# Patient Record
Sex: MALE | Race: WHITE | Employment: OTHER | URBAN - METROPOLITAN AREA
[De-identification: names, ages, dates, MRNs, and addresses within clinical notes are randomized per-mention and may not be internally consistent; named-entity substitution may affect disease eponyms.]

---

## 2017-01-01 ENCOUNTER — HOSPITAL ENCOUNTER (EMERGENCY)
Facility: CLINIC | Age: 57
Discharge: HOME | End: 2017-11-03
Attending: FAMILY MEDICINE | Admitting: FAMILY MEDICINE
Payer: COMMERCIAL

## 2017-01-01 ENCOUNTER — ANESTHESIA EVENT (OUTPATIENT)
Dept: SURGERY | Facility: CLINIC | Age: 57
End: 2017-01-01
Payer: COMMERCIAL

## 2017-01-01 ENCOUNTER — HOSPITAL ENCOUNTER (OUTPATIENT)
Dept: CARDIOLOGY | Facility: CLINIC | Age: 57
Discharge: HOME | End: 2017-12-07
Attending: INTERNAL MEDICINE | Admitting: INTERNAL MEDICINE
Payer: COMMERCIAL

## 2017-01-01 ENCOUNTER — HOSPITAL ENCOUNTER (OUTPATIENT)
Dept: NUCLEAR MEDICINE | Facility: CLINIC | Age: 57
Setting detail: NUCLEAR MEDICINE
Discharge: HOME | End: 2017-11-29
Attending: FAMILY MEDICINE | Admitting: FAMILY MEDICINE
Payer: COMMERCIAL

## 2017-01-01 ENCOUNTER — OFFICE VISIT (OUTPATIENT)
Dept: FAMILY MEDICINE | Facility: CLINIC | Age: 57
End: 2017-01-01
Payer: COMMERCIAL

## 2017-01-01 ENCOUNTER — APPOINTMENT (OUTPATIENT)
Dept: GENERAL RADIOLOGY | Facility: CLINIC | Age: 57
End: 2017-01-01
Attending: EMERGENCY MEDICINE
Payer: COMMERCIAL

## 2017-01-01 ENCOUNTER — HOSPITAL ENCOUNTER (OUTPATIENT)
Dept: CT IMAGING | Facility: CLINIC | Age: 57
Discharge: HOME | End: 2017-12-20
Attending: INTERNAL MEDICINE | Admitting: INTERNAL MEDICINE
Payer: COMMERCIAL

## 2017-01-01 ENCOUNTER — HOSPITAL ENCOUNTER (OUTPATIENT)
Dept: PET IMAGING | Facility: CLINIC | Age: 57
Discharge: HOME | End: 2017-12-15
Attending: INTERNAL MEDICINE | Admitting: INTERNAL MEDICINE
Payer: COMMERCIAL

## 2017-01-01 ENCOUNTER — OFFICE VISIT (OUTPATIENT)
Dept: SURGERY | Facility: CLINIC | Age: 57
End: 2017-01-01
Payer: COMMERCIAL

## 2017-01-01 ENCOUNTER — APPOINTMENT (OUTPATIENT)
Dept: ULTRASOUND IMAGING | Facility: CLINIC | Age: 57
DRG: 274 | End: 2017-01-01
Attending: HOSPITALIST
Payer: COMMERCIAL

## 2017-01-01 ENCOUNTER — TELEPHONE (OUTPATIENT)
Dept: NUTRITION | Facility: CLINIC | Age: 57
End: 2017-01-01

## 2017-01-01 ENCOUNTER — APPOINTMENT (OUTPATIENT)
Dept: CT IMAGING | Facility: CLINIC | Age: 57
DRG: 274 | End: 2017-01-01
Attending: INTERNAL MEDICINE
Payer: COMMERCIAL

## 2017-01-01 ENCOUNTER — HOSPITAL ENCOUNTER (OUTPATIENT)
Dept: CARDIOLOGY | Facility: CLINIC | Age: 57
Discharge: HOME | End: 2017-11-29
Attending: FAMILY MEDICINE | Admitting: FAMILY MEDICINE
Payer: COMMERCIAL

## 2017-01-01 ENCOUNTER — APPOINTMENT (OUTPATIENT)
Dept: GENERAL RADIOLOGY | Facility: CLINIC | Age: 57
DRG: 274 | End: 2017-01-01
Attending: RADIOLOGY
Payer: COMMERCIAL

## 2017-01-01 ENCOUNTER — APPOINTMENT (OUTPATIENT)
Dept: CT IMAGING | Facility: CLINIC | Age: 57
DRG: 274 | End: 2017-01-01
Attending: HOSPITALIST
Payer: COMMERCIAL

## 2017-01-01 ENCOUNTER — HOSPITAL ENCOUNTER (OUTPATIENT)
Dept: MRI IMAGING | Facility: CLINIC | Age: 57
Discharge: HOME | End: 2017-12-29
Attending: INTERNAL MEDICINE | Admitting: INTERNAL MEDICINE
Payer: COMMERCIAL

## 2017-01-01 ENCOUNTER — TELEPHONE (OUTPATIENT)
Dept: FAMILY MEDICINE | Facility: CLINIC | Age: 57
End: 2017-01-01

## 2017-01-01 ENCOUNTER — HOSPITAL ENCOUNTER (EMERGENCY)
Facility: CLINIC | Age: 57
Discharge: OTHER INSTITUTION | End: 2017-12-07
Attending: EMERGENCY MEDICINE | Admitting: EMERGENCY MEDICINE
Payer: COMMERCIAL

## 2017-01-01 ENCOUNTER — CARE COORDINATION (OUTPATIENT)
Dept: ONCOLOGY | Facility: CLINIC | Age: 57
End: 2017-01-01

## 2017-01-01 ENCOUNTER — HOSPITAL ENCOUNTER (OUTPATIENT)
Dept: GENERAL RADIOLOGY | Facility: CLINIC | Age: 57
End: 2017-12-20
Attending: RADIOLOGY
Payer: COMMERCIAL

## 2017-01-01 ENCOUNTER — ONCOLOGY VISIT (OUTPATIENT)
Dept: ONCOLOGY | Facility: CLINIC | Age: 57
End: 2017-01-01
Attending: INTERNAL MEDICINE
Payer: COMMERCIAL

## 2017-01-01 ENCOUNTER — APPOINTMENT (OUTPATIENT)
Dept: CARDIOLOGY | Facility: CLINIC | Age: 57
DRG: 274 | End: 2017-01-01
Attending: PHYSICIAN ASSISTANT
Payer: COMMERCIAL

## 2017-01-01 ENCOUNTER — OFFICE VISIT (OUTPATIENT)
Dept: CARDIOLOGY | Facility: CLINIC | Age: 57
End: 2017-01-01

## 2017-01-01 ENCOUNTER — HOSPITAL ENCOUNTER (INPATIENT)
Facility: CLINIC | Age: 57
LOS: 2 days | Discharge: HOME | DRG: 274 | End: 2017-12-09
Attending: INTERNAL MEDICINE | Admitting: INTERNAL MEDICINE
Payer: COMMERCIAL

## 2017-01-01 VITALS
RESPIRATION RATE: 16 BRPM | TEMPERATURE: 98.6 F | HEART RATE: 69 BPM | WEIGHT: 162.48 LBS | OXYGEN SATURATION: 95 % | BODY MASS INDEX: 23.66 KG/M2 | DIASTOLIC BLOOD PRESSURE: 78 MMHG | SYSTOLIC BLOOD PRESSURE: 117 MMHG

## 2017-01-01 VITALS
SYSTOLIC BLOOD PRESSURE: 139 MMHG | HEART RATE: 56 BPM | BODY MASS INDEX: 21.42 KG/M2 | DIASTOLIC BLOOD PRESSURE: 87 MMHG | OXYGEN SATURATION: 99 % | WEIGHT: 149.6 LBS | RESPIRATION RATE: 18 BRPM | HEIGHT: 70 IN | TEMPERATURE: 96.3 F

## 2017-01-01 VITALS
DIASTOLIC BLOOD PRESSURE: 60 MMHG | SYSTOLIC BLOOD PRESSURE: 122 MMHG | HEART RATE: 88 BPM | TEMPERATURE: 97.7 F | WEIGHT: 156 LBS | HEIGHT: 70 IN | BODY MASS INDEX: 22.33 KG/M2

## 2017-01-01 VITALS
SYSTOLIC BLOOD PRESSURE: 138 MMHG | DIASTOLIC BLOOD PRESSURE: 90 MMHG | RESPIRATION RATE: 16 BRPM | OXYGEN SATURATION: 95 % | HEART RATE: 91 BPM | TEMPERATURE: 97.5 F

## 2017-01-01 VITALS
OXYGEN SATURATION: 97 % | DIASTOLIC BLOOD PRESSURE: 85 MMHG | TEMPERATURE: 97.5 F | BODY MASS INDEX: 23.3 KG/M2 | HEIGHT: 69 IN | HEART RATE: 67 BPM | RESPIRATION RATE: 16 BRPM | SYSTOLIC BLOOD PRESSURE: 127 MMHG | WEIGHT: 157.3 LBS

## 2017-01-01 VITALS
BODY MASS INDEX: 21.33 KG/M2 | WEIGHT: 149 LBS | TEMPERATURE: 97.8 F | RESPIRATION RATE: 14 BRPM | HEART RATE: 60 BPM | SYSTOLIC BLOOD PRESSURE: 111 MMHG | HEIGHT: 70 IN | DIASTOLIC BLOOD PRESSURE: 78 MMHG

## 2017-01-01 VITALS
SYSTOLIC BLOOD PRESSURE: 100 MMHG | DIASTOLIC BLOOD PRESSURE: 70 MMHG | WEIGHT: 158.6 LBS | OXYGEN SATURATION: 98 % | TEMPERATURE: 98.4 F | HEIGHT: 70 IN | BODY MASS INDEX: 22.71 KG/M2 | HEART RATE: 154 BPM

## 2017-01-01 VITALS
WEIGHT: 166.2 LBS | BODY MASS INDEX: 24.62 KG/M2 | DIASTOLIC BLOOD PRESSURE: 86 MMHG | OXYGEN SATURATION: 100 % | HEIGHT: 69 IN | HEART RATE: 140 BPM | SYSTOLIC BLOOD PRESSURE: 122 MMHG

## 2017-01-01 VITALS
WEIGHT: 166 LBS | TEMPERATURE: 97.4 F | RESPIRATION RATE: 12 BRPM | BODY MASS INDEX: 24.16 KG/M2 | HEART RATE: 141 BPM | DIASTOLIC BLOOD PRESSURE: 85 MMHG | OXYGEN SATURATION: 96 % | SYSTOLIC BLOOD PRESSURE: 127 MMHG

## 2017-01-01 VITALS
DIASTOLIC BLOOD PRESSURE: 78 MMHG | OXYGEN SATURATION: 99 % | HEART RATE: 68 BPM | TEMPERATURE: 98 F | RESPIRATION RATE: 16 BRPM | SYSTOLIC BLOOD PRESSURE: 118 MMHG

## 2017-01-01 VITALS
HEART RATE: 148 BPM | TEMPERATURE: 97.8 F | RESPIRATION RATE: 18 BRPM | HEIGHT: 70 IN | SYSTOLIC BLOOD PRESSURE: 117 MMHG | DIASTOLIC BLOOD PRESSURE: 77 MMHG | WEIGHT: 166 LBS | BODY MASS INDEX: 23.77 KG/M2 | OXYGEN SATURATION: 97 %

## 2017-01-01 DIAGNOSIS — I48.92 ATRIAL FLUTTER, UNSPECIFIED TYPE (H): ICD-10-CM

## 2017-01-01 DIAGNOSIS — R59.0 MEDIASTINAL LYMPHADENOPATHY: ICD-10-CM

## 2017-01-01 DIAGNOSIS — K21.9 GASTROESOPHAGEAL REFLUX DISEASE WITHOUT ESOPHAGITIS: ICD-10-CM

## 2017-01-01 DIAGNOSIS — I48.3 TYPICAL ATRIAL FLUTTER (H): Primary | ICD-10-CM

## 2017-01-01 DIAGNOSIS — J90 PLEURAL EFFUSION: ICD-10-CM

## 2017-01-01 DIAGNOSIS — C34.92 NON-SMALL CELL CANCER OF LEFT LUNG (H): ICD-10-CM

## 2017-01-01 DIAGNOSIS — I48.92 ATRIAL FLUTTER WITH RAPID VENTRICULAR RESPONSE (H): ICD-10-CM

## 2017-01-01 DIAGNOSIS — C34.90 NON-SMALL CELL CARCINOMA OF LUNG (H): Primary | ICD-10-CM

## 2017-01-01 DIAGNOSIS — R91.8 LUNG MASS: ICD-10-CM

## 2017-01-01 DIAGNOSIS — F17.210 CIGARETTE NICOTINE DEPENDENCE WITHOUT COMPLICATION: ICD-10-CM

## 2017-01-01 DIAGNOSIS — I42.9 CARDIOMYOPATHY, UNSPECIFIED TYPE (H): Primary | ICD-10-CM

## 2017-01-01 DIAGNOSIS — R10.84 ABDOMINAL PAIN, GENERALIZED: ICD-10-CM

## 2017-01-01 DIAGNOSIS — I50.21 ACUTE SYSTOLIC HEART FAILURE (H): ICD-10-CM

## 2017-01-01 DIAGNOSIS — C34.02 MALIGNANT NEOPLASM OF HILUS OF LEFT LUNG (H): Primary | ICD-10-CM

## 2017-01-01 DIAGNOSIS — R91.8 LUNG MASS: Primary | ICD-10-CM

## 2017-01-01 DIAGNOSIS — R00.0 TACHYCARDIA: Primary | ICD-10-CM

## 2017-01-01 DIAGNOSIS — R06.09 DOE (DYSPNEA ON EXERTION): ICD-10-CM

## 2017-01-01 DIAGNOSIS — I49.9 IRREGULAR HEART BEAT: ICD-10-CM

## 2017-01-01 DIAGNOSIS — R06.09 DYSPNEA ON EXERTION: ICD-10-CM

## 2017-01-01 DIAGNOSIS — I49.9 IRREGULAR HEART BEAT: Primary | ICD-10-CM

## 2017-01-01 DIAGNOSIS — I48.92 ATRIAL FLUTTER (H): ICD-10-CM

## 2017-01-01 DIAGNOSIS — R07.9 ACUTE CHEST PAIN: ICD-10-CM

## 2017-01-01 DIAGNOSIS — Z71.6 ENCOUNTER FOR TOBACCO USE CESSATION COUNSELING: ICD-10-CM

## 2017-01-01 DIAGNOSIS — C34.90 NON-SMALL CELL CARCINOMA OF LUNG (H): ICD-10-CM

## 2017-01-01 DIAGNOSIS — R07.9 CHEST PAIN, UNSPECIFIED TYPE: ICD-10-CM

## 2017-01-01 DIAGNOSIS — I42.9 SECONDARY CARDIOMYOPATHY (H): ICD-10-CM

## 2017-01-01 LAB
ALBUMIN SERPL-MCNC: 3.4 G/DL (ref 3.4–5)
ALP SERPL-CCNC: 122 U/L (ref 40–150)
ALT SERPL W P-5'-P-CCNC: 30 U/L (ref 0–70)
ANION GAP SERPL CALCULATED.3IONS-SCNC: 5 MMOL/L (ref 3–14)
ANION GAP SERPL CALCULATED.3IONS-SCNC: 6 MMOL/L (ref 3–14)
ANION GAP SERPL CALCULATED.3IONS-SCNC: 7 MMOL/L (ref 3–14)
ANION GAP SERPL CALCULATED.3IONS-SCNC: 8 MMOL/L (ref 3–14)
ANION GAP SERPL CALCULATED.3IONS-SCNC: NORMAL MMOL/L (ref 6–17)
APPEARANCE FLD: NORMAL
APTT PPP: 38 SEC (ref 22–37)
AST SERPL W P-5'-P-CCNC: 22 U/L (ref 0–45)
BACTERIA SPEC CULT: NO GROWTH
BASOPHILS # BLD AUTO: 0 10E9/L (ref 0–0.2)
BASOPHILS # BLD AUTO: 0 10E9/L (ref 0–0.2)
BASOPHILS NFR BLD AUTO: 0.3 %
BASOPHILS NFR BLD AUTO: 0.3 %
BILIRUB SERPL-MCNC: 0.6 MG/DL (ref 0.2–1.3)
BUN SERPL-MCNC: 15 MG/DL (ref 7–30)
BUN SERPL-MCNC: 20 MG/DL (ref 7–30)
BUN SERPL-MCNC: 23 MG/DL (ref 7–30)
BUN SERPL-MCNC: 24 MG/DL (ref 7–30)
BUN SERPL-MCNC: NORMAL MG/DL (ref 7–30)
CALCIUM SERPL-MCNC: 8.5 MG/DL (ref 8.5–10.1)
CALCIUM SERPL-MCNC: 8.6 MG/DL (ref 8.5–10.1)
CALCIUM SERPL-MCNC: 8.7 MG/DL (ref 8.5–10.1)
CALCIUM SERPL-MCNC: 8.7 MG/DL (ref 8.5–10.1)
CALCIUM SERPL-MCNC: NORMAL MG/DL (ref 8.5–10.1)
CHLORIDE SERPL-SCNC: 105 MMOL/L (ref 94–109)
CHLORIDE SERPL-SCNC: 106 MMOL/L (ref 94–109)
CHLORIDE SERPL-SCNC: 107 MMOL/L (ref 94–109)
CHLORIDE SERPL-SCNC: 107 MMOL/L (ref 94–109)
CHLORIDE SERPL-SCNC: NORMAL MMOL/L (ref 94–109)
CO2 SERPL-SCNC: 25 MMOL/L (ref 20–32)
CO2 SERPL-SCNC: 26 MMOL/L (ref 20–32)
CO2 SERPL-SCNC: 29 MMOL/L (ref 20–32)
CO2 SERPL-SCNC: 29 MMOL/L (ref 20–32)
CO2 SERPL-SCNC: NORMAL MMOL/L (ref 20–32)
COLOR FLD: YELLOW
COPATH REPORT: NORMAL
CREAT SERPL-MCNC: 0.95 MG/DL (ref 0.66–1.25)
CREAT SERPL-MCNC: 0.95 MG/DL (ref 0.66–1.25)
CREAT SERPL-MCNC: 1 MG/DL (ref 0.66–1.25)
CREAT SERPL-MCNC: 1.13 MG/DL (ref 0.66–1.25)
CREAT SERPL-MCNC: NORMAL MG/DL (ref 0.66–1.25)
DIFFERENTIAL METHOD BLD: NORMAL
EOSINOPHIL # BLD AUTO: 0 10E9/L (ref 0–0.7)
EOSINOPHIL # BLD AUTO: 0.1 10E9/L (ref 0–0.7)
EOSINOPHIL NFR BLD AUTO: 0.5 %
EOSINOPHIL NFR BLD AUTO: 0.6 %
ERYTHROCYTE [DISTWIDTH] IN BLOOD BY AUTOMATED COUNT: 12.9 % (ref 10–15)
ERYTHROCYTE [DISTWIDTH] IN BLOOD BY AUTOMATED COUNT: 13 % (ref 10–15)
ERYTHROCYTE [DISTWIDTH] IN BLOOD BY AUTOMATED COUNT: 13.2 % (ref 10–15)
ERYTHROCYTE [DISTWIDTH] IN BLOOD BY AUTOMATED COUNT: 13.5 % (ref 10–15)
ERYTHROCYTE [DISTWIDTH] IN BLOOD BY AUTOMATED COUNT: NORMAL % (ref 10–15)
GFR SERPL CREATININE-BSD FRML MDRD: 66 ML/MIN/1.7M2
GFR SERPL CREATININE-BSD FRML MDRD: 77 ML/MIN/1.7M2
GFR SERPL CREATININE-BSD FRML MDRD: 81 ML/MIN/1.7M2
GFR SERPL CREATININE-BSD FRML MDRD: 81 ML/MIN/1.7M2
GFR SERPL CREATININE-BSD FRML MDRD: NORMAL ML/MIN/1.7M2
GLUCOSE BLDC GLUCOMTR-MCNC: 94 MG/DL (ref 70–99)
GLUCOSE FLD-MCNC: 88 MG/DL
GLUCOSE SERPL-MCNC: 77 MG/DL (ref 70–99)
GLUCOSE SERPL-MCNC: 88 MG/DL (ref 70–99)
GLUCOSE SERPL-MCNC: 90 MG/DL (ref 70–99)
GLUCOSE SERPL-MCNC: 96 MG/DL (ref 70–99)
GLUCOSE SERPL-MCNC: NORMAL MG/DL (ref 70–99)
GRAM STN SPEC: NORMAL
GRAM STN SPEC: NORMAL
HCT VFR BLD AUTO: 41.2 % (ref 40–53)
HCT VFR BLD AUTO: 42.3 % (ref 40–53)
HCT VFR BLD AUTO: 42.7 % (ref 40–53)
HCT VFR BLD AUTO: 44 % (ref 40–53)
HCT VFR BLD AUTO: 44.8 % (ref 40–53)
HCT VFR BLD AUTO: NORMAL % (ref 40–53)
HGB BLD-MCNC: 13.7 G/DL (ref 13.3–17.7)
HGB BLD-MCNC: 14 G/DL (ref 13.3–17.7)
HGB BLD-MCNC: 14.5 G/DL (ref 13.3–17.7)
HGB BLD-MCNC: 14.8 G/DL (ref 13.3–17.7)
HGB BLD-MCNC: 15 G/DL (ref 13.3–17.7)
HGB BLD-MCNC: 15.2 G/DL (ref 13.3–17.7)
HGB BLD-MCNC: NORMAL G/DL (ref 13.3–17.7)
IMM GRANULOCYTES # BLD: 0 10E9/L (ref 0–0.4)
IMM GRANULOCYTES # BLD: 0 10E9/L (ref 0–0.4)
IMM GRANULOCYTES NFR BLD: 0.2 %
IMM GRANULOCYTES NFR BLD: 0.3 %
INR PPP: 1.05 (ref 0.86–1.14)
INR PPP: 1.09 (ref 0.86–1.14)
INTERPRETATION ECG - MUSE: NORMAL
INTERPRETATION ECG - MUSE: NORMAL
LAB SCANNED RESULT: NORMAL
LDH FLD L TO P-CCNC: 219 U/L
LDH SERPL L TO P-CCNC: 357 U/L (ref 85–227)
LMWH PPP CHRO-ACNC: 0.16 IU/ML
LMWH PPP CHRO-ACNC: 0.22 IU/ML
LMWH PPP CHRO-ACNC: 0.26 IU/ML
LMWH PPP CHRO-ACNC: 1.62 IU/ML
LYMPHOCYTES # BLD AUTO: 1.4 10E9/L (ref 0.8–5.3)
LYMPHOCYTES # BLD AUTO: 1.8 10E9/L (ref 0.8–5.3)
LYMPHOCYTES NFR BLD AUTO: 17.4 %
LYMPHOCYTES NFR BLD AUTO: 28.7 %
LYMPHOCYTES NFR FLD MANUAL: 80 %
MCH RBC QN AUTO: 29.5 PG (ref 26.5–33)
MCH RBC QN AUTO: 29.9 PG (ref 26.5–33)
MCH RBC QN AUTO: 30.1 PG (ref 26.5–33)
MCH RBC QN AUTO: 30.8 PG (ref 26.5–33)
MCH RBC QN AUTO: NORMAL PG (ref 26.5–33)
MCHC RBC AUTO-ENTMCNC: 33.1 G/DL (ref 31.5–36.5)
MCHC RBC AUTO-ENTMCNC: 33.9 G/DL (ref 31.5–36.5)
MCHC RBC AUTO-ENTMCNC: 34.1 G/DL (ref 31.5–36.5)
MCHC RBC AUTO-ENTMCNC: 34.7 G/DL (ref 31.5–36.5)
MCHC RBC AUTO-ENTMCNC: NORMAL G/DL (ref 31.5–36.5)
MCV RBC AUTO: 88 FL (ref 78–100)
MCV RBC AUTO: 88 FL (ref 78–100)
MCV RBC AUTO: 89 FL (ref 78–100)
MCV RBC AUTO: 89 FL (ref 78–100)
MCV RBC AUTO: NORMAL FL (ref 78–100)
MONOCYTES # BLD AUTO: 0.8 10E9/L (ref 0–1.3)
MONOCYTES # BLD AUTO: 1.1 10E9/L (ref 0–1.3)
MONOCYTES NFR BLD AUTO: 12.1 %
MONOCYTES NFR BLD AUTO: 13.6 %
MONOS+MACROS NFR FLD MANUAL: 3 %
NEUTROPHILS # BLD AUTO: 3.7 10E9/L (ref 1.6–8.3)
NEUTROPHILS # BLD AUTO: 5.4 10E9/L (ref 1.6–8.3)
NEUTROPHILS NFR BLD AUTO: 58.2 %
NEUTROPHILS NFR BLD AUTO: 67.8 %
NEUTS BAND NFR FLD MANUAL: 17 %
NT-PROBNP SERPL-MCNC: 1314 PG/ML (ref 0–900)
PLATELET # BLD AUTO: 190 10E9/L (ref 150–450)
PLATELET # BLD AUTO: 215 10E9/L (ref 150–450)
PLATELET # BLD AUTO: 233 10E9/L (ref 150–450)
PLATELET # BLD AUTO: 246 10E9/L (ref 150–450)
PLATELET # BLD AUTO: NORMAL 10E9/L (ref 150–450)
POTASSIUM SERPL-SCNC: 4 MMOL/L (ref 3.4–5.3)
POTASSIUM SERPL-SCNC: 4.1 MMOL/L (ref 3.4–5.3)
POTASSIUM SERPL-SCNC: 4.2 MMOL/L (ref 3.4–5.3)
POTASSIUM SERPL-SCNC: 4.2 MMOL/L (ref 3.4–5.3)
POTASSIUM SERPL-SCNC: NORMAL MMOL/L (ref 3.4–5.3)
PROT FLD-MCNC: 2.2 G/DL
PROT SERPL-MCNC: 5.9 G/DL (ref 6.8–8.8)
PROT SERPL-MCNC: 6.6 G/DL (ref 6.8–8.8)
RADIOLOGIST FLAGS: ABNORMAL
RBC # BLD AUTO: 4.75 10E12/L (ref 4.4–5.9)
RBC # BLD AUTO: 4.8 10E12/L (ref 4.4–5.9)
RBC # BLD AUTO: 4.98 10E12/L (ref 4.4–5.9)
RBC # BLD AUTO: 5.08 10E12/L (ref 4.4–5.9)
RBC # BLD AUTO: NORMAL 10E12/L (ref 4.4–5.9)
SODIUM SERPL-SCNC: 139 MMOL/L (ref 133–144)
SODIUM SERPL-SCNC: 140 MMOL/L (ref 133–144)
SODIUM SERPL-SCNC: 140 MMOL/L (ref 133–144)
SODIUM SERPL-SCNC: 141 MMOL/L (ref 133–144)
SODIUM SERPL-SCNC: NORMAL MMOL/L (ref 133–144)
SPECIMEN SOURCE FLD: NORMAL
SPECIMEN SOURCE: NORMAL
SPECIMEN SOURCE: NORMAL
TROPONIN I SERPL-MCNC: <0.015 UG/L (ref 0–0.04)
TSH SERPL DL<=0.005 MIU/L-ACNC: 2.35 MU/L (ref 0.4–4)
TSH SERPL DL<=0.005 MIU/L-ACNC: 2.84 MU/L (ref 0.4–4)
TSH SERPL DL<=0.005 MIU/L-ACNC: NORMAL MU/L (ref 0.4–4)
WBC # BLD AUTO: 6.4 10E9/L (ref 4–11)
WBC # BLD AUTO: 7.4 10E9/L (ref 4–11)
WBC # BLD AUTO: 7.9 10E9/L (ref 4–11)
WBC # BLD AUTO: 8 10E9/L (ref 4–11)
WBC # BLD AUTO: NORMAL 10E9/L (ref 4–11)
WBC # FLD AUTO: 800 /UL

## 2017-01-01 PROCEDURE — 93312 ECHO TRANSESOPHAGEAL: CPT | Mod: 26 | Performed by: INTERNAL MEDICINE

## 2017-01-01 PROCEDURE — 93613 INTRACARDIAC EPHYS 3D MAPG: CPT | Performed by: INTERNAL MEDICINE

## 2017-01-01 PROCEDURE — 99285 EMERGENCY DEPT VISIT HI MDM: CPT | Mod: 25 | Performed by: EMERGENCY MEDICINE

## 2017-01-01 PROCEDURE — C1732 CATH, EP, DIAG/ABL, 3D/VECT: HCPCS

## 2017-01-01 PROCEDURE — 25000128 H RX IP 250 OP 636: Performed by: INTERNAL MEDICINE

## 2017-01-01 PROCEDURE — 99232 SBSQ HOSP IP/OBS MODERATE 35: CPT | Performed by: INTERNAL MEDICINE

## 2017-01-01 PROCEDURE — 82962 GLUCOSE BLOOD TEST: CPT

## 2017-01-01 PROCEDURE — 96368 THER/DIAG CONCURRENT INF: CPT | Performed by: EMERGENCY MEDICINE

## 2017-01-01 PROCEDURE — 85018 HEMOGLOBIN: CPT | Performed by: INTERNAL MEDICINE

## 2017-01-01 PROCEDURE — 85027 COMPLETE CBC AUTOMATED: CPT | Performed by: PHYSICIAN ASSISTANT

## 2017-01-01 PROCEDURE — 93010 ELECTROCARDIOGRAM REPORT: CPT | Performed by: INTERNAL MEDICINE

## 2017-01-01 PROCEDURE — 99211 OFF/OP EST MAY X REQ PHY/QHP: CPT

## 2017-01-01 PROCEDURE — 81445 SO NEO GSAP 5-50DNA/DNA&RNA: CPT | Performed by: RADIOLOGY

## 2017-01-01 PROCEDURE — 88112 CYTOPATH CELL ENHANCE TECH: CPT | Mod: 26 | Performed by: HOSPITALIST

## 2017-01-01 PROCEDURE — 71250 CT THORAX DX C-: CPT

## 2017-01-01 PROCEDURE — 00000155 ZZHCL STATISTIC H-CELL BLOCK W/STAIN: Performed by: HOSPITALIST

## 2017-01-01 PROCEDURE — 93018 CV STRESS TEST I&R ONLY: CPT | Performed by: INTERNAL MEDICINE

## 2017-01-01 PROCEDURE — 25000132 ZZH RX MED GY IP 250 OP 250 PS 637: Performed by: FAMILY MEDICINE

## 2017-01-01 PROCEDURE — 21000001 ZZH R&B HEART CARE

## 2017-01-01 PROCEDURE — 25000132 ZZH RX MED GY IP 250 OP 250 PS 637: Performed by: INTERNAL MEDICINE

## 2017-01-01 PROCEDURE — 34300033 ZZH RX 343: Performed by: INTERNAL MEDICINE

## 2017-01-01 PROCEDURE — 85025 COMPLETE CBC W/AUTO DIFF WBC: CPT | Performed by: EMERGENCY MEDICINE

## 2017-01-01 PROCEDURE — 93306 TTE W/DOPPLER COMPLETE: CPT | Mod: 26 | Performed by: INTERNAL MEDICINE

## 2017-01-01 PROCEDURE — 99152 MOD SED SAME PHYS/QHP 5/>YRS: CPT

## 2017-01-01 PROCEDURE — 83615 LACTATE (LD) (LDH) ENZYME: CPT | Performed by: PHYSICIAN ASSISTANT

## 2017-01-01 PROCEDURE — 99221 1ST HOSP IP/OBS SF/LOW 40: CPT | Mod: 25 | Performed by: INTERNAL MEDICINE

## 2017-01-01 PROCEDURE — 99223 1ST HOSP IP/OBS HIGH 75: CPT | Mod: AI | Performed by: INTERNAL MEDICINE

## 2017-01-01 PROCEDURE — 25000128 H RX IP 250 OP 636: Performed by: FAMILY MEDICINE

## 2017-01-01 PROCEDURE — 93325 DOPPLER ECHO COLOR FLOW MAPG: CPT

## 2017-01-01 PROCEDURE — 99233 SBSQ HOSP IP/OBS HIGH 50: CPT | Performed by: INTERNAL MEDICINE

## 2017-01-01 PROCEDURE — 25000128 H RX IP 250 OP 636: Performed by: EMERGENCY MEDICINE

## 2017-01-01 PROCEDURE — 93621 COMP EP EVL L PAC&REC C SINS: CPT

## 2017-01-01 PROCEDURE — 74177 CT ABD & PELVIS W/CONTRAST: CPT

## 2017-01-01 PROCEDURE — 93016 CV STRESS TEST SUPVJ ONLY: CPT | Performed by: INTERNAL MEDICINE

## 2017-01-01 PROCEDURE — A9585 GADOBUTROL INJECTION: HCPCS | Performed by: INTERNAL MEDICINE

## 2017-01-01 PROCEDURE — 99215 OFFICE O/P EST HI 40 MIN: CPT | Performed by: INTERNAL MEDICINE

## 2017-01-01 PROCEDURE — 4A0234Z MEASUREMENT OF CARDIAC ELECTRICAL ACTIVITY, PERCUTANEOUS APPROACH: ICD-10-PCS | Performed by: INTERNAL MEDICINE

## 2017-01-01 PROCEDURE — 80048 BASIC METABOLIC PNL TOTAL CA: CPT | Performed by: PHYSICIAN ASSISTANT

## 2017-01-01 PROCEDURE — 88305 TISSUE EXAM BY PATHOLOGIST: CPT | Performed by: HOSPITALIST

## 2017-01-01 PROCEDURE — 27210782 ZZH KIT EP TOTES DISP CR7

## 2017-01-01 PROCEDURE — 25000125 ZZHC RX 250: Performed by: FAMILY MEDICINE

## 2017-01-01 PROCEDURE — 88305 TISSUE EXAM BY PATHOLOGIST: CPT | Performed by: RADIOLOGY

## 2017-01-01 PROCEDURE — 99231 SBSQ HOSP IP/OBS SF/LOW 25: CPT | Performed by: INTERNAL MEDICINE

## 2017-01-01 PROCEDURE — 78452 HT MUSCLE IMAGE SPECT MULT: CPT

## 2017-01-01 PROCEDURE — 36415 COLL VENOUS BLD VENIPUNCTURE: CPT | Performed by: INTERNAL MEDICINE

## 2017-01-01 PROCEDURE — 84157 ASSAY OF PROTEIN OTHER: CPT | Performed by: HOSPITALIST

## 2017-01-01 PROCEDURE — 4A023FZ MEASUREMENT OF CARDIAC RHYTHM, PERCUTANEOUS APPROACH: ICD-10-PCS | Performed by: INTERNAL MEDICINE

## 2017-01-01 PROCEDURE — 99205 OFFICE O/P NEW HI 60 MIN: CPT | Performed by: INTERNAL MEDICINE

## 2017-01-01 PROCEDURE — 99203 OFFICE O/P NEW LOW 30 MIN: CPT | Performed by: SURGERY

## 2017-01-01 PROCEDURE — 85520 HEPARIN ASSAY: CPT | Performed by: INTERNAL MEDICINE

## 2017-01-01 PROCEDURE — 78816 PET IMAGE W/CT FULL BODY: CPT | Mod: PI

## 2017-01-01 PROCEDURE — 25000132 ZZH RX MED GY IP 250 OP 250 PS 637: Performed by: EMERGENCY MEDICINE

## 2017-01-01 PROCEDURE — 99284 EMERGENCY DEPT VISIT MOD MDM: CPT | Mod: Z6 | Performed by: FAMILY MEDICINE

## 2017-01-01 PROCEDURE — 25000125 ZZHC RX 250: Performed by: INTERNAL MEDICINE

## 2017-01-01 PROCEDURE — 93613 INTRACARDIAC EPHYS 3D MAPG: CPT

## 2017-01-01 PROCEDURE — 32555 ASPIRATE PLEURA W/ IMAGING: CPT

## 2017-01-01 PROCEDURE — 93000 ELECTROCARDIOGRAM COMPLETE: CPT | Performed by: FAMILY MEDICINE

## 2017-01-01 PROCEDURE — 84484 ASSAY OF TROPONIN QUANT: CPT | Performed by: EMERGENCY MEDICINE

## 2017-01-01 PROCEDURE — 80048 BASIC METABOLIC PNL TOTAL CA: CPT | Performed by: FAMILY MEDICINE

## 2017-01-01 PROCEDURE — 99284 EMERGENCY DEPT VISIT MOD MDM: CPT | Mod: 25 | Performed by: FAMILY MEDICINE

## 2017-01-01 PROCEDURE — 85025 COMPLETE CBC W/AUTO DIFF WBC: CPT | Performed by: FAMILY MEDICINE

## 2017-01-01 PROCEDURE — 70553 MRI BRAIN STEM W/O & W/DYE: CPT

## 2017-01-01 PROCEDURE — 02583ZZ DESTRUCTION OF CONDUCTION MECHANISM, PERCUTANEOUS APPROACH: ICD-10-PCS | Performed by: INTERNAL MEDICINE

## 2017-01-01 PROCEDURE — 96366 THER/PROPH/DIAG IV INF ADDON: CPT | Performed by: EMERGENCY MEDICINE

## 2017-01-01 PROCEDURE — 96375 TX/PRO/DX INJ NEW DRUG ADDON: CPT | Performed by: EMERGENCY MEDICINE

## 2017-01-01 PROCEDURE — 87070 CULTURE OTHR SPECIMN AEROBIC: CPT | Performed by: HOSPITALIST

## 2017-01-01 PROCEDURE — 93005 ELECTROCARDIOGRAM TRACING: CPT

## 2017-01-01 PROCEDURE — A9552 F18 FDG: HCPCS | Performed by: INTERNAL MEDICINE

## 2017-01-01 PROCEDURE — 93621 COMP EP EVL L PAC&REC C SINS: CPT | Mod: 26 | Performed by: INTERNAL MEDICINE

## 2017-01-01 PROCEDURE — 99495 TRANSJ CARE MGMT MOD F2F 14D: CPT | Performed by: NURSE PRACTITIONER

## 2017-01-01 PROCEDURE — 99205 OFFICE O/P NEW HI 60 MIN: CPT | Mod: 25 | Performed by: INTERNAL MEDICINE

## 2017-01-01 PROCEDURE — 36415 COLL VENOUS BLD VENIPUNCTURE: CPT | Performed by: FAMILY MEDICINE

## 2017-01-01 PROCEDURE — 38505 NEEDLE BIOPSY LYMPH NODES: CPT

## 2017-01-01 PROCEDURE — 84443 ASSAY THYROID STIM HORMONE: CPT | Performed by: EMERGENCY MEDICINE

## 2017-01-01 PROCEDURE — 96365 THER/PROPH/DIAG IV INF INIT: CPT | Performed by: EMERGENCY MEDICINE

## 2017-01-01 PROCEDURE — 27210795 ZZH PAD DEFIB QUICK CR4

## 2017-01-01 PROCEDURE — 99239 HOSP IP/OBS DSCHRG MGMT >30: CPT | Performed by: HOSPITALIST

## 2017-01-01 PROCEDURE — 96375 TX/PRO/DX INJ NEW DRUG ADDON: CPT | Performed by: FAMILY MEDICINE

## 2017-01-01 PROCEDURE — 88342 IMHCHEM/IMCYTCHM 1ST ANTB: CPT | Mod: 26 | Performed by: RADIOLOGY

## 2017-01-01 PROCEDURE — 99214 OFFICE O/P EST MOD 30 MIN: CPT | Performed by: FAMILY MEDICINE

## 2017-01-01 PROCEDURE — 93320 DOPPLER ECHO COMPLETE: CPT | Mod: 26 | Performed by: INTERNAL MEDICINE

## 2017-01-01 PROCEDURE — 80048 BASIC METABOLIC PNL TOTAL CA: CPT | Performed by: INTERNAL MEDICINE

## 2017-01-01 PROCEDURE — 88341 IMHCHEM/IMCYTCHM EA ADD ANTB: CPT | Mod: 26 | Performed by: RADIOLOGY

## 2017-01-01 PROCEDURE — 25000128 H RX IP 250 OP 636: Performed by: RADIOLOGY

## 2017-01-01 PROCEDURE — 27210796 ZZH PAD EXTRNAL REFRENCE CARDIAC MAPPING CR14

## 2017-01-01 PROCEDURE — 99233 SBSQ HOSP IP/OBS HIGH 50: CPT | Performed by: HOSPITALIST

## 2017-01-01 PROCEDURE — 87205 SMEAR GRAM STAIN: CPT | Performed by: HOSPITALIST

## 2017-01-01 PROCEDURE — 96374 THER/PROPH/DIAG INJ IV PUSH: CPT | Performed by: FAMILY MEDICINE

## 2017-01-01 PROCEDURE — 71020 XR CHEST 2 VW: CPT

## 2017-01-01 PROCEDURE — 85027 COMPLETE CBC AUTOMATED: CPT | Performed by: INTERNAL MEDICINE

## 2017-01-01 PROCEDURE — 40000986 XR CHEST 1 VW

## 2017-01-01 PROCEDURE — 93653 COMPRE EP EVAL TX SVT: CPT | Performed by: INTERNAL MEDICINE

## 2017-01-01 PROCEDURE — 80053 COMPREHEN METABOLIC PANEL: CPT | Performed by: EMERGENCY MEDICINE

## 2017-01-01 PROCEDURE — 40000857 ZZH STATISTIC TEE INCLUDES SEDATION

## 2017-01-01 PROCEDURE — 84155 ASSAY OF PROTEIN SERUM: CPT | Performed by: PHYSICIAN ASSISTANT

## 2017-01-01 PROCEDURE — 93653 COMPRE EP EVAL TX SVT: CPT

## 2017-01-01 PROCEDURE — 88342 IMHCHEM/IMCYTCHM 1ST ANTB: CPT | Performed by: RADIOLOGY

## 2017-01-01 PROCEDURE — 93005 ELECTROCARDIOGRAM TRACING: CPT | Performed by: FAMILY MEDICINE

## 2017-01-01 PROCEDURE — 25000132 ZZH RX MED GY IP 250 OP 250 PS 637: Performed by: HOSPITALIST

## 2017-01-01 PROCEDURE — 25000128 H RX IP 250 OP 636: Performed by: HOSPITALIST

## 2017-01-01 PROCEDURE — 84443 ASSAY THYROID STIM HORMONE: CPT | Performed by: FAMILY MEDICINE

## 2017-01-01 PROCEDURE — 85520 HEPARIN ASSAY: CPT | Performed by: PHYSICIAN ASSISTANT

## 2017-01-01 PROCEDURE — 02K83ZZ MAP CONDUCTION MECHANISM, PERCUTANEOUS APPROACH: ICD-10-PCS | Performed by: INTERNAL MEDICINE

## 2017-01-01 PROCEDURE — A9502 TC99M TETROFOSMIN: HCPCS | Performed by: FAMILY MEDICINE

## 2017-01-01 PROCEDURE — 85610 PROTHROMBIN TIME: CPT | Performed by: PHYSICIAN ASSISTANT

## 2017-01-01 PROCEDURE — 89051 BODY FLUID CELL COUNT: CPT | Performed by: HOSPITALIST

## 2017-01-01 PROCEDURE — 99152 MOD SED SAME PHYS/QHP 5/>YRS: CPT | Performed by: INTERNAL MEDICINE

## 2017-01-01 PROCEDURE — 0W9B3ZX DRAINAGE OF LEFT PLEURAL CAVITY, PERCUTANEOUS APPROACH, DIAGNOSTIC: ICD-10-PCS | Performed by: RADIOLOGY

## 2017-01-01 PROCEDURE — 78452 HT MUSCLE IMAGE SPECT MULT: CPT | Mod: 26 | Performed by: INTERNAL MEDICINE

## 2017-01-01 PROCEDURE — 93306 TTE W/DOPPLER COMPLETE: CPT

## 2017-01-01 PROCEDURE — 25000125 ZZHC RX 250: Performed by: RADIOLOGY

## 2017-01-01 PROCEDURE — 83615 LACTATE (LD) (LDH) ENZYME: CPT | Performed by: HOSPITALIST

## 2017-01-01 PROCEDURE — 88112 CYTOPATH CELL ENHANCE TECH: CPT | Performed by: HOSPITALIST

## 2017-01-01 PROCEDURE — 36415 COLL VENOUS BLD VENIPUNCTURE: CPT | Performed by: PHYSICIAN ASSISTANT

## 2017-01-01 PROCEDURE — 88305 TISSUE EXAM BY PATHOLOGIST: CPT | Mod: 26 | Performed by: RADIOLOGY

## 2017-01-01 PROCEDURE — 93005 ELECTROCARDIOGRAM TRACING: CPT | Performed by: EMERGENCY MEDICINE

## 2017-01-01 PROCEDURE — 00000159 ZZHCL STATISTIC H-SEND OUTS PREP: Performed by: RADIOLOGY

## 2017-01-01 PROCEDURE — 93325 DOPPLER ECHO COLOR FLOW MAPG: CPT | Mod: 26 | Performed by: INTERNAL MEDICINE

## 2017-01-01 PROCEDURE — 99153 MOD SED SAME PHYS/QHP EA: CPT

## 2017-01-01 PROCEDURE — 88305 TISSUE EXAM BY PATHOLOGIST: CPT | Mod: 26 | Performed by: HOSPITALIST

## 2017-01-01 PROCEDURE — 25000125 ZZHC RX 250: Performed by: EMERGENCY MEDICINE

## 2017-01-01 PROCEDURE — 85018 HEMOGLOBIN: CPT | Performed by: FAMILY MEDICINE

## 2017-01-01 PROCEDURE — 85014 HEMATOCRIT: CPT | Performed by: INTERNAL MEDICINE

## 2017-01-01 PROCEDURE — 83880 ASSAY OF NATRIURETIC PEPTIDE: CPT | Performed by: EMERGENCY MEDICINE

## 2017-01-01 PROCEDURE — 93017 CV STRESS TEST TRACING ONLY: CPT

## 2017-01-01 PROCEDURE — 25000132 ZZH RX MED GY IP 250 OP 250 PS 637: Performed by: PHYSICIAN ASSISTANT

## 2017-01-01 PROCEDURE — 93010 ELECTROCARDIOGRAM REPORT: CPT | Mod: Z6 | Performed by: EMERGENCY MEDICINE

## 2017-01-01 PROCEDURE — 34300033 ZZH RX 343: Performed by: FAMILY MEDICINE

## 2017-01-01 PROCEDURE — 27210995 ZZH RX 272: Performed by: PHYSICIAN ASSISTANT

## 2017-01-01 PROCEDURE — 82945 GLUCOSE OTHER FLUID: CPT | Performed by: HOSPITALIST

## 2017-01-01 PROCEDURE — 88341 IMHCHEM/IMCYTCHM EA ADD ANTB: CPT | Performed by: RADIOLOGY

## 2017-01-01 PROCEDURE — 71260 CT THORAX DX C+: CPT

## 2017-01-01 RX ORDER — ADENOSINE 3 MG/ML
6-12 INJECTION, SOLUTION INTRAVENOUS EVERY 5 MIN PRN
Status: DISCONTINUED | OUTPATIENT
Start: 2017-01-01 | End: 2017-01-01 | Stop reason: HOSPADM

## 2017-01-01 RX ORDER — LIDOCAINE/PRILOCAINE 2.5 %-2.5%
CREAM (GRAM) TOPICAL
Qty: 30 G | Refills: 1 | Status: SHIPPED | OUTPATIENT
Start: 2017-01-01 | End: 2018-01-01

## 2017-01-01 RX ORDER — IBUTILIDE FUMARATE 1 MG/10ML
1 INJECTION, SOLUTION INTRAVENOUS
Status: DISCONTINUED | OUTPATIENT
Start: 2017-01-01 | End: 2017-01-01 | Stop reason: HOSPADM

## 2017-01-01 RX ORDER — METOPROLOL TARTRATE 50 MG
50 TABLET ORAL 2 TIMES DAILY
Qty: 60 TABLET | Refills: 1 | Status: SHIPPED | OUTPATIENT
Start: 2017-01-01 | End: 2017-01-01

## 2017-01-01 RX ORDER — SODIUM CHLORIDE 450 MG/100ML
INJECTION, SOLUTION INTRAVENOUS CONTINUOUS
Status: DISCONTINUED | OUTPATIENT
Start: 2017-01-01 | End: 2017-01-01 | Stop reason: HOSPADM

## 2017-01-01 RX ORDER — ADENOSINE 3 MG/ML
6 INJECTION, SOLUTION INTRAVENOUS EVERY 5 MIN PRN
Status: DISCONTINUED | OUTPATIENT
Start: 2017-01-01 | End: 2017-01-01 | Stop reason: HOSPADM

## 2017-01-01 RX ORDER — NALOXONE HYDROCHLORIDE 0.4 MG/ML
0.4 INJECTION, SOLUTION INTRAMUSCULAR; INTRAVENOUS; SUBCUTANEOUS EVERY 5 MIN PRN
Status: DISCONTINUED | OUTPATIENT
Start: 2017-01-01 | End: 2017-01-01 | Stop reason: HOSPADM

## 2017-01-01 RX ORDER — NALOXONE HYDROCHLORIDE 0.4 MG/ML
.1-.4 INJECTION, SOLUTION INTRAMUSCULAR; INTRAVENOUS; SUBCUTANEOUS
Status: DISCONTINUED | OUTPATIENT
Start: 2017-01-01 | End: 2017-01-01 | Stop reason: HOSPADM

## 2017-01-01 RX ORDER — METOPROLOL TARTRATE 25 MG/1
25 TABLET, FILM COATED ORAL ONCE
Status: COMPLETED | OUTPATIENT
Start: 2017-01-01 | End: 2017-01-01

## 2017-01-01 RX ORDER — ONDANSETRON 2 MG/ML
4 INJECTION INTRAMUSCULAR; INTRAVENOUS EVERY 4 HOURS PRN
Status: DISCONTINUED | OUTPATIENT
Start: 2017-01-01 | End: 2017-01-01 | Stop reason: HOSPADM

## 2017-01-01 RX ORDER — SODIUM FLUORIDE 0.1 MG/ML
1 RINSE ORAL 3 TIMES DAILY PRN
COMMUNITY
Start: 2017-01-01 | End: 2018-01-01

## 2017-01-01 RX ORDER — FENTANYL CITRATE 50 UG/ML
25-50 INJECTION, SOLUTION INTRAMUSCULAR; INTRAVENOUS
Status: DISCONTINUED | OUTPATIENT
Start: 2017-01-01 | End: 2017-01-01 | Stop reason: HOSPADM

## 2017-01-01 RX ORDER — METOPROLOL TARTRATE 25 MG/1
25 TABLET, FILM COATED ORAL 2 TIMES DAILY
Qty: 60 TABLET | Refills: 0 | Status: SHIPPED | OUTPATIENT
Start: 2017-01-01 | End: 2017-01-01

## 2017-01-01 RX ORDER — IOPAMIDOL 755 MG/ML
81 INJECTION, SOLUTION INTRAVASCULAR ONCE
Status: COMPLETED | OUTPATIENT
Start: 2017-01-01 | End: 2017-01-01

## 2017-01-01 RX ORDER — CALCIUM CARBONATE 500 MG/1
500-1000 TABLET, CHEWABLE ORAL
Status: DISCONTINUED | OUTPATIENT
Start: 2017-01-01 | End: 2017-01-01 | Stop reason: HOSPADM

## 2017-01-01 RX ORDER — IBUTILIDE FUMARATE 1 MG/10ML
0.01 INJECTION, SOLUTION INTRAVENOUS
Status: DISCONTINUED | OUTPATIENT
Start: 2017-01-01 | End: 2017-01-01 | Stop reason: HOSPADM

## 2017-01-01 RX ORDER — ACETAMINOPHEN 325 MG/1
650 TABLET ORAL EVERY 4 HOURS PRN
Status: DISCONTINUED | OUTPATIENT
Start: 2017-01-01 | End: 2017-01-01

## 2017-01-01 RX ORDER — FENTANYL CITRATE 50 UG/ML
50-100 INJECTION, SOLUTION INTRAMUSCULAR; INTRAVENOUS
Status: COMPLETED | OUTPATIENT
Start: 2017-01-01 | End: 2017-01-01

## 2017-01-01 RX ORDER — SODIUM CHLORIDE 9 MG/ML
INJECTION, SOLUTION INTRAVENOUS CONTINUOUS
Status: DISCONTINUED | OUTPATIENT
Start: 2017-01-01 | End: 2017-01-01

## 2017-01-01 RX ORDER — ASPIRIN 325 MG
325 TABLET ORAL ONCE
Status: COMPLETED | OUTPATIENT
Start: 2017-01-01 | End: 2017-01-01

## 2017-01-01 RX ORDER — LIDOCAINE HYDROCHLORIDE 10 MG/ML
5 INJECTION, SOLUTION EPIDURAL; INFILTRATION; INTRACAUDAL; PERINEURAL ONCE
Status: COMPLETED | OUTPATIENT
Start: 2017-01-01 | End: 2017-01-01

## 2017-01-01 RX ORDER — NICOTINE 21 MG/24HR
1 PATCH, TRANSDERMAL 24 HOURS TRANSDERMAL DAILY
Status: DISCONTINUED | OUTPATIENT
Start: 2017-01-01 | End: 2017-01-01 | Stop reason: HOSPADM

## 2017-01-01 RX ORDER — DOBUTAMINE HYDROCHLORIDE 200 MG/100ML
5-40 INJECTION INTRAVENOUS CONTINUOUS PRN
Status: DISCONTINUED | OUTPATIENT
Start: 2017-01-01 | End: 2017-01-01 | Stop reason: HOSPADM

## 2017-01-01 RX ORDER — LIDOCAINE 40 MG/G
CREAM TOPICAL
Status: DISCONTINUED | OUTPATIENT
Start: 2017-01-01 | End: 2017-01-01 | Stop reason: HOSPADM

## 2017-01-01 RX ORDER — DIPHENHYDRAMINE HYDROCHLORIDE 50 MG/ML
25-50 INJECTION INTRAMUSCULAR; INTRAVENOUS
Status: DISCONTINUED | OUTPATIENT
Start: 2017-01-01 | End: 2017-01-01 | Stop reason: HOSPADM

## 2017-01-01 RX ORDER — FUROSEMIDE 10 MG/ML
20-100 INJECTION INTRAMUSCULAR; INTRAVENOUS
Status: DISCONTINUED | OUTPATIENT
Start: 2017-01-01 | End: 2017-01-01 | Stop reason: HOSPADM

## 2017-01-01 RX ORDER — AMOXICILLIN 250 MG
1 CAPSULE ORAL 2 TIMES DAILY PRN
Status: DISCONTINUED | OUTPATIENT
Start: 2017-01-01 | End: 2017-01-01 | Stop reason: HOSPADM

## 2017-01-01 RX ORDER — LIDOCAINE HYDROCHLORIDE AND EPINEPHRINE 10; 10 MG/ML; UG/ML
10-30 INJECTION, SOLUTION INFILTRATION; PERINEURAL
Status: DISCONTINUED | OUTPATIENT
Start: 2017-01-01 | End: 2017-01-01 | Stop reason: HOSPADM

## 2017-01-01 RX ORDER — ONDANSETRON 4 MG/1
4 TABLET, ORALLY DISINTEGRATING ORAL EVERY 6 HOURS PRN
Status: DISCONTINUED | OUTPATIENT
Start: 2017-01-01 | End: 2017-01-01 | Stop reason: HOSPADM

## 2017-01-01 RX ORDER — PROTAMINE SULFATE 10 MG/ML
5-40 INJECTION, SOLUTION INTRAVENOUS EVERY 10 MIN PRN
Status: DISCONTINUED | OUTPATIENT
Start: 2017-01-01 | End: 2017-01-01 | Stop reason: HOSPADM

## 2017-01-01 RX ORDER — FLUMAZENIL 0.1 MG/ML
0.2 INJECTION, SOLUTION INTRAVENOUS
Status: DISCONTINUED | OUTPATIENT
Start: 2017-01-01 | End: 2017-01-01 | Stop reason: HOSPADM

## 2017-01-01 RX ORDER — BUPIVACAINE HYDROCHLORIDE 2.5 MG/ML
10-30 INJECTION, SOLUTION EPIDURAL; INFILTRATION; INTRACAUDAL
Status: DISCONTINUED | OUTPATIENT
Start: 2017-01-01 | End: 2017-01-01 | Stop reason: HOSPADM

## 2017-01-01 RX ORDER — PANTOPRAZOLE SODIUM 40 MG/1
40 TABLET, DELAYED RELEASE ORAL EVERY MORNING
Qty: 30 TABLET | Refills: 0 | Status: CANCELLED | OUTPATIENT
Start: 2017-01-01

## 2017-01-01 RX ORDER — DILTIAZEM HYDROCHLORIDE 5 MG/ML
20 INJECTION INTRAVENOUS ONCE
Status: COMPLETED | OUTPATIENT
Start: 2017-01-01 | End: 2017-01-01

## 2017-01-01 RX ORDER — KETOROLAC TROMETHAMINE 15 MG/ML
15-30 INJECTION, SOLUTION INTRAMUSCULAR; INTRAVENOUS
Status: DISCONTINUED | OUTPATIENT
Start: 2017-01-01 | End: 2017-01-01 | Stop reason: HOSPADM

## 2017-01-01 RX ORDER — SODIUM CHLORIDE 9 MG/ML
INJECTION, SOLUTION INTRAVENOUS CONTINUOUS PRN
Status: DISCONTINUED | OUTPATIENT
Start: 2017-01-01 | End: 2017-01-01 | Stop reason: HOSPADM

## 2017-01-01 RX ORDER — MEGESTROL ACETATE 40 MG/ML
200 SUSPENSION ORAL DAILY
Qty: 600 ML | Refills: 2 | Status: SHIPPED | OUTPATIENT
Start: 2017-01-01 | End: 2018-01-01

## 2017-01-01 RX ORDER — NALOXONE HYDROCHLORIDE 0.4 MG/ML
.1-.4 INJECTION, SOLUTION INTRAMUSCULAR; INTRAVENOUS; SUBCUTANEOUS
Status: DISCONTINUED | OUTPATIENT
Start: 2017-01-01 | End: 2017-01-01

## 2017-01-01 RX ORDER — ONDANSETRON 2 MG/ML
4 INJECTION INTRAMUSCULAR; INTRAVENOUS EVERY 6 HOURS PRN
Status: DISCONTINUED | OUTPATIENT
Start: 2017-01-01 | End: 2017-01-01 | Stop reason: HOSPADM

## 2017-01-01 RX ORDER — PROCHLORPERAZINE MALEATE 10 MG
10 TABLET ORAL EVERY 6 HOURS PRN
Qty: 30 TABLET | Refills: 5 | Status: SHIPPED | OUTPATIENT
Start: 2017-01-01 | End: 2018-01-01

## 2017-01-01 RX ORDER — GADOBUTROL 604.72 MG/ML
7 INJECTION INTRAVENOUS ONCE
Status: COMPLETED | OUTPATIENT
Start: 2017-01-01 | End: 2017-01-01

## 2017-01-01 RX ORDER — ASPIRIN 81 MG/1
81 TABLET ORAL DAILY
Status: DISCONTINUED | OUTPATIENT
Start: 2017-01-01 | End: 2017-01-01

## 2017-01-01 RX ORDER — PROMETHAZINE HYDROCHLORIDE 25 MG/ML
6.25-25 INJECTION, SOLUTION INTRAMUSCULAR; INTRAVENOUS EVERY 4 HOURS PRN
Status: DISCONTINUED | OUTPATIENT
Start: 2017-01-01 | End: 2017-01-01 | Stop reason: HOSPADM

## 2017-01-01 RX ORDER — AMOXICILLIN 250 MG
2 CAPSULE ORAL 2 TIMES DAILY PRN
Status: DISCONTINUED | OUTPATIENT
Start: 2017-01-01 | End: 2017-01-01 | Stop reason: HOSPADM

## 2017-01-01 RX ORDER — OXYCODONE HYDROCHLORIDE 5 MG/1
5-10 TABLET ORAL
Status: DISCONTINUED | OUTPATIENT
Start: 2017-01-01 | End: 2017-01-01 | Stop reason: HOSPADM

## 2017-01-01 RX ORDER — LIDOCAINE HYDROCHLORIDE 10 MG/ML
10-30 INJECTION, SOLUTION INFILTRATION; PERINEURAL
Status: DISCONTINUED | OUTPATIENT
Start: 2017-01-01 | End: 2017-01-01 | Stop reason: HOSPADM

## 2017-01-01 RX ORDER — LIDOCAINE HYDROCHLORIDE 10 MG/ML
10-30 INJECTION, SOLUTION INFILTRATION; PERINEURAL
Status: COMPLETED | OUTPATIENT
Start: 2017-01-01 | End: 2017-01-01

## 2017-01-01 RX ORDER — LIDOCAINE HYDROCHLORIDE 40 MG/ML
1.5 SOLUTION TOPICAL ONCE
Status: COMPLETED | OUTPATIENT
Start: 2017-01-01 | End: 2017-01-01

## 2017-01-01 RX ORDER — ATROPINE SULFATE 0.1 MG/ML
.5-1 INJECTION INTRAVENOUS
Status: DISCONTINUED | OUTPATIENT
Start: 2017-01-01 | End: 2017-01-01 | Stop reason: HOSPADM

## 2017-01-01 RX ORDER — LORAZEPAM 0.5 MG/1
0.5 TABLET ORAL EVERY 4 HOURS PRN
Qty: 30 TABLET | Refills: 5 | Status: SHIPPED | OUTPATIENT
Start: 2017-01-01 | End: 2018-01-01

## 2017-01-01 RX ORDER — FENTANYL CITRATE 50 UG/ML
25-50 INJECTION, SOLUTION INTRAMUSCULAR; INTRAVENOUS EVERY 5 MIN PRN
Status: DISCONTINUED | OUTPATIENT
Start: 2017-01-01 | End: 2017-01-01 | Stop reason: HOSPADM

## 2017-01-01 RX ORDER — OMEPRAZOLE 40 MG/1
40 CAPSULE, DELAYED RELEASE ORAL DAILY
Qty: 30 CAPSULE | Refills: 1 | Status: SHIPPED | OUTPATIENT
Start: 2017-01-01 | End: 2018-01-01

## 2017-01-01 RX ORDER — IOPAMIDOL 755 MG/ML
80 INJECTION, SOLUTION INTRAVASCULAR ONCE
Status: COMPLETED | OUTPATIENT
Start: 2017-01-01 | End: 2017-01-01

## 2017-01-01 RX ORDER — PROTAMINE SULFATE 10 MG/ML
1-5 INJECTION, SOLUTION INTRAVENOUS
Status: DISCONTINUED | OUTPATIENT
Start: 2017-01-01 | End: 2017-01-01 | Stop reason: HOSPADM

## 2017-01-01 RX ORDER — ACETAMINOPHEN 325 MG/1
650 TABLET ORAL EVERY 4 HOURS PRN
Status: DISCONTINUED | OUTPATIENT
Start: 2017-01-01 | End: 2017-01-01 | Stop reason: HOSPADM

## 2017-01-01 RX ORDER — METOPROLOL TARTRATE 1 MG/ML
5 INJECTION, SOLUTION INTRAVENOUS EVERY 5 MIN PRN
Status: DISCONTINUED | OUTPATIENT
Start: 2017-01-01 | End: 2017-01-01 | Stop reason: HOSPADM

## 2017-01-01 RX ORDER — MORPHINE SULFATE 2 MG/ML
1-2 INJECTION, SOLUTION INTRAMUSCULAR; INTRAVENOUS EVERY 5 MIN PRN
Status: DISCONTINUED | OUTPATIENT
Start: 2017-01-01 | End: 2017-01-01 | Stop reason: HOSPADM

## 2017-01-01 RX ORDER — NICOTINE 21 MG/24HR
1 PATCH, TRANSDERMAL 24 HOURS TRANSDERMAL DAILY
Qty: 30 PATCH | Refills: 0 | Status: SHIPPED | OUTPATIENT
Start: 2017-01-01 | End: 2018-01-01

## 2017-01-01 RX ORDER — NICOTINE 21 MG/24HR
1 PATCH, TRANSDERMAL 24 HOURS TRANSDERMAL DAILY
Status: DISCONTINUED | OUTPATIENT
Start: 2017-01-01 | End: 2017-01-01

## 2017-01-01 RX ORDER — GLYCOPYRROLATE 0.2 MG/ML
0.1 INJECTION, SOLUTION INTRAMUSCULAR; INTRAVENOUS ONCE
Status: COMPLETED | OUTPATIENT
Start: 2017-01-01 | End: 2017-01-01

## 2017-01-01 RX ORDER — HEPARIN SODIUM 1000 [USP'U]/ML
1000-10000 INJECTION, SOLUTION INTRAVENOUS; SUBCUTANEOUS EVERY 5 MIN PRN
Status: DISCONTINUED | OUTPATIENT
Start: 2017-01-01 | End: 2017-01-01 | Stop reason: HOSPADM

## 2017-01-01 RX ORDER — REGADENOSON 0.08 MG/ML
0.4 INJECTION, SOLUTION INTRAVENOUS ONCE
Status: COMPLETED | OUTPATIENT
Start: 2017-01-01 | End: 2017-01-01

## 2017-01-01 RX ORDER — METOPROLOL TARTRATE 50 MG
75 TABLET ORAL 2 TIMES DAILY
Qty: 60 TABLET | Refills: 1
Start: 2017-01-01 | End: 2018-01-01

## 2017-01-01 RX ORDER — ONDANSETRON 8 MG/1
8 TABLET, FILM COATED ORAL EVERY 8 HOURS PRN
Qty: 10 TABLET | Refills: 5 | Status: SHIPPED | OUTPATIENT
Start: 2017-01-01 | End: 2018-01-01

## 2017-01-01 RX ORDER — LORAZEPAM 2 MG/ML
.5-2 INJECTION INTRAMUSCULAR EVERY 10 MIN PRN
Status: DISCONTINUED | OUTPATIENT
Start: 2017-01-01 | End: 2017-01-01 | Stop reason: HOSPADM

## 2017-01-01 RX ORDER — SODIUM CHLORIDE 9 MG/ML
INJECTION, SOLUTION INTRAVENOUS CONTINUOUS
Status: DISCONTINUED | OUTPATIENT
Start: 2017-01-01 | End: 2017-01-01 | Stop reason: HOSPADM

## 2017-01-01 RX ADMIN — ASPIRIN 81 MG: 81 TABLET, COATED ORAL at 10:00

## 2017-01-01 RX ADMIN — OMEPRAZOLE 40 MG: 20 CAPSULE, DELAYED RELEASE ORAL at 10:00

## 2017-01-01 RX ADMIN — BENZOCAINE 2 ML: 220 SPRAY, METERED PERIODONTAL at 12:19

## 2017-01-01 RX ADMIN — SODIUM CHLORIDE 80 ML: 9 INJECTION, SOLUTION INTRAVENOUS at 08:35

## 2017-01-01 RX ADMIN — FENTANYL CITRATE 50 MCG: 50 INJECTION, SOLUTION INTRAMUSCULAR; INTRAVENOUS at 14:08

## 2017-01-01 RX ADMIN — NICOTINE 1 PATCH: 21 PATCH, EXTENDED RELEASE TRANSDERMAL at 21:44

## 2017-01-01 RX ADMIN — SODIUM CHLORIDE: 4.5 INJECTION, SOLUTION INTRAVENOUS at 12:02

## 2017-01-01 RX ADMIN — FENTANYL CITRATE 25 MCG: 50 INJECTION, SOLUTION INTRAMUSCULAR; INTRAVENOUS at 12:44

## 2017-01-01 RX ADMIN — SODIUM CHLORIDE: 9 INJECTION, SOLUTION INTRAVENOUS at 05:49

## 2017-01-01 RX ADMIN — SODIUM CHLORIDE 65 ML: 9 INJECTION, SOLUTION INTRAVENOUS at 10:31

## 2017-01-01 RX ADMIN — METOPROLOL TARTRATE 5 MG: 5 INJECTION INTRAVENOUS at 19:39

## 2017-01-01 RX ADMIN — FLUDEOXYGLUCOSE F-18 10.12 MCI.: 500 INJECTION, SOLUTION INTRAVENOUS at 06:56

## 2017-01-01 RX ADMIN — METOPROLOL TARTRATE 75 MG: 50 TABLET ORAL at 01:42

## 2017-01-01 RX ADMIN — MIDAZOLAM 1 MG: 1 INJECTION INTRAMUSCULAR; INTRAVENOUS at 13:36

## 2017-01-01 RX ADMIN — METOPROLOL TARTRATE 25 MG: 25 TABLET ORAL at 20:04

## 2017-01-01 RX ADMIN — MIDAZOLAM HYDROCHLORIDE 1 MG: 1 INJECTION, SOLUTION INTRAMUSCULAR; INTRAVENOUS at 12:46

## 2017-01-01 RX ADMIN — Medication 4500 UNITS: at 17:42

## 2017-01-01 RX ADMIN — APIXABAN 5 MG: 5 TABLET, FILM COATED ORAL at 17:50

## 2017-01-01 RX ADMIN — DILTIAZEM HYDROCHLORIDE 20 MG: 5 INJECTION INTRAVENOUS at 15:30

## 2017-01-01 RX ADMIN — FENTANYL CITRATE 25 MCG: 50 INJECTION, SOLUTION INTRAMUSCULAR; INTRAVENOUS at 12:46

## 2017-01-01 RX ADMIN — FENTANYL CITRATE 50 MCG: 50 INJECTION, SOLUTION INTRAMUSCULAR; INTRAVENOUS at 13:36

## 2017-01-01 RX ADMIN — DILTIAZEM HYDROCHLORIDE 5 MG/HR: 5 INJECTION INTRAVENOUS at 15:35

## 2017-01-01 RX ADMIN — OMEPRAZOLE 40 MG: 20 CAPSULE, DELAYED RELEASE ORAL at 06:57

## 2017-01-01 RX ADMIN — SODIUM CHLORIDE: 9 INJECTION, SOLUTION INTRAVENOUS at 08:20

## 2017-01-01 RX ADMIN — MIDAZOLAM HYDROCHLORIDE 1 MG: 1 INJECTION, SOLUTION INTRAMUSCULAR; INTRAVENOUS at 12:44

## 2017-01-01 RX ADMIN — GADOBUTROL 7 ML: 604.72 INJECTION INTRAVENOUS at 12:41

## 2017-01-01 RX ADMIN — MIDAZOLAM HYDROCHLORIDE 2 MG: 1 INJECTION, SOLUTION INTRAMUSCULAR; INTRAVENOUS at 12:37

## 2017-01-01 RX ADMIN — IOPAMIDOL 80 ML: 755 INJECTION, SOLUTION INTRAVENOUS at 08:35

## 2017-01-01 RX ADMIN — CALCIUM CARBONATE (ANTACID) CHEW TAB 500 MG 500 MG: 500 CHEW TAB at 06:07

## 2017-01-01 RX ADMIN — MIDAZOLAM 1 MG: 1 INJECTION INTRAMUSCULAR; INTRAVENOUS at 08:21

## 2017-01-01 RX ADMIN — IOPAMIDOL 81 ML: 755 INJECTION, SOLUTION INTRAVENOUS at 10:31

## 2017-01-01 RX ADMIN — LIDOCAINE HYDROCHLORIDE 1.5 ML: 40 SOLUTION TOPICAL at 12:01

## 2017-01-01 RX ADMIN — ASPIRIN 325 MG ORAL TABLET 325 MG: 325 PILL ORAL at 20:08

## 2017-01-01 RX ADMIN — HEPARIN SODIUM 900 UNITS/HR: 10000 INJECTION, SOLUTION INTRAVENOUS at 17:42

## 2017-01-01 RX ADMIN — APIXABAN 5 MG: 5 TABLET, FILM COATED ORAL at 08:11

## 2017-01-01 RX ADMIN — METOPROLOL TARTRATE 75 MG: 50 TABLET ORAL at 08:11

## 2017-01-01 RX ADMIN — MIDAZOLAM 1 MG: 1 INJECTION INTRAMUSCULAR; INTRAVENOUS at 14:09

## 2017-01-01 RX ADMIN — METOPROLOL TARTRATE 75 MG: 50 TABLET ORAL at 10:00

## 2017-01-01 RX ADMIN — ADENOSINE 6 MG: 3 INJECTION, SOLUTION INTRAVENOUS at 19:08

## 2017-01-01 RX ADMIN — TETROFOSMIN 33 MCI.: 1.38 INJECTION, POWDER, LYOPHILIZED, FOR SOLUTION INTRAVENOUS at 12:50

## 2017-01-01 RX ADMIN — METOPROLOL TARTRATE 75 MG: 50 TABLET ORAL at 00:15

## 2017-01-01 RX ADMIN — GLYCOPYRROLATE 0.5 MG: 0.2 INJECTION, SOLUTION INTRAMUSCULAR; INTRAVENOUS at 11:59

## 2017-01-01 RX ADMIN — FENTANYL CITRATE 50 MCG: 50 INJECTION, SOLUTION INTRAMUSCULAR; INTRAVENOUS at 13:37

## 2017-01-01 RX ADMIN — NICOTINE 1 PATCH: 21 PATCH, EXTENDED RELEASE TRANSDERMAL at 08:11

## 2017-01-01 RX ADMIN — SODIUM CHLORIDE: 9 INJECTION, SOLUTION INTRAVENOUS at 15:30

## 2017-01-01 RX ADMIN — LIDOCAINE HYDROCHLORIDE 50 MG: 10 INJECTION, SOLUTION EPIDURAL; INFILTRATION; INTRACAUDAL; PERINEURAL at 16:05

## 2017-01-01 RX ADMIN — FENTANYL CITRATE 50 MCG: 50 INJECTION INTRAMUSCULAR; INTRAVENOUS at 08:20

## 2017-01-01 RX ADMIN — TETROFOSMIN 10.9 MCI.: 1.38 INJECTION, POWDER, LYOPHILIZED, FOR SOLUTION INTRAVENOUS at 10:45

## 2017-01-01 RX ADMIN — REGADENOSON 0.4 MG: 0.08 INJECTION, SOLUTION INTRAVENOUS at 12:16

## 2017-01-01 RX ADMIN — LIDOCAINE HYDROCHLORIDE 10 ML: 10 INJECTION, SOLUTION INFILTRATION; PERINEURAL at 13:41

## 2017-01-01 ASSESSMENT — PAIN SCALES - GENERAL
PAINLEVEL: NO PAIN (0)
PAINLEVEL: SEVERE PAIN (6)
PAINLEVEL: MILD PAIN (3)

## 2017-01-01 ASSESSMENT — LIFESTYLE VARIABLES: TOBACCO_USE: 1

## 2017-11-03 NOTE — ED NOTES
Went to [facility] today to get something for reflux, found to have tachycardia and was sent here for further eval, states his doctor spoke to one of the doctors here. Has had SOA recently, felt it was due to his smokers cough, continues to smoke, has had epigastric chest pain

## 2017-11-03 NOTE — MR AVS SNAPSHOT
"              After Visit Summary   11/3/2017    Zechariah Ashby    MRN: [id number]           Patient Information     Date Of Birth          1960        Visit Information        Provider Department      11/3/2017 4:00 PM Kailash Mason MD [facility]        Today's Diagnoses     Tachycardia    -  1    Abdominal pain, generalized        MONTANA (dyspnea on exertion)           Follow-ups after your visit        Who to contact     If you have questions or need follow up information about today's clinic visit or your schedule please contact [facility] directly at [phone].  Normal or non-critical lab and imaging results will be communicated to you by Musicraiserhart, letter or phone within 4 business days after the clinic has received the results. If you do not hear from us within 7 days, please contact the clinic through Musicraiserhart or phone. If you have a critical or abnormal lab result, we will notify you by phone as soon as possible.  Submit refill requests through [email or web address] or call your pharmacy and they will forward the refill request to us. Please allow 3 business days for your refill to be completed.          Additional Information About Your Visit        MyChart Information     [email or web address] lets you send messages to your doctor, view your test results, renew your prescriptions, schedule appointments and more. To sign up, go to [email or web address] . Click on \"Log in\" on the left side of the screen, which will take you to the Welcome page. Then click on \"Sign up Now\" on the right side of the page.     You will be asked to enter the access code listed below, as well as some personal information. Please follow the directions to create your username and password.     Your access code is: [email or web address]  Expires: 2018  5:44 PM     Your access code will  in 90 days. If you need help or a new code, please call your [facility] or [phone].        Care EveryWhere ID     This " "is your Care EveryWhere ID. This could be used by other organizations to access your [facility] medical records  [id number]        Your Vitals Were     Pulse Temperature Height Pulse Oximetry BMI (Body Mass Index)       154 98.4  F (36.9  C) (Tympanic) 5' 9.5\" (1.765 m) 98% 23.09 kg/m2        Blood Pressure from Last 3 Encounters:   11/03/17 100/70   10/16/15 144/76   04/28/15 143/84    Weight from Last 3 Encounters:   11/03/17 158 lb 9.6 oz (71.9 kg)   10/16/15 183 lb 6.4 oz (83.2 kg)   10/30/13 170 lb (77.1 kg)              We Performed the Following     EKG 12-lead complete w/read - Clinics     Hemoglobin        Primary Care Provider Office Phone # Fax #    Kailash Mason [phone] [address] [place] [address]        Equal Access to Services     [facility] : Hadii aad ku hadasho Soomaali, waaxda luqadaha, qaybta kaalmada adeegyada, waxay idiin haypeggy carrasquillo . So [facility] [phone].    ATENCIÓN: Si habla español, tiene a morocho disposición servicios gratuitos de asistencia lingüística. Llame al [phone].    We comply with applicable federal civil rights laws and [place] laws. We do not discriminate on the basis of race, color, national origin, age, disability, sex, sexual orientation, or gender identity.            Thank you!     Thank you for choosing [facility]  for your care. Our goal is always to provide you with excellent care. Hearing back from our patients is one way we can continue to improve our services. Please take a few minutes to complete the written survey that you may receive in the mail after your visit with us. Thank you!             Your Updated Medication List - Protect others around you: Learn how to safely use, store and throw away your medicines at www.disposemymeds.org.      Notice  As of 11/3/2017  5:44 PM    You have not been prescribed any medications.      "

## 2017-11-03 NOTE — PROGRESS NOTES
"  SUBJECTIVE:   Zechariah Ashby is a 57 year old male who presents to clinic today for the following health issues:  Chief Complaint   Patient presents with     Abdominal Pain     Hx of ulcer.        Abdominal Pain      Duration:hx of ulcer in 2015- abdominal pain since but worse x 6 months.    Description (location/character/radiation): upper mid abdomen       Associated flank pain: None    Intensity:  moderate, severe, 4-7/10    Accompanying signs and symptoms:        Fever/Chills: YES- states gets cold very easy. Weight loss of 30 lbs over the last year without trying. Rapid pulse of 155 in clinic today       Gas/Bloating: YES       Nausea/vomitting: no        Diarrhea: no        Dysuria or Hematuria: no     History (previous similar pain/trauma/previous testing): yes hx of perforated  ulcer 2015    Precipitating or alleviating factors:       Pain worse with eating/BM/urination: worse if constipated and eating        Pain relieved by BM: YES    Therapies tried and outcome: Protonix helped in 2015    LMP:  not applicable      Abdominal pain daily around umbilicus and epigastric.    Aggravating factors: eating and spicy food.   Course of symptoms over time: gradually worse over 6 months.  Seems fairly good today.  It has been daily.   It has been constant.   Alleviating factors: Tums.  Align helps-Probiotic?  No nausea or vomiting.    Constipation the past couple years.  No melena or blood.     He has shortness of breath.  He has dyspnea on exertion.  Walking fast or up stairs or manual labor.  This has been present the past couple months.   Chest pain off and on pressure associated with breathing problems.  Frequency: 1-2 times a week.  Triggering factors: exertion.    No chest pain today.   No history of heart disease.     Some \"smokers cough\".  More in the AM.     History   Smoking Status     Current Every Day Smoker     Packs/day: 1.50   Smokeless Tobacco     Never Used      Patient Active Problem List " "  Diagnosis     CARDIOVASCULAR SCREENING; LDL GOAL LESS THAN 130     Perforated ulcer (HCC)     Free intraperitoneal air      ROS:General: POSITIVE for:, weight loss, 30#.  Low energy.   : No urinary frequency or dysuria, bladder or kidney problems  Musculoskeletal: POSITIVE  for:, pain shoulders.   Psychiatric:Some depression.   He had lightheadedness once a week or so ago.  No orthostatic symptoms.   Drinking less fluids lately    OBJECTIVE:Blood pressure 100/70, pulse 154, temperature 98.4  F (36.9  C), temperature source Tympanic, height 5' 9.5\" (1.765 m), weight 158 lb 9.6 oz (71.9 kg), SpO2 98 %. BMI=Body mass index is 23.09 kg/(m^2).  GENERAL APPEARANCE ADULT: Alert, no acute distress  EYES: PERRL, EOM normal, conjunctiva and lids normal  ENT:oral mucous membranes moist, oropharynx clear   NECK: No adenopathy,masses or thyromegaly  RESP: lungs clear to auscultation   CV: regular rhythm, tachycardia rate about 150, no murmur  ABDOMEN: soft, no organomegaly or masses , bowel sounds normal, mildly tender epigastrium  MS: extremities normal, no peripheral edema   HGB=14.5  EKG:Sinus tachycardia.  Ovmw=761.  No acute ST-T wave changes    ASSESSMENT:   (R00.0) Tachycardia  (primary encounter diagnosis)  Comment: uncertain cause.  No anemia.  Possible hyperthyroid.  Does not look dehydrated.  This may have been going on for a while and causing shortness of breath with exertion.  R/O pulmonary or cardiac cause.   Plan: EKG 12-lead complete w/read - Clinics  [facility]    I have sent patient to the ED for further evaluation for timely evaluation which we cannot do here.           (R10.84) Abdominal pain, generalized  Comment: GERD, ulcer likely stomach origin.  Doubt any perforation like 2 years ago with benign stomach exam and no acute symptoms.   Plan: Hemoglobin        Likely will need PPI and possibly gastroscopy with his weight loss to R/O cancer.     (R06.09) MONTANA " (dyspnea on exertion)  Comment: see above.  R/O underlying respiratory or cardiac cause.

## 2017-11-03 NOTE — ED AVS SNAPSHOT
[facility] Emergency Department    [address] [address]    Phone:  [phone]    Fax:  [phone]                                       Zechariah Ashby   MRN: [id number]    Department:  [facility] Emergency Department   Date of Visit:  11/3/2017           After Visit Summary Signature Page     I have received my discharge instructions, and my questions have been answered. I have discussed any challenges I see with this plan with the nurse or doctor.    ..........................................................................................................................................  Patient/Patient Representative Signature      ..........................................................................................................................................  Patient Representative Print Name and Relationship to Patient    ..................................................               ................................................  Date                                            Time    ..........................................................................................................................................  Reviewed by Signature/Title    ...................................................              ..............................................  Date                                                            Time

## 2017-11-03 NOTE — ED AVS SNAPSHOT
[facility] Emergency Department    [address] [address]    Phone:  [phone]    Fax:  [phone]                                       Zechariah Ashby   MRN: [id number]    Department:  [facility] Emergency Department   Date of Visit:  11/3/2017           Patient Information     Date Of Birth          1960        Your diagnoses for this visit were:     Atrial flutter, unspecified type (H)        You were seen by Shankar Ornelas MD.        Discharge Instructions       Return to the Emergency Room if the following occurs:     Fainting, worsened breathing, chest pain, or for any concern at anytime.    Or, follow-up with the following provider as we discussed:     Return to your primary doctor early this next week for reevaluation.    Medications discussed:    Metoprolol 25 mg twice a day.  Prilosec 20 mg once or twice daily.  Aspirin 325 mg daily.    If you received pain-relieving or sedating medication during your time in the ER, avoid alcohol, driving automobiles, or working with machinery.  Also, a responsible adult must stay with you.        Call the Nurse Advice Line at [phone] or [phone] for any concern at anytime.      24 Hour Appointment Hotline       To make an appointment at any [facility] clinic, call [phone] ([phone]). If you don't have a family doctor or clinic, we will help you find one. [facility] clinics are conveniently located to serve the needs of you and your family.             Review of your medicines      START taking        Dose / Directions Last dose taken    metoprolol 25 MG tablet   Commonly known as:  LOPRESSOR   Dose:  25 mg   Quantity:  60 tablet        Take 1 tablet (25 mg) by mouth 2 times daily   Refills:  0        omeprazole 20 MG CR capsule   Commonly known as:  priLOSEC   Dose:  20 mg   Quantity:  30 capsule        Take 1 capsule (20 mg) by mouth daily   Refills:  0                Prescriptions were sent or printed at  these locations (2 Prescriptions)                   [id number] - [place], [place] - [address]   [address], [place] [address]    Telephone:  [phone]   Fax:  [phone]   Hours:  Closed 10-16-08 business to [facility]                E-Prescribed (1 of 2)         metoprolol (LOPRESSOR) 25 MG tablet                 Printed at Department/Unit printer (1 of 2)         omeprazole (PRILOSEC) 20 MG CR capsule                Procedures and tests performed during your visit     Procedure/Test Number of Times Performed    Basic metabolic panel 2    CBC with platelets differential 1    CBC with platelets, differential 1    EKG 12-lead, tracing only 1    TSH with free T4 reflex 2      Orders Needing Specimen Collection     None      Pending Results     No orders found from 11/1/2017 to 11/4/2017.            Pending Culture Results     No orders found from 11/1/2017 to 11/4/2017.            Pending Results Instructions     If you had any lab results that were not finalized at the time of your Discharge, you can call the ED Lab Result RN at [phone]. You will be contacted by this team for any positive Lab results or changes in treatment. The nurses are available 7 days a week from 10A to 6:30P.  You can leave a message 24 hours per day and they will return your call.        Test Results From Your Hospital Stay        11/3/2017  7:24 PM      Component Results     Component Value Ref Range & Units Status    TSH Canceled, Test credited 0.40 - 4.00 mU/L Final    Quantity not sufficient  ED TECH ELVIN TO REORDER RECOLLECT 1915 11/03/2017 FACUNDO           11/3/2017  7:22 PM      Component Results     Component Value Ref Range & Units Status    WBC Canceled, Test credited 4.0 - 11.0 10e9/L Final    Unsatisfactory specimen - clotted  ED TECH ELVIN TO REORDER RECOLLECT 1915 11/03/2017 FACUNDO      RBC Count Canceled, Test credited 4.4 - 5.9 10e12/L Final    Unsatisfactory specimen - clotted  ED TECH ELVIN  TO REORDER RECOLLECT 1915 11/03/2017 FACUNDO      Hemoglobin Canceled, Test credited 13.3 - 17.7 g/dL Final    Unsatisfactory specimen - clotted  ED TECH ELVIN TO REORDER RECOLLECT 1915 11/03/2017 FACUNDO      Hematocrit Canceled, Test credited 40.0 - 53.0 % Final    Unsatisfactory specimen - clotted  ED TECH ELVIN TO REORDER RECOLLECT 1915 11/03/2017 FACUNDO      MCV Canceled, Test credited 78 - 100 fl Final    Unsatisfactory specimen - clotted  ED TECH ELVIN TO REORDER RECOLLECT 1915 11/03/2017 FACUNDO      MCH Canceled, Test credited 26.5 - 33.0 pg Final    Unsatisfactory specimen - clotted  ED TECH ELVIN TO REORDER RECOLLECT 1915 11/03/2017 FACUNDO      MCHC Canceled, Test credited 31.5 - 36.5 g/dL Final    Unsatisfactory specimen - clotted  ED TECH ELVIN TO REORDER RECOLLECT 1915 11/03/2017 FACUNDO      RDW Canceled, Test credited 10.0 - 15.0 % Final    Unsatisfactory specimen - clotted  ED TECH ELVIN TO REORDER RECOLLECT 1915 11/03/2017 FACUNDO      Platelet Count Canceled, Test credited 150 - 450 10e9/L Final    Unsatisfactory specimen - clotted  ED TECH ELVIN TO REORDER RECOLLECT 1915 11/03/2017 FACUNDO      Diff Method Canceled, Test credited  Final    Unsatisfactory specimen - clotted  ED TECH ELVIN TO REORDER RECOLLECT 1915 11/03/2017 FACUNDO           11/3/2017  7:24 PM      Component Results     Component Value Ref Range & Units Status    Sodium Canceled, Test credited 133 - 144 mmol/L Final    Quantity not sufficient  ED TECH ELVIN TO REORDER RECOLLECT 1915 11/03/2017 FACUNDO      Potassium Canceled, Test credited 3.4 - 5.3 mmol/L Final    Quantity not sufficient  ED TECH ELVIN TO REORDER RECOLLECT 1915 11/03/2017 FACUNDO      Chloride Canceled, Test credited 94 - 109 mmol/L Final    Quantity not sufficient  ED TECH ELVIN TO REORDER RECOLLECT 1915 11/03/2017 FACUNDO      Carbon Dioxide Canceled, Test credited 20 - 32 mmol/L Final    Quantity not sufficient  ED TECH ELVIN TO REORDER RECOLLECT 1915 11/03/2017 FACUNDO      Anion Gap Canceled, Test credited 6 - 17  mmol/L Final    Quantity not sufficient  ED TECH ELVIN TO REORDER RECOLLECT 1915 11/03/2017 FACUNDO      Glucose Canceled, Test credited 70 - 99 mg/dL Final    Quantity not sufficient  ED TECH ELVIN TO REORDER RECOLLECT 1915 11/03/2017 FACUNDO      Urea Nitrogen Canceled, Test credited 7 - 30 mg/dL Final    Quantity not sufficient  ED TECH ELVIN TO REORDER RECOLLECT 1915 11/03/2017 FACUNDO      Creatinine Canceled, Test credited 0.66 - 1.25 mg/dL Final    Quantity not sufficient  ED TECH ELVIN TO REORDER RECOLLECT 1915 11/03/2017 FACUNDO      GFR Estimate Canceled, Test credited >60 mL/min/1.7m2 Final    Quantity not sufficient  ED TECH ELVIN TO REORDER RECOLLECT 1915 11/03/2017 FACUNDO      GFR Estimate If Black Canceled, Test credited >60 mL/min/1.7m2 Final    Quantity not sufficient  ED TECH ELVIN TO REORDER RECOLLECT 1915 11/03/2017 FACUNDO      Calcium Canceled, Test credited 8.5 - 10.1 mg/dL Final    Quantity not sufficient  ED TECH ELVIN TO REORDER RECOLLECT 1915 11/03/2017 FACUNDO           11/3/2017  8:03 PM      Component Results     Component Value Ref Range & Units Status    WBC 6.4 4.0 - 11.0 10e9/L Final    RBC Count 4.98 4.4 - 5.9 10e12/L Final    Hemoglobin 15.0 13.3 - 17.7 g/dL Final    Hematocrit 44.0 40.0 - 53.0 % Final    MCV 88 78 - 100 fl Final    MCH 30.1 26.5 - 33.0 pg Final    MCHC 34.1 31.5 - 36.5 g/dL Final    RDW 13.0 10.0 - 15.0 % Final    Platelet Count 190 150 - 450 10e9/L Final    Diff Method Automated Method  Final    % Neutrophils 58.2 % Final    % Lymphocytes 28.7 % Final    % Monocytes 12.1 % Final    % Eosinophils 0.5 % Final    % Basophils 0.3 % Final    % Immature Granulocytes 0.2 % Final    Absolute Neutrophil 3.7 1.6 - 8.3 10e9/L Final    Absolute Lymphocytes 1.8 0.8 - 5.3 10e9/L Final    Absolute Monocytes 0.8 0.0 - 1.3 10e9/L Final    Absolute Eosinophils 0.0 0.0 - 0.7 10e9/L Final    Absolute Basophils 0.0 0.0 - 0.2 10e9/L Final    Abs Immature Granulocytes 0.0 0 - 0.4 10e9/L Final         11/3/2017  8:12  "PM      Component Results     Component Value Ref Range & Units Status    Sodium 139 133 - 144 mmol/L Final    Potassium 4.0 3.4 - 5.3 mmol/L Final    Chloride 107 94 - 109 mmol/L Final    Carbon Dioxide 25 20 - 32 mmol/L Final    Anion Gap 7 3 - 14 mmol/L Final    Glucose 77 70 - 99 mg/dL Final    Urea Nitrogen 15 7 - 30 mg/dL Final    Creatinine 1.00 0.66 - 1.25 mg/dL Final    GFR Estimate 77 >60 mL/min/1.7m2 Final    Non  GFR Calc    GFR Estimate If Black >90 >60 mL/min/1.7m2 Final    African American GFR Calc    Calcium 8.7 8.5 - 10.1 mg/dL Final         11/3/2017  8:23 PM      Component Results     Component Value Ref Range & Units Status    TSH 2.84 0.40 - 4.00 mU/L Final                Thank you for choosing [facility]       Thank you for choosing [facility] for your care. Our goal is always to provide you with excellent care. Hearing back from our patients is one way we can continue to improve our services. Please take a few minutes to complete the written survey that you may receive in the mail after you visit with us. Thank you!        Viralize Information     Viralize lets you send messages to your doctor, view your test results, renew your prescriptions, schedule appointments and more. To sign up, go to [email or web address] . Click on \"Log in\" on the left side of the screen, which will take you to the Welcome page. Then click on \"Sign up Now\" on the right side of the page.     You will be asked to enter the access code listed below, as well as some personal information. Please follow the directions to create your username and password.     Your access code is: [email or web address]  Expires: 2018  5:44 PM     Your access code will  in 90 days. If you need help or a new code, please call your [facility] clinic or [phone].        Care EveryWhere ID     This is your Care EveryWhere ID. This could be used by other organizations to access your [facility] medical records  [id number]      "   Equal Access to Services     ROD PERRY : Diego Zhang, ronan pendleton, harley campos. So [facility] [phone].    ATENCIÓN: Si habla español, tiene a morocho disposición servicios gratuitos de asistencia lingüística. Llame al [phone].    We comply with applicable federal civil rights laws and [place] laws. We do not discriminate on the basis of race, color, national origin, age, disability, sex, sexual orientation, or gender identity.            After Visit Summary       This is your record. Keep this with you and show to your community pharmacist(s) and doctor(s) at your next visit.

## 2017-11-03 NOTE — NURSING NOTE
"Chief Complaint   Patient presents with     Abdominal Pain     Hx of ulcer.       Initial /70 (BP Location: Right arm, Patient Position: Chair, Cuff Size: Adult Large)  Pulse 154  Temp 98.4  F (36.9  C) (Tympanic)  Ht 5' 9.5\" (1.765 m)  Wt 158 lb 9.6 oz (71.9 kg)  SpO2 98%  BMI 23.09 kg/m2 Estimated body mass index is 23.09 kg/(m^2) as calculated from the following:    Height as of this encounter: 5' 9.5\" (1.765 m).    Weight as of this encounter: 158 lb 9.6 oz (71.9 kg).  Medication Reconciliation: complete    Health Maintenance that is potentially due pending provider review:  Colonoscopy/FIT    Gave pt phone number/pended order to schedule mammo and/or colonoscopy(or FIT)    Is there anyone who you would like to be able to receive your results? Yes  If yes have patient fill out ARIK  Emani Blanco CMA    "

## 2017-11-03 NOTE — ED PROVIDER NOTES
HPI  Patient is a 57-year-old male presenting with a rapid heart rate and shortness of breath.  He denies significant past medical history involving his heart.  He takes Sudafed on a regular basis for sinus symptoms.  This is not changed over the past week.  He does smoke.  No drugs.  No alcohol.  He drinks a pot of coffee daily.  Distant history of perforated ulcer in his stomach and appendicitis with appendectomy.    The patient has recognized a fast heart rate and some occasional shortness of breath over the past week.  No chest pain.  His ability to walk and exert himself has been limited.  Occasional lightheadedness but no fainting.  No leg pain.  No leg swelling.  No back pain or abdominal pain.  No nausea or vomiting.  No hematochezia or melena.  No diarrhea.  No fever.  No coughing.  He has not had similar symptoms previously.  No personal or family history of blood dyscrasias.  No family history of sudden death.    ROS: All other review of systems are negative other than that noted above.   PMH: Reviewed.  SH: Reviewed.  FH: Reviewed.      PHYSICAL  /90  Pulse 91  Temp 97.5  F (36.4  C) (Oral)  Resp 16  SpO2 95%  General: Patient is alert and in moderate distress.  Concerned.  Neurological: Alert.  Moving upper and lower extremities equally, bilaterally.  Head / Neck: Atraumatic.  Ears: Not done.  Eyes: Pupils are equal, round, and reactive.  Normal conjunctiva.  Nose: Midline.  No epistaxis.  Mouth / Throat: No ulcerations or lesions.  Upper pharynx is not erythematous.  Moist.  Respiratory: No respiratory distress. CTA B.  Cardiovascular: Regular rhythm.  Tachycardia.  Peripheral extremities are warm.  No edema.  No calf tenderness.  Abdomen / Pelvis: Not tender.  No distention.  Soft throughout.  Genitalia: Not done.  Musculoskeletal: No tenderness over major muscles and joints.  Skin: No evidence of rash or trauma.        PHYSICIAN  1851.  Patient presents from the clinic with tachycardia and  shortness of breath.  It is a narrow complex tachycardia that is regular.  It's rate is consistent at about 152.  Repeat EKG ordered.  This will be documented below.  When looking at the EKG brought from the clinic and the monitor I suspect this is SVT.  Lab values pending.    Labs Ordered and Resulted from Time of ED Arrival Up to the Time of Departure from the ED   TSH WITH FREE T4 REFLEX   CBC WITH PLATELETS DIFFERENTIAL   BASIC METABOLIC PANEL   CBC WITH PLATELETS DIFFERENTIAL   BASIC METABOLIC PANEL   TSH WITH FREE T4 REFLEX     EKG  (1852)   Interpretation performed by me.  Rate: 153     Rhythm: svt     Axis: nl  Intervals: CT (12-2) -, QRS (<12) 74, QTc (>5) 446  P wave: -     QRS complex: nl  ST segment / T-wave: T-wave inversion in the inferior leads.  Conclusion: SVT with T-wave inversion in the inferior leads likely related to rate    1858.  Patient has SVT.  Adenosine ordered.  Labs pending.    1917.  The patient was given adenosine for presumed SVT.  I reviewed the risks and benefits of getting the medication prior to proceeding.  The patient and his wife were in agreement with the plan to use adenosine for SVT.  Adenosine 6 mg IV was given.  The underlying rhythm of atrial flutter was easily seen.  Therefore, I placed an order for metoprolol 5 mg IV bumps based on the rate.  I will determine need for oral medication depending on the results of his IV doses.    1956.  Pt has a variable heart rate between .  Metoprolol 25 mg oral dose given.  He has a [id number] score of 0.  Aspirin 325 mg will be given and recommended for anticoagulation until further guidance as provided by his primary doctor or cardiologist.    2100.  The patient continues to have a heart rate that is variable between 95 and 115.  He tells me his breathing is much improved.  Continue with aspirin 325 mg daily.  Continue with metoprolol 25 mg twice a day.      IMPRESSION    ICD-10-CM    1. Atrial flutter, unspecified type (H)  I48.92            Critical Care time:  none     \                 Shankar Ornelas MD  11/03/17 2104

## 2017-11-04 NOTE — DISCHARGE INSTRUCTIONS
Return to the Emergency Room if the following occurs:     Fainting, worsened breathing, chest pain, or for any concern at anytime.    Or, follow-up with the following provider as we discussed:     Return to your primary doctor early this next week for reevaluation.    Medications discussed:    Metoprolol 25 mg twice a day.  Prilosec 20 mg once or twice daily.  Aspirin 325 mg daily.    If you received pain-relieving or sedating medication during your time in the ER, avoid alcohol, driving automobiles, or working with machinery.  Also, a responsible adult must stay with you.        Call the Nurse Advice Line at [phone] or [phone] for any concern at anytime.

## 2017-11-04 NOTE — ED NOTES
Discharge instructions reviewed in detail.  Pt verbalized understanding.  No further questions or concerns.

## 2017-11-15 NOTE — NURSING NOTE
"Chief Complaint   Patient presents with     Heart Problem       Initial /77  Pulse 148  Temp 97.8  F (36.6  C) (Tympanic)  Resp 18  Ht 5' 9.5\" (1.765 m)  Wt 166 lb (75.3 kg)  SpO2 97%  BMI 24.16 kg/m2 Estimated body mass index is 24.16 kg/(m^2) as calculated from the following:    Height as of this encounter: 5' 9.5\" (1.765 m).    Weight as of this encounter: 166 lb (75.3 kg).  Medication Reconciliation: complete    Health Maintenance that is potentially due pending provider review:          Is there anyone who you would like to be able to receive your results?   If yes have patient fill out ARIK    "

## 2017-11-15 NOTE — MR AVS SNAPSHOT
After Visit Summary   11/15/2017    Zechariah Ashby    MRN: [id number]           Patient Information     Date Of Birth          1960        Visit Information        Provider Department      11/15/2017 4:00 PM Kailash Mason MD [facility]        Today's Diagnoses     Irregular heart beat    -  1    Atrial flutter, unspecified type (H)        Gastroesophageal reflux disease without esophagitis          Care Instructions    ASSESSMENT:     ICD-10-CM    1. Irregular heart beat I49.9 EKG 12-lead complete w/read - Clinics     metoprolol (LOPRESSOR) 50 MG tablet   2. Atrial flutter, unspecified type (H) I48.92 metoprolol (LOPRESSOR) 50 MG tablet   3. Gastroesophageal reflux disease without esophagitis K21.9 omeprazole (PRILOSEC) 40 MG capsule      Heart rate is slower with metoprolol but still a little fast and causing some symptoms.   PLAN: Increase the metoprolol to 2 pills (50mg) twice daily.  I will send new prescription for 50mg pills so you only have to take 1 pill.   I will discuss with cardiology.    We may need to put you on blood thinner as well.     Check your pulse for rhythm and rate at the wrist or neck.  You want to check for two things, the heart rate and if it is a regular rhythm or irregular rhythm.  Normal heart rate is  beats a minute.  You can count for 10 seconds and multiply by 6 or count for 15 seconds and multiply by 4 to get the number of beats in a minute.     If you feel worse, more shortness of breath,  Worse or persistent chest pain, go to the emergency room right away or call 911.     Gastroesophageal reflux (GERD) lifestyle changes that can help improve symptoms include:  Eating smaller meals and not lying down after meals  Elevate head of bed with 6-8 inch blocks or a wedge pillow.   Avoid lying flat on back after eating and at bedtime  Avoid tight fitting clothing  Avoid reflux inducing foods like fat, caffeine, chocolate, carbonated  "beverages  Maintain an ideal body weight  Avoid alcohol and tobacco  Avoid medications like ibuprofen, naproxen and aspirin          Follow-ups after your visit        Who to contact     If you have questions or need follow up information about today's clinic visit or your schedule please contact [facility] directly at [phone].  Normal or non-critical lab and imaging results will be communicated to you by MyChart, letter or phone within 4 business days after the clinic has received the results. If you do not hear from us within 7 days, please contact the clinic through MyChart or phone. If you have a critical or abnormal lab result, we will notify you by phone as soon as possible.  Submit refill requests through [employer] or call your pharmacy and they will forward the refill request to us. Please allow 3 business days for your refill to be completed.          Additional Information About Your Visit        MyChart Information     [employer] lets you send messages to your doctor, view your test results, renew your prescriptions, schedule appointments and more. To sign up, go to [email or web address] . Click on \"Log in\" on the left side of the screen, which will take you to the Welcome page. Then click on \"Sign up Now\" on the right side of the page.     You will be asked to enter the access code listed below, as well as some personal information. Please follow the directions to create your username and password.     Your access code is: [email or web address]  Expires: 2018  4:44 PM     Your access code will  in 90 days. If you need help or a new code, please call your [facility] or [phone].        Care EveryWhere ID     This is your Care EveryWhere ID. This could be used by other organizations to access your [facility] medical records  [id number]        Your Vitals Were     Pulse Temperature Respirations Height Pulse Oximetry BMI (Body Mass Index)    148 97.8  F (36.6  C) (Tympanic) 18 " "5' 9.5\" (1.765 m) 97% 24.16 kg/m2       Blood Pressure from Last 3 Encounters:   11/15/17 117/77   11/03/17 138/90   11/03/17 100/70    Weight from Last 3 Encounters:   11/15/17 166 lb (75.3 kg)   11/03/17 158 lb 9.6 oz (71.9 kg)   10/16/15 183 lb 6.4 oz (83.2 kg)              We Performed the Following     EKG 12-lead complete w/read - Clinics          Today's Medication Changes          These changes are accurate as of: 11/15/17  5:54 PM.  If you have any questions, ask your nurse or doctor.               These medicines have changed or have updated prescriptions.        Dose/Directions    metoprolol 50 MG tablet   Commonly known as:  LOPRESSOR   This may have changed:    - medication strength  - how much to take   Used for:  Irregular heart beat, Atrial flutter, unspecified type (H)   Changed by:  Kailash Mason MD        Dose:  50 mg   Take 1 tablet (50 mg) by mouth 2 times daily   Quantity:  60 tablet   Refills:  1       omeprazole 40 MG capsule   Commonly known as:  priLOSEC   This may have changed:    - medication strength  - how much to take  - additional instructions   Used for:  Gastroesophageal reflux disease without esophagitis   Changed by:  Kailash Mason MD        Dose:  40 mg   Take 1 capsule (40 mg) by mouth daily Take 30-60 minutes before a meal.   Quantity:  30 capsule   Refills:  1            Where to get your medicines      These medications were sent to [id number] [address] [address]    Hours:  Closed 10-16-08 business to [facility] Phone:  [phone]     metoprolol 50 MG tablet    omeprazole 40 MG capsule                Primary Care Provider Office Phone # Fax #    Kailash Mason [phone] [address] [facility] [address]        Equal Access to Services     ROD PERRY AH: Diego Zhang, waaxda luqadaha, qaybchiara so, harley frost " lalincoln boykin. So [facility] [phone].    ATENCIÓN: Si habla jarred, tiene a morocho disposición servicios gratuitos de asistencia lingüística. Roz al [phone].    We comply with applicable federal civil rights laws and [place] laws. We do not discriminate on the basis of race, color, national origin, age, disability, sex, sexual orientation, or gender identity.            Thank you!     Thank you for choosing [facility]  for your care. Our goal is always to provide you with excellent care. Hearing back from our patients is one way we can continue to improve our services. Please take a few minutes to complete the written survey that you may receive in the mail after your visit with us. Thank you!             Your Updated Medication List - Protect others around you: Learn how to safely use, store and throw away your medicines at www.disposemymeds.org.          This list is accurate as of: 11/15/17  5:54 PM.  Always use your most recent med list.                   Brand Name Dispense Instructions for use Diagnosis    ALIGN Chew      Take 1 chew tab by mouth 3 times daily as needed        metoprolol 50 MG tablet    LOPRESSOR    60 tablet    Take 1 tablet (50 mg) by mouth 2 times daily    Irregular heart beat, Atrial flutter, unspecified type (H)       omeprazole 40 MG capsule    priLOSEC    30 capsule    Take 1 capsule (40 mg) by mouth daily Take 30-60 minutes before a meal.    Gastroesophageal reflux disease without esophagitis

## 2017-11-15 NOTE — PROGRESS NOTES
"  SUBJECTIVE:   Zechariah Ashby is a 57 year old male who presents to clinic today for the following health issues:  Chief Complaint   Patient presents with     Heart Problem      Follow up on Rapid Heart Rate  Seen in clinic on 11/03/17 and went to ED.  Still has symptoms daily. Chest pain and SOB.  Today in clinic BP on rt arm 121/91  P 147     Lt arm 117/77 P 64   /84 P 147     Seen in clinic and sent to ED on 11/3.  Was given adenosine, found to be in atrial flutter.  His rate decreased to  in ED.   Started on metoprolol at 25mg twice daily.   Also started on aspirin.     He has been taking the metoprolol.  Seems like he feels better within a half hour.  Lasts 8-10 hours.  Then he feels chest pain and shortness of breath.   5/10 chest discomfort-seems like it has not changed.  Feels like someone sitting on chest and difficult to breathe.  He has had this for 2 and 1/2 weeks now.   No current symptoms.   He does have dyspnea on exertion.   Can walk  About 100' before feeling shortness of breath.  He feels he could walk for miles but would be short of breath.   The guys at work as him why he is puffing.     Caffeine:he has cut back to 2 cups per day.  Was drinking a pot per day.     Occupation: .     Patient Active Problem List   Diagnosis     CARDIOVASCULAR SCREENING; LDL GOAL LESS THAN 130     Perforated ulcer (HCC)     Free intraperitoneal air      ROS:  No edema.   Persistent heartburn symptoms.  Epigastric pain.  Worse after eating.  He has stopped pop.   Taking omeprazole 20mg daily for the past week which helps some.  Asking if he can take something else.   No alcohol.   History   Smoking Status     Current Every Day Smoker     Packs/day: 0.75   Smokeless Tobacco     Never Used        OBJECTIVE:Blood pressure 117/77, pulse 148, temperature 97.8  F (36.6  C), temperature source Tympanic, resp. rate 18, height 5' 9.5\" (1.765 m), weight 166 lb (75.3 kg), SpO2 97 %. BMI=Body " mass index is 24.16 kg/(m^2).  GENERAL APPEARANCE ADULT: Alert, no acute distress  NECK: No adenopathy,masses or thyromegaly  RESP: lungs clear to auscultation   CV: irregular rhythm-irregularly irregular, tachycardia [id number], no murmur  ABDOMEN: soft, no organomegaly, masses or tenderness  MS: extremities normal, no peripheral edema  EKG:Atrial flutter with variable rate.  Unchanged since recent EKG-no acute or different ST-T changes.      ASSESSMENT:     ICD-10-CM    1. Irregular heart beat I49.9 EKG 12-lead complete w/read - Clinics     metoprolol (LOPRESSOR) 50 MG tablet   2. Atrial flutter, unspecified type (H) I48.92 metoprolol (LOPRESSOR) 50 MG tablet   3. Gastroesophageal reflux disease without esophagitis K21.9 omeprazole (PRILOSEC) 40 MG capsule      Heart rate is slower with metoprolol but still a little fast and causing some symptoms.   PLAN: Increase the metoprolol to 2 pills (50mg) twice daily.  I will send new prescription for 50mg pills so you only have to take 1 pill.   I will discuss with cardiology.    We may need to put you on blood thinner as well.     Check your pulse for rhythm and rate at the wrist or neck.  You want to check for two things, the heart rate and if it is a regular rhythm or irregular rhythm.  Normal heart rate is  beats a minute.  You can count for 10 seconds and multiply by 6 or count for 15 seconds and multiply by 4 to get the number of beats in a minute.     If you feel worse, more shortness of breath,  Worse or persistent chest pain, go to the emergency room right away or call 911.     Gastroesophageal reflux (GERD) lifestyle changes that can help improve symptoms include:  Eating smaller meals and not lying down after meals  Elevate head of bed with 6-8 inch blocks or a wedge pillow.   Avoid lying flat on back after eating and at bedtime  Avoid tight fitting clothing  Avoid reflux inducing foods like fat, caffeine, chocolate, carbonated beverages  Maintain an ideal  body weight  Avoid alcohol and tobacco  Avoid medications like ibuprofen, naproxen and aspirin               11/16/2017:I did review with cardiology.   PLAN: Lexiscan nuclear medicine stress test  Continue the beta-blocker.    EP cardiology consult.   He reports feeling better today with increased beta-blocker dose.   I will hold off on anticoagulation for now as CHA2 DS2 VASc score=0.  Might need if some cardioversion procedure.

## 2017-11-15 NOTE — PATIENT INSTRUCTIONS
ASSESSMENT:     ICD-10-CM    1. Irregular heart beat I49.9 EKG 12-lead complete w/read - Clinics     metoprolol (LOPRESSOR) 50 MG tablet   2. Atrial flutter, unspecified type (H) I48.92 metoprolol (LOPRESSOR) 50 MG tablet   3. Gastroesophageal reflux disease without esophagitis K21.9 omeprazole (PRILOSEC) 40 MG capsule      Heart rate is slower with metoprolol but still a little fast and causing some symptoms.   PLAN: Increase the metoprolol to 2 pills (50mg) twice daily.  I will send new prescription for 50mg pills so you only have to take 1 pill.   I will discuss with cardiology.    We may need to put you on blood thinner as well.     Check your pulse for rhythm and rate at the wrist or neck.  You want to check for two things, the heart rate and if it is a regular rhythm or irregular rhythm.  Normal heart rate is  beats a minute.  You can count for 10 seconds and multiply by 6 or count for 15 seconds and multiply by 4 to get the number of beats in a minute.     If you feel worse, more shortness of breath,  Worse or persistent chest pain, go to the emergency room right away or call 911.     Gastroesophageal reflux (GERD) lifestyle changes that can help improve symptoms include:  Eating smaller meals and not lying down after meals  Elevate head of bed with 6-8 inch blocks or a wedge pillow.   Avoid lying flat on back after eating and at bedtime  Avoid tight fitting clothing  Avoid reflux inducing foods like fat, caffeine, chocolate, carbonated beverages  Maintain an ideal body weight  Avoid alcohol and tobacco  Avoid medications like ibuprofen, naproxen and aspirin

## 2017-12-01 NOTE — PROGRESS NOTES
I spoke with him today.  He is continuing to have shortness of breath with activity.  Pulse has been 100-110 on metoprolol 50mg twice daily.   Notified of decreased EF and possible ischemia and possible previous area of heart muscle damage.    I did review tests with cardiology-Dr. Pierre today.  He has appointment on 12/14.   PLAN: Take aspirin daily-can do 81mg.   Increase metoprolol to 75mg (1 and 1/2 pills) twice daily.   Cardiology will contact him to see about getting him in earlier.   ROSA JASSO MD

## 2017-12-07 NOTE — ED PROVIDER NOTES
Chief complaint short of air chest pain    57-year-old male presents from cardiology clinic here.  He is found to have atrial flutter with rapid ventricular response, he has associated shortness of air chest tightness.  He could not walk stairs going up to the cardiology clinic today which is unusual for him, had to stop secondary to shortness of air.  He describes precordial chest pressure that occurs randomly, not necessarily exertional, reports more frequent episodes in the evening, he relates this to his morning dose of metoprolol wearing off.  He denies feeling of palpitations, he believes has been in and out of atrial flutter for the past 5 weeks or so.  Said no nausea vomiting or diarrhea.  Has a nonproductive cough.  He's been unable to work at his job as a  secondary to fatigue and shortness of air.  He is not anticoagulated, takes aspirin 325 mg twice a day.  He denies leg pain or swelling, no history of DVT/PE.  Denies fever.  Continues to smoke.  He is transferred down to the emergency department for further evaluation, stabilization with respect to heart rate, and then consider transfer to [facility], at patient request, for KIMBERLY guided cardioversion per her recommendation  from cardiology clinic here.    Past medical history, medications, allergies, social history, family history all reviewed.  Patient Active Problem List   Diagnosis     CARDIOVASCULAR SCREENING; LDL GOAL LESS THAN 130     Perforated ulcer (HCC)     Free intraperitoneal air     Atrial flutter (H)     Review Of Systems  Skin: negative  Eyes: negative  Ears/Nose/Throat: negative  Respiratory: Shortness of breath,Dyspnea on exertion,Cough  Cardiovascular: palpitations, tachycardia and chest pain  Gastrointestinal: nausea  Genitourinary: negative  Musculoskeletal: negative  Neurologic: negative  Psychiatric: negative  Hematologic/Lymphatic/Immunologic: negative  Endocrine: negative    BP (!) 133/107  Pulse 141  Temp 97.4  F (36.3   C) (Oral)  Resp 17  Wt 75.3 kg (166 lb)  SpO2 97%  BMI 24.17 kg/m2  Nontoxic appearing no respiratory distress alert and oriented ×3  Head atraumatic normocephalic  Conjunctiva noninjected, oropharynx moist without lesions or erythema  No cervical adenopathy neck supple full active painless range of motion, small nodular minimally tender subcutaneous mass left inferior lateral neck without associated induration and warmth or redness.  Lungs clear to auscultation  Heart regular tachycardic no murmur  Abdomen soft nontender bowel sounds positive no masses or HSM  Strength and sensation grossly intact throughout the extremities  Speech is fluent, good eye contact, thought processes are rational  Legs nontender without redness, trace bilateral pretibial edema    EKG time 1445, symptoms short of air tachycardia, atrial flutter, rate 142, diffuse ST depression, low voltage, unchanged from previous, read by Dr. Norman Lara    Results for orders placed or performed during the hospital encounter of 12/07/17   XR Chest 2 Views    Narrative    XR CHEST 2 VW 12/7/2017 4:11 PM    HISTORY: Chest pain. Short of breath. Atrial flutter.    COMPARISON: 4/26/2015    FINDINGS: In comparison with previous chest radiographs there is  elevation of the left hemidiaphragm with focal tenting. There is also  hazy opacity in the upper lung which could represents partial upper  lobe atelectasis. The right lung is clear. No pneumothorax. No pleural  effusion. Heart size appears stable.      Impression    IMPRESSION: Abnormal appearance of the left lung, as above, new in  comparison with previous exams. Although this is unlikely to be  related to the patient's current cardiac symptoms, further evaluation  with chest CT is recommended.    HOLLIE MAE MD   CBC with platelets differential   Result Value Ref Range    WBC 7.9 4.0 - 11.0 10e9/L    RBC Count 5.08 4.4 - 5.9 10e12/L    Hemoglobin 15.2 13.3 - 17.7 g/dL    Hematocrit 44.8 40.0 -  53.0 %    MCV 88 78 - 100 fl    MCH 29.9 26.5 - 33.0 pg    MCHC 33.9 31.5 - 36.5 g/dL    RDW 12.9 10.0 - 15.0 %    Platelet Count 246 150 - 450 10e9/L    Diff Method Automated Method     % Neutrophils 67.8 %    % Lymphocytes 17.4 %    % Monocytes 13.6 %    % Eosinophils 0.6 %    % Basophils 0.3 %    % Immature Granulocytes 0.3 %    Absolute Neutrophil 5.4 1.6 - 8.3 10e9/L    Absolute Lymphocytes 1.4 0.8 - 5.3 10e9/L    Absolute Monocytes 1.1 0.0 - 1.3 10e9/L    Absolute Eosinophils 0.1 0.0 - 0.7 10e9/L    Absolute Basophils 0.0 0.0 - 0.2 10e9/L    Abs Immature Granulocytes 0.0 0 - 0.4 10e9/L   Comprehensive metabolic panel   Result Value Ref Range    Sodium 140 133 - 144 mmol/L    Potassium 4.2 3.4 - 5.3 mmol/L    Chloride 105 94 - 109 mmol/L    Carbon Dioxide 29 20 - 32 mmol/L    Anion Gap 6 3 - 14 mmol/L    Glucose 90 70 - 99 mg/dL    Urea Nitrogen 23 7 - 30 mg/dL    Creatinine 1.13 0.66 - 1.25 mg/dL    GFR Estimate 66 >60 mL/min/1.7m2    GFR Estimate If Black 80 >60 mL/min/1.7m2    Calcium 8.5 8.5 - 10.1 mg/dL    Bilirubin Total 0.6 0.2 - 1.3 mg/dL    Albumin 3.4 3.4 - 5.0 g/dL    Protein Total 6.6 (L) 6.8 - 8.8 g/dL    Alkaline Phosphatase 122 40 - 150 U/L    ALT 30 0 - 70 U/L    AST 22 0 - 45 U/L   Troponin I   Result Value Ref Range    Troponin I ES <0.015 0.000 - 0.045 ug/L   Nt probnp inpatient (BNP)   Result Value Ref Range    N-Terminal Pro BNP Inpatient 1314 (H) 0 - 900 pg/mL   INR   Result Value Ref Range    INR 1.09 0.86 - 1.14   Partial thromboplastin time   Result Value Ref Range    PTT 38 (H) 22 - 37 sec   TSH with free T4 reflex   Result Value Ref Range    TSH 2.35 0.40 - 4.00 mU/L     Medications   diltiazem (CARDIZEM) 125 mg in NaCl 0.9 % 125 mL infusion (5 mg/hr Intravenous New Bag 12/7/17 4105)   heparin infusion 25,000 units in 0.45% NaCl 250 mL (900 Units/hr Intravenous New Bag 12/7/17 4911)   diltiazem (CARDIZEM) injection 20 mg (20 mg Intravenous Given 12/7/17 1530)   heparin Loading Dose  bolus dose from infusion pump 4,500 Units (4,500 Units Intravenous Given 12/7/17 4496)     Course: 15 mg Cardizem IV bolus, 5 mg per hour drip, heart rate from 140s down to 70s, subjectively feeling better on reevaluation.  No evidence for fluid overload or cardiac ischemia.  Remainder of labs within normal limits.  Started on low-dose heparin protocol given atrial flutter.  Chest x-ray abnormal opacification left upper lobe requires further investigation with CT scan.  Patient is reevaluated for times during his prolonged stay in the emergency department.  He remains on a 5 mg/h Cardizem drip heart rate in the 70s.    MDM: 57-year-old male presented cardiology clinic today for evaluation of chest pain shortness of air, known history of atrial flutter, presented with rapid ventricular response, responded well to diltiazem, remained stable throughout stay in the emergency Department vitals within normal limits and subjectively feeling better with rate control.  Patient requires further evaluation by cardiology given recent echo and nuclear stress results, reviewed in Epic.  I spoke with  cardiologist on-call [facility] who accepts patient in transfer for further evaluation and treatment.  He was originally seen by cardiology here today, and she discussed transferring him to [facility], at patient request he prefers to be transferred to [facility].  Ultimately a bed was not available at [facility], I spoke with the patient, he consented to transfer to [facility], patient is reviewed with  hospitalist on-call who accepts patient in transfer.  Working diagnosis and plan and results of studies reviewed with patient who expressed understanding and agreement.    Impression: Atrial flutter with rapid ventricular response, chest pain, dyspnea on exertion     Norman Lara MD  12/07/17 1756       Norman Lara MD  12/07/17 2022

## 2017-12-07 NOTE — PROGRESS NOTES
Dictation reference number -     REFERRING PROVIDER:  Kailash Mason MD  [address] [place] [id number]    PRIMARY CARE PROVIDER:  Kailash Mason  [address] [place] [address]    REASON FOR VISIT/CHIEF COMPLAINT:  1.   2.   3.     HISTORY OF PRESENT ILLNESS:      REVIEW OF SYSTEMS:  A comprehensive 10-point review of systems was completed and the pertinent positives are documented in the history of present illness.    Skin:  Negative     Eyes:  Positive for glasses  ENT:  Positive for nasal congestion  Respiratory:  Positive for shortness of breath;dyspnea on exertion;dyspnea at rest  Cardiovascular:    Positive for;palpitations;chest pain;fatigue;lightheadedness;dizziness  Gastroenterology: Negative    Genitourinary:  Negative    Musculoskeletal:  Negative    Neurologic:  Negative    Psychiatric:  Negative    Heme/Lymph/Imm:  Negative    Endocrine:  Negative      CURRENT MEDICATIONS:  Current Outpatient Prescriptions   Medication Sig Dispense Refill     aspirin 81 MG tablet Take 1 tablet (81 mg) by mouth daily 30 tablet      metoprolol (LOPRESSOR) 50 MG tablet Take 1.5 tablets (75 mg) by mouth 2 times daily 60 tablet 1     Probiotic Product (ALIGN) CHEW Take 1 chew tab by mouth 3 times daily as needed       omeprazole (PRILOSEC) 40 MG capsule Take 1 capsule (40 mg) by mouth daily Take 30-60 minutes before a meal. 30 capsule 1         ALLERGIES:  No Known Allergies    PAST MEDICAL HISTORY:    No past medical history on file.    PAST SURGICAL HISTORY:    Past Surgical History:   Procedure Laterality Date     APPENDECTOMY      age 28s     SURGICAL HISTORY OF -   2004    Gastroscopy with biopsy for gastric ulcer       FAMILY HISTORY:    Family History   Problem Relation Age of Onset     CANCER Mother      pancreatic cancer,  at 38 years     Alzheimer Disease Father       at 84 years.       SOCIAL HISTORY:    Social History     Social History     Marital status:      Spouse  "name: Leonor Shultz     Number of children: 2     Years of education: N/A     Occupational History           Social History Main Topics     Smoking status: Current Every Day Smoker     Packs/day: 2.00     Years: 50.00     Smokeless tobacco: Never Used     Alcohol use No     Drug use: No     Sexual activity: Yes     Partners: Female     Other Topics Concern     Parent/Sibling W/ Cabg, Mi Or Angioplasty Before 65f 55m? No     Social History Narrative       PHYSICAL EXAM:    Vitals: /86 (BP Location: Right arm, Patient Position: Sitting, Cuff Size: Adult Regular)  Pulse 140  Ht 1.765 m (5' 9.49\")  Wt 75.4 kg (166 lb 3.2 oz)  SpO2 100%  BMI 24.2 kg/m2  Wt Readings from Last 5 Encounters:   12/07/17 75.3 kg (166 lb)   12/07/17 75.4 kg (166 lb 3.2 oz)   11/15/17 75.3 kg (166 lb)   11/03/17 71.9 kg (158 lb 9.6 oz)   10/16/15 83.2 kg (183 lb 6.4 oz)       Constitutional: Comfortable at rest. Cooperative, alert and oriented, well developed, well nourished.  Eyes: Pupils equal and round, conjunctivae and lids unremarkable, sclera white, no xanthalasma,   ENT: Satisfactory dentition.  No cyanosis or pallor.  Neck: Carotid pulses are full and equal bilaterally, no carotid bruit, no thyromegaly     Respiratory: Normal respiratory effort with symmetrical chest wall movements and no use of accessory muscles. Good air entry with normal breath sounds and no rales or wheeze.  Cardiovascular: Normal jugular venous pulse and pressure.  Normal carotid pulse character and volume.  No carotid bruit.  Apical impulse is undisplaced and normal to palpation without parasternal heave or thrill.  Heart sounds are normal and regular. No audible murmur. No S3, S4 or friction rub.    GI: Soft, nontender, no hepatosplenomegaly, no masses, active bowel sounds.    Skin: No rash, erythema, ecchymosis.  Musculoskeletal: Normal muscle tone and strength. Normal gait. No spinal deformities.  Neuropsychiatric: Oriented to " time place and person.  Affect normal.  No gross motor deficits.  Extremities: No edema. No clubbing or deformities.    DIAGNOSTIC DATA:  I reviewed pertinent laboratory data and imaging studies. Results were discussed with the patient.    LABORATORY DATA       ECG:       CHEST X-RAY:       TRANSTHORACIC ECHOCARDIOGRAM:      CORONARY ANGIOGRAM:       RIGHT HEART CATHETERIZATION:  RA -   RV -  PA -  WEDGE PRESSURE -  CARDIAC INDEX -  PVR -    DIAGNOSES:  1.   2.   3.   4.   5.   6.   7.     ASSESSMENT:         PLAN:  1.   2.   3.   4.   5.   6.   7.       Encounter Diagnoses   Name Primary?     Typical atrial flutter (H) Yes     Acute chest pain      Acute systolic heart failure (H)      Cigarette nicotine dependence without complication        Orders Placed This Encounter   Procedures     EKG 12-LEAD CLINIC READ ([facility])- to be scheduled       CC  REFERRING PROVIDER:  Kailash Mason MD  [address], [place] [address]

## 2017-12-07 NOTE — MR AVS SNAPSHOT
After Visit Summary   12/7/2017    Zechariah Ashby    MRN: [id number]           Patient Information     Date Of Birth          1960        Visit Information        Provider Department      12/7/2017 1:45 PM Princess Damon MD [facility]        Today's Diagnoses     Typical atrial flutter (H)    -  1    Acute chest pain        Acute systolic heart failure (H)        Cigarette nicotine dependence without complication          Care Instructions    Transfer to ER for KIMBERLY guided cardioversion and heart failure management at the [facility], per patient preference.          Follow-ups after your visit        Your next 10 appointments already scheduled     Dec 15, 2017  7:30 AM CST   (Arrive by 7:15 AM)   PE NPET ONCOLOGY (EYES TO THIGHS) with UUPET1   [facility] [place] PET [facility] ([facility], [facility])    [address] [facility] [address] [phone]           Tell your doctor:   If there is any chance you may be pregnant or if you are breastfeeding.   If you have problems lying in small spaces (claustrophobia). If you do, your doctor may give you medicine to help you relax. If you have diabetes:   Have your exam early in the morning. Your blood glucose will go up as the day goes by.   Your glucose level must be 180 or less at the start of the exam. Please take any medicines you need to ensure this blood glucose level. 24 hours before your scan: Don t do any heavy exercise. (No jogging, aerobics or other workouts.) Exercise will make your pictures less accurate. 6 hours before your scan:   Stop all food and liquids (except water).   Do not chew gum or suck on mints.   If you need to take medicine with food, you may take it with a few crackers.  Please call your Imaging Department at your exam site with any questions.            Jan 09, 2018  8:00 AM CST   (Arrive by 7:45 AM)   NEW LUNG NODULE with  Osvaldo Ortiz MD   [facility] ([facility])    [address]  2[address]  [facility] [id number] [phone]            Jan 12, 2018 10:45 AM CST   Ech Complete with MAVIS   [facility] ([facility])    [address] [facility] [id number] [phone]           1. Please bring or wear a comfortable two-piece outfit. 2. You may eat, drink and take your normal medicines. 3. For any questions that cannot be answered, please contact the ordering physician            Jan 16, 2018  9:45 AM CST   ecg with [place] [facility]   [facility] ([facility])    [address] [facility] [id number] [phone]            Jan 16, 2018 10:00 AM CST   Return Visit with Daksha Montemayor PA-C   [facility] ([facility])    [address] [facility] [id number] [phone]              Future tests that were ordered for you today     Open Future Orders        Priority Expected Expires Ordered    CT Lymph Node Biopsy STAT 12/13/2017 12/13/2018 12/13/2017    PET Oncology (Eyes to Thighs) Routine 12/13/2017 12/14/2018 12/13/2017            Who to contact     If you have questions or need follow up information about today's clinic visit or your schedule please contact [facility] directly at [phone].  Normal or non-critical lab and imaging results will be communicated to you by MyChart, letter or phone within 4 business days after the clinic has received the results. If you do not hear from us within 7 days, please contact the clinic through MyChart or phone. If you have a critical or abnormal lab result, we will notify you by phone as soon as possible.  Submit refill requests through [employer] or call your pharmacy and they will forward the refill request to us. Please allow 3  "business days for your refill to be completed.          Additional Information About Your Visit        MyChart Information     [employer] lets you send messages to your doctor, view your test results, renew your prescriptions, schedule appointments and more. To sign up, go to [email or web address] . Click on \"Log in\" on the left side of the screen, which will take you to the Welcome page. Then click on \"Sign up Now\" on the right side of the page.     You will be asked to enter the access code listed below, as well as some personal information. Please follow the directions to create your username and password.     Your access code is: [email or web address]  Expires: 2018  4:44 PM     Your access code will  in 90 days. If you need help or a new code, please call your [place] clinic or [phone].        Care EveryWhere ID     This is your Care EveryWhere ID. This could be used by other organizations to access your [place] medical records  [id number]        Your Vitals Were     Pulse Height Pulse Oximetry BMI (Body Mass Index)          140 1.765 m (5' 9.49\") 100% 24.2 kg/m2         Blood Pressure from Last 3 Encounters:   No data found for BP    Weight from Last 3 Encounters:   No data found for Wt              Today, you had the following     No orders found for display       Primary Care Provider Office Phone # Fax #    Kailash Mason [phone]       [address] [id number]        Equal Access to Services     MIRIAM PERRY : Hadii arnol lai hadasho Soaustinali, waaxda luqadaha, qaybta kaalmada judah, harley carrasquillo . So [facility] [phone].    ATENCIÓN: Si habla español, tiene a morocho disposición servicios gratuitos de asistencia lingüística. Llame al [phone].    We comply with applicable federal civil rights laws and [place] laws. We do not discriminate on the basis of race, color, national origin, age, disability, sex, sexual orientation, or gender " identity.            Thank you!     Thank you for choosing [facility] HEART [facility]  for your care. Our goal is always to provide you with excellent care. Hearing back from our patients is one way we can continue to improve our services. Please take a few minutes to complete the written survey that you may receive in the mail after your visit with us. Thank you!             Your Updated Medication List - Protect others around you: Learn how to safely use, store and throw away your medicines at www.disposemymeds.org.          This list is accurate as of: 12/7/17 10:57 PM.  Always use your most recent med list.                   Brand Name Dispense Instructions for use Diagnosis    ALIGN Chew      Take 1 chew tab by mouth 3 times daily as needed        apixaban ANTICOAGULANT 5 MG tablet    ELIQUIS    60 tablet    Take 1 tablet (5 mg) by mouth 2 times daily    Typical atrial flutter (H)       metoprolol 50 MG tablet    LOPRESSOR    60 tablet    Take 1.5 tablets (75 mg) by mouth 2 times daily    Irregular heart beat, Atrial flutter, unspecified type (H), Cardiomyopathy, unspecified type (H)       nicotine 21 MG/24HR 24 hr patch    NICODERM CQ    30 patch    Place 1 patch onto the skin daily    Encounter for tobacco use cessation counseling       omeprazole 40 MG capsule    priLOSEC    30 capsule    Take 1 capsule (40 mg) by mouth daily Take 30-60 minutes before a meal.    Gastroesophageal reflux disease without esophagitis

## 2017-12-07 NOTE — IP AVS SNAPSHOT
[facility]    [address] [place] [address], [address]    [place] [address]    Phone:  [phone]                                       After Visit Summary   12/7/2017    Zechariah Ashby    MRN: [id number]           After Visit Summary Signature Page     I have received my discharge instructions, and my questions have been answered. I have discussed any challenges I see with this plan with the nurse or doctor.    ..........................................................................................................................................  Patient/Patient Representative Signature      ..........................................................................................................................................  Patient Representative Print Name and Relationship to Patient    ..................................................               ................................................  Date                                            Time    ..........................................................................................................................................  Reviewed by Signature/Title    ...................................................              ..............................................  Date                                                            Time

## 2017-12-07 NOTE — PATIENT INSTRUCTIONS
Transfer to ER for KIMBERLY guided cardioversion and heart failure management at the [facility], per patient preference.

## 2017-12-07 NOTE — ED NOTES
Bed: ED03  Expected date:   Expected time:   Means of arrival:   Comments:  [facility] - cardiology clinic

## 2017-12-07 NOTE — ED NOTES
Pt here from clinic with a-fib with high rate of 140's. The patient is currently having slight shortness of breath, and feels palpitations. The patient has had palpitations for a month.

## 2017-12-07 NOTE — NURSING NOTE
"Pt roomed, EKG done showing A Flutter, rate ~140. Pt c/o SOB/Chest pain \"heaviness\" when my medication wears off. Is on Lopressor 75 mg BID. Also takes  mg BID \"I don't want to have a stroke\" (ASA 81 mg on med list) Has not felt well for the past 5-6 weeks. This pt is a new consult for cardiology-Dr. Damon. Dr. Damon notified and assessed the patient. Plan is for patient to go to the ER, transfer for KIMBERLY and DCCV. MD to MD (Rich JOSHI and MD Nelson at 1413) Pt brought to ER via w/c with all belongings. Verbal report given to GURMEET Hernández at 1430. Mary Ocasio RN Cardiology at [facility] December 7, 2017, 2:36 PM  "

## 2017-12-07 NOTE — PROGRESS NOTES
LOCATION:  [facility]      REFERRING AND PRIMARY CARE PROVIDER:  Kailash Mason MD      REASON FOR VISIT:   1.  Recent diagnosis of symptomatic atrial flutter with rapid ventricular rate.      HISTORY OF PRESENT ILLNESS:  The patient is new to Cardiology.  He is a 57-year-old  gentleman who is employed as a , who was unaccompanied today.  He is a heavy cigarette smoker (2 packs a day since the age of 9 years).  He is quite slim and his BMI is 24.2 kg/m2.      1.  New diagnosis of atrial flutter.   2.  Tachycardia.  New diagnosis of left ventricular systolic cardiomyopathy.      For the last 5 weeks, the patient has been having tach palpitations associated with dyspnea and chest tightness.  He presented to the Emergency Room at [facility] 11/03/2017 and his ECG demonstrated typical atrial flutter, 2:1 conduction, ventricular rate 150 BPM, troponin negative.  With 6 mg of IV adenosine, flutter waves were clearly visible.  At that time, his CHADS-VASc score was 0.  He was started on aspirin 325 mg and metoprolol tartrate 37.5 mg daily and discharged.      He subsequently had a transthoracic echocardiogram which showed left ventricular systolic cardiomyopathy with an LVEF of 30%-35%, diffuse hypokinesis, normal right ventricular size and function, mild mitral and tricuspid regurgitation, moderate biatrial enlargement, and enlarged and noncollapsing IVC.      He also had a nuclear stress test which was done when he was still in rapid atrial flutter - it showed a small area of possible nontransmural infarction in the anterior wall and anterior apex, with ischemia in the anterior wall, with the appearance of LV dilatation on stress images.  However, the patient was in atrial flutter with the variable block at a rate of 100 bpm and there was trouble with gating.      His TSH is normal at 2.84, normal renal function, normal electrolytes, hemoglobin 15.0.     ECG  "repeated today shows typical atrial flutter with 2:1 block, 150 BPM.  There are some nonspecific lateral ST-T changes.      He has not had a recent chest x-ray.     He is not known to have any previous cardiac diagnoses, does not drink alcohol, is a heavy smoker, clinical probability of sleep apnea is low, no thyroid disease, not hypertensive or diabetic.  Over the last 2 weeks, he has had progressive exertional shortness of breath that is limiting him from doing his work, 2 pillow orthopnea without PND, no presyncope or syncope, and chest pain consistent with angina with a tightness radiating down the arm that occurs when \"the metoprolol wears off.\"  A few years ago, he had a perforated gastric ulcer but has not had any major bleeding.      CURRENT MEDICATIONS:   1.  Aspirin 325 mg daily.   2.  Metoprolol tartrate 75 mg twice daily.   3.  Omeprazole 40 mg daily.      ALLERGIES:  No known allergies.      FAMILY HISTORY:   Reviewed and noncontributory.      SOCIAL HISTORY:  Works as a .  Heavy smoker of 2 packs a day since the age of 9 years.  Rare alcohol.  No recreational drugs.   and lives with his wife, Anita.  Two children.      PHYSICAL EXAMINATION:     VITAL SIGNS:  /86, pulse 140 BPM, weight 75.4 kg, respiratory rate 16 per minute, sats 100% on room air.  BMI 24.19 kg/m2.   CONSTITUTIONAL:  Very slim.  Has resting conversational dyspnea but no chest pain.  Cooperative, alert, oriented.   EYES:  No pallor, icterus or xanthelasma.   ENT:  Poor dental hygiene with nicotine staining.   NECK:  No thyromegaly.  Normal carotid pulse.   RESPIRATORY:  Normal respiratory effort bilaterally.   CHEST:  Normal breath sounds with a few rales at the bases.  No wheeze.   CARDIOVASCULAR:  Apical impulse is not displaced.  Jugular venous pressure is elevated 5 cm in the neck.  Heart sounds are regular but tachycardic, no audible murmur or pericardial friction rub.   ABDOMEN:  Soft and " nontender.  No hepatosplenomegaly or masses.   SKIN:  No rash, erythema or ecchymosis.   NEUROPSYCHIATRIC:  Oriented x3.   EXTREMITIES:  No edema, clubbing or deformities.      DIAGNOSES:   1.  Recent diagnosis of typical atrial flutter with rapid ventricular response.   2.  Chest pain consistent with angina.   3.  Acute left ventricular systolic heart failure secondary to #1 and tachycardia-mediated cardiomyopathy.  LVEF 30%-35%.   4.  Chronic nicotine dependency, 2 packs per day.      ASSESSMENT:    This gentleman gives a clear history of tachy palpitations, angina-like chest pain and dyspnea that has been progressing over the last 5 weeks.  Echo shows an LVEF of 30%-35% with diffuse hypokinesis consistent with a tachycardia-mediated cardiomyopathy. Although his baseline stress test showed abnormalities suggestive of ischemia, it was done when he was still in rapid atrial flutter and this can make the test unreliable.  However, due to his heavy smoking history he is at very high risk of coronary artery disease. once he is in sinus rhythm and his ejection fraction has improved, we should consider either a coronary angiogram or a repeat stress test.  Given his probability of vascular disease and low EF, his CHADS-VASc score is elevated.      RECOMMENDATIONS:   1.   I would recommend hospitalization, KIMBERLY-guided cardioversion, diagnostic coronary angiogram and downstream catheter ablation.   Counseled the patient about the above.  He is agreeable to be transferred to the Emergency Room and from there prefers admission to the Cardiology unit at the [facility].  We will facilitate this.   2.  With respect to long-term anticoagulation, he does have a history of perforated gastric ulcer in the past.  However, given that catheter ablation has a very high success rate, hopefully we will not commit him to long-term anticoagulation.        It was my pleasure to visit with this patient.  I spent 60 minutes with  the patient, about 30 minutes of this was critical care time to arrange urgent ER transfer and hospitalization.      cc:   Kailash Mason MD   [address] JACQUI MENDEZ MD             D: 2017 14:33   T: 2017 15:47   MT: al      Name:     ROQUE SAXENA   MRN:      [id number]        Account:      [id number]   :      1960           Service Date: 2017      Document: [id number]

## 2017-12-07 NOTE — ED NOTES
Bed: IN01  Expected date:   Expected time:   Means of arrival:   Comments:  Zechariah Ashby  [id number]    57-year-old who was visiting with Dr. Tellez in cardiology clinic.  Patient has a recent visit to the emergency department with a flutter, converted with adenosine.  He is in cardiology clinic currently with symptomatic atrial flutter, complaining of chest tightness, tachycardic, and shortness of breath.  Ejection fraction is 30-35%.  He has history of tobacco abuse, and   vascular disease which is likely.  Also does have history of perforated gastric ulcer previously.  Blood pressures are stable at 122/86.  Patient will be sent to the emergency department for further evaluation and treatment.  Consider KIMBERLY guided cardioversion.  According to cardiologist, patient requesting [facility] rather than [facility].

## 2017-12-07 NOTE — IP AVS SNAPSHOT
MRN:[id number]                      After Visit Summary   12/7/2017    Zechariah Ashby    MRN: [id number]           Thank you!     Thank you for choosing [facility] for your care. Our goal is always to provide you with excellent care. Hearing back from our patients is one way we can continue to improve our services. Please take a few minutes to complete the written survey that you may receive in the mail after you visit with us. Thank you!        Patient Information     Date Of Birth          1960        Designated Caregiver       Most Recent Value    Caregiver    Will someone help with your care after discharge? no      About your hospital stay     You were admitted on:  December 7, 2017 You last received care in the:  [facility]    You were discharged on:  December 9, 2017        Reason for your hospital stay       Atrial flutter                  Who to Call     For medical emergencies, please call 911.  For non-urgent questions about your medical care, please call your primary care provider or clinic, [phone]          Attending Provider     Provider Specialty    Xochitl Escobedo MD Internal Medicine    [place], Naeem Valero DO Internal Medicine       Primary Care Provider Office Phone # Fax #    Kailash Masno [phone]       When to contact your care team       Call your primary doctor if you have any of the following:  increased shortness of breath or cough or fever or chest pain.                  After Care Instructions     Diet       Follow this diet upon discharge: Orders Placed This Encounter      Regular Diet Adult                  Follow-up Appointments     Follow-up and recommended labs and tests        Follow up with primary care provider, Kailash Mason, within 7-10, to evaluate medication change and for hospital follow- up. No follow up labs or test are needed.    Follow up with oncology as recommended.                   Your next 10 appointments already scheduled     Jan 12, 2018 10:45 AM CST   Ech Complete with [facility]   [facility] ([facility])    [address] [address]   [phone]           1. Please bring or wear a comfortable two-piece outfit. 2. You may eat, drink and take your normal medicines. 3. For any questions that cannot be answered, please contact the ordering physician            Jan 16, 2018  9:45 AM CST   ecg with [facility]   [facility] ([facility])    [address] [address]   [phone]            Jan 16, 2018 10:00 AM CST   Return Visit with Daksha Montemayor PA-C   [facility] ([facility])    [address] [address]   [phone]              Additional Services     Follow-Up with Cardiac Advanced Practice Provider                 Future tests that were ordered for you     EKG 12-lead complete with read (future)           Echocardiogram       Administration of IV contrast will be tailored to this examination per the appropriate written protocol listed in the Echocardiography department Protocol Book, or by the supervising Cardiologist. This may result in an order change.    Use of contrast is at the discretion of the supervising Cardiologist.                  Pending Results     Date and Time Order Name Status Description    12/8/2017 2300 EKG 12-lead, tracing only Preliminary     12/8/2017 1459 Cytology non gyn In process     12/8/2017 1459 Fluid Culture Aerobic Bacterial Preliminary     12/8/2017 1101 EKG 12-lead, tracing only Preliminary             Statement of Approval     Ordered          12/09/17 1644  I have reviewed and agree with all the recommendations and orders detailed in this document.  EFFECTIVE NOW     Approved and electronically signed by:  Kenneth Quintana MD             Admission  "Information     Date & Time Provider Department Dept. Phone    2017 Naeem Leone,  [facility] [phone]      Your Vitals Were     Blood Pressure Pulse Temperature Respirations Weight Pulse Oximetry    117/78 (BP Location: Right arm) 69 98.6  F (37  C) (Oral) 16 73.7 kg (162 lb 7.7 oz) 95%    BMI (Body Mass Index)                   23.66 kg/m2           [facility] Education Information     [facility] lets you send messages to your doctor, view your test results, renew your prescriptions, schedule appointments and more. To sign up, go to [email or web address] . Click on \"Log in\" on the left side of the screen, which will take you to the Welcome page. Then click on \"Sign up Now\" on the right side of the page.     You will be asked to enter the access code listed below, as well as some personal information. Please follow the directions to create your username and password.     Your access code is: [email or web address]  Expires: 2018  4:44 PM     Your access code will  in 90 days. If you need help or a new code, please call your [place] clinic or [phone].        Care EveryWhere ID     This is your Care EveryWhere ID. This could be used by other organizations to access your [place] medical records  [id number]        Equal Access to Services     ROD PERRY AH: Hadnehal Zhang, waaxturner pendleton, qaybta harley martinez. So [facility] [phone].    ATENCIÓN: Si habla español, tiene a morocho disposición servicios gratuitos de asistencia lingüística. Roz al [phone].    We comply with applicable federal civil rights laws and [place] laws. We do not discriminate on the basis of race, color, national origin, age, disability, sex, sexual orientation, or gender identity.               Review of your medicines      START taking        Dose / Directions    apixaban ANTICOAGULANT 5 MG tablet   Commonly known as:  ELIQUIS        " Dose:  5 mg   Take 1 tablet (5 mg) by mouth 2 times daily   Quantity:  60 tablet   Refills:  0       nicotine 21 MG/24HR 24 hr patch   Commonly known as:  NICODERM CQ   Used for:  Encounter for tobacco use cessation counseling        Dose:  1 patch   Start taking on:  12/10/2017   Place 1 patch onto the skin daily   Quantity:  30 patch   Refills:  0         CONTINUE these medicines which have NOT CHANGED        Dose / Directions    ALIGN Chew        Dose:  1 chew tab   Take 1 chew tab by mouth 3 times daily as needed   Refills:  0       metoprolol 50 MG tablet   Commonly known as:  LOPRESSOR   Used for:  Irregular heart beat, Cardiomyopathy, unspecified type (H)        Dose:  75 mg   Take 1.5 tablets (75 mg) by mouth 2 times daily   Quantity:  60 tablet   Refills:  1       omeprazole 40 MG capsule   Commonly known as:  priLOSEC   Used for:  Gastroesophageal reflux disease without esophagitis        Dose:  40 mg   Take 1 capsule (40 mg) by mouth daily Take 30-60 minutes before a meal.   Quantity:  30 capsule   Refills:  1         STOP taking     ASPIRIN EC PO                Where to get your medicines      These medications were sent to [id number] [address]  [address] [address]    Hours:  Closed 10-16-08 business to [facility] Phone:  [phone]     apixaban ANTICOAGULANT 5 MG tablet    nicotine 21 MG/24HR 24 hr patch                Protect others around you: Learn how to safely use, store and throw away your medicines at www.disposemymeds.org.             Medication List: This is a list of all your medications and when to take them. Check marks below indicate your daily home schedule. Keep this list as a reference.      Medications           Morning Afternoon Evening Bedtime As Needed    ALIGN Chew   Take 1 chew tab by mouth 3 times daily as needed                                   apixaban ANTICOAGULANT 5 MG tablet   Commonly known as:  ELIQUIS    Take 1 tablet (5 mg) by mouth 2 times daily   Last time this was given:  5 mg on 12/9/2017  8:11 AM   Next Dose Due:  12/9 9:00 p.m.                                      metoprolol 50 MG tablet   Commonly known as:  LOPRESSOR   Take 1.5 tablets (75 mg) by mouth 2 times daily   Last time this was given:  75 mg on 12/9/2017  8:11 AM   Next Dose Due:  12/9  9:00 p.m.                                      nicotine 21 MG/24HR 24 hr patch   Commonly known as:  NICODERM CQ   Place 1 patch onto the skin daily   Start taking on:  12/10/2017   Last time this was given:  1 patch on 12/9/2017  8:11 AM                                   omeprazole 40 MG capsule   Commonly known as:  priLOSEC   Take 1 capsule (40 mg) by mouth daily Take 30-60 minutes before a meal.   Last time this was given:  40 mg on 12/9/2017  6:57 AM   Next Dose Due:  12/10                                             More Information        Apixaban Oral tablet  What is this medicine?  APIXABAN (a PIX a ban) is an anticoagulant (blood thinner). It is used to lower the chance of stroke in people with a medical condition called atrial fibrillation. It is also used to treat or prevent blood clots in the lungs or in the veins.  This medicine may be used for other purposes; ask your health care provider or pharmacist if you have questions.  What should I tell my health care provider before I take this medicine?  They need to know if you have any of these conditions:    bleeding disorders    bleeding in the brain    blood in your stools (black or tarry stools) or if you have blood in your vomit    history of stomach bleeding    kidney disease    liver disease    mechanical heart valve    an unusual or allergic reaction to apixaban, other medicines, foods, dyes, or preservatives    pregnant or trying to get pregnant    breast-feeding  How should I use this medicine?  Take this medicine by mouth with a glass of water. Follow the directions on the prescription label.  You can take it with or without food. If it upsets your stomach, take it with food. Take your medicine at regular intervals. Do not take it more often than directed. Do not stop taking except on your doctor's advice. Stopping this medicine may increase your risk of a blot clot. Be sure to refill your prescription before you run out of medicine.  Talk to your pediatrician regarding the use of this medicine in children. Special care may be needed.  Overdosage: If you think you have taken too much of this medicine contact a poison control center or emergency room at once.  NOTE: This medicine is only for you. Do not share this medicine with others.  What if I miss a dose?  If you miss a dose, take it as soon as you can. If it is almost time for your next dose, take only that dose. Do not take double or extra doses.  What may interact with this medicine?  This medicine may interact with the following:    aspirin and aspirin-like medicines    certain medicines for fungal infections like ketoconazole and itraconazole    certain medicines for seizures like carbamazepine and phenytoin    certain medicines that treat or prevent blood clots like warfarin, enoxaparin, and dalteparin    clarithromycin    NSAIDs, medicines for pain and inflammation, like ibuprofen or naproxen    rifampin    ritonavir    Presho's wort  This list may not describe all possible interactions. Give your health care provider a list of all the medicines, herbs, non-prescription drugs, or dietary supplements you use. Also tell them if you smoke, drink alcohol, or use illegal drugs. Some items may interact with your medicine.  What should I watch for while using this medicine?  Notify your doctor or health care professional and seek emergency treatment if you develop breathing problems; changes in vision; chest pain; severe, sudden headache; pain, swelling, warmth in the leg; trouble speaking; sudden numbness or weakness of the face, arm, or leg. These  can be signs that your condition has gotten worse.  If you are going to have surgery, tell your doctor or health care professional that you are taking this medicine.  Tell your health care professional that you use this medicine before you have a spinal or epidural procedure. Sometimes people who take this medicine have bleeding problems around the spine when they have a spinal or epidural procedure. This bleeding is very rare. If you have a spinal or epidural procedure while on this medicine, call your health care professional immediately if you have back pain, numbness or tingling (especially in your legs and feet), muscle weakness, paralysis, or loss of bladder or bowel control.  Avoid sports and activities that might cause injury while you are using this medicine. Severe falls or injuries can cause unseen bleeding. Be careful when using sharp tools or knives. Consider using an electric razor. Take special care brushing or flossing your teeth. Report any injuries, bruising, or red spots on the skin to your doctor or health care professional.  What side effects may I notice from receiving this medicine?  Side effects that you should report to your doctor or health care professional as soon as possible:    allergic reactions like skin rash, itching or hives, swelling of the face, lips, or tongue    signs and symptoms of bleeding such as bloody or black, tarry stools; red or dark-brown urine; spitting up blood or brown material that looks like coffee grounds; red spots on the skin; unusual bruising or bleeding from the eye, gums, or nose  This list may not describe all possible side effects. Call your doctor for medical advice about side effects. You may report side effects to FDA at 6-454-RSJ-2350.  Where should I keep my medicine?  Keep out of the reach of children.  Store at room temperature between 20 and 25 degrees C (68 and 77 degrees F). Throw away any unused medicine after the expiration date.  NOTE: This  sheet is a summary. It may not cover all possible information. If you have questions about this medicine, talk to your doctor, pharmacist, or health care provider.  NOTE:This sheet is a summary. It may not cover all possible information. If you have questions about this medicine, talk to your doctor, pharmacist, or health care provider. Copyright  2016 Gold Standard

## 2017-12-07 NOTE — LETTER
12/7/2017      Kailash Mason MD  [address] [place] [id number]      RE: Zechariah Ashby       Dear Colleague,    I had the pleasure of seeing Zechariah Ashby in the [facility].    LOCATION:  [facility], [place], [facility]      REFERRING AND PRIMARY CARE PROVIDER:  Kailash Mason MD      REASON FOR VISIT:   1.  Recent diagnosis of symptomatic atrial flutter with rapid ventricular rate.      HISTORY OF PRESENT ILLNESS:  The patient is new to Cardiology.  He is a 57-year-old  gentleman who is employed as a , who was unaccompanied today.  He is a heavy cigarette smoker (2 packs a day since the age of 9 years).  He is quite slim and his BMI is 24.2 kg/m2.      1.  New diagnosis of atrial flutter.   2.  Tachycardia.  New diagnosis of left ventricular systolic cardiomyopathy.      For the last 5 weeks, the patient has been having tach palpitations associated with dyspnea and chest tightness.  He presented to the Emergency Room at [facility] 11/03/2017 and his ECG demonstrated typical atrial flutter, 2:1 conduction, ventricular rate 150 BPM, troponin negative.  With 6 mg of IV adenosine, flutter waves were clearly visible.  At that time, his CHADS-VASc score was 0.  He was started on aspirin 325 mg and metoprolol tartrate 37.5 mg daily and discharged.      He subsequently had a transthoracic echocardiogram which showed left ventricular systolic cardiomyopathy with an LVEF of 30%-35%, diffuse hypokinesis, normal right ventricular size and function, mild mitral and tricuspid regurgitation, moderate biatrial enlargement, and enlarged and noncollapsing IVC.      He also had a nuclear stress test which was done when he was still in atrial flutter and showed a small area of possible nontransmural infarction in the anterior wall and anterior apex.  There was no definite reversible ischemia.      His TSH is normal at 2.84, normal renal function, normal  "electrolytes, hemoglobin 15.0.     ECG repeated today shows typical atrial flutter with 2:1 block, 150 BPM.  There are some nonspecific lateral ST-T changes.      He has not had a recent chest x-ray.     He is not known to have any previous cardiac diagnoses, does not drink alcohol, is a heavy smoker, clinical probability of sleep apnea is low, no thyroid disease, not hypertensive or diabetic.  Over the last 2 weeks, he has had progressive exertional shortness of breath that is limiting him from doing his work, 2 pillow orthopnea without PND, no presyncope or syncope, and chest pain consistent with angina with a tightness radiating down the arm that occurs when \"the metoprolol wears off.\"  A few years ago, he had a perforated gastric ulcer but has not had any major bleeding.      CURRENT MEDICATIONS:   1.  Aspirin 325 mg daily.   2.  Metoprolol tartrate 75 mg twice daily.   3.  Omeprazole 40 mg daily.      ALLERGIES:  No known allergies.      FAMILY HISTORY:   Reviewed and noncontributory.      SOCIAL HISTORY:  Works as a .  Heavy smoker of 2 packs a day since the age of 9 years.  Rare alcohol.  No recreational drugs.   and lives with his wife, Anita.  Two children.      PHYSICAL EXAMINATION:     VITAL SIGNS:  /86, pulse 140 BPM, weight 75.4 kg, respiratory rate 16 per minute, sats 100% on room air.  BMI 24.19 kg/m2.   CONSTITUTIONAL:  Very slim.  Has resting conversational dyspnea but no chest pain.  Cooperative, alert, oriented.   EYES:  No pallor, icterus or xanthelasma.   ENT:  Poor dental hygiene with nicotine staining.   NECK:  No thyromegaly.  Normal carotid pulse.   RESPIRATORY:  Normal respiratory effort bilaterally.   CHEST:  Normal breath sounds with a few rales at the bases.  No wheeze.   CARDIOVASCULAR:  Apical impulse is not displaced.  Jugular venous pressure is elevated 5 cm in the neck.  Heart sounds are regular but tachycardic, no audible murmur or pericardial " friction rub.   ABDOMEN:  Soft and nontender.  No hepatosplenomegaly or masses.   SKIN:  No rash, erythema or ecchymosis.   NEUROPSYCHIATRIC:  Oriented x3.   EXTREMITIES:  No edema, clubbing or deformities.      DIAGNOSES:   1.  Recent diagnosis of typical atrial flutter with rapid ventricular response.   2.  Chest pain consistent with angina.   3.  Acute left ventricular systolic heart failure secondary to #1 and tachycardia-mediated cardiomyopathy.  LVEF 30%-35%.   4.  Chronic nicotine dependency, 2 packs per day.      ASSESSMENT:    This gentleman gives a clear history of tachy palpitations, angina-like chest pain and dyspnea that has been progressing over the last 5 weeks.  Echo shows an LVEF of 30%-35% with diffuse hypokinesis consistent with a tachycardia-mediated cardiomyopathy.  Although his stress test had minor abnormalities, it was done when he was in atrial flutter, so the results are not entirely reliable.  Certainly with his heavy smoking history, he is at significant risk for obstructive coronary artery disease and probably has an element of a rate-related ischemia on top of that.  Given his probability of a vascular disease and low EF, his CHADS-VASc score is elevated.  I would recommend hospitalization, KIMBERLY-guided cardioversion and downstream catheter ablation.      RECOMMENDATIONS:   1.  Counseled the patient about the above.  He is agreeable to be transferred to the Emergency Room and from there prefers admission to the Cardiology unit at the [facility].  We will facilitate this.   2.  With respect to long-term anticoagulation, he does have a history of perforated gastric ulcer in the past.  However, given that catheter ablation has a very high success rate, hopefully we will not commit him to long-term anticoagulation.   3.  Due to heavy smoking history, once he is in sinus rhythm and his ejection fraction has improved, we should consider either a coronary angiogram or a repeat  stress test.      It was my pleasure to visit with this patient.  I spent 60 minutes with the patient, about 30 minutes of this was critical care time to arrange urgent ER transfer and hospitalization.      Outpatient Encounter Prescriptions as of 12/7/2017   Medication Sig Dispense Refill     metoprolol (LOPRESSOR) 50 MG tablet Take 1.5 tablets (75 mg) by mouth 2 times daily 60 tablet 1     [DISCONTINUED] aspirin 81 MG tablet Take 1 tablet (81 mg) by mouth daily (Patient taking differently: Take 325 mg by mouth 2 times daily ) 30 tablet      Probiotic Product (ALIGN) CHEW Take 1 chew tab by mouth 3 times daily as needed       omeprazole (PRILOSEC) 40 MG capsule Take 1 capsule (40 mg) by mouth daily Take 30-60 minutes before a meal. 30 capsule 1     No facility-administered encounter medications on file as of 12/7/2017.      Again, thank you for allowing me to participate in the care of your patient.      Sincerely,    Princess Damon MD     [facility]

## 2017-12-08 NOTE — ED NOTES
[facility] still getting bed assignment for patient.  Will call us when they have a bed.

## 2017-12-08 NOTE — PLAN OF CARE
Problem: Patient Care Overview  Goal: Plan of Care/Patient Progress Review  Outcome: No Change  Pt arrived from [facility] last evening. VSS since arrival on unit. Tele shows a-flutter with rates 70-90. Pt c/o SOB, improved with 2L nasal cannula though O2 sats never dropped below 90%. Continues on heparin and diltiazem gtts. A&O x 4. Up with SBA. Plan for chest CT and cardiology consult today.

## 2017-12-08 NOTE — PROGRESS NOTES
KIMBERLY, arrived in special procedures. Family updated. Heparin gtt dc'd per order. .45 normal saline started. Dentures removed. Lungs clear.  1300 KIMBERLY done, tolerated well. 4mg versed, 100mcg fentanyl used. Dr. Damico reported no clot and small pericardial effusion.  1320 Patient transported to cath lab on monitor for ablation. Bedside nurse, Marcelo updated and report given to Karli from the cath lab. Drowsy but arousable. Pants, socks, long underware removed and sent to room.  Dentures given to significant other.

## 2017-12-08 NOTE — PROCEDURES
Dictated.  Typical CCW aflutter.  Successful RA isthmus ablation.  No apparent complication.  Moderate sinus lizzy- (38-42 bpm) post ablation.     EBL = 20 cc.    Plan:  - Eliquis to be started at 5 pm today.

## 2017-12-08 NOTE — CONSULTS
"CLINICAL NUTRITION SERVICES  -  ASSESSMENT NOTE      Future/Additional Recommendations: Once diet advances, Medical Food Supplement: Plus2 shake BID between meals   Malnutrition: Severe malnutrition  In Context of:  Environmental or social circumstances          REASON FOR ASSESSMENT  Dolores Ashby is a 57 year old male seen by Registered Dietitian for Provider Order - Unintentional weight loss of 10# or more in past 2 months.    NUTRITION HISTORY  - Information obtained from pt and ex-wife who was at bedside with him.  When asked about his diet dolores say's he doesn't eat very much and has had a decrease in appetite for last 6-12 months. Ex-wife says its been longer. Dolores says he is very tired most of the time. Dolores works as a  and is on his feet for greater then 12 hours a day. Ex-wife says he is \"work alcoholic and never eats.\" smokes about 1/2 pack/day  Typical day diet:  B- 1 cup of coffee with teaspoon of sugar  L- Ritz crackers ( about 12-15) , Gatorade to drink.  D- very sporadic, sometimes two burgers a night and others just a bowl of cereal with 2% milk and english muffin with butter on it.       CURRENT NUTRITION ORDERS  Diet Order:     NPO for medical test as of 12/7    Current Intake/Tolerance:  NPO-   At home Pt is eating less then 2 very small meals a day.   Pt says has no appetite for long time now. Ex-wife says she is concerned about his weight.     PHYSICAL FINDINGS  Observed  Muscle Wasting clavicle   Obtained from Chart/Interdisciplinary Team  None noted    ANTHROPOMETRICS  Height: 5'11\" -per pt  Weight: 73.6 kg (162 lbs 3.2 oz)  Body mass index is 22.5 kg/(m^2).  Weight Status:  Normal BMI  IBW: 78.1  % IBW: 94%  Weight History: pt says he usually weighs around 180-185 and know he has lost weight. Overall pt has gained weight in last month. However pt has lost 1.8kg (2%) in last 24 hrs.     Wt Readings from Last 10 Encounters:   12/08/17 73.6 kg (162 lb 3.2 oz)   12/07/17 " 75.3 kg (166 lb)   12/07/17 75.4 kg (166 lb 3.2 oz)   11/15/17 75.3 kg (166 lb)   11/03/17 71.9 kg (158 lb 9.6 oz)   10/16/15 83.2 kg (183 lb 6.4 oz)   10/30/13 77.1 kg (170 lb)   02/21/11 81.6 kg (179 lb 12.8 oz)   11/13/09 71.9 kg (158 lb 9.6 oz)       LABS  Labs reviewed    MEDICATIONS  Medications reviewed    Dosing Weight 73.6 actual weight 12/8    ASSESSED NUTRITION NEEDS PER APPROVED PRACTICE GUIDELINES:  Estimated Energy Needs: 1061-0869 kcals (30-35 Kcal/Kg)  Justification: underweight  Estimated Protein Needs:  grams protein (1.2-1.5 g pro/Kg)  Justification: preservation of lean body mass  Estimated Fluid Needs: 9475-0665  mL (1 mL/Kcal)  Justification: maintenance    MALNUTRITION:  % Weight Loss:  Weight loss does not meet criteria for malnutrition   % Intake:  </= 50% for >/= 1 month (severe malnutrition)  Subcutaneous Fat Loss:  None observed  Muscle Loss:  Clavicle bone region Severe depletion  Fluid Retention:  None noted    Malnutrition Diagnosis: Severe malnutrition  In Context of:  Environmental or social circumstances    NUTRITION DIAGNOSIS:  Inadequate protein-energy intake related to reduced appetite as evidenced by  eating less than 2 small meals a day for greater then 12 month.       NUTRITION INTERVENTIONS  Recommendations / Nutrition Prescription  AD per MD  Supplements BID between meals  .  Implementation  Nutrition education: Provided education on importance of adequate caloric/protein intake for health and muscle retention.   Medical Food Supplement: Plus2 shake BID between meals  .  Nutrition Goals  1.Pt will eat at least 50% of his meals TID  2.Drink at least 50% of supplements.  .  MONITORING AND EVALUATION:  Progress towards goals will be monitored and evaluated per protocol and Practice Guidelines      Surendra Galvez (Dietitic Intern)

## 2017-12-08 NOTE — H&P
[facility]    History and Physical  Hospitalist       Date of Admission:  12/7/2017    Assessment & Plan   Zechariah Ashby is a 57 year old male who was recently diagnosed with atrial fib/flutter in early November was transferred to [facility] from [facility] for KIMBERLY and possible cardioversion for symptomatic a fib/flutter     Atrial Fibrillation/Flutter  Reduced EF 30-35% but no history of heart failure  Positive stress test   Was being managed with Lopressor for rate control and ASA.  He was symptomatic at the cardiologist office today and was sent in to the ED for admission for KIMBERLY and possible cardioversion.  Patient was then sent to [facility] from [facility] to have this performed.  While at [facility] he was started on a Heparin drip in anticipation of his cardioversion   Of note, patient did have an echocardiogram 1 week ago which shoed a normal LV size, mild concentric LVH, EF 30-35%.  He also had a lexiscan at that time that showed small area of possible nontransmural infarction involving the distal anterior wall and anteroapex.  - [place] [facility] [place]  - NPO at midnight   - Continue Lopressor for rate control   - Continue Heparin drip   - Cardiology consult placed and appreciate their recommendations    Abnormal CXR  Of note, per the CXR report the patient has new findings on CXR with elevation of the left hemidiaphragm with focal tenting and radiology recommended CT imaging for further evaluation   - CT Chest ordered     Chronic Medical Issues  GERD & H/o perforated gastric ulcer  Continue PTA Prilosec     DVT Prophylaxis: Heparin drip  Code Status: Full Code    Disposition: Expected discharge in 2-3 days after KIMBERLY with cardioversion.    Naeem Leone DO    Primary Care Physician   Kailash Mason    Chief Complaint    Symptomatic atrial fibrillation     History is obtained from the patient    History of Present Illness   Zechariah Ashby is a 57 year old male who was recently  diagnosed with A fib/flutter at the beginning of November.  He was actually at his cardiologist's clinic today when he was found to have a rapid ventricular rate and was symptomatic with chest tightness and difficulty breathing.  His cardiologist then sent him to the ED at [facility] for admission for KIMBERLY and possible cardioversion.  Patient was then sent to [facility] to have these performed.  By the time I evaluated him his symptoms had resolved.  He denies any fevers, chills, cough, abdominal pain, N/V/D or problems with urination    Past Medical History    I have reviewed this patient's medical history and updated it with pertinent information if needed.   Atrial fibrillation   Perforated gastric ulcer    Past Surgical History   I have reviewed this patient's surgical history and updated it with pertinent information if needed.  Past Surgical History:   Procedure Laterality Date     APPENDECTOMY      age 28s     SURGICAL HISTORY OF -   08/24/2004    Gastroscopy with biopsy for gastric ulcer       Prior to Admission Medications   Prior to Admission Medications   Prescriptions Last Dose Informant Patient Reported? Taking?   Probiotic Product (ALIGN) CHEW  Self Yes No   Sig: Take 1 chew tab by mouth 3 times daily as needed   aspirin 81 MG tablet  Self No No   Sig: Take 1 tablet (81 mg) by mouth daily   Patient taking differently: Take 325 mg by mouth 2 times daily    metoprolol (LOPRESSOR) 50 MG tablet  Self No No   Sig: Take 1.5 tablets (75 mg) by mouth 2 times daily   omeprazole (PRILOSEC) 40 MG capsule  Self No No   Sig: Take 1 capsule (40 mg) by mouth daily Take 30-60 minutes before a meal.      Facility-Administered Medications: None     Allergies   No Known Allergies    Social History   I have reviewed this patient's social history and updated it with pertinent information if needed. Zechariah Ashby  reports that he has been smoking.  He has a 100.00 pack-year smoking history. He has never used smokeless  tobacco. He reports that he does not drink alcohol or use illicit drugs.    Family History   I have reviewed this patient's family history and updated it with pertinent information if needed.   Family History   Problem Relation Age of Onset     CANCER Mother      pancreatic cancer,  at 38 years     Alzheimer Disease Father       at 84 years.       Review of Systems   The 10 point Review of Systems is negative other than noted in the HPI    Physical Exam   Temp: 99.2  F (37.3  C) Temp src: Oral BP: (!) 136/93   Heart Rate: 76 Resp: 18 SpO2: 96 % O2 Device: None (Room air)    Vital Signs with Ranges  Temp:  [97.4  F (36.3  C)-99.2  F (37.3  C)] 99.2  F (37.3  C)  Pulse:  [140-141] 141  Heart Rate:  [] 76  Resp:  [7-33] 18  BP: (101-157)/() 136/93  SpO2:  [95 %-100 %] 96 %  160 lbs 14.97 oz    Constitutional: Awake, alert, cooperative, no apparent distress.  Eyes: Conjunctiva and pupils examined and normal.  HEENT: Moist mucous membranes, normal dentition.  Respiratory: Clear to auscultation bilaterally, no crackles or wheezing.  Cardiovascular: Irregularly irregular, normal S1 and S2, and no murmur noted.  GI: Soft, non-distended, non-tender, normal bowel sounds.  Lymph/Hematologic: No anterior cervical or supraclavicular adenopathy.  Skin: No rashes, no cyanosis, no edema.  Musculoskeletal: No joint swelling, erythema or tenderness.  Neurologic: Cranial nerves 2-12 intact, normal strength and sensation.  Psychiatric: Alert, oriented to person, place and time, no obvious anxiety or depression.    Data   Data reviewed today:  I personally reviewed   CXR:  No cardiomegaly.  No obvious infiltrates    Recent Labs  Lab 17  1445   WBC 7.9   HGB 15.2   MCV 88      INR 1.09      POTASSIUM 4.2   CHLORIDE 105   CO2 29   BUN 23   CR 1.13   ANIONGAP 6   VIJAY 8.5   GLC 90   ALBUMIN 3.4   PROTTOTAL 6.6*   BILITOTAL 0.6   ALKPHOS 122   ALT 30   AST 22   TROPI <0.015       Imaging:  Recent  Results (from the past 24 hour(s))   XR Chest 2 Views    Narrative    XR CHEST 2 VW 12/7/2017 4:11 PM    HISTORY: Chest pain. Short of breath. Atrial flutter.    COMPARISON: 4/26/2015    FINDINGS: In comparison with previous chest radiographs there is  elevation of the left hemidiaphragm with focal tenting. There is also  hazy opacity in the upper lung which could represents partial upper  lobe atelectasis. The right lung is clear. No pneumothorax. No pleural  effusion. Heart size appears stable.      Impression    IMPRESSION: Abnormal appearance of the left lung, as above, new in  comparison with previous exams. Although this is unlikely to be  related to the patient's current cardiac symptoms, further evaluation  with chest CT is recommended.    HOLLIE MAE MD

## 2017-12-08 NOTE — PROGRESS NOTES
In anticipatation of patient DCing tomorrow post ablation, [facility]    [facility]:   Will have echocardiogram 1/12 (Friday) @ 10:45 AM  Will get EKG on 1/16/2018 @ 9:45 AM  Will see me on 1/16 @ 10:00 AM

## 2017-12-08 NOTE — CONSULTS
[facility]    EP Consultation     Date of Admission:  12/7/2017  Date of Consult (When I saw the patient): 12/08/17    Assessment & Plan   Zechariah Ashby is a 57 year old male with h/o long term tobacco abuse who has had palpitations x 5 weeks and went to ER at [facility] and dx'd with AFlutter.  Sent home on metoprolol and ASA. Saw Dr. Damon yesterday who arranged admission and transfer for KIMBERLY/DCCV.  Sent from [facility] yesterday evening. Has been NPO.    1. Atrial Flutter with presumed tachycardia induced CM -   * First dx'd 11/3/2017.   * Has now likely been in AFlutter since  * Has been on heparin gtt since admit  * Has Diltiazem gtt started and HR is controlled  * Discussed KIMBERLY and plan for DCCV vs AFlutter ablation (if indeed typical). He would like to proceed with AFlutter ablation if possible.     PLAN:    * Discussed RBI of KIMBERLY including sore throat, esophageal injury and use of sedation. Agrees and willing to proceed   * Discussed RBI of Atrial Flutter ablation, including peripheral vessel injury, use of sedation, damage to existing structures, CVA/TIA, need for anticoagulants post procedure and lifting restrictions x 48 hours following procedure. Understands that he will likely be cardioverted IF not found to have typical atrial flutter. Discussed RBI of DCCV including CVA/TIA, need for anticoagulation following, surface burns and other arrhythmias requiring treatment.    2. CM - EF on echo 11/29 30-35%.   * LA was moderately dilated @ 3.5 cm with a volume index of 31.6 ml/m2  * PTA on lopressor 75 mg BID.   * Euvolemic on exam      PLAN:   * Repeat echo ~1 month as CM likely arrhythmia-induced   * If continues, will need ischemic w/u given tobacco use    3. Tobacco use - 90 pyh (2 ppd since 10 y/o)  * Interested in quitting   PLAN:   * Nicotine patch   *Smoking cessation    4. Reported elevated hemidiaphragm   PLAN:   * CT chest today        Daksha Montemayor PA-C    Code Status    Full  "Code    Reason for Consult   Reason for consult: We were asked by Internal Medicine to evaluate for atrial flutter    Primary Care Physician   Kailash Mason    Chief Complaint   Atrial flutter    History is obtained from the patient and EPIC chart.    History of Present Illness   Zechariah Ashby is a 57 year old male with h/o long term tobacco abuse who has had palpitations x 5 weeks and went to ER at [facility] and dx'd with AFlutter on 11/3/17.  Sent home on metoprolol and ASA. Saw Dr. Damon yesterday 12/7 who arranged admission and transfer for KIMBERLY/DCCV.  Sent from [facility] yesterday evening. Has been NPO in preparation for procedures.    He denies exertional CP, but does note a \"heaviness\" that the Diltiazem gtt has helped. For the last few weeks he has noted increasing [place] and 2 pillow orthopnea.     He denies lightheadedness, dizziness, edema or PND.     Past Medical History   I have reviewed this patient's medical history and updated it with pertinent information if needed.   No past medical history on file.    Past Surgical History   I have reviewed this patient's surgical history and updated it with pertinent information if needed.  Past Surgical History:   Procedure Laterality Date     APPENDECTOMY      age 28s     SURGICAL HISTORY OF -   08/24/2004    Gastroscopy with biopsy for gastric ulcer       Prior to Admission Medications   Prior to Admission Medications   Prescriptions Last Dose Informant Patient Reported? Taking?   Probiotic Product (ALIGN) CHEW  Self Yes No   Sig: Take 1 chew tab by mouth 3 times daily as needed   aspirin 81 MG tablet  Self No No   Sig: Take 1 tablet (81 mg) by mouth daily   Patient taking differently: Take 325 mg by mouth 2 times daily    metoprolol (LOPRESSOR) 50 MG tablet  Self No No   Sig: Take 1.5 tablets (75 mg) by mouth 2 times daily   omeprazole (PRILOSEC) 40 MG capsule  Self No No   Sig: Take 1 capsule (40 mg) by mouth daily Take 30-60 minutes before a meal.    "   Facility-Administered Medications: None         Medications     diltiazem (CARDIZEM) infusion ADULT 5 mg/hr (17 0134)     Reason anticoagulant not prescribed for atrial fibrillation       - MEDICATION INSTRUCTIONS -       HEParin 900 Units/hr (17 0123)       aspirin EC  81 mg Oral Daily     metoprolol  75 mg Oral BID     omeprazole  40 mg Oral Daily       Allergies   No Known Allergies    Social History   I have updated and reviewed the following Social History Narrative:   Social History     Social History Narrative   ; 2 kids. Minimal EtOH. Heavy smoking    Family History   I have reviewed this patient's family history and updated it with pertinent information if needed.   Family History   Problem Relation Age of Onset     CANCER Mother      pancreatic cancer,  at 38 years     Alzheimer Disease Father       at 84 years.       Review of Systems   The 5 point Review of Systems is negative other than noted in the HPI or here.     Physical Exam   Temp: 97.7  F (36.5  C) Temp src: Oral BP: 132/83   Heart Rate: 74 Resp: 16 SpO2: 96 % O2 Device: Nasal cannula Oxygen Delivery: 2 LPM  Vital Signs with Ranges  Temp:  [97.4  F (36.3  C)-99.2  F (37.3  C)] 97.7  F (36.5  C)  Pulse:  [140-141] 141  Heart Rate:  [] 74  Resp:  [7-33] 16  BP: (101-157)/() 132/83  SpO2:  [95 %-100 %] 96 %  162 lbs 3.2 oz    Telemetry:  AFlutter 70-90s    Constitutional: awake, alert, cooperative, no apparent distress, and appears stated age  Respiratory: Decreased BS throughout. No wheeze/crackles  Cardiovascular: Slight irregularity. No MRG  Musculoskeletal: No edema. No bruits noted    Data   I personally reviewed the EKG tracing showing typical atrial flutter @ 142 bpm ( @ 1445).  Results for orders placed or performed during the hospital encounter of 17 (from the past 24 hour(s))   CBC with platelets   Result Value Ref Range    WBC 8.0 4.0 - 11.0 10e9/L    RBC Count 4.80 4.4 - 5.9 10e12/L     Hemoglobin 14.8 13.3 - 17.7 g/dL    Hematocrit 42.7 40.0 - 53.0 %    MCV 89 78 - 100 fl    MCH 30.8 26.5 - 33.0 pg    MCHC 34.7 31.5 - 36.5 g/dL    RDW 13.2 10.0 - 15.0 %    Platelet Count 233 150 - 450 10e9/L   Heparin 10a Level   Result Value Ref Range    Heparin 10A Level 0.26 IU/mL   Heparin Xa (10a) Level   Result Value Ref Range    Heparin 10A Level 0.16 IU/mL   CT Chest w/o Contrast   Result Value Ref Range    Radiologist flags Probable chest malignancy. (Urgent)     Narrative    CT CHEST WITHOUT CONTRAST   12/8/2017 8:53 AM     HISTORY: Abnormal chest x-ray.     TECHNIQUE:  No IV contrast material.  Radiation dose for this scan was  reduced using automated exposure control, adjustment of the mA and/or  kV according to patient size, or iterative reconstruction technique.    COMPARISON: Chest x-ray 12/7/2017.    FINDINGS:  There is complete left upper lobe atelectasis. The left  upper lobe bronchus is abnormally truncated (series 3, image 27).  There is a moderate-sized left pleural effusion. Calcified granuloma  is noted in the left lower lobe. There are mild emphysematous changes  of both lungs. No right pulmonary nodule or mass. There is an enlarged  subcarinal lymph node that measures 3.1 x 2.2 cm (series 2, image 30).  There are also enlarged right paratracheal lymph nodes. Evaluation of  hilar lymph nodes limited without IV contrast. No axillary adenopathy.      Impression    IMPRESSION:  1. Left upper lobe atelectasis appears to be related to obstruction of  the left upper lobe bronchus, likely malignant. Consider bronchoscopy.  2. Moderate-sized left pleural effusion.  3. Mediastinal lymphadenopathy. Evaluation for hilar lymphadenopathy  limited without intravenous contrast.  4. Mild emphysematous changes.    [Access Center: Probable chest malignancy.]    This report will be copied to the [facility] to ensure a  provider is contacted. Access Center is available Monday through  Friday  8am-3:30 pm.

## 2017-12-08 NOTE — CONSULTS
[facility]    Hematology/Oncology Consult Note      Date of Admission:  12/7/2017  Date of Consult:  12/08/17  Reason for Consult: concern for lung malignancy      ASSESSMENT/PLAN:  Zechariah Ashby is a 57 year old male, with heavy smoking history, weight loss, found to have complete left upper lobe atelectasis, left pleural effusion, and mediastinal lymphadenopathy on CT chest, concerning for metastatic lung cancer.  He underwent a thoracentesis earlier today, with cytology sent and pending.  He will be getting a CT c/a/p to evaluate extent of disease as well.  He will likely need a pulmonary consult and bronchoscopy for biopsy when his cardiac status is stable, but will see if the cytology from the pleural effusion can provide enough information.    I discussed this with the patient this evening, but he had just returned from procedure and was very sleepy, and kept falling asleep during our conversation.   I am unsure of how much information he was able to retain from our conversation this evening, and he had no family present.  Dr. Sauceda is rounding this weekend, and I have updated him.  He will visit patient tomorrow as well to further discuss and see if he has any questions.  After discharge, he will need oncology follow-up, likely at [facility] in [place], with Dr. Rand or Dr. May.    Atrial flutter: s/p KIMBERLY with ablation, has been on heparin drip, planning to start apixaban this evening.  -as per cardiology      HISTORY OF PRESENT ILLNESS: Zechariah Ashby is a 57 year old male was recently diagnosed with atrial fibrillation/atrial flutter.  He was found to have rapid ventricular rate and symptomatic, so he was sent to the hospital for KIMBERLY and cardioversion.  He was transferred from [facility] to [facility].  He was found to have abnormal chest x-ray, so this was followed up by a CT chest without contrast.  This found left upper lobe atelectasis, appears to be related  to obstruction of the left upper lobe bronchus, likely malignancy.  There is also a moderate-sized left pleural effusion, as well as mediastinal lymphadenopathy, including an enlarged subcarinal lymph node that measures 3.1 x 2.2 cm.      Patient was seen in his room.  He was sleeping.  He had just returned from a procedure.  He was able to awaken, but fell asleep again quickly.  Per chart review, he does have a heavy smoking history and is a current smoker.  He says that his breathing currently feels comfortable.  He has an ex-wife who was here, but was not in the room when I visited.      REVIEW OF SYSTEMS:   14 point ROS was reviewed and is negative other than as noted above in HPI.       MEDICATIONS:  Current Facility-Administered Medications   Medication     metoprolol (LOPRESSOR) tablet 75 mg     omeprazole (priLOSEC) CR capsule 40 mg     diltiazem (CARDIZEM) 125 mg in NaCl 0.9 % 125 mL infusion     [START ON 12/9/2017] apixaban ANTICOAGULANT (ELIQUIS) tablet 5 mg     nicotine Patch in Place     [START ON 12/9/2017] nicotine patch REMOVAL     nicotine (NICODERM CQ) 21 MG/24HR 24 hr patch 1 patch     lidocaine 1 % 1 mL     lidocaine (LMX4) cream     sodium chloride (PF) 0.9% PF flush 3 mL     sodium chloride (PF) 0.9% PF flush 3 mL     HOLD: metformin and metformin containing medications on day of procedure and 48 hours after IV contrast given     HOLD: enoxaparin (LOVENOX) Post Procedure     HOLD: heparin (IV or Subcutaneous) Post Procedure     acetaminophen (TYLENOL) tablet 650 mg     apixaban ANTICOAGULANT (ELIQUIS) tablet 5 mg     0.9% sodium chloride infusion     naloxone (NARCAN) injection 0.1-0.4 mg     No anticoagulants IF patient has had acute trauma/surgery or recent intracranial, GI or urinary tract bleeding.      Patient is already receiving anticoagulation with heparin, enoxaparin (LOVENOX), warfarin (COUMADIN)  or other anticoagulant medication     oxyCODONE IR (ROXICODONE) tablet 5-10 mg      "senna-docusate (SENOKOT-S;PERICOLACE) 8.6-50 MG per tablet 1 tablet    Or     senna-docusate (SENOKOT-S;PERICOLACE) 8.6-50 MG per tablet 2 tablet     ondansetron (ZOFRAN-ODT) ODT tab 4 mg    Or     ondansetron (ZOFRAN) injection 4 mg         ALLERGIES:  No Known Allergies      PAST MEDICAL HISTORY:  No past medical history on file.      PAST SURGICAL HISTORY:  Past Surgical History:   Procedure Laterality Date     APPENDECTOMY      age 28s     SURGICAL HISTORY OF -   2004    Gastroscopy with biopsy for gastric ulcer         SOCIAL HISTORY:  Social History     Social History     Marital status:      Spouse name: Leonor Shultz     Number of children: 2     Years of education: N/A     Occupational History           Social History Main Topics     Smoking status: Current Every Day Smoker     Packs/day: 2.00     Years: 50.00     Smokeless tobacco: Never Used     Alcohol use No     Drug use: No     Sexual activity: Yes     Partners: Female     Other Topics Concern     Parent/Sibling W/ Cabg, Mi Or Angioplasty Before 65f 55m? No     Social History Narrative         FAMILY HISTORY:  Family History   Problem Relation Age of Onset     CANCER Mother      pancreatic cancer,  at 38 years     Alzheimer Disease Father       at 84 years.         PHYSICAL EXAM:  Vital signs:  Temp: 96.1  F (35.6  C) Temp src: Oral BP: 115/75 Pulse: (!) 40 (needle out, thora done) Heart Rate: 70 Resp: 29 SpO2: 98 % O2 Device: None (Room air) Oxygen Delivery: 2 LPM   Weight: 73.6 kg (162 lb 3.2 oz)  Estimated body mass index is 23.62 kg/(m^2) as calculated from the following:    Height as of an earlier encounter on 17: 1.765 m (5' 9.49\").    Weight as of this encounter: 73.6 kg (162 lb 3.2 oz).    GENERAL/CONSTITUTIONAL: No acute distress.  Sleeping, but able to awaken.  RESPIRATORY: Clear to auscultation bilaterally on anterior auscultation.  CARDIOVASCULAR: Regular rate and rhythm without murmurs, " gallops, or rubs.  GASTROINTESTINAL: No tenderness. The patient has normal bowel sounds. No guarding.  MUSCULOSKELETAL: Warm and well-perfused.  NEUROLOGIC: Sleepy.  INTEGUMENTARY: No jaundice.      LABS:  CBC RESULTS:   Recent Labs   Lab Test  12/08/17   1125   WBC  7.4   RBC  4.75   HGB  14.0   HCT  42.3   MCV  89   MCH  29.5   MCHC  33.1   RDW  13.5   PLT  215       Recent Labs   Lab Test  12/08/17   1125  12/07/17   1445   NA  141  140   POTASSIUM  4.1  4.2   CHLORIDE  107  105   CO2  29  29   ANIONGAP  5  6   GLC  88  90   BUN  20  23   CR  0.95  1.13   VIJAY  8.6  8.5         IMAGING:  CT chest without contrast 12/8/17:  1. Left upper lobe atelectasis appears to be related to obstruction of  the left upper lobe bronchus, likely malignant. Consider bronchoscopy.  2. Moderate-sized left pleural effusion.  3. Mediastinal lymphadenopathy. Evaluation for hilar lymphadenopathy  limited without intravenous contrast.  4. Mild emphysematous changes.        Thank you for the opportunity to participate in this patient's care.  Please call with any questions.    Daksha Marr MD  Hematology/Oncology  [facility]

## 2017-12-08 NOTE — PHARMACY-ADMISSION MEDICATION HISTORY
Admission medication history interview status for the 12/7/2017  admission is complete. See EPIC admission navigator for prior to admission medications     Medication history source reliability:Good    Actions taken by pharmacist (provider contacted, etc): Interviewed pt     Additional medication history information not noted on PTA med list :    ---  Pt was prescribed aspirin 325 mg po bid but he takes 650 mg po bid b/c he doesn't want to have a stroke.    Medication reconciliation/reorder completed by provider prior to medication history? No    Time spent in this activity: 10 minutes    Prior to Admission medications    Medication Sig Last Dose Taking? Auth Provider   ASPIRIN EC PO Take 650 mg by mouth 2 times daily 12/7/2017 Yes Unknown, Entered By History   metoprolol (LOPRESSOR) 50 MG tablet Take 1.5 tablets (75 mg) by mouth 2 times daily 12/7/2017 at pm Yes Kailash Mason MD   Probiotic Product (ALIGN) CHEW Take 1 chew tab by mouth 3 times daily as needed prn Yes Kailash Mason MD   omeprazole (PRILOSEC) 40 MG capsule Take 1 capsule (40 mg) by mouth daily Take 30-60 minutes before a meal. 12/7/2017 Yes Kailash Mason MD

## 2017-12-08 NOTE — PROGRESS NOTES
Flying squad note    Monitored pt while in US for L chest thora.  When pt sat at EOB for procedure he became nauseated and SOB.  SpO2 on RA 96-98%, did place on 2L NC anyway. Denied chest pain and light-headedness throughout.    Heart rate ranged 38-45 bpm during procedure and did increase to 45-55 bpm on transport back to room.  650ml straw-colored fluid from L chest.    Report given to primary care RN upon return to room.

## 2017-12-08 NOTE — PROGRESS NOTES
1200- Dr Cardenas contacted by Ania from Cath lab. RE Heparin gtt. Dr Cardenas states that it is acceptable to turn off the heparin gtt at NOON and still proceed with the 1pm ablation. Ex wife is aware.

## 2017-12-08 NOTE — PLAN OF CARE
Problem: Patient Care Overview  Goal: Plan of Care/Patient Progress Review  Outcome: Improving  A&OX4. Denies pain, no SOB reported. Up with SBA. Heparin gtt running at 900 units/hr. Diltiazem gtt at 5ml/hr running prior to ablation. Cardiology consulted. KIMBERLY with ablation done. Pt came from cath lab at 3pm. Vitals stable. Rt groin site CDI. Bed rest until 7pm.Dressing intact, CMS intact. Plan for thoracocenthesis prior to starting eliquis.  Will continue to monitor.

## 2017-12-08 NOTE — PROGRESS NOTES
[facility]    Hospitalist Progress Note    Date of Service (when I saw the patient): 12/08/2017    Assessment & Plan   Zechariah Ashby is a 57 year old male who was recently diagnosed with atrial fib/flutter in early November was transferred to [facility] from [facility] for KIMBERLY and possible cardioversion for symptomatic a fib/flutter      Atrial Fibrillation/Flutter  Reduced EF 30-35% but no history of heart failure  Positive stress test   Was being managed with Lopressor for rate control and ASA.  He was symptomatic at the cardiologist's office and was sent in to the ED for admission for KIMBERLY and possible cardioversion. Patient was then sent to [facility] from [facility] to have this performed. While at [facility] he was started on a Heparin drip in anticipation of his cardioversion   Of note, patient did have an echocardiogram 1 week ago which shoed a normal LV size, mild concentric LVH, EF 30-35%.  He also had a lexiscan at that time that showed small area of possible nontransmural infarction involving the distal anterior wall and anteroapex.  - was on heparin prior, underwent KIMBERLY then successful RA isthmus ablation, now in sinus  - plan is to start apixaban this evening   - Metoprolol 75 mg BID  - tele monitor  - appreciate cardiology/EP consult.     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 CXR with elevation of the left hemidiaphragm with focal tenting, CT chest was done for further evaluation and it showed Left upper lobe atelectasis appears to be related to obstruction of the left upper lobe bronchus, likely malignant. Also has moderate-sized left pleural effusion and Mediastinal lymphadenopathy. Evaluation for hilar lymphadenopathy was limited without intravenous contrast.  - CT chest abd pelvis ordered with contrast for further evaluation  - would like to tap Lt effusion -diagnostic  - he will  eventually need bronchoscopy, will ask onc for eval.   - he needs to be in anticoagulation starting tonight, and with that will not be able to send for bronch/biopsy.      Tobacco use  Nicotine patch  Counseling when alert    GERD & H/o perforated gastric ulcer  Continue PTA Prilosec     DVT Prophylaxis: apixaban    Code Status: Full Code    Disposition: Expected discharge: after Onc eval. Discussed with patient and his ex wife.     Kenneth Quintana MD  Hospitalist    Interval History   Patient had KIMBERLY and A flutter ablation, he is sleepy. Denies chest pain, sore throat, dyspnea  No headache or diplopia.    -Data reviewed today: I reviewed all new labs and imaging results over the last 24 hours. I personally reviewed the EKG tracing showing sinus currently on tele. CT chest report reviewed, attached.    Physical Exam   Temp: 96.1  F (35.6  C) Temp src: Oral BP: 115/75 Pulse: 52 Heart Rate: 70 Resp: 16 SpO2: 96 % O2 Device: None (Room air) Oxygen Delivery: 2 LPM  Vitals:    12/07/17 2300 12/08/17 0708   Weight: 73 kg (160 lb 15 oz) 73.6 kg (162 lb 3.2 oz)     Vital Signs with Ranges  Temp:  [96.1  F (35.6  C)-99.2  F (37.3  C)] 96.1  F (35.6  C)  Pulse:  [52-77] 52  Heart Rate:  [] 70  Resp:  [0-33] 16  BP: (101-157)/() 115/75  SpO2:  [89 %-100 %] 96 %  I/O last 3 completed shifts:  In: 120 [P.O.:120]  Out: 600 [Urine:600]    Constitutional: Somnolent, not in distress, thin appearing male.  HRRNT: PERRLA EOMI  Respiratory: Bilateral equal air entry, clear to auscultation. No respiratory distress.  Cardiovascular: Regular s1s2, no murmur, rub or gallop. No tachycardia.  GI: Soft, non distended, non tender, bowel tones active.  Skin/Integumen: No rash, no blister.  Lymphatic: no cervical lymphadenopathy  Neuro: Somnolent but wakes up and responds appropriately.     Medications     diltiazem (CARDIZEM) infusion ADULT Stopped (12/08/17 2430)     NaCl       Reason anticoagulant not prescribed for atrial  fibrillation       - MEDICATION INSTRUCTIONS -         metoprolol  75 mg Oral BID     omeprazole  40 mg Oral Daily     [START ON 12/9/2017] apixaban ANTICOAGULANT  5 mg Oral BID     nicotine   Transdermal Q8H     [START ON 12/9/2017] nicotine   Transdermal Daily     nicotine  1 patch Transdermal Daily     sodium chloride (PF)  3 mL Intracatheter Q8H     apixaban ANTICOAGULANT  5 mg Oral Once       Data     Recent Labs  Lab 12/08/17  1125 12/08/17  0010 12/07/17  1445   WBC 7.4 8.0 7.9   HGB 14.0 14.8 15.2   MCV 89 89 88    233 246   INR 1.05  --  1.09     --  140   POTASSIUM 4.1  --  4.2   CHLORIDE 107  --  105   CO2 29  --  29   BUN 20  --  23   CR 0.95  --  1.13   ANIONGAP 5  --  6   VIJAY 8.6  --  8.5   GLC 88  --  90   ALBUMIN  --   --  3.4   PROTTOTAL  --   --  6.6*   BILITOTAL  --   --  0.6   ALKPHOS  --   --  122   ALT  --   --  30   AST  --   --  22   TROPI  --   --  <0.015       Recent Results (from the past 24 hour(s))   XR Chest 2 Views    Narrative    XR CHEST 2 VW 12/7/2017 4:11 PM    HISTORY: Chest pain. Short of breath. Atrial flutter.    COMPARISON: 4/26/2015    FINDINGS: In comparison with previous chest radiographs there is  elevation of the left hemidiaphragm with focal tenting. There is also  hazy opacity in the upper lung which could represents partial upper  lobe atelectasis. The right lung is clear. No pneumothorax. No pleural  effusion. Heart size appears stable.      Impression    IMPRESSION: Abnormal appearance of the left lung, as above, new in  comparison with previous exams. Although this is unlikely to be  related to the patient's current cardiac symptoms, further evaluation  with chest CT is recommended.    HOLLIE MAE MD   CT Chest w/o Contrast   Result Value    Radiologist flags Probable chest malignancy. (Urgent)    Narrative    CT CHEST WITHOUT CONTRAST   12/8/2017 8:53 AM     HISTORY: Abnormal chest x-ray.     TECHNIQUE:  No IV contrast material.  Radiation dose for  this scan was  reduced using automated exposure control, adjustment of the mA and/or  kV according to patient size, or iterative reconstruction technique.    COMPARISON: Chest x-ray 12/7/2017.    FINDINGS:  There is complete left upper lobe atelectasis. The left  upper lobe bronchus is abnormally truncated (series 3, image 27).  There is a moderate-sized left pleural effusion. Calcified granuloma  is noted in the left lower lobe. There are mild emphysematous changes  of both lungs. No right pulmonary nodule or mass. There is an enlarged  subcarinal lymph node that measures 3.1 x 2.2 cm (series 2, image 30).  There are also enlarged right paratracheal lymph nodes. Evaluation of  hilar lymph nodes limited without IV contrast. No axillary adenopathy.      Impression    IMPRESSION:  1. Left upper lobe atelectasis appears to be related to obstruction of  the left upper lobe bronchus, likely malignant. Consider bronchoscopy.  2. Moderate-sized left pleural effusion.  3. Mediastinal lymphadenopathy. Evaluation for hilar lymphadenopathy  limited without intravenous contrast.  4. Mild emphysematous changes.    [Access Center: Probable chest malignancy.]    This report will be copied to the [facility] to ensure a  provider is contacted. [facility] Center is available Monday through  Friday 8am-3:30 pm.     BROCK MCCORMACK MD

## 2017-12-08 NOTE — ED NOTES
[facility] called asking if the patient would be ok going there instead of [facility]. Dr. Lara talked to the patient and is now talking to Dr. Escobedo @ [facility] for transfer.

## 2017-12-09 NOTE — PLAN OF CARE
Problem: Patient Care Overview  Goal: Plan of Care/Patient Progress Review  Outcome: No Change  Pt A&O. Pt has been resting w/out any complaints. Son at bedside. IVF infusing. S/p DCCV, R groin site dsg c/d/i.  NSR on tele. VSS. Pt NPO after 2am, plan for CT Abd today. Will continue to monitor pt.

## 2017-12-09 NOTE — PROGRESS NOTES
[facility]    Cardiology Progress Note    Date of Service (when I saw the patient): 12/09/2017     Assessment & Plan   Zechariah Ashby is a 57 year old male who was admitted on 12/7/2017.     Active Problems:    Atrial flutter (H)    S/p successful ablation 12/8    Assessment: Patient stable without new symptoms or issues. CXR and device check off.    Plan: Continue current medical Rx, and f/u with EP in 2-3 months. CV will sign off for now.    Tyree Dasilva MD    Interval History       Physical Exam   Temp: 97.9  F (36.6  C) Temp src: Oral BP: 131/84 Pulse: 69 Heart Rate: 74 Resp: 18 SpO2: 96 % O2 Device: None (Room air)    Vitals:    12/07/17 2300 12/08/17 0708 12/09/17 0640   Weight: 73 kg (160 lb 15 oz) 73.6 kg (162 lb 3.2 oz) 73.7 kg (162 lb 7.7 oz)     Vital Signs with Ranges  Temp:  [96.1  F (35.6  C)-98.4  F (36.9  C)] 97.9  F (36.6  C)  Pulse:  [40-69] 69  Heart Rate:  [70-74] 74  Resp:  [16-29] 18  BP: (108-141)/(72-86) 131/84  SpO2:  [93 %-98 %] 96 %  I/O last 3 completed shifts:  In: 50 [P.O.:50]  Out: 600 [Urine:600]    Constitutional: awake, alert, cooperative, no apparent distress, and appears stated age  Respiratory: No increased work of breathing, good air exchange, clear to auscultation bilaterally, no crackles or wheezing  Cardiovascular: Normal apical impulse, regular rate and rhythm, normal S1 and S2, no S3 or S4, and no murmur noted  GI: No scars, normal bowel sounds, soft, non-distended, non-tender, no masses palpated, no hepatosplenomegally  Musculoskeletal: There is no redness, warmth, or swelling of the joints.  Full range of motion noted.  Motor strength is 5 out of 5 all extremities bilaterally.  Tone is normal.    Medications     diltiazem (CARDIZEM) infusion ADULT Stopped (12/08/17 1339)     NaCl 75 mL/hr at 12/09/17 0549     Reason anticoagulant not prescribed for atrial fibrillation       - MEDICATION INSTRUCTIONS -         metoprolol  75 mg Oral BID      omeprazole  40 mg Oral Daily     apixaban ANTICOAGULANT  5 mg Oral BID     nicotine   Transdermal Q8H     nicotine   Transdermal Daily     nicotine  1 patch Transdermal Daily     sodium chloride (PF)  3 mL Intracatheter Q8H       Data   Results for orders placed or performed during the hospital encounter of 12/07/17 (from the past 24 hour(s))   Hematology & Oncology IP Consult: Lt lung mass/hilar lymphadenopathy and Lt effusion, concern for lung malignancy; Consultant may enter orders: Yes; Patient to be seen: Routine - within 24 hours; Requested Clinic/Group: [facility]    Daksha Black MD     12/8/2017  5:28 PM  [facility]    Hematology/Oncology Consult Note      Date of Admission:  12/7/2017  Date of Consult:  12/08/17  Reason for Consult: concern for lung malignancy      ASSESSMENT/PLAN:  Zechariah Ashby is a 57 year old male, with   heavy smoking history, weight loss, found to have complete left   upper lobe atelectasis, left pleural effusion, and mediastinal   lymphadenopathy on CT chest, concerning for metastatic lung   cancer.  He underwent a thoracentesis earlier today, with   cytology sent and pending.  He will be getting a CT c/a/p to   evaluate extent of disease as well.  He will likely need a   pulmonary consult and bronchoscopy for biopsy when his cardiac   status is stable, but will see if the cytology from the pleural   effusion can provide enough information.    I discussed this with the patient this evening, but he had just   returned from procedure and was very sleepy, and kept falling   asleep during our conversation.   I am unsure of how much   information he was able to retain from our conversation this   evening, and he had no family present.  Dr. Sauceda is rounding   this weekend, and I have updated him.  He will visit patient   tomorrow as well to further discuss and see if he has any   questions.  After discharge, he will need oncology  follow-up,   likely at [facility] in [place], with Dr. Rand or Dr. May.    Atrial flutter: s/p KIMBERLY with ablation, has been on heparin drip,   planning to start apixaban this evening.  -as per cardiology      HISTORY OF PRESENT ILLNESS: Zechariah Ashby is a 57 year old male   was recently diagnosed with atrial fibrillation/atrial flutter.    He was found to have rapid ventricular rate and symptomatic, so   he was sent to the hospital for KIMBERLY and cardioversion.  He was   transferred from [facility] to [facility].  He was   found to have abnormal chest x-ray, so this was followed up by a   CT chest without contrast.  This found left upper lobe   atelectasis, appears to be related to obstruction of the left   upper lobe bronchus, likely malignancy.  There is also a   moderate-sized left pleural effusion, as well as mediastinal   lymphadenopathy, including an enlarged subcarinal lymph node that   measures 3.1 x 2.2 cm.      Patient was seen in his room.  He was sleeping.  He had just   returned from a procedure.  He was able to awaken, but fell   asleep again quickly.  Per chart review, he does have a heavy   smoking history and is a current smoker.  He says that his   breathing currently feels comfortable.  He has an ex-wife who was   here, but was not in the room when I visited.      REVIEW OF SYSTEMS:   14 point ROS was reviewed and is negative other than as noted   above in HPI.       MEDICATIONS:  Current Facility-Administered Medications   Medication     metoprolol (LOPRESSOR) tablet 75 mg     omeprazole (priLOSEC) CR capsule 40 mg     diltiazem (CARDIZEM) 125 mg in NaCl 0.9 % 125 mL infusion     [START ON 12/9/2017] apixaban ANTICOAGULANT (ELIQUIS) tablet 5   mg     nicotine Patch in Place     [START ON 12/9/2017] nicotine patch REMOVAL     nicotine (NICODERM CQ) 21 MG/24HR 24 hr patch 1 patch     lidocaine 1 % 1 mL     lidocaine (LMX4) cream     sodium chloride (PF) 0.9% PF flush 3 mL     sodium  chloride (PF) 0.9% PF flush 3 mL     HOLD: metformin and metformin containing medications on day of   procedure and 48 hours after IV contrast given     HOLD: enoxaparin (LOVENOX) Post Procedure     HOLD: heparin (IV or Subcutaneous) Post Procedure     acetaminophen (TYLENOL) tablet 650 mg     apixaban ANTICOAGULANT (ELIQUIS) tablet 5 mg     0.9% sodium chloride infusion     naloxone (NARCAN) injection 0.1-0.4 mg     No anticoagulants IF patient has had acute trauma/surgery or   recent intracranial, GI or urinary tract bleeding.      Patient is already receiving anticoagulation with heparin,   enoxaparin (LOVENOX), warfarin (COUMADIN)  or other anticoagulant   medication     oxyCODONE IR (ROXICODONE) tablet 5-10 mg     senna-docusate (SENOKOT-S;PERICOLACE) 8.6-50 MG per tablet 1   tablet    Or     senna-docusate (SENOKOT-S;PERICOLACE) 8.6-50 MG per tablet 2   tablet     ondansetron (ZOFRAN-ODT) ODT tab 4 mg    Or     ondansetron (ZOFRAN) injection 4 mg         ALLERGIES:  No Known Allergies      PAST MEDICAL HISTORY:  No past medical history on file.      PAST SURGICAL HISTORY:  Past Surgical History:   Procedure Laterality Date     APPENDECTOMY      age 28s     SURGICAL HISTORY OF -   08/24/2004    Gastroscopy with biopsy for gastric ulcer         SOCIAL HISTORY:  Social History     Social History     Marital status:      Spouse name: Leonor Shultz     Number of children: 2     Years of education: N/A     Occupational History           Social History Main Topics     Smoking status: Current Every Day Smoker     Packs/day: 2.00     Years: 50.00     Smokeless tobacco: Never Used     Alcohol use No     Drug use: No     Sexual activity: Yes     Partners: Female     Other Topics Concern     Parent/Sibling W/ Cabg, Mi Or Angioplasty Before 65f 55m? No     Social History Narrative         FAMILY HISTORY:  Family History   Problem Relation Age of Onset     CANCER Mother      pancreatic cancer,  " at 38 years     Alzheimer Disease Father       at 84 years.         PHYSICAL EXAM:  Vital signs:  Temp: 96.1  F (35.6  C) Temp src: Oral BP: 115/75 Pulse: (!) 40   (needle out, thora done) Heart Rate: 70 Resp: 29 SpO2: 98 % O2   Device: None (Room air) Oxygen Delivery: 2 LPM   Weight: 73.6 kg   (162 lb 3.2 oz)  Estimated body mass index is 23.62 kg/(m^2) as calculated from   the following:    Height as of an earlier encounter on 17: 1.765 m (5'   9.49\").    Weight as of this encounter: 73.6 kg (162 lb 3.2 oz).    GENERAL/CONSTITUTIONAL: No acute distress.  Sleeping, but able to   awaken.  RESPIRATORY: Clear to auscultation bilaterally on anterior   auscultation.  CARDIOVASCULAR: Regular rate and rhythm without murmurs, gallops,   or rubs.  GASTROINTESTINAL: No tenderness. The patient has normal bowel   sounds. No guarding.  MUSCULOSKELETAL: Warm and well-perfused.  NEUROLOGIC: Sleepy.  INTEGUMENTARY: No jaundice.      LABS:  CBC RESULTS:   Recent Labs   Lab Test  17   1125   WBC  7.4   RBC  4.75   HGB  14.0   HCT  42.3   MCV  89   MCH  29.5   MCHC  33.1   RDW  13.5   PLT  215       Recent Labs   Lab Test  17   1125  17   1445   NA  141  140   POTASSIUM  4.1  4.2   CHLORIDE  107  105   CO2  29  29   ANIONGAP  5  6   GLC  88  90   BUN  20  23   CR  0.95  1.13   VIJAY  8.6  8.5         IMAGING:  CT chest without contrast 17:  1. Left upper lobe atelectasis appears to be related to   obstruction of  the left upper lobe bronchus, likely malignant. Consider   bronchoscopy.  2. Moderate-sized left pleural effusion.  3. Mediastinal lymphadenopathy. Evaluation for hilar   lymphadenopathy  limited without intravenous contrast.  4. Mild emphysematous changes.        Thank you for the opportunity to participate in this patient's   care.  Please call with any questions.    Daksha Marr MD  Hematology/Oncology  [facility]   Cell count with differential fluid   Result " Value Ref Range    Body Fluid Analysis Source Left     Color Fluid Yellow     Appearance Fluid Hazy     WBC Fluid 800 /uL    % Neutrophils Fluid 17 %    % Lymphocytes Fluid 80 %    % Mono/Macro Fluid 3 %   Fluid Culture Aerobic Bacterial   Result Value Ref Range    Specimen Description Pleural fluid Left     Culture Micro Culture negative monitoring continues    Glucose fluid   Result Value Ref Range    Glucose Fluid Source Left     Glucose Fluid 88 mg/dL   Gram stain   Result Value Ref Range    Specimen Description Pleural fluid Left     Gram Stain No organisms seen     Gram Stain No WBC's seen    Lactate dehydrogenase fluid   Result Value Ref Range    LD Fluid Source Left     Lactate Dehydrogenase Fluid 219 U/L   Protein fluid   Result Value Ref Range    Protein Total Fluid Source Left     Protein Total Fluid 2.2 g/dL   XR Chest 1 View    Narrative    CHEST ONE VIEW  12/8/2017 4:20 PM     HISTORY:  Left thoracentesis.     COMPARISON: 12/7/2017.      Impression    IMPRESSION: Persistent hazy opacity projected over the left upper lobe  may represent atelectasis. No pneumothorax on either side. Left  pleural fluid has been evacuated. Heart size normal.    BROCK MCCORMACK MD   US Thoracentesis    Narrative     US THORACENTESIS   12/8/2017 4:27 PM       HISTORY: Left pleural effusion      TECHNIQUE:  Informed consent was obtained from the patient. A timeout  was performed. Ultrasound was used to confirm left pleural effusion.  The overlying skin was marked and then prepped and draped in a sterile  manner.  5 mL of 1% lidocaine was used for local anesthesia. The  thoracentesis needle was then advanced into the chest. 625 mL of  straw-colored pleural fluid was removed. Patient tolerated the  procedure well. Followup chest x-ray was ordered.      FINDINGS: Ultrasound demonstrates a moderate  sized left pleural  effusion.      Impression    IMPRESSION:  Ultrasound-guided left thoracentesis.     BROCK MCCORMACK MD    Heparin Xa (10a) Level   Result Value Ref Range    Heparin 10A Level 1.62 (HH) IU/mL   Basic metabolic panel   Result Value Ref Range    Sodium 140 133 - 144 mmol/L    Potassium 4.2 3.4 - 5.3 mmol/L    Chloride 106 94 - 109 mmol/L    Carbon Dioxide 26 20 - 32 mmol/L    Anion Gap 8 3 - 14 mmol/L    Glucose 96 70 - 99 mg/dL    Urea Nitrogen 24 7 - 30 mg/dL    Creatinine 0.95 0.66 - 1.25 mg/dL    GFR Estimate 81 >60 mL/min/1.7m2    GFR Estimate If Black >90 >60 mL/min/1.7m2    Calcium 8.7 8.5 - 10.1 mg/dL   Hemoglobin and hematocrit   Result Value Ref Range    Hemoglobin 13.7 13.3 - 17.7 g/dL    Hematocrit 41.2 40.0 - 53.0 %   EKG 12-lead, tracing only   Result Value Ref Range    Interpretation ECG Click View Image link to view waveform and result    CT Chest/Abdomen/Pelvis w Contrast    Narrative    CT CHEST, ABDOMEN AND PELVIS WITH CONTRAST  12/9/2017 10:41 AM    HISTORY: Lung mass. To evaluate for other lesions as well.     TECHNIQUE: CT scan obtained of the chest, abdomen, and pelvis with  oral and IV contrast. 81 mL Isovue-370 injected. Radiation dose for  this scan was reduced using automated exposure control, adjustment of  the mA and/or kV according to patient size, or iterative  reconstruction technique.    COMPARISON: CT chest 12/8/2017, CT abdomen and pelvis 4/24/2015.    FINDINGS:  Chest: Collapse of the left upper lobe with left upper lobe bronchus  cutoff again identified. This is worrisome for underlying left upper  lobe mass that blends in with necrotic appearing anterior mediastinal  adenopathy. The precise size is difficult to measure, but a potential  mass with surrounding left upper lobe atelectatic lung is  approximately 7.9 x 5.8 cm series 2 image 23. This measurement  includes the lobulated necrotic mediastinal adenopathy and hilar  adenopathy on the left. Stable left lower lobe granuloma that is  calcified. Mild right base atelectasis. Emphysematous changes. A few  stable pulmonary nodules  noted bilaterally. A new ground glass nodule  posterior left lower lobe is 0.6 cm series 3 image 36. Clusters of  nodularity appear stable at the periphery of the right middle lobe,  and right lower lobe. Emphysematous changes. Small left pleural fluid.  Trace right pleural fluid. No acute thoracic aortic abnormality. No  detected pulmonary embolism.    Adenopathy in the mediastinum also extends to the subcarinal location  that appears heterogeneous measuring 2.9 x 2 cm, not significantly  changed image 26. Right hilar enlarged lymph node is also stable  measuring 2.1 x 2.1 cm image 26. There are other right paratracheal  stable enlarged lymph nodes noted. No aggressive thoracic bony lesions  detected.    Abdomen/pelvis: No aggressive appearing bony lesions identified at the  levels of the abdomen and pelvis. There is diffuse edema of the  gallbladder. Limited opacification of the liver. There is a nodular  hypodense focal lesion at the posterior right liver that is 1.1 cm  series 2 image 57 that appears new. It is uncertain whether this  represents a true nodule versus an unenhanced vessel given the limited  opacification of the liver. The adrenals, spleen, pancreas, and  kidneys do not demonstrate any significant abnormalities. Tiny left  renal cysts suggested on series 2 image 75 that are limited in  assessment given the small sizes. No acute bowel abnormality. No bowel  obstruction. Stool distends the rectum. Small pelvic fluid. Edema in  the fat planes of the abdomen and pelvis noted. There are small upper  abdominal and tiny retroperitoneal lymph nodes without convincing  enlargement.      Impression    IMPRESSION:  1. Complete left upper lobe collapse appears stable likely  representing underlying malignancy such as bronchogenic carcinoma.  This blends in with necrotic large adenopathy at the anterior  mediastinum. There is also potential malignant adenopathy at the  bilateral hilar, right mediastinum, and  subcarinal locations recommend  continued oncologic workup.  2. Several small stable indeterminate pulmonary nodules on the right.  Recommend surveillance imaging to establish long-term stability. There  is a new pulmonary nodule at the left lower lobe that is likely  infectious or inflammatory as it is new since yesterday.  3. A small hypodense nodule at the posterior right liver appears to be  new since the prior CT from 2015. As such, it is indeterminate with a  neoplastic etiology such as metastasis remaining in the differential.  It could also represent poorly enhanced vessel on this exam showing  heterogeneous enhancement. Liver MRI should be considered for further  workup.  4. Anasarca and small fluid in the abdomen and pelvis.  5. Prominent edema of the gallbladder wall. Correlate clinically for  cholecystitis.    PEDRITO MAHONEY MD

## 2017-12-09 NOTE — PLAN OF CARE
Problem: Patient Care Overview  Goal: Plan of Care/Patient Progress Review  Outcome: Improving  A&OX4. Denies pain, no SOB reported. O2 sat stable on RA, 94-96% RA. Vitals stable. Rt groin site looks CDI, lt posterior chest s/p thoracocenthesis site CDI. Up with SBA. CT abdomen/ pelvis done. Awaiting result. TELE SR. Possible d/c in the afternoon. Will continue to monitor.

## 2017-12-09 NOTE — PLAN OF CARE
Problem: Patient Care Overview  Goal: Plan of Care/Patient Progress Review  VSS. Tele SR/SB. Denies CP, SOB. R groin site CDI, no bruit and CMS intact. Up with SBA. Thoracentesis today, site CDI. Plan for CT chest tomorrow. Continue to monitor.

## 2017-12-10 NOTE — PLAN OF CARE
Problem: Patient Care Overview  Goal: Plan of Care/Patient Progress Review  Outcome: Adequate for Discharge Date Met: 12/09/17  Patient Ax4.  Up with SBA in room.  Patient denies pain or increased SOB.  VSS on room air.  Lungs diminished.  Thoracentesis site C/D/I, right groin site C/D/I.  Discharge instructions reviewed with patient and son, no further questions.  Discharged with all personal belongings via wheelchair transport to private transportation home provided by son.

## 2017-12-10 NOTE — DISCHARGE SUMMARY
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  The following problems were addressed during his hospitalization:      Atrial Fibrillation/Flutter  Reduced EF 30-35% but no history of heart failure  Positive stress test   Was being managed with Lopressor for rate control and ASA.  He was symptomatic at the cardiologist's office and was sent in to the ED for admission for KIMBERLY and possible cardioversion. Patient was then sent to [facility] from [facility] to have this performed. While at [facility] he was started on a Heparin drip in anticipation of his cardioversion   Of note, patient did have an echocardiogram 1 week ago which showed a normal LV size, mild concentric LVH, EF 30-35%.  He also had a lexiscan at that time that showed small area of possible nontransmural infarction involving the distal anterior wall and anteroapex.  - was on heparin prior, underwent KIMBERLY then successful RA isthmus ablation, remained on sinus  - Immediately after ablation, he underwent Lt thoracentesis with fluid analysis, and then started on apixaban, cytology is pending, effusion is transudative by light's criteria.    - Metoprolol 75 mg BID  - Appreciate cardiology/EP consult.      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 CXR with elevation of the left hemidiaphragm with focal tenting, CT chest was done for further evaluation and it showed Left upper lobe atelectasis appears to be related to obstruction of the left upper lobe bronchus, likely malignant. Also has moderate-sized left pleural effusion and Mediastinal lymphadenopathy.    - CT chest abd pelvis obtained, report attached below, basically showed finding concerning for Lt bronchogenic carcinoma, Lt effusion tapped, cytopogy pending but is transudative in nature.  - Evaluated by Oncology, eventually patient will need bronchoscopy and biopsy (there is Lt cervical LN which could be biopsied instead if cytology is negative), Onc will follow.      Tobacco use  Nicotine patch  Counseling done    Kenneth Quintana MD  Hospialist    Significant Results and Procedures   KIMBERLY  Atrial flutter ablation  CT chest and chest abd pelvis reports attached.    Pending Results   These results will be followed up by Oncologist  Unresulted Labs Ordered in the Past 30 Days of this Admission     Date and Time Order Name Status Description    12/8/2017 1459 Cytology non gyn In process     12/8/2017 1459 Fluid Culture Aerobic Bacterial Preliminary           Code Status   Full Code       Primary Care Physician   Kailash Mason    Constitutional: Alert, awake. Not in distress.   HEENT: PERRLA EOMI  Lymphatic system: Isolated enlarged firm Lt cervical LN about 1.5-2 cm, firm. Also Lt axillary LN about 1.5 cm in size  Respiratory: Bilateral equal air entry, clear to auscultation. No respiratory distress.  Cardiovascular: Regular s1s2, no murmur, rub or gallop. No tachycardia.  GI: Soft, non distended, non tender, bowel tones active.  Skin/Integumen: No rash, no blister.    Discharge Disposition   Discharged to home  Condition at discharge: Stable    Consultations This Hospital Stay   CARDIOLOGY IP CONSULT  PHARMACY TO DOSE  HEPARIN  SMOKING CESSATION PROGRAM IP CONSULT  HEMATOLOGY & ONCOLOGY IP CONSULT    Time Spent on this Encounter   I, Kenneth Quintana, personally saw the patient today and spent greater than 30 minutes discharging this patient.    Discharge Orders     Follow-Up with Cardiac Advanced Practice Provider     EKG 12-lead complete with read (future)     Reason for your hospital stay   Atrial flutter     Follow-up and recommended labs and tests    Follow up with primary care provider, Kailash Mason, within 7-10, to evaluate medication change and for hospital follow- up. No follow up labs or test are needed.    Follow up with oncology as recommended.     When to contact your care team   Call your primary doctor if you have any of the following:  increased shortness of breath or cough or fever or chest pain.     Full Code     Echocardiogram   Administration of IV contrast will be tailored to this examination per the appropriate written protocol listed in the Echocardiography department Protocol Book, or by the supervising Cardiologist. This may result in an order change.    Use of contrast is at the discretion of the supervising Cardiologist.     Diet   Follow this diet upon discharge: Orders Placed This Encounter     Regular Diet Adult       Discharge Medications   Discharge Medication List as of 12/9/2017  4:54 PM      START taking these medications    Details   apixaban ANTICOAGULANT (ELIQUIS) 5 MG tablet Take 1 tablet (5 mg) by mouth 2 times daily, Disp-60 tablet, R-0, E-Prescribe      nicotine (NICODERM CQ) 21 MG/24HR 24 hr patch Place 1 patch onto the skin daily, Disp-30 patch, R-0, E-Prescribe         CONTINUE these medications which have NOT CHANGED    Details   metoprolol (LOPRESSOR) 50 MG tablet Take 1.5 tablets (75 mg) by mouth 2 times daily, Disp-60 tablet, R-1, No Print Out      Probiotic Product (ALIGN) CHEW Take 1 chew tab by mouth 3 times daily as needed, Historical      omeprazole (PRILOSEC) 40 MG capsule  Take 1 capsule (40 mg) by mouth daily Take 30-60 minutes before a meal., Disp-30 capsule, R-1, E-Prescribe         STOP taking these medications       ASPIRIN EC PO Comments:   Reason for Stopping:             Allergies   No Known Allergies  Data   Most Recent 3 CBC's:  Recent Labs   Lab Test  12/09/17   0635  12/08/17   1125  12/08/17   0010  12/07/17   1445   WBC   --   7.4  8.0  7.9   HGB  13.7  14.0  14.8  15.2   MCV   --   89  89  88   PLT   --   215  233  246      Most Recent 3 BMP's:  Recent Labs   Lab Test  12/09/17   0635  12/08/17   1125  12/07/17   1445   NA  140  141  140   POTASSIUM  4.2  4.1  4.2   CHLORIDE  106  107  105   CO2  26  29  29   BUN  24  20  23   CR  0.95  0.95  1.13   ANIONGAP  8  5  6   VIJAY  8.7  8.6  8.5   GLC  96  88  90     Most Recent 2 LFT's:  Recent Labs   Lab Test  12/07/17   1445  04/25/15   0650   AST  22  10   ALT  30  17   ALKPHOS  122  54   BILITOTAL  0.6  0.7     Most Recent INR's and Anticoagulation Dosing History:  Anticoagulation Dose History     Recent Dosing and Labs Latest Ref Rng & Units 12/7/2017 12/8/2017    INR 0.86 - 1.14 1.09 1.05        Most Recent 3 Troponin's:  Recent Labs   Lab Test  12/07/17   1445   TROPI  <0.015     Most Recent Cholesterol Panel:  Recent Labs   Lab Test  02/21/11   0909   CHOL  179   LDL  86   HDL  53   TRIG  202*     Most Recent 6 Bacteria Isolates From Any Culture (See EPIC Reports for Culture Details):  Recent Labs   Lab Test  12/08/17   1605   CULT  Culture negative monitoring continues     Most Recent TSH, T4 and A1c Labs:  Recent Labs   Lab Test  12/07/17   1445   TSH  2.35     Results for orders placed or performed during the hospital encounter of 12/07/17   CT Chest w/o Contrast     Value    Radiologist flags Probable chest malignancy. (Urgent)    Narrative    CT CHEST WITHOUT CONTRAST   12/8/2017 8:53 AM     HISTORY: Abnormal chest x-ray.     TECHNIQUE:  No IV contrast material.  Radiation dose for this scan was  reduced using  automated exposure control, adjustment of the mA and/or  kV according to patient size, or iterative reconstruction technique.    COMPARISON: Chest x-ray 12/7/2017.    FINDINGS:  There is complete left upper lobe atelectasis. The left  upper lobe bronchus is abnormally truncated (series 3, image 27).  There is a moderate-sized left pleural effusion. Calcified granuloma  is noted in the left lower lobe. There are mild emphysematous changes  of both lungs. No right pulmonary nodule or mass. There is an enlarged  subcarinal lymph node that measures 3.1 x 2.2 cm (series 2, image 30).  There are also enlarged right paratracheal lymph nodes. Evaluation of  hilar lymph nodes limited without IV contrast. No axillary adenopathy.      Impression    IMPRESSION:  1. Left upper lobe atelectasis appears to be related to obstruction of  the left upper lobe bronchus, likely malignant. Consider bronchoscopy.  2. Moderate-sized left pleural effusion.  3. Mediastinal lymphadenopathy. Evaluation for hilar lymphadenopathy  limited without intravenous contrast.  4. Mild emphysematous changes.    [Access Center: Probable chest malignancy.]    This report will be copied to the [facility] to ensure a  provider is contacted. [facility] Center is available Monday through  Friday 8am-3:30 pm.     BROCK MCCORMACK MD   US Thoracentesis    Narrative     US THORACENTESIS   12/8/2017 4:27 PM       HISTORY: Left pleural effusion      TECHNIQUE:  Informed consent was obtained from the patient. A timeout  was performed. Ultrasound was used to confirm left pleural effusion.  The overlying skin was marked and then prepped and draped in a sterile  manner.  5 mL of 1% lidocaine was used for local anesthesia. The  thoracentesis needle was then advanced into the chest. 625 mL of  straw-colored pleural fluid was removed. Patient tolerated the  procedure well. Followup chest x-ray was ordered.      FINDINGS: Ultrasound demonstrates a moderate  sized  left pleural  effusion.      Impression    IMPRESSION:  Ultrasound-guided left thoracentesis.     BROCK MCCORMACK MD   CT Chest/Abdomen/Pelvis w Contrast    Narrative    CT CHEST, ABDOMEN AND PELVIS WITH CONTRAST  12/9/2017 10:41 AM    HISTORY: Lung mass. To evaluate for other lesions as well.     TECHNIQUE: CT scan obtained of the chest, abdomen, and pelvis with  oral and IV contrast. 81 mL Isovue-370 injected. Radiation dose for  this scan was reduced using automated exposure control, adjustment of  the mA and/or kV according to patient size, or iterative  reconstruction technique.    COMPARISON: CT chest 12/8/2017, CT abdomen and pelvis 4/24/2015.    FINDINGS:  Chest: Collapse of the left upper lobe with left upper lobe bronchus  cutoff again identified. This is worrisome for underlying left upper  lobe mass that blends in with necrotic appearing anterior mediastinal  adenopathy. The precise size is difficult to measure, but a potential  mass with surrounding left upper lobe atelectatic lung is  approximately 7.9 x 5.8 cm series 2 image 23. This measurement  includes the lobulated necrotic mediastinal adenopathy and hilar  adenopathy on the left. Stable left lower lobe granuloma that is  calcified. Mild right base atelectasis. Emphysematous changes. A few  stable pulmonary nodules noted bilaterally. A new ground glass nodule  posterior left lower lobe is 0.6 cm series 3 image 36. Clusters of  nodularity appear stable at the periphery of the right middle lobe,  and right lower lobe. Emphysematous changes. Small left pleural fluid.  Trace right pleural fluid. No acute thoracic aortic abnormality. No  detected pulmonary embolism.    Adenopathy in the mediastinum also extends to the subcarinal location  that appears heterogeneous measuring 2.9 x 2 cm, not significantly  changed image 26. Right hilar enlarged lymph node is also stable  measuring 2.1 x 2.1 cm image 26. There are other right paratracheal  stable  enlarged lymph nodes noted. No aggressive thoracic bony lesions  detected.    Abdomen/pelvis: No aggressive appearing bony lesions identified at the  levels of the abdomen and pelvis. There is diffuse edema of the  gallbladder. Limited opacification of the liver. There is a nodular  hypodense focal lesion at the posterior right liver that is 1.1 cm  series 2 image 57 that appears new. It is uncertain whether this  represents a true nodule versus an unenhanced vessel given the limited  opacification of the liver. The adrenals, spleen, pancreas, and  kidneys do not demonstrate any significant abnormalities. Tiny left  renal cysts suggested on series 2 image 75 that are limited in  assessment given the small sizes. No acute bowel abnormality. No bowel  obstruction. Stool distends the rectum. Small pelvic fluid. Edema in  the fat planes of the abdomen and pelvis noted. There are small upper  abdominal and tiny retroperitoneal lymph nodes without convincing  enlargement.      Impression    IMPRESSION:  1. Complete left upper lobe collapse appears stable likely  representing underlying malignancy such as bronchogenic carcinoma.  This blends in with necrotic large adenopathy at the anterior  mediastinum. There is also potential malignant adenopathy at the  bilateral hilar, right mediastinum, and subcarinal locations recommend  continued oncologic workup.  2. Several small stable indeterminate pulmonary nodules on the right.  Recommend surveillance imaging to establish long-term stability. There  is a new pulmonary nodule at the left lower lobe that is likely  infectious or inflammatory as it is new since yesterday.  3. A small hypodense nodule at the posterior right liver appears to be  new since the prior CT from 2015. As such, it is indeterminate with a  neoplastic etiology such as metastasis remaining in the differential.  It could also represent poorly enhanced vessel on this exam showing  heterogeneous enhancement.  Liver MRI should be considered for further  workup.  4. Anasarca and small fluid in the abdomen and pelvis.  5. Prominent edema of the gallbladder wall. Correlate clinically for  cholecystitis.    PEDRITO MAHONEY MD   XR Chest 1 View    Narrative    CHEST ONE VIEW  12/8/2017 4:20 PM     HISTORY:  Left thoracentesis.     COMPARISON: 12/7/2017.      Impression    IMPRESSION: Persistent hazy opacity projected over the left upper lobe  may represent atelectasis. No pneumothorax on either side. Left  pleural fluid has been evacuated. Heart size normal.    BROCK MCCORMACK MD

## 2017-12-10 NOTE — PROGRESS NOTES
Mr. Zechariah Ashby is a 57-year-old gentleman with atrial fibrillation/flutter. He was admitted for KIMBERLY and ablation.      Chest x-ray on 12/07/2017 revealed abnormal appearance of the left lung.  For further evaluation, CT chest was done on 12/08/2017.  It reveals complete left upper lobe atelectasis.  Left upper lobe bronchus is abnormally truncated.  It could be due to endobronchial lesion.  There is moderate left-sided pleural effusion.  There is enlarged chest lymphnode.  Left thoracocentesis was done on 12/08/2017.  Straw-colored fluid was removed. Cytology is pending.   For further evaluation, CT chest, abdomen and pelvis was done on 12/09/2017.  It again reveals complete left upper lobe collapse.  There is a large necrotic lymph node at anterior mediastinum.  There is lymphadenopathy in bilateral hilar and subcarinal location.  There are several small indeterminate pulmonary nodules on the right.  There is a small hypodense nodule in the right liver.        For atrial flutter, patient had ablation done on 12/08/2017.  He is currently on anticoagulation with Eliquis.      Overall, the patient is feeling better.  He denies any chest pain.  No shortness of breath.  Occasional cough.  No coughing up blood.      PHYSICAL EXAMINATION:   GENERAL:  He is alert, oriented x3.   VITAL SIGNS:  Reviewed.   NECK:  There is a hard lymph node in the left upper neck.  No lymph node in the right side  AXILLAE:  No lymphadenopathy.      LABORATORY DATA:  Reviewed.      ASSESSMENT:   1.  A 57-year-old gentleman with left pleural effusion, left neck lymphadenopathy, mediastinal lymphadenopathy and left upper lobe collapse.  This is highly suspicious for primary lung cancer.   2.  Hypodense nodule in posterior right liver.  Could be metastatic disease.   3.  Atrial flutter, status post ablation.      PLAN:   1.  I had a long discussion with the patient and his son.  Reviewed the result of CT scan.  Picture of CT scan shown.  I  explained to them that the CT scan reveals multiple abnormalities including left upper lobe collapse, effusion and mediastinal lymphadenopathy.  Also, there are nodules in the lung.  This could be metastatic disease.  There is a hypodensity in the right side of the liver.  This could be metastatic disease.      Patient had thoracocentesis done.  We will wait for cytology.  It might give us the diagnosis.      I explained to the patient that, if cytology is negative or if sample is not enough for molecular testing, he will need biopsy. Easiest would be to do  biopsy of the left neck lymph node.  This can be done as an outpatient. Patient is on Eliquis.  As per the cardiologist, Eliquis can be held for biopsy.      2. Patient lives in [place].  We will arrange for him to follow up with Oncology in [facility].  Patient and son are agreeable for this plan.      5.  Case discussed with Dr. Quintana. Patient is going to be discharged today.  He will follow up in Oncology Clinic week.      TOTAL TIME SPENT:  Thirty minutes, more than 50% of the time spent in counseling and coordination of care.         TAMIE MACHADO MD             D: 2017 16:40   T: 2017 18:13   MT: MARCO      Name:     ROQUE SAXENA   MRN:      [id number]        Account:      [id number]   :      1960           Service Date: 2017      Document: [id number]

## 2017-12-11 NOTE — TELEPHONE ENCOUNTER
ED/UC/IP follow up phone call: typical atrial flutter - 12/09/17    RN please call to follow up.    Number of ED visits in past 12 months = 2

## 2017-12-11 NOTE — TELEPHONE ENCOUNTER
ED / Discharge Outreach Protocol    Patient Contact    Attempt # 1    Was call answered?  No.  Left message on voicemail with information to call me back.  Emani Lemus RN

## 2017-12-12 NOTE — MR AVS SNAPSHOT
After Visit Summary   12/12/2017    Zechariah Ashby    MRN: [id number]           Patient Information     Date Of Birth          1960        Visit Information        Provider Department      12/12/2017 1:40 PM Halima Hood APRN CNP [facility]        Care Instructions    Continue all medication without any change     See Oncology tomorrow for follow up and Cardiology as scheduled     See Dr. Mason in 2 months for check in or sooner if needed              Follow-ups after your visit        Your next 10 appointments already scheduled     Dec 13, 2017  3:00 PM CST   New Visit with Joycelyn May MD   [facility] ([facility])    [facility]  [address]  [facility] [address]   [phone]            Jan 12, 2018 10:45 AM CST   Ech Complete with [facility] [facility] ([facility])    [address] [facility] [address] [phone]           1. Please bring or wear a comfortable two-piece outfit. 2. You may eat, drink and take your normal medicines. 3. For any questions that cannot be answered, please contact the ordering physician            Jan 16, 2018  9:45 AM CST   ecg with [facility] [facility]   [facility] ([facility])    [address] [facility] [id number] [phone]            Jan 16, 2018 10:00 AM CST   Return Visit with Daksha Montemayor PA-C   [facility] ([facility])    [address] [facility] [address] [phone]              Who to contact     If you have questions or need follow up information about today's clinic visit or your schedule please contact [facility] directly at [phone].  Normal or non-critical lab and imaging results will be communicated to you by MyChart, letter or phone within 4 business days after the  "clinic has received the results. If you do not hear from us within 7 days, please contact the clinic through [employer] or phone. If you have a critical or abnormal lab result, we will notify you by phone as soon as possible.  Submit refill requests through [employer] or call your pharmacy and they will forward the refill request to us. Please allow 3 business days for your refill to be completed.          Additional Information About Your Visit        [employer] Information     [employer] lets you send messages to your doctor, view your test results, renew your prescriptions, schedule appointments and more. To sign up, go to [email or web address] . Click on \"Log in\" on the left side of the screen, which will take you to the Welcome page. Then click on \"Sign up Now\" on the right side of the page.     You will be asked to enter the access code listed below, as well as some personal information. Please follow the directions to create your username and password.     Your access code is: [email or web address]  Expires: 2018  4:44 PM     Your access code will  in 90 days. If you need help or a new code, please call your [facility] clinic or [phone].        Care EveryWhere ID     This is your Care EveryWhere ID. This could be used by other organizations to access your [facility] medical records  [id number]        Your Vitals Were     Pulse Temperature Height BMI (Body Mass Index)          88 97.7  F (36.5  C) (Tympanic) 5' 9.5\" (1.765 m) 22.71 kg/m2         Blood Pressure from Last 3 Encounters:   17 122/60   17 117/78   17 127/85    Weight from Last 3 Encounters:   17 156 lb (70.8 kg)   17 162 lb 7.7 oz (73.7 kg)   17 166 lb (75.3 kg)              Today, you had the following     No orders found for display       Primary Care Provider Office Phone # Fax #    Kailash Mason [phone] [address] [place] [address]        Equal Access to Services     [facility] " GAAR : Hadii aad ku hadotis Coxali, waaxda luqadaha, qaybta kaalmada adepriscila, harley hirain hayaacristiano villanuevadick frost neida . So [facility] [phone].    ATENCIÓN: Si habla español, tiene a morocho disposición servicios gratuitos de asistencia lingüística. Llame al [phone].    We comply with applicable federal civil rights laws and [place] laws. We do not discriminate on the basis of race, color, national origin, age, disability, sex, sexual orientation, or gender identity.            Thank you!     Thank you for choosing [facility]  for your care. Our goal is always to provide you with excellent care. Hearing back from our patients is one way we can continue to improve our services. Please take a few minutes to complete the written survey that you may receive in the mail after your visit with us. Thank you!             Your Updated Medication List - Protect others around you: Learn how to safely use, store and throw away your medicines at www.disposemymeds.org.          This list is accurate as of: 12/12/17  2:15 PM.  Always use your most recent med list.                   Brand Name Dispense Instructions for use Diagnosis    ALIGN Chew      Take 1 chew tab by mouth 3 times daily as needed        apixaban ANTICOAGULANT 5 MG tablet    ELIQUIS    60 tablet    Take 1 tablet (5 mg) by mouth 2 times daily    Typical atrial flutter (H)       metoprolol 50 MG tablet    LOPRESSOR    60 tablet    Take 1.5 tablets (75 mg) by mouth 2 times daily    Irregular heart beat, Atrial flutter, unspecified type (H), Cardiomyopathy, unspecified type (H)       nicotine 21 MG/24HR 24 hr patch    NICODERM CQ    30 patch    Place 1 patch onto the skin daily    Encounter for tobacco use cessation counseling       omeprazole 40 MG capsule    priLOSEC    30 capsule    Take 1 capsule (40 mg) by mouth daily Take 30-60 minutes before a meal.    Gastroesophageal reflux disease without esophagitis

## 2017-12-12 NOTE — PROGRESS NOTES
SUBJECTIVE:   Zechariah Ashby is a 57 year old male who presents to clinic today for the following health issues:        Hospital Follow-up Visit:    Hospital/Nursing Home/IP Rehab Facility: [facility]  Date of Admission: 12/07/17  Date of Discharge: 12/09/17  Reason(s) for Admission: Typical atrial flutter            Problems taking medications regularly:  None       Medication changes since discharge: None       Problems adhering to non-medication therapy:  None    Summary of hospitalization:  [facility] discharge summary reviewed  Diagnostic Tests/Treatments reviewed.  Follow up needed: Oncology on 12/13/2017 and Cardiology on 1/12/2017  Other Healthcare Providers Involved in Patient s Care:         Specialist appointment - Oncology and Cardiology   Update since discharge: stable.     Post Discharge Medication Reconciliation: discharge medications reconciled, continue medications without change.  Plan of care communicated with patient and family     Coding guidelines for this visit:  Type of Medical   Decision Making Face-to-Face Visit       within 7 Days of discharge Face-to-Face Visit        within 14 days of discharge   Moderate Complexity 60506 [id number]   High Complexity 51774 [id number]          Patient diagnosed with A-flutter early November managed on Metoprolol and ASA. Was seeing Cardiology on 12/7/2017 and subsequently sent to the ER to be transferred to the [facility] for KIMBERLY- guided cardioversion and ablation. Patient on eliquis post-discharge, tolerating well without any significant bleeding. No complaints of heart flutter since discharge. Still wea and shortness of breath is better than prior to admission  Lung mass found on CT during hospitalization 2/2 20# weight loss over the year and chest x-ray findings of elevation of hemidiaphragm with focal tenting.         Problem list and histories reviewed & adjusted, as indicated.  Additional history: as documented    Patient Active  "Problem List   Diagnosis     CARDIOVASCULAR SCREENING; LDL GOAL LESS THAN 130     Perforated ulcer (HCC)     Free intraperitoneal air     Atrial flutter (H)     Lung mass     Pleural effusion     Mediastinal lymphadenopathy     Past Surgical History:   Procedure Laterality Date     APPENDECTOMY      age 28s     SURGICAL HISTORY OF -   2004    Gastroscopy with biopsy for gastric ulcer       Social History   Substance Use Topics     Smoking status: Former Smoker     Packs/day: 2.00     Years: 50.00     Quit date: 2017     Smokeless tobacco: Never Used     Alcohol use No     Family History   Problem Relation Age of Onset     CANCER Mother      pancreatic cancer,  at 38 years     Alzheimer Disease Father       at 84 years.         Current Outpatient Prescriptions   Medication Sig Dispense Refill     apixaban ANTICOAGULANT (ELIQUIS) 5 MG tablet Take 1 tablet (5 mg) by mouth 2 times daily 60 tablet 0     nicotine (NICODERM CQ) 21 MG/24HR 24 hr patch Place 1 patch onto the skin daily 30 patch 0     metoprolol (LOPRESSOR) 50 MG tablet Take 1.5 tablets (75 mg) by mouth 2 times daily 60 tablet 1     Probiotic Product (ALIGN) CHEW Take 1 chew tab by mouth 3 times daily as needed       omeprazole (PRILOSEC) 40 MG capsule Take 1 capsule (40 mg) by mouth daily Take 30-60 minutes before a meal. 30 capsule 1     No Known Allergies      Reviewed and updated as needed this visit by clinical staff  Tobacco  Allergies  Meds  Problems  Med Hx  Surg Hx  Fam Hx  Soc Hx        Reviewed and updated as needed this visit by Provider  Allergies  Meds  Problems         ROS:  Constitutional, HEENT, cardiovascular, pulmonary, gi and gu systems are negative, except as otherwise noted.      OBJECTIVE:   /60 (BP Location: Right arm, Cuff Size: Adult Regular)  Pulse 88  Temp 97.7  F (36.5  C) (Tympanic)  Ht 5' 9.5\" (1.765 m)  Wt 156 lb (70.8 kg)  BMI 22.71 kg/m2  Body mass index is 22.71 kg/(m^2).  GENERAL " APPEARANCE: healthy, alert, no distress, Nourishment slightly cachectic and fatigued  EYES: Eyes grossly normal to inspection, PERRL and conjunctivae and sclerae normal  RESP: lungs clear to auscultation - no rales, rhonchi or wheezes   CV: regular rates and rhythm, normal S1 S2, no S3 or S4 and no murmur, click or rub  ABDOMEN: soft, nontender, without hepatosplenomegaly or masses and bowel sounds normal  MS: extremities normal- no gross deformities noted, peripheral pulses normal and no edema  SKIN: no suspicious lesions or rashes and ashen appearing  NEURO: mentation intact, speech normal and decreased strength and tone    Diagnostic Test Results:  none     ASSESSMENT/PLAN:     1. Typical atrial flutter (H)  Patient diagnosed with A-flutter early November managed on Metoprolol and ASA. Was seeing Cardiology on 12/7/2017 and subsequently sent to the ER to be transferred to the [facility] for KIMBERLY- guided cardioversion and ablation. Patient on eliquis post-discharge, tolerating well without any significant bleeding. No complaints of heart flutter since discharge. Still weak. Has appointment with Cardiology 1/16/2018. Follow up as scheduled no change in Eliquis.     2. Lung mass  During recent hospitalization CT scan was done to evaluate significant weight loss and abnormal chest x-ray findings and found to have obstruction of the left upper lobe bronchus with lymphadenopathy. Patient has appointment with Oncology tomorrow to set up bronchoscopy and further management.       Patient Instructions   Continue all medication without any change     See Oncology tomorrow for follow up and Cardiology as scheduled     See Dr. Mason in 2 months for check in or sooner if needed          NOEMY Gregory [facility]

## 2017-12-12 NOTE — PATIENT INSTRUCTIONS
Continue all medication without any change     See Oncology tomorrow for follow up and Cardiology as scheduled     See Dr. Mason in 2 months for check in or sooner if needed

## 2017-12-13 PROBLEM — J90 PLEURAL EFFUSION: Status: ACTIVE | Noted: 2017-01-01

## 2017-12-13 PROBLEM — R91.8 LUNG MASS: Status: ACTIVE | Noted: 2017-01-01

## 2017-12-13 PROBLEM — R59.0 MEDIASTINAL LYMPHADENOPATHY: Status: ACTIVE | Noted: 2017-01-01

## 2017-12-13 NOTE — PATIENT INSTRUCTIONS
You will need to have a biopsy of the cervical lymph node that is abnormal as well as a bronchoscopy/EUS.  You will need to have a PET scan. We would like to see you back in clinic with Dr. May with the results of testing. Copy of appointments, and after visit summary (AVS) given to patient.  If you have any questions during business hours (M-F 8 AM- 4PM), please call Camille Lara RN, BSN, OCN Oncology Hematology /Breast Cancer Navigator at [facility] [phone].   For questions after business hours, or on holidays/weekends, please call our after hours Nurse Triage line [phone]. Thank you.

## 2017-12-13 NOTE — LETTER
12/13/2017         RE: Zechariah Ashby  [id number] [facility] [address]        Dear Colleague,    Thank you for referring your patient, Zechariah Ashby, to the [facility]. Please see a copy of my visit note below.    DATE OF VISIT: Dec 13, 2017    REASON FOR REFERRAL:  Lung mass.    CHIEF COMPLAINT:   Chief Complaint   Patient presents with     Oncology Clinic Visit     New Patient - Lung Mass       HISTORY OF PRESENT ILLNESS:   Zechariah Ashby is a 57 year old male who was recently diagnosed with atrial fib/flutter in early November . During workup just x-ray showed elevation of the left hemidiaphragm with a focal tenting. The CT scan was done for further evaluation showing left upper lobe atelectasis and a moderate-sized left pleural effusion. Patient also had mediastinal adenopathy. Patient had left thoracocentesis. Cytology came back as transudate and cytology came back negative. Patient has a long history of smoking. He recently had 20 pounds weight loss. He denies any dysphagia. He denies any chest pain.    REVIEW OF SYSTEMS:   Constitutional: Negative for fever, chills, and night sweats. Weight loss as mentioned above. Poor appetite.  Skin: negative.  Eyes: negative.  Ears/Nose/Throat: negative.  Respiratory: Increasing shortness of breath on exertion.   Cardiovascular: negative.  Gastrointestinal: negative.  Genitourinary: negative.  Musculoskeletal: negative.  Neurologic: negative.  Psychiatric: negative.  Hematologic/Lymphatic/Immunologic: negative.  Endocrine: negative.    PAST MEDICAL HISTORY:   No past medical history on file.    PAST SURGICAL HISTORY:   Past Surgical History:   Procedure Laterality Date     APPENDECTOMY      age 28s     SURGICAL HISTORY OF -   08/24/2004    Gastroscopy with biopsy for gastric ulcer       ALLERGIES:   Allergies as of 12/13/2017     (No Known Allergies)       MEDICATIONS:   Current Outpatient Prescriptions   Medication Sig Dispense Refill  "    apixaban ANTICOAGULANT (ELIQUIS) 5 MG tablet Take 1 tablet (5 mg) by mouth 2 times daily 60 tablet 0     nicotine (NICODERM CQ) 21 MG/24HR 24 hr patch Place 1 patch onto the skin daily 30 patch 0     metoprolol (LOPRESSOR) 50 MG tablet Take 1.5 tablets (75 mg) by mouth 2 times daily 60 tablet 1     Probiotic Product (ALIGN) CHEW Take 1 chew tab by mouth 3 times daily as needed       omeprazole (PRILOSEC) 40 MG capsule Take 1 capsule (40 mg) by mouth daily Take 30-60 minutes before a meal. 30 capsule 1        FAMILY HISTORY:   Family History   Problem Relation Age of Onset     CANCER Mother      pancreatic cancer,  at 38 years     Alzheimer Disease Father       at 84 years.      Family history is reviewed today.    SOCIAL HISTORY:   Social History     Social History     Marital status:      Spouse name: Leonor Shultz     Number of children: 2     Years of education: N/A     Occupational History           Social History Main Topics     Smoking status: Former Smoker     Packs/day: 2.00     Years: 50.00     Quit date: 2017     Smokeless tobacco: Never Used     Alcohol use No     Drug use: No     Sexual activity: Yes     Partners: Female     Other Topics Concern     Parent/Sibling W/ Cabg, Mi Or Angioplasty Before 65f 55m? No     Social History Narrative       PHYSICAL EXAMINATION:   /85 (BP Location: Right arm, Patient Position: Sitting, Cuff Size: Adult Regular)  Pulse 67  Temp 97.5  F (36.4  C) (Tympanic)  Resp 16  Ht 1.765 m (5' 9.49\")  Wt 71.4 kg (157 lb 4.8 oz)  SpO2 97%  BMI 22.9 kg/m2  Wt Readings from Last 10 Encounters:   17 71.4 kg (157 lb 4.8 oz)   17 70.8 kg (156 lb)   17 73.7 kg (162 lb 7.7 oz)   17 75.3 kg (166 lb)   17 75.4 kg (166 lb 3.2 oz)   11/15/17 75.3 kg (166 lb)   17 71.9 kg (158 lb 9.6 oz)   10/16/15 83.2 kg (183 lb 6.4 oz)   10/30/13 77.1 kg (170 lb)   11 81.6 kg (179 lb 12.8 oz)      ECOG " performance status: 1  GENERAL APPEARANCE: Healthy, alert and in no acute distress.  HEENT: Sclerae anicteric. PERRLA. Oropharynx without ulcers, lesions, or thrush.  NECK: Supple. No asymmetry or masses.  LYMPHATICS: No palpable cervical, supraclavicular, axillary, or inguinal lymphadenopathy.  RESP: Lungs clear to auscultation bilaterally without rales, rhonchi or wheezes.  CARDIOVASCULAR: Regular rate and rhythm. Normal S1, S2; no S3 or S4. No murmur, gallop, or rub.  ABDOMEN: Soft, nontender. Bowel sounds normal. No palpable organomegaly or masses.  MUSCULOSKELETAL: Extremities without gross deformities noted. No edema of bilateral lower extremities.  SKIN: No suspicious lesions or rashes.  NEURO: Alert and oriented x 3. Cranial nerves II-XII grossly intact.  PSYCHIATRIC: Mentation and affect appear normal.    LABORATORY RESULTS:  Admission on 2017, Discharged on 2017   Component Date Value Ref Range Status     WBC 2017 8.0  4.0 - 11.0 10e9/L Final     RBC Count 2017 4.80  4.4 - 5.9 10e12/L Final     Hemoglobin 2017 14.8  13.3 - 17.7 g/dL Final     Hematocrit 2017 42.7  40.0 - 53.0 % Final     MCV 2017 89  78 - 100 fl Final     MCH 2017 30.8  26.5 - 33.0 pg Final     MCHC 2017 34.7  31.5 - 36.5 g/dL Final     RDW 2017 13.2  10.0 - 15.0 % Final     Platelet Count 2017 233  150 - 450 10e9/L Final     Heparin 10A Level 2017 0.26  IU/mL Final    Comment: Therapeutic Range:    UFH:   0.15-0.35 IU/mL for low             intensity dosing           0.30-0.70 IU/mL for high             intensity dosing for             DVT and PE    Enoxaparin: If administered             once daily with dose             1.5mg/k.0-2.0 IU/mL                If administered             twice daily with dose             1mg/k.60-1.0 IU/mL       Heparin 10A Level 2017 0.16  IU/mL Final    Comment: Therapeutic Range:    UFH:   0.15-0.35 IU/mL for low              intensity dosing           0.30-0.70 IU/mL for high             intensity dosing for             DVT and PE    Enoxaparin: If administered             once daily with dose             1.5mg/k.0-2.0 IU/mL                If administered             twice daily with dose             1mg/k.60-1.0 IU/mL       Radiologist flags 2017 Probable chest malignancy.*  Final     WBC 2017 7.4  4.0 - 11.0 10e9/L Final     RBC Count 2017 4.75  4.4 - 5.9 10e12/L Final     Hemoglobin 2017 14.0  13.3 - 17.7 g/dL Final     Hematocrit 2017 42.3  40.0 - 53.0 % Final     MCV 2017 89  78 - 100 fl Final     MCH 2017 29.5  26.5 - 33.0 pg Final     MCHC 2017 33.1  31.5 - 36.5 g/dL Final     RDW 2017 13.5  10.0 - 15.0 % Final     Platelet Count 2017 215  150 - 450 10e9/L Final     Sodium 2017 141  133 - 144 mmol/L Final     Potassium 2017 4.1  3.4 - 5.3 mmol/L Final     Chloride 2017 107  94 - 109 mmol/L Final     Carbon Dioxide 2017 29  20 - 32 mmol/L Final     Anion Gap 2017 5  3 - 14 mmol/L Final     Glucose 2017 88  70 - 99 mg/dL Final     Urea Nitrogen 2017 20  7 - 30 mg/dL Final     Creatinine 2017 0.95  0.66 - 1.25 mg/dL Final     GFR Estimate 2017 81  >60 mL/min/1.7m2 Final    Non  GFR Calc     GFR Estimate If Black 2017 >90  >60 mL/min/1.7m2 Final    African American GFR Calc     Calcium 2017 8.6  8.5 - 10.1 mg/dL Final     INR 2017 1.05  0.86 - 1.14 Final     Interpretation ECG 2017 Click View Image link to view waveform and result   Final     Heparin 10A Level 2017 0.22  IU/mL Final    Comment: Therapeutic Range:    UFH:   0.15-0.35 IU/mL for low             intensity dosing           0.30-0.70 IU/mL for high             intensity dosing for             DVT and PE    Enoxaparin: If administered             once daily with dose             1.5mg/k.0-2.0 IU/mL                 If administered             twice daily with dose             1mg/k.60-1.0 IU/mL       Body Fluid Analysis Source 2017 Left   Final    Pleural fluid     Color Fluid 2017 Yellow   Final     Appearance Fluid 2017 Hazy   Final     WBC Fluid 2017 800  /uL Final    Comment: Clot(s) present, counts may be inaccurate  No reference ranges have been established.  This result should be interpreted   in the context of the patient's clinical condition and compared to   simultaneous measurement in the patient's blood.  Refer to Lab Guide for   specific interpretive guidelines.       % Neutrophils Fluid 2017 17  % Final     % Lymphocytes Fluid 2017 80  % Final     % Mono/Macro Fluid 2017 3  % Final     Specimen Description 2017 Pleural fluid Left   Final     Culture Micro 2017 No growth   Final     Glucose Fluid Source 2017 Left   Final    Pleural fluid     Glucose Fluid 2017 88  mg/dL Final    Comment: No reference ranges have been established.  This result should be interpreted   in the context of the patient's clinical condition and compared to   simultaneous measurement in the patient's blood.  Refer to Lab Guide for   specific interpretive guidelines.       Specimen Description 2017 Pleural fluid Left   Final     Gram Stain 2017 No organisms seen   Final     Gram Stain 2017 No WBC's seen   Final     LD Fluid Source 2017 Left   Final    Pleural fluid     Lactate Dehydrogenase Fluid 2017 219  U/L Final    Comment: No reference ranges have been established.  This result should be interpreted   in the context of the patient's clinical condition and compared to   simultaneous measurement in the patient's blood.  Refer to Lab Guide for   specific interpretive guidelines.       Protein Total Fluid Source 2017 Left   Final    Pleural fluid     Protein Total Fluid 2017 2.2  g/dL Final    Comment: No reference  ranges have been established.  This result should be interpreted   in the context of the patient's clinical condition and compared to   simultaneous measurement in the patient's blood.  Refer to Lab Guide for   specific interpretive guidelines.       Copath Report 12/08/2017    Final                    Value:Patient Name: ROQUE SAXENA  MR#: [id number]  Specimen #: [id number]  Collected: 12/8/2017  Received: 12/11/2017  Reported: 12/12/2017 15:03  Ordering Phy(s): MAUREEN DAY  Additional Phy(s): NIKKI MCCALLUM [place]    For improved result formatting, select 'View Enhanced Report Format'  under Linked Documents section.    SPECIMEN/STAIN PROCESS:  Pleural Fluid, left       Pap-Cyto x 1, Cell Block w/ H&E-Cyto x 1, Cell Block, Level 2 x  1, Cell Block, Level 3 x 1    ----------------------------------------------------------------  CYTOLOGIC INTERPRETATION:     Pleural Fluid, left:   Negative for malignancy  Specimen Adequacy: Satisfactory for evaluation.    I have personally reviewed all specimens and/or slides, including the  listed special stains, and used them with my medical judgement to  determine or confirm the final diagnosis.    Electronically signed out by:    Sergey Thacker M.D.    Processed and screened at [facility],  [facility]    CLINICAL HISTORY:  Pleural effusion    ,    GROSS:  Pleural Fluid, left:  Received 650 ml of yellow, cloudy fluid, processed  as 1 Pap stained Autocyte and one hematoxylin and eosin stained cell  block.    MICROSCOPIC:  Examination of pleural fluid preparation is remarkable for clusters of  reactive mesothelial cells associated with chronic inflammatory change.  No malignant features are apparent.    CPT Codes:  A: 74946-OKGKPEW, 34061-GLO, HCB    TESTING LAB LOCATION:  [facility]    [phone]    COLLECTION SITE:  Client:  [facility]  Location:  [facility] (S)       Lactate Dehydrogenase 2017 357* 85 - 227 U/L Final     Protein Total 2017 5.9* 6.8 - 8.8 g/dL Final     Interpretation ECG 2017 Click View Image link to view waveform and result   Final     Heparin 10A Level 2017 1.62* IU/mL Final    Comment: Therapeutic Range:    UFH:   0.15-0.35 IU/mL for low             intensity dosing           0.30-0.70 IU/mL for high             intensity dosing for             DVT and PE    Enoxaparin: If administered             once daily with dose             1.5mg/k.0-2.0 IU/mL                If administered             twice daily with dose             1mg/k.60-1.0 IU/mL  Critical Value:  Critical value if patient is receiving  unfractionated heparin: >1 IU/mL, critical  range does not apply if patient is receiving  low molecular weight heparin.  Critical Value called to and read back by  RYAN ZHENG IN [facility] AT 0746 BY DD       Sodium 2017 140  133 - 144 mmol/L Final     Potassium 2017 4.2  3.4 - 5.3 mmol/L Final     Chloride 2017 106  94 - 109 mmol/L Final     Carbon Dioxide 2017 26  20 - 32 mmol/L Final     Anion Gap 2017 8  3 - 14 mmol/L Final     Glucose 2017 96  70 - 99 mg/dL Final     Urea Nitrogen 2017 24  7 - 30 mg/dL Final     Creatinine 2017 0.95  0.66 - 1.25 mg/dL Final     GFR Estimate 2017 81  >60 mL/min/1.7m2 Final    Non  GFR Calc     GFR Estimate If Black 2017 >90  >60 mL/min/1.7m2 Final    African American GFR Calc     Calcium 2017 8.7  8.5 - 10.1 mg/dL Final     Hemoglobin 2017 13.7  13.3 - 17.7 g/dL Final     Hematocrit 2017 41.2  40.0 - 53.0 % Final       IMAGING RESULTS:  Recent Results (from the past 744 hour(s))   NM Lexiscan stress test    Narrative    GATED MYOCARDIAL PERFUSION SCINTIGRAPHY WITH INTRAVENOUS PHARMACOLOGIC  VASODILATATION LEXISCAN -ONE DAY STUDY     2017 1:21 PM  ROQUE SAXENA  57  years  Male  1960.    Indication/Clinical History: Shortness of breath and chest pain with  new atrial flutter    Impression  1.  Myocardial perfusion imaging using single isotope technique  demonstrated small area of possible nontransmural infarction involving  the distal anterior wall and anteroapex with significant residual  ischemia within the remainder of the anterior wall.   2. Gated images demonstrated anteroseptal and apical hypokinesis with  normal left ventricular chamber size.  The left ventricular systolic  function is mild to moderately reduced, with an ejection fraction  measured at 43%.  3. No prior studies available for comparison. The reduced ejection  fraction could be due to the presence of atrial flutter with somewhat  rapid heart rate at rest of 100 bpm and difficulty with gating.  However, the ventricle appears to dilate significantly on stress  images raising concern that there could be significant myocardial  ischemia present as suggested by the perfusion defects. Additional  evaluation for significant coronary artery disease should be  considered before referral for ablation of atrial flutter.    Procedure  Pharmacologic stress testing was performed with Lexiscan at a rate of  0.08 mg/ml rapid bolus injection, for 15 seconds, 0.4 mg/5ml  intravenously. Low-level exercise was performed along with the  vasodilator infusion.  The heart rate was 100 at baseline and martha to  112 beats per minute during the Lexiscan infusion. The rest blood  pressure was 128/94 mmHg and was 80/60 mm Hg during Lexiscan infusion.  The patient experienced dizziness and shortness of breath  during the  test.    Myocardial perfusion imaging was performed at rest, approximately 45  minutes after the injection intravenously of 10.9 mCi of Tc-99m  Myoview. At peak pharmacologic effect, 10-20 seconds after Lexiscan,   the patient was injected intravenously with 33.0 mCi of  Tc-99m  Myoview. The post-stress tomographic  imaging was performed  approximately 60 minutes after stress.    EKG Findings  The resting EKG demonstrated atrial flutter with variable conduction.  ST and T-wave abnormalities are present due to atrial flutter. The  stress EKG demonstrated no obvious ischemic changes but the ECG is  essentially nondiagnostic due to the presence of flutter waves making  ST and T-wave portions unreadable.    Tomographic Findings  Overall, the study quality is good . On the stress images, there is  moderate count depression involving the mid and distal anteroseptal  wall and apex and mild count depression within the remainder of the  anterior wall. On the rest images, there is mild count depression  involving the distal anteroseptal wall . Gated images demonstrated mid  and distal anteroseptal hypokinesis. The left ventricular ejection  fraction was calculated to be 43%. TID was present.    PRISCILLA VILLAFUERTE MD   XR Chest 2 Views    Narrative    XR CHEST 2 VW 12/7/2017 4:11 PM    HISTORY: Chest pain. Short of breath. Atrial flutter.    COMPARISON: 4/26/2015    FINDINGS: In comparison with previous chest radiographs there is  elevation of the left hemidiaphragm with focal tenting. There is also  hazy opacity in the upper lung which could represents partial upper  lobe atelectasis. The right lung is clear. No pneumothorax. No pleural  effusion. Heart size appears stable.      Impression    IMPRESSION: Abnormal appearance of the left lung, as above, new in  comparison with previous exams. Although this is unlikely to be  related to the patient's current cardiac symptoms, further evaluation  with chest CT is recommended.    HOLLIE MAE MD   CT Chest w/o Contrast   Result Value    Radiologist flags Probable chest malignancy. (Urgent)    Narrative    CT CHEST WITHOUT CONTRAST   12/8/2017 8:53 AM     HISTORY: Abnormal chest x-ray.     TECHNIQUE:  No IV contrast material.  Radiation dose for this scan was  reduced using automated exposure  control, adjustment of the mA and/or  kV according to patient size, or iterative reconstruction technique.    COMPARISON: Chest x-ray 12/7/2017.    FINDINGS:  There is complete left upper lobe atelectasis. The left  upper lobe bronchus is abnormally truncated (series 3, image 27).  There is a moderate-sized left pleural effusion. Calcified granuloma  is noted in the left lower lobe. There are mild emphysematous changes  of both lungs. No right pulmonary nodule or mass. There is an enlarged  subcarinal lymph node that measures 3.1 x 2.2 cm (series 2, image 30).  There are also enlarged right paratracheal lymph nodes. Evaluation of  hilar lymph nodes limited without IV contrast. No axillary adenopathy.      Impression    IMPRESSION:  1. Left upper lobe atelectasis appears to be related to obstruction of  the left upper lobe bronchus, likely malignant. Consider bronchoscopy.  2. Moderate-sized left pleural effusion.  3. Mediastinal lymphadenopathy. Evaluation for hilar lymphadenopathy  limited without intravenous contrast.  4. Mild emphysematous changes.    [Access Center: Probable chest malignancy.]    This report will be copied to the [facility] to ensure a  provider is contacted. Access Center is available Monday through  Friday 8am-3:30 pm.     BROCK MCCORMACK MD   XR Chest 1 View    Narrative    CHEST ONE VIEW  12/8/2017 4:20 PM     HISTORY:  Left thoracentesis.     COMPARISON: 12/7/2017.      Impression    IMPRESSION: Persistent hazy opacity projected over the left upper lobe  may represent atelectasis. No pneumothorax on either side. Left  pleural fluid has been evacuated. Heart size normal.    BROCK MCCORMACK MD   US Thoracentesis    Narrative     US THORACENTESIS   12/8/2017 4:27 PM       HISTORY: Left pleural effusion      TECHNIQUE:  Informed consent was obtained from the patient. A timeout  was performed. Ultrasound was used to confirm left pleural effusion.  The overlying skin was marked and  then prepped and draped in a sterile  manner.  5 mL of 1% lidocaine was used for local anesthesia. The  thoracentesis needle was then advanced into the chest. 625 mL of  straw-colored pleural fluid was removed. Patient tolerated the  procedure well. Followup chest x-ray was ordered.      FINDINGS: Ultrasound demonstrates a moderate  sized left pleural  effusion.      Impression    IMPRESSION:  Ultrasound-guided left thoracentesis.     BROCK MCCORMACK MD   CT Chest/Abdomen/Pelvis w Contrast    Narrative    CT CHEST, ABDOMEN AND PELVIS WITH CONTRAST  12/9/2017 10:41 AM    HISTORY: Lung mass. To evaluate for other lesions as well.     TECHNIQUE: CT scan obtained of the chest, abdomen, and pelvis with  oral and IV contrast. 81 mL Isovue-370 injected. Radiation dose for  this scan was reduced using automated exposure control, adjustment of  the mA and/or kV according to patient size, or iterative  reconstruction technique.    COMPARISON: CT chest 12/8/2017, CT abdomen and pelvis 4/24/2015.    FINDINGS:  Chest: Collapse of the left upper lobe with left upper lobe bronchus  cutoff again identified. This is worrisome for underlying left upper  lobe mass that blends in with necrotic appearing anterior mediastinal  adenopathy. The precise size is difficult to measure, but a potential  mass with surrounding left upper lobe atelectatic lung is  approximately 7.9 x 5.8 cm series 2 image 23. This measurement  includes the lobulated necrotic mediastinal adenopathy and hilar  adenopathy on the left. Stable left lower lobe granuloma that is  calcified. Mild right base atelectasis. Emphysematous changes. A few  stable pulmonary nodules noted bilaterally. A new ground glass nodule  posterior left lower lobe is 0.6 cm series 3 image 36. Clusters of  nodularity appear stable at the periphery of the right middle lobe,  and right lower lobe. Emphysematous changes. Small left pleural fluid.  Trace right pleural fluid. No acute thoracic  aortic abnormality. No  detected pulmonary embolism.    Adenopathy in the mediastinum also extends to the subcarinal location  that appears heterogeneous measuring 2.9 x 2 cm, not significantly  changed image 26. Right hilar enlarged lymph node is also stable  measuring 2.1 x 2.1 cm image 26. There are other right paratracheal  stable enlarged lymph nodes noted. No aggressive thoracic bony lesions  detected.    Abdomen/pelvis: No aggressive appearing bony lesions identified at the  levels of the abdomen and pelvis. There is diffuse edema of the  gallbladder. Limited opacification of the liver. There is a nodular  hypodense focal lesion at the posterior right liver that is 1.1 cm  series 2 image 57 that appears new. It is uncertain whether this  represents a true nodule versus an unenhanced vessel given the limited  opacification of the liver. The adrenals, spleen, pancreas, and  kidneys do not demonstrate any significant abnormalities. Tiny left  renal cysts suggested on series 2 image 75 that are limited in  assessment given the small sizes. No acute bowel abnormality. No bowel  obstruction. Stool distends the rectum. Small pelvic fluid. Edema in  the fat planes of the abdomen and pelvis noted. There are small upper  abdominal and tiny retroperitoneal lymph nodes without convincing  enlargement.      Impression    IMPRESSION:  1. Complete left upper lobe collapse appears stable likely  representing underlying malignancy such as bronchogenic carcinoma.  This blends in with necrotic large adenopathy at the anterior  mediastinum. There is also potential malignant adenopathy at the  bilateral hilar, right mediastinum, and subcarinal locations recommend  continued oncologic workup.  2. Several small stable indeterminate pulmonary nodules on the right.  Recommend surveillance imaging to establish long-term stability. There  is a new pulmonary nodule at the left lower lobe that is likely  infectious or inflammatory as it  is new since yesterday.  3. A small hypodense nodule at the posterior right liver appears to be  new since the prior CT from 2015. As such, it is indeterminate with a  neoplastic etiology such as metastasis remaining in the differential.  It could also represent poorly enhanced vessel on this exam showing  heterogeneous enhancement. Liver MRI should be considered for further  workup.  4. Anasarca and small fluid in the abdomen and pelvis.  5. Prominent edema of the gallbladder wall. Correlate clinically for  cholecystitis.    PEDRITO MAHONEY MD   PET Oncology Whole Body    Narrative    Combined Report of:    PET and CT on  12/15/2017 8:39 AM :    1. PET of the neck, chest, abdomen, and pelvis.  2. PET CT Fusion for Attenuation Correction and Anatomical  Localization:    3. 3D MIP and PET-CT fused images were processed on an independent  workstation and archived to PACS and reviewed by a radiologist.    Technique:    1. PET: The patient received 10.12 mCi of F-18-FDG; the serum glucose  was 94 prior to administration, body weight was 71.4 kg. Images were  evaluated in the axial, sagittal, and coronal planes as well as the  rotational whole body MIP. Images were acquired from the Vertex to the  Feet.    UPTAKE WAS MEASURED AT 67 MINUTES.     2. CT: CT only obtained for attenuation correction and not diagnostic  purposes.    INDICATION: obstruction noted on previous imaging  , bronchial mass;  Lung mass; Pleural effusion; Mediastinal lymphadenopathy    ADDITIONAL INFORMATION OBTAINED FROM EMR: none    COMPARISON: CT CAP 12/9/2017     FINDINGS:     HEAD/NECK:  Several FDG avid left neck lymphadenopathy. Examples include 1.8 cm  supraclavicular node with max SUV of 13 and 1.1 cm level 5 lymph node  with max SUV of 10.    CHEST:  There is a large FDG-avid mass centered within the anterior  mediastinum approximately measuring 8 x 6 cm with max SUV of 13. Also  there are multiple FDG avid mediastinal lymphadenopathy.  Examples  include 2.1 x 2.8 cm subcarinal node with max SUV of 19, 2 x 1.3 cm  subcarinal node with max SUV of 18 and 1.4 cm aorticopulmonary lymph  node with max SUV of 12.    Several FDG avid left axillary lymph nodes. For example, 1.4 cm node  with max SUV of 5.3 and 1.0 cm node with max SUV of 5.6    Increased 4.3 cm thick left pleural effusion.    There is obliteration of the left upper lobe bronchus. Complete  atelectasis of the left upper lobe with associated FDG uptake (max SUV  3.2).     Emphysematous changes in the lungs.    ABDOMEN AND PELVIS:  There is slight thickening and an FDG uptake in the left adrenal gland  with max SUV of 4.6.    There is focal FDG uptake in the right inguinal region (max SUV 5.1)  without associated CT of normal.    LOWER EXTREMITIES:   No abnormal masses or hypermetabolic lesions.    BONES:   Bilateral spondylolysis and grade 1 anterolisthesis at L5-S1.     There are no suspicious lytic or blastic osseous lesions.  There is no  abnormal FDG uptake in the skeleton.      Impression    IMPRESSION:   1. Large FDG avid mediastinal mass, also extending to the left hilum  with obliteration of the left upper lobe bronchus and atelectasis of  the upper lobe.   2. Multiple left cervical, left axillary and mediastinal  lymphadenopathy.  3. Increased left pleural effusion since 12/9/2017.  4. Hypermetabolic left adrenal lesion, suspicious for metastasis.  5. Bilateral spondylolysis and grade 1 anterolisthesis at L5-S1.    I have personally reviewed the examination and initial interpretation  and I agree with the findings.    ALICIA BEVERLY MD       ASSESSMENT AND PLAN:  (R91.8) Lung mass  (primary encounter diagnosis)  (J90) Pleural effusion  (R59.0) Mediastinal lymphadenopathy  This is a 57-year-old male patient with probably lung cancer with multiple metastases including mediastinal cervical lymph nodes. I reviewed with the patient today didn't imaging studies in details. I reviewed  with the patient the natural history of lung cancer. We talked about histology. We also talked about staging, management and prognosis. I we refilled the patient for biopsy from the cervical lymph node. Bronchoscopy probably helpful in diagnosis as well. Once tissue diagnosis is identified we will talk to the patient about different treatment modalities. We talked about symptom management including shortness of breath, pain, poor appetite and weight loss. I will meet with the patient again next week to discuss management plan and details based on tissue diagnosis and the finding from imaging studies.    The patient is ready to learn, no apparent learning barriers were identified, Diagnosis and treatment plans were explained to the patient. The patient expressed understanding of the content. The patient questions were answered to his satisfaction.    Joycelyn May MD    Chart documentation with Dragon Voice recognition Software. Although reviewed after completion, some words and grammatical errors may remain.    Again, thank you for allowing me to participate in the care of your patient.        Sincerely,        Joycelyn May MD

## 2017-12-13 NOTE — NURSING NOTE
"Oncology Rooming Note    December 13, 2017 2:53 PM   Zechariah Ashby is a 57 year old male who presents for:    Chief Complaint   Patient presents with     Oncology Clinic Visit     New Patient - Lung Mass     Initial Vitals: /85 (BP Location: Right arm, Patient Position: Sitting, Cuff Size: Adult Regular)  Pulse 67  Temp 97.5  F (36.4  C) (Tympanic)  Resp 16  Ht 1.765 m (5' 9.49\")  Wt 71.4 kg (157 lb 4.8 oz)  SpO2 97%  BMI 22.9 kg/m2 Estimated body mass index is 22.9 kg/(m^2) as calculated from the following:    Height as of this encounter: 1.765 m (5' 9.49\").    Weight as of this encounter: 71.4 kg (157 lb 4.8 oz). Body surface area is 1.87 meters squared.  No Pain (0) Comment: Data Unavailable   No LMP for male patient.  Allergies reviewed: Yes  Medications reviewed: Yes    Medications: Medication refills not needed today.  Pharmacy name entered into Ginger [employer]:        [id number] [address] [facility]    Clinical concerns:New Patient - Lung Mass.     7  minutes for nursing intake (face to face time)     Carmen Barreto CMA              "

## 2017-12-13 NOTE — MR AVS SNAPSHOT
After Visit Summary   12/13/2017    Zechariah Ashby    MRN: [id number]           Patient Information     Date Of Birth          1960        Visit Information        Provider Department      12/13/2017 3:00 PM Joycelyn May MD [facility] ONCOLOGY      Today's Diagnoses     Lung mass    -  1    Pleural effusion        Mediastinal lymphadenopathy          Care Instructions    You will need to have a biopsy of the cervical lymph node that is abnormal as well as a bronchoscopy/EUS.  You will need to have a PET scan. We would like to see you back in clinic with Dr. May with the results of testing. Copy of appointments, and after visit summary (AVS) given to patient.  If you have any questions during business hours (M-F 8 AM- 4PM), please call Camille Lara RN, BSN, OCN Oncology Hematology /Breast Cancer Navigator at [facility] [phone].   For questions after business hours, or on holidays/weekends, please call our after hours Nurse Triage line [phone]. Thank you.            Follow-ups after your visit        Your next 10 appointments already scheduled     Jan 12, 2018 10:45 AM CST   [place]ch Complete with [facility] [facility] ([facility])    [address] [id number] [phone]           1. Please bring or wear a comfortable two-piece outfit. 2. You may eat, drink and take your normal medicines. 3. For any questions that cannot be answered, please contact the ordering physician            Jan 16, 2018  9:45 AM CST   ecg with [facility] [facility]   [facility] ([facility])    [address] [facility] [address] [phone]            Jan 16, 2018 10:00 AM CST   Return Visit with Daksha Montemayor PA-C   [facility] ([facility])    [address] [facility] [address]  "  [phone]              Future tests that were ordered for you today     Open Future Orders        Priority Expected Expires Ordered    CT Lymph Node Biopsy STAT 2017    PET Oncology (Eyes to Thighs) Routine 2017            Who to contact     If you have questions or need follow up information about today's clinic visit or your schedule please contact [facility] directly at [phone].  Normal or non-critical lab and imaging results will be communicated to you by MyChart, letter or phone within 4 business days after the clinic has received the results. If you do not hear from us within 7 days, please contact the clinic through Ocean[employer]art or phone. If you have a critical or abnormal lab result, we will notify you by phone as soon as possible.  Submit refill requests through Trot or call your pharmacy and they will forward the refill request to us. Please allow 3 business days for your refill to be completed.          Additional Information About Your Visit        OceanaharMobissimo Information     Trot lets you send messages to your doctor, view your test results, renew your prescriptions, schedule appointments and more. To sign up, go to [email or web address] . Click on \"Log in\" on the left side of the screen, which will take you to the Welcome page. Then click on \"Sign up Now\" on the right side of the page.     You will be asked to enter the access code listed below, as well as some personal information. Please follow the directions to create your username and password.     Your access code is: [email or web address]  Expires: 2018  4:44 PM     Your access code will  in 90 days. If you need help or a new code, please call your [place] clinic or [phone].        Care EveryWhere ID     This is your Care EveryWhere ID. This could be used by other organizations to access your [place] medical records  [id number]        Your Vitals Were     Pulse " "Temperature Respirations Height Pulse Oximetry BMI (Body Mass Index)    67 97.5  F (36.4  C) (Tympanic) 16 1.765 m (5' 9.49\") 97% 22.9 kg/m2       Blood Pressure from Last 3 Encounters:   12/13/17 127/85   12/12/17 122/60   12/09/17 117/78    Weight from Last 3 Encounters:   12/13/17 71.4 kg (157 lb 4.8 oz)   12/12/17 70.8 kg (156 lb)   12/09/17 73.7 kg (162 lb 7.7 oz)              We Performed the Following     BRONCHOSCOPY W BIOPSY, SINGLE/MULT SITES        Primary Care Provider Office Phone # Fax #    Kailash Mason [phone] [address] [place] [address]        Equal Access to Services     [facility] Hospital[facility] : Diego Zhang, waaxda luqadaha, qaybta kaalmada judah, harley carrasquillo . So [facility] [phone].    ATENCIÓN: Si habla español, tiene a morocho disposición servicios gratuitos de asistencia lingüística. [facility] [phone].    We comply with applicable federal civil rights laws and [place] laws. We do not discriminate on the basis of race, color, national origin, age, disability, sex, sexual orientation, or gender identity.            Thank you!     Thank you for choosing [facility]  for your care. Our goal is always to provide you with excellent care. Hearing back from our patients is one way we can continue to improve our services. Please take a few minutes to complete the written survey that you may receive in the mail after your visit with us. Thank you!             Your Updated Medication List - Protect others around you: Learn how to safely use, store and throw away your medicines at www.disposemymeds.org.          This list is accurate as of: 12/13/17  3:37 PM.  Always use your most recent med list.                   Brand Name Dispense Instructions for use Diagnosis    ALIGN Chew      Take 1 chew tab by mouth 3 times daily as needed        apixaban ANTICOAGULANT 5 MG tablet    ELIQUIS    60 tablet    Take 1 tablet (5 " mg) by mouth 2 times daily    Typical atrial flutter (H)       metoprolol 50 MG tablet    LOPRESSOR    60 tablet    Take 1.5 tablets (75 mg) by mouth 2 times daily    Irregular heart beat, Atrial flutter, unspecified type (H), Cardiomyopathy, unspecified type (H)       nicotine 21 MG/24HR 24 hr patch    NICODERM CQ    30 patch    Place 1 patch onto the skin daily    Encounter for tobacco use cessation counseling       omeprazole 40 MG capsule    priLOSEC    30 capsule    Take 1 capsule (40 mg) by mouth daily Take 30-60 minutes before a meal.    Gastroesophageal reflux disease without esophagitis

## 2017-12-15 NOTE — PROGRESS NOTES
DATE OF VISIT: Dec 13, 2017    REASON FOR REFERRAL:  Lung mass.    CHIEF COMPLAINT:   Chief Complaint   Patient presents with     Oncology Clinic Visit     New Patient - Lung Mass       HISTORY OF PRESENT ILLNESS:   Zechariah Ashby is a 57 year old male who was recently diagnosed with atrial fib/flutter in early November . During workup just x-ray showed elevation of the left hemidiaphragm with a focal tenting. The CT scan was done for further evaluation showing left upper lobe atelectasis and a moderate-sized left pleural effusion. Patient also had mediastinal adenopathy. Patient had left thoracocentesis. Cytology came back as transudate and cytology came back negative. Patient has a long history of smoking. He recently had 20 pounds weight loss. He denies any dysphagia. He denies any chest pain.    REVIEW OF SYSTEMS:   Constitutional: Negative for fever, chills, and night sweats. Weight loss as mentioned above. Poor appetite.  Skin: negative.  Eyes: negative.  Ears/Nose/Throat: negative.  Respiratory: Increasing shortness of breath on exertion.   Cardiovascular: negative.  Gastrointestinal: negative.  Genitourinary: negative.  Musculoskeletal: negative.  Neurologic: negative.  Psychiatric: negative.  Hematologic/Lymphatic/Immunologic: negative.  Endocrine: negative.    PAST MEDICAL HISTORY:   No past medical history on file.    PAST SURGICAL HISTORY:   Past Surgical History:   Procedure Laterality Date     APPENDECTOMY      age 28s     SURGICAL HISTORY OF -   08/24/2004    Gastroscopy with biopsy for gastric ulcer       ALLERGIES:   Allergies as of 12/13/2017     (No Known Allergies)       MEDICATIONS:   Current Outpatient Prescriptions   Medication Sig Dispense Refill     apixaban ANTICOAGULANT (ELIQUIS) 5 MG tablet Take 1 tablet (5 mg) by mouth 2 times daily 60 tablet 0     nicotine (NICODERM CQ) 21 MG/24HR 24 hr patch Place 1 patch onto the skin daily 30 patch 0     metoprolol (LOPRESSOR) 50 MG tablet Take 1.5  "tablets (75 mg) by mouth 2 times daily 60 tablet 1     Probiotic Product (ALIGN) CHEW Take 1 chew tab by mouth 3 times daily as needed       omeprazole (PRILOSEC) 40 MG capsule Take 1 capsule (40 mg) by mouth daily Take 30-60 minutes before a meal. 30 capsule 1        FAMILY HISTORY:   Family History   Problem Relation Age of Onset     CANCER Mother      pancreatic cancer,  at 38 years     Alzheimer Disease Father       at 84 years.      Family history is reviewed today.    SOCIAL HISTORY:   Social History     Social History     Marital status:      Spouse name: Leonor Shultz     Number of children: 2     Years of education: N/A     Occupational History           Social History Main Topics     Smoking status: Former Smoker     Packs/day: 2.00     Years: 50.00     Quit date: 2017     Smokeless tobacco: Never Used     Alcohol use No     Drug use: No     Sexual activity: Yes     Partners: Female     Other Topics Concern     Parent/Sibling W/ Cabg, Mi Or Angioplasty Before 65f 55m? No     Social History Narrative       PHYSICAL EXAMINATION:   /85 (BP Location: Right arm, Patient Position: Sitting, Cuff Size: Adult Regular)  Pulse 67  Temp 97.5  F (36.4  C) (Tympanic)  Resp 16  Ht 1.765 m (5' 9.49\")  Wt 71.4 kg (157 lb 4.8 oz)  SpO2 97%  BMI 22.9 kg/m2  Wt Readings from Last 10 Encounters:   17 71.4 kg (157 lb 4.8 oz)   17 70.8 kg (156 lb)   17 73.7 kg (162 lb 7.7 oz)   17 75.3 kg (166 lb)   17 75.4 kg (166 lb 3.2 oz)   11/15/17 75.3 kg (166 lb)   17 71.9 kg (158 lb 9.6 oz)   10/16/15 83.2 kg (183 lb 6.4 oz)   10/30/13 77.1 kg (170 lb)   11 81.6 kg (179 lb 12.8 oz)      ECOG performance status: 1  GENERAL APPEARANCE: Healthy, alert and in no acute distress.  HEENT: Sclerae anicteric. PERRLA. Oropharynx without ulcers, lesions, or thrush.  NECK: Supple. No asymmetry or masses.  LYMPHATICS: No palpable cervical, " supraclavicular, axillary, or inguinal lymphadenopathy.  RESP: Lungs clear to auscultation bilaterally without rales, rhonchi or wheezes.  CARDIOVASCULAR: Regular rate and rhythm. Normal S1, S2; no S3 or S4. No murmur, gallop, or rub.  ABDOMEN: Soft, nontender. Bowel sounds normal. No palpable organomegaly or masses.  MUSCULOSKELETAL: Extremities without gross deformities noted. No edema of bilateral lower extremities.  SKIN: No suspicious lesions or rashes.  NEURO: Alert and oriented x 3. Cranial nerves II-XII grossly intact.  PSYCHIATRIC: Mentation and affect appear normal.    LABORATORY RESULTS:  Admission on 2017, Discharged on 2017   Component Date Value Ref Range Status     WBC 2017 8.0  4.0 - 11.0 10e9/L Final     RBC Count 2017 4.80  4.4 - 5.9 10e12/L Final     Hemoglobin 2017 14.8  13.3 - 17.7 g/dL Final     Hematocrit 2017 42.7  40.0 - 53.0 % Final     MCV 2017 89  78 - 100 fl Final     MCH 2017 30.8  26.5 - 33.0 pg Final     MCHC 2017 34.7  31.5 - 36.5 g/dL Final     RDW 2017 13.2  10.0 - 15.0 % Final     Platelet Count 2017 233  150 - 450 10e9/L Final     Heparin 10A Level 2017 0.26  IU/mL Final    Comment: Therapeutic Range:    UFH:   0.15-0.35 IU/mL for low             intensity dosing           0.30-0.70 IU/mL for high             intensity dosing for             DVT and PE    Enoxaparin: If administered             once daily with dose             1.5mg/k.0-2.0 IU/mL                If administered             twice daily with dose             1mg/k.60-1.0 IU/mL       Heparin 10A Level 2017 0.16  IU/mL Final    Comment: Therapeutic Range:    UFH:   0.15-0.35 IU/mL for low             intensity dosing           0.30-0.70 IU/mL for high             intensity dosing for             DVT and PE    Enoxaparin: If administered             once daily with dose             1.5mg/k.0-2.0 IU/mL                If  administered             twice daily with dose             1mg/k.60-1.0 IU/mL       Radiologist flags 2017 Probable chest malignancy.*  Final     WBC 2017 7.4  4.0 - 11.0 10e9/L Final     RBC Count 2017 4.75  4.4 - 5.9 10e12/L Final     Hemoglobin 2017 14.0  13.3 - 17.7 g/dL Final     Hematocrit 2017 42.3  40.0 - 53.0 % Final     MCV 2017 89  78 - 100 fl Final     MCH 2017 29.5  26.5 - 33.0 pg Final     MCHC 2017 33.1  31.5 - 36.5 g/dL Final     RDW 2017 13.5  10.0 - 15.0 % Final     Platelet Count 2017 215  150 - 450 10e9/L Final     Sodium 2017 141  133 - 144 mmol/L Final     Potassium 2017 4.1  3.4 - 5.3 mmol/L Final     Chloride 2017 107  94 - 109 mmol/L Final     Carbon Dioxide 2017 29  20 - 32 mmol/L Final     Anion Gap 2017 5  3 - 14 mmol/L Final     Glucose 2017 88  70 - 99 mg/dL Final     Urea Nitrogen 2017 20  7 - 30 mg/dL Final     Creatinine 2017 0.95  0.66 - 1.25 mg/dL Final     GFR Estimate 2017 81  >60 mL/min/1.7m2 Final    Non  GFR Calc     GFR Estimate If Black 2017 >90  >60 mL/min/1.7m2 Final    African American GFR Calc     Calcium 2017 8.6  8.5 - 10.1 mg/dL Final     INR 2017 1.05  0.86 - 1.14 Final     Interpretation ECG 2017 Click View Image link to view waveform and result   Final     Heparin 10A Level 2017 0.22  IU/mL Final    Comment: Therapeutic Range:    UFH:   0.15-0.35 IU/mL for low             intensity dosing           0.30-0.70 IU/mL for high             intensity dosing for             DVT and PE    Enoxaparin: If administered             once daily with dose             1.5mg/k.0-2.0 IU/mL                If administered             twice daily with dose             1mg/k.60-1.0 IU/mL       Body Fluid Analysis Source 2017 Left   Final    Pleural fluid     Color Fluid 2017 Yellow   Final     Appearance  Fluid 12/08/2017 Hazy   Final     WBC Fluid 12/08/2017 800  /uL Final    Comment: Clot(s) present, counts may be inaccurate  No reference ranges have been established.  This result should be interpreted   in the context of the patient's clinical condition and compared to   simultaneous measurement in the patient's blood.  Refer to Lab Guide for   specific interpretive guidelines.       % Neutrophils Fluid 12/08/2017 17  % Final     % Lymphocytes Fluid 12/08/2017 80  % Final     % Mono/Macro Fluid 12/08/2017 3  % Final     Specimen Description 12/08/2017 Pleural fluid Left   Final     Culture Micro 12/08/2017 No growth   Final     Glucose Fluid Source 12/08/2017 Left   Final    Pleural fluid     Glucose Fluid 12/08/2017 88  mg/dL Final    Comment: No reference ranges have been established.  This result should be interpreted   in the context of the patient's clinical condition and compared to   simultaneous measurement in the patient's blood.  Refer to Lab Guide for   specific interpretive guidelines.       Specimen Description 12/08/2017 Pleural fluid Left   Final     Gram Stain 12/08/2017 No organisms seen   Final     Gram Stain 12/08/2017 No WBC's seen   Final     LD Fluid Source 12/08/2017 Left   Final    Pleural fluid     Lactate Dehydrogenase Fluid 12/08/2017 219  U/L Final    Comment: No reference ranges have been established.  This result should be interpreted   in the context of the patient's clinical condition and compared to   simultaneous measurement in the patient's blood.  Refer to Lab Guide for   specific interpretive guidelines.       Protein Total Fluid Source 12/08/2017 Left   Final    Pleural fluid     Protein Total Fluid 12/08/2017 2.2  g/dL Final    Comment: No reference ranges have been established.  This result should be interpreted   in the context of the patient's clinical condition and compared to   simultaneous measurement in the patient's blood.  Refer to Lab Guide for   specific  interpretive guidelines.       Copath Report 12/08/2017    Final                    Value:Patient Name: ROQUE SAXENA  MR#: [id number]  Specimen #: [id number]  Collected: 12/8/2017  Received: 12/11/2017  Reported: 12/12/2017 15:03  Ordering Phy(s): MAUREEN DAY  Additional Phy(s): NIKKI MCCALLUM [place]    For improved result formatting, select 'View Enhanced Report Format'  under Linked Documents section.    SPECIMEN/STAIN PROCESS:  Pleural Fluid, left       Pap-Cyto x 1, Cell Block w/ H&E-Cyto x 1, Cell Block, Level 2 x  1, Cell Block, Level 3 x 1    ----------------------------------------------------------------  CYTOLOGIC INTERPRETATION:     Pleural Fluid, left:   Negative for malignancy  Specimen Adequacy: Satisfactory for evaluation.    I have personally reviewed all specimens and/or slides, including the  listed special stains, and used them with my medical judgement to  determine or confirm the final diagnosis.    Electronically signed out by:    Sergey Thacker M.D.    Processed and screened at [facility]    CLINICAL HISTORY:  Pleural effusion    ,    GROSS:  Pleural Fluid, left:  Received 650 ml of yellow, cloudy fluid, processed  as 1 Pap stained Autocyte and one hematoxylin and eosin stained cell  block.    MICROSCOPIC:  Examination of pleural fluid preparation is remarkable for clusters of  reactive mesothelial cells associated with chronic inflammatory change.  No malignant features are apparent.    CPT Codes:  A: 51996-WDQBHZI, 58149-DTJ, HCB    TESTING LAB LOCATION:  [facility]    [phone]    COLLECTION SITE:  Client[facility]  [facility]  [facility]       Lactate Dehydrogenase 12/08/2017 357* 85 - 227 U/L Final     Protein Total 12/08/2017 5.9* 6.8 - 8.8 g/dL Final     Interpretation ECG 12/09/2017 Click View Image link to view waveform and result    Final     Heparin 10A Level 2017 1.62* IU/mL Final    Comment: Therapeutic Range:    UFH:   0.15-0.35 IU/mL for low             intensity dosing           0.30-0.70 IU/mL for high             intensity dosing for             DVT and PE    Enoxaparin: If administered             once daily with dose             1.5mg/k.0-2.0 IU/mL                If administered             twice daily with dose             1mg/k.60-1.0 IU/mL  Critical Value:  Critical value if patient is receiving  unfractionated heparin: >1 IU/mL, critical  range does not apply if patient is receiving  low molecular weight heparin.  Critical Value called to and read back by  RYAN ZHENG IN [place] AT 0746 BY DD       Sodium 2017 140  133 - 144 mmol/L Final     Potassium 2017 4.2  3.4 - 5.3 mmol/L Final     Chloride 2017 106  94 - 109 mmol/L Final     Carbon Dioxide 2017 26  20 - 32 mmol/L Final     Anion Gap 2017 8  3 - 14 mmol/L Final     Glucose 2017 96  70 - 99 mg/dL Final     Urea Nitrogen 2017 24  7 - 30 mg/dL Final     Creatinine 2017 0.95  0.66 - 1.25 mg/dL Final     GFR Estimate 2017 81  >60 mL/min/1.7m2 Final    Non  GFR Calc     GFR Estimate If Black 2017 >90  >60 mL/min/1.7m2 Final    African American GFR Calc     Calcium 2017 8.7  8.5 - 10.1 mg/dL Final     Hemoglobin 2017 13.7  13.3 - 17.7 g/dL Final     Hematocrit 2017 41.2  40.0 - 53.0 % Final       IMAGING RESULTS:  Recent Results (from the past 744 hour(s))   NM Lexiscan stress test    Narrative    GATED MYOCARDIAL PERFUSION SCINTIGRAPHY WITH INTRAVENOUS PHARMACOLOGIC  VASODILATATION LEXISCAN -ONE DAY STUDY     2017 1:21 PM  ROQUE SAXENA  57 years  Male  1960.    Indication/Clinical History: Shortness of breath and chest pain with  new atrial flutter    Impression  1.  Myocardial perfusion imaging using single isotope technique  demonstrated small area of possible  nontransmural infarction involving  the distal anterior wall and anteroapex with significant residual  ischemia within the remainder of the anterior wall.   2. Gated images demonstrated anteroseptal and apical hypokinesis with  normal left ventricular chamber size.  The left ventricular systolic  function is mild to moderately reduced, with an ejection fraction  measured at 43%.  3. No prior studies available for comparison. The reduced ejection  fraction could be due to the presence of atrial flutter with somewhat  rapid heart rate at rest of 100 bpm and difficulty with gating.  However, the ventricle appears to dilate significantly on stress  images raising concern that there could be significant myocardial  ischemia present as suggested by the perfusion defects. Additional  evaluation for significant coronary artery disease should be  considered before referral for ablation of atrial flutter.    Procedure  Pharmacologic stress testing was performed with Lexiscan at a rate of  0.08 mg/ml rapid bolus injection, for 15 seconds, 0.4 mg/5ml  intravenously. Low-level exercise was performed along with the  vasodilator infusion.  The heart rate was 100 at baseline and martha to  112 beats per minute during the Lexiscan infusion. The rest blood  pressure was 128/94 mmHg and was 80/60 mm Hg during Lexiscan infusion.  The patient experienced dizziness and shortness of breath  during the  test.    Myocardial perfusion imaging was performed at rest, approximately 45  minutes after the injection intravenously of 10.9 mCi of Tc-99m  Myoview. At peak pharmacologic effect, 10-20 seconds after Lexiscan,   the patient was injected intravenously with 33.0 mCi of  Tc-99m  Myoview. The post-stress tomographic imaging was performed  approximately 60 minutes after stress.    EKG Findings  The resting EKG demonstrated atrial flutter with variable conduction.  ST and T-wave abnormalities are present due to atrial flutter. The  stress EKG  demonstrated no obvious ischemic changes but the ECG is  essentially nondiagnostic due to the presence of flutter waves making  ST and T-wave portions unreadable.    Tomographic Findings  Overall, the study quality is good . On the stress images, there is  moderate count depression involving the mid and distal anteroseptal  wall and apex and mild count depression within the remainder of the  anterior wall. On the rest images, there is mild count depression  involving the distal anteroseptal wall . Gated images demonstrated mid  and distal anteroseptal hypokinesis. The left ventricular ejection  fraction was calculated to be 43%. TID was present.    PRISCILLA VILLAFUERTE MD   XR Chest 2 Views    Narrative    XR CHEST 2 VW 12/7/2017 4:11 PM    HISTORY: Chest pain. Short of breath. Atrial flutter.    COMPARISON: 4/26/2015    FINDINGS: In comparison with previous chest radiographs there is  elevation of the left hemidiaphragm with focal tenting. There is also  hazy opacity in the upper lung which could represents partial upper  lobe atelectasis. The right lung is clear. No pneumothorax. No pleural  effusion. Heart size appears stable.      Impression    IMPRESSION: Abnormal appearance of the left lung, as above, new in  comparison with previous exams. Although this is unlikely to be  related to the patient's current cardiac symptoms, further evaluation  with chest CT is recommended.    HOLLIE MAE MD   CT Chest w/o Contrast   Result Value    Radiologist flags Probable chest malignancy. (Urgent)    Narrative    CT CHEST WITHOUT CONTRAST   12/8/2017 8:53 AM     HISTORY: Abnormal chest x-ray.     TECHNIQUE:  No IV contrast material.  Radiation dose for this scan was  reduced using automated exposure control, adjustment of the mA and/or  kV according to patient size, or iterative reconstruction technique.    COMPARISON: Chest x-ray 12/7/2017.    FINDINGS:  There is complete left upper lobe atelectasis. The left  upper lobe bronchus  is abnormally truncated (series 3, image 27).  There is a moderate-sized left pleural effusion. Calcified granuloma  is noted in the left lower lobe. There are mild emphysematous changes  of both lungs. No right pulmonary nodule or mass. There is an enlarged  subcarinal lymph node that measures 3.1 x 2.2 cm (series 2, image 30).  There are also enlarged right paratracheal lymph nodes. Evaluation of  hilar lymph nodes limited without IV contrast. No axillary adenopathy.      Impression    IMPRESSION:  1. Left upper lobe atelectasis appears to be related to obstruction of  the left upper lobe bronchus, likely malignant. Consider bronchoscopy.  2. Moderate-sized left pleural effusion.  3. Mediastinal lymphadenopathy. Evaluation for hilar lymphadenopathy  limited without intravenous contrast.  4. Mild emphysematous changes.    [Access Center: Probable chest malignancy.]    This report will be copied to the [facility] to ensure a  provider is contacted. Access Center is available Monday through  Friday 8am-3:30 pm.     BROCK MCCORMACK MD   XR Chest 1 View    Narrative    CHEST ONE VIEW  12/8/2017 4:20 PM     HISTORY:  Left thoracentesis.     COMPARISON: 12/7/2017.      Impression    IMPRESSION: Persistent hazy opacity projected over the left upper lobe  may represent atelectasis. No pneumothorax on either side. Left  pleural fluid has been evacuated. Heart size normal.    BROCK MCCORMACK MD   US Thoracentesis    Narrative     US THORACENTESIS   12/8/2017 4:27 PM       HISTORY: Left pleural effusion      TECHNIQUE:  Informed consent was obtained from the patient. A timeout  was performed. Ultrasound was used to confirm left pleural effusion.  The overlying skin was marked and then prepped and draped in a sterile  manner.  5 mL of 1% lidocaine was used for local anesthesia. The  thoracentesis needle was then advanced into the chest. 625 mL of  straw-colored pleural fluid was removed. Patient tolerated  the  procedure well. Followup chest x-ray was ordered.      FINDINGS: Ultrasound demonstrates a moderate  sized left pleural  effusion.      Impression    IMPRESSION:  Ultrasound-guided left thoracentesis.     BROCK MCCORMACK MD   CT Chest/Abdomen/Pelvis w Contrast    Narrative    CT CHEST, ABDOMEN AND PELVIS WITH CONTRAST  12/9/2017 10:41 AM    HISTORY: Lung mass. To evaluate for other lesions as well.     TECHNIQUE: CT scan obtained of the chest, abdomen, and pelvis with  oral and IV contrast. 81 mL Isovue-370 injected. Radiation dose for  this scan was reduced using automated exposure control, adjustment of  the mA and/or kV according to patient size, or iterative  reconstruction technique.    COMPARISON: CT chest 12/8/2017, CT abdomen and pelvis 4/24/2015.    FINDINGS:  Chest: Collapse of the left upper lobe with left upper lobe bronchus  cutoff again identified. This is worrisome for underlying left upper  lobe mass that blends in with necrotic appearing anterior mediastinal  adenopathy. The precise size is difficult to measure, but a potential  mass with surrounding left upper lobe atelectatic lung is  approximately 7.9 x 5.8 cm series 2 image 23. This measurement  includes the lobulated necrotic mediastinal adenopathy and hilar  adenopathy on the left. Stable left lower lobe granuloma that is  calcified. Mild right base atelectasis. Emphysematous changes. A few  stable pulmonary nodules noted bilaterally. A new ground glass nodule  posterior left lower lobe is 0.6 cm series 3 image 36. Clusters of  nodularity appear stable at the periphery of the right middle lobe,  and right lower lobe. Emphysematous changes. Small left pleural fluid.  Trace right pleural fluid. No acute thoracic aortic abnormality. No  detected pulmonary embolism.    Adenopathy in the mediastinum also extends to the subcarinal location  that appears heterogeneous measuring 2.9 x 2 cm, not significantly  changed image 26. Right hilar  enlarged lymph node is also stable  measuring 2.1 x 2.1 cm image 26. There are other right paratracheal  stable enlarged lymph nodes noted. No aggressive thoracic bony lesions  detected.    Abdomen/pelvis: No aggressive appearing bony lesions identified at the  levels of the abdomen and pelvis. There is diffuse edema of the  gallbladder. Limited opacification of the liver. There is a nodular  hypodense focal lesion at the posterior right liver that is 1.1 cm  series 2 image 57 that appears new. It is uncertain whether this  represents a true nodule versus an unenhanced vessel given the limited  opacification of the liver. The adrenals, spleen, pancreas, and  kidneys do not demonstrate any significant abnormalities. Tiny left  renal cysts suggested on series 2 image 75 that are limited in  assessment given the small sizes. No acute bowel abnormality. No bowel  obstruction. Stool distends the rectum. Small pelvic fluid. Edema in  the fat planes of the abdomen and pelvis noted. There are small upper  abdominal and tiny retroperitoneal lymph nodes without convincing  enlargement.      Impression    IMPRESSION:  1. Complete left upper lobe collapse appears stable likely  representing underlying malignancy such as bronchogenic carcinoma.  This blends in with necrotic large adenopathy at the anterior  mediastinum. There is also potential malignant adenopathy at the  bilateral hilar, right mediastinum, and subcarinal locations recommend  continued oncologic workup.  2. Several small stable indeterminate pulmonary nodules on the right.  Recommend surveillance imaging to establish long-term stability. There  is a new pulmonary nodule at the left lower lobe that is likely  infectious or inflammatory as it is new since yesterday.  3. A small hypodense nodule at the posterior right liver appears to be  new since the prior CT from 2015. As such, it is indeterminate with a  neoplastic etiology such as metastasis remaining in the  differential.  It could also represent poorly enhanced vessel on this exam showing  heterogeneous enhancement. Liver MRI should be considered for further  workup.  4. Anasarca and small fluid in the abdomen and pelvis.  5. Prominent edema of the gallbladder wall. Correlate clinically for  cholecystitis.    PEDRITO MAHONEY MD   PET Oncology Whole Body    Narrative    Combined Report of:    PET and CT on  12/15/2017 8:39 AM :    1. PET of the neck, chest, abdomen, and pelvis.  2. PET CT Fusion for Attenuation Correction and Anatomical  Localization:    3. 3D MIP and PET-CT fused images were processed on an independent  workstation and archived to PACS and reviewed by a radiologist.    Technique:    1. PET: The patient received 10.12 mCi of F-18-FDG; the serum glucose  was 94 prior to administration, body weight was 71.4 kg. Images were  evaluated in the axial, sagittal, and coronal planes as well as the  rotational whole body MIP. Images were acquired from the Vertex to the  Feet.    UPTAKE WAS MEASURED AT 67 MINUTES.     2. CT: CT only obtained for attenuation correction and not diagnostic  purposes.    INDICATION: obstruction noted on previous imaging  , bronchial mass;  Lung mass; Pleural effusion; Mediastinal lymphadenopathy    ADDITIONAL INFORMATION OBTAINED FROM EMR: none    COMPARISON: CT CAP 12/9/2017     FINDINGS:     HEAD/NECK:  Several FDG avid left neck lymphadenopathy. Examples include 1.8 cm  supraclavicular node with max SUV of 13 and 1.1 cm level 5 lymph node  with max SUV of 10.    CHEST:  There is a large FDG-avid mass centered within the anterior  mediastinum approximately measuring 8 x 6 cm with max SUV of 13. Also  there are multiple FDG avid mediastinal lymphadenopathy. Examples  include 2.1 x 2.8 cm subcarinal node with max SUV of 19, 2 x 1.3 cm  subcarinal node with max SUV of 18 and 1.4 cm aorticopulmonary lymph  node with max SUV of 12.    Several FDG avid left axillary lymph nodes. For example,  1.4 cm node  with max SUV of 5.3 and 1.0 cm node with max SUV of 5.6    Increased 4.3 cm thick left pleural effusion.    There is obliteration of the left upper lobe bronchus. Complete  atelectasis of the left upper lobe with associated FDG uptake (max SUV  3.2).     Emphysematous changes in the lungs.    ABDOMEN AND PELVIS:  There is slight thickening and an FDG uptake in the left adrenal gland  with max SUV of 4.6.    There is focal FDG uptake in the right inguinal region (max SUV 5.1)  without associated CT of normal.    LOWER EXTREMITIES:   No abnormal masses or hypermetabolic lesions.    BONES:   Bilateral spondylolysis and grade 1 anterolisthesis at L5-S1.     There are no suspicious lytic or blastic osseous lesions.  There is no  abnormal FDG uptake in the skeleton.      Impression    IMPRESSION:   1. Large FDG avid mediastinal mass, also extending to the left hilum  with obliteration of the left upper lobe bronchus and atelectasis of  the upper lobe.   2. Multiple left cervical, left axillary and mediastinal  lymphadenopathy.  3. Increased left pleural effusion since 12/9/2017.  4. Hypermetabolic left adrenal lesion, suspicious for metastasis.  5. Bilateral spondylolysis and grade 1 anterolisthesis at L5-S1.    I have personally reviewed the examination and initial interpretation  and I agree with the findings.    ALICIA BEVERLY MD       ASSESSMENT AND PLAN:  (R91.8) Lung mass  (primary encounter diagnosis)  (J90) Pleural effusion  (R59.0) Mediastinal lymphadenopathy  This is a 57-year-old male patient with probably lung cancer with multiple metastases including mediastinal cervical lymph nodes. I reviewed with the patient today didn't imaging studies in details. I reviewed with the patient the natural history of lung cancer. We talked about histology. We also talked about staging, management and prognosis. I we refilled the patient for biopsy from the cervical lymph node. Bronchoscopy probably helpful  in diagnosis as well. Once tissue diagnosis is identified we will talk to the patient about different treatment modalities. We talked about symptom management including shortness of breath, pain, poor appetite and weight loss. I will meet with the patient again next week to discuss management plan and details based on tissue diagnosis and the finding from imaging studies.    The patient is ready to learn, no apparent learning barriers were identified, Diagnosis and treatment plans were explained to the patient. The patient expressed understanding of the content. The patient questions were answered to his satisfaction.    Joycelyn May MD    Chart documentation with Dragon Voice recognition Software. Although reviewed after completion, some words and grammatical errors may remain.  Patient is clear for procedure in  day surgery.

## 2017-12-19 NOTE — TELEPHONE ENCOUNTER
Patient was contacted by phone due to reporting concerns about ability to eat on the Oncology Distress Screening tool. A voicemail message was left indicating the reason for the call and the number to use to contact dietitian.     Catalina Garg RD,LD  Clinical Dietitian

## 2017-12-20 NOTE — PROGRESS NOTES
Review of Systems, Mallimpadi and ASA has been performed. The patient is approved for the imaging procedure using sedation.

## 2017-12-20 NOTE — DISCHARGE INSTRUCTIONS
Radiology  Discharge Instructions for Lung/Chest Biopsy    A lung/chest biopsy is a procedure to obtain a small tissue sample from your lung/chest.  This tissue is obtained using a biopsy needle.  This sample will be examined in a laboratory for any abnormalities.    AFTER YOU ARE HOME:    You may return to your normal diet    Relax and take it easy for 24 hours    Do have someone stay with you for the next 24 hours to help you if you have any difficulties.  You may develop a complete or partial collapse of your lung (pneumothorax), from the needle entering your lung.    Remove band-aid from biopsy site in 24 hours.    You may use Tylenol for discomfort at biopsy site.    You may cough up small amounts of blood.    CALL YOUR PRIMARY PROVIDER IF:    You develop temperature over 101o F or redness at biopsy site.        COME TO EMERGENCY ROOM IF:    You are having severe difficulty breathing, severe chest pain, coughing up large amounts of blood, or have heavy bleeding from biopsy site.  DO NOT DRIVE YOURSELF.    Your ordering physician will contact you with the laboratory results.                     Radiology  Discharge Instructions Following Sedation for an Adult    Procedure performed: CT Needle Biopsy    Sedative medication given: Versed, Fentanyl    AFTER YOU ARE HOME:    Relax and take it easy for 24 hours.    Drink plenty of fluids.    Eat your normal diet, unless otherwise instructed by your primary provider.    DO NOT smoke for at least 24 hours (if you have been given any medications that were to help you relax or sedate you during your procedure).    DO NOT drink alcoholic beverages for the remainder of the day.    DO NOT drive or operate machinery at home or work for 24 hours.    DO NOT make any important or legal decisions for 24 hours.    CALL YOUR PRIMARY PROVIDER IF:    You develop excessive nausea, vomiting or dizziness.     The nausea, vomiting or dizziness continues longer than 24  hours.    AFTER HOURS CALL [place] [facility] AT [phone].              I have reviewed and understand these discharge instructions.        __________________________________________________  Patient Signature        __________________________________________________  Nurse/Radiologist Signature  12/20/2017

## 2017-12-20 NOTE — IP AVS SNAPSHOT
MRN:[id number]                      After Visit Summary   12/20/2017    Zechariah Ashby    MRN: [id number]           Visit Information        Provider Department      12/20/2017  8:00 AM [facility]; [facility]; [facility]           Review of your medicines      UNREVIEWED medicines. Ask your doctor about these medicines        Dose / Directions    ALIGN Chew        Dose:  1 chew tab   Take 1 chew tab by mouth 3 times daily as needed   Refills:  0       apixaban ANTICOAGULANT 5 MG tablet   Commonly known as:  ELIQUIS   Used for:  Typical atrial flutter (H)        Dose:  5 mg   Take 1 tablet (5 mg) by mouth 2 times daily   Quantity:  60 tablet   Refills:  0       metoprolol 50 MG tablet   Commonly known as:  LOPRESSOR   Used for:  Irregular heart beat, Atrial flutter, unspecified type (H), Cardiomyopathy, unspecified type (H)        Dose:  75 mg   Take 1.5 tablets (75 mg) by mouth 2 times daily   Quantity:  60 tablet   Refills:  1       nicotine 21 MG/24HR 24 hr patch   Commonly known as:  NICODERM CQ   Used for:  Encounter for tobacco use cessation counseling        Dose:  1 patch   Place 1 patch onto the skin daily   Quantity:  30 patch   Refills:  0       omeprazole 40 MG capsule   Commonly known as:  priLOSEC   Used for:  Gastroesophageal reflux disease without esophagitis        Dose:  40 mg   Take 1 capsule (40 mg) by mouth daily Take 30-60 minutes before a meal.   Quantity:  30 capsule   Refills:  1                Protect others around you: Learn how to safely use, store and throw away your medicines at www.disposemymeds.org.         Follow-ups after your visit        Your next 10 appointments already scheduled     Jan 09, 2018  8:00 AM CST   (Arrive by 7:45 AM)   NEW LUNG NODULE with Osvaldo Ortiz MD   [facility] ([facility])    [address]  [address]  [place] [address] [phone]            Jan 12, 2018  10:45 AM CST   Ech Complete with [id number]   [facility] ([facility])    [address]  [facility] [place] [address] [phone]           1. Please bring or wear a comfortable two-piece outfit. 2. You may eat, drink and take your normal medicines. 3. For any questions that cannot be answered, please contact the ordering physician            Jan 16, 2018  9:45 AM CST   ecg with [facility]   [facility] ([facility])    [address]  [facility] [place] [place] [address]   [phone]            Jan 16, 2018 10:00 AM CST   Return Visit with Daksah Montemayor PA-C   [facility] ([facility])    [address] [address] [phone]               Care Instructions        Further instructions from your care team                       Radiology  Discharge Instructions for Lung/Chest Biopsy    A lung/chest biopsy is a procedure to obtain a small tissue sample from your lung/chest.  This tissue is obtained using a biopsy needle.  This sample will be examined in a laboratory for any abnormalities.    AFTER YOU ARE HOME:    You may return to your normal diet    Relax and take it easy for 24 hours    Do have someone stay with you for the next 24 hours to help you if you have any difficulties.  You may develop a complete or partial collapse of your lung (pneumothorax), from the needle entering your lung.    Remove band-aid from biopsy site in 24 hours.    You may use Tylenol for discomfort at biopsy site.    You may cough up small amounts of blood.    CALL YOUR PRIMARY PROVIDER IF:    You develop temperature over 101o F or redness at biopsy site.        COME TO EMERGENCY ROOM IF:    You are having severe difficulty breathing, severe chest pain, coughing up large amounts of blood, or have heavy bleeding from biopsy site.  DO NOT DRIVE YOURSELF.    Your ordering physician will contact  "you with the laboratory results.                     Radiology  Discharge Instructions Following Sedation for an Adult    Procedure performed: CT Needle Biopsy    Sedative medication given: Versed, Fentanyl    AFTER YOU ARE HOME:    Relax and take it easy for 24 hours.    Drink plenty of fluids.    Eat your normal diet, unless otherwise instructed by your primary provider.    DO NOT smoke for at least 24 hours (if you have been given any medications that were to help you relax or sedate you during your procedure).    DO NOT drink alcoholic beverages for the remainder of the day.    DO NOT drive or operate machinery at home or work for 24 hours.    DO NOT make any important or legal decisions for 24 hours.    CALL YOUR PRIMARY PROVIDER IF:    You develop excessive nausea, vomiting or dizziness.     The nausea, vomiting or dizziness continues longer than 24 hours.    AFTER HOURS CALL [place] [facility] AT [phone].              I have reviewed and understand these discharge instructions.        __________________________________________________  Patient Signature        __________________________________________________  Nurse/Radiologist Signature  2017         Additional Information About Your Visit        MyChart Information     [facility] lets you send messages to your doctor, view your test results, renew your prescriptions, schedule appointments and more. To sign up, go to [email or web address] . Click on \"Log in\" on the left side of the screen, which will take you to the Welcome page. Then click on \"Sign up Now\" on the right side of the page.     You will be asked to enter the access code listed below, as well as some personal information. Please follow the directions to create your username and password.     Your access code is: [email or web address]  Expires: 2018  4:44 PM     Your access code will  in 90 days. If you need help or a new code, please call your [place] clinic or " [phone].        Care EveryWhere ID     This is your Care EveryWhere ID. This could be used by other organizations to access your [facility] medical records  [id number]        Your Vitals Were     Blood Pressure Pulse Temperature Respirations Pulse Oximetry       120/87 68 98  F (36.7  C) (Tympanic) 16 98%        Primary Care Provider Office Phone # Fax #    Kailash Mason [phone]      Equal Access to Services     ROD PERRY : Hadii aad ku hadasho Soomaali, waaxda luqadaha, qaybta kaalmada adeegyada, waxay idiin hayaan adeeg kharash la'peggy . So [facility] [phone].    ATENCIÓN: Si habla español, tiene a morocho disposición servicios gratuitos de asistencia lingüística. [facility] [phone].    We comply with applicable federal civil rights laws and [place] laws. We do not discriminate on the basis of race, color, national origin, age, disability, sex, sexual orientation, or gender identity.            Thank you!     Thank you for choosing [facility] for your care. Our goal is always to provide you with excellent care. Hearing back from our patients is one way we can continue to improve our services. Please take a few minutes to complete the written survey that you may receive in the mail after you visit with us. Thank you!             Medication List: This is a list of all your medications and when to take them. Check marks below indicate your daily home schedule. Keep this list as a reference.      Medications           Morning Afternoon Evening Bedtime As Needed    ALIGN Chew   Take 1 chew tab by mouth 3 times daily as needed                                apixaban ANTICOAGULANT 5 MG tablet   Commonly known as:  ELIQUIS   Take 1 tablet (5 mg) by mouth 2 times daily                                metoprolol 50 MG tablet   Commonly known as:  LOPRESSOR   Take 1.5 tablets (75 mg) by mouth 2 times daily                                nicotine 21 MG/24HR 24 hr patch   Commonly known as:   NICODERM CQ   Place 1 patch onto the skin daily                                omeprazole 40 MG capsule   Commonly known as:  priLOSEC   Take 1 capsule (40 mg) by mouth daily Take 30-60 minutes before a meal.

## 2017-12-20 NOTE — IP AVS SNAPSHOT
[facility]    [address]    [facility] [place] [address]    Phone:  [phone]    Fax:  [phone]                                       After Visit Summary   12/20/2017    Zechariah Ashby    MRN: [id number]           After Visit Summary Signature Page     I have received my discharge instructions, and my questions have been answered. I have discussed any challenges I see with this plan with the nurse or doctor.    ..........................................................................................................................................  Patient/Patient Representative Signature      ..........................................................................................................................................  Patient Representative Print Name and Relationship to Patient    ..................................................               ................................................  Date                                            Time    ..........................................................................................................................................  Reviewed by Signature/Title    ...................................................              ..............................................  Date                                                            Time

## 2017-12-20 NOTE — PROGRESS NOTES
RADIOLOGY PROCEDURE NOTE  Patient name: Zechariah Ashby  MRN: [id number]  : 1960    Pre-procedure diagnosis: Anterior mediastinal adenopathy.  Post-procedure diagnosis: Same    Procedure Date/Time: 2017  8:42 AM  Procedure: CT guided needle core biopsy.  Estimated blood loss: None  Specimen(s) collected:  Three needle cores.  The patient tolerated the procedure well with no immediate complications.    See imaging dictation for procedural details.    Provider name: Peter Crooks  Assistant(s):None

## 2017-12-27 NOTE — LETTER
SURGERYPLANNING/SCHEDULING WORKSHEET                              [facility]  [address] [address] [phone]                          Zechariah Ashby                :  1960  MRN:  [id number]  Phone: [phone] (home)     Same Day Surgery   Surgeon: Tomas Crews MD  Diagnosis:   Left lung cancer  Allergies:  Review of patient's allergies indicates no known allergies.   A preoperative evaluation and physical will be done by this office.   ====================================================  Surgical Procedure:  General Surgery:portacath placement  Length of Procedure:  1 hour  Type of anesthesia:  Local with MAC  The proposed surgical procedure is considered LOW risk.  Date of Procedure:_January 3, 2018___    Time: __12:00 pm_____       Special Equipment: None  Informed Consent Obtained and Signed:  NO  ====================================================  Instructions to Same Day Surgery Staff  Pneumoboots  Preop Antibiotic:  Ancef 2 gm IV  pre-op within one hour prior to incision For > 80 kg  Preop Pain Meds:  None  Preop Orders:  Routine Standing Orders.  ====================================================  Instructions to the patient:  Preop physical exam scheduled (within 30 days or 7 days prior) with:  Dr. Pradhan_______  Clinic:  ____________________                                         Date______________Time_________________________  Come to the hospital at: __Same Day Surgery will call to confirm__________  HOME PREPARATION:   Shower with Hibiclens the night before or the morning of surgery, gently cleaning skin from neck to feet  Bathe and brush teeth the morning of surgery.  Take medications with a sip of water the morning of surgery:   Check with  if taking insulin.  May have  a light meal, toast and clear liquids, up to 8 hrs before surgery  May have clear liquids (liquids one can read  through) up to 4 hrs before surgery  NOTHING after 4 hrs before surgery  Stop aspirin 7-10 days before surgery  Stop NSAIDS (Ibuproven, Naproxen, etc) 5 days before surgery  Stop Plavix 7-10 days before surgery  Arrange for transportation.    Tomas Crews MD    12/27/2017  This form was electronically signed at chart closure                                                                        Chart Copy

## 2017-12-27 NOTE — MR AVS SNAPSHOT
After Visit Summary   12/27/2017    Zechariah Ashby    MRN: [id number]           Patient Information     Date Of Birth          1960        Visit Information        Provider Department      12/27/2017 8:00 AM Joycelyn May MD [facility] ONCOLOGY      Today's Diagnoses     Non-small cell carcinoma of lung (H)    -  1    Mediastinal lymphadenopathy          Care Instructions    We would like you to have a port placed, Brain MRI, Biopsy of neck and to start Carboplatin/Gemzar after port placement. We would like to see you back in with Cycle 2 for a follow up appt. Copy of appointments, and after visit summary (AVS) given to patient.  If you have any questions during business hours (M-F 8 AM- 4PM), please call Camille Lara RN, BSN, OCN Oncology Hematology /Breast Cancer Navigator at [facility] [phone].   For questions after business hours, or on holidays/weekends, please call our after hours Nurse Triage line [phone]. Thank you.            Follow-ups after your visit        Additional Services     GENERAL SURG ADULT REFERRAL       Your provider has referred you to: [facility]: [facility] [phone]   [email or web address]    Please be aware that coverage of these services is subject to the terms and limitations of your health insurance plan.  Call member services at your health plan with any benefit or coverage questions.      Please bring the following with you to your appointment:    (1) Any X-Rays, CTs or MRIs which have been performed.  Contact the facility where they were done to arrange for  prior to your scheduled appointment.   (2) List of current medications   (3) This referral request   (4) Any documents/labs given to you for this referral    Port placement                  Your next 10 appointments already scheduled     Dec 29, 2017 12:30 PM CST   (Arrive by 12:15 PM)    MR BRAIN W/O & W CONTRAST with [place]MR1   [facility] MRI ([facility])    [address]  [facility] [address] [phone]           Take your medicines as usual, unless your doctor tells you not to. Bring a list of your current medicines to your exam (including vitamins, minerals and over-the-counter drugs).  You will be given intravenous contrast for this exam. To prepare:   The day before your exam, drink extra fluids at least six 8-ounce glasses (unless your doctor tells you to restrict your fluids).   Have a blood test (creatinine test) within 30 days of your exam. Go to your clinic or Diagnostic Imaging Department for this test.  The MRI machine uses a strong magnet. Please wear clothes without metal (snaps, zippers). A sweatsuit works well, or we may give you a hospital gown.  Please remove any body piercings and hair extensions before you arrive. You will also remove watches, jewelry, hairpins, wallets, dentures, partial dental plates and hearing aids. You may wear contact lenses, and you may be able to wear your rings. We have a safe place to keep your personal items, but it is safer to leave them at home.   **IMPORTANT** THE INSTRUCTIONS BELOW ARE ONLY FOR THOSE PATIENTS WHO HAVE BEEN TOLD THEY WILL RECEIVE SEDATION OR GENERAL ANESTHESIA DURING THEIR MRI PROCEDURE:  IF YOU WILL RECEIVE SEDATION (take medicine to help you relax during your exam):   You must get the medicine from your doctor before you arrive. Bring the medicine to the exam. Do not take it at home.   Arrive one hour early. Bring someone who can take you home after the test. Your medicine will make you sleepy. After the exam, you may not drive, take a bus or take a taxi by yourself.   No eating 8 hours before your exam. You may have clear liquids up until 4 hours before your exam. (Clear liquids include water, clear tea, black coffee and fruit juice without pulp.)  IF YOU WILL RECEIVE ANESTHESIA (be asleep for  your exam):   Arrive 1 1/2 hours early. Bring someone who can take you home after the test. You may not drive, take a bus or take a taxi by yourself.   No eating 8 hours before your exam. You may have clear liquids up until 4 hours before your exam. (Clear liquids include water, clear tea, black coffee and fruit juice without pulp.)  Please call the Imaging Department at your exam site with any questions.            Jan 03, 2018   Procedure with Tomas Crews MD   [facility] PeriOP Services (--)    [address] [address]   [phone]           The medical center is located at [address] (between [address] in [place], [place] north of [place]).            Jan 03, 2018  2:00 PM CST   Level O with ROOM 6 [facility] Infusion ([facility])    [facility]  [address]  [facility] [address]   [phone]            Jan 04, 2018  8:45 AM CST   (Arrive by 8:30 AM)   US BIOPSY HEAD NECK THORAX SOFT TISSUE with [place] RAD   [facility] Ultrasound ([facility])    [address]  [facility] [address]   [phone]           Tell us in advance if there s any chance you may be pregnant.  Bring a list of your medicines to the exam. Include vitamins, minerals and over-the-counter drugs.  If you take blood thinners, you may need to stop taking them a few days before treatment. Talk to your doctor before stopping these medicines. You will need a blood test the morning of your exam.   Stop taking Coumadin (warfarin) 3 days before your exam. Restart the day after your exam.   If you take aspirin, you may need to stop taking it 3 days before your scan.   If you take Plavix, Ticlid, Pletal or Persantine, you may need to stop taking them 5 days before your scan. Please talk to your doctor before stopping these medicines.  If you will receive sedation for this test (medicine to help you relax):   See  your family doctor for an exam within 30 days of treatment.   Plan for an adult to drive you home and stay with you for at least 6 hours.   Follow the eating and drinking guidelines checked below:   No eating or drinking for 4 hours before your test. You may take medicine with small sips of water.   If you have diabetes:If you take insulin, call your diabetes care team. Do not take diabetes pills on the morning of your test. If you take metformin (Avandamet, Glucophage, Glucovance, Metaglip) and received contrast, wait 48 hours before re-starting this medicine.  Please call the Imaging Department at your exam site with any questions.            Jan 05, 2018  8:00 AM CST   Level 3 with ROOM 8 [facility] Infusion ([facility])    [facility]  [address]  [facility] [address]   [phone]            Jan 09, 2018  8:00 AM CST   (Arrive by 7:45 AM)   NEW LUNG NODULE with Osvaldo Ortiz MD   [facility] ([facility])    [address]  2nd [address]  [facility] [address] [phone]            Jan 12, 2018 10:45 AM CST   [place]ch Complete with [address] Echocardiography ([facility])    [address] [address] [phone]           1. Please bring or wear a comfortable two-piece outfit. 2. You may eat, drink and take your normal medicines. 3. For any questions that cannot be answered, please contact the ordering physician            Jan 16, 2018  9:45 AM CST   ecg with [place] CARDIAC SERVICES   [facility] Cardiac Services ([facility])    [address] [facility] [address] [phone]            Jan 16, 2018 10:00 AM CST   Return Visit with Daksha Montemayor PA-C   [facility] ([facility])    [address] [facility] [address] [phone]              Future tests  "that were ordered for you today     Open Future Orders        Priority Expected Expires Ordered    US Biopsy Head Neck Thorax Soft Tissue Routine 2017    MR Brain w/o & w Contrast Routine  2018            Who to contact     If you have questions or need follow up information about today's clinic visit or your schedule please contact [facility] directly at [phone].  Normal or non-critical lab and imaging results will be communicated to you by [employer], letter or phone within 4 business days after the clinic has received the results. If you do not hear from us within 7 days, please contact the clinic through [employer] or phone. If you have a critical or abnormal lab result, we will notify you by phone as soon as possible.  Submit refill requests through [employer] or call your pharmacy and they will forward the refill request to us. Please allow 3 business days for your refill to be completed.          Additional Information About Your Visit        [employer] Information     [employer] lets you send messages to your doctor, view your test results, renew your prescriptions, schedule appointments and more. To sign up, go to [email or web address] . Click on \"Log in\" on the left side of the screen, which will take you to the Welcome page. Then click on \"Sign up Now\" on the right side of the page.     You will be asked to enter the access code listed below, as well as some personal information. Please follow the directions to create your username and password.     Your access code is: [email or web address]  Expires: 2018  4:44 PM     Your access code will  in 90 days. If you need help or a new code, please call your [place] clinic or [phone].        Care EveryWhere ID     This is your Care EveryWhere ID. This could be used by other organizations to access your [place] medical records  [id number]        Your Vitals Were     Pulse Temperature Respirations Height " "Pulse Oximetry BMI (Body Mass Index)    56 96.3  F (35.7  C) (Tympanic) 18 1.778 m (5' 10\") 99% 21.47 kg/m2       Blood Pressure from Last 3 Encounters:   12/27/17 111/78   12/27/17 139/87   12/20/17 118/78    Weight from Last 3 Encounters:   12/27/17 67.6 kg (149 lb)   12/27/17 67.9 kg (149 lb 9.6 oz)   12/13/17 71.4 kg (157 lb 4.8 oz)              We Performed the Following     GENERAL SURG ADULT REFERRAL        Primary Care Provider Office Phone # Fax #    Kailash Mason [phone] [address] [place] [address]        Equal Access to Services     ROD PERRY : Diego garibay Soasher, waaxda luqadaha, qaybta kaalmada adedickyaturner, hraley carrasquillo . So [facility] [phone].    ATENCIÓN: Si habla español, tiene a morocho disposición servicios gratuitos de asistencia lingüística. Llame al [phone].    We comply with applicable federal civil rights laws and [place] laws. We do not discriminate on the basis of race, color, national origin, age, disability, sex, sexual orientation, or gender identity.            Thank you!     Thank you for choosing [facility]CER [facility]  for your care. Our goal is always to provide you with excellent care. Hearing back from our patients is one way we can continue to improve our services. Please take a few minutes to complete the written survey that you may receive in the mail after your visit with us. Thank you!             Your Updated Medication List - Protect others around you: Learn how to safely use, store and throw away your medicines at www.disposemymeds.org.          This list is accurate as of: 12/27/17 10:50 AM.  Always use your most recent med list.                   Brand Name Dispense Instructions for use Diagnosis    ALIGN Chew      Take 1 chew tab by mouth 3 times daily as needed        apixaban ANTICOAGULANT 5 MG tablet    ELIQUIS    60 tablet    Take 1 tablet (5 mg) by mouth 2 times daily    Typical atrial " flutter (H)       ASPIRIN PO      Take 325 mg by mouth daily    Mediastinal lymphadenopathy, Non-small cell carcinoma of lung (H)       metoprolol 50 MG tablet    LOPRESSOR    60 tablet    Take 1.5 tablets (75 mg) by mouth 2 times daily    Irregular heart beat, Atrial flutter, unspecified type (H), Cardiomyopathy, unspecified type (H)       nicotine 21 MG/24HR 24 hr patch    NICODERM CQ    30 patch    Place 1 patch onto the skin daily    Encounter for tobacco use cessation counseling       omeprazole 40 MG capsule    priLOSEC    30 capsule    Take 1 capsule (40 mg) by mouth daily Take 30-60 minutes before a meal.    Gastroesophageal reflux disease without esophagitis

## 2017-12-27 NOTE — PROGRESS NOTES
57-year-old male with diagnosis of left lung cancer here for Port-A-Cath placement. Patient has a large left mediastinal mass as well as a left pleural effusion causing partial collapse of his left lung. Right lung is intact. Patient has never had a central line.    Patient Active Problem List   Diagnosis     CARDIOVASCULAR SCREENING; LDL GOAL LESS THAN 130     Perforated ulcer (HCC)     Free intraperitoneal air     Atrial flutter (H)     Lung mass     Pleural effusion     Mediastinal lymphadenopathy       Past Medical History:   Diagnosis Date     Atrial flutter (H) 2017       Past Surgical History:   Procedure Laterality Date     APPENDECTOMY      age 30s     SURGICAL HISTORY OF -   2004    Gastroscopy with biopsy for gastric ulcer       Family History   Problem Relation Age of Onset     CANCER Mother      pancreatic cancer,  at 38 years     Alzheimer Disease Father       at 84 years.       Social History   Substance Use Topics     Smoking status: Current Some Day Smoker     Packs/day: 0.25     Years: 50.00     Smokeless tobacco: Never Used      Comment: down to 1-2 cigerattes a day.      Alcohol use No        History   Drug Use No       Current Outpatient Prescriptions   Medication Sig Dispense Refill     ASPIRIN PO Take 325 mg by mouth daily       apixaban ANTICOAGULANT (ELIQUIS) 5 MG tablet Take 1 tablet (5 mg) by mouth 2 times daily 60 tablet 0     nicotine (NICODERM CQ) 21 MG/24HR 24 hr patch Place 1 patch onto the skin daily 30 patch 0     metoprolol (LOPRESSOR) 50 MG tablet Take 1.5 tablets (75 mg) by mouth 2 times daily 60 tablet 1     Probiotic Product (ALIGN) CHEW Take 1 chew tab by mouth 3 times daily as needed       omeprazole (PRILOSEC) 40 MG capsule Take 1 capsule (40 mg) by mouth daily Take 30-60 minutes before a meal. 30 capsule 1       No Known Allergies    CBC  Recent Labs   Lab Test  17   0635  17   1125   WBC   --   7.4   RBC   --   4.75   HGB  13.7  14.0   HCT   "41.2  42.3   MCV   --   89   MCH   --   29.5   MCHC   --   33.1   RDW   --   13.5   PLT   --   215       BMP  Recent Labs   Lab Test  12/09/17   0635   NA  140   POTASSIUM  4.2   VIJAY  8.7   CHLORIDE  106   CO2  26   BUN  24   CR  0.95   GLC  96       LFTs  Recent Labs   Lab Test  12/08/17   1125  12/07/17   1445   PROTTOTAL  5.9*  6.6*   ALBUMIN   --   3.4   BILITOTAL   --   0.6   ALKPHOS   --   122   AST   --   22   ALT   --   30     ROS  Constitutional - Denies fevers, weight loss, malaise, lethargy  Neuro - Denies tremors or seizures  Pulmon - + SOB  CV - Denies CP, SOB, lower extremity edema, difficulty w/ stairs, has never used NTG  GI - Denies hematemesis, BRBPR, melena, chronic diarrhea or epigastric pain   - Denies hematuria, difficulty voiding, h/o STDs  Hematology - Denies blood clotting disorders, chronic anemias  Dermatology - No melanomas or skin cancers  Rheumatology - No h/o RA  Pysch - Denies depression, bipolar d/o or schizophrenia    Exam:  Patient Vitals for the past 24 hrs:   BP Temp Temp src Pulse Resp Height Weight   12/27/17 1009 111/78 97.8  F (36.6  C) Oral 60 14 1.778 m (5' 10\") 67.6 kg (149 lb)       General - Alert and Oriented X4, NAD, cachectic  HEENT - Normocephalic, atraumatic, PERRLA, Nose midline, Throat without lesions  Neck - supple, no LAD, Thyroid normal, Carotids without bruits  Lungs - Decreased BS on left  CV - Heart RRR, no lift's, thrills, murmurs, rubs, or gallops. Carotid, radial, and femoral pulses 2+ bilaterally  Abdomen - Soft, non-tender, +BS, no hepatosplenomegaly, no palpable masses  Neuro - Full ROM, Strength 5/5 and major muscle groups, sensation intact  Extremities - No cyanosis, clubbing or edema    Assessment and plan: A 57-year-old male with lung cancer. Patient is a good candidate for Port-A-Cath placement. Risks benefits alternatives and complications were discussed with the patient including the possibility of infection and bleeding or pneumothorax. " Patient understood and wished to proceed. PATIENT IS CLEARED FOR SURGERY.    Tomas Crews MD

## 2017-12-27 NOTE — PATIENT INSTRUCTIONS
We would like you to have a port placed, Brain MRI, Biopsy of neck and to start Carboplatin/Gemzar after port placement. We would like to see you back in with Cycle 2 for a follow up appt. Copy of appointments, and after visit summary (AVS) given to patient.  If you have any questions during business hours (M-F 8 AM- 4PM), please call Camille Lara RN, BSN, OCN Oncology Hematology /Breast Cancer Navigator at [facility] [phone].   For questions after business hours, or on holidays/weekends, please call our after hours Nurse Triage line [phone]. Thank you.

## 2017-12-27 NOTE — NURSING NOTE
"Oncology Rooming Note    December 27, 2017 8:09 AM   Zechariah Ashby is a 57 year old male who presents for:    Chief Complaint   Patient presents with     Oncology Clinic Visit     2 week follow up bronchial mass. Review biopsy results.      Initial Vitals: /87 (BP Location: Right arm, Patient Position: Sitting, Cuff Size: Adult Large)  Pulse 56  Temp 96.3  F (35.7  C) (Tympanic)  Resp 18  Ht 1.778 m (5' 10\")  Wt 67.9 kg (149 lb 9.6 oz)  SpO2 99%  BMI 21.47 kg/m2 Estimated body mass index is 21.47 kg/(m^2) as calculated from the following:    Height as of this encounter: 1.778 m (5' 10\").    Weight as of this encounter: 67.9 kg (149 lb 9.6 oz). Body surface area is 1.83 meters squared.  Severe Pain (6) Comment: intermittent after medications.    No LMP for male patient.  Allergies reviewed: Yes  Medications reviewed: Yes    Medications: Medication refills not needed today.  Pharmacy name entered into [employer]:    [id number] [address]    Clinical concerns: 2 week follow up bronchial mass. Review biopsy results. Bilateral pain in the lower back which started about 1-2 weeks ago. Also with intermittent chest pains     7 minutes for nursing intake (face to face time)     Courtney Lopez, [facility]            "

## 2017-12-27 NOTE — LETTER
12/27/2017         RE: Zechariah Ashby  [address] [facility] [address]        Dear Colleague,    Thank you for referring your patient, Zechariah Ashby, to the [facility]. Please see a copy of my visit note below.    DATE OF VISIT: Dec 27, 2017    Zechariah Ashby is a 57 year old male is seen today for   Chief Complaint   Patient presents with     Oncology Clinic Visit     2 week follow up bronchial mass. Review biopsy results.    .       (C34.90) Non-small cell carcinoma of lung (H)  (primary encounter diagnosis)  I reviewed with the patient the results of the PET scan showing a large FDG avid mediastinal mass extending to the left hilum with obliteration of the left upper lobe bronchus and atelectasis of the upper lobe.  There are also multiple left cervical left cervical and mediastinal adenopathy.  There is increased left pleural effusion.  There is hypermetabolic left adrenal lesion.  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  I reviewed with the patient today management of stage IV squamous cell lung cancer.  We talked about staging, biology, management and prognosis.  We talked about palliative chemotherapy.  We talked about different options including immunotherapy.  I would recommend carboplatin with an AUC of 5 on day 1 with gemcitabine 1000 mg/m  to be given on day 1 and 8 every 21 day cycle.  The plan is to proceed with 6 cycles of treatment. We discussed the rationale behind using combination Carboplatin and Gemcitabine in detail as well as major side effects including, but not limited to immediate/short term side effects of acute infusion reaction/anaphylaxis, life-threatening infection, flu-like syndrome (headache, weakness, fever, shakes, aches, pains, and sweating), nausea/vomiting, diarrhea/constipation, mucositis, edema, rare alopecia, rash, anemia, neutropenia, thrombocytopenia and bleeding,  and weakness and fatigue as well as risk of long-term side effect of rare duration and dose dependent peripheral neuropathy and pulmonary toxicity. Patient instructed to call with signs or symptoms of infection including, but not limited to temperature greater than or equal to 100.5 degrees Fahrenheit, chills, severe sore throat, ear or sinus pain, mouth sores, cough, painful urination, and/or non-healing wound. The patient was given written information about Carboplatin and Gemcitabine, agrees to proceed with treatment, and denies any further questions at this time.    The patient is ready to learn, no apparent learning barriers were identified.  Diagnosis and treatment plans were explained to the patient. The patient expressed understanding of the content. The patient asked appropriate questions. The patient questions were answered to his satisfaction.  Time spent 45 minutes more than 50% of the time in counseling coordination of care including discussion of chemotherapeutic drugs potential benefits and side effects.  Management of squamous cell lung cancer and prognosis.  Chart documentation with Dragon Voice recognition Software. Although reviewed after completion, some words and grammatical errors may remain.    Again, thank you for allowing me to participate in the care of your patient.        Sincerely,        Joycelyn May MD

## 2017-12-27 NOTE — MR AVS SNAPSHOT
After Visit Summary   12/27/2017    Zechariah Ashby    MRN: [id number]           Patient Information     Date Of Birth          1960        Visit Information        Provider Department      12/27/2017 10:00 AM Tomas Crews MD [facility]        Today's Diagnoses     Malignant neoplasm of hilus of left lung (H)    -  1      Care Instructions    Per Physician's instructions            Follow-ups after your visit        Your next 10 appointments already scheduled     Dec 29, 2017 12:30 PM CST   (Arrive by 12:15 PM)   MR BRAIN W/O & W CONTRAST with [address] MRI ([facility])    [address] [facility] [address]   [phone]           Take your medicines as usual, unless your doctor tells you not to. Bring a list of your current medicines to your exam (including vitamins, minerals and over-the-counter drugs).  You will be given intravenous contrast for this exam. To prepare:   The day before your exam, drink extra fluids at least six 8-ounce glasses (unless your doctor tells you to restrict your fluids).   Have a blood test (creatinine test) within 30 days of your exam. Go to your clinic or Diagnostic Imaging Department for this test.  The MRI machine uses a strong magnet. Please wear clothes without metal (snaps, zippers). A sweatsuit works well, or we may give you a hospital gown.  Please remove any body piercings and hair extensions before you arrive. You will also remove watches, jewelry, hairpins, wallets, dentures, partial dental plates and hearing aids. You may wear contact lenses, and you may be able to wear your rings. We have a safe place to keep your personal items, but it is safer to leave them at home.   **IMPORTANT** THE INSTRUCTIONS BELOW ARE ONLY FOR THOSE PATIENTS WHO HAVE BEEN TOLD THEY WILL RECEIVE SEDATION OR GENERAL ANESTHESIA DURING THEIR MRI PROCEDURE:  IF YOU WILL RECEIVE SEDATION (take medicine to help you relax during  your exam):   You must get the medicine from your doctor before you arrive. Bring the medicine to the exam. Do not take it at home.   Arrive one hour early. Bring someone who can take you home after the test. Your medicine will make you sleepy. After the exam, you may not drive, take a bus or take a taxi by yourself.   No eating 8 hours before your exam. You may have clear liquids up until 4 hours before your exam. (Clear liquids include water, clear tea, black coffee and fruit juice without pulp.)  IF YOU WILL RECEIVE ANESTHESIA (be asleep for your exam):   Arrive 1 1/2 hours early. Bring someone who can take you home after the test. You may not drive, take a bus or take a taxi by yourself.   No eating 8 hours before your exam. You may have clear liquids up until 4 hours before your exam. (Clear liquids include water, clear tea, black coffee and fruit juice without pulp.)  Please call the Imaging Department at your exam site with any questions.            Jan 09, 2018  8:00 AM CST   (Arrive by 7:45 AM)   NEW LUNG NODULE with Osvaldo Ortiz MD   [facility] ([facility])    [address] [address] [phone]            Jan 12, 2018 10:45 AM CST   Ech Complete with [address] Echocardiography ([facility])    [address] [facility] [address] [phone]           1. Please bring or wear a comfortable two-piece outfit. 2. You may eat, drink and take your normal medicines. 3. For any questions that cannot be answered, please contact the ordering physician            Jan 16, 2018  9:45 AM CST   ecg with [facility] [facility]   [facility] ([facility])    [address] [facility] [address] [phone]            Jan 16, 2018 10:00 AM CST   Return Visit with Daksha Montemayor PA-C   [facility] ([facility]  "[facility])    [address]  [facility] [address]   [phone]              Future tests that were ordered for you today     Open Future Orders        Priority Expected Expires Ordered    US Biopsy Head Neck Thorax Soft Tissue Routine 2017    MR Brain w/o & w Contrast Routine  2018            Who to contact     If you have questions or need follow up information about today's clinic visit or your schedule please contact [facility] directly at [phone].  Normal or non-critical lab and imaging results will be communicated to you by [email or web address], letter or phone within 4 business days after the clinic has received the results. If you do not hear from us within 7 days, please contact the clinic through [email or web address] or phone. If you have a critical or abnormal lab result, we will notify you by phone as soon as possible.  Submit refill requests through [employer] or call your pharmacy and they will forward the refill request to us. Please allow 3 business days for your refill to be completed.          Additional Information About Your Visit        MyChart Information     [employer] lets you send messages to your doctor, view your test results, renew your prescriptions, schedule appointments and more. To sign up, go to [email or web address] . Click on \"Log in\" on the left side of the screen, which will take you to the Welcome page. Then click on \"Sign up Now\" on the right side of the page.     You will be asked to enter the access code listed below, as well as some personal information. Please follow the directions to create your username and password.     Your access code is: [email or web address]  Expires: 2018  4:44 PM     Your access code will  in 90 days. If you need help or a new code, please call your [facility] or [phone].        Care EveryWhere ID     This is your Care EveryWhere ID. This could be used by other organizations to access your " "[facility] medical records  [id number]        Your Vitals Were     Pulse Temperature Respirations Height BMI (Body Mass Index)       60 97.8  F (36.6  C) (Oral) 14 1.778 m (5' 10\") 21.38 kg/m2        Blood Pressure from Last 3 Encounters:   12/27/17 111/78   12/27/17 139/87   12/20/17 118/78    Weight from Last 3 Encounters:   12/27/17 67.6 kg (149 lb)   12/27/17 67.9 kg (149 lb 9.6 oz)   12/13/17 71.4 kg (157 lb 4.8 oz)              Today, you had the following     No orders found for display       Primary Care Provider Office Phone # Fax #    Kailash Mason [phone] [address] [place] [address]        Equal Access to Services     [facility]: Hadii arnol garibay Soasher, waaxda luqadaha, qaybta kaalmada judah, harley carrasquillo . So [facility] [phone].    ATENCIÓN: Si habla español, tiene a morocho disposición servicios gratuitos de asistencia lingüística. aLuriame al [phone].    We comply with applicable federal civil rights laws and [place] laws. We do not discriminate on the basis of race, color, national origin, age, disability, sex, sexual orientation, or gender identity.            Thank you!     Thank you for choosing [facility]  for your care. Our goal is always to provide you with excellent care. Hearing back from our patients is one way we can continue to improve our services. Please take a few minutes to complete the written survey that you may receive in the mail after your visit with us. Thank you!             Your Updated Medication List - Protect others around you: Learn how to safely use, store and throw away your medicines at www.disposemymeds.org.          This list is accurate as of: 12/27/17 10:33 AM.  Always use your most recent med list.                   Brand Name Dispense Instructions for use Diagnosis    ALIGN Chew      Take 1 chew tab by mouth 3 times daily as needed        apixaban ANTICOAGULANT 5 MG tablet "    ELIQUIS    60 tablet    Take 1 tablet (5 mg) by mouth 2 times daily    Typical atrial flutter (H)       ASPIRIN PO      Take 325 mg by mouth daily    Mediastinal lymphadenopathy, Non-small cell carcinoma of lung (H)       metoprolol 50 MG tablet    LOPRESSOR    60 tablet    Take 1.5 tablets (75 mg) by mouth 2 times daily    Irregular heart beat, Atrial flutter, unspecified type (H), Cardiomyopathy, unspecified type (H)       nicotine 21 MG/24HR 24 hr patch    NICODERM CQ    30 patch    Place 1 patch onto the skin daily    Encounter for tobacco use cessation counseling       omeprazole 40 MG capsule    priLOSEC    30 capsule    Take 1 capsule (40 mg) by mouth daily Take 30-60 minutes before a meal.    Gastroesophageal reflux disease without esophagitis

## 2017-12-27 NOTE — NURSING NOTE
"Chief Complaint   Patient presents with     Consult     port placement       Initial /78 (BP Location: Right arm, Patient Position: Chair, Cuff Size: Adult Regular)  Pulse 60  Temp 97.8  F (36.6  C) (Oral)  Resp 14  Ht 1.778 m (5' 10\")  Wt 67.6 kg (149 lb)  BMI 21.38 kg/m2 Estimated body mass index is 21.38 kg/(m^2) as calculated from the following:    Height as of this encounter: 1.778 m (5' 10\").    Weight as of this encounter: 67.6 kg (149 lb).  Medication Reconciliation: complete     Maddy Panchal CMA      "

## 2017-12-27 NOTE — LETTER
2017         RE: Zechariah Ashby  [address] [facility] [address]        Dear Colleague,    Thank you for referring your patient, Zechariah Ashby, to the [facility]. Please see a copy of my visit note below.    57-year-old male with diagnosis of left lung cancer here for Port-A-Cath placement. Patient has a large left mediastinal mass as well as a left pleural effusion causing partial collapse of his left lung. Right lung is intact. Patient has never had a central line.    Patient Active Problem List   Diagnosis     CARDIOVASCULAR SCREENING; LDL GOAL LESS THAN 130     Perforated ulcer (HCC)     Free intraperitoneal air     Atrial flutter (H)     Lung mass     Pleural effusion     Mediastinal lymphadenopathy       Past Medical History:   Diagnosis Date     Atrial flutter (H) 2017       Past Surgical History:   Procedure Laterality Date     APPENDECTOMY      age 30s     SURGICAL HISTORY OF -   2004    Gastroscopy with biopsy for gastric ulcer       Family History   Problem Relation Age of Onset     CANCER Mother      pancreatic cancer,  at 38 years     Alzheimer Disease Father       at 84 years.       Social History   Substance Use Topics     Smoking status: Current Some Day Smoker     Packs/day: 0.25     Years: 50.00     Smokeless tobacco: Never Used      Comment: down to 1-2 cigerattes a day.      Alcohol use No        History   Drug Use No       Current Outpatient Prescriptions   Medication Sig Dispense Refill     ASPIRIN PO Take 325 mg by mouth daily       apixaban ANTICOAGULANT (ELIQUIS) 5 MG tablet Take 1 tablet (5 mg) by mouth 2 times daily 60 tablet 0     nicotine (NICODERM CQ) 21 MG/24HR 24 hr patch Place 1 patch onto the skin daily 30 patch 0     metoprolol (LOPRESSOR) 50 MG tablet Take 1.5 tablets (75 mg) by mouth 2 times daily 60 tablet 1     Probiotic Product (ALIGN) CHEW Take 1 chew tab by mouth 3 times daily as needed       omeprazole (PRILOSEC)  "40 MG capsule Take 1 capsule (40 mg) by mouth daily Take 30-60 minutes before a meal. 30 capsule 1       No Known Allergies    CBC  Recent Labs   Lab Test  12/09/17   0635  12/08/17   1125   WBC   --   7.4   RBC   --   4.75   HGB  13.7  14.0   HCT  41.2  42.3   MCV   --   89   MCH   --   29.5   MCHC   --   33.1   RDW   --   13.5   PLT   --   215       BMP  Recent Labs   Lab Test  12/09/17   0635   NA  140   POTASSIUM  4.2   VIJAY  8.7   CHLORIDE  106   CO2  26   BUN  24   CR  0.95   GLC  96       LFTs  Recent Labs   Lab Test  12/08/17   1125  12/07/17   1445   PROTTOTAL  5.9*  6.6*   ALBUMIN   --   3.4   BILITOTAL   --   0.6   ALKPHOS   --   122   AST   --   22   ALT   --   30     ROS  Constitutional - Denies fevers, weight loss, malaise, lethargy  Neuro - Denies tremors or seizures  Pulmon - + SOB  CV - Denies CP, SOB, lower extremity edema, difficulty w/ stairs, has never used NTG  GI - Denies hematemesis, BRBPR, melena, chronic diarrhea or epigastric pain   - Denies hematuria, difficulty voiding, h/o STDs  Hematology - Denies blood clotting disorders, chronic anemias  Dermatology - No melanomas or skin cancers  Rheumatology - No h/o RA  Pysch - Denies depression, bipolar d/o or schizophrenia    Exam:  Patient Vitals for the past 24 hrs:   BP Temp Temp src Pulse Resp Height Weight   12/27/17 1009 111/78 97.8  F (36.6  C) Oral 60 14 1.778 m (5' 10\") 67.6 kg (149 lb)       General - Alert and Oriented X4, NAD, cachectic  HEENT - Normocephalic, atraumatic, PERRLA, Nose midline, Throat without lesions  Neck - supple, no LAD, Thyroid normal, Carotids without bruits  Lungs - Decreased BS on left  CV - Heart RRR, no lift's, thrills, murmurs, rubs, or gallops. Carotid, radial, and femoral pulses 2+ bilaterally  Abdomen - Soft, non-tender, +BS, no hepatosplenomegaly, no palpable masses  Neuro - Full ROM, Strength 5/5 and major muscle groups, sensation intact  Extremities - No cyanosis, clubbing or edema    Assessment and " plan: A 57-year-old male with lung cancer. Patient is a good candidate for Port-A-Cath placement. Risks benefits alternatives and complications were discussed with the patient including the possibility of infection and bleeding or pneumothorax. Patient understood and wished to proceed. PATIENT IS CLEARED FOR SURGERY.    Tomas Crews MD     Again, thank you for allowing me to participate in the care of your patient.        Sincerely,        Tomas Crews MD

## 2017-12-29 NOTE — PROGRESS NOTES
DATE OF VISIT: Dec 27, 2017    Zechariah Ashby is a 57 year old male is seen today for   Chief Complaint   Patient presents with     Oncology Clinic Visit     2 week follow up bronchial mass. Review biopsy results.    .       (C34.90) Non-small cell carcinoma of lung (H)  (primary encounter diagnosis)  I reviewed with the patient the results of the PET scan showing a large FDG avid mediastinal mass extending to the left hilum with obliteration of the left upper lobe bronchus and atelectasis of the upper lobe.  There are also multiple left cervical left cervical and mediastinal adenopathy.  There is increased left pleural effusion.  There is hypermetabolic left adrenal lesion.  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  I reviewed with the patient today management of stage IV squamous cell lung cancer.  We talked about staging, biology, management and prognosis.  We talked about palliative chemotherapy.  We talked about different options including immunotherapy.  I would recommend carboplatin with an AUC of 5 on day 1 with gemcitabine 1000 mg/m  to be given on day 1 and 8 every 21 day cycle.  The plan is to proceed with 6 cycles of treatment. We discussed the rationale behind using combination Carboplatin and Gemcitabine in detail as well as major side effects including, but not limited to immediate/short term side effects of acute infusion reaction/anaphylaxis, life-threatening infection, flu-like syndrome (headache, weakness, fever, shakes, aches, pains, and sweating), nausea/vomiting, diarrhea/constipation, mucositis, edema, rare alopecia, rash, anemia, neutropenia, thrombocytopenia and bleeding, and weakness and fatigue as well as risk of long-term side effect of rare duration and dose dependent peripheral neuropathy and pulmonary toxicity. Patient instructed to call with signs or symptoms of infection including, but not limited to  temperature greater than or equal to 100.5 degrees Fahrenheit, chills, severe sore throat, ear or sinus pain, mouth sores, cough, painful urination, and/or non-healing wound. The patient was given written information about Carboplatin and Gemcitabine, agrees to proceed with treatment, and denies any further questions at this time.    The patient is ready to learn, no apparent learning barriers were identified.  Diagnosis and treatment plans were explained to the patient. The patient expressed understanding of the content. The patient asked appropriate questions. The patient questions were answered to his satisfaction.  Time spent 45 minutes more than 50% of the time in counseling coordination of care including discussion of chemotherapeutic drugs potential benefits and side effects.  Management of squamous cell lung cancer and prognosis.  Chart documentation with Dragon Voice recognition Software. Although reviewed after completion, some words and grammatical errors may remain.

## 2017-12-29 NOTE — PROGRESS NOTES
Message left for patient with normal results.  Advised to call back with questions or concerns.  Direct line provided.

## 2017-12-29 NOTE — PROGRESS NOTES
"Chemotherapy Education    Patient is a 57 year old male here today for chemotherapy education, accompanied by ex-wife Estefani.  Pt has a cancer diagnosis of   Encounter Diagnosis   Name Primary?     Non-small cell carcinoma of lung (H) Yes    and their main concern is \"weight gain, tolerance\".  Their Oncologist is Dr. LASHON May, and PCP is Kailash Mason.  Reviewed the following with the patient and their support person:  Treatment goal: palliative  Treatment regimen & duration: Carboplatin day 1, Gemzar day 1 and 8 every 21 days; and rationale for strict adherence to this schedule, including specific medication names including pre-treatment medications and at home scheduled or as needed medications, delivery methods,.  Potential side effects: Side effects and management; including skin changes/hand-foot syndrome, anemia, neutropenia, thrombocytopenia, diarrhea/constipation, hair loss syndrome, memory changes/ \"chemobrain\", mouth sores, taste changes, neuropathy, fatigue, myelosuppression, sexuality/infertility, and risk of extravasation or infiltration.  Infection prevention, and monitoring of lab values, what lab tests and what changes of these values meant, along with the possibility of hydration or blood product transfusion, or the need to defer or hold treatment.    Chemotherapy information, including ways it is excreted from the body and cleaning and containment of vomitus or other bodily fluid, use of the bathroom, sexual health and intimacy, what to do if needing to miss a treatment, when to call a provider and the need for staff to wear protective equipment.  Importance of Central line care (port) or IV site care.  Proper use of the take home infusion pump, troubleshooting, and who to call if there is a malfunction.  Written information: Written information including the \"Getting Ready for Chemotherapy: What to Expect, Before, During, and After your Treatment\" booklet, specific drug information guides " "printed from [employer], [employer] booklets \"Eating Hints: Before, During, and After Cancer Treatment\" and \"Chemotherapy and You\".  Also, a folder with information on when to contact the provider, various programs offered at [facility], and our business card with contact information given; Oncology Clinic, RN Case Manager, and the after hours Nurse Advise Line.  General orientation to the Medical Oncology department, Infusion Services department, Huc/scheduling, bathrooms and usual flow of the treatment day provided as well as introduction to the Infusion nurses.  No barriers to learning identified. Patient and family verbalized understanding of all written and verbal information. All questions answered to patients satisfaction.   Other concerns: appetite is decreased.  \"Don't feel hungry\".  Would like to try an appetite stimulant.  Pt instructed to call with further questions or concerns.  Patient states understanding and is in agreement with this plan.  Copy of appointments, and after visit summary (AVS) given to patient. Patient discharged via wheelchair.    Face to Face time with patient: 50 min    Camille Lara RN, BSN, OCN  Oncology Hematology   Breast Cancer Navigator  [facility]  [email or web address]  Phone [phone]    "

## 2017-12-30 NOTE — ANESTHESIA PREPROCEDURE EVALUATION
Anesthesia Evaluation     .             ROS/MED HX    ENT/Pulmonary: Comment: Large left mediastinal mass and pleural effusion causing partial collapse of left lung  Left lung cancer  SOB    (+)tobacco use, Current use , . .    Neurologic:       Cardiovascular: Comment: Recent ablation for atrial flutter    (+) ----. : . . . :. dysrhythmias a-fib, . Previous cardiac testing Echodate:[id number]:Complete KIMBERLY Adult. KIMBERLY Probe #3 was used during the procedure.  _____________________________________________________________________________  __        Interpretation Summary     The left ventricle is normal in size.  The visual ejection fraction is estimated at 30-35%.  No regional wall motion abnormalities noted.  The right ventricle is normal size.  Mildly decreased right ventricular systolic function  No thrombus is detected in the left atrial appendage.  A contrast injection (Bubble Study) was performed that was essentially  negative for flow across the interatrial septum.  _____________________________________________________________________________  __        KIMBERLY  Versed (4mg) was given intravenously. Fentanyl (100mcg) was given  intravenously. Total sedation time: 18 minutes of continuous bedside 1:1  monitoring. Prior to the exam, the oral cavity was checked and no overcrowding  was noted. Consent to the procedure was obtained prior to sedation. The  transesophageal probe was passed without difficulty. There were no  complications associated with this procedure.     Left Ventricle  The left ventricle is normal in size. There is normal left ventricular wall  thickness. The visual ejection fraction is estimated at 30-35%. No regional  wall motion abnormalities noted.     Right Ventricle  The right ventricle is normal size. Mildly decreased right ventricular  systolic function.     Atria  No thrombus is detected in the left atrial appendage. The left atrium is  moderately dilated. The right atrium is moderately  dilated. A contrast  injection (Bubble Study) was performed that was negative for flow across the  interatrial septum. There is no color Doppler evidence of an atrial shunt.        Mitral Valve  The mitral valve is normal in structure and function. There is trace mitral  regurgitation.     Tricuspid Valve  The tricuspid valve is normal in structure and function. There is trace  tricuspid regurgitation.     Aortic Valve  Normal tricuspid aortic valve. No aortic regurgitation is present.     Pulmonic Valve  The pulmonic valve is normal in structure and function. There is trace  pulmonic valvular regurgitation.     Vessels  The aortic root is normal size. Normal size ascending aorta. Mild  atherosclerotic plaque(s) in the descending aorta.        Pericardial/Pleural  Small pericardial effusion. There are no echocardiographic indications of  cardiac tamponade.     Rhythm  The rhythm was atrial flutter.Stress Testdate:11-29-17 results:Indication/Clinical History: Shortness of breath and chest pain with  new atrial flutter     Impression  1. Myocardial perfusion imaging using single isotope technique  demonstrated small area of possible nontransmural infarction involving  the distal anterior wall and anteroapex with significant residual  ischemia within the remainder of the anterior wall.   2. Gated images demonstrated anteroseptal and apical hypokinesis with  normal left ventricular chamber size. The left ventricular systolic  function is mild to moderately reduced, with an ejection fraction  measured at 43%.  3. No prior studies available for comparison. The reduced ejection  fraction could be due to the presence of atrial flutter with somewhat  rapid heart rate at rest of 100 bpm and difficulty with gating.  However, the ventricle appears to dilate significantly on stress  images raising concern that there could be significant myocardial  ischemia present as suggested by the perfusion defects. Additional  evaluation  for significant coronary artery disease should be  considered before referral for ablation of atrial flutter.     Procedure  Pharmacologic stress testing was performed with Lexiscan at a rate of  0.08 mg/ml rapid bolus injection, for 15 seconds, 0.4 mg/5ml  intravenously. Low-level exercise was performed along with the  vasodilator infusion. The heart rate was 100 at baseline and martha to  112 beats per minute during the Lexiscan infusion. The rest blood  pressure was 128/94 mmHg and was 80/60 mm Hg during Lexiscan infusion.  The patient experienced dizziness and shortness of breath  during the  test.     Myocardial perfusion imaging was performed at rest, approximately 45  minutes after the injection intravenously of 10.9 mCi of Tc-99m  Myoview. At peak pharmacologic effect, 10-20 seconds after Lexiscan,   the patient was injected intravenously with 33.0 mCi of  Tc-99m  Myoview. The post-stress tomographic imaging was performed  approximately 60 minutes after stress.     EKG Findings  The resting EKG demonstrated atrial flutter with variable conduction.  ST and T-wave abnormalities are present due to atrial flutter. The  stress EKG demonstrated no obvious ischemic changes but the ECG is  essentially nondiagnostic due to the presence of flutter waves making  ST and T-wave portions unreadable.     Tomographic Findings  Overall, the study quality is good . On the stress images, there is  moderate count depression involving the mid and distal anteroseptal  wall and apex and mild count depression within the remainder of the  anterior wall. On the rest images, there is mild count depression  involving the distal anteroseptal wall . Gated images demonstrated mid  and distal anteroseptal hypokinesis. The left ventricular ejection  fraction was calculated to be 43%. TID was present.ECG reviewed date:12-9-17 results:SR with PACs; nonspecific T wave abnormality date: results:          METS/Exercise Tolerance:     Hematologic:          Musculoskeletal:         GI/Hepatic: Comment: ulcer        Renal/Genitourinary:         Endo:         Psychiatric:         Infectious Disease:         Malignancy:   (+) Malignancy History of Lung          Other:                     Physical Exam  Normal systems: cardiovascular, pulmonary and dental    Airway   Mallampati: II  TM distance: >3 FB  Neck ROM: full    Dental     Cardiovascular       Pulmonary                     Anesthesia Plan      History & Physical Review  History and physical reviewed and following examination; no interval change.    ASA Status:  3 .    NPO Status:  > 6 hours    Plan for MAC Maintenance will be Balanced.  Reason for MAC:  Deep or markedly invasive procedure (G8)  PONV prophylaxis:  Ondansetron (or other 5HT-3) and Dexamethasone or Solumedrol       Postoperative Care  Postoperative pain management:  IV analgesics and Oral pain medications.      Consents  Anesthetic plan, risks, benefits and alternatives discussed with:  Patient..                          .

## 2018-01-01 ENCOUNTER — ANTICOAGULATION THERAPY VISIT (OUTPATIENT)
Dept: ANTICOAGULATION | Facility: CLINIC | Age: 58
End: 2018-01-01
Payer: COMMERCIAL

## 2018-01-01 ENCOUNTER — TELEPHONE (OUTPATIENT)
Dept: ANTICOAGULATION | Facility: CLINIC | Age: 58
End: 2018-01-01

## 2018-01-01 ENCOUNTER — INFUSION THERAPY VISIT (OUTPATIENT)
Dept: INFUSION THERAPY | Facility: CLINIC | Age: 58
End: 2018-01-01
Attending: INTERNAL MEDICINE
Payer: COMMERCIAL

## 2018-01-01 ENCOUNTER — APPOINTMENT (OUTPATIENT)
Dept: CT IMAGING | Facility: CLINIC | Age: 58
End: 2018-01-01
Attending: EMERGENCY MEDICINE
Payer: COMMERCIAL

## 2018-01-01 ENCOUNTER — HOSPITAL ENCOUNTER (EMERGENCY)
Facility: CLINIC | Age: 58
Discharge: HOME | End: 2018-07-17
Attending: EMERGENCY MEDICINE | Admitting: EMERGENCY MEDICINE
Payer: COMMERCIAL

## 2018-01-01 ENCOUNTER — TELEPHONE (OUTPATIENT)
Dept: FAMILY MEDICINE | Facility: CLINIC | Age: 58
End: 2018-01-01

## 2018-01-01 ENCOUNTER — OFFICE VISIT (OUTPATIENT)
Dept: NEUROSURGERY | Facility: CLINIC | Age: 58
End: 2018-01-01
Attending: NEUROLOGICAL SURGERY
Payer: COMMERCIAL

## 2018-01-01 ENCOUNTER — TELEPHONE (OUTPATIENT)
Dept: ONCOLOGY | Facility: CLINIC | Age: 58
End: 2018-01-01

## 2018-01-01 ENCOUNTER — TELEPHONE (OUTPATIENT)
Dept: NUTRITION | Facility: CLINIC | Age: 58
End: 2018-01-01

## 2018-01-01 ENCOUNTER — OFFICE VISIT (OUTPATIENT)
Dept: CARDIOLOGY | Facility: CLINIC | Age: 58
End: 2018-01-01
Payer: COMMERCIAL

## 2018-01-01 ENCOUNTER — OFFICE VISIT (OUTPATIENT)
Dept: FAMILY MEDICINE | Facility: CLINIC | Age: 58
End: 2018-01-01
Payer: MEDICAID

## 2018-01-01 ENCOUNTER — OFFICE VISIT (OUTPATIENT)
Dept: CARDIOLOGY | Facility: CLINIC | Age: 58
End: 2018-01-01
Attending: PHYSICIAN ASSISTANT
Payer: COMMERCIAL

## 2018-01-01 ENCOUNTER — HOSPITAL ENCOUNTER (OUTPATIENT)
Facility: CLINIC | Age: 58
Discharge: HOME | End: 2018-02-15
Attending: INTERNAL MEDICINE | Admitting: INTERNAL MEDICINE
Payer: MEDICAID

## 2018-01-01 ENCOUNTER — HOSPITAL ENCOUNTER (OUTPATIENT)
Facility: CLINIC | Age: 58
Discharge: HOME | End: 2018-07-27
Attending: INTERNAL MEDICINE | Admitting: INTERNAL MEDICINE
Payer: COMMERCIAL

## 2018-01-01 ENCOUNTER — HOSPITAL ENCOUNTER (OUTPATIENT)
Dept: CARDIOLOGY | Facility: CLINIC | Age: 58
Discharge: HOME | End: 2018-06-27
Attending: PHYSICIAN ASSISTANT | Admitting: PHYSICIAN ASSISTANT
Payer: COMMERCIAL

## 2018-01-01 ENCOUNTER — HOSPITAL ENCOUNTER (OUTPATIENT)
Facility: CLINIC | Age: 58
Discharge: HOME | End: 2018-03-15
Attending: INTERNAL MEDICINE | Admitting: INTERNAL MEDICINE
Payer: MEDICAID

## 2018-01-01 ENCOUNTER — HOSPITAL ENCOUNTER (OUTPATIENT)
Facility: CLINIC | Age: 58
Discharge: HOME | End: 2018-02-22
Attending: FAMILY MEDICINE | Admitting: INTERNAL MEDICINE
Payer: MEDICAID

## 2018-01-01 ENCOUNTER — ANTICOAGULATION THERAPY VISIT (OUTPATIENT)
Dept: ANTICOAGULATION | Facility: CLINIC | Age: 58
End: 2018-01-01
Payer: MEDICAID

## 2018-01-01 ENCOUNTER — HOSPITAL ENCOUNTER (EMERGENCY)
Facility: CLINIC | Age: 58
Discharge: HOME | End: 2018-07-22
Attending: EMERGENCY MEDICINE | Admitting: EMERGENCY MEDICINE
Payer: COMMERCIAL

## 2018-01-01 ENCOUNTER — INFUSION THERAPY VISIT (OUTPATIENT)
Dept: SPIRITUAL SERVICES | Facility: CLINIC | Age: 58
End: 2018-01-01

## 2018-01-01 ENCOUNTER — DOCUMENTATION ONLY (OUTPATIENT)
Dept: ONCOLOGY | Facility: CLINIC | Age: 58
End: 2018-01-01

## 2018-01-01 ENCOUNTER — HOSPITAL ENCOUNTER (OUTPATIENT)
Dept: MRI IMAGING | Facility: CLINIC | Age: 58
Discharge: HOME | End: 2018-05-17
Attending: RADIOLOGY | Admitting: RADIOLOGY
Payer: COMMERCIAL

## 2018-01-01 ENCOUNTER — INFUSION THERAPY VISIT (OUTPATIENT)
Dept: INFUSION THERAPY | Facility: CLINIC | Age: 58
End: 2018-01-01
Attending: INTERNAL MEDICINE
Payer: MEDICAID

## 2018-01-01 ENCOUNTER — ONCOLOGY VISIT (OUTPATIENT)
Dept: ONCOLOGY | Facility: CLINIC | Age: 58
End: 2018-01-01
Attending: INTERNAL MEDICINE
Payer: COMMERCIAL

## 2018-01-01 ENCOUNTER — MEDICAL CORRESPONDENCE (OUTPATIENT)
Dept: HEALTH INFORMATION MANAGEMENT | Facility: CLINIC | Age: 58
End: 2018-01-01

## 2018-01-01 ENCOUNTER — APPOINTMENT (OUTPATIENT)
Dept: CT IMAGING | Facility: CLINIC | Age: 58
End: 2018-01-01
Attending: FAMILY MEDICINE
Payer: COMMERCIAL

## 2018-01-01 ENCOUNTER — HOSPITAL ENCOUNTER (OUTPATIENT)
Dept: CARDIOLOGY | Facility: CLINIC | Age: 58
Discharge: HOME | End: 2018-05-11
Attending: INTERNAL MEDICINE | Admitting: INTERNAL MEDICINE
Payer: COMMERCIAL

## 2018-01-01 ENCOUNTER — TELEPHONE (OUTPATIENT)
Dept: INTERVENTIONAL RADIOLOGY/VASCULAR | Facility: CLINIC | Age: 58
End: 2018-01-01

## 2018-01-01 ENCOUNTER — ANTICOAGULATION THERAPY VISIT (OUTPATIENT)
Dept: ANTICOAGULATION | Facility: CLINIC | Age: 58
End: 2018-01-01

## 2018-01-01 ENCOUNTER — HOSPITAL ENCOUNTER (OUTPATIENT)
Dept: CARDIOLOGY | Facility: CLINIC | Age: 58
End: 2018-05-11
Attending: INTERNAL MEDICINE
Payer: COMMERCIAL

## 2018-01-01 ENCOUNTER — HOSPITAL ENCOUNTER (OUTPATIENT)
Dept: CARDIOLOGY | Facility: CLINIC | Age: 58
Discharge: HOME | End: 2018-04-10
Attending: PHYSICIAN ASSISTANT | Admitting: PHYSICIAN ASSISTANT
Payer: COMMERCIAL

## 2018-01-01 ENCOUNTER — HOSPITAL ENCOUNTER (OUTPATIENT)
Dept: CARDIOLOGY | Facility: CLINIC | Age: 58
End: 2018-02-13
Attending: PHYSICIAN ASSISTANT
Payer: MEDICAID

## 2018-01-01 ENCOUNTER — OFFICE VISIT (OUTPATIENT)
Dept: RADIATION THERAPY | Facility: OUTPATIENT CENTER | Age: 58
End: 2018-01-01
Payer: COMMERCIAL

## 2018-01-01 ENCOUNTER — HOSPITAL ENCOUNTER (OUTPATIENT)
Facility: CLINIC | Age: 58
Discharge: HOME | End: 2018-04-26
Attending: FAMILY MEDICINE | Admitting: INTERNAL MEDICINE
Payer: COMMERCIAL

## 2018-01-01 ENCOUNTER — DOCUMENTATION ONLY (OUTPATIENT)
Dept: CARDIOLOGY | Facility: CLINIC | Age: 58
End: 2018-01-01

## 2018-01-01 ENCOUNTER — APPOINTMENT (OUTPATIENT)
Dept: CARDIOLOGY | Facility: CLINIC | Age: 58
End: 2018-01-01
Attending: FAMILY MEDICINE
Payer: COMMERCIAL

## 2018-01-01 ENCOUNTER — HOSPITAL ENCOUNTER (OUTPATIENT)
Dept: SURGERY | Facility: CLINIC | Age: 58
End: 2018-01-12
Attending: PHYSICIAN ASSISTANT | Admitting: COLON & RECTAL SURGERY
Payer: COMMERCIAL

## 2018-01-01 ENCOUNTER — HOSPITAL ENCOUNTER (OUTPATIENT)
Facility: CLINIC | Age: 58
Setting detail: OBSERVATION
Discharge: HOME HEALTH | End: 2018-01-09
Attending: FAMILY MEDICINE | Admitting: INTERNAL MEDICINE
Payer: COMMERCIAL

## 2018-01-01 ENCOUNTER — HOSPITAL ENCOUNTER (OUTPATIENT)
Facility: CLINIC | Age: 58
Discharge: HOME | End: 2018-02-01
Attending: INTERNAL MEDICINE | Admitting: INTERNAL MEDICINE
Payer: MEDICAID

## 2018-01-01 ENCOUNTER — DOCUMENTATION ONLY (OUTPATIENT)
Dept: CARE COORDINATION | Facility: CLINIC | Age: 58
End: 2018-01-01

## 2018-01-01 ENCOUNTER — APPOINTMENT (OUTPATIENT)
Dept: MEDSURG UNIT | Facility: CLINIC | Age: 58
End: 2018-01-01
Attending: INTERNAL MEDICINE
Payer: COMMERCIAL

## 2018-01-01 ENCOUNTER — NURSE TRIAGE (OUTPATIENT)
Dept: NURSING | Facility: CLINIC | Age: 58
End: 2018-01-01

## 2018-01-01 ENCOUNTER — OFFICE VISIT (OUTPATIENT)
Dept: FAMILY MEDICINE | Facility: CLINIC | Age: 58
End: 2018-01-01
Payer: COMMERCIAL

## 2018-01-01 ENCOUNTER — OFFICE VISIT (OUTPATIENT)
Dept: RADIATION ONCOLOGY | Facility: CLINIC | Age: 58
End: 2018-01-01
Attending: RADIOLOGY
Payer: COMMERCIAL

## 2018-01-01 ENCOUNTER — HOSPITAL ENCOUNTER (OUTPATIENT)
Facility: CLINIC | Age: 58
Discharge: HOME | End: 2018-03-29
Attending: FAMILY MEDICINE | Admitting: INTERNAL MEDICINE
Payer: MEDICAID

## 2018-01-01 ENCOUNTER — HOSPITAL ENCOUNTER (OUTPATIENT)
Facility: CLINIC | Age: 58
Setting detail: OBSERVATION
Discharge: HOME | End: 2018-01-20
Attending: FAMILY MEDICINE | Admitting: FAMILY MEDICINE
Payer: COMMERCIAL

## 2018-01-01 ENCOUNTER — TELEPHONE (OUTPATIENT)
Dept: NURSING | Facility: CLINIC | Age: 58
End: 2018-01-01

## 2018-01-01 ENCOUNTER — HOSPITAL ENCOUNTER (OUTPATIENT)
Facility: CLINIC | Age: 58
Discharge: HOME | End: 2018-01-03
Attending: SURGERY | Admitting: SURGERY
Payer: COMMERCIAL

## 2018-01-01 ENCOUNTER — HOSPITAL ENCOUNTER (OUTPATIENT)
Dept: CARDIOLOGY | Facility: CLINIC | Age: 58
End: 2018-01-12
Attending: PHYSICIAN ASSISTANT | Admitting: COLON & RECTAL SURGERY
Payer: COMMERCIAL

## 2018-01-01 ENCOUNTER — HOSPITAL ENCOUNTER (OUTPATIENT)
Facility: CLINIC | Age: 58
Discharge: HOME | End: 2018-05-10
Attending: RADIOLOGY | Admitting: RADIOLOGY
Payer: COMMERCIAL

## 2018-01-01 ENCOUNTER — HOSPITAL ENCOUNTER (OUTPATIENT)
Facility: CLINIC | Age: 58
Discharge: HOME | End: 2018-04-19
Attending: FAMILY MEDICINE | Admitting: INTERNAL MEDICINE
Payer: COMMERCIAL

## 2018-01-01 ENCOUNTER — HOSPITAL ENCOUNTER (OUTPATIENT)
Dept: PET IMAGING | Facility: CLINIC | Age: 58
Discharge: HOME | End: 2018-05-16
Attending: INTERNAL MEDICINE | Admitting: INTERNAL MEDICINE
Payer: COMMERCIAL

## 2018-01-01 ENCOUNTER — HOSPITAL ENCOUNTER (OUTPATIENT)
Dept: ULTRASOUND IMAGING | Facility: CLINIC | Age: 58
Discharge: HOME | End: 2018-01-04
Attending: INTERNAL MEDICINE | Admitting: INTERNAL MEDICINE
Payer: COMMERCIAL

## 2018-01-01 ENCOUNTER — INFUSION THERAPY VISIT (OUTPATIENT)
Dept: INFUSION THERAPY | Facility: CLINIC | Age: 58
End: 2018-01-01
Attending: PHYSICIAN ASSISTANT
Payer: COMMERCIAL

## 2018-01-01 ENCOUNTER — APPOINTMENT (OUTPATIENT)
Dept: GENERAL RADIOLOGY | Facility: CLINIC | Age: 58
End: 2018-01-01
Attending: SURGERY
Payer: COMMERCIAL

## 2018-01-01 ENCOUNTER — HOSPITAL ENCOUNTER (OUTPATIENT)
Facility: CLINIC | Age: 58
Discharge: HOME | End: 2018-01-25
Attending: INTERNAL MEDICINE | Admitting: INTERNAL MEDICINE
Payer: COMMERCIAL

## 2018-01-01 ENCOUNTER — APPOINTMENT (OUTPATIENT)
Dept: PHYSICAL THERAPY | Facility: CLINIC | Age: 58
End: 2018-01-01
Payer: COMMERCIAL

## 2018-01-01 ENCOUNTER — APPOINTMENT (OUTPATIENT)
Dept: INTERVENTIONAL RADIOLOGY/VASCULAR | Facility: CLINIC | Age: 58
End: 2018-01-01
Attending: INTERNAL MEDICINE
Payer: COMMERCIAL

## 2018-01-01 ENCOUNTER — APPOINTMENT (OUTPATIENT)
Dept: GENERAL RADIOLOGY | Facility: CLINIC | Age: 58
End: 2018-01-01
Attending: EMERGENCY MEDICINE
Payer: COMMERCIAL

## 2018-01-01 ENCOUNTER — OFFICE VISIT (OUTPATIENT)
Dept: CARDIOLOGY | Facility: CLINIC | Age: 58
End: 2018-01-01
Payer: MEDICAID

## 2018-01-01 ENCOUNTER — APPOINTMENT (OUTPATIENT)
Dept: ULTRASOUND IMAGING | Facility: CLINIC | Age: 58
End: 2018-01-01
Attending: EMERGENCY MEDICINE
Payer: COMMERCIAL

## 2018-01-01 ENCOUNTER — HOSPITAL ENCOUNTER (OUTPATIENT)
Facility: CLINIC | Age: 58
Discharge: HOME | End: 2018-01-12
Admitting: PHYSICIAN ASSISTANT
Payer: COMMERCIAL

## 2018-01-01 ENCOUNTER — ONCOLOGY VISIT (OUTPATIENT)
Dept: ONCOLOGY | Facility: CLINIC | Age: 58
End: 2018-01-01
Attending: INTERNAL MEDICINE
Payer: MEDICAID

## 2018-01-01 ENCOUNTER — CARE COORDINATION (OUTPATIENT)
Dept: CARE COORDINATION | Facility: CLINIC | Age: 58
End: 2018-01-01

## 2018-01-01 ENCOUNTER — MYC MEDICAL ADVICE (OUTPATIENT)
Dept: FAMILY MEDICINE | Facility: CLINIC | Age: 58
End: 2018-01-01

## 2018-01-01 ENCOUNTER — HOSPITAL ENCOUNTER (OUTPATIENT)
Dept: MEDSURG UNIT | Facility: CLINIC | Age: 58
End: 2018-05-17
Attending: RADIOLOGY
Payer: COMMERCIAL

## 2018-01-01 ENCOUNTER — HOSPITAL ENCOUNTER (OUTPATIENT)
Facility: CLINIC | Age: 58
Discharge: HOME | End: 2018-04-05
Attending: FAMILY MEDICINE | Admitting: INTERNAL MEDICINE
Payer: COMMERCIAL

## 2018-01-01 ENCOUNTER — TELEPHONE (OUTPATIENT)
Dept: RADIATION ONCOLOGY | Facility: CLINIC | Age: 58
End: 2018-01-01

## 2018-01-01 ENCOUNTER — HOSPITAL ENCOUNTER (OUTPATIENT)
Dept: MRI IMAGING | Facility: CLINIC | Age: 58
Discharge: HOME | End: 2018-04-27
Attending: INTERNAL MEDICINE | Admitting: INTERNAL MEDICINE
Payer: COMMERCIAL

## 2018-01-01 ENCOUNTER — ONCOLOGY VISIT (OUTPATIENT)
Dept: ONCOLOGY | Facility: CLINIC | Age: 58
End: 2018-01-01
Attending: FAMILY MEDICINE
Payer: COMMERCIAL

## 2018-01-01 ENCOUNTER — MYC MEDICAL ADVICE (OUTPATIENT)
Dept: ONCOLOGY | Facility: CLINIC | Age: 58
End: 2018-01-01

## 2018-01-01 ENCOUNTER — APPOINTMENT (OUTPATIENT)
Dept: GENERAL RADIOLOGY | Facility: CLINIC | Age: 58
End: 2018-01-01
Attending: RADIOLOGY
Payer: COMMERCIAL

## 2018-01-01 ENCOUNTER — HOSPITAL ENCOUNTER (OUTPATIENT)
Facility: CLINIC | Age: 58
Discharge: HOME | End: 2018-05-23
Attending: INTERNAL MEDICINE | Admitting: INTERNAL MEDICINE
Payer: COMMERCIAL

## 2018-01-01 ENCOUNTER — HOSPITAL ENCOUNTER (OUTPATIENT)
Dept: GENERAL RADIOLOGY | Facility: CLINIC | Age: 58
End: 2018-06-27
Attending: PHYSICIAN ASSISTANT
Payer: COMMERCIAL

## 2018-01-01 ENCOUNTER — HOSPITAL ENCOUNTER (OUTPATIENT)
Facility: CLINIC | Age: 58
Discharge: HOME | End: 2018-01-12
Attending: INTERNAL MEDICINE | Admitting: INTERNAL MEDICINE
Payer: COMMERCIAL

## 2018-01-01 ENCOUNTER — ANESTHESIA (OUTPATIENT)
Dept: SURGERY | Facility: CLINIC | Age: 58
End: 2018-01-01
Payer: COMMERCIAL

## 2018-01-01 ENCOUNTER — HOSPITAL ENCOUNTER (EMERGENCY)
Facility: CLINIC | Age: 58
Discharge: HOME | End: 2018-05-07
Attending: EMERGENCY MEDICINE | Admitting: EMERGENCY MEDICINE
Payer: COMMERCIAL

## 2018-01-01 ENCOUNTER — APPOINTMENT (OUTPATIENT)
Dept: GENERAL RADIOLOGY | Facility: CLINIC | Age: 58
End: 2018-01-01
Attending: FAMILY MEDICINE
Payer: COMMERCIAL

## 2018-01-01 ENCOUNTER — HOSPITAL ENCOUNTER (OUTPATIENT)
Dept: CT IMAGING | Facility: CLINIC | Age: 58
Discharge: HOME | End: 2018-02-13
Attending: INTERNAL MEDICINE | Admitting: INTERNAL MEDICINE
Payer: MEDICAID

## 2018-01-01 VITALS
HEART RATE: 83 BPM | OXYGEN SATURATION: 100 % | SYSTOLIC BLOOD PRESSURE: 110 MMHG | BODY MASS INDEX: 20.79 KG/M2 | DIASTOLIC BLOOD PRESSURE: 74 MMHG | WEIGHT: 149 LBS

## 2018-01-01 VITALS
TEMPERATURE: 97.5 F | HEART RATE: 87 BPM | HEIGHT: 71 IN | BODY MASS INDEX: 20.06 KG/M2 | DIASTOLIC BLOOD PRESSURE: 96 MMHG | WEIGHT: 143.3 LBS | RESPIRATION RATE: 20 BRPM | SYSTOLIC BLOOD PRESSURE: 130 MMHG | OXYGEN SATURATION: 99 %

## 2018-01-01 VITALS
BODY MASS INDEX: 21.33 KG/M2 | TEMPERATURE: 97.8 F | OXYGEN SATURATION: 98 % | SYSTOLIC BLOOD PRESSURE: 110 MMHG | DIASTOLIC BLOOD PRESSURE: 66 MMHG | RESPIRATION RATE: 16 BRPM | WEIGHT: 149 LBS | HEART RATE: 68 BPM | HEIGHT: 70 IN

## 2018-01-01 VITALS
RESPIRATION RATE: 16 BRPM | OXYGEN SATURATION: 98 % | HEART RATE: 80 BPM | DIASTOLIC BLOOD PRESSURE: 78 MMHG | TEMPERATURE: 98.7 F | SYSTOLIC BLOOD PRESSURE: 128 MMHG

## 2018-01-01 VITALS
HEART RATE: 69 BPM | OXYGEN SATURATION: 100 % | BODY MASS INDEX: 20.98 KG/M2 | SYSTOLIC BLOOD PRESSURE: 117 MMHG | TEMPERATURE: 97.8 F | WEIGHT: 149.9 LBS | DIASTOLIC BLOOD PRESSURE: 75 MMHG | HEIGHT: 71 IN | RESPIRATION RATE: 16 BRPM

## 2018-01-01 VITALS
HEIGHT: 71 IN | HEART RATE: 94 BPM | RESPIRATION RATE: 20 BRPM | DIASTOLIC BLOOD PRESSURE: 88 MMHG | TEMPERATURE: 97.5 F | BODY MASS INDEX: 20.94 KG/M2 | WEIGHT: 149.6 LBS | SYSTOLIC BLOOD PRESSURE: 113 MMHG | OXYGEN SATURATION: 98 %

## 2018-01-01 VITALS
RESPIRATION RATE: 18 BRPM | DIASTOLIC BLOOD PRESSURE: 69 MMHG | HEART RATE: 75 BPM | OXYGEN SATURATION: 99 % | WEIGHT: 151.6 LBS | SYSTOLIC BLOOD PRESSURE: 112 MMHG | BODY MASS INDEX: 21.15 KG/M2

## 2018-01-01 VITALS — HEART RATE: 66 BPM | DIASTOLIC BLOOD PRESSURE: 78 MMHG | SYSTOLIC BLOOD PRESSURE: 111 MMHG

## 2018-01-01 VITALS
BODY MASS INDEX: 21.01 KG/M2 | OXYGEN SATURATION: 100 % | HEART RATE: 60 BPM | HEIGHT: 71 IN | RESPIRATION RATE: 18 BRPM | DIASTOLIC BLOOD PRESSURE: 94 MMHG | WEIGHT: 150.1 LBS | SYSTOLIC BLOOD PRESSURE: 153 MMHG | TEMPERATURE: 97 F

## 2018-01-01 VITALS
HEART RATE: 67 BPM | TEMPERATURE: 98.6 F | DIASTOLIC BLOOD PRESSURE: 56 MMHG | SYSTOLIC BLOOD PRESSURE: 92 MMHG | OXYGEN SATURATION: 99 % | RESPIRATION RATE: 16 BRPM

## 2018-01-01 VITALS
SYSTOLIC BLOOD PRESSURE: 100 MMHG | HEART RATE: 78 BPM | RESPIRATION RATE: 18 BRPM | BODY MASS INDEX: 20.3 KG/M2 | WEIGHT: 145 LBS | OXYGEN SATURATION: 99 % | TEMPERATURE: 98.7 F | HEIGHT: 71 IN | DIASTOLIC BLOOD PRESSURE: 60 MMHG

## 2018-01-01 VITALS
HEIGHT: 71 IN | TEMPERATURE: 96.4 F | DIASTOLIC BLOOD PRESSURE: 82 MMHG | BODY MASS INDEX: 20.94 KG/M2 | SYSTOLIC BLOOD PRESSURE: 134 MMHG | WEIGHT: 149.6 LBS | RESPIRATION RATE: 16 BRPM | OXYGEN SATURATION: 100 % | HEART RATE: 61 BPM

## 2018-01-01 VITALS
HEART RATE: 78 BPM | RESPIRATION RATE: 21 BRPM | TEMPERATURE: 97.9 F | OXYGEN SATURATION: 97 % | DIASTOLIC BLOOD PRESSURE: 60 MMHG | WEIGHT: 152 LBS | BODY MASS INDEX: 21.21 KG/M2 | SYSTOLIC BLOOD PRESSURE: 110 MMHG

## 2018-01-01 VITALS
WEIGHT: 146.4 LBS | BODY MASS INDEX: 20.43 KG/M2 | TEMPERATURE: 99.1 F | OXYGEN SATURATION: 97 % | DIASTOLIC BLOOD PRESSURE: 54 MMHG | SYSTOLIC BLOOD PRESSURE: 90 MMHG | RESPIRATION RATE: 24 BRPM | HEART RATE: 83 BPM

## 2018-01-01 VITALS
RESPIRATION RATE: 20 BRPM | WEIGHT: 148.8 LBS | OXYGEN SATURATION: 98 % | BODY MASS INDEX: 20.83 KG/M2 | TEMPERATURE: 98.4 F | DIASTOLIC BLOOD PRESSURE: 87 MMHG | HEART RATE: 74 BPM | HEIGHT: 71 IN | SYSTOLIC BLOOD PRESSURE: 125 MMHG

## 2018-01-01 VITALS
TEMPERATURE: 98.1 F | BODY MASS INDEX: 20.89 KG/M2 | HEIGHT: 71 IN | DIASTOLIC BLOOD PRESSURE: 71 MMHG | RESPIRATION RATE: 24 BRPM | HEART RATE: 79 BPM | OXYGEN SATURATION: 98 % | SYSTOLIC BLOOD PRESSURE: 105 MMHG | WEIGHT: 149.2 LBS

## 2018-01-01 VITALS
HEIGHT: 71 IN | DIASTOLIC BLOOD PRESSURE: 73 MMHG | OXYGEN SATURATION: 94 % | RESPIRATION RATE: 20 BRPM | SYSTOLIC BLOOD PRESSURE: 115 MMHG | HEART RATE: 83 BPM | WEIGHT: 147 LBS | BODY MASS INDEX: 20.58 KG/M2 | TEMPERATURE: 98.6 F

## 2018-01-01 VITALS
HEART RATE: 69 BPM | BODY MASS INDEX: 22.11 KG/M2 | HEIGHT: 69 IN | DIASTOLIC BLOOD PRESSURE: 62 MMHG | RESPIRATION RATE: 24 BRPM | WEIGHT: 149.3 LBS | SYSTOLIC BLOOD PRESSURE: 99 MMHG | OXYGEN SATURATION: 98 % | TEMPERATURE: 97.4 F

## 2018-01-01 VITALS
DIASTOLIC BLOOD PRESSURE: 96 MMHG | OXYGEN SATURATION: 96 % | HEIGHT: 71 IN | WEIGHT: 150 LBS | TEMPERATURE: 97.8 F | BODY MASS INDEX: 21 KG/M2 | SYSTOLIC BLOOD PRESSURE: 134 MMHG | RESPIRATION RATE: 16 BRPM

## 2018-01-01 VITALS — DIASTOLIC BLOOD PRESSURE: 65 MMHG | SYSTOLIC BLOOD PRESSURE: 97 MMHG | OXYGEN SATURATION: 97 % | HEART RATE: 83 BPM

## 2018-01-01 VITALS
DIASTOLIC BLOOD PRESSURE: 72 MMHG | OXYGEN SATURATION: 99 % | HEART RATE: 86 BPM | TEMPERATURE: 96.7 F | RESPIRATION RATE: 20 BRPM | SYSTOLIC BLOOD PRESSURE: 116 MMHG

## 2018-01-01 VITALS
SYSTOLIC BLOOD PRESSURE: 79 MMHG | DIASTOLIC BLOOD PRESSURE: 60 MMHG | BODY MASS INDEX: 21.2 KG/M2 | HEART RATE: 47 BPM | WEIGHT: 148.1 LBS | HEIGHT: 70 IN

## 2018-01-01 VITALS
TEMPERATURE: 98.1 F | HEIGHT: 71 IN | OXYGEN SATURATION: 96 % | BODY MASS INDEX: 21.98 KG/M2 | HEART RATE: 80 BPM | RESPIRATION RATE: 20 BRPM | DIASTOLIC BLOOD PRESSURE: 74 MMHG | WEIGHT: 157 LBS | SYSTOLIC BLOOD PRESSURE: 105 MMHG

## 2018-01-01 VITALS
RESPIRATION RATE: 20 BRPM | WEIGHT: 148 LBS | DIASTOLIC BLOOD PRESSURE: 89 MMHG | OXYGEN SATURATION: 100 % | TEMPERATURE: 98.4 F | SYSTOLIC BLOOD PRESSURE: 145 MMHG | BODY MASS INDEX: 20.65 KG/M2 | HEART RATE: 73 BPM

## 2018-01-01 VITALS
WEIGHT: 148.1 LBS | HEIGHT: 69 IN | OXYGEN SATURATION: 97 % | SYSTOLIC BLOOD PRESSURE: 100 MMHG | HEART RATE: 70 BPM | DIASTOLIC BLOOD PRESSURE: 71 MMHG | BODY MASS INDEX: 21.94 KG/M2 | TEMPERATURE: 97 F | RESPIRATION RATE: 18 BRPM

## 2018-01-01 VITALS
OXYGEN SATURATION: 99 % | WEIGHT: 148 LBS | BODY MASS INDEX: 20.65 KG/M2 | SYSTOLIC BLOOD PRESSURE: 136 MMHG | DIASTOLIC BLOOD PRESSURE: 94 MMHG | HEART RATE: 70 BPM

## 2018-01-01 VITALS
BODY MASS INDEX: 20.02 KG/M2 | DIASTOLIC BLOOD PRESSURE: 90 MMHG | TEMPERATURE: 96.4 F | WEIGHT: 143 LBS | SYSTOLIC BLOOD PRESSURE: 130 MMHG | HEIGHT: 71 IN | OXYGEN SATURATION: 100 % | RESPIRATION RATE: 18 BRPM | HEART RATE: 73 BPM

## 2018-01-01 VITALS
DIASTOLIC BLOOD PRESSURE: 85 MMHG | TEMPERATURE: 97 F | HEART RATE: 77 BPM | SYSTOLIC BLOOD PRESSURE: 146 MMHG | WEIGHT: 147 LBS | BODY MASS INDEX: 20.51 KG/M2

## 2018-01-01 VITALS — DIASTOLIC BLOOD PRESSURE: 60 MMHG | SYSTOLIC BLOOD PRESSURE: 106 MMHG | HEART RATE: 71 BPM

## 2018-01-01 VITALS
DIASTOLIC BLOOD PRESSURE: 109 MMHG | OXYGEN SATURATION: 95 % | RESPIRATION RATE: 21 BRPM | SYSTOLIC BLOOD PRESSURE: 147 MMHG | TEMPERATURE: 97.6 F

## 2018-01-01 VITALS
DIASTOLIC BLOOD PRESSURE: 55 MMHG | RESPIRATION RATE: 32 BRPM | HEIGHT: 69 IN | HEART RATE: 62 BPM | TEMPERATURE: 98.5 F | SYSTOLIC BLOOD PRESSURE: 80 MMHG | WEIGHT: 153.4 LBS | BODY MASS INDEX: 22.72 KG/M2 | OXYGEN SATURATION: 99 %

## 2018-01-01 VITALS
HEIGHT: 71 IN | DIASTOLIC BLOOD PRESSURE: 64 MMHG | BODY MASS INDEX: 21.17 KG/M2 | RESPIRATION RATE: 18 BRPM | OXYGEN SATURATION: 97 % | WEIGHT: 151.24 LBS | HEART RATE: 70 BPM | SYSTOLIC BLOOD PRESSURE: 91 MMHG | TEMPERATURE: 97.4 F

## 2018-01-01 VITALS
HEART RATE: 65 BPM | SYSTOLIC BLOOD PRESSURE: 113 MMHG | WEIGHT: 142 LBS | BODY MASS INDEX: 19.81 KG/M2 | TEMPERATURE: 98.8 F | DIASTOLIC BLOOD PRESSURE: 73 MMHG

## 2018-01-01 VITALS
SYSTOLIC BLOOD PRESSURE: 134 MMHG | DIASTOLIC BLOOD PRESSURE: 89 MMHG | OXYGEN SATURATION: 96 % | TEMPERATURE: 97.6 F | HEART RATE: 96 BPM

## 2018-01-01 VITALS
RESPIRATION RATE: 30 BRPM | SYSTOLIC BLOOD PRESSURE: 82 MMHG | HEIGHT: 70 IN | BODY MASS INDEX: 21.85 KG/M2 | DIASTOLIC BLOOD PRESSURE: 56 MMHG | OXYGEN SATURATION: 98 % | TEMPERATURE: 99.4 F | WEIGHT: 152.6 LBS | HEART RATE: 56 BPM

## 2018-01-01 VITALS — OXYGEN SATURATION: 99 % | SYSTOLIC BLOOD PRESSURE: 121 MMHG | HEART RATE: 74 BPM | DIASTOLIC BLOOD PRESSURE: 86 MMHG

## 2018-01-01 VITALS
WEIGHT: 144 LBS | BODY MASS INDEX: 20.09 KG/M2 | DIASTOLIC BLOOD PRESSURE: 72 MMHG | HEART RATE: 68 BPM | SYSTOLIC BLOOD PRESSURE: 119 MMHG

## 2018-01-01 VITALS
SYSTOLIC BLOOD PRESSURE: 107 MMHG | TEMPERATURE: 97.7 F | WEIGHT: 151.9 LBS | HEIGHT: 71 IN | OXYGEN SATURATION: 98 % | RESPIRATION RATE: 20 BRPM | BODY MASS INDEX: 21.27 KG/M2 | HEART RATE: 56 BPM | DIASTOLIC BLOOD PRESSURE: 73 MMHG

## 2018-01-01 VITALS
OXYGEN SATURATION: 98 % | RESPIRATION RATE: 22 BRPM | TEMPERATURE: 97.8 F | SYSTOLIC BLOOD PRESSURE: 99 MMHG | DIASTOLIC BLOOD PRESSURE: 76 MMHG

## 2018-01-01 VITALS
HEART RATE: 77 BPM | SYSTOLIC BLOOD PRESSURE: 114 MMHG | DIASTOLIC BLOOD PRESSURE: 77 MMHG | RESPIRATION RATE: 18 BRPM | OXYGEN SATURATION: 99 %

## 2018-01-01 VITALS
WEIGHT: 150.7 LBS | BODY MASS INDEX: 21.1 KG/M2 | HEIGHT: 71 IN | TEMPERATURE: 97.4 F | OXYGEN SATURATION: 100 % | DIASTOLIC BLOOD PRESSURE: 77 MMHG | HEART RATE: 75 BPM | RESPIRATION RATE: 16 BRPM | SYSTOLIC BLOOD PRESSURE: 110 MMHG

## 2018-01-01 VITALS
HEART RATE: 89 BPM | BODY MASS INDEX: 19.88 KG/M2 | TEMPERATURE: 98.5 F | HEIGHT: 71 IN | DIASTOLIC BLOOD PRESSURE: 98 MMHG | SYSTOLIC BLOOD PRESSURE: 128 MMHG | WEIGHT: 142 LBS | OXYGEN SATURATION: 99 % | RESPIRATION RATE: 40 BRPM

## 2018-01-01 VITALS
SYSTOLIC BLOOD PRESSURE: 139 MMHG | BODY MASS INDEX: 21.07 KG/M2 | HEART RATE: 67 BPM | WEIGHT: 151 LBS | OXYGEN SATURATION: 100 % | DIASTOLIC BLOOD PRESSURE: 83 MMHG

## 2018-01-01 VITALS
HEART RATE: 70 BPM | BODY MASS INDEX: 20.04 KG/M2 | RESPIRATION RATE: 16 BRPM | TEMPERATURE: 96.1 F | SYSTOLIC BLOOD PRESSURE: 123 MMHG | WEIGHT: 143.6 LBS | DIASTOLIC BLOOD PRESSURE: 74 MMHG

## 2018-01-01 VITALS — SYSTOLIC BLOOD PRESSURE: 108 MMHG | DIASTOLIC BLOOD PRESSURE: 78 MMHG

## 2018-01-01 VITALS — SYSTOLIC BLOOD PRESSURE: 131 MMHG | HEART RATE: 59 BPM | DIASTOLIC BLOOD PRESSURE: 79 MMHG

## 2018-01-01 VITALS — HEART RATE: 79 BPM | SYSTOLIC BLOOD PRESSURE: 113 MMHG | DIASTOLIC BLOOD PRESSURE: 73 MMHG

## 2018-01-01 VITALS — SYSTOLIC BLOOD PRESSURE: 121 MMHG | DIASTOLIC BLOOD PRESSURE: 75 MMHG | HEART RATE: 75 BPM

## 2018-01-01 DIAGNOSIS — I48.92 ATRIAL FLUTTER, UNSPECIFIED TYPE (H): ICD-10-CM

## 2018-01-01 DIAGNOSIS — C79.31 BRAIN METASTASIS: ICD-10-CM

## 2018-01-01 DIAGNOSIS — Z79.01 LONG-TERM (CURRENT) USE OF ANTICOAGULANTS: ICD-10-CM

## 2018-01-01 DIAGNOSIS — T45.1X5A CHEMOTHERAPY INDUCED NAUSEA AND VOMITING: Primary | ICD-10-CM

## 2018-01-01 DIAGNOSIS — C34.90 NON-SMALL CELL CARCINOMA OF LUNG (H): ICD-10-CM

## 2018-01-01 DIAGNOSIS — C34.90 NON-SMALL CELL CARCINOMA OF LUNG (H): Primary | ICD-10-CM

## 2018-01-01 DIAGNOSIS — K21.9 GASTROESOPHAGEAL REFLUX DISEASE WITHOUT ESOPHAGITIS: ICD-10-CM

## 2018-01-01 DIAGNOSIS — I82.409 DVT (DEEP VENOUS THROMBOSIS) (H): Primary | ICD-10-CM

## 2018-01-01 DIAGNOSIS — K59.03 DRUG-INDUCED CONSTIPATION: ICD-10-CM

## 2018-01-01 DIAGNOSIS — Z71.6 ENCOUNTER FOR TOBACCO USE CESSATION COUNSELING: ICD-10-CM

## 2018-01-01 DIAGNOSIS — I50.21 ACUTE SYSTOLIC CONGESTIVE HEART FAILURE (H): ICD-10-CM

## 2018-01-01 DIAGNOSIS — I42.9 CARDIOMYOPATHY, UNSPECIFIED TYPE (H): ICD-10-CM

## 2018-01-01 DIAGNOSIS — I48.92 ATRIAL FLUTTER (H): Primary | ICD-10-CM

## 2018-01-01 DIAGNOSIS — Z79.01 WARFARIN ANTICOAGULATION: ICD-10-CM

## 2018-01-01 DIAGNOSIS — R53.1 WEAKNESS: ICD-10-CM

## 2018-01-01 DIAGNOSIS — K59.01 SLOW TRANSIT CONSTIPATION: ICD-10-CM

## 2018-01-01 DIAGNOSIS — Z85.89 HISTORY OF MALIGNANT NEOPLASM METASTATIC TO BRAIN: Primary | ICD-10-CM

## 2018-01-01 DIAGNOSIS — I48.92 ATRIAL FLUTTER, UNSPECIFIED TYPE (H): Primary | ICD-10-CM

## 2018-01-01 DIAGNOSIS — C34.90 SQUAMOUS CELL CARCINOMA OF LUNG, UNSPECIFIED LATERALITY (H): ICD-10-CM

## 2018-01-01 DIAGNOSIS — E86.0 DEHYDRATION: Primary | ICD-10-CM

## 2018-01-01 DIAGNOSIS — F41.9 ANXIETY: ICD-10-CM

## 2018-01-01 DIAGNOSIS — R11.2 CHEMOTHERAPY INDUCED NAUSEA AND VOMITING: Primary | ICD-10-CM

## 2018-01-01 DIAGNOSIS — I95.9 HYPOTENSION, UNSPECIFIED HYPOTENSION TYPE: ICD-10-CM

## 2018-01-01 DIAGNOSIS — R06.02 SHORTNESS OF BREATH: ICD-10-CM

## 2018-01-01 DIAGNOSIS — D61.810 ANTINEOPLASTIC CHEMOTHERAPY INDUCED PANCYTOPENIA (H): ICD-10-CM

## 2018-01-01 DIAGNOSIS — I50.23 ACUTE ON CHRONIC SYSTOLIC CONGESTIVE HEART FAILURE (H): ICD-10-CM

## 2018-01-01 DIAGNOSIS — L89.151 DECUBITUS ULCER OF SACRAL REGION, STAGE 1: ICD-10-CM

## 2018-01-01 DIAGNOSIS — T45.1X5A CHEMOTHERAPY INDUCED NAUSEA AND VOMITING: ICD-10-CM

## 2018-01-01 DIAGNOSIS — F32.1 MODERATE SINGLE CURRENT EPISODE OF MAJOR DEPRESSIVE DISORDER (H): ICD-10-CM

## 2018-01-01 DIAGNOSIS — I48.0 PAROXYSMAL ATRIAL FIBRILLATION (H): ICD-10-CM

## 2018-01-01 DIAGNOSIS — J44.1 COPD EXACERBATION (H): ICD-10-CM

## 2018-01-01 DIAGNOSIS — R11.2 CHEMOTHERAPY INDUCED NAUSEA AND VOMITING: ICD-10-CM

## 2018-01-01 DIAGNOSIS — C34.12 MALIGNANT NEOPLASM OF UPPER LOBE OF LEFT LUNG (H): ICD-10-CM

## 2018-01-01 DIAGNOSIS — J90 PLEURAL EFFUSION: ICD-10-CM

## 2018-01-01 DIAGNOSIS — I48.3 TYPICAL ATRIAL FLUTTER (H): ICD-10-CM

## 2018-01-01 DIAGNOSIS — R06.02 SHORTNESS OF BREATH: Primary | ICD-10-CM

## 2018-01-01 DIAGNOSIS — R79.1 ELEVATED INR: ICD-10-CM

## 2018-01-01 DIAGNOSIS — R11.0 NAUSEA: ICD-10-CM

## 2018-01-01 DIAGNOSIS — Z79.899 ON AMIODARONE THERAPY: Primary | ICD-10-CM

## 2018-01-01 DIAGNOSIS — F17.210 CIGARETTE NICOTINE DEPENDENCE, UNCOMPLICATED: ICD-10-CM

## 2018-01-01 DIAGNOSIS — I48.0 PAROXYSMAL ATRIAL FIBRILLATION (H): Primary | ICD-10-CM

## 2018-01-01 DIAGNOSIS — E86.0 DEHYDRATION: ICD-10-CM

## 2018-01-01 DIAGNOSIS — R07.9 ACUTE CHEST PAIN: ICD-10-CM

## 2018-01-01 DIAGNOSIS — C79.31 BRAIN METASTASIS: Primary | ICD-10-CM

## 2018-01-01 DIAGNOSIS — I48.19 PERSISTENT ATRIAL FIBRILLATION (H): Primary | ICD-10-CM

## 2018-01-01 DIAGNOSIS — C71.2 MALIGNANT NEOPLASM OF TEMPORAL LOBE (H): Primary | ICD-10-CM

## 2018-01-01 DIAGNOSIS — C79.31 METASTASIS TO BRAIN (H): Primary | ICD-10-CM

## 2018-01-01 DIAGNOSIS — R13.10 DYSPHAGIA, UNSPECIFIED TYPE: ICD-10-CM

## 2018-01-01 DIAGNOSIS — E63.9 POOR NUTRITION: ICD-10-CM

## 2018-01-01 DIAGNOSIS — Z87.891 PERSONAL HISTORY OF TOBACCO USE, PRESENTING HAZARDS TO HEALTH: ICD-10-CM

## 2018-01-01 DIAGNOSIS — Z72.0 TOBACCO ABUSE: ICD-10-CM

## 2018-01-01 DIAGNOSIS — R00.2 PALPITATIONS: ICD-10-CM

## 2018-01-01 DIAGNOSIS — J90 BILATERAL PLEURAL EFFUSION: ICD-10-CM

## 2018-01-01 DIAGNOSIS — R09.02 HYPOXEMIA: ICD-10-CM

## 2018-01-01 DIAGNOSIS — J44.1 COPD EXACERBATION (H): Primary | ICD-10-CM

## 2018-01-01 DIAGNOSIS — F32.1 MODERATE SINGLE CURRENT EPISODE OF MAJOR DEPRESSIVE DISORDER (H): Primary | ICD-10-CM

## 2018-01-01 DIAGNOSIS — K59.00 CONSTIPATION, UNSPECIFIED CONSTIPATION TYPE: ICD-10-CM

## 2018-01-01 DIAGNOSIS — N39.0 URINARY TRACT INFECTION WITHOUT HEMATURIA, SITE UNSPECIFIED: ICD-10-CM

## 2018-01-01 DIAGNOSIS — I48.19 PERSISTENT ATRIAL FIBRILLATION (H): ICD-10-CM

## 2018-01-01 DIAGNOSIS — R94.39 ABNORMAL CARDIOVASCULAR STRESS TEST: ICD-10-CM

## 2018-01-01 DIAGNOSIS — I49.9 IRREGULAR HEART BEAT: ICD-10-CM

## 2018-01-01 DIAGNOSIS — C61 PROSTATE CANCER (H): Primary | ICD-10-CM

## 2018-01-01 DIAGNOSIS — J44.9 CHRONIC OBSTRUCTIVE PULMONARY DISEASE, UNSPECIFIED COPD TYPE (H): ICD-10-CM

## 2018-01-01 DIAGNOSIS — R59.0 MEDIASTINAL LYMPHADENOPATHY: Primary | ICD-10-CM

## 2018-01-01 DIAGNOSIS — T45.1X5A ANTINEOPLASTIC CHEMOTHERAPY INDUCED PANCYTOPENIA (H): ICD-10-CM

## 2018-01-01 DIAGNOSIS — I10 HTN (HYPERTENSION): ICD-10-CM

## 2018-01-01 DIAGNOSIS — R59.0 MEDIASTINAL LYMPHADENOPATHY: ICD-10-CM

## 2018-01-01 DIAGNOSIS — R49.0 HOARSENESS: ICD-10-CM

## 2018-01-01 DIAGNOSIS — R63.4 LOSS OF WEIGHT: ICD-10-CM

## 2018-01-01 DIAGNOSIS — C79.31 METASTASIS TO BRAIN (H): ICD-10-CM

## 2018-01-01 DIAGNOSIS — R09.89 AIR HUNGER: Primary | ICD-10-CM

## 2018-01-01 DIAGNOSIS — G93.9 BRAIN LESION: ICD-10-CM

## 2018-01-01 DIAGNOSIS — R00.2 PALPITATIONS: Primary | ICD-10-CM

## 2018-01-01 DIAGNOSIS — Z79.899 ON AMIODARONE THERAPY: ICD-10-CM

## 2018-01-01 LAB
ALBUMIN SERPL-MCNC: 2.9 G/DL (ref 3.4–5)
ALBUMIN SERPL-MCNC: 3.1 G/DL (ref 3.4–5)
ALBUMIN SERPL-MCNC: 3.1 G/DL (ref 3.4–5)
ALBUMIN SERPL-MCNC: 3.3 G/DL (ref 3.4–5)
ALBUMIN SERPL-MCNC: 3.5 G/DL (ref 3.4–5)
ALBUMIN SERPL-MCNC: 3.5 G/DL (ref 3.4–5)
ALBUMIN SERPL-MCNC: 3.9 G/DL (ref 3.4–5)
ALBUMIN UR-MCNC: 10 MG/DL
ALP SERPL-CCNC: 130 U/L (ref 40–150)
ALP SERPL-CCNC: 78 U/L (ref 40–150)
ALP SERPL-CCNC: 89 U/L (ref 40–150)
ALP SERPL-CCNC: 91 U/L (ref 40–150)
ALP SERPL-CCNC: 92 U/L (ref 40–150)
ALP SERPL-CCNC: 97 U/L (ref 40–150)
ALP SERPL-CCNC: 99 U/L (ref 40–150)
ALT SERPL W P-5'-P-CCNC: 14 U/L (ref 0–70)
ALT SERPL W P-5'-P-CCNC: 16 U/L (ref 0–70)
ALT SERPL W P-5'-P-CCNC: 17 U/L (ref 0–70)
ALT SERPL W P-5'-P-CCNC: 18 U/L (ref 0–70)
ALT SERPL W P-5'-P-CCNC: 23 U/L (ref 0–70)
ALT SERPL W P-5'-P-CCNC: 24 U/L (ref 0–70)
ALT SERPL W P-5'-P-CCNC: 70 U/L (ref 0–70)
ANION GAP SERPL CALCULATED.3IONS-SCNC: 11 MMOL/L (ref 3–14)
ANION GAP SERPL CALCULATED.3IONS-SCNC: 4 MMOL/L (ref 3–14)
ANION GAP SERPL CALCULATED.3IONS-SCNC: 4 MMOL/L (ref 3–14)
ANION GAP SERPL CALCULATED.3IONS-SCNC: 5 MMOL/L (ref 3–14)
ANION GAP SERPL CALCULATED.3IONS-SCNC: 5 MMOL/L (ref 3–14)
ANION GAP SERPL CALCULATED.3IONS-SCNC: 6 MMOL/L (ref 3–14)
ANION GAP SERPL CALCULATED.3IONS-SCNC: 7 MMOL/L (ref 3–14)
ANION GAP SERPL CALCULATED.3IONS-SCNC: 7 MMOL/L (ref 3–14)
ANISOCYTOSIS BLD QL SMEAR: ABNORMAL
APPEARANCE UR: ABNORMAL
AST SERPL W P-5'-P-CCNC: 14 U/L (ref 0–45)
AST SERPL W P-5'-P-CCNC: 16 U/L (ref 0–45)
AST SERPL W P-5'-P-CCNC: 16 U/L (ref 0–45)
AST SERPL W P-5'-P-CCNC: 18 U/L (ref 0–45)
AST SERPL W P-5'-P-CCNC: 19 U/L (ref 0–45)
AST SERPL W P-5'-P-CCNC: 19 U/L (ref 0–45)
AST SERPL W P-5'-P-CCNC: 76 U/L (ref 0–45)
BACTERIA #/AREA URNS HPF: ABNORMAL /HPF
BACTERIA SPEC CULT: NO GROWTH
BASE EXCESS BLDV CALC-SCNC: 0.2 MMOL/L
BASOPHILS # BLD AUTO: 0 10E9/L (ref 0–0.2)
BASOPHILS NFR BLD AUTO: 0 %
BASOPHILS NFR BLD AUTO: 0.1 %
BASOPHILS NFR BLD AUTO: 0.2 %
BASOPHILS NFR BLD AUTO: 0.3 %
BASOPHILS NFR BLD AUTO: 0.5 %
BASOPHILS NFR BLD AUTO: 0.6 %
BASOPHILS NFR BLD AUTO: 0.8 %
BASOPHILS NFR BLD AUTO: 0.9 %
BASOPHILS NFR BLD AUTO: 1.2 %
BASOPHILS NFR BLD AUTO: 2 %
BILIRUB SERPL-MCNC: 0.2 MG/DL (ref 0.2–1.3)
BILIRUB SERPL-MCNC: 0.2 MG/DL (ref 0.2–1.3)
BILIRUB SERPL-MCNC: 0.3 MG/DL (ref 0.2–1.3)
BILIRUB SERPL-MCNC: 0.4 MG/DL (ref 0.2–1.3)
BILIRUB SERPL-MCNC: 0.5 MG/DL (ref 0.2–1.3)
BILIRUB SERPL-MCNC: 0.5 MG/DL (ref 0.2–1.3)
BILIRUB SERPL-MCNC: 1.5 MG/DL (ref 0.2–1.3)
BILIRUB UR QL STRIP: NEGATIVE
BUN SERPL-MCNC: 15 MG/DL (ref 7–30)
BUN SERPL-MCNC: 16 MG/DL (ref 7–30)
BUN SERPL-MCNC: 17 MG/DL (ref 7–30)
BUN SERPL-MCNC: 19 MG/DL (ref 7–30)
BUN SERPL-MCNC: 20 MG/DL (ref 7–30)
BUN SERPL-MCNC: 21 MG/DL (ref 7–30)
BUN SERPL-MCNC: 23 MG/DL (ref 7–30)
BUN SERPL-MCNC: 23 MG/DL (ref 7–30)
BUN SERPL-MCNC: 25 MG/DL (ref 7–30)
BUN SERPL-MCNC: 25 MG/DL (ref 7–30)
BUN SERPL-MCNC: 27 MG/DL (ref 7–30)
CALCIUM SERPL-MCNC: 7.2 MG/DL (ref 8.5–10.1)
CALCIUM SERPL-MCNC: 7.9 MG/DL (ref 8.5–10.1)
CALCIUM SERPL-MCNC: 8 MG/DL (ref 8.5–10.1)
CALCIUM SERPL-MCNC: 8.1 MG/DL (ref 8.5–10.1)
CALCIUM SERPL-MCNC: 8.3 MG/DL (ref 8.5–10.1)
CALCIUM SERPL-MCNC: 8.4 MG/DL (ref 8.5–10.1)
CALCIUM SERPL-MCNC: 8.4 MG/DL (ref 8.5–10.1)
CALCIUM SERPL-MCNC: 8.5 MG/DL (ref 8.5–10.1)
CALCIUM SERPL-MCNC: 8.5 MG/DL (ref 8.5–10.1)
CALCIUM SERPL-MCNC: 8.6 MG/DL (ref 8.5–10.1)
CALCIUM SERPL-MCNC: 8.6 MG/DL (ref 8.5–10.1)
CHLORIDE SERPL-SCNC: 101 MMOL/L (ref 94–109)
CHLORIDE SERPL-SCNC: 102 MMOL/L (ref 94–109)
CHLORIDE SERPL-SCNC: 104 MMOL/L (ref 94–109)
CHLORIDE SERPL-SCNC: 105 MMOL/L (ref 94–109)
CHLORIDE SERPL-SCNC: 106 MMOL/L (ref 94–109)
CHLORIDE SERPL-SCNC: 107 MMOL/L (ref 94–109)
CHLORIDE SERPL-SCNC: 107 MMOL/L (ref 94–109)
CHLORIDE SERPL-SCNC: 109 MMOL/L (ref 94–109)
CHLORIDE SERPL-SCNC: 109 MMOL/L (ref 94–109)
CO2 SERPL-SCNC: 22 MMOL/L (ref 20–32)
CO2 SERPL-SCNC: 24 MMOL/L (ref 20–32)
CO2 SERPL-SCNC: 25 MMOL/L (ref 20–32)
CO2 SERPL-SCNC: 26 MMOL/L (ref 20–32)
CO2 SERPL-SCNC: 27 MMOL/L (ref 20–32)
CO2 SERPL-SCNC: 27 MMOL/L (ref 20–32)
CO2 SERPL-SCNC: 28 MMOL/L (ref 20–32)
CO2 SERPL-SCNC: 29 MMOL/L (ref 20–32)
COLOR UR AUTO: YELLOW
COPATH REPORT: NORMAL
CREAT SERPL-MCNC: 0.67 MG/DL (ref 0.66–1.25)
CREAT SERPL-MCNC: 0.69 MG/DL (ref 0.66–1.25)
CREAT SERPL-MCNC: 0.83 MG/DL (ref 0.66–1.25)
CREAT SERPL-MCNC: 0.86 MG/DL (ref 0.66–1.25)
CREAT SERPL-MCNC: 0.86 MG/DL (ref 0.66–1.25)
CREAT SERPL-MCNC: 0.87 MG/DL (ref 0.66–1.25)
CREAT SERPL-MCNC: 0.89 MG/DL (ref 0.66–1.25)
CREAT SERPL-MCNC: 0.9 MG/DL (ref 0.66–1.25)
CREAT SERPL-MCNC: 0.93 MG/DL (ref 0.66–1.25)
CREAT SERPL-MCNC: 0.94 MG/DL (ref 0.66–1.25)
CREAT SERPL-MCNC: 0.98 MG/DL (ref 0.66–1.25)
CREAT SERPL-MCNC: 0.99 MG/DL (ref 0.66–1.25)
CREAT SERPL-MCNC: 1 MG/DL (ref 0.66–1.25)
CREAT SERPL-MCNC: 1.03 MG/DL (ref 0.66–1.25)
CREAT SERPL-MCNC: 1.05 MG/DL (ref 0.66–1.25)
CREAT SERPL-MCNC: 1.07 MG/DL (ref 0.66–1.25)
CREAT SERPL-MCNC: 1.1 MG/DL (ref 0.66–1.25)
CREAT SERPL-MCNC: 1.13 MG/DL (ref 0.66–1.25)
D DIMER PPP FEU-MCNC: 2.8 UG/ML FEU (ref 0–0.5)
DIFFERENTIAL METHOD BLD: ABNORMAL
DIFFERENTIAL METHOD BLD: NORMAL
DIFFERENTIAL METHOD BLD: NORMAL
EOSINOPHIL # BLD AUTO: 0 10E9/L (ref 0–0.7)
EOSINOPHIL # BLD AUTO: 0.1 10E9/L (ref 0–0.7)
EOSINOPHIL NFR BLD AUTO: 0 %
EOSINOPHIL NFR BLD AUTO: 0.1 %
EOSINOPHIL NFR BLD AUTO: 0.1 %
EOSINOPHIL NFR BLD AUTO: 0.2 %
EOSINOPHIL NFR BLD AUTO: 0.2 %
EOSINOPHIL NFR BLD AUTO: 0.4 %
EOSINOPHIL NFR BLD AUTO: 0.5 %
EOSINOPHIL NFR BLD AUTO: 0.5 %
EOSINOPHIL NFR BLD AUTO: 0.6 %
EOSINOPHIL NFR BLD AUTO: 0.8 %
EOSINOPHIL NFR BLD AUTO: 0.8 %
EOSINOPHIL NFR BLD AUTO: 1 %
EOSINOPHIL NFR BLD AUTO: 1.4 %
EOSINOPHIL NFR BLD AUTO: 2.1 %
EOSINOPHIL NFR BLD AUTO: 2.3 %
ERYTHROCYTE [DISTWIDTH] IN BLOOD BY AUTOMATED COUNT: 12 % (ref 10–15)
ERYTHROCYTE [DISTWIDTH] IN BLOOD BY AUTOMATED COUNT: 12.1 % (ref 10–15)
ERYTHROCYTE [DISTWIDTH] IN BLOOD BY AUTOMATED COUNT: 12.2 % (ref 10–15)
ERYTHROCYTE [DISTWIDTH] IN BLOOD BY AUTOMATED COUNT: 12.3 % (ref 10–15)
ERYTHROCYTE [DISTWIDTH] IN BLOOD BY AUTOMATED COUNT: 12.3 % (ref 10–15)
ERYTHROCYTE [DISTWIDTH] IN BLOOD BY AUTOMATED COUNT: 12.5 % (ref 10–15)
ERYTHROCYTE [DISTWIDTH] IN BLOOD BY AUTOMATED COUNT: 12.6 % (ref 10–15)
ERYTHROCYTE [DISTWIDTH] IN BLOOD BY AUTOMATED COUNT: 12.7 % (ref 10–15)
ERYTHROCYTE [DISTWIDTH] IN BLOOD BY AUTOMATED COUNT: 12.9 % (ref 10–15)
ERYTHROCYTE [DISTWIDTH] IN BLOOD BY AUTOMATED COUNT: 13.6 % (ref 10–15)
ERYTHROCYTE [DISTWIDTH] IN BLOOD BY AUTOMATED COUNT: 13.7 % (ref 10–15)
ERYTHROCYTE [DISTWIDTH] IN BLOOD BY AUTOMATED COUNT: 13.8 % (ref 10–15)
ERYTHROCYTE [DISTWIDTH] IN BLOOD BY AUTOMATED COUNT: 15.2 % (ref 10–15)
ERYTHROCYTE [DISTWIDTH] IN BLOOD BY AUTOMATED COUNT: 15.5 % (ref 10–15)
ERYTHROCYTE [DISTWIDTH] IN BLOOD BY AUTOMATED COUNT: 15.6 % (ref 10–15)
ERYTHROCYTE [DISTWIDTH] IN BLOOD BY AUTOMATED COUNT: 16.3 % (ref 10–15)
ERYTHROCYTE [DISTWIDTH] IN BLOOD BY AUTOMATED COUNT: 17.1 % (ref 10–15)
ERYTHROCYTE [DISTWIDTH] IN BLOOD BY AUTOMATED COUNT: 17.5 % (ref 10–15)
ERYTHROCYTE [DISTWIDTH] IN BLOOD BY AUTOMATED COUNT: 18.1 % (ref 10–15)
ERYTHROCYTE [DISTWIDTH] IN BLOOD BY AUTOMATED COUNT: 18.7 % (ref 10–15)
ERYTHROCYTE [DISTWIDTH] IN BLOOD BY AUTOMATED COUNT: 19.2 % (ref 10–15)
GFR SERPL CREATININE-BSD FRML MDRD: 66 ML/MIN/1.7M2
GFR SERPL CREATININE-BSD FRML MDRD: 68 ML/MIN/1.7M2
GFR SERPL CREATININE-BSD FRML MDRD: 71 ML/MIN/1.7M2
GFR SERPL CREATININE-BSD FRML MDRD: 72 ML/MIN/1.7M2
GFR SERPL CREATININE-BSD FRML MDRD: 74 ML/MIN/1.7M2
GFR SERPL CREATININE-BSD FRML MDRD: 77 ML/MIN/1.7M2
GFR SERPL CREATININE-BSD FRML MDRD: 77 ML/MIN/1.7M2
GFR SERPL CREATININE-BSD FRML MDRD: 78 ML/MIN/1.7M2
GFR SERPL CREATININE-BSD FRML MDRD: 82 ML/MIN/1.7M2
GFR SERPL CREATININE-BSD FRML MDRD: 83 ML/MIN/1.7M2
GFR SERPL CREATININE-BSD FRML MDRD: 86 ML/MIN/1.7M2
GFR SERPL CREATININE-BSD FRML MDRD: 87 ML/MIN/1.7M2
GFR SERPL CREATININE-BSD FRML MDRD: 90 ML/MIN/1.7M2
GFR SERPL CREATININE-BSD FRML MDRD: >90 ML/MIN/1.7M2
GLUCOSE SERPL-MCNC: 104 MG/DL (ref 70–99)
GLUCOSE SERPL-MCNC: 104 MG/DL (ref 70–99)
GLUCOSE SERPL-MCNC: 107 MG/DL (ref 70–99)
GLUCOSE SERPL-MCNC: 121 MG/DL (ref 70–99)
GLUCOSE SERPL-MCNC: 125 MG/DL (ref 70–99)
GLUCOSE SERPL-MCNC: 150 MG/DL (ref 70–99)
GLUCOSE SERPL-MCNC: 162 MG/DL (ref 70–99)
GLUCOSE SERPL-MCNC: 79 MG/DL (ref 70–99)
GLUCOSE SERPL-MCNC: 86 MG/DL (ref 70–99)
GLUCOSE SERPL-MCNC: 88 MG/DL (ref 70–99)
GLUCOSE SERPL-MCNC: 92 MG/DL (ref 70–99)
GLUCOSE SERPL-MCNC: 93 MG/DL (ref 70–99)
GLUCOSE SERPL-MCNC: 95 MG/DL (ref 70–99)
GLUCOSE UR STRIP-MCNC: NEGATIVE MG/DL
HCO3 BLDV-SCNC: 26 MMOL/L (ref 21–28)
HCT VFR BLD AUTO: 25.1 % (ref 40–53)
HCT VFR BLD AUTO: 26.2 % (ref 40–53)
HCT VFR BLD AUTO: 27.4 % (ref 40–53)
HCT VFR BLD AUTO: 27.9 % (ref 40–53)
HCT VFR BLD AUTO: 28.1 % (ref 40–53)
HCT VFR BLD AUTO: 29.1 % (ref 40–53)
HCT VFR BLD AUTO: 29.3 % (ref 40–53)
HCT VFR BLD AUTO: 30.1 % (ref 40–53)
HCT VFR BLD AUTO: 30.7 % (ref 40–53)
HCT VFR BLD AUTO: 31.4 % (ref 40–53)
HCT VFR BLD AUTO: 31.7 % (ref 40–53)
HCT VFR BLD AUTO: 31.8 % (ref 40–53)
HCT VFR BLD AUTO: 34.3 % (ref 40–53)
HCT VFR BLD AUTO: 34.7 % (ref 40–53)
HCT VFR BLD AUTO: 35.9 % (ref 40–53)
HCT VFR BLD AUTO: 37.1 % (ref 40–53)
HCT VFR BLD AUTO: 37.8 % (ref 40–53)
HCT VFR BLD AUTO: 38.7 % (ref 40–53)
HCT VFR BLD AUTO: 40.1 % (ref 40–53)
HCT VFR BLD AUTO: 40.5 % (ref 40–53)
HCT VFR BLD AUTO: 46.3 % (ref 40–53)
HCT VFR BLD AUTO: 48.8 % (ref 40–53)
HCT VFR BLD AUTO: 49.9 % (ref 40–53)
HGB BLD-MCNC: 10.1 G/DL (ref 13.3–17.7)
HGB BLD-MCNC: 10.6 G/DL (ref 13.3–17.7)
HGB BLD-MCNC: 11.3 G/DL (ref 13.3–17.7)
HGB BLD-MCNC: 11.4 G/DL (ref 13.3–17.7)
HGB BLD-MCNC: 12.1 G/DL (ref 13.3–17.7)
HGB BLD-MCNC: 12.3 G/DL (ref 13.3–17.7)
HGB BLD-MCNC: 12.7 G/DL (ref 13.3–17.7)
HGB BLD-MCNC: 13.1 G/DL (ref 13.3–17.7)
HGB BLD-MCNC: 13.4 G/DL (ref 13.3–17.7)
HGB BLD-MCNC: 13.5 G/DL (ref 13.3–17.7)
HGB BLD-MCNC: 15.5 G/DL (ref 13.3–17.7)
HGB BLD-MCNC: 15.8 G/DL (ref 13.3–17.7)
HGB BLD-MCNC: 16.3 G/DL (ref 13.3–17.7)
HGB BLD-MCNC: 8.1 G/DL (ref 13.3–17.7)
HGB BLD-MCNC: 8.4 G/DL (ref 13.3–17.7)
HGB BLD-MCNC: 8.9 G/DL (ref 13.3–17.7)
HGB BLD-MCNC: 8.9 G/DL (ref 13.3–17.7)
HGB BLD-MCNC: 9.1 G/DL (ref 13.3–17.7)
HGB BLD-MCNC: 9.3 G/DL (ref 13.3–17.7)
HGB BLD-MCNC: 9.4 G/DL (ref 13.3–17.7)
HGB BLD-MCNC: 9.7 G/DL (ref 13.3–17.7)
HGB BLD-MCNC: 9.9 G/DL (ref 13.3–17.7)
HGB BLD-MCNC: 9.9 G/DL (ref 13.3–17.7)
HGB UR QL STRIP: NEGATIVE
HYALINE CASTS #/AREA URNS LPF: 9 /LPF (ref 0–2)
IMM GRANULOCYTES # BLD: 0 10E9/L (ref 0–0.4)
IMM GRANULOCYTES NFR BLD: 0 %
IMM GRANULOCYTES NFR BLD: 0.1 %
IMM GRANULOCYTES NFR BLD: 0.1 %
IMM GRANULOCYTES NFR BLD: 0.2 %
IMM GRANULOCYTES NFR BLD: 0.3 %
IMM GRANULOCYTES NFR BLD: 0.5 %
IMM GRANULOCYTES NFR BLD: 0.6 %
INR POINT OF CARE: 1.2 (ref 0.86–1.14)
INR POINT OF CARE: 2.3 (ref 0.86–1.14)
INR POINT OF CARE: 3.6 (ref 0.86–1.14)
INR PPP: 1.08 (ref 0.86–1.14)
INR PPP: 1.17 (ref 0.86–1.14)
INR PPP: 1.19 (ref 0.86–1.14)
INR PPP: 1.4
INR PPP: 1.4
INR PPP: 1.5
INR PPP: 1.6
INR PPP: 1.6 (ref 0.86–1.14)
INR PPP: 2.1
INR PPP: 2.1
INR PPP: 2.19 (ref 0.86–1.14)
INR PPP: 2.2
INR PPP: 2.3
INR PPP: 2.3
INR PPP: 2.35 (ref 0.86–1.14)
INR PPP: 2.51 (ref 0.86–1.14)
INR PPP: 2.6 (ref 0.86–1.14)
INR PPP: 2.77 (ref 0.86–1.14)
INR PPP: 2.92 (ref 0.86–1.14)
INR PPP: 2.98 (ref 0.86–1.14)
INR PPP: 3
INR PPP: 3.1
INR PPP: 3.57 (ref 0.86–1.14)
INR PPP: 4.37 (ref 0.86–1.14)
INR PPP: 4.8
INR PPP: 4.9
INR PPP: 5
INR PPP: 5.7
INR PPP: 6.5
INTERPRETATION ECG - MUSE: NORMAL
INTERPRETATION ECG - MUSE: NORMAL
KETONES UR STRIP-MCNC: NEGATIVE MG/DL
LACTATE BLD-SCNC: 1.6 MMOL/L (ref 0.7–2)
LEUKOCYTE ESTERASE UR QL STRIP: NEGATIVE
LYMPHOCYTES # BLD AUTO: 0.6 10E9/L (ref 0.8–5.3)
LYMPHOCYTES # BLD AUTO: 0.7 10E9/L (ref 0.8–5.3)
LYMPHOCYTES # BLD AUTO: 0.8 10E9/L (ref 0.8–5.3)
LYMPHOCYTES # BLD AUTO: 0.8 10E9/L (ref 0.8–5.3)
LYMPHOCYTES # BLD AUTO: 0.9 10E9/L (ref 0.8–5.3)
LYMPHOCYTES # BLD AUTO: 1 10E9/L (ref 0.8–5.3)
LYMPHOCYTES # BLD AUTO: 1 10E9/L (ref 0.8–5.3)
LYMPHOCYTES # BLD AUTO: 1.3 10E9/L (ref 0.8–5.3)
LYMPHOCYTES # BLD AUTO: 1.4 10E9/L (ref 0.8–5.3)
LYMPHOCYTES NFR BLD AUTO: 16.9 %
LYMPHOCYTES NFR BLD AUTO: 17 %
LYMPHOCYTES NFR BLD AUTO: 19.5 %
LYMPHOCYTES NFR BLD AUTO: 22.4 %
LYMPHOCYTES NFR BLD AUTO: 22.5 %
LYMPHOCYTES NFR BLD AUTO: 24.6 %
LYMPHOCYTES NFR BLD AUTO: 24.9 %
LYMPHOCYTES NFR BLD AUTO: 26 %
LYMPHOCYTES NFR BLD AUTO: 27 %
LYMPHOCYTES NFR BLD AUTO: 27.1 %
LYMPHOCYTES NFR BLD AUTO: 27.1 %
LYMPHOCYTES NFR BLD AUTO: 32.4 %
LYMPHOCYTES NFR BLD AUTO: 35 %
LYMPHOCYTES NFR BLD AUTO: 36.5 %
LYMPHOCYTES NFR BLD AUTO: 4.7 %
LYMPHOCYTES NFR BLD AUTO: 44.6 %
LYMPHOCYTES NFR BLD AUTO: 45.1 %
LYMPHOCYTES NFR BLD AUTO: 6.2 %
LYMPHOCYTES NFR BLD AUTO: 6.4 %
LYMPHOCYTES NFR BLD AUTO: 9.3 %
Lab: NORMAL
MAGNESIUM SERPL-MCNC: 1.5 MG/DL (ref 1.6–2.3)
MAGNESIUM SERPL-MCNC: 2 MG/DL (ref 1.6–2.3)
MCH RBC QN AUTO: 28.7 PG (ref 26.5–33)
MCH RBC QN AUTO: 28.7 PG (ref 26.5–33)
MCH RBC QN AUTO: 29 PG (ref 26.5–33)
MCH RBC QN AUTO: 29 PG (ref 26.5–33)
MCH RBC QN AUTO: 29.1 PG (ref 26.5–33)
MCH RBC QN AUTO: 29.3 PG (ref 26.5–33)
MCH RBC QN AUTO: 29.4 PG (ref 26.5–33)
MCH RBC QN AUTO: 29.5 PG (ref 26.5–33)
MCH RBC QN AUTO: 29.9 PG (ref 26.5–33)
MCH RBC QN AUTO: 30 PG (ref 26.5–33)
MCH RBC QN AUTO: 30.8 PG (ref 26.5–33)
MCH RBC QN AUTO: 30.8 PG (ref 26.5–33)
MCH RBC QN AUTO: 31.5 PG (ref 26.5–33)
MCH RBC QN AUTO: 31.7 PG (ref 26.5–33)
MCH RBC QN AUTO: 32 PG (ref 26.5–33)
MCH RBC QN AUTO: 32.4 PG (ref 26.5–33)
MCH RBC QN AUTO: 32.9 PG (ref 26.5–33)
MCH RBC QN AUTO: 33 PG (ref 26.5–33)
MCH RBC QN AUTO: 33.3 PG (ref 26.5–33)
MCHC RBC AUTO-ENTMCNC: 31.1 G/DL (ref 31.5–36.5)
MCHC RBC AUTO-ENTMCNC: 31.7 G/DL (ref 31.5–36.5)
MCHC RBC AUTO-ENTMCNC: 31.9 G/DL (ref 31.5–36.5)
MCHC RBC AUTO-ENTMCNC: 32.1 G/DL (ref 31.5–36.5)
MCHC RBC AUTO-ENTMCNC: 32.2 G/DL (ref 31.5–36.5)
MCHC RBC AUTO-ENTMCNC: 32.3 G/DL (ref 31.5–36.5)
MCHC RBC AUTO-ENTMCNC: 32.3 G/DL (ref 31.5–36.5)
MCHC RBC AUTO-ENTMCNC: 32.4 G/DL (ref 31.5–36.5)
MCHC RBC AUTO-ENTMCNC: 32.4 G/DL (ref 31.5–36.5)
MCHC RBC AUTO-ENTMCNC: 32.5 G/DL (ref 31.5–36.5)
MCHC RBC AUTO-ENTMCNC: 32.7 G/DL (ref 31.5–36.5)
MCHC RBC AUTO-ENTMCNC: 32.9 G/DL (ref 31.5–36.5)
MCHC RBC AUTO-ENTMCNC: 32.9 G/DL (ref 31.5–36.5)
MCHC RBC AUTO-ENTMCNC: 33.1 G/DL (ref 31.5–36.5)
MCHC RBC AUTO-ENTMCNC: 33.2 G/DL (ref 31.5–36.5)
MCHC RBC AUTO-ENTMCNC: 33.4 G/DL (ref 31.5–36.5)
MCHC RBC AUTO-ENTMCNC: 33.5 G/DL (ref 31.5–36.5)
MCHC RBC AUTO-ENTMCNC: 33.6 G/DL (ref 31.5–36.5)
MCHC RBC AUTO-ENTMCNC: 33.7 G/DL (ref 31.5–36.5)
MCHC RBC AUTO-ENTMCNC: 33.7 G/DL (ref 31.5–36.5)
MCHC RBC AUTO-ENTMCNC: 33.9 G/DL (ref 31.5–36.5)
MCV RBC AUTO: 102 FL (ref 78–100)
MCV RBC AUTO: 103 FL (ref 78–100)
MCV RBC AUTO: 87 FL (ref 78–100)
MCV RBC AUTO: 87 FL (ref 78–100)
MCV RBC AUTO: 88 FL (ref 78–100)
MCV RBC AUTO: 89 FL (ref 78–100)
MCV RBC AUTO: 89 FL (ref 78–100)
MCV RBC AUTO: 91 FL (ref 78–100)
MCV RBC AUTO: 92 FL (ref 78–100)
MCV RBC AUTO: 92 FL (ref 78–100)
MCV RBC AUTO: 93 FL (ref 78–100)
MCV RBC AUTO: 95 FL (ref 78–100)
MCV RBC AUTO: 95 FL (ref 78–100)
MCV RBC AUTO: 96 FL (ref 78–100)
MCV RBC AUTO: 96 FL (ref 78–100)
MCV RBC AUTO: 99 FL (ref 78–100)
MONOCYTES # BLD AUTO: 0 10E9/L (ref 0–1.3)
MONOCYTES # BLD AUTO: 0.1 10E9/L (ref 0–1.3)
MONOCYTES # BLD AUTO: 0.2 10E9/L (ref 0–1.3)
MONOCYTES # BLD AUTO: 0.3 10E9/L (ref 0–1.3)
MONOCYTES # BLD AUTO: 0.4 10E9/L (ref 0–1.3)
MONOCYTES # BLD AUTO: 0.4 10E9/L (ref 0–1.3)
MONOCYTES # BLD AUTO: 0.5 10E9/L (ref 0–1.3)
MONOCYTES # BLD AUTO: 0.5 10E9/L (ref 0–1.3)
MONOCYTES # BLD AUTO: 0.6 10E9/L (ref 0–1.3)
MONOCYTES # BLD AUTO: 0.7 10E9/L (ref 0–1.3)
MONOCYTES # BLD AUTO: 0.8 10E9/L (ref 0–1.3)
MONOCYTES NFR BLD AUTO: 0.9 %
MONOCYTES NFR BLD AUTO: 1.1 %
MONOCYTES NFR BLD AUTO: 13.3 %
MONOCYTES NFR BLD AUTO: 14.6 %
MONOCYTES NFR BLD AUTO: 14.6 %
MONOCYTES NFR BLD AUTO: 17.7 %
MONOCYTES NFR BLD AUTO: 18.6 %
MONOCYTES NFR BLD AUTO: 18.9 %
MONOCYTES NFR BLD AUTO: 20.6 %
MONOCYTES NFR BLD AUTO: 23.1 %
MONOCYTES NFR BLD AUTO: 23.6 %
MONOCYTES NFR BLD AUTO: 3.8 %
MONOCYTES NFR BLD AUTO: 4.1 %
MONOCYTES NFR BLD AUTO: 4.4 %
MONOCYTES NFR BLD AUTO: 5.4 %
MONOCYTES NFR BLD AUTO: 6.4 %
MONOCYTES NFR BLD AUTO: 6.9 %
MONOCYTES NFR BLD AUTO: 7.1 %
MONOCYTES NFR BLD AUTO: 8.6 %
MONOCYTES NFR BLD AUTO: 9.5 %
MUCOUS THREADS #/AREA URNS LPF: PRESENT /LPF
NEUTROPHILS # BLD AUTO: 0.6 10E9/L (ref 1.6–8.3)
NEUTROPHILS # BLD AUTO: 0.8 10E9/L (ref 1.6–8.3)
NEUTROPHILS # BLD AUTO: 0.9 10E9/L (ref 1.6–8.3)
NEUTROPHILS # BLD AUTO: 1.2 10E9/L (ref 1.6–8.3)
NEUTROPHILS # BLD AUTO: 1.5 10E9/L (ref 1.6–8.3)
NEUTROPHILS # BLD AUTO: 1.6 10E9/L (ref 1.6–8.3)
NEUTROPHILS # BLD AUTO: 1.7 10E9/L (ref 1.6–8.3)
NEUTROPHILS # BLD AUTO: 11.4 10E9/L (ref 1.6–8.3)
NEUTROPHILS # BLD AUTO: 2.1 10E9/L (ref 1.6–8.3)
NEUTROPHILS # BLD AUTO: 2.2 10E9/L (ref 1.6–8.3)
NEUTROPHILS # BLD AUTO: 2.3 10E9/L (ref 1.6–8.3)
NEUTROPHILS # BLD AUTO: 2.7 10E9/L (ref 1.6–8.3)
NEUTROPHILS # BLD AUTO: 2.8 10E9/L (ref 1.6–8.3)
NEUTROPHILS # BLD AUTO: 2.9 10E9/L (ref 1.6–8.3)
NEUTROPHILS # BLD AUTO: 6.2 10E9/L (ref 1.6–8.3)
NEUTROPHILS # BLD AUTO: 8.1 10E9/L (ref 1.6–8.3)
NEUTROPHILS # BLD AUTO: 8.7 10E9/L (ref 1.6–8.3)
NEUTROPHILS # BLD AUTO: 9.2 10E9/L (ref 1.6–8.3)
NEUTROPHILS NFR BLD AUTO: 31.8 %
NEUTROPHILS NFR BLD AUTO: 42.9 %
NEUTROPHILS NFR BLD AUTO: 46 %
NEUTROPHILS NFR BLD AUTO: 47.2 %
NEUTROPHILS NFR BLD AUTO: 53.6 %
NEUTROPHILS NFR BLD AUTO: 55.5 %
NEUTROPHILS NFR BLD AUTO: 58.4 %
NEUTROPHILS NFR BLD AUTO: 58.8 %
NEUTROPHILS NFR BLD AUTO: 59.6 %
NEUTROPHILS NFR BLD AUTO: 61.3 %
NEUTROPHILS NFR BLD AUTO: 61.9 %
NEUTROPHILS NFR BLD AUTO: 63.4 %
NEUTROPHILS NFR BLD AUTO: 64.4 %
NEUTROPHILS NFR BLD AUTO: 67.5 %
NEUTROPHILS NFR BLD AUTO: 73.9 %
NEUTROPHILS NFR BLD AUTO: 77.9 %
NEUTROPHILS NFR BLD AUTO: 82.7 %
NEUTROPHILS NFR BLD AUTO: 86.9 %
NEUTROPHILS NFR BLD AUTO: 90.7 %
NEUTROPHILS NFR BLD AUTO: 92.2 %
NITRATE UR QL: NEGATIVE
NRBC # BLD AUTO: 0 10*3/UL
NRBC # BLD AUTO: 0 10*3/UL
NRBC BLD AUTO-RTO: 0 /100
NRBC BLD AUTO-RTO: 0 /100
NT-PROBNP SERPL-MCNC: 1783 PG/ML (ref 0–900)
NT-PROBNP SERPL-MCNC: 2681 PG/ML (ref 0–900)
PCO2 BLDV: 44 MM HG (ref 40–50)
PH BLDV: 7.38 PH (ref 7.32–7.43)
PH UR STRIP: 6 PH (ref 5–7)
PLATELET # BLD AUTO: 106 10E9/L (ref 150–450)
PLATELET # BLD AUTO: 115 10E9/L (ref 150–450)
PLATELET # BLD AUTO: 115 10E9/L (ref 150–450)
PLATELET # BLD AUTO: 141 10E9/L (ref 150–450)
PLATELET # BLD AUTO: 145 10E9/L (ref 150–450)
PLATELET # BLD AUTO: 163 10E9/L (ref 150–450)
PLATELET # BLD AUTO: 170 10E9/L (ref 150–450)
PLATELET # BLD AUTO: 180 10E9/L (ref 150–450)
PLATELET # BLD AUTO: 198 10E9/L (ref 150–450)
PLATELET # BLD AUTO: 235 10E9/L (ref 150–450)
PLATELET # BLD AUTO: 249 10E9/L (ref 150–450)
PLATELET # BLD AUTO: 250 10E9/L (ref 150–450)
PLATELET # BLD AUTO: 250 10E9/L (ref 150–450)
PLATELET # BLD AUTO: 270 10E9/L (ref 150–450)
PLATELET # BLD AUTO: 275 10E9/L (ref 150–450)
PLATELET # BLD AUTO: 276 10E9/L (ref 150–450)
PLATELET # BLD AUTO: 294 10E9/L (ref 150–450)
PLATELET # BLD AUTO: 300 10E9/L (ref 150–450)
PLATELET # BLD AUTO: 333 10E9/L (ref 150–450)
PLATELET # BLD AUTO: 350 10E9/L (ref 150–450)
PLATELET # BLD AUTO: 365 10E9/L (ref 150–450)
PLATELET # BLD AUTO: 379 10E9/L (ref 150–450)
PLATELET # BLD AUTO: 402 10E9/L (ref 150–450)
PLATELET # BLD EST: ABNORMAL 10*3/UL
PO2 BLDV: 23 MM HG (ref 25–47)
POTASSIUM SERPL-SCNC: 3.6 MMOL/L (ref 3.4–5.3)
POTASSIUM SERPL-SCNC: 3.8 MMOL/L (ref 3.4–5.3)
POTASSIUM SERPL-SCNC: 3.8 MMOL/L (ref 3.4–5.3)
POTASSIUM SERPL-SCNC: 3.9 MMOL/L (ref 3.4–5.3)
POTASSIUM SERPL-SCNC: 4 MMOL/L (ref 3.4–5.3)
POTASSIUM SERPL-SCNC: 4 MMOL/L (ref 3.4–5.3)
POTASSIUM SERPL-SCNC: 4.1 MMOL/L (ref 3.4–5.3)
POTASSIUM SERPL-SCNC: 4.1 MMOL/L (ref 3.4–5.3)
POTASSIUM SERPL-SCNC: 4.2 MMOL/L (ref 3.4–5.3)
POTASSIUM SERPL-SCNC: 4.4 MMOL/L (ref 3.4–5.3)
POTASSIUM SERPL-SCNC: 4.4 MMOL/L (ref 3.4–5.3)
POTASSIUM SERPL-SCNC: 4.5 MMOL/L (ref 3.4–5.3)
PROT SERPL-MCNC: 6.1 G/DL (ref 6.8–8.8)
PROT SERPL-MCNC: 6.2 G/DL (ref 6.8–8.8)
PROT SERPL-MCNC: 6.2 G/DL (ref 6.8–8.8)
PROT SERPL-MCNC: 6.4 G/DL (ref 6.8–8.8)
PROT SERPL-MCNC: 6.4 G/DL (ref 6.8–8.8)
PROT SERPL-MCNC: 6.8 G/DL (ref 6.8–8.8)
PROT SERPL-MCNC: 7 G/DL (ref 6.8–8.8)
RBC # BLD AUTO: 2.63 10E12/L (ref 4.4–5.9)
RBC # BLD AUTO: 2.73 10E12/L (ref 4.4–5.9)
RBC # BLD AUTO: 2.76 10E12/L (ref 4.4–5.9)
RBC # BLD AUTO: 2.87 10E12/L (ref 4.4–5.9)
RBC # BLD AUTO: 2.95 10E12/L (ref 4.4–5.9)
RBC # BLD AUTO: 2.97 10E12/L (ref 4.4–5.9)
RBC # BLD AUTO: 2.97 10E12/L (ref 4.4–5.9)
RBC # BLD AUTO: 3.06 10E12/L (ref 4.4–5.9)
RBC # BLD AUTO: 3.14 10E12/L (ref 4.4–5.9)
RBC # BLD AUTO: 3.45 10E12/L (ref 4.4–5.9)
RBC # BLD AUTO: 3.52 10E12/L (ref 4.4–5.9)
RBC # BLD AUTO: 3.62 10E12/L (ref 4.4–5.9)
RBC # BLD AUTO: 3.9 10E12/L (ref 4.4–5.9)
RBC # BLD AUTO: 3.93 10E12/L (ref 4.4–5.9)
RBC # BLD AUTO: 4.12 10E12/L (ref 4.4–5.9)
RBC # BLD AUTO: 4.2 10E12/L (ref 4.4–5.9)
RBC # BLD AUTO: 4.32 10E12/L (ref 4.4–5.9)
RBC # BLD AUTO: 4.47 10E12/L (ref 4.4–5.9)
RBC # BLD AUTO: 4.56 10E12/L (ref 4.4–5.9)
RBC # BLD AUTO: 4.57 10E12/L (ref 4.4–5.9)
RBC # BLD AUTO: 4.89 10E12/L (ref 4.4–5.9)
RBC # BLD AUTO: 4.94 10E12/L (ref 4.4–5.9)
RBC # BLD AUTO: 5.18 10E12/L (ref 4.4–5.9)
RBC #/AREA URNS AUTO: 7 /HPF (ref 0–2)
RBC MORPH BLD: NORMAL
RBC MORPH BLD: NORMAL
SODIUM SERPL-SCNC: 132 MMOL/L (ref 133–144)
SODIUM SERPL-SCNC: 136 MMOL/L (ref 133–144)
SODIUM SERPL-SCNC: 137 MMOL/L (ref 133–144)
SODIUM SERPL-SCNC: 137 MMOL/L (ref 133–144)
SODIUM SERPL-SCNC: 138 MMOL/L (ref 133–144)
SODIUM SERPL-SCNC: 138 MMOL/L (ref 133–144)
SODIUM SERPL-SCNC: 139 MMOL/L (ref 133–144)
SODIUM SERPL-SCNC: 140 MMOL/L (ref 133–144)
SODIUM SERPL-SCNC: 140 MMOL/L (ref 133–144)
SOURCE: ABNORMAL
SP GR UR STRIP: 1.02 (ref 1–1.03)
SPECIMEN SOURCE: NORMAL
SQUAMOUS #/AREA URNS AUTO: 1 /HPF (ref 0–1)
T4 FREE SERPL-MCNC: 1.27 NG/DL (ref 0.76–1.46)
T4 FREE SERPL-MCNC: 1.37 NG/DL (ref 0.76–1.46)
TROPONIN I SERPL-MCNC: <0.015 UG/L (ref 0–0.04)
TSH SERPL DL<=0.005 MIU/L-ACNC: 3.89 MU/L (ref 0.4–4)
TSH SERPL DL<=0.005 MIU/L-ACNC: 5.63 MU/L (ref 0.4–4)
TSH SERPL DL<=0.005 MIU/L-ACNC: 9.46 MU/L (ref 0.4–4)
UROBILINOGEN UR STRIP-MCNC: 4 MG/DL (ref 0–2)
VARIANT LYMPHS BLD QL SMEAR: PRESENT
WBC # BLD AUTO: 1.9 10E9/L (ref 4–11)
WBC # BLD AUTO: 10 10E9/L (ref 4–11)
WBC # BLD AUTO: 10 10E9/L (ref 4–11)
WBC # BLD AUTO: 12.6 10E9/L (ref 4–11)
WBC # BLD AUTO: 2 10E9/L (ref 4–11)
WBC # BLD AUTO: 2 10E9/L (ref 4–11)
WBC # BLD AUTO: 2.2 10E9/L (ref 4–11)
WBC # BLD AUTO: 2.4 10E9/L (ref 4–11)
WBC # BLD AUTO: 2.7 10E9/L (ref 4–11)
WBC # BLD AUTO: 3.3 10E9/L (ref 4–11)
WBC # BLD AUTO: 3.4 10E9/L (ref 4–11)
WBC # BLD AUTO: 3.4 10E9/L (ref 4–11)
WBC # BLD AUTO: 3.5 10E9/L (ref 4–11)
WBC # BLD AUTO: 3.5 10E9/L (ref 4–11)
WBC # BLD AUTO: 3.9 10E9/L (ref 4–11)
WBC # BLD AUTO: 4 10E9/L (ref 4–11)
WBC # BLD AUTO: 4.2 10E9/L (ref 4–11)
WBC # BLD AUTO: 5 10E9/L (ref 4–11)
WBC # BLD AUTO: 8.4 10E9/L (ref 4–11)
WBC # BLD AUTO: 9.2 10E9/L (ref 4–11)
WBC # BLD AUTO: 9.2 10E9/L (ref 4–11)
WBC # BLD AUTO: 9.4 10E9/L (ref 4–11)
WBC # BLD AUTO: 9.8 10E9/L (ref 4–11)
WBC #/AREA URNS AUTO: 7 /HPF (ref 0–2)

## 2018-01-01 PROCEDURE — 99207 ZZC NO CHARGE NURSE ONLY: CPT

## 2018-01-01 PROCEDURE — 25000128 H RX IP 250 OP 636: Performed by: INTERNAL MEDICINE

## 2018-01-01 PROCEDURE — 36416 COLLJ CAPILLARY BLOOD SPEC: CPT

## 2018-01-01 PROCEDURE — G0463 HOSPITAL OUTPT CLINIC VISIT: HCPCS | Mod: 25

## 2018-01-01 PROCEDURE — 40000306 ZZH STATISTIC PRE PROC ASSESS II: Performed by: SURGERY

## 2018-01-01 PROCEDURE — 83735 ASSAY OF MAGNESIUM: CPT | Performed by: INTERNAL MEDICINE

## 2018-01-01 PROCEDURE — 25000125 ZZHC RX 250: Performed by: RADIOLOGY

## 2018-01-01 PROCEDURE — 71260 CT THORAX DX C+: CPT

## 2018-01-01 PROCEDURE — 25000125 ZZHC RX 250: Performed by: INTERNAL MEDICINE

## 2018-01-01 PROCEDURE — 85027 COMPLETE CBC AUTOMATED: CPT | Performed by: EMERGENCY MEDICINE

## 2018-01-01 PROCEDURE — 96375 TX/PRO/DX INJ NEW DRUG ADDON: CPT

## 2018-01-01 PROCEDURE — 96413 CHEMO IV INFUSION 1 HR: CPT

## 2018-01-01 PROCEDURE — 85025 COMPLETE CBC W/AUTO DIFF WBC: CPT | Performed by: FAMILY MEDICINE

## 2018-01-01 PROCEDURE — 70553 MRI BRAIN STEM W/O & W/DYE: CPT

## 2018-01-01 PROCEDURE — 25000128 H RX IP 250 OP 636: Performed by: FAMILY MEDICINE

## 2018-01-01 PROCEDURE — 96367 TX/PROPH/DG ADDL SEQ IV INF: CPT

## 2018-01-01 PROCEDURE — 85025 COMPLETE CBC W/AUTO DIFF WBC: CPT | Performed by: INTERNAL MEDICINE

## 2018-01-01 PROCEDURE — 70552 MRI BRAIN STEM W/DYE: CPT

## 2018-01-01 PROCEDURE — 25000132 ZZH RX MED GY IP 250 OP 250 PS 637: Performed by: FAMILY MEDICINE

## 2018-01-01 PROCEDURE — 96360 HYDRATION IV INFUSION INIT: CPT | Performed by: FAMILY MEDICINE

## 2018-01-01 PROCEDURE — 99214 OFFICE O/P EST MOD 30 MIN: CPT | Performed by: INTERNAL MEDICINE

## 2018-01-01 PROCEDURE — 84484 ASSAY OF TROPONIN QUANT: CPT | Performed by: FAMILY MEDICINE

## 2018-01-01 PROCEDURE — 25000128 H RX IP 250 OP 636: Performed by: EMERGENCY MEDICINE

## 2018-01-01 PROCEDURE — 97162 PT EVAL MOD COMPLEX 30 MIN: CPT | Mod: GP | Performed by: PHYSICAL THERAPIST

## 2018-01-01 PROCEDURE — 27210794 ZZH OR GENERAL SUPPLY STERILE: Performed by: SURGERY

## 2018-01-01 PROCEDURE — 99215 OFFICE O/P EST HI 40 MIN: CPT | Performed by: INTERNAL MEDICINE

## 2018-01-01 PROCEDURE — 36415 COLL VENOUS BLD VENIPUNCTURE: CPT | Performed by: FAMILY MEDICINE

## 2018-01-01 PROCEDURE — 96417 CHEMO IV INFUS EACH ADDL SEQ: CPT

## 2018-01-01 PROCEDURE — 93005 ELECTROCARDIOGRAM TRACING: CPT

## 2018-01-01 PROCEDURE — 84484 ASSAY OF TROPONIN QUANT: CPT | Performed by: EMERGENCY MEDICINE

## 2018-01-01 PROCEDURE — 76942 ECHO GUIDE FOR BIOPSY: CPT

## 2018-01-01 PROCEDURE — 25000128 H RX IP 250 OP 636: Performed by: RADIOLOGY

## 2018-01-01 PROCEDURE — 93010 ELECTROCARDIOGRAM REPORT: CPT | Performed by: PHYSICIAN ASSISTANT

## 2018-01-01 PROCEDURE — 40000193 ZZH STATISTIC PT WARD VISIT: Performed by: PHYSICAL THERAPIST

## 2018-01-01 PROCEDURE — 71046 X-RAY EXAM CHEST 2 VIEWS: CPT

## 2018-01-01 PROCEDURE — 40000858 ZZH STATISTIC CARDIOVERSION

## 2018-01-01 PROCEDURE — 85610 PROTHROMBIN TIME: CPT | Performed by: FAMILY MEDICINE

## 2018-01-01 PROCEDURE — 12000007 ZZH R&B INTERMEDIATE

## 2018-01-01 PROCEDURE — 93325 DOPPLER ECHO COLOR FLOW MAPG: CPT | Mod: 26 | Performed by: INTERNAL MEDICINE

## 2018-01-01 PROCEDURE — 37000009 ZZH ANESTHESIA TECHNICAL FEE, EACH ADDTL 15 MIN: Performed by: SURGERY

## 2018-01-01 PROCEDURE — 81001 URINALYSIS AUTO W/SCOPE: CPT | Performed by: FAMILY MEDICINE

## 2018-01-01 PROCEDURE — 99153 MOD SED SAME PHYS/QHP EA: CPT

## 2018-01-01 PROCEDURE — 34300033 ZZH RX 343: Performed by: INTERNAL MEDICINE

## 2018-01-01 PROCEDURE — 85610 PROTHROMBIN TIME: CPT | Mod: QW

## 2018-01-01 PROCEDURE — 99214 OFFICE O/P EST MOD 30 MIN: CPT | Mod: 25 | Performed by: INTERNAL MEDICINE

## 2018-01-01 PROCEDURE — 93321 DOPPLER ECHO F-UP/LMTD STD: CPT | Mod: 26 | Performed by: INTERNAL MEDICINE

## 2018-01-01 PROCEDURE — G0378 HOSPITAL OBSERVATION PER HR: HCPCS

## 2018-01-01 PROCEDURE — 85610 PROTHROMBIN TIME: CPT | Performed by: EMERGENCY MEDICINE

## 2018-01-01 PROCEDURE — 99214 OFFICE O/P EST MOD 30 MIN: CPT | Performed by: FAMILY MEDICINE

## 2018-01-01 PROCEDURE — 96374 THER/PROPH/DIAG INJ IV PUSH: CPT

## 2018-01-01 PROCEDURE — G0463 HOSPITAL OUTPT CLINIC VISIT: HCPCS

## 2018-01-01 PROCEDURE — 99207 ZZC NO CHARGE NURSE ONLY: CPT | Performed by: REGISTERED NURSE

## 2018-01-01 PROCEDURE — 99223 1ST HOSP IP/OBS HIGH 75: CPT | Mod: AI | Performed by: FAMILY MEDICINE

## 2018-01-01 PROCEDURE — 99219 ZZC INITIAL OBSERVATION CARE,LEVL II: CPT | Performed by: FAMILY MEDICINE

## 2018-01-01 PROCEDURE — 25000125 ZZHC RX 250: Mod: ZF | Performed by: RADIOLOGY

## 2018-01-01 PROCEDURE — 99285 EMERGENCY DEPT VISIT HI MDM: CPT | Mod: Z6 | Performed by: EMERGENCY MEDICINE

## 2018-01-01 PROCEDURE — 99207 ZZC CDG-CHARGE REQUIRED MANUAL ENTRY: CPT | Performed by: FAMILY MEDICINE

## 2018-01-01 PROCEDURE — 93308 TTE F-UP OR LMTD: CPT | Mod: 26 | Performed by: INTERNAL MEDICINE

## 2018-01-01 PROCEDURE — 80053 COMPREHEN METABOLIC PANEL: CPT | Performed by: EMERGENCY MEDICINE

## 2018-01-01 PROCEDURE — 93010 ELECTROCARDIOGRAM REPORT: CPT | Performed by: INTERNAL MEDICINE

## 2018-01-01 PROCEDURE — 78815 PET IMAGE W/CT SKULL-THIGH: CPT | Mod: PS

## 2018-01-01 PROCEDURE — 36561 INSERT TUNNELED CV CATH: CPT | Performed by: SURGERY

## 2018-01-01 PROCEDURE — 93010 ELECTROCARDIOGRAM REPORT: CPT | Mod: Z6 | Performed by: FAMILY MEDICINE

## 2018-01-01 PROCEDURE — 96361 HYDRATE IV INFUSION ADD-ON: CPT | Performed by: EMERGENCY MEDICINE

## 2018-01-01 PROCEDURE — 84439 ASSAY OF FREE THYROXINE: CPT | Performed by: PHYSICIAN ASSISTANT

## 2018-01-01 PROCEDURE — 84132 ASSAY OF SERUM POTASSIUM: CPT | Performed by: INTERNAL MEDICINE

## 2018-01-01 PROCEDURE — 25000125 ZZHC RX 250: Performed by: NURSE ANESTHETIST, CERTIFIED REGISTERED

## 2018-01-01 PROCEDURE — 80048 BASIC METABOLIC PNL TOTAL CA: CPT | Performed by: INTERNAL MEDICINE

## 2018-01-01 PROCEDURE — 25000125 ZZHC RX 250: Performed by: EMERGENCY MEDICINE

## 2018-01-01 PROCEDURE — 82565 ASSAY OF CREATININE: CPT | Performed by: INTERNAL MEDICINE

## 2018-01-01 PROCEDURE — 25000132 ZZH RX MED GY IP 250 OP 250 PS 637: Performed by: INTERNAL MEDICINE

## 2018-01-01 PROCEDURE — C1788 PORT, INDWELLING, IMP: HCPCS | Performed by: SURGERY

## 2018-01-01 PROCEDURE — 77001 FLUOROGUIDE FOR VEIN DEVICE: CPT | Mod: 26 | Performed by: SURGERY

## 2018-01-01 PROCEDURE — 99285 EMERGENCY DEPT VISIT HI MDM: CPT | Mod: 25 | Performed by: EMERGENCY MEDICINE

## 2018-01-01 PROCEDURE — 25000132 ZZH RX MED GY IP 250 OP 250 PS 637: Mod: ZF | Performed by: RADIOLOGY

## 2018-01-01 PROCEDURE — 40000277 XR SURGERY CARM FLUORO LESS THAN 5 MIN W STILLS

## 2018-01-01 PROCEDURE — C1729 CATH, DRAINAGE: HCPCS

## 2018-01-01 PROCEDURE — 85025 COMPLETE CBC W/AUTO DIFF WBC: CPT | Performed by: EMERGENCY MEDICINE

## 2018-01-01 PROCEDURE — 25000128 H RX IP 250 OP 636: Performed by: SURGERY

## 2018-01-01 PROCEDURE — 37000008 ZZH ANESTHESIA TECHNICAL FEE, 1ST 30 MIN: Performed by: SURGERY

## 2018-01-01 PROCEDURE — 27210732 ZZH ACCESSORY CR1

## 2018-01-01 PROCEDURE — 99284 EMERGENCY DEPT VISIT MOD MDM: CPT | Mod: 25 | Performed by: EMERGENCY MEDICINE

## 2018-01-01 PROCEDURE — 40000857 ZZH STATISTIC TEE INCLUDES SEDATION

## 2018-01-01 PROCEDURE — 80048 BASIC METABOLIC PNL TOTAL CA: CPT | Performed by: FAMILY MEDICINE

## 2018-01-01 PROCEDURE — 93000 ELECTROCARDIOGRAM COMPLETE: CPT | Performed by: PHYSICIAN ASSISTANT

## 2018-01-01 PROCEDURE — 85027 COMPLETE CBC AUTOMATED: CPT | Performed by: FAMILY MEDICINE

## 2018-01-01 PROCEDURE — 84443 ASSAY THYROID STIM HORMONE: CPT | Performed by: INTERNAL MEDICINE

## 2018-01-01 PROCEDURE — 36415 COLL VENOUS BLD VENIPUNCTURE: CPT | Performed by: PHYSICIAN ASSISTANT

## 2018-01-01 PROCEDURE — 85025 COMPLETE CBC W/AUTO DIFF WBC: CPT | Performed by: PHYSICIAN ASSISTANT

## 2018-01-01 PROCEDURE — 84484 ASSAY OF TROPONIN QUANT: CPT | Performed by: PHYSICIAN ASSISTANT

## 2018-01-01 PROCEDURE — 93005 ELECTROCARDIOGRAM TRACING: CPT | Mod: XU

## 2018-01-01 PROCEDURE — 71000027 ZZH RECOVERY PHASE 2 EACH 15 MINS: Performed by: SURGERY

## 2018-01-01 PROCEDURE — 99214 OFFICE O/P EST MOD 30 MIN: CPT | Performed by: PHYSICIAN ASSISTANT

## 2018-01-01 PROCEDURE — 40000235 ZZH STATISTIC TELEMETRY

## 2018-01-01 PROCEDURE — G0180 MD CERTIFICATION HHA PATIENT: HCPCS | Performed by: FAMILY MEDICINE

## 2018-01-01 PROCEDURE — 99215 OFFICE O/P EST HI 40 MIN: CPT | Performed by: PHYSICIAN ASSISTANT

## 2018-01-01 PROCEDURE — 99152 MOD SED SAME PHYS/QHP 5/>YRS: CPT

## 2018-01-01 PROCEDURE — 77370 RADIATION PHYSICS CONSULT: CPT | Performed by: RADIOLOGY

## 2018-01-01 PROCEDURE — 93000 ELECTROCARDIOGRAM COMPLETE: CPT | Performed by: FAMILY MEDICINE

## 2018-01-01 PROCEDURE — 99211 OFF/OP EST MAY X REQ PHY/QHP: CPT

## 2018-01-01 PROCEDURE — 96365 THER/PROPH/DIAG IV INF INIT: CPT

## 2018-01-01 PROCEDURE — 93005 ELECTROCARDIOGRAM TRACING: CPT | Performed by: EMERGENCY MEDICINE

## 2018-01-01 PROCEDURE — 84439 ASSAY OF FREE THYROXINE: CPT | Performed by: INTERNAL MEDICINE

## 2018-01-01 PROCEDURE — 99215 OFFICE O/P EST HI 40 MIN: CPT | Performed by: FAMILY MEDICINE

## 2018-01-01 PROCEDURE — 94640 AIRWAY INHALATION TREATMENT: CPT

## 2018-01-01 PROCEDURE — 25000128 H RX IP 250 OP 636

## 2018-01-01 PROCEDURE — 93325 DOPPLER ECHO COLOR FLOW MAPG: CPT

## 2018-01-01 PROCEDURE — 71046 X-RAY EXAM CHEST 2 VIEWS: CPT | Mod: XU

## 2018-01-01 PROCEDURE — 80053 COMPREHEN METABOLIC PANEL: CPT | Performed by: INTERNAL MEDICINE

## 2018-01-01 PROCEDURE — 99285 EMERGENCY DEPT VISIT HI MDM: CPT | Mod: 25 | Performed by: FAMILY MEDICINE

## 2018-01-01 PROCEDURE — 75989 ABSCESS DRAINAGE UNDER X-RAY: CPT

## 2018-01-01 PROCEDURE — 77371 SRS MULTISOURCE: CPT | Performed by: RADIOLOGY

## 2018-01-01 PROCEDURE — 25000125 ZZHC RX 250: Performed by: FAMILY MEDICINE

## 2018-01-01 PROCEDURE — 80053 COMPREHEN METABOLIC PANEL: CPT | Performed by: PHYSICIAN ASSISTANT

## 2018-01-01 PROCEDURE — 88305 TISSUE EXAM BY PATHOLOGIST: CPT | Mod: 26 | Performed by: INTERNAL MEDICINE

## 2018-01-01 PROCEDURE — C1769 GUIDE WIRE: HCPCS

## 2018-01-01 PROCEDURE — 82803 BLOOD GASES ANY COMBINATION: CPT | Performed by: FAMILY MEDICINE

## 2018-01-01 PROCEDURE — 0298T ZZC EXT ECG > 48HR TO 21 DAY REVIEW AND INTERPRETATN: CPT | Performed by: INTERNAL MEDICINE

## 2018-01-01 PROCEDURE — 70491 CT SOFT TISSUE NECK W/DYE: CPT

## 2018-01-01 PROCEDURE — 25000128 H RX IP 250 OP 636: Performed by: NURSE ANESTHETIST, CERTIFIED REGISTERED

## 2018-01-01 PROCEDURE — 85025 COMPLETE CBC W/AUTO DIFF WBC: CPT | Performed by: RADIOLOGY

## 2018-01-01 PROCEDURE — 99217 ZZC OBSERVATION CARE DISCHARGE: CPT | Performed by: FAMILY MEDICINE

## 2018-01-01 PROCEDURE — 40000986 XR CHEST 1 VW

## 2018-01-01 PROCEDURE — 99285 EMERGENCY DEPT VISIT HI MDM: CPT | Mod: Z6 | Performed by: FAMILY MEDICINE

## 2018-01-01 PROCEDURE — 80053 COMPREHEN METABOLIC PANEL: CPT | Performed by: FAMILY MEDICINE

## 2018-01-01 PROCEDURE — 25000128 H RX IP 250 OP 636: Mod: ZF | Performed by: RADIOLOGY

## 2018-01-01 PROCEDURE — 36000054 ZZH SURGERY LEVEL 2 W FLUORO 1ST 30 MIN: Performed by: SURGERY

## 2018-01-01 PROCEDURE — 93005 ELECTROCARDIOGRAM TRACING: CPT | Performed by: FAMILY MEDICINE

## 2018-01-01 PROCEDURE — 96374 THER/PROPH/DIAG INJ IV PUSH: CPT | Mod: 59 | Performed by: EMERGENCY MEDICINE

## 2018-01-01 PROCEDURE — 27210190 US THORACENTESIS

## 2018-01-01 PROCEDURE — 99226 ZZC SUBSEQUENT OBSERVATION CARE,LEVEL III: CPT | Performed by: INTERNAL MEDICINE

## 2018-01-01 PROCEDURE — 25000125 ZZHC RX 250: Performed by: SURGERY

## 2018-01-01 PROCEDURE — 25000132 ZZH RX MED GY IP 250 OP 250 PS 637: Performed by: EMERGENCY MEDICINE

## 2018-01-01 PROCEDURE — 96360 HYDRATION IV INFUSION INIT: CPT | Performed by: EMERGENCY MEDICINE

## 2018-01-01 PROCEDURE — 94640 AIRWAY INHALATION TREATMENT: CPT | Performed by: FAMILY MEDICINE

## 2018-01-01 PROCEDURE — A9552 F18 FDG: HCPCS | Performed by: INTERNAL MEDICINE

## 2018-01-01 PROCEDURE — 99215 OFFICE O/P EST HI 40 MIN: CPT | Mod: 25 | Performed by: PHYSICIAN ASSISTANT

## 2018-01-01 PROCEDURE — 36000052 ZZH SURGERY LEVEL 2 EA 15 ADDTL MIN: Performed by: SURGERY

## 2018-01-01 PROCEDURE — 83880 ASSAY OF NATRIURETIC PEPTIDE: CPT | Performed by: FAMILY MEDICINE

## 2018-01-01 PROCEDURE — 25000128 H RX IP 250 OP 636: Performed by: STUDENT IN AN ORGANIZED HEALTH CARE EDUCATION/TRAINING PROGRAM

## 2018-01-01 PROCEDURE — 97530 THERAPEUTIC ACTIVITIES: CPT | Mod: GP | Performed by: PHYSICAL THERAPIST

## 2018-01-01 PROCEDURE — 0296T ZIO PATCH HOLTER: CPT | Performed by: INTERNAL MEDICINE

## 2018-01-01 PROCEDURE — 27210738 ZZH ACCESSORY CR2

## 2018-01-01 PROCEDURE — 77334 RADIATION TREATMENT AID(S): CPT | Performed by: RADIOLOGY

## 2018-01-01 PROCEDURE — 85379 FIBRIN DEGRADATION QUANT: CPT | Performed by: FAMILY MEDICINE

## 2018-01-01 PROCEDURE — 85610 PROTHROMBIN TIME: CPT | Performed by: RADIOLOGY

## 2018-01-01 PROCEDURE — 83605 ASSAY OF LACTIC ACID: CPT | Performed by: FAMILY MEDICINE

## 2018-01-01 PROCEDURE — A9585 GADOBUTROL INJECTION: HCPCS | Performed by: RADIOLOGY

## 2018-01-01 PROCEDURE — 87040 BLOOD CULTURE FOR BACTERIA: CPT | Performed by: FAMILY MEDICINE

## 2018-01-01 PROCEDURE — 87086 URINE CULTURE/COLONY COUNT: CPT | Performed by: FAMILY MEDICINE

## 2018-01-01 PROCEDURE — 88305 TISSUE EXAM BY PATHOLOGIST: CPT | Performed by: INTERNAL MEDICINE

## 2018-01-01 PROCEDURE — 84443 ASSAY THYROID STIM HORMONE: CPT | Performed by: PHYSICIAN ASSISTANT

## 2018-01-01 PROCEDURE — 77300 RADIATION THERAPY DOSE PLAN: CPT | Performed by: RADIOLOGY

## 2018-01-01 PROCEDURE — 99207 ZZC CDG-CODE CATEGORY CHANGED: CPT | Performed by: FAMILY MEDICINE

## 2018-01-01 PROCEDURE — 99284 EMERGENCY DEPT VISIT MOD MDM: CPT | Mod: Z6 | Performed by: EMERGENCY MEDICINE

## 2018-01-01 PROCEDURE — 40000166 ZZH STATISTIC PP CARE STAGE 1

## 2018-01-01 PROCEDURE — A9585 GADOBUTROL INJECTION: HCPCS | Performed by: INTERNAL MEDICINE

## 2018-01-01 PROCEDURE — 27210903 ZZH KIT CR5

## 2018-01-01 PROCEDURE — 36591 DRAW BLOOD OFF VENOUS DEVICE: CPT

## 2018-01-01 PROCEDURE — 96361 HYDRATE IV INFUSION ADD-ON: CPT

## 2018-01-01 PROCEDURE — 99285 EMERGENCY DEPT VISIT HI MDM: CPT | Mod: 25

## 2018-01-01 PROCEDURE — 97116 GAIT TRAINING THERAPY: CPT | Mod: GP,59 | Performed by: PHYSICAL THERAPIST

## 2018-01-01 PROCEDURE — 40000986 XR CHEST PORT 1 VW

## 2018-01-01 PROCEDURE — 40000172 ZZH STATISTIC PROCEDURE PREP ONLY

## 2018-01-01 PROCEDURE — 77295 3-D RADIOTHERAPY PLAN: CPT | Performed by: RADIOLOGY

## 2018-01-01 PROCEDURE — 25000125 ZZHC RX 250: Performed by: STUDENT IN AN ORGANIZED HEALTH CARE EDUCATION/TRAINING PROGRAM

## 2018-01-01 PROCEDURE — 99217 ZZC OBSERVATION CARE DISCHARGE: CPT | Performed by: INTERNAL MEDICINE

## 2018-01-01 DEVICE — CATH PORT MRI POWERPORT 8FR SL 1808000: Type: IMPLANTABLE DEVICE | Site: CHEST  WALL | Status: FUNCTIONAL

## 2018-01-01 RX ORDER — EPINEPHRINE 1 MG/ML
0.3 INJECTION, SOLUTION, CONCENTRATE INTRAVENOUS EVERY 5 MIN PRN
Status: CANCELLED | OUTPATIENT
Start: 2018-01-01

## 2018-01-01 RX ORDER — NALOXONE HYDROCHLORIDE 0.4 MG/ML
.1-.4 INJECTION, SOLUTION INTRAMUSCULAR; INTRAVENOUS; SUBCUTANEOUS
Status: DISCONTINUED | OUTPATIENT
Start: 2018-01-01 | End: 2018-01-01 | Stop reason: HOSPADM

## 2018-01-01 RX ORDER — DIPHENHYDRAMINE HYDROCHLORIDE 50 MG/ML
50 INJECTION INTRAMUSCULAR; INTRAVENOUS
Status: CANCELLED
Start: 2018-01-01

## 2018-01-01 RX ORDER — HEPARIN SODIUM (PORCINE) LOCK FLUSH IV SOLN 100 UNIT/ML 100 UNIT/ML
5 SOLUTION INTRAVENOUS
Status: CANCELLED | OUTPATIENT
Start: 2018-01-01

## 2018-01-01 RX ORDER — ALBUTEROL SULFATE 0.83 MG/ML
2.5 SOLUTION RESPIRATORY (INHALATION)
Status: CANCELLED | OUTPATIENT
Start: 2018-01-01

## 2018-01-01 RX ORDER — METHYLPREDNISOLONE SODIUM SUCCINATE 125 MG/2ML
125 INJECTION, POWDER, LYOPHILIZED, FOR SOLUTION INTRAMUSCULAR; INTRAVENOUS
Status: CANCELLED
Start: 2018-01-01

## 2018-01-01 RX ORDER — MEPERIDINE HYDROCHLORIDE 25 MG/ML
25 INJECTION INTRAMUSCULAR; INTRAVENOUS; SUBCUTANEOUS EVERY 30 MIN PRN
Status: CANCELLED | OUTPATIENT
Start: 2018-01-01

## 2018-01-01 RX ORDER — HEPARIN SODIUM (PORCINE) LOCK FLUSH IV SOLN 100 UNIT/ML 100 UNIT/ML
5 SOLUTION INTRAVENOUS
Status: DISCONTINUED | OUTPATIENT
Start: 2018-01-01 | End: 2018-01-01 | Stop reason: HOSPADM

## 2018-01-01 RX ORDER — BUPROPION HYDROCHLORIDE 200 MG/1
200 TABLET, EXTENDED RELEASE ORAL 2 TIMES DAILY
Qty: 60 TABLET | Refills: 5 | Status: SHIPPED | OUTPATIENT
Start: 2018-01-01

## 2018-01-01 RX ORDER — LORAZEPAM 2 MG/ML
0.5 INJECTION INTRAMUSCULAR EVERY 4 HOURS PRN
Status: CANCELLED
Start: 2018-01-01

## 2018-01-01 RX ORDER — PROCHLORPERAZINE MALEATE 10 MG
10 TABLET ORAL EVERY 6 HOURS PRN
Status: DISCONTINUED | OUTPATIENT
Start: 2018-01-01 | End: 2018-01-01 | Stop reason: HOSPADM

## 2018-01-01 RX ORDER — IPRATROPIUM BROMIDE AND ALBUTEROL SULFATE 2.5; .5 MG/3ML; MG/3ML
3 SOLUTION RESPIRATORY (INHALATION) ONCE
Status: COMPLETED | OUTPATIENT
Start: 2018-01-01 | End: 2018-01-01

## 2018-01-01 RX ORDER — ZOLPIDEM TARTRATE 5 MG/1
5 TABLET ORAL
Status: DISCONTINUED | OUTPATIENT
Start: 2018-01-01 | End: 2018-01-01 | Stop reason: HOSPADM

## 2018-01-01 RX ORDER — LORAZEPAM 0.5 MG/1
0.5 TABLET ORAL EVERY 4 HOURS PRN
Qty: 30 TABLET | Refills: 5 | Status: SHIPPED | OUTPATIENT
Start: 2018-01-01

## 2018-01-01 RX ORDER — NICOTINE 21 MG/24HR
1 PATCH, TRANSDERMAL 24 HOURS TRANSDERMAL DAILY
Status: DISCONTINUED | OUTPATIENT
Start: 2018-01-01 | End: 2018-01-01 | Stop reason: HOSPADM

## 2018-01-01 RX ORDER — IOPAMIDOL 755 MG/ML
125 INJECTION, SOLUTION INTRAVASCULAR ONCE
Status: COMPLETED | OUTPATIENT
Start: 2018-01-01 | End: 2018-01-01

## 2018-01-01 RX ORDER — EPINEPHRINE 0.3 MG/.3ML
0.3 INJECTION SUBCUTANEOUS EVERY 5 MIN PRN
Status: CANCELLED | OUTPATIENT
Start: 2018-01-01

## 2018-01-01 RX ORDER — SODIUM CHLORIDE 9 MG/ML
1000 INJECTION, SOLUTION INTRAVENOUS CONTINUOUS PRN
Status: CANCELLED
Start: 2018-01-01

## 2018-01-01 RX ORDER — MORPHINE SULFATE 15 MG/1
TABLET ORAL
Qty: 30 TABLET | Refills: 0 | Status: SHIPPED | OUTPATIENT
Start: 2018-01-01 | End: 2018-01-01

## 2018-01-01 RX ORDER — ALBUTEROL SULFATE 90 UG/1
1-2 AEROSOL, METERED RESPIRATORY (INHALATION)
Status: CANCELLED
Start: 2018-01-01

## 2018-01-01 RX ORDER — WARFARIN SODIUM 2.5 MG/1
TABLET ORAL
Qty: 75 TABLET | Refills: 0 | Status: SHIPPED | OUTPATIENT
Start: 2018-01-01 | End: 2018-01-01

## 2018-01-01 RX ORDER — LORAZEPAM 0.5 MG/1
.5-1 TABLET ORAL
Status: DISCONTINUED | OUTPATIENT
Start: 2018-01-01 | End: 2018-01-01 | Stop reason: HOSPADM

## 2018-01-01 RX ORDER — FLUMAZENIL 0.1 MG/ML
0.2 INJECTION, SOLUTION INTRAVENOUS
Status: DISCONTINUED | OUTPATIENT
Start: 2018-01-01 | End: 2018-01-01 | Stop reason: HOSPADM

## 2018-01-01 RX ORDER — LIDOCAINE HYDROCHLORIDE AND EPINEPHRINE 10; 10 MG/ML; UG/ML
INJECTION, SOLUTION INFILTRATION; PERINEURAL PRN
Status: DISCONTINUED | OUTPATIENT
Start: 2018-01-01 | End: 2018-01-01 | Stop reason: HOSPADM

## 2018-01-01 RX ORDER — LIDOCAINE 40 MG/G
CREAM TOPICAL
Status: DISCONTINUED | OUTPATIENT
Start: 2018-01-01 | End: 2018-01-01 | Stop reason: HOSPADM

## 2018-01-01 RX ORDER — FENTANYL CITRATE 50 UG/ML
25-50 INJECTION, SOLUTION INTRAMUSCULAR; INTRAVENOUS
Status: DISCONTINUED | OUTPATIENT
Start: 2018-01-01 | End: 2018-01-01 | Stop reason: HOSPADM

## 2018-01-01 RX ORDER — ACETAMINOPHEN 325 MG/1
650 TABLET ORAL EVERY 4 HOURS PRN
Status: DISCONTINUED | OUTPATIENT
Start: 2018-01-01 | End: 2018-01-01 | Stop reason: HOSPADM

## 2018-01-01 RX ORDER — CEFAZOLIN SODIUM 2 G/100ML
2 INJECTION, SOLUTION INTRAVENOUS
Status: COMPLETED | OUTPATIENT
Start: 2018-01-01 | End: 2018-01-01

## 2018-01-01 RX ORDER — SODIUM CHLORIDE, SODIUM LACTATE, POTASSIUM CHLORIDE, CALCIUM CHLORIDE 600; 310; 30; 20 MG/100ML; MG/100ML; MG/100ML; MG/100ML
INJECTION, SOLUTION INTRAVENOUS CONTINUOUS
Status: DISCONTINUED | OUTPATIENT
Start: 2018-01-01 | End: 2018-01-01 | Stop reason: HOSPADM

## 2018-01-01 RX ORDER — SODIUM CHLORIDE 9 MG/ML
1000 INJECTION, SOLUTION INTRAVENOUS CONTINUOUS
Status: DISCONTINUED | OUTPATIENT
Start: 2018-01-01 | End: 2018-01-01 | Stop reason: HOSPADM

## 2018-01-01 RX ORDER — ONDANSETRON 4 MG/1
8 TABLET, ORALLY DISINTEGRATING ORAL EVERY 6 HOURS PRN
Status: DISCONTINUED | OUTPATIENT
Start: 2018-01-01 | End: 2018-01-01 | Stop reason: HOSPADM

## 2018-01-01 RX ORDER — ATROPINE SULFATE 0.1 MG/ML
.5-1 INJECTION INTRAVENOUS
Status: DISCONTINUED | OUTPATIENT
Start: 2018-01-01 | End: 2018-01-01 | Stop reason: HOSPADM

## 2018-01-01 RX ORDER — FENTANYL CITRATE 50 UG/ML
50-100 INJECTION, SOLUTION INTRAMUSCULAR; INTRAVENOUS
Status: DISCONTINUED | OUTPATIENT
Start: 2018-01-01 | End: 2018-01-01 | Stop reason: HOSPADM

## 2018-01-01 RX ORDER — ONDANSETRON 4 MG/1
8 TABLET, ORALLY DISINTEGRATING ORAL EVERY 8 HOURS PRN
Status: DISCONTINUED | OUTPATIENT
Start: 2018-01-01 | End: 2018-01-01 | Stop reason: HOSPADM

## 2018-01-01 RX ORDER — MORPHINE SULFATE 15 MG/1
15 TABLET ORAL EVERY 6 HOURS PRN
Qty: 60 TABLET | Refills: 0 | Status: SHIPPED | OUTPATIENT
Start: 2018-01-01 | End: 2018-01-01

## 2018-01-01 RX ORDER — NALOXONE HYDROCHLORIDE 0.4 MG/ML
.1-.4 INJECTION, SOLUTION INTRAMUSCULAR; INTRAVENOUS; SUBCUTANEOUS
Status: DISCONTINUED | OUTPATIENT
Start: 2018-01-01 | End: 2018-01-01

## 2018-01-01 RX ORDER — HYDROCODONE BITARTRATE AND ACETAMINOPHEN 5; 325 MG/1; MG/1
1-2 TABLET ORAL EVERY 6 HOURS PRN
Status: DISCONTINUED | OUTPATIENT
Start: 2018-01-01 | End: 2018-01-01 | Stop reason: HOSPADM

## 2018-01-01 RX ORDER — MEGESTROL ACETATE 40 MG/ML
200 SUSPENSION ORAL DAILY
Status: DISCONTINUED | OUTPATIENT
Start: 2018-01-01 | End: 2018-01-01 | Stop reason: HOSPADM

## 2018-01-01 RX ORDER — PHYTONADIONE 5 MG/1
5 TABLET ORAL ONCE
Qty: 1 TABLET | Refills: 0 | Status: SHIPPED | OUTPATIENT
Start: 2018-01-01 | End: 2018-01-01

## 2018-01-01 RX ORDER — ALBUTEROL SULFATE 90 UG/1
2 AEROSOL, METERED RESPIRATORY (INHALATION) EVERY 4 HOURS PRN
Qty: 1 INHALER | Refills: 11 | Status: SHIPPED | OUTPATIENT
Start: 2018-01-01

## 2018-01-01 RX ORDER — PROPOFOL 10 MG/ML
INJECTION, EMULSION INTRAVENOUS CONTINUOUS PRN
Status: DISCONTINUED | OUTPATIENT
Start: 2018-01-01 | End: 2018-01-01

## 2018-01-01 RX ORDER — MORPHINE SULFATE 15 MG/1
15 TABLET ORAL EVERY 4 HOURS PRN
Status: DISCONTINUED | OUTPATIENT
Start: 2018-01-01 | End: 2018-01-01

## 2018-01-01 RX ORDER — AMOXICILLIN 250 MG
1 CAPSULE ORAL 2 TIMES DAILY
Qty: 60 TABLET | Refills: 11 | Status: SHIPPED | OUTPATIENT
Start: 2018-01-01 | End: 2018-01-01

## 2018-01-01 RX ORDER — LIDOCAINE HYDROCHLORIDE 10 MG/ML
INJECTION, SOLUTION INFILTRATION; PERINEURAL PRN
Status: DISCONTINUED | OUTPATIENT
Start: 2018-01-01 | End: 2018-01-01

## 2018-01-01 RX ORDER — HEPARIN SODIUM 1000 [USP'U]/ML
INJECTION, SOLUTION INTRAVENOUS; SUBCUTANEOUS PRN
Status: DISCONTINUED | OUTPATIENT
Start: 2018-01-01 | End: 2018-01-01 | Stop reason: HOSPADM

## 2018-01-01 RX ORDER — LIDOCAINE HYDROCHLORIDE 40 MG/ML
1.5 SOLUTION TOPICAL ONCE
Status: DISCONTINUED | OUTPATIENT
Start: 2018-01-01 | End: 2018-01-01 | Stop reason: HOSPADM

## 2018-01-01 RX ORDER — ONDANSETRON 2 MG/ML
4 INJECTION INTRAMUSCULAR; INTRAVENOUS EVERY 6 HOURS PRN
Status: DISCONTINUED | OUTPATIENT
Start: 2018-01-01 | End: 2018-01-01 | Stop reason: HOSPADM

## 2018-01-01 RX ORDER — MORPHINE SULFATE 30 MG/1
2.5 TABLET ORAL EVERY 4 HOURS PRN
Qty: 60 TABLET | Refills: 0 | Status: SHIPPED | OUTPATIENT
Start: 2018-01-01 | End: 2018-01-01 | Stop reason: ALTCHOICE

## 2018-01-01 RX ORDER — HYDROMORPHONE HYDROCHLORIDE 1 MG/ML
.3-.5 INJECTION, SOLUTION INTRAMUSCULAR; INTRAVENOUS; SUBCUTANEOUS EVERY 10 MIN PRN
Status: DISCONTINUED | OUTPATIENT
Start: 2018-01-01 | End: 2018-01-01 | Stop reason: HOSPADM

## 2018-01-01 RX ORDER — NICOTINE 21 MG/24HR
1 PATCH, TRANSDERMAL 24 HOURS TRANSDERMAL EVERY 24 HOURS
Qty: 30 PATCH | Refills: 0 | Status: SHIPPED | OUTPATIENT
Start: 2018-01-01 | End: 2018-01-01

## 2018-01-01 RX ORDER — MORPHINE SULFATE 15 MG/1
TABLET ORAL
Qty: 60 TABLET | Refills: 0 | Status: ON HOLD | OUTPATIENT
Start: 2018-01-01 | End: 2018-01-01

## 2018-01-01 RX ORDER — HEPARIN SODIUM (PORCINE) LOCK FLUSH IV SOLN 100 UNIT/ML 100 UNIT/ML
500 SOLUTION INTRAVENOUS ONCE
Status: COMPLETED | OUTPATIENT
Start: 2018-01-01 | End: 2018-01-01

## 2018-01-01 RX ORDER — WARFARIN SODIUM 5 MG/1
TABLET ORAL
Qty: 30 TABLET | Refills: 1 | COMMUNITY
Start: 2018-01-01 | End: 2018-01-01

## 2018-01-01 RX ORDER — ONDANSETRON 2 MG/ML
4 INJECTION INTRAMUSCULAR; INTRAVENOUS
Status: DISCONTINUED | OUTPATIENT
Start: 2018-01-01 | End: 2018-01-01 | Stop reason: HOSPADM

## 2018-01-01 RX ORDER — LISINOPRIL 10 MG/1
10 TABLET ORAL DAILY
Qty: 30 TABLET | Refills: 1 | Status: SHIPPED | OUTPATIENT
Start: 2018-01-01

## 2018-01-01 RX ORDER — IOPAMIDOL 755 MG/ML
70 INJECTION, SOLUTION INTRAVASCULAR ONCE
Status: COMPLETED | OUTPATIENT
Start: 2018-01-01 | End: 2018-01-01

## 2018-01-01 RX ORDER — HEPARIN SODIUM,PORCINE 10 UNIT/ML
5 VIAL (ML) INTRAVENOUS
Status: COMPLETED | OUTPATIENT
Start: 2018-01-01 | End: 2018-01-01

## 2018-01-01 RX ORDER — LORAZEPAM 0.5 MG/1
0.5 TABLET ORAL EVERY 4 HOURS PRN
Qty: 30 TABLET | Refills: 5 | Status: SHIPPED | OUTPATIENT
Start: 2018-01-01 | End: 2018-01-01

## 2018-01-01 RX ORDER — WARFARIN SODIUM 2.5 MG/1
TABLET ORAL
Qty: 25 TABLET | Refills: 0 | Status: SHIPPED | OUTPATIENT
Start: 2018-01-01 | End: 2018-01-01

## 2018-01-01 RX ORDER — WARFARIN SODIUM 2.5 MG/1
TABLET ORAL
Qty: 25 TABLET | Refills: 0 | COMMUNITY
Start: 2018-01-01

## 2018-01-01 RX ORDER — HEPARIN SODIUM (PORCINE) LOCK FLUSH IV SOLN 100 UNIT/ML 100 UNIT/ML
500 SOLUTION INTRAVENOUS EVERY 8 HOURS
Status: DISCONTINUED | OUTPATIENT
Start: 2018-01-01 | End: 2018-01-01 | Stop reason: CLARIF

## 2018-01-01 RX ORDER — HYDROCODONE BITARTRATE AND ACETAMINOPHEN 5; 325 MG/1; MG/1
1-2 TABLET ORAL EVERY 6 HOURS PRN
Qty: 30 TABLET | Refills: 0 | Status: SHIPPED | OUTPATIENT
Start: 2018-01-01 | End: 2018-01-01

## 2018-01-01 RX ORDER — METOPROLOL TARTRATE 25 MG/1
75 TABLET, FILM COATED ORAL 2 TIMES DAILY
Status: DISCONTINUED | OUTPATIENT
Start: 2018-01-01 | End: 2018-01-01 | Stop reason: HOSPADM

## 2018-01-01 RX ORDER — ALBUTEROL SULFATE 0.83 MG/ML
1 SOLUTION RESPIRATORY (INHALATION) EVERY 6 HOURS PRN
Qty: 25 VIAL | Refills: 3 | Status: SHIPPED | OUTPATIENT
Start: 2018-01-01

## 2018-01-01 RX ORDER — ONDANSETRON 2 MG/ML
4 INJECTION INTRAMUSCULAR; INTRAVENOUS EVERY 30 MIN PRN
Status: DISCONTINUED | OUTPATIENT
Start: 2018-01-01 | End: 2018-01-01 | Stop reason: HOSPADM

## 2018-01-01 RX ORDER — DEXTROSE MONOHYDRATE 25 G/50ML
25-50 INJECTION, SOLUTION INTRAVENOUS
Status: CANCELLED | OUTPATIENT
Start: 2018-01-01

## 2018-01-01 RX ORDER — FENTANYL CITRATE 50 UG/ML
25-50 INJECTION, SOLUTION INTRAMUSCULAR; INTRAVENOUS EVERY 5 MIN PRN
Status: DISCONTINUED | OUTPATIENT
Start: 2018-01-01 | End: 2018-01-01 | Stop reason: HOSPADM

## 2018-01-01 RX ORDER — POTASSIUM CHLORIDE 1500 MG/1
20 TABLET, EXTENDED RELEASE ORAL
Status: DISCONTINUED | OUTPATIENT
Start: 2018-01-01 | End: 2018-01-01 | Stop reason: HOSPADM

## 2018-01-01 RX ORDER — PROCHLORPERAZINE MALEATE 10 MG
10 TABLET ORAL EVERY 6 HOURS PRN
COMMUNITY
Start: 2018-01-01

## 2018-01-01 RX ORDER — IOPAMIDOL 755 MG/ML
80 INJECTION, SOLUTION INTRAVASCULAR ONCE
Status: COMPLETED | OUTPATIENT
Start: 2018-01-01 | End: 2018-01-01

## 2018-01-01 RX ORDER — BUPROPION HYDROCHLORIDE 150 MG/1
TABLET, EXTENDED RELEASE ORAL
Qty: 60 TABLET | Refills: 1 | Status: SHIPPED | OUTPATIENT
Start: 2018-01-01 | End: 2018-01-01

## 2018-01-01 RX ORDER — IOPAMIDOL 755 MG/ML
69 INJECTION, SOLUTION INTRAVASCULAR ONCE
Status: COMPLETED | OUTPATIENT
Start: 2018-01-01 | End: 2018-01-01

## 2018-01-01 RX ORDER — SODIUM CHLORIDE 9 MG/ML
1000 INJECTION, SOLUTION INTRAVENOUS CONTINUOUS
Status: DISCONTINUED | OUTPATIENT
Start: 2018-01-01 | End: 2018-01-01

## 2018-01-01 RX ORDER — SULFAMETHOXAZOLE/TRIMETHOPRIM 800-160 MG
1 TABLET ORAL 2 TIMES DAILY
Status: DISCONTINUED | OUTPATIENT
Start: 2018-01-01 | End: 2018-01-01 | Stop reason: HOSPADM

## 2018-01-01 RX ORDER — LORAZEPAM 0.5 MG/1
.5-1 TABLET ORAL
Status: COMPLETED | OUTPATIENT
Start: 2018-01-01 | End: 2018-01-01

## 2018-01-01 RX ORDER — METOPROLOL TARTRATE 1 MG/ML
5 INJECTION, SOLUTION INTRAVENOUS
Status: DISCONTINUED | OUTPATIENT
Start: 2018-01-01 | End: 2018-01-01 | Stop reason: HOSPADM

## 2018-01-01 RX ORDER — HEPARIN SODIUM (PORCINE) LOCK FLUSH IV SOLN 100 UNIT/ML 100 UNIT/ML
500 SOLUTION INTRAVENOUS SEE ADMIN INSTRUCTIONS
Status: COMPLETED | OUTPATIENT
Start: 2018-01-01 | End: 2018-01-01

## 2018-01-01 RX ORDER — BUPIVACAINE HYDROCHLORIDE 2.5 MG/ML
INJECTION, SOLUTION INFILTRATION; PERINEURAL PRN
Status: DISCONTINUED | OUTPATIENT
Start: 2018-01-01 | End: 2018-01-01 | Stop reason: HOSPADM

## 2018-01-01 RX ORDER — FENTANYL CITRATE 50 UG/ML
INJECTION, SOLUTION INTRAMUSCULAR; INTRAVENOUS PRN
Status: DISCONTINUED | OUTPATIENT
Start: 2018-01-01 | End: 2018-01-01

## 2018-01-01 RX ORDER — WARFARIN SODIUM 2.5 MG/1
TABLET ORAL
Qty: 75 TABLET | Refills: 0 | COMMUNITY
Start: 2018-01-01 | End: 2018-01-01

## 2018-01-01 RX ORDER — HEPARIN SODIUM,PORCINE 10 UNIT/ML
5 VIAL (ML) INTRAVENOUS ONCE
Status: COMPLETED | OUTPATIENT
Start: 2018-01-01 | End: 2018-01-01

## 2018-01-01 RX ORDER — WARFARIN SODIUM 5 MG/1
5 TABLET ORAL
Status: COMPLETED | OUTPATIENT
Start: 2018-01-01 | End: 2018-01-01

## 2018-01-01 RX ORDER — MORPHINE SULFATE 15 MG/1
30 TABLET ORAL 2 TIMES DAILY
Qty: 60 TABLET | Refills: 0 | Status: SHIPPED | OUTPATIENT
Start: 2018-01-01 | End: 2018-01-01

## 2018-01-01 RX ORDER — PREDNISONE 20 MG/1
40 TABLET ORAL DAILY
Status: DISCONTINUED | OUTPATIENT
Start: 2018-01-01 | End: 2018-01-01 | Stop reason: HOSPADM

## 2018-01-01 RX ORDER — GLYCOPYRROLATE 0.2 MG/ML
0.1 INJECTION, SOLUTION INTRAMUSCULAR; INTRAVENOUS ONCE
Status: DISCONTINUED | OUTPATIENT
Start: 2018-01-01 | End: 2018-01-01 | Stop reason: HOSPADM

## 2018-01-01 RX ORDER — PREDNISONE 20 MG/1
20 TABLET ORAL DAILY
Qty: 5 TABLET | Refills: 0 | Status: SHIPPED | OUTPATIENT
Start: 2018-01-01 | End: 2018-01-01

## 2018-01-01 RX ORDER — HEPARIN SODIUM (PORCINE) LOCK FLUSH IV SOLN 100 UNIT/ML 100 UNIT/ML
SOLUTION INTRAVENOUS
Status: COMPLETED
Start: 2018-01-01 | End: 2018-01-01

## 2018-01-01 RX ORDER — LORAZEPAM 0.5 MG/1
0.5 TABLET ORAL EVERY 4 HOURS PRN
Status: DISCONTINUED | OUTPATIENT
Start: 2018-01-01 | End: 2018-01-01 | Stop reason: HOSPADM

## 2018-01-01 RX ORDER — KETOROLAC TROMETHAMINE 30 MG/ML
30 INJECTION, SOLUTION INTRAMUSCULAR; INTRAVENOUS EVERY 6 HOURS PRN
Status: DISCONTINUED | OUTPATIENT
Start: 2018-01-01 | End: 2018-01-01 | Stop reason: HOSPADM

## 2018-01-01 RX ORDER — OMEPRAZOLE 40 MG/1
40 CAPSULE, DELAYED RELEASE ORAL DAILY
Qty: 30 CAPSULE | Refills: 11 | Status: SHIPPED | OUTPATIENT
Start: 2018-01-01

## 2018-01-01 RX ORDER — GADOBUTROL 604.72 MG/ML
6 INJECTION INTRAVENOUS ONCE
Status: COMPLETED | OUTPATIENT
Start: 2018-01-01 | End: 2018-01-01

## 2018-01-01 RX ORDER — AMIODARONE HYDROCHLORIDE 200 MG/1
200 TABLET ORAL DAILY
Status: DISCONTINUED | OUTPATIENT
Start: 2018-01-01 | End: 2018-01-01 | Stop reason: HOSPADM

## 2018-01-01 RX ORDER — MORPHINE SULFATE 15 MG/1
15 TABLET, FILM COATED, EXTENDED RELEASE ORAL EVERY 12 HOURS
Qty: 60 TABLET | Refills: 0 | Status: SHIPPED | OUTPATIENT
Start: 2018-01-01

## 2018-01-01 RX ORDER — DOXYCYCLINE 100 MG/1
100 CAPSULE ORAL 2 TIMES DAILY
Qty: 20 CAPSULE | Refills: 0 | Status: SHIPPED | OUTPATIENT
Start: 2018-01-01 | End: 2018-01-01

## 2018-01-01 RX ORDER — HEPARIN SODIUM (PORCINE) LOCK FLUSH IV SOLN 100 UNIT/ML 100 UNIT/ML
500 SOLUTION INTRAVENOUS EVERY 8 HOURS
Status: DISCONTINUED | OUTPATIENT
Start: 2018-01-01 | End: 2018-01-01 | Stop reason: HOSPADM

## 2018-01-01 RX ORDER — MORPHINE SULFATE 10 MG/5ML
5-10 SOLUTION ORAL EVERY 4 HOURS PRN
Status: DISCONTINUED | OUTPATIENT
Start: 2018-01-01 | End: 2018-01-01 | Stop reason: HOSPADM

## 2018-01-01 RX ORDER — METOPROLOL TARTRATE 25 MG/1
25 TABLET, FILM COATED ORAL ONCE
Status: COMPLETED | OUTPATIENT
Start: 2018-01-01 | End: 2018-01-01

## 2018-01-01 RX ORDER — SODIUM CHLORIDE 9 MG/ML
INJECTION, SOLUTION INTRAVENOUS CONTINUOUS
Status: DISCONTINUED | OUTPATIENT
Start: 2018-01-01 | End: 2018-01-01 | Stop reason: HOSPADM

## 2018-01-01 RX ORDER — HEPARIN SODIUM (PORCINE) LOCK FLUSH IV SOLN 100 UNIT/ML 100 UNIT/ML
5 SOLUTION INTRAVENOUS ONCE
Status: COMPLETED | OUTPATIENT
Start: 2018-01-01 | End: 2018-01-01

## 2018-01-01 RX ORDER — MORPHINE SULFATE 100 MG/5ML
2.5 SOLUTION ORAL
Qty: 20 ML | Refills: 0 | Status: SHIPPED | OUTPATIENT
Start: 2018-01-01

## 2018-01-01 RX ORDER — AMIODARONE HYDROCHLORIDE 200 MG/1
TABLET ORAL
Qty: 45 TABLET | Refills: 1 | Status: SHIPPED | OUTPATIENT
Start: 2018-01-01 | End: 2018-01-01

## 2018-01-01 RX ORDER — PREDNISONE 20 MG/1
40 TABLET ORAL ONCE
Status: COMPLETED | OUTPATIENT
Start: 2018-01-01 | End: 2018-01-01

## 2018-01-01 RX ORDER — LIDOCAINE 40 MG/G
1 CREAM TOPICAL SEE ADMIN INSTRUCTIONS
Status: COMPLETED | OUTPATIENT
Start: 2018-01-01 | End: 2018-01-01

## 2018-01-01 RX ORDER — HEPARIN SODIUM (PORCINE) LOCK FLUSH IV SOLN 100 UNIT/ML 100 UNIT/ML
500 SOLUTION INTRAVENOUS
Status: COMPLETED | OUTPATIENT
Start: 2018-01-01 | End: 2018-01-01

## 2018-01-01 RX ORDER — IPRATROPIUM BROMIDE AND ALBUTEROL SULFATE 2.5; .5 MG/3ML; MG/3ML
3 SOLUTION RESPIRATORY (INHALATION) ONCE
Status: DISCONTINUED | OUTPATIENT
Start: 2018-01-01 | End: 2018-01-01

## 2018-01-01 RX ORDER — IOPAMIDOL 755 MG/ML
80 INJECTION, SOLUTION INTRAVASCULAR ONCE
Status: DISCONTINUED | OUTPATIENT
Start: 2018-01-01 | End: 2018-01-01

## 2018-01-01 RX ORDER — METOPROLOL TARTRATE 50 MG
50 TABLET ORAL 2 TIMES DAILY
Qty: 60 TABLET | Refills: 1
Start: 2018-01-01 | End: 2018-01-01

## 2018-01-01 RX ORDER — HEPARIN SODIUM,PORCINE 10 UNIT/ML
5-10 VIAL (ML) INTRAVENOUS EVERY 24 HOURS
Status: DISCONTINUED | OUTPATIENT
Start: 2018-01-01 | End: 2018-01-01 | Stop reason: HOSPADM

## 2018-01-01 RX ORDER — ONDANSETRON 2 MG/ML
8 INJECTION INTRAMUSCULAR; INTRAVENOUS ONCE
Status: COMPLETED | OUTPATIENT
Start: 2018-01-01 | End: 2018-01-01

## 2018-01-01 RX ORDER — MEPERIDINE HYDROCHLORIDE 25 MG/ML
12.5 INJECTION INTRAMUSCULAR; INTRAVENOUS; SUBCUTANEOUS
Status: DISCONTINUED | OUTPATIENT
Start: 2018-01-01 | End: 2018-01-01 | Stop reason: HOSPADM

## 2018-01-01 RX ORDER — WARFARIN SODIUM 5 MG/1
5 TABLET ORAL DAILY
Qty: 30 TABLET | Refills: 1 | Status: SHIPPED | OUTPATIENT
Start: 2018-01-01 | End: 2018-01-01

## 2018-01-01 RX ORDER — ACETAMINOPHEN 650 MG/1
650 SUPPOSITORY RECTAL EVERY 4 HOURS PRN
Status: DISCONTINUED | OUTPATIENT
Start: 2018-01-01 | End: 2018-01-01 | Stop reason: HOSPADM

## 2018-01-01 RX ORDER — AMIODARONE HYDROCHLORIDE 200 MG/1
TABLET ORAL
Qty: 90 TABLET | Refills: 0 | Status: SHIPPED | OUTPATIENT
Start: 2018-01-01 | End: 2018-01-01

## 2018-01-01 RX ORDER — NICOTINE 21 MG/24HR
PATCH, TRANSDERMAL 24 HOURS TRANSDERMAL
COMMUNITY
Start: 2017-01-01 | End: 2018-01-01

## 2018-01-01 RX ORDER — LORAZEPAM 2 MG/ML
1 INJECTION INTRAMUSCULAR ONCE
Status: COMPLETED | OUTPATIENT
Start: 2018-01-01 | End: 2018-01-01

## 2018-01-01 RX ORDER — OMEPRAZOLE 40 MG/1
CAPSULE, DELAYED RELEASE ORAL
Qty: 30 CAPSULE | Refills: 0 | Status: CANCELLED | OUTPATIENT
Start: 2018-01-01

## 2018-01-01 RX ORDER — AMIODARONE HYDROCHLORIDE 200 MG/1
TABLET ORAL
Qty: 30 TABLET | Refills: 0 | Status: SHIPPED | OUTPATIENT
Start: 2018-01-01 | End: 2018-01-01

## 2018-01-01 RX ORDER — GADOBUTROL 604.72 MG/ML
7.5 INJECTION INTRAVENOUS ONCE
Status: COMPLETED | OUTPATIENT
Start: 2018-01-01 | End: 2018-01-01

## 2018-01-01 RX ORDER — ASPIRIN 325 MG
325 TABLET ORAL DAILY
Status: DISCONTINUED | OUTPATIENT
Start: 2018-01-01 | End: 2018-01-01 | Stop reason: HOSPADM

## 2018-01-01 RX ORDER — PREDNISONE 20 MG/1
40 TABLET ORAL DAILY
Qty: 10 TABLET | Refills: 0 | Status: SHIPPED | OUTPATIENT
Start: 2018-01-01 | End: 2018-01-01

## 2018-01-01 RX ORDER — LORAZEPAM 0.5 MG/1
.5-2 TABLET ORAL
Status: COMPLETED | OUTPATIENT
Start: 2018-01-01 | End: 2018-01-01

## 2018-01-01 RX ORDER — AMIODARONE HYDROCHLORIDE 200 MG/1
TABLET ORAL
Qty: 30 TABLET | Refills: 0 | Status: SHIPPED | OUTPATIENT
Start: 2018-01-01

## 2018-01-01 RX ORDER — NICOTINE 21 MG/24HR
1 PATCH, TRANSDERMAL 24 HOURS TRANSDERMAL EVERY 24 HOURS
Qty: 14 PATCH | Refills: 5 | Status: SHIPPED | OUTPATIENT
Start: 2018-01-01 | End: 2018-01-01 | Stop reason: DRUGHIGH

## 2018-01-01 RX ORDER — POTASSIUM CHLORIDE 1500 MG/1
40 TABLET, EXTENDED RELEASE ORAL
Status: DISCONTINUED | OUTPATIENT
Start: 2018-01-01 | End: 2018-01-01 | Stop reason: HOSPADM

## 2018-01-01 RX ORDER — ONDANSETRON 4 MG/1
4 TABLET, ORALLY DISINTEGRATING ORAL EVERY 30 MIN PRN
Status: DISCONTINUED | OUTPATIENT
Start: 2018-01-01 | End: 2018-01-01 | Stop reason: HOSPADM

## 2018-01-01 RX ORDER — LIDOCAINE HYDROCHLORIDE 10 MG/ML
10 INJECTION, SOLUTION EPIDURAL; INFILTRATION; INTRACAUDAL; PERINEURAL ONCE
Status: COMPLETED | OUTPATIENT
Start: 2018-01-01 | End: 2018-01-01

## 2018-01-01 RX ORDER — MEPERIDINE HYDROCHLORIDE 50 MG/ML
25 INJECTION INTRAMUSCULAR; INTRAVENOUS; SUBCUTANEOUS EVERY 30 MIN PRN
Status: CANCELLED | OUTPATIENT
Start: 2018-01-01

## 2018-01-01 RX ORDER — PROCHLORPERAZINE MALEATE 10 MG
10 TABLET ORAL EVERY 6 HOURS PRN
Qty: 30 TABLET | Refills: 5 | Status: SHIPPED | OUTPATIENT
Start: 2018-01-01 | End: 2018-01-01

## 2018-01-01 RX ORDER — ONDANSETRON 4 MG/1
4 TABLET, FILM COATED ORAL EVERY 8 HOURS PRN
Qty: 30 TABLET | Refills: 1 | Status: SHIPPED | OUTPATIENT
Start: 2018-01-01

## 2018-01-01 RX ORDER — HYDROCODONE BITARTRATE AND ACETAMINOPHEN 5; 325 MG/1; MG/1
1-2 TABLET ORAL EVERY 4 HOURS PRN
Status: DISCONTINUED | OUTPATIENT
Start: 2018-01-01 | End: 2018-01-01 | Stop reason: HOSPADM

## 2018-01-01 RX ORDER — NICOTINE POLACRILEX 4 MG
15-30 LOZENGE BUCCAL
Status: CANCELLED | OUTPATIENT
Start: 2018-01-01

## 2018-01-01 RX ORDER — NICOTINE 21 MG/24HR
1 PATCH, TRANSDERMAL 24 HOURS TRANSDERMAL DAILY
Status: DISCONTINUED | OUTPATIENT
Start: 2018-01-01 | End: 2018-01-01

## 2018-01-01 RX ORDER — ONDANSETRON 4 MG/1
4 TABLET, ORALLY DISINTEGRATING ORAL EVERY 6 HOURS PRN
Status: DISCONTINUED | OUTPATIENT
Start: 2018-01-01 | End: 2018-01-01 | Stop reason: HOSPADM

## 2018-01-01 RX ORDER — METOPROLOL TARTRATE 50 MG
TABLET ORAL
Qty: 60 TABLET | Refills: 1 | Status: ON HOLD | OUTPATIENT
Start: 2018-01-01 | End: 2018-01-01

## 2018-01-01 RX ORDER — ALBUTEROL SULFATE 0.83 MG/ML
2.5 SOLUTION RESPIRATORY (INHALATION) EVERY 4 HOURS PRN
Status: DISCONTINUED | OUTPATIENT
Start: 2018-01-01 | End: 2018-01-01 | Stop reason: HOSPADM

## 2018-01-01 RX ORDER — DEXAMETHASONE SODIUM PHOSPHATE 10 MG/ML
10 INJECTION, SOLUTION INTRAMUSCULAR; INTRAVENOUS ONCE
Status: COMPLETED | OUTPATIENT
Start: 2018-01-01 | End: 2018-01-01

## 2018-01-01 RX ORDER — METOPROLOL TARTRATE 25 MG/1
50 TABLET, FILM COATED ORAL 2 TIMES DAILY
Status: DISCONTINUED | OUTPATIENT
Start: 2018-01-01 | End: 2018-01-01

## 2018-01-01 RX ORDER — HEPARIN SODIUM,PORCINE 10 UNIT/ML
5-10 VIAL (ML) INTRAVENOUS
Status: DISCONTINUED | OUTPATIENT
Start: 2018-01-01 | End: 2018-01-01 | Stop reason: HOSPADM

## 2018-01-01 RX ORDER — POLYETHYLENE GLYCOL 3350 17 G/17G
1 POWDER, FOR SOLUTION ORAL DAILY
Qty: 510 G | Refills: 1 | Status: SHIPPED | OUTPATIENT
Start: 2018-01-01

## 2018-01-01 RX ORDER — AMOXICILLIN 250 MG
2 CAPSULE ORAL 2 TIMES DAILY
Qty: 120 TABLET | Refills: 11 | Status: SHIPPED | OUTPATIENT
Start: 2018-01-01

## 2018-01-01 RX ADMIN — SODIUM CHLORIDE 250 ML: 9 INJECTION, SOLUTION INTRAVENOUS at 09:44

## 2018-01-01 RX ADMIN — SODIUM CHLORIDE 500 ML: 9 INJECTION, SOLUTION INTRAVENOUS at 10:17

## 2018-01-01 RX ADMIN — HEPARIN, PORCINE (PF) 10 UNIT/ML INTRAVENOUS SYRINGE 5 ML: at 19:31

## 2018-01-01 RX ADMIN — SODIUM CHLORIDE 80 ML: 9 INJECTION, SOLUTION INTRAVENOUS at 18:03

## 2018-01-01 RX ADMIN — LORAZEPAM 0.5 MG: 0.5 TABLET ORAL at 05:56

## 2018-01-01 RX ADMIN — CARBOPLATIN 570 MG: 10 INJECTION, SOLUTION INTRAVENOUS at 11:29

## 2018-01-01 RX ADMIN — APIXABAN 5 MG: 2.5 TABLET, FILM COATED ORAL at 00:57

## 2018-01-01 RX ADMIN — PROPOFOL 25 MCG/KG/MIN: 10 INJECTION, EMULSION INTRAVENOUS at 12:05

## 2018-01-01 RX ADMIN — SODIUM CHLORIDE 58 ML: 9 INJECTION, SOLUTION INTRAVENOUS at 10:37

## 2018-01-01 RX ADMIN — GEMCITABINE 1800 MG: 38 INJECTION, SOLUTION INTRAVENOUS at 14:55

## 2018-01-01 RX ADMIN — SODIUM CHLORIDE 150 MG: 0.9 INJECTION, SOLUTION INTRAVENOUS at 10:09

## 2018-01-01 RX ADMIN — SODIUM CHLORIDE, PRESERVATIVE FREE 5 ML: 5 INJECTION INTRAVENOUS at 03:20

## 2018-01-01 RX ADMIN — GADOBUTROL 7.5 ML: 604.72 INJECTION INTRAVENOUS at 07:39

## 2018-01-01 RX ADMIN — SODIUM CHLORIDE 150 MG: 9 INJECTION, SOLUTION INTRAVENOUS at 11:02

## 2018-01-01 RX ADMIN — FENTANYL CITRATE 50 MCG: 50 INJECTION INTRAMUSCULAR; INTRAVENOUS at 10:20

## 2018-01-01 RX ADMIN — GEMCITABINE 1800 MG: 38 INJECTION, SOLUTION INTRAVENOUS at 14:36

## 2018-01-01 RX ADMIN — LIDOCAINE HYDROCHLORIDE: 10 INJECTION, SOLUTION EPIDURAL; INFILTRATION; INTRACAUDAL; PERINEURAL at 11:58

## 2018-01-01 RX ADMIN — HEPARIN 500 UNITS: 100 SYRINGE at 15:37

## 2018-01-01 RX ADMIN — OMEPRAZOLE 40 MG: 20 CAPSULE, DELAYED RELEASE ORAL at 06:43

## 2018-01-01 RX ADMIN — NICOTINE 1 PATCH: 21 PATCH, EXTENDED RELEASE TRANSDERMAL at 20:01

## 2018-01-01 RX ADMIN — FENTANYL CITRATE 50 MCG: 50 INJECTION, SOLUTION INTRAMUSCULAR; INTRAVENOUS at 12:06

## 2018-01-01 RX ADMIN — NICOTINE 1 PATCH: 21 PATCH, EXTENDED RELEASE TRANSDERMAL at 20:49

## 2018-01-01 RX ADMIN — SULFAMETHOXAZOLE AND TRIMETHOPRIM 1 TABLET: 800; 160 TABLET ORAL at 08:32

## 2018-01-01 RX ADMIN — GEMCITABINE 1800 MG: 38 INJECTION, SOLUTION INTRAVENOUS at 09:46

## 2018-01-01 RX ADMIN — GEMCITABINE 1800 MG: 38 INJECTION, SOLUTION INTRAVENOUS at 10:06

## 2018-01-01 RX ADMIN — BUPIVACAINE HYDROCHLORIDE 30 ML: 7.5 INJECTION, SOLUTION EPIDURAL; RETROBULBAR at 05:54

## 2018-01-01 RX ADMIN — SODIUM CHLORIDE, PRESERVATIVE FREE 5 ML: 5 INJECTION INTRAVENOUS at 12:06

## 2018-01-01 RX ADMIN — LIDOCAINE HYDROCHLORIDE 50 MG: 10 INJECTION, SOLUTION EPIDURAL; INFILTRATION; INTRACAUDAL; PERINEURAL at 09:03

## 2018-01-01 RX ADMIN — WARFARIN SODIUM 5 MG: 5 TABLET ORAL at 17:56

## 2018-01-01 RX ADMIN — SODIUM CHLORIDE 200 MG: 9 INJECTION, SOLUTION INTRAVENOUS at 16:03

## 2018-01-01 RX ADMIN — HEPARIN, PORCINE (PF) 10 UNIT/ML INTRAVENOUS SYRINGE 5 ML: at 05:54

## 2018-01-01 RX ADMIN — SODIUM CHLORIDE 250 ML: 9 INJECTION, SOLUTION INTRAVENOUS at 11:02

## 2018-01-01 RX ADMIN — IOPAMIDOL 69 ML: 755 INJECTION, SOLUTION INTRAVENOUS at 16:37

## 2018-01-01 RX ADMIN — DEXAMETHASONE SODIUM PHOSPHATE: 10 INJECTION, SOLUTION INTRAMUSCULAR; INTRAVENOUS at 11:26

## 2018-01-01 RX ADMIN — SODIUM CHLORIDE, PRESERVATIVE FREE 5 ML: 5 INJECTION INTRAVENOUS at 11:18

## 2018-01-01 RX ADMIN — DEXAMETHASONE SODIUM PHOSPHATE: 10 INJECTION, SOLUTION INTRAMUSCULAR; INTRAVENOUS at 10:37

## 2018-01-01 RX ADMIN — SODIUM CHLORIDE, PRESERVATIVE FREE 5 ML: 5 INJECTION INTRAVENOUS at 12:07

## 2018-01-01 RX ADMIN — LIDOCAINE 1 G: 40 CREAM TOPICAL at 05:55

## 2018-01-01 RX ADMIN — SODIUM CHLORIDE 1000 ML: 9 INJECTION, SOLUTION INTRAVENOUS at 11:00

## 2018-01-01 RX ADMIN — MIDAZOLAM HYDROCHLORIDE 2 MG: 1 INJECTION, SOLUTION INTRAMUSCULAR; INTRAVENOUS at 12:05

## 2018-01-01 RX ADMIN — FAMOTIDINE 20 MG: 20 INJECTION, SOLUTION INTRAVENOUS at 13:42

## 2018-01-01 RX ADMIN — ASPIRIN 325 MG ORAL TABLET 325 MG: 325 PILL ORAL at 08:15

## 2018-01-01 RX ADMIN — IPRATROPIUM BROMIDE AND ALBUTEROL SULFATE 3 ML: .5; 3 SOLUTION RESPIRATORY (INHALATION) at 19:00

## 2018-01-01 RX ADMIN — LIDOCAINE HYDROCHLORIDE 50 MG: 10 INJECTION, SOLUTION INFILTRATION; PERINEURAL at 12:05

## 2018-01-01 RX ADMIN — HEPARIN SODIUM (PORCINE) LOCK FLUSH IV SOLN 100 UNIT/ML 500 UNITS: 100 SOLUTION at 13:27

## 2018-01-01 RX ADMIN — DEXAMETHASONE SODIUM PHOSPHATE: 10 INJECTION, SOLUTION INTRAMUSCULAR; INTRAVENOUS at 10:39

## 2018-01-01 RX ADMIN — DEXAMETHASONE SODIUM PHOSPHATE: 10 INJECTION, SOLUTION INTRAMUSCULAR; INTRAVENOUS at 10:53

## 2018-01-01 RX ADMIN — APIXABAN 5 MG: 5 TABLET, FILM COATED ORAL at 08:15

## 2018-01-01 RX ADMIN — MIDAZOLAM 1 MG: 1 INJECTION INTRAMUSCULAR; INTRAVENOUS at 10:20

## 2018-01-01 RX ADMIN — CEFAZOLIN SODIUM 2 G: 2 INJECTION, SOLUTION INTRAVENOUS at 08:39

## 2018-01-01 RX ADMIN — SODIUM CHLORIDE 250 ML: 9 INJECTION, SOLUTION INTRAVENOUS at 13:42

## 2018-01-01 RX ADMIN — GEMCITABINE 1800 MG: 38 INJECTION, SOLUTION INTRAVENOUS at 10:40

## 2018-01-01 RX ADMIN — SODIUM CHLORIDE 250 ML: 9 INJECTION, SOLUTION INTRAVENOUS at 09:37

## 2018-01-01 RX ADMIN — SODIUM CHLORIDE 150 MG: 9 INJECTION, SOLUTION INTRAVENOUS at 10:09

## 2018-01-01 RX ADMIN — SODIUM CHLORIDE, PRESERVATIVE FREE 5 ML: 5 INJECTION INTRAVENOUS at 10:50

## 2018-01-01 RX ADMIN — FLUDEOXYGLUCOSE F-18 14.31 MCI.: 500 INJECTION, SOLUTION INTRAVENOUS at 11:55

## 2018-01-01 RX ADMIN — METOPROLOL TARTRATE 25 MG: 25 TABLET ORAL at 02:39

## 2018-01-01 RX ADMIN — CARBOPLATIN 550 MG: 10 INJECTION, SOLUTION INTRAVENOUS at 11:56

## 2018-01-01 RX ADMIN — LORAZEPAM 1 MG: 2 INJECTION INTRAMUSCULAR; INTRAVENOUS at 16:12

## 2018-01-01 RX ADMIN — IOPAMIDOL 70 ML: 755 INJECTION, SOLUTION INTRAVENOUS at 10:37

## 2018-01-01 RX ADMIN — APIXABAN 5 MG: 5 TABLET, FILM COATED ORAL at 20:01

## 2018-01-01 RX ADMIN — SODIUM CHLORIDE 1000 ML: 9 INJECTION, SOLUTION INTRAVENOUS at 11:57

## 2018-01-01 RX ADMIN — OMEPRAZOLE 40 MG: 20 CAPSULE, DELAYED RELEASE ORAL at 09:56

## 2018-01-01 RX ADMIN — FENTANYL CITRATE 25 MCG: 50 INJECTION INTRAMUSCULAR; INTRAVENOUS at 10:24

## 2018-01-01 RX ADMIN — SODIUM CHLORIDE, PRESERVATIVE FREE 5 ML: 5 INJECTION INTRAVENOUS at 15:05

## 2018-01-01 RX ADMIN — HEPARIN, PORCINE (PF) 10 UNIT/ML INTRAVENOUS SYRINGE 5 ML: at 07:20

## 2018-01-01 RX ADMIN — NICOTINE 1 PATCH: 21 PATCH, EXTENDED RELEASE TRANSDERMAL at 01:00

## 2018-01-01 RX ADMIN — ONDANSETRON 8 MG: 2 INJECTION INTRAMUSCULAR; INTRAVENOUS at 11:07

## 2018-01-01 RX ADMIN — ASPIRIN 325 MG ORAL TABLET 325 MG: 325 PILL ORAL at 09:56

## 2018-01-01 RX ADMIN — LIDOCAINE HYDROCHLORIDE 4 ML: 10 INJECTION, SOLUTION EPIDURAL; INFILTRATION; INTRACAUDAL; PERINEURAL at 10:25

## 2018-01-01 RX ADMIN — MIDAZOLAM 0.5 MG: 1 INJECTION INTRAMUSCULAR; INTRAVENOUS at 10:24

## 2018-01-01 RX ADMIN — SODIUM CHLORIDE, POTASSIUM CHLORIDE, SODIUM LACTATE AND CALCIUM CHLORIDE 1000 ML: 600; 310; 30; 20 INJECTION, SOLUTION INTRAVENOUS at 15:29

## 2018-01-01 RX ADMIN — LIDOCAINE HYDROCHLORIDE 30 ML: 20 SOLUTION ORAL; TOPICAL at 01:47

## 2018-01-01 RX ADMIN — MIDAZOLAM HYDROCHLORIDE 1 MG: 1 INJECTION, SOLUTION INTRAMUSCULAR; INTRAVENOUS at 12:08

## 2018-01-01 RX ADMIN — SULFAMETHOXAZOLE AND TRIMETHOPRIM 1 TABLET: 800; 160 TABLET ORAL at 19:48

## 2018-01-01 RX ADMIN — SODIUM CHLORIDE, POTASSIUM CHLORIDE, SODIUM LACTATE AND CALCIUM CHLORIDE: 600; 310; 30; 20 INJECTION, SOLUTION INTRAVENOUS at 11:58

## 2018-01-01 RX ADMIN — HEPARIN 500 UNITS: 100 SYRINGE at 19:55

## 2018-01-01 RX ADMIN — PREDNISONE 40 MG: 20 TABLET ORAL at 18:27

## 2018-01-01 RX ADMIN — SODIUM CHLORIDE 1000 ML: 9 INJECTION, SOLUTION INTRAVENOUS at 18:28

## 2018-01-01 RX ADMIN — FAMOTIDINE 20 MG: 10 INJECTION, SOLUTION INTRAVENOUS at 09:44

## 2018-01-01 RX ADMIN — HEPARIN SODIUM (PORCINE) LOCK FLUSH IV SOLN 100 UNIT/ML 500 UNITS: 100 SOLUTION at 11:57

## 2018-01-01 RX ADMIN — FAMOTIDINE 20 MG: 10 INJECTION, SOLUTION INTRAVENOUS at 14:40

## 2018-01-01 RX ADMIN — IOPAMIDOL 80 ML: 755 INJECTION, SOLUTION INTRAVENOUS at 03:35

## 2018-01-01 RX ADMIN — CARBOPLATIN 535 MG: 10 INJECTION, SOLUTION INTRAVENOUS at 15:12

## 2018-01-01 RX ADMIN — SODIUM CHLORIDE 90 ML: 9 INJECTION, SOLUTION INTRAVENOUS at 03:35

## 2018-01-01 RX ADMIN — SODIUM CHLORIDE 1000 ML: 9 INJECTION, SOLUTION INTRAVENOUS at 01:47

## 2018-01-01 RX ADMIN — SODIUM CHLORIDE, PRESERVATIVE FREE 5 ML: 5 INJECTION INTRAVENOUS at 11:32

## 2018-01-01 RX ADMIN — DEXAMETHASONE SODIUM PHOSPHATE: 10 INJECTION, SOLUTION INTRAMUSCULAR; INTRAVENOUS at 09:27

## 2018-01-01 RX ADMIN — MIDAZOLAM HYDROCHLORIDE 2 MG: 1 INJECTION, SOLUTION INTRAMUSCULAR; INTRAVENOUS at 12:01

## 2018-01-01 RX ADMIN — SODIUM CHLORIDE 250 ML: 9 INJECTION, SOLUTION INTRAVENOUS at 10:36

## 2018-01-01 RX ADMIN — SODIUM CHLORIDE 75 ML: 9 INJECTION, SOLUTION INTRAVENOUS at 16:36

## 2018-01-01 RX ADMIN — DEXAMETHASONE SODIUM PHOSPHATE 10 MG: 10 INJECTION, SOLUTION INTRAMUSCULAR; INTRAVENOUS at 12:06

## 2018-01-01 RX ADMIN — APIXABAN 5 MG: 5 TABLET, FILM COATED ORAL at 08:35

## 2018-01-01 RX ADMIN — SODIUM CHLORIDE 1000 ML: 9 INJECTION, SOLUTION INTRAVENOUS at 17:15

## 2018-01-01 RX ADMIN — FAMOTIDINE 20 MG: 20 INJECTION, SOLUTION INTRAVENOUS at 09:54

## 2018-01-01 RX ADMIN — CARBOPLATIN 585 MG: 10 INJECTION, SOLUTION INTRAVENOUS at 11:33

## 2018-01-01 RX ADMIN — APIXABAN 2.5 MG: 2.5 TABLET, FILM COATED ORAL at 10:40

## 2018-01-01 RX ADMIN — SODIUM CHLORIDE, PRESERVATIVE FREE 5 ML: 5 INJECTION INTRAVENOUS at 14:14

## 2018-01-01 RX ADMIN — PREDNISONE 40 MG: 20 TABLET ORAL at 09:56

## 2018-01-01 RX ADMIN — DEXAMETHASONE SODIUM PHOSPHATE: 10 INJECTION, SOLUTION INTRAMUSCULAR; INTRAVENOUS at 09:53

## 2018-01-01 RX ADMIN — SODIUM CHLORIDE, PRESERVATIVE FREE 500 UNITS: 5 INJECTION INTRAVENOUS at 11:29

## 2018-01-01 RX ADMIN — DEXAMETHASONE SODIUM PHOSPHATE: 10 INJECTION, SOLUTION INTRAMUSCULAR; INTRAVENOUS at 09:41

## 2018-01-01 RX ADMIN — METOPROLOL TARTRATE 25 MG: 25 TABLET ORAL at 21:07

## 2018-01-01 RX ADMIN — SODIUM CHLORIDE, PRESERVATIVE FREE 5 ML: 5 INJECTION INTRAVENOUS at 11:54

## 2018-01-01 RX ADMIN — SODIUM CHLORIDE: 9 INJECTION, SOLUTION INTRAVENOUS at 08:37

## 2018-01-01 RX ADMIN — METOPROLOL TARTRATE 50 MG: 25 TABLET ORAL at 08:33

## 2018-01-01 RX ADMIN — SODIUM CHLORIDE 150 MG: 9 INJECTION, SOLUTION INTRAVENOUS at 14:10

## 2018-01-01 RX ADMIN — FAMOTIDINE 20 MG: 10 INJECTION INTRAVENOUS at 09:44

## 2018-01-01 RX ADMIN — APIXABAN 5 MG: 5 TABLET, FILM COATED ORAL at 20:50

## 2018-01-01 RX ADMIN — SODIUM CHLORIDE, PRESERVATIVE FREE 5 ML: 5 INJECTION INTRAVENOUS at 15:54

## 2018-01-01 RX ADMIN — HEPARIN 500 UNITS: 100 SYRINGE at 11:55

## 2018-01-01 RX ADMIN — SODIUM CHLORIDE, PRESERVATIVE FREE 5 ML: 5 INJECTION INTRAVENOUS at 12:41

## 2018-01-01 RX ADMIN — LIDOCAINE HYDROCHLORIDE 10 ML: 10 INJECTION, SOLUTION INFILTRATION; PERINEURAL at 12:19

## 2018-01-01 RX ADMIN — SODIUM CHLORIDE 1000 ML: 9 INJECTION, SOLUTION INTRAVENOUS at 16:38

## 2018-01-01 RX ADMIN — GADOBUTROL 6 ML: 604.72 INJECTION INTRAVENOUS at 17:11

## 2018-01-01 RX ADMIN — IOPAMIDOL 125 ML: 755 INJECTION, SOLUTION INTRAVENOUS at 18:03

## 2018-01-01 RX ADMIN — GEMCITABINE 1800 MG: 38 INJECTION, SOLUTION INTRAVENOUS at 11:24

## 2018-01-01 RX ADMIN — HEPARIN 5 ML: 100 SYRINGE at 11:42

## 2018-01-01 RX ADMIN — METOPROLOL TARTRATE 75 MG: 25 TABLET ORAL at 09:56

## 2018-01-01 RX ADMIN — HEPARIN 500 UNITS: 100 SYRINGE at 10:06

## 2018-01-01 RX ADMIN — SODIUM CHLORIDE, PRESERVATIVE FREE 5 ML: 5 INJECTION INTRAVENOUS at 10:38

## 2018-01-01 RX ADMIN — CEFAZOLIN SODIUM 2 G: 2 INJECTION, SOLUTION INTRAVENOUS at 12:01

## 2018-01-01 RX ADMIN — SODIUM CHLORIDE 250 ML: 9 INJECTION, SOLUTION INTRAVENOUS at 09:26

## 2018-01-01 RX ADMIN — SODIUM CHLORIDE 250 ML: 9 INJECTION, SOLUTION INTRAVENOUS at 14:39

## 2018-01-01 RX ADMIN — HEPARIN SODIUM (PORCINE) LOCK FLUSH IV SOLN 100 UNIT/ML 5 ML: 100 SOLUTION at 12:10

## 2018-01-01 RX ADMIN — FAMOTIDINE 20 MG: 10 INJECTION, SOLUTION INTRAVENOUS at 09:14

## 2018-01-01 RX ADMIN — CARBOPLATIN 510 MG: 10 INJECTION, SOLUTION INTRAVENOUS at 11:15

## 2018-01-01 RX ADMIN — FAMOTIDINE 20 MG: 20 INJECTION, SOLUTION INTRAVENOUS at 10:36

## 2018-01-01 RX ADMIN — GEMCITABINE 1800 MG: 38 INJECTION, SOLUTION INTRAVENOUS at 10:59

## 2018-01-01 RX ADMIN — SODIUM CHLORIDE, PRESERVATIVE FREE 5 ML: 5 INJECTION INTRAVENOUS at 10:49

## 2018-01-01 RX ADMIN — SODIUM CHLORIDE 1000 ML: 9 INJECTION, SOLUTION INTRAVENOUS at 10:04

## 2018-01-01 RX ADMIN — PREDNISONE 40 MG: 20 TABLET ORAL at 08:14

## 2018-01-01 RX ADMIN — AMIODARONE HYDROCHLORIDE 200 MG: 200 TABLET ORAL at 08:32

## 2018-01-01 RX ADMIN — SODIUM CHLORIDE 1000 ML: 9 INJECTION, SOLUTION INTRAVENOUS at 11:55

## 2018-01-01 RX ADMIN — GEMCITABINE 1800 MG: 38 INJECTION, SOLUTION INTRAVENOUS at 10:39

## 2018-01-01 RX ADMIN — CARBOPLATIN 560 MG: 10 INJECTION, SOLUTION INTRAVENOUS at 10:42

## 2018-01-01 RX ADMIN — SODIUM CHLORIDE, PRESERVATIVE FREE 5 ML: 5 INJECTION INTRAVENOUS at 13:40

## 2018-01-01 RX ADMIN — SODIUM CHLORIDE, PRESERVATIVE FREE 5 ML: 5 INJECTION INTRAVENOUS at 08:10

## 2018-01-01 RX ADMIN — SODIUM CHLORIDE, PRESERVATIVE FREE 5 ML: 5 INJECTION INTRAVENOUS at 15:35

## 2018-01-01 RX ADMIN — LORAZEPAM 1 MG: 0.5 TABLET ORAL at 05:55

## 2018-01-01 RX ADMIN — FAMOTIDINE 20 MG: 20 INJECTION, SOLUTION INTRAVENOUS at 10:27

## 2018-01-01 RX ADMIN — DEXAMETHASONE SODIUM PHOSPHATE 12 MG: 10 INJECTION, SOLUTION INTRAMUSCULAR; INTRAVENOUS at 14:40

## 2018-01-01 RX ADMIN — GEMCITABINE 1800 MG: 38 INJECTION, SOLUTION INTRAVENOUS at 11:18

## 2018-01-01 RX ADMIN — IPRATROPIUM BROMIDE AND ALBUTEROL SULFATE 3 ML: .5; 3 SOLUTION RESPIRATORY (INHALATION) at 13:39

## 2018-01-01 RX ADMIN — METOPROLOL TARTRATE 75 MG: 25 TABLET ORAL at 08:14

## 2018-01-01 RX ADMIN — DEXAMETHASONE SODIUM PHOSPHATE: 10 INJECTION, SOLUTION INTRAMUSCULAR; INTRAVENOUS at 09:49

## 2018-01-01 RX ADMIN — DEXAMETHASONE SODIUM PHOSPHATE: 10 INJECTION, SOLUTION INTRAMUSCULAR; INTRAVENOUS at 09:14

## 2018-01-01 RX ADMIN — METOPROLOL TARTRATE 50 MG: 25 TABLET ORAL at 19:48

## 2018-01-01 RX ADMIN — SODIUM CHLORIDE 250 ML: 9 INJECTION, SOLUTION INTRAVENOUS at 09:14

## 2018-01-01 RX ADMIN — DEXAMETHASONE SODIUM PHOSPHATE: 10 INJECTION, SOLUTION INTRAMUSCULAR; INTRAVENOUS at 13:56

## 2018-01-01 RX ADMIN — FAMOTIDINE 20 MG: 10 INJECTION, SOLUTION INTRAVENOUS at 10:36

## 2018-01-01 RX ADMIN — SODIUM CHLORIDE 150 MG: 9 INJECTION, SOLUTION INTRAVENOUS at 09:45

## 2018-01-01 RX ADMIN — FENTANYL CITRATE 50 MCG: 50 INJECTION, SOLUTION INTRAMUSCULAR; INTRAVENOUS at 12:08

## 2018-01-01 RX ADMIN — SODIUM CHLORIDE, PRESERVATIVE FREE 5 ML: 5 INJECTION INTRAVENOUS at 13:06

## 2018-01-01 RX ADMIN — SODIUM CHLORIDE 250 ML: 9 INJECTION, SOLUTION INTRAVENOUS at 10:30

## 2018-01-01 RX ADMIN — SODIUM CHLORIDE 250 ML: 9 INJECTION, SOLUTION INTRAVENOUS at 09:52

## 2018-01-01 RX ADMIN — SODIUM CHLORIDE 1000 ML: 9 INJECTION, SOLUTION INTRAVENOUS at 17:49

## 2018-01-01 RX ADMIN — SODIUM CHLORIDE 200 MG: 9 INJECTION, SOLUTION INTRAVENOUS at 15:02

## 2018-01-01 RX ADMIN — SODIUM CHLORIDE, PRESERVATIVE FREE 500 UNITS: 5 INJECTION INTRAVENOUS at 16:38

## 2018-01-01 RX ADMIN — SODIUM CHLORIDE, POTASSIUM CHLORIDE, SODIUM LACTATE AND CALCIUM CHLORIDE 1000 ML: 600; 310; 30; 20 INJECTION, SOLUTION INTRAVENOUS at 13:04

## 2018-01-01 RX ADMIN — FAMOTIDINE 20 MG: 10 INJECTION, SOLUTION INTRAVENOUS at 09:52

## 2018-01-01 ASSESSMENT — ANXIETY QUESTIONNAIRES
5. BEING SO RESTLESS THAT IT IS HARD TO SIT STILL: NOT AT ALL
6. BECOMING EASILY ANNOYED OR IRRITABLE: MORE THAN HALF THE DAYS
3. WORRYING TOO MUCH ABOUT DIFFERENT THINGS: MORE THAN HALF THE DAYS
GAD7 TOTAL SCORE: 9
3. WORRYING TOO MUCH ABOUT DIFFERENT THINGS: MORE THAN HALF THE DAYS
2. NOT BEING ABLE TO STOP OR CONTROL WORRYING: NEARLY EVERY DAY
GAD7 TOTAL SCORE: 9
1. FEELING NERVOUS, ANXIOUS, OR ON EDGE: MORE THAN HALF THE DAYS
7. FEELING AFRAID AS IF SOMETHING AWFUL MIGHT HAPPEN: NOT AT ALL
5. BEING SO RESTLESS THAT IT IS HARD TO SIT STILL: NOT AT ALL
GAD7 TOTAL SCORE: 12
6. BECOMING EASILY ANNOYED OR IRRITABLE: NOT AT ALL
7. FEELING AFRAID AS IF SOMETHING AWFUL MIGHT HAPPEN: NOT AT ALL
1. FEELING NERVOUS, ANXIOUS, OR ON EDGE: MORE THAN HALF THE DAYS
GAD7 TOTAL SCORE: 12
2. NOT BEING ABLE TO STOP OR CONTROL WORRYING: NEARLY EVERY DAY

## 2018-01-01 ASSESSMENT — ENCOUNTER SYMPTOMS
SPEECH DIFFICULTY: 0
FATIGUE: 1
HEADACHES: 0
RESPIRATORY NEGATIVE: 1
NAUSEA: 1
APPETITE CHANGE: 1
DYSRHYTHMIAS: 1
EYES NEGATIVE: 1
WEAKNESS: 1
TREMORS: 0
VOMITING: 0
CONFUSION: 1
CARDIOVASCULAR NEGATIVE: 1
SHORTNESS OF BREATH: 1
MUSCULOSKELETAL NEGATIVE: 1
FEVER: 0
DIZZINESS: 0
ACTIVITY CHANGE: 1
COUGH: 1

## 2018-01-01 ASSESSMENT — PAIN DESCRIPTION - DESCRIPTORS
DESCRIPTORS: DULL
DESCRIPTORS: ACHING
DESCRIPTORS: TIGHTNESS
DESCRIPTORS: ACHING;TIGHTNESS
DESCRIPTORS: ACHING

## 2018-01-01 ASSESSMENT — PAIN SCALES - GENERAL
PAINLEVEL: EXTREME PAIN (8)
PAINLEVEL: NO PAIN (0)
PAINLEVEL: MODERATE PAIN (5)
PAINLEVEL: NO PAIN (0)
PAINLEVEL: NO PAIN (0)
PAINLEVEL: MILD PAIN (3)
PAINLEVEL: MODERATE PAIN (4)
PAINLEVEL: MILD PAIN (2)
PAINLEVEL: NO PAIN (0)
PAINLEVEL: NO PAIN (0)
PAINLEVEL: SEVERE PAIN (6)
PAINLEVEL: NO PAIN (0)
PAINLEVEL: MILD PAIN (2)

## 2018-01-01 ASSESSMENT — PATIENT HEALTH QUESTIONNAIRE - PHQ9
SUM OF ALL RESPONSES TO PHQ QUESTIONS 1-9: 19
5. POOR APPETITE OR OVEREATING: MORE THAN HALF THE DAYS
SUM OF ALL RESPONSES TO PHQ QUESTIONS 1-9: 22
5. POOR APPETITE OR OVEREATING: NEARLY EVERY DAY

## 2018-01-03 NOTE — IP AVS SNAPSHOT
[facility] PreOP/Phase II    [address] [facility] [address]    Phone:  [phone]    Fax:  [phone]                                       After Visit Summary   1/3/2018    Zechariah Ashby    MRN: [id number]           After Visit Summary Signature Page     I have received my discharge instructions, and my questions have been answered. I have discussed any challenges I see with this plan with the nurse or doctor.    ..........................................................................................................................................  Patient/Patient Representative Signature      ..........................................................................................................................................  Patient Representative Print Name and Relationship to Patient    ..................................................               ................................................  Date                                            Time    ..........................................................................................................................................  Reviewed by Signature/Title    ...................................................              ..............................................  Date                                                            Time

## 2018-01-03 NOTE — PROGRESS NOTES
Infusion Nursing Note:  Zechariah Ashby presents today for PAC labs and a port flush.   Patient seen by provider today: Yes.   present during visit today: Dr. Crews. Not Applicable.    Note: Labs drawn via his port per [facility] protocol without complications. Pt. Will have a biopsy tomorrow and chemotherapy.    Intravenous Access:  Labs drawn without difficulty.  Implanted Port.    Treatment Conditions:  Lab Results   Component Value Date    HGB 13.4 01/03/2018     Lab Results   Component Value Date    WBC 8.4 01/03/2018      Lab Results   Component Value Date    ANEU 6.2 01/03/2018     Lab Results   Component Value Date     01/03/2018      Lab Results   Component Value Date     12/09/2017                   Lab Results   Component Value Date    POTASSIUM 4.2 12/09/2017           Lab Results   Component Value Date    MAG 2.0 04/25/2015            Lab Results   Component Value Date    CR 0.83 01/03/2018                   Lab Results   Component Value Date    VIJAY 8.7 12/09/2017                Lab Results   Component Value Date    BILITOTAL 0.6 12/07/2017           Lab Results   Component Value Date    ALBUMIN 3.4 12/07/2017                    Lab Results   Component Value Date    ALT 30 12/07/2017           Lab Results   Component Value Date    AST 22 12/07/2017             Post Infusion Assessment:  Blood return noted pre and post infusion.  Site patent and intact, free from redness, edema or discomfort.  No evidence of extravasations.    Discharge Plan:   Patient and/or family verbalized understanding of discharge instructions and all questions answered.  Patient discharged in stable condition accompanied by: wife.  Departure Mode: Wheelchair.    Piper Lynch RN

## 2018-01-03 NOTE — ANESTHESIA POSTPROCEDURE EVALUATION
Patient: Zechariah Ashby    Procedure(s):  Port-a-Cath Placement - Wound Class: I-Clean    Diagnosis:left lung cancer  Diagnosis Additional Information: No value filed.    Anesthesia Type:  MAC    Note:  Anesthesia Post Evaluation    Patient location during evaluation: Bedside  Patient participation: Able to fully participate in evaluation  Level of consciousness: awake and alert  Pain management: adequate  Airway patency: patent  Cardiovascular status: acceptable  Respiratory status: acceptable  Hydration status: acceptable  PONV: none     Anesthetic complications: None          Last vitals:  Vitals:    01/03/18 1103   BP: 114/81   Resp: 20   Temp: 36.5  C (97.7  F)   SpO2: 97%         Electronically Signed By: Brock Bearden CRNA, APRN CRNA  January 3, 2018  12:44 PM

## 2018-01-03 NOTE — BRIEF OP NOTE
PreOp Dx: Lung cancer    PostOp Dx: Same    Procedure: portacath placement    Surgeon: KYREE Crews    Anesthesia: MAC Monisha CRNA    Findings: cath tip in SVC on fluoro    IVF: 400ml    UOP: n/a    EBL: 5ml      Tomas Crews MD

## 2018-01-03 NOTE — DISCHARGE INSTRUCTIONS
Wash incisions daily with soap and water. Some mild redness or swelling is expected. If draining, cover with dry gauze.    OK to use Portacath.    No lifting restrictions.    Okay to use ice pack over wound as necessary for comfort.    Use pain medication as necessary but avoid constipating side effects. Ibuprofen okay but avoid Tylenol as your pain medication already contains this.    Diet as tolerated. No restrictions.    Follow up with Infusion nurses or Dr Crews in 1-2 weeks.                          Same Day Surgery Discharge Instructions  Special Precautions After Surgery - Adult    1. It is not unusual to feel lightheaded or faint, up to 24 hours after surgery or while taking pain medication.  If you have these symptoms; sit for a few minutes before standing and have someone assist you when getting up.  2. You should rest and relax for the next 24 hours and must have someone stay with you for at least 24 hours after your discharge.  3. DO NOT DRIVE any vehicle or operate mechanical equipment for 24 hours following the end of your surgery.  DO NOT DRIVE while taking narcotic pain medications that have been prescribed by your physician.  If you had a limb operated on, you must be able to use it fully to drive.  4. DO NOT drink alcoholic beverages for 24 hours following surgery or while taking prescription pain medication.  5. Drink clear liquids (apple juice, ginger ale, broth, 7-Up, etc.).  Progress to your regular diet as you feel able.  6. Any questions call your physician and do not make important decisions for 24 hours.    ACTIVITY  ? Rest today.  No activity or diet restrictions.     INCISIONAL CARE  ? Apply ice 1/2 hour on and 1/2 hour off while awake as needed for comfort.  ? Be alert for signs of infection:  redness, swelling, heat, drainage of pus, and/or elevated temperature.  Contact your doctor if these occur.        Call for an appointment to return to the clinic    Medications:  ? Follow the  instructions on the bottle.     Additional discharge instructions:Yasmine infromation  __________________________________________________________________________________________________________________________________  IMPORTANT NUMBERS:    [facility] Main Number:  [phone], [phone]  Pharmacy:  [phone]  Same Day Surgery:  [phone], Monday - Friday until 8:30 p.m.  Urgent Care:  [phone]  Emergency Room:  [phone]                                                                              Surgery Specialty Clinic:  [phone]

## 2018-01-03 NOTE — IP AVS SNAPSHOT
MRN:[id number]                      After Visit Summary   1/3/2018    Zechariah Ashby    MRN: [id number]           Thank you!     Thank you for choosing [facility] for your care. Our goal is always to provide you with excellent care. Hearing back from our patients is one way we can continue to improve our services. Please take a few minutes to complete the written survey that you may receive in the mail after you visit with us. Thank you!        Patient Information     Date Of Birth          1960        About your hospital stay     You were admitted on:  January 3, 2018 You last received care in the:  [facility] PreOP/Phase II    You were discharged on:  January 3, 2018       Who to Call     For medical emergencies, please call 911.  For non-urgent questions about your medical care, please call your primary care provider or clinic, [phone]  For questions related to your surgery, please call your surgery clinic        Attending Provider     Provider Specialty    Tomas Crews MD Surgery       Primary Care Provider Office Phone # Fax #    Kailash Mason [phone]      Your next 10 appointments already scheduled     Jan 04, 2018  8:45 AM CST   (Arrive by 8:30 AM)   US BIOPSY HEAD NECK THORAX SOFT TISSUE with [facility], [facility] Ultrasound ([facility])    [address] [address] [phone]           Tell us in advance if there s any chance you may be pregnant.  Bring a list of your medicines to the exam. Include vitamins, minerals and over-the-counter drugs.  If you take blood thinners, you may need to stop taking them a few days before treatment. Talk to your doctor before stopping these medicines. You will need a blood test the morning of your exam.   Stop taking Coumadin (warfarin) 3 days before your exam. Restart the day after your exam.   If you take aspirin, you may need to stop taking it 3 days before your  scan.   If you take Plavix, Ticlid, Pletal or Persantine, you may need to stop taking them 5 days before your scan. Please talk to your doctor before stopping these medicines.  If you will receive sedation for this test (medicine to help you relax):   See your family doctor for an exam within 30 days of treatment.   Plan for an adult to drive you home and stay with you for at least 6 hours.   Follow the eating and drinking guidelines checked below:   No eating or drinking for 4 hours before your test. You may take medicine with small sips of water.   If you have diabetes:If you take insulin, call your diabetes care team. Do not take diabetes pills on the morning of your test. If you take metformin (Avandamet, Glucophage, Glucovance, Metaglip) and received contrast, wait 48 hours before re-starting this medicine.  Please call the Imaging Department at your exam site with any questions.            Jan 04, 2018 10:30 AM CST   Level 3 with ROOM 2 [facility] ([facility])    [facility]  [address]  [facility] [address]   [phone]            Jan 09, 2018  8:00 AM CST   (Arrive by 7:45 AM)   NEW LUNG NODULE with Osvaldo Ortiz MD   [facility] ([facility])    [address]  [address]  [facility] [place] [address] [phone]            Jan 12, 2018 10:45 AM CST   Ech Complete with [id number] [facility] ([facility])    [address] [address]   [phone]           1. Please bring or wear a comfortable two-piece outfit. 2. You may eat, drink and take your normal medicines. 3. For any questions that cannot be answered, please contact the ordering physician            Jan 16, 2018  9:45 AM CST   ecg with [facility]   [facility]iac Services ([facility])    [address] [address]    [phone]            Jan 16, 2018 10:00 AM CST   Return Visit with Daksha Montemayor PA-C   [facility] ([facility])    [address] [address]   [phone]            Jan 18, 2018  8:30 AM CST   Return Visit with Joycelyn May MD   [facility] ([facility])    [facility]  [address]  [facility] [address]   [phone]            Jan 26, 2018  8:00 AM CST   Level 3 with ROOM 6 [facility] ([facility])    [facility]  [address]  [facility] [address]   [phone]            Jan 26, 2018  9:00 AM CST   Return Visit with Joycelyn May MD   [facility] ([facility])    [facility]  [address]  [facility] [address]   [phone]              Further instructions from your care team       Wash incisions daily with soap and water. Some mild redness or swelling is expected. If draining, cover with dry gauze.    OK to use Portacath.    No lifting restrictions.    Okay to use ice pack over wound as necessary for comfort.    Use pain medication as necessary but avoid constipating side effects. Ibuprofen okay but avoid Tylenol as your pain medication already contains this.    Diet as tolerated. No restrictions.    Follow up with Infusion nurses or Dr Crews in 1-2 weeks.                          Same Day Surgery Discharge Instructions  Special Precautions After Surgery - Adult    1. It is not unusual to feel lightheaded or faint, up to 24 hours after surgery or while taking pain medication.  If you have these symptoms; sit for a few minutes before standing and have someone assist you when getting up.  2. You should rest and relax for the next 24 hours and must have someone stay with you for at least 24 hours after your discharge.  3. DO NOT DRIVE any  "vehicle or operate mechanical equipment for 24 hours following the end of your surgery.  DO NOT DRIVE while taking narcotic pain medications that have been prescribed by your physician.  If you had a limb operated on, you must be able to use it fully to drive.  4. DO NOT drink alcoholic beverages for 24 hours following surgery or while taking prescription pain medication.  5. Drink clear liquids (apple juice, ginger ale, broth, 7-Up, etc.).  Progress to your regular diet as you feel able.  6. Any questions call your physician and do not make important decisions for 24 hours.    ACTIVITY  ? Rest today.  No activity or diet restrictions.     INCISIONAL CARE  ? Apply ice 1/2 hour on and 1/2 hour off while awake as needed for comfort.  ? Be alert for signs of infection:  redness, swelling, heat, drainage of pus, and/or elevated temperature.  Contact your doctor if these occur.        Call for an appointment to return to the clinic    Medications:  ? Follow the instructions on the bottle.     Additional discharge instructions:Portacath infromation  __________________________________________________________________________________________________________________________________  IMPORTANT NUMBERS:    [facility] Main Number:  [phone], [phone]  Pharmacy:  [phone]  Same Day Surgery:  [phone], Monday - Friday until 8:30 p.m.  Urgent Care:  [phone]  Emergency Room:  [phone]                                                                              Surgery Specialty Clinic:  [phone]               Pending Results     No orders found from 1/1/2018 to 1/4/2018.            Admission Information     Date & Time Provider Department Dept. Phone    1/3/2018 Tomas Crews MD [facility] PreOP/Phase [phone]      Your Vitals Were     Blood Pressure Pulse Temperature Respirations Height Weight    110/66 68 97.8  F (36.6  C) (Oral) 16 1.778 m (5' 10\") 67.6 kg (149 lb)    Pulse Oximetry BMI (Body " "Mass Index)                98% 21.38 kg/m2          readeo Information     readeo lets you send messages to your doctor, view your test results, renew your prescriptions, schedule appointments and more. To sign up, go to [email or web address] . Click on \"Log in\" on the left side of the screen, which will take you to the Welcome page. Then click on \"Sign up Now\" on the right side of the page.     You will be asked to enter the access code listed below, as well as some personal information. Please follow the directions to create your username and password.     Your access code is: [email or web address]  Expires: 2018  4:44 PM     Your access code will  in 90 days. If you need help or a new code, please call your [facility] clinic or [phone].        Care EveryWhere ID     This is your Care EveryWhere ID. This could be used by other organizations to access your [facility] medical records  [id number]        Equal Access to Services     ROD PERRY : Hadii arnol bateso Soasher, waaxda luqadaha, qaybta kaalmada adeegyaturner, harley carrasquillo . So [facility] [phone].    ATENCIÓN: Si habla español, tiene a morocho disposición servicios gratuitos de asistencia lingüística. [facility] [phone].    We comply with applicable federal civil rights laws and [place] laws. We do not discriminate on the basis of race, color, national origin, age, disability, sex, sexual orientation, or gender identity.               Review of your medicines      START taking        Dose / Directions    HYDROcodone-acetaminophen 5-325 MG per tablet   Commonly known as:  NORCO   Used for:  Non-small cell carcinoma of lung (H)        Dose:  1-2 tablet   Take 1-2 tablets by mouth every 6 hours as needed   Quantity:  30 tablet   Refills:  0         CONTINUE these medicines which have NOT CHANGED        Dose / Directions    ALIGN Chew        Dose:  1 chew tab   Take 1 chew tab by mouth 3 times daily as needed   Refills:  0    "    apixaban ANTICOAGULANT 5 MG tablet   Commonly known as:  ELIQUIS   Used for:  Typical atrial flutter (H)        Dose:  5 mg   Take 1 tablet (5 mg) by mouth 2 times daily   Quantity:  60 tablet   Refills:  0       ASPIRIN PO   Used for:  Mediastinal lymphadenopathy, Non-small cell carcinoma of lung (H)        Dose:  325 mg   Take 325 mg by mouth daily   Refills:  0       lidocaine-prilocaine cream   Commonly known as:  EMLA   Used for:  Non-small cell carcinoma of lung (H)        Place a quarter sized amount of cream onto port area 30-60 minutes prior to port use. Do not rub in. Cover with tegaderm or saran wrap.   Quantity:  30 g   Refills:  1       LORazepam 0.5 MG tablet   Commonly known as:  ATIVAN   Used for:  Non-small cell carcinoma of lung (H)        Dose:  0.5 mg   Take 1 tablet (0.5 mg) by mouth every 4 hours as needed (Anxiety, Nausea/Vomiting or Sleep)   Quantity:  30 tablet   Refills:  5       megestrol 40 MG/ML suspension   Commonly known as:  MEGACE ORAL   Used for:  Non-small cell carcinoma of lung (H)        Dose:  200 mg   Take 5 mLs (200 mg) by mouth daily   Quantity:  600 mL   Refills:  2       metoprolol 50 MG tablet   Commonly known as:  LOPRESSOR   Used for:  Irregular heart beat, Atrial flutter, unspecified type (H), Cardiomyopathy, unspecified type (H)        Dose:  75 mg   Take 1.5 tablets (75 mg) by mouth 2 times daily   Quantity:  60 tablet   Refills:  1       nicotine 21 MG/24HR 24 hr patch   Commonly known as:  NICODERM CQ   Used for:  Encounter for tobacco use cessation counseling        Dose:  1 patch   Place 1 patch onto the skin daily   Quantity:  30 patch   Refills:  0       omeprazole 40 MG capsule   Commonly known as:  priLOSEC   Used for:  Gastroesophageal reflux disease without esophagitis        Dose:  40 mg   Take 1 capsule (40 mg) by mouth daily Take 30-60 minutes before a meal.   Quantity:  30 capsule   Refills:  1       ondansetron 8 MG tablet   Commonly known as:   ZOFRAN   Used for:  Non-small cell carcinoma of lung (H)        Dose:  8 mg   Take 1 tablet (8 mg) by mouth every 8 hours as needed (Nausea/Vomiting)   Quantity:  10 tablet   Refills:  5       prochlorperazine 10 MG tablet   Commonly known as:  COMPAZINE   Used for:  Non-small cell carcinoma of lung (H)        Dose:  10 mg   Take 1 tablet (10 mg) by mouth every 6 hours as needed (Nausea/Vomiting)   Quantity:  30 tablet   Refills:  5            Where to get your medicines      Some of these will need a paper prescription and others can be bought over the counter. Ask your nurse if you have questions.     Bring a paper prescription for each of these medications     HYDROcodone-acetaminophen 5-325 MG per tablet                Protect others around you: Learn how to safely use, store and throw away your medicines at www.disposemymeds.org.             Medication List: This is a list of all your medications and when to take them. Check marks below indicate your daily home schedule. Keep this list as a reference.      Medications           Morning Afternoon Evening Bedtime As Needed    ALIGN Chew   Take 1 chew tab by mouth 3 times daily as needed                                apixaban ANTICOAGULANT 5 MG tablet   Commonly known as:  ELIQUIS   Take 1 tablet (5 mg) by mouth 2 times daily                                ASPIRIN PO   Take 325 mg by mouth daily                                HYDROcodone-acetaminophen 5-325 MG per tablet   Commonly known as:  NORCO   Take 1-2 tablets by mouth every 6 hours as needed                                lidocaine-prilocaine cream   Commonly known as:  EMLA   Place a quarter sized amount of cream onto port area 30-60 minutes prior to port use. Do not rub in. Cover with tegaderm or saran wrap.                                LORazepam 0.5 MG tablet   Commonly known as:  ATIVAN   Take 1 tablet (0.5 mg) by mouth every 4 hours as needed (Anxiety, Nausea/Vomiting or Sleep)                                 megestrol 40 MG/ML suspension   Commonly known as:  MEGACE ORAL   Take 5 mLs (200 mg) by mouth daily                                metoprolol 50 MG tablet   Commonly known as:  LOPRESSOR   Take 1.5 tablets (75 mg) by mouth 2 times daily                                nicotine 21 MG/24HR 24 hr patch   Commonly known as:  NICODERM CQ   Place 1 patch onto the skin daily                                omeprazole 40 MG capsule   Commonly known as:  priLOSEC   Take 1 capsule (40 mg) by mouth daily Take 30-60 minutes before a meal.                                ondansetron 8 MG tablet   Commonly known as:  ZOFRAN   Take 1 tablet (8 mg) by mouth every 8 hours as needed (Nausea/Vomiting)                                prochlorperazine 10 MG tablet   Commonly known as:  COMPAZINE   Take 1 tablet (10 mg) by mouth every 6 hours as needed (Nausea/Vomiting)

## 2018-01-03 NOTE — ANESTHESIA CARE TRANSFER NOTE
Patient: Zechariah Ashby    Procedure(s):  Port-a-Cath Placement - Wound Class: I-Clean    Diagnosis: left lung cancer  Diagnosis Additional Information: No value filed.    Anesthesia Type:   MAC     Note:  Airway :Face Mask  Patient transferred to:Phase II  Handoff Report: Identifed the Patient, Identified the Reponsible Provider, Reviewed the pertinent medical history, Discussed the surgical course, Reviewed Intra-OP anesthesia mangement and issues during anesthesia, Set expectations for post-procedure period and Allowed opportunity for questions and acknowledgement of understanding      Vitals: (Last set prior to Anesthesia Care Transfer)    CRNA VITALS  1/3/2018 1211 - 1/3/2018 1243      1/3/2018             Pulse: 66    SpO2: 99 %                Electronically Signed By: Brock Bearden CRNA, APRN CRNA  January 3, 2018  12:43 PM

## 2018-01-03 NOTE — MR AVS SNAPSHOT
After Visit Summary   1/3/2018    Zechariah Ashby    MRN: [id number]           Patient Information     Date Of Birth          1960        Visit Information        Provider Department      1/3/2018 2:00 PM ROOM 6 [facility] Cancer Infusion        Today's Diagnoses     Non-small cell carcinoma of lung (H)    -  1       Follow-ups after your visit        Your next 10 appointments already scheduled     Jan 04, 2018  8:45 AM CST   (Arrive by 8:30 AM)   US BIOPSY HEAD NECK THORAX SOFT TISSUE with [place]US4, [place] RAD   [facility] Ultrasound ([facility])    [address]  [facility] [address]   [phone]           Tell us in advance if there s any chance you may be pregnant.  Bring a list of your medicines to the exam. Include vitamins, minerals and over-the-counter drugs.  If you take blood thinners, you may need to stop taking them a few days before treatment. Talk to your doctor before stopping these medicines. You will need a blood test the morning of your exam.   Stop taking Coumadin (warfarin) 3 days before your exam. Restart the day after your exam.   If you take aspirin, you may need to stop taking it 3 days before your scan.   If you take Plavix, Ticlid, Pletal or Persantine, you may need to stop taking them 5 days before your scan. Please talk to your doctor before stopping these medicines.  If you will receive sedation for this test (medicine to help you relax):   See your family doctor for an exam within 30 days of treatment.   Plan for an adult to drive you home and stay with you for at least 6 hours.   Follow the eating and drinking guidelines checked below:   No eating or drinking for 4 hours before your test. You may take medicine with small sips of water.   If you have diabetes:If you take insulin, call your diabetes care team. Do not take diabetes pills on the morning of your test. If you take metformin (Avandamet, Glucophage, Glucovance, Metaglip) and received  contrast, wait 48 hours before re-starting this medicine.  Please call the Imaging Department at your exam site with any questions.            Jan 04, 2018 10:30 AM CST   Level 3 with ROOM 2 [facility] Cancer Infusion ([facility])    [facility]  [address]  [facility] [address]   [phone]            Jan 09, 2018  8:00 AM CST   (Arrive by 7:45 AM)   NEW LUNG NODULE with Osvaldo Ortiz MD   [facility] ([facility])    [address] [facility]  [address] [place] [id number] [phone]            Jan 12, 2018 10:45 AM CST   Ech Complete with [address] Echocardiography ([facility])    [address] [facility] [address]   [phone]           1. Please bring or wear a comfortable two-piece outfit. 2. You may eat, drink and take your normal medicines. 3. For any questions that cannot be answered, please contact the ordering physician            Jan 16, 2018  9:45 AM CST   ecg with [place] CARDIAC SERVICES   [facility] Cardiac Services ([facility])    [address] [facility] [address]   [phone]            Jan 16, 2018 10:00 AM CST   Return Visit with Daksha Montemayor PA-C   [facility] ([facility])    [address] [facility] [address]   [phone]            Jan 18, 2018  8:30 AM CST   Return Visit with Joycelyn May MD   [facility] ([facility])    [facility]  [address]  [facility] [address]   [phone]            Jan 26, 2018  8:00 AM CST   Level 3 with ROOM 6 [facility] Infusion ([facility])    [facility]  [address]  [facility] [address]   [phone]            Jan 26, 2018  9:00 AM CST   Return Visit with Joycelyn May MD   [facility] Cancer  "Clinic ([facility])    [facility]  [address] [facility] [address]  [facility] [address] [phone]              Who to contact     If you have questions or need follow up information about today's clinic visit or your schedule please contact [facility] directly at [phone].  Normal or non-critical lab and imaging results will be communicated to you by MyChart, letter or phone within 4 business days after the clinic has received the results. If you do not hear from us within 7 days, please contact the clinic through [employer] or phone. If you have a critical or abnormal lab result, we will notify you by phone as soon as possible.  Submit refill requests through [employer] or call your pharmacy and they will forward the refill request to us. Please allow 3 business days for your refill to be completed.          Additional Information About Your Visit        [employer] Information     [employer] lets you send messages to your doctor, view your test results, renew your prescriptions, schedule appointments and more. To sign up, go to [email or web address] . Click on \"Log in\" on the left side of the screen, which will take you to the Welcome page. Then click on \"Sign up Now\" on the right side of the page.     You will be asked to enter the access code listed below, as well as some personal information. Please follow the directions to create your username and password.     Your access code is: [email or web address]  Expires: 2018  4:44 PM     Your access code will  in 90 days. If you need help or a new code, please call your [place] clinic or [phone].        Care EveryWhere ID     This is your Care EveryWhere ID. This could be used by other organizations to access your [place] medical records  [id number]         Blood Pressure from Last 3 Encounters:   18 110/66   17 111/78   17 139/87    Weight from Last 3 Encounters:   18 67.6 kg (149 lb) "   12/27/17 67.6 kg (149 lb)   12/27/17 67.9 kg (149 lb 9.6 oz)              We Performed the Following     CBC with platelets differential     Creatinine        Primary Care Provider Office Phone # Fax #    Kailash Mason [phone] [address] [facility] [address]        Equal Access to Services     [facility]: Hadii aad ku hadasho Soomaali, waaxda luqadaha, qaybta kaalmada adeegyada, waxay idiin hayaan adeeg margot lalilianacristiano . So [facility] [phone].    ATENCIÓN: Si habla español, tiene a morocho disposición servicios gratuitos de asistencia lingüística. Lauriame al [phone].    We comply with applicable federal civil rights laws and [place] laws. We do not discriminate on the basis of race, color, national origin, age, disability, sex, sexual orientation, or gender identity.            Thank you!     Thank you for choosing [facility]  for your care. Our goal is always to provide you with excellent care. Hearing back from our patients is one way we can continue to improve our services. Please take a few minutes to complete the written survey that you may receive in the mail after your visit with us. Thank you!             Your Updated Medication List - Protect others around you: Learn how to safely use, store and throw away your medicines at www.disposemymeds.org.          This list is accurate as of: 1/3/18  4:08 PM.  Always use your most recent med list.                   Brand Name Dispense Instructions for use Diagnosis    ALIGN Chew      Take 1 chew tab by mouth 3 times daily as needed        apixaban ANTICOAGULANT 5 MG tablet    ELIQUIS    60 tablet    Take 1 tablet (5 mg) by mouth 2 times daily    Typical atrial flutter (H)       ASPIRIN PO      Take 325 mg by mouth daily    Mediastinal lymphadenopathy, Non-small cell carcinoma of lung (H)       HYDROcodone-acetaminophen 5-325 MG per tablet    NORCO    30 tablet    Take 1-2 tablets by mouth every 6 hours as needed     Non-small cell carcinoma of lung (H)       lidocaine-prilocaine cream    EMLA    30 g    Place a quarter sized amount of cream onto port area 30-60 minutes prior to port use. Do not rub in. Cover with tegaderm or saran wrap.    Non-small cell carcinoma of lung (H)       LORazepam 0.5 MG tablet    ATIVAN    30 tablet    Take 1 tablet (0.5 mg) by mouth every 4 hours as needed (Anxiety, Nausea/Vomiting or Sleep)    Non-small cell carcinoma of lung (H)       megestrol 40 MG/ML suspension    MEGACE ORAL    600 mL    Take 5 mLs (200 mg) by mouth daily    Non-small cell carcinoma of lung (H)       metoprolol 50 MG tablet    LOPRESSOR    60 tablet    Take 1.5 tablets (75 mg) by mouth 2 times daily    Irregular heart beat, Atrial flutter, unspecified type (H), Cardiomyopathy, unspecified type (H)       nicotine 21 MG/24HR 24 hr patch    NICODERM CQ    30 patch    Place 1 patch onto the skin daily    Encounter for tobacco use cessation counseling       omeprazole 40 MG capsule    priLOSEC    30 capsule    Take 1 capsule (40 mg) by mouth daily Take 30-60 minutes before a meal.    Gastroesophageal reflux disease without esophagitis       ondansetron 8 MG tablet    ZOFRAN    10 tablet    Take 1 tablet (8 mg) by mouth every 8 hours as needed (Nausea/Vomiting)    Non-small cell carcinoma of lung (H)       prochlorperazine 10 MG tablet    COMPAZINE    30 tablet    Take 1 tablet (10 mg) by mouth every 6 hours as needed (Nausea/Vomiting)    Non-small cell carcinoma of lung (H)

## 2018-01-04 NOTE — PROGRESS NOTES
Infusion Nursing Note:  Zechariah Ashby presents today for C1D1 Gemzar, Carboplatin.    Patient seen by provider today: No   present during visit today: Not Applicable.    Note: Doses verified with protocol and BSA.    Intravenous Access:  Implanted Port.    Treatment Conditions:  Results reviewed, labs MET treatment parameters, ok to proceed with treatment.      Post Infusion Assessment:  Blood return noted pre and post infusion.  Site patent and intact, free from redness, edema or discomfort.  No evidence of extravasations.  Access discontinued per protocol.        Ayala Carlisle RN

## 2018-01-04 NOTE — DISCHARGE INSTRUCTIONS
Lymph Node Biopsy Discharge Instructions  _____________________________________    Patient Name: Zechariah Ashby  Today's Date: January 4, 2018    If you have not received your results after 5 days, please call the doctor who ordered your test.    Diet and medicines    You may go back to your regular diet and medicines.    You may take pain relievers such as Advil (ibuprofen) or Tylenol (acetaminophen).    Activity    You may go back to your normal routine.    No heavy exercise for 24 hours.    Site care    The needle site may have mild bruising, soreness and swelling. This will go away in a few days.     For swelling and bruising, place an ice pack on the site. Never use ice directly on your skin. Use the pack for 20 minutes. Remove it for at least 30 minutes before re-using.    Call your doctor if you have:    Severe pain at the needle site.     Fever over 101  F (38.3  C), taken under the tongue.    Increased redness or swelling.    Fluid oozing or draining from the site.    Go to the emergency room or call 911 if:     You have bleeding that cannot be stopped with direct pressure.     You have trouble breathing.    Your neck swells.    If you have questions, call your hospital:      [facility] at [phone]

## 2018-01-04 NOTE — MR AVS SNAPSHOT
After Visit Summary   1/4/2018    Zechariah Ashby    MRN: [id number]           Patient Information     Date Of Birth          1960        Visit Information        Provider Department      1/4/2018 10:30 AM ROOM 2 [facility] Cancer Infusion        Today's Diagnoses     Non-small cell carcinoma of lung (H)    -  1       Follow-ups after your visit        Your next 10 appointments already scheduled     Jan 09, 2018  8:00 AM CST   (Arrive by 7:45 AM)   NEW LUNG NODULE with Osvaldo Ortiz MD   [facility] ([facility] [place])    [address] [place] [address]  [address] [place] [id number] [phone]            Jan 12, 2018 10:45 AM CST   Ech Complete with [address] Echocardiography ([facility])    [address] [facility] [address]   [phone]           1. Please bring or wear a comfortable two-piece outfit. 2. You may eat, drink and take your normal medicines. 3. For any questions that cannot be answered, please contact the ordering physician            Jan 12, 2018  1:30 PM CST   Level 1 with ROOM 9 [facility] Cancer Infusion ([facility])    [facility]  [address]  [facility] [address]   [phone]            Jan 12, 2018  2:30 PM CST   Return Visit with Alejandra Rand MD   [facility] ([facility])    [facility]  [address]  [facility] [address]   [phone]            Jan 16, 2018  9:45 AM CST   ecg with [place] CARD[place] SERVICES   [facility] Cardiac Services ([facility])    [address] [facility] [address]   [phone]            Jan 16, 2018 10:00 AM CST   Return Visit with Daksha Montemayor PA-C   [facility] ([facility])    [address] [facility] [address]   [phone]            Jan 25, 2018  8:00 AM CST  "  Level 3 with ROOM 1 [facility] Infusion ([facility])    [facility]  [address]  [facility] [address]   [phone]            Jan 25, 2018  9:00 AM CST   Return Visit with Joycelyn May MD   [facility] ([facility])    [facility]  [address]  [facility] [address]   [phone]            Feb 01, 2018  8:00 AM CST   Level 1 with ROOM 9 [facility] Infusion ([facility])    [facility]  [address]  [facility] [address]   [phone]              Future tests that were ordered for you today     Open Future Orders        Priority Expected Expires Ordered    US Biopsy Lymph Node Core Routine 12/27/2017 12/27/2018 12/27/2017            Who to contact     If you have questions or need follow up information about today's clinic visit or your schedule please contact [facility] directly at [phone].  Normal or non-critical lab and imaging results will be communicated to you by [facility] Inchart, letter or phone within 4 business days after the clinic has received the results. If you do not hear from us within 7 days, please contact the clinic through [employer] or phone. If you have a critical or abnormal lab result, we will notify you by phone as soon as possible.  Submit refill requests through [employer] or call your pharmacy and they will forward the refill request to us. Please allow 3 business days for your refill to be completed.          Additional Information About Your Visit        [employer] Information     [employer] lets you send messages to your doctor, view your test results, renew your prescriptions, schedule appointments and more. To sign up, go to [email or web address] . Click on \"Log in\" on the left side of the screen, which will take you to the Welcome page. Then click on \"Sign up Now\" on the right side of the page.     You " will be asked to enter the access code listed below, as well as some personal information. Please follow the directions to create your username and password.     Your access code is: [email or web address]  Expires: 2018  4:44 PM     Your access code will  in 90 days. If you need help or a new code, please call your [place] clinic or [phone].        Care EveryWhere ID     This is your Care EveryWhere ID. This could be used by other organizations to access your [place] medical records  [id number]         Blood Pressure from Last 3 Encounters:   18 108/78   18 110/66   17 111/78    Weight from Last 3 Encounters:   18 67.6 kg (149 lb)   17 67.6 kg (149 lb)   17 67.9 kg (149 lb 9.6 oz)              Today, you had the following     No orders found for display       Primary Care Provider Office Phone # Fax #    Kailash Mason [phone] [address] [place], [place] [id number]        Equal Access to Services     [facility] : Hadii arnol garibay Soasher, waaxda lubeckieadaha, qaybta kaalmada ujdah, harley carrasquillo . So [facility] [phone].    ATENCIÓN: Si habla español, tiene a morocho disposición servicios gratuitos de asistencia lingüística. [facility] [phone].    We comply with applicable federal civil rights laws and [place] laws. We do not discriminate on the basis of race, color, national origin, age, disability, sex, sexual orientation, or gender identity.            Thank you!     Thank you for choosing [facility]  for your care. Our goal is always to provide you with excellent care. Hearing back from our patients is one way we can continue to improve our services. Please take a few minutes to complete the written survey that you may receive in the mail after your visit with us. Thank you!             Your Updated Medication List - Protect others around you: Learn how to safely use, store and throw away your  medicines at www.disposemymeds.org.          This list is accurate as of: 1/4/18  1:18 PM.  Always use your most recent med list.                   Brand Name Dispense Instructions for use Diagnosis    ALIGN Chew      Take 1 chew tab by mouth 3 times daily as needed        apixaban ANTICOAGULANT 5 MG tablet    ELIQUIS    60 tablet    Take 1 tablet (5 mg) by mouth 2 times daily    Typical atrial flutter (H)       ASPIRIN PO      Take 325 mg by mouth daily    Mediastinal lymphadenopathy, Non-small cell carcinoma of lung (H)       HYDROcodone-acetaminophen 5-325 MG per tablet    NORCO    30 tablet    Take 1-2 tablets by mouth every 6 hours as needed    Non-small cell carcinoma of lung (H)       lidocaine-prilocaine cream    EMLA    30 g    Place a quarter sized amount of cream onto port area 30-60 minutes prior to port use. Do not rub in. Cover with tegaderm or saran wrap.    Non-small cell carcinoma of lung (H)       LORazepam 0.5 MG tablet    ATIVAN    30 tablet    Take 1 tablet (0.5 mg) by mouth every 4 hours as needed (Anxiety, Nausea/Vomiting or Sleep)    Non-small cell carcinoma of lung (H)       megestrol 40 MG/ML suspension    MEGACE ORAL    600 mL    Take 5 mLs (200 mg) by mouth daily    Non-small cell carcinoma of lung (H)       metoprolol 50 MG tablet    LOPRESSOR    60 tablet    Take 1.5 tablets (75 mg) by mouth 2 times daily    Irregular heart beat, Atrial flutter, unspecified type (H), Cardiomyopathy, unspecified type (H)       nicotine 21 MG/24HR 24 hr patch    NICODERM CQ    30 patch    Place 1 patch onto the skin daily    Encounter for tobacco use cessation counseling       omeprazole 40 MG capsule    priLOSEC    30 capsule    Take 1 capsule (40 mg) by mouth daily Take 30-60 minutes before a meal.    Gastroesophageal reflux disease without esophagitis       ondansetron 8 MG tablet    ZOFRAN    10 tablet    Take 1 tablet (8 mg) by mouth every 8 hours as needed (Nausea/Vomiting)    Non-small cell  carcinoma of lung (H)       prochlorperazine 10 MG tablet    COMPAZINE    30 tablet    Take 1 tablet (10 mg) by mouth every 6 hours as needed (Nausea/Vomiting)    Non-small cell carcinoma of lung (H)

## 2018-01-04 NOTE — OP NOTE
DATE OF SURGERY:  01/03/2018      PREOPERATIVE DIAGNOSIS:  Lung cancer.      POSTOPERATIVE DIAGNOSIS:  Lung cancer.      PROCEDURE:  Port-A-Cath placement.      SURGEON:  Tomas Crews MD      ANESTHESIA:  MAC.      ANESTHESIOLOGIST:  Brock Bearden CRNA      FINDINGS:  Catheter tip in superior vena cava on fluoroscopy.      INDICATION:  Zechariah Ashby is a 57-year-old male seen in clinic with a new diagnosis of left lung cancer, including large hilar mass.      CONSENT:  Risks, benefits, alternatives and complications were discussed with the patient, including the possibility of infection, bleeding, or pneumothorax.  The patient understood and wished to proceed.      PROCEDURE:  The patient was taken to the operating room and placed in supine position.  Monitored anesthesia care was initiated.  A shoulder roll was placed vertically between his shoulder blades.  His anterior chest was cleaned and draped in a sterile manner after first clipping extraneous hair.  Two grams of Ancef was used as perioperative antibiotic.  A 50:50 solution of 0.25% Marcaine plain with 1% lidocaine with epinephrine was used to anesthetize the skin on the patient's left anterior chest as well as the subcutaneous tissue in the clavicle.  He was placed into Trendelenburg position, and a needle was inserted into the left subclavian vein with a flashback of dark red blood, and a wire guided smoothly over the needle.  On fluoroscopy, the wire was noted to be in the superior vena cava.  A subcutaneous pocket was anesthetized on the patient's left anterior chest, and a 4 cm transverse incision was made.  The pocket was dissected out bluntly, and electrocautery was used for hemostasis.  A catheter was then tunneled from the wire exit point to the subcutaneous pocket.  The patient was placed back into Trendelenburg position.  A subcutaneous dilator and introducer sheath were placed over the wire, and the wire and dilator removed, leaving only the  introducer sheath.  The catheter thread smoothly into the sheath, which was broken free and removed from the body.  The catheter tip on fluoroscopy was noted to be in the right ventricle, and it was pulled back until the tip was at the right atrial superior vena cava junction.  The catheter was then cut to the appropriate length and attached to the port.  The port venkatesh back dark red blood and flushed with ease.  A final flush solution consisting of 1000 units of heparin per cc was then injected into the port.  A total of 4 cc of this solution were used.  Finally, the subcutaneous pocket was closed using interrupted deep dermal 3-0 Vicryl sutures and a 4-0 Vicryl running subcuticular stitch.  The wire exit point was also closed using a 4-0 Vicryl running subcuticular stitch.  Both incisions were then treated with Dermabond.  The patient tolerated the procedure well and was transferred back to [facility] for recovery.      PLAN:  Following a stable recovery, he will be discharged home with a regular diet, activity as tolerated.      DISABILITY:  None.      MEDICATIONS:  Prescription written for Norco.      INSTRUCTIONS:  The patient is advised to wash his wounds daily with soap and water, and to follow up with me or the infusion nurses in 1-2 weeks.      A portable chest x-ray confirming line placement is pending at the time of dictation.         VANESA WALDEN             D: 2018 12:46   T: 2018 22:55   MT: ALEXANDRO#[id number]      Name:     ROQUE SAXENA   MRN:      [id number]        Account:        [id number]   :      1960           Procedure Date: 2018      Document: [id number]       cc: Kailash Mason MD

## 2018-01-04 NOTE — IP AVS SNAPSHOT
[facility]    [address] [facility] [address]    Phone:  [phone]                                       After Visit Summary   1/4/2018    Zechariah Ashby    MRN: [id number]           After Visit Summary Signature Page     I have received my discharge instructions, and my questions have been answered. I have discussed any challenges I see with this plan with the nurse or doctor.    ..........................................................................................................................................  Patient/Patient Representative Signature      ..........................................................................................................................................  Patient Representative Print Name and Relationship to Patient    ..................................................               ................................................  Date                                            Time    ..........................................................................................................................................  Reviewed by Signature/Title    ...................................................              ..............................................  Date                                                            Time

## 2018-01-04 NOTE — IP AVS SNAPSHOT
MRN:[id number]                      After Visit Summary   1/4/2018    Zechariah Ashby    MRN: [id number]           Visit Information        Provider Department      1/4/2018  8:45 AM [facility]; [facility]           Review of your medicines      UNREVIEWED medicines. Ask your doctor about these medicines        Dose / Directions    ALIGN Chew        Dose:  1 chew tab   Take 1 chew tab by mouth 3 times daily as needed   Refills:  0       apixaban ANTICOAGULANT 5 MG tablet   Commonly known as:  ELIQUIS   Used for:  Typical atrial flutter (H)        Dose:  5 mg   Take 1 tablet (5 mg) by mouth 2 times daily   Quantity:  60 tablet   Refills:  0       ASPIRIN PO   Used for:  Mediastinal lymphadenopathy, Non-small cell carcinoma of lung (H)        Dose:  325 mg   Take 325 mg by mouth daily   Refills:  0       HYDROcodone-acetaminophen 5-325 MG per tablet   Commonly known as:  NORCO   Used for:  Non-small cell carcinoma of lung (H)        Dose:  1-2 tablet   Take 1-2 tablets by mouth every 6 hours as needed   Quantity:  30 tablet   Refills:  0       lidocaine-prilocaine cream   Commonly known as:  EMLA   Used for:  Non-small cell carcinoma of lung (H)        Place a quarter sized amount of cream onto port area 30-60 minutes prior to port use. Do not rub in. Cover with tegaderm or saran wrap.   Quantity:  30 g   Refills:  1       LORazepam 0.5 MG tablet   Commonly known as:  ATIVAN   Used for:  Non-small cell carcinoma of lung (H)        Dose:  0.5 mg   Take 1 tablet (0.5 mg) by mouth every 4 hours as needed (Anxiety, Nausea/Vomiting or Sleep)   Quantity:  30 tablet   Refills:  5       megestrol 40 MG/ML suspension   Commonly known as:  MEGACE ORAL   Used for:  Non-small cell carcinoma of lung (H)        Dose:  200 mg   Take 5 mLs (200 mg) by mouth daily   Quantity:  600 mL   Refills:  2       metoprolol 50 MG tablet   Commonly known as:  LOPRESSOR   Used for:  Irregular heart beat,  Atrial flutter, unspecified type (H), Cardiomyopathy, unspecified type (H)        Dose:  75 mg   Take 1.5 tablets (75 mg) by mouth 2 times daily   Quantity:  60 tablet   Refills:  1       nicotine 21 MG/24HR 24 hr patch   Commonly known as:  NICODERM CQ   Used for:  Encounter for tobacco use cessation counseling        Dose:  1 patch   Place 1 patch onto the skin daily   Quantity:  30 patch   Refills:  0       omeprazole 40 MG capsule   Commonly known as:  priLOSEC   Used for:  Gastroesophageal reflux disease without esophagitis        Dose:  40 mg   Take 1 capsule (40 mg) by mouth daily Take 30-60 minutes before a meal.   Quantity:  30 capsule   Refills:  1       ondansetron 8 MG tablet   Commonly known as:  ZOFRAN   Used for:  Non-small cell carcinoma of lung (H)        Dose:  8 mg   Take 1 tablet (8 mg) by mouth every 8 hours as needed (Nausea/Vomiting)   Quantity:  10 tablet   Refills:  5       prochlorperazine 10 MG tablet   Commonly known as:  COMPAZINE   Used for:  Non-small cell carcinoma of lung (H)        Dose:  10 mg   Take 1 tablet (10 mg) by mouth every 6 hours as needed (Nausea/Vomiting)   Quantity:  30 tablet   Refills:  5                Protect others around you: Learn how to safely use, store and throw away your medicines at www.disposemymeds.org.         Follow-ups after your visit        Your next 10 appointments already scheduled     Jan 04, 2018 10:30 AM CST   Level 3 with ROOM 2 [facility] ([facility])    [facility]  [address]  [facility] [address]   [phone]            Jan 09, 2018  8:00 AM CST   (Arrive by 7:45 AM)   NEW LUNG NODULE with Osvaldo Ortiz MD   [facility] ([facility])    [address]  [address]  [facility] [address] [phone]            Jan 12, 2018 10:45 AM CST   Ech Complete with [address] [facility] ([facility])    [address] [place] [address]   [phone]           1. Please bring or wear a comfortable two-piece outfit. 2. You may eat, drink and take your normal medicines. 3. For any questions that cannot be answered, please contact the ordering physician            Jan 16, 2018  9:45 AM CST   ecg with [facility]   [facility] ([facility])    [address] [address]   [phone]            Jan 16, 2018 10:00 AM CST   Return Visit with Daksha Montemayor PA-C   [facility] ([facility])    [address] [address]   [phone]            Jan 18, 2018  8:30 AM CST   Return Visit with Joycelyn May MD   [facility] ([facility])    [facility]  [address]  [facility] [address]   [phone]            Jan 26, 2018  8:00 AM CST   Level 3 with ROOM 6 [facility] ([facility])    [facility]  [address]  [facility] [address]   [phone]            Jan 26, 2018  9:00 AM CST   Return Visit with Joycelyn May MD   [facility] ([facility])    [facility]  [address]  [facility] [address]   [phone]               Care Instructions        Further instructions from your care team       Lymph Node Biopsy Discharge Instructions  _____________________________________    Patient Name: Zechariah WOODALL Isma  Today's Date: January 4, 2018    If you have not received your results after 5 days, please call the doctor who ordered your test.    Diet and medicines    You may go back to your regular diet and medicines.    You may take pain relievers such as Advil (ibuprofen) or Tylenol (acetaminophen).    Activity    You may go back to your normal routine.    No heavy exercise for 24  "hours.    Site care    The needle site may have mild bruising, soreness and swelling. This will go away in a few days.     For swelling and bruising, place an ice pack on the site. Never use ice directly on your skin. Use the pack for 20 minutes. Remove it for at least 30 minutes before re-using.    Call your doctor if you have:    Severe pain at the needle site.     Fever over 101  F (38.3  C), taken under the tongue.    Increased redness or swelling.    Fluid oozing or draining from the site.    Go to the emergency room or call 911 if:     You have bleeding that cannot be stopped with direct pressure.     You have trouble breathing.    Your neck swells.    If you have questions, call your hospital:      [facility] at [phone]           Additional Information About Your Visit        MyChart Information     [employer] lets you send messages to your doctor, view your test results, renew your prescriptions, schedule appointments and more. To sign up, go to [email or web address] . Click on \"Log in\" on the left side of the screen, which will take you to the Welcome page. Then click on \"Sign up Now\" on the right side of the page.     You will be asked to enter the access code listed below, as well as some personal information. Please follow the directions to create your username and password.     Your access code is: [email or web address]  Expires: 2018  4:44 PM     Your access code will  in 90 days. If you need help or a new code, please call your [facility] clinic or [phone].        Care EveryWhere ID     This is your Care EveryWhere ID. This could be used by other organizations to access your [facility] medical records  [id number]        Your Vitals Were     Blood Pressure                   108/78            Primary Care Provider Office Phone # Fax #    Kailash Mason [phone]      Equal Access to Services     ROD PERRY AH: ronan Bedoya " seferinojovita diane sophiasarai so, harley torresaan ah. So [facility] [phone].    ATENCIÓN: Si wesley stern, tiene a morocho disposición servicios gratuitos de asistencia lingüística. Roz al [phone].    We comply with applicable federal civil rights laws and [place] laws. We do not discriminate on the basis of race, color, national origin, age, disability, sex, sexual orientation, or gender identity.            Thank you!     Thank you for choosing [facility] for your care. Our goal is always to provide you with excellent care. Hearing back from our patients is one way we can continue to improve our services. Please take a few minutes to complete the written survey that you may receive in the mail after you visit with us. Thank you!             Medication List: This is a list of all your medications and when to take them. Check marks below indicate your daily home schedule. Keep this list as a reference.      Medications           Morning Afternoon Evening Bedtime As Needed    ALIGN Chew   Take 1 chew tab by mouth 3 times daily as needed                                apixaban ANTICOAGULANT 5 MG tablet   Commonly known as:  ELIQUIS   Take 1 tablet (5 mg) by mouth 2 times daily                                ASPIRIN PO   Take 325 mg by mouth daily                                HYDROcodone-acetaminophen 5-325 MG per tablet   Commonly known as:  NORCO   Take 1-2 tablets by mouth every 6 hours as needed                                lidocaine-prilocaine cream   Commonly known as:  EMLA   Place a quarter sized amount of cream onto port area 30-60 minutes prior to port use. Do not rub in. Cover with tegaderm or saran wrap.                                LORazepam 0.5 MG tablet   Commonly known as:  ATIVAN   Take 1 tablet (0.5 mg) by mouth every 4 hours as needed (Anxiety, Nausea/Vomiting or Sleep)                                megestrol 40 MG/ML suspension   Commonly known as:  MEGACE ORAL    Take 5 mLs (200 mg) by mouth daily                                metoprolol 50 MG tablet   Commonly known as:  LOPRESSOR   Take 1.5 tablets (75 mg) by mouth 2 times daily                                nicotine 21 MG/24HR 24 hr patch   Commonly known as:  NICODERM CQ   Place 1 patch onto the skin daily                                omeprazole 40 MG capsule   Commonly known as:  priLOSEC   Take 1 capsule (40 mg) by mouth daily Take 30-60 minutes before a meal.                                ondansetron 8 MG tablet   Commonly known as:  ZOFRAN   Take 1 tablet (8 mg) by mouth every 8 hours as needed (Nausea/Vomiting)                                prochlorperazine 10 MG tablet   Commonly known as:  COMPAZINE   Take 1 tablet (10 mg) by mouth every 6 hours as needed (Nausea/Vomiting)

## 2018-01-04 NOTE — PROGRESS NOTES
"SPIRITUAL HEALTH SERVICES Progress Note  [facility]    Introductory visit with Zechariah WOODALL Isma to offer ongoing emotional/spiritual support during cancer care.      Illness Narrative: Non SCLC      Distress: Zechariah welcomed visit and stated he welcomed visits in the future.  Today he was \"good.\"      Coping: N/A      Meaning-Making: N/A      Plan: I will check back with Zechariah during future infusion appointments.    Osmel Alvarez M.A., [facility]  Staff   [facility]  Office [phone]  Cell [phone]  Pager [phone]    "

## 2018-01-04 NOTE — PROGRESS NOTES
RADIOLOGY PROCEDURE NOTE  Patient name: Zechariah Ashby  MRN: [id number]  : 1960    Pre-procedure diagnosis: Abnormal left level 5 lymphnode on PET CT.  Post-procedure diagnosis: Same    Procedure Date/Time: 2018  9:40 AM  Procedure: US guided needle core biopsy.  Estimated blood loss: None  Specimen(s) collected:  Three needle cores.  The patient tolerated the procedure well with no immediate complications.    See imaging dictation for procedural details.    Provider name: Peter Crooks  Assistant(s):None

## 2018-01-06 NOTE — TELEPHONE ENCOUNTER
Step-daughter states patient started chemotherapy and was told if temperature reaches 100.4 he needs to be seen.  States temperature if 100.0 now and just wanting to clarify that patient would go to [facility].

## 2018-01-07 PROBLEM — J44.1 COPD EXACERBATION (H): Status: ACTIVE | Noted: 2018-01-01

## 2018-01-07 NOTE — ED NOTES
Accesses chest wall port lt, with 3/4in PP, pt tolerated well. Labs drawn. Pt is here with wife and x wife. Pt is a/o x 4

## 2018-01-07 NOTE — IP AVS SNAPSHOT
[facility]    [address] [address]    Phone:  [phone]    Fax:  [phone]                                       After Visit Summary   1/7/2018    Zechariah Ashby    MRN: [id number]           After Visit Summary Signature Page     I have received my discharge instructions, and my questions have been answered. I have discussed any challenges I see with this plan with the nurse or doctor.    ..........................................................................................................................................  Patient/Patient Representative Signature      ..........................................................................................................................................  Patient Representative Print Name and Relationship to Patient    ..................................................               ................................................  Date                                            Time    ..........................................................................................................................................  Reviewed by Signature/Title    ...................................................              ..............................................  Date                                                            Time

## 2018-01-07 NOTE — LETTER
Transition Communication Hand-off for Care Transitions to Next Level of Care Provider    Name: Zechariah Ashby  MRN #: [id number]  Primary Care Provider: Kailash Mason  Primary Care MD Name: Dr Mason  Primary Clinic: [address] [place] [address]  Primary Care Clinic Name: (P) [facility]  Reason for Hospitalization:  COPD exacerbation (H) [J44.1]  Acute systolic congestive heart failure (H) [I50.21]  Malignant neoplasm of upper lobe of left lung (H) [C34.12]  Admit Date/Time: 1/7/2018  3:11 PM  Discharge Date: 1/9/18  Payor Source: Payor: [employer] / Plan: [employer] OPEN ACCESS FULLY INSURED / Product Type: HMO /     Readmission Assessment Measure (SUZETTE) Risk Score/category: Average        Reason for Communication Hand-off Referral: Admission diagnoses: COPD    Discharge Plan:  Home with [facility] ([phone] Fax: [phone])     Concern for non-adherence with plan of care:   Y/N No  Discharge Needs Assessment:  Needs       Most Recent Value    Anticipated Changes Related to Illness other (see comments) [continued weakness]    Equipment Currently Used at Home none    Transportation Available family or friend will provide            Follow-up plan:  Future Appointments  Date Time Provider Department Center   1/11/2018 2:30 PM Daksha Montemayor PA-C [facility]   1/12/2018 1:30 PM ROOM 9 [facility]   1/12/2018 2:30 PM Alejandra Rand MD [facility]   1/15/2018 10:00 AM Kailash Mason MD [facility]   1/25/2018 8:00 AM ROOM 1 [facility]   1/25/2018 9:00 AM Joycelyn May MD [facility]   2/1/2018 8:00 AM ROOM 9 [facility]     Piña Recommendations:  Patient has lung cancer, he is currently receiving chemotherapy.  He currently has an open Adult Protection case open with the [facility].  His County Worker is Emiliano (Phone: [phone] Fax:  [phone]).  Faxed hospital information per [facility]'s request.        Carolynn Mesa RN, Care  Coordinator    AVS/Discharge Summary is the source of truth; this is a helpful guide for improved communication of patient story

## 2018-01-07 NOTE — TELEPHONE ENCOUNTER
Caller is not with the patient, she says he told her he is feeling SOB but wants to wait for clinic time tomorrow to be seen for this. She asked if he should be seen today. She does not know the severity of the SOB or any others symptoms he may be having. Informed SOB may be a 911 or ED call. To have who ever is with him call us now or 911 if he is having a hard time breathing. She will call his son now.    Rafaela Mahan RN, [facility]

## 2018-01-07 NOTE — H&P
[facility]   History and Physical    Zechariah Ahsby MRN# [id number]   Age: 57 year old YOB: 1960     Date of Admission:  1/7/2018    Home clinic: [facility]  Primary care provider: Kailash Mason         Assessment and Plan:   (J44.1) COPD exacerbation (H)  (primary encounter diagnosis)  Comment: 57 yr old male with shortness of breath , cough , hypoxia   Plan: IV fluids, prednisone, Oxygen as needed. Neb treatments     (I50.21) Acute systolic congestive heart failure (H)  Comment: Here with some mild CHF exacerbation .   Plan: Monitor I/O repeat ECHO in the morning     (C34.12) Malignant neoplasm of upper lobe of left lung (H)  Comment: Ongoing has next scheduled chemo for Friday   Plan:  As above     Anticipate 2-3 nights in the hospital  Disposition - Fair  DVT prophylaxis - mechanical.             Chief Complaint:   Dyspnea on exertion       History is obtained from the patient          History of Present Illness:   This patient is a 57 year old  male with a significant past medical history of chronic obstructive pulmonary disease, congestive heart failure, coronary artery disease and malignancy who presents with the following condition requiring a hospital admission:    Shortness of breath  Weakness  Chills    Patient is a 57 yr old male here for weakness and shortness of breath. His symptoms started last week Thursday shortly after his first round of chemotherapy for treatment of non cell carcinoma of the lung. This was also recently diagnosed. He states that he is coughing . Cough is wet in nature and he is unable to bring up the sputum. He reports no fevers. He reports no chest pain    His history is also complicated by recent diagnosis of atrial flutter s/p ablation , also has some cardiomyopathy with recent echo showing and EF of 35 % . He also had a positive NM lexiscan which showed depression in the mid and distal anteroseptal wall and  apex.  Patient 's labs showed mildly elevated BNP , troponin was within normal limits . Electrolytes showed mild hyponatremia. CBC was within normal limits  CT scan of the chest showed mild increase in the lung mass , No PE , no pneumonia.   Patient will be admitted for COPD exacerbation , respiratory distress and also CHF exacerbation        Past Medical History:     Patient Active Problem List    Diagnosis Date Noted     Non-small cell carcinoma of lung (H) 12/28/2017     Priority: Medium     Lung mass 12/13/2017     Priority: Medium     Pleural effusion 12/13/2017     Priority: Medium     Mediastinal lymphadenopathy 12/13/2017     Priority: Medium     Atrial flutter (H) 11/16/2017     Priority: Medium     Onset 11/3/2017.  Seen in clinic and ED.       Perforated ulcer (HCC) 04/24/2015     Priority: Medium     Free intraperitoneal air 04/24/2015     Priority: Medium     CARDIOVASCULAR SCREENING; LDL GOAL LESS THAN 130 10/31/2010     Priority: Medium               Past Surgical History:      Past Surgical History:   Procedure Laterality Date     APPENDECTOMY      age 30s     INSERT PORT VASCULAR ACCESS N/A 1/3/2018    Procedure: INSERT PORT VASCULAR ACCESS;  Port-a-Cath Placement;  Surgeon: Tomas Crews MD;  Location: [facility]     SURGICAL HISTORY OF -   08/24/2004    Gastroscopy with biopsy for gastric ulcer             Social History:     Social History     Social History     Marital status:      Spouse name: Leonor Shultz     Number of children: 2     Years of education: N/A     Occupational History           Social History Main Topics     Smoking status: Current Some Day Smoker     Packs/day: 0.25     Years: 50.00     Smokeless tobacco: Never Used      Comment: down to 1-2 cigerattes a day.      Alcohol use No     Drug use: No     Sexual activity: Yes     Partners: Female     Other Topics Concern     Parent/Sibling W/ Cabg, Mi Or Angioplasty Before 65f 55m? No     Social History  Narrative          Family History:     Family History   Problem Relation Age of Onset     CANCER Mother      pancreatic cancer,  at 38 years     Alzheimer Disease Father       at 84 years.        Allergies:     Allergies   Allergen Reactions     Nka [No Known Allergies]           Medications:       No current facility-administered medications on file prior to encounter.   Current Outpatient Prescriptions on File Prior to Encounter:  LORazepam (ATIVAN) 0.5 MG tablet Take 1 tablet (0.5 mg) by mouth every 4 hours as needed (Anxiety, Nausea/Vomiting or Sleep)   prochlorperazine (COMPAZINE) 10 MG tablet Take 1 tablet (10 mg) by mouth every 6 hours as needed (Nausea/Vomiting)   megestrol (MEGACE ORAL) 40 MG/ML suspension Take 5 mLs (200 mg) by mouth daily   ASPIRIN PO Take 325 mg by mouth daily   apixaban ANTICOAGULANT (ELIQUIS) 5 MG tablet Take 1 tablet (5 mg) by mouth 2 times daily   nicotine (NICODERM CQ) 21 MG/24HR 24 hr patch Place 1 patch onto the skin daily   metoprolol (LOPRESSOR) 50 MG tablet Take 1.5 tablets (75 mg) by mouth 2 times daily   Probiotic Product (ALIGN) CHEW Take 1 chew tab by mouth 3 times daily as needed   omeprazole (PRILOSEC) 40 MG capsule Take 1 capsule (40 mg) by mouth daily Take 30-60 minutes before a meal.   HYDROcodone-acetaminophen (NORCO) 5-325 MG per tablet Take 1-2 tablets by mouth every 6 hours as needed   ondansetron (ZOFRAN) 8 MG tablet Take 1 tablet (8 mg) by mouth every 8 hours as needed (Nausea/Vomiting)   lidocaine-prilocaine (EMLA) cream Place a quarter sized amount of cream onto port area 30-60 minutes prior to port use. Do not rub in. Cover with tegaderm or saran wrap.       Review of Systems:   A 10 point review of systems was performed and all else is negative except as stated in the HPI         Physical Exam:   Initial vitals were reviewed   Blood pressure 103/68, pulse 75, temperature 98  F (36.7  C), temperature source Oral, resp. rate 27, weight 68 kg (150 lb),  SpO2 98 %.  Constitutional:   awake, alert, cooperative, mild distress, appears older than stated age and pale   Eyes:   Lids and lashes normal, pupils equal, round and reactive to light, extra ocular muscles intact, sclera clear, conjunctiva normal   ENT:   normocepalic, without obvious abnormality   Neck:   supple, symmetrical, trachea midline   Hematologic / Lymphatic:   no cervical lymphadenopathy   Back:   symmetric   Lungs:   tachypneic, moderate air exchange, no retractions and rhonchi left apex, left base and left anterior, diminished breath sounds left apex, left base and left anterior   Cardiovascular:   Normal apical impulse, regular rate and rhythm, normal S1 and S2, no S3 or S4, and no murmur noted   Abdomen:   No scars, normal bowel sounds, soft, non-distended, non-tender, no masses palpated, no hepatosplenomegally   Genitounirinary:   deferred   Musculoskeletal:   no lower extremity pitting edema present  there is no redness, warmth, or swelling of the joints   Neurologic:   Awake, alert, oriented to name, place and time.     Skin:   no bruising or bleeding          Data:   All laboratory data reviewed    Results for orders placed or performed during the hospital encounter of 01/07/18   CT Chest Pulmonary Embolism w Contrast    Narrative    CT CHEST PULMONARY EMBOLISM WITH CONTRAST   1/7/2018 4:37 PM    HISTORY: Dyspnea. History of lung cancer.    TECHNIQUE: Pulmonary embolism protocol was performed. 69 mL Isovue 370  were injected IV. After contrast injection, volumetric helical  acquisition was performed from the thoracic inlet through the upper  abdomen. Radiation dose for this scan was reduced using automated  exposure control, adjustment of the mA and/or kV according to patient  size, or iterative reconstruction technique.    COMPARISON: CT of the chest, abdomen, and pelvis performed 12/9/2017.    FINDINGS:  No evidence for pulmonary embolism. The thoracic aorta is  of normal caliber, without  evidence for thoracic aortic aneurysm or  dissection. There is a small left pleural effusion, not significantly  changed. Small pericardial effusion has increased slightly. No right  pleural effusion. There is again complete atelectasis of the left  upper lobe within an area of masslike soft tissue thickening  anteriorly and medially (series 7 image 63) likely representing the  patient's known malignancy. This area of masslike soft tissue  thickening is not well-defined on this exam, but appears to have  increased slightly in size, today estimated at 7.8 x 5.7 cm. This mass  again appears to extend into the mediastinum, and abuts the ascending  thoracic aorta and aortic arch, as well as partially encase the main  and left pulmonary arteries as well as several left upper lobe  pulmonary artery branches.    Moderate mediastinal adenopathy has also increased in size. For  example, an enlarged right paratracheal lymph node in the superior  mediastinum (series 7 image 44) measures 2.1 x 1.8 cm (previously 1.6  x 1.3 cm). Mild right hilar adenopathy has also increased slightly.  Left Port-A-Cath, with tip in the right atrium. Emphysematous changes  are noted throughout both lungs. Small calcified granuloma in the left  lower lobe is unchanged. Degenerative changes are noted throughout the  thoracic spine.      Impression    IMPRESSION:   1. No evidence for pulmonary embolism or thoracic aortic dissection.  2. Left upper lobe mass is not well-defined, but appears to have  increased slightly in size.  3. Mediastinal and right hilar adenopathy has also increased slightly.  4. Small left pleural effusion is unchanged.  5. Small pericardial effusion has increased slightly in size.  6. Complete atelectasis of the left upper lobe is again noted.  7. Emphysema.    CBC with platelets differential   Result Value Ref Range    WBC 10.0 4.0 - 11.0 10e9/L    RBC Count 4.57 4.4 - 5.9 10e12/L    Hemoglobin 13.5 13.3 - 17.7 g/dL     Hematocrit 40.1 40.0 - 53.0 %    MCV 88 78 - 100 fl    MCH 29.5 26.5 - 33.0 pg    MCHC 33.7 31.5 - 36.5 g/dL    RDW 12.3 10.0 - 15.0 %    Platelet Count 180 150 - 450 10e9/L    Diff Method Automated Method     % Neutrophils 92.2 %    % Lymphocytes 6.4 %    % Monocytes 1.1 %    % Eosinophils 0.1 %    % Basophils 0.1 %    % Immature Granulocytes 0.1 %    Absolute Neutrophil 9.2 (H) 1.6 - 8.3 10e9/L    Absolute Lymphocytes 0.6 (L) 0.8 - 5.3 10e9/L    Absolute Monocytes 0.1 0.0 - 1.3 10e9/L    Absolute Eosinophils 0.0 0.0 - 0.7 10e9/L    Absolute Basophils 0.0 0.0 - 0.2 10e9/L    Abs Immature Granulocytes 0.0 0 - 0.4 10e9/L   Comprehensive metabolic panel   Result Value Ref Range    Sodium 132 (L) 133 - 144 mmol/L    Potassium 4.4 3.4 - 5.3 mmol/L    Chloride 101 94 - 109 mmol/L    Carbon Dioxide 25 20 - 32 mmol/L    Anion Gap 6 3 - 14 mmol/L    Glucose 104 (H) 70 - 99 mg/dL    Urea Nitrogen 27 7 - 30 mg/dL    Creatinine 0.94 0.66 - 1.25 mg/dL    GFR Estimate 82 >60 mL/min/1.7m2    GFR Estimate If Black >90 >60 mL/min/1.7m2    Calcium 8.6 8.5 - 10.1 mg/dL    Bilirubin Total 1.5 (H) 0.2 - 1.3 mg/dL    Albumin 3.1 (L) 3.4 - 5.0 g/dL    Protein Total 6.8 6.8 - 8.8 g/dL    Alkaline Phosphatase 130 40 - 150 U/L    ALT 70 0 - 70 U/L    AST 76 (H) 0 - 45 U/L   Nt probnp inpatient (BNP)   Result Value Ref Range    N-Terminal Pro BNP Inpatient 1783 (H) 0 - 900 pg/mL   Troponin I   Result Value Ref Range    Troponin I ES <0.015 0.000 - 0.045 ug/L            Attestation:  I have reviewed today's vital signs, notes, medications, labs and imaging.     Brett Navarro MD

## 2018-01-07 NOTE — TELEPHONE ENCOUNTER
Clinic Action Needed:No  Reason for Call: Zechariah's ex wife Estefani is calling from car with very poor reception.  She is enroute to the [facility] ER with Zechariah and they were advised to be seen in ER now.  Zechariah is undergoing treatment for CA and had his last chemo infusion on Friday.  He is having breathing issues and has hx of collapsed lung. It's not clear to me when he had collapsed lung.  Caller is asking that we notify [facility] that they are on their way in.  Called and spoke to charge nurse Yenny at [facility].   Routed to: Not routed.    Ayala Arias, RN  [facility]

## 2018-01-07 NOTE — IP AVS SNAPSHOT
MRN:[id number]                      After Visit Summary   1/7/2018    Zechariah Ashby    MRN: [id number]           Thank you!     Thank you for choosing [facility] for your care. Our goal is always to provide you with excellent care. Hearing back from our patients is one way we can continue to improve our services. Please take a few minutes to complete the written survey that you may receive in the mail after you visit with us. Thank you!        Patient Information     Date Of Birth          1960        Designated Caregiver       Most Recent Value    Caregiver    Will someone help with your care after discharge? yes    Name of designated caregiver rob clifford    Phone number of caregiver [phone]    Caregiver address [address] [place] [address]      About your hospital stay     You were admitted on:  January 7, 2018 You last received care in the:  [facility]    You were discharged on:  January 9, 2018       Who to Call     For medical emergencies, please call 911.  For non-urgent questions about your medical care, please call your primary care provider or clinic, [phone]          Attending Provider     Provider Specialty    Magdy Woodward MD Emergency Medicine    [facility]    Clayton Alvarez MD Internal Medicine       Primary Care Provider Office Phone # Fax #    Kailash Mason [phone]      After Care Instructions     Activity       Your activity upon discharge: activity as tolerated            Diet       Follow this diet upon discharge: Orders Placed This Encounter      Snacks/Supplements Adult: Ensure Plus (Adult); With Meals      Advance Diet as Tolerated: Regular Diet Adult                  Follow-up Appointments     Follow-up and recommended labs and tests        Follow up with primary care provider, Kailash Mason, within 1-2weeks, for hospital follow- up.  F/u cards as scheduled  F/u oncology as  scheduled                  Your next 10 appointments already scheduled     Jan 11, 2018  2:30 PM CST   Return Visit with Daksha Montemayor PA-C   [facility] ([facility])    [address] [place], [place] [id number]  [facility] [address] [phone]            Jan 12, 2018  1:30 PM CST   Level 1 with ROOM 9 [facility] ([facility])    [facility]  [address]  [facility] [address] [phone]            Jan 12, 2018  2:30 PM CST   Return Visit with Alejandra [place] MD   [facility] ([facility])    [facility]  [address]  [facility] [address] [phone]            Luis Fernando 15, 2018 10:00 AM CST   [facility] Kailash [facility] MD   [facility]    [address] [place] [id number] [phone]            Jan 25, 2018  8:00 AM CST   Level 3 with ROOM 1 [facility] ([facility])    [facility]  [address]  [facility] [address]   [phone]            Jan 25, 2018  9:00 AM CST   Return Visit with Joycelyn May MD   [facility] ([facility])    [facility]  [address]  [facility] [address] [phone]            Feb 01, 2018  8:00 AM CST   Level 1 with ROOM 9 [facility] ([facility])    [facility]  [address]  [facility] [address]   [phone]              Additional Services     Home Care Referral       ______________________________________________________________________    Your provider has referred you to: [facility]: [facility] [phone]   [email or web address]    Extended Emergency Contact  Information  Primary Emergency Contact: Estefani Argueta (ex-wife)   [facility]  Home Phone: [phone]  Mobile Phone: [phone]  Relation: Other  Secondary Emergency Contact: Zechariah Ashby Jr.   [facility]  Home Phone: [phone]  Mobile Phone: [phone]  Relation: Son    Patient Anticipated Discharge Date: 1/9/2018   RN, PT, HHA to begin 24 - 48 hours after discharge.  PLEASE EVALUATE AND TREAT (Evaluation timeline is 24 - 48 hrs. Please call if there is need for a variance to this timeline).    REASON FOR REFERRAL: Assessment & Treatment: PT and RN    ADDITIONAL SERVICES NEEDED: HHA    OTHER PERTINENT INFORMATION: Patient was last seen by provider on 1/8/2018 for COPD.    No current outpatient prescriptions on file.    Patient Active Problem List:     CARDIOVASCULAR SCREENING; LDL GOAL LESS THAN 130     Perforated ulcer (HCC)     Free intraperitoneal air     Atrial flutter (H)     Lung mass     Pleural effusion     Mediastinal lymphadenopathy     Non-small cell carcinoma of lung (H)     COPD exacerbation (H)     Weakness      Documentation of Face to Face and Certification for Home Health Services    I certify that patient, Zechariah Ashby is under my care and that I, or a Nurse Practitioner or Physician's Assistant working with me, had a face-to-face encounter that meets the physician face-to-face encounter requirements with this patient on: 1/8/2018.    This encounter with the patient was in whole, or in part, for the following medical condition, which is the primary reason for Home Health Care: COPD.    I certify that, based on my findings, the following services are medically necessary Home Health Services: Nursing and Physical Therapy    My clinical findings support the need for the above services because: Nurse is needed: To provide assessment and oversight required in the home to assure adherence to the medical plan due to: COPD..    Further, I certify that my clinical findings support that  "this patient is homebound (i.e. absences from home require considerable and taxing effort and are for medical reasons or [employer] orth[employer] services or infrequently or of short duration when for other reasons) because: Requires assistance of another person or specialized equipment to access medical services because patient: Requires supervision of another for safe transfer..    Based on the above findings, I certify that this patient is confined to the home and needs intermittent skilled nursing care, physical therapy and/or speech therapy.  The patient is under my care, and I have initiated the establishment of the plan of care.  This patient will be followed by a physician who will periodically review the plan of care.    Physician/Provider to provide follow up care: Kailash Mason certified Physician at time of discharge: Clayton Alvarez    Please be aware that coverage of these services is subject to the terms and limitations of your health insurance plan.  Call member services at your health plan with any benefit or coverage questions.                  General Recommendations To Control Heart Failure When You Get Home     Instructions To Patients and Families: Please read and check off each of these important instructions as you do them when you get home.           Weight and symptoms      ___ Put a scale in your bathroom  ___ Post a weight chart or calendar next to the scale  ___Weigh yourself every day as soon as you get up in the morning. You should only be wearing your pajamas. Write your weight on the chart/calendar.  ___ Bring your weight chart/calendar with you to all appointments    ___Call your doctor if you gain 2 pounds in 1 day or 5 pounds in 1 week from your \"dry\" weight (baseline weight). Also call your doctor if you have shortness of breath that gets worse over time, leg swelling or fatigue.         Medicines and diet     ___ Make sure to take your medicines as prescribed.    ___Bring a " "current list of your medicines and all of your medicine bottles with you to all appointments.    ___ Limit fluids if you still have swelling or shortness of breath, or if your doctor tells you to do so.  ___ Eat less than 2000 mg of sodium (salt) every day. Read food labels, and do not add salt to meals.   ___ Heart healthy diet with low fat and low cholesterol          Activity and suggested lifestyle changes    ___ Stay active. Talk to your doctor about an exercise program that is safe for your heart.    ___ Stop smoking. Reduce alcohol use.      ___ Lose weight if you are overweight. Extra weight puts a lot of stress on the heart.          Control for Leg Swelling   ___ Keep your legs elevated (raised) as needed for swelling. If swelling is uncomfortable or elevation doesn t help, ask your doctor about using ACE wrap or Jobst stockings.          Follow-up appointments   ____ Follow up with your doctor within 7-10 days after discharge.    ___ Make sure to take your medications as prescribed and bring an accurate list of your medications and your weight chart/calendar to your follow up appointment.           Pending Results     Date and Time Order Name Status Description    1/9/2018 0032 EKG 12-LEAD, TRACING ONLY In process             Statement of Approval     Ordered          01/09/18 1149  I have reviewed and agree with all the recommendations and orders detailed in this document.  EFFECTIVE NOW     Approved and electronically signed by:  Clayton Alvarez MD             Admission Information     Date & Time Provider Department Dept. Phone    1/7/2018 Clayton Alvarez MD [facility] [phone]      Your Vitals Were     Blood Pressure Pulse Temperature Respirations Height Weight    91/64 70 97.4  F (36.3  C) (Oral) 18 1.803 m (5' 11\") 68.6 kg (151 lb 3.8 oz)    Pulse Oximetry BMI (Body Mass Index)                97% 21.09 kg/m2          MyChart Information     [facility] lets you send messages to your " "doctor, view your test results, renew your prescriptions, schedule appointments and more. To sign up, go to [email or web address] . Click on \"Log in\" on the left side of the screen, which will take you to the Welcome page. Then click on \"Sign up Now\" on the right side of the page.     You will be asked to enter the access code listed below, as well as some personal information. Please follow the directions to create your username and password.     Your access code is: [email or web address]  Expires: 2018  4:44 PM     Your access code will  in 90 days. If you need help or a new code, please call your [place] clinic or [phone].        Care EveryWhere ID     This is your Care EveryWhere ID. This could be used by other organizations to access your [facility] medical records  [id number]        Equal Access to Services     [facility] Center[facility]HLIA : Diego Zhang, ronan pendleton, diane so, harley carrasquillo . So [facility] [phone].    ATENCIÓN: Si habla español, tiene a morocho disposición servicios gratuitos de asistencia lingüística. Roz al [phone].    We comply with applicable federal civil rights laws and [place] laws. We do not discriminate on the basis of race, color, national origin, age, disability, sex, sexual orientation, or gender identity.               Review of your medicines      START taking        Dose / Directions    predniSONE 20 MG tablet   Commonly known as:  DELTASONE        Dose:  40 mg   Take 2 tablets (40 mg) by mouth daily for 5 days   Quantity:  10 tablet   Refills:  0         CONTINUE these medicines which have NOT CHANGED        Dose / Directions    ALIGN Chew        Dose:  1 chew tab   Take 1 chew tab by mouth 3 times daily as needed   Refills:  0       apixaban ANTICOAGULANT 5 MG tablet   Commonly known as:  ELIQUIS   Used for:  Typical atrial flutter (H)        Dose:  5 mg   Take 1 tablet (5 mg) by mouth 2 times daily   Quantity:  60 " tablet   Refills:  0       ASPIRIN PO   Used for:  Mediastinal lymphadenopathy, Non-small cell carcinoma of lung (H)        Dose:  325 mg   Take 325 mg by mouth daily   Refills:  0       HYDROcodone-acetaminophen 5-325 MG per tablet   Commonly known as:  NORCO   Used for:  Non-small cell carcinoma of lung (H)        Dose:  1-2 tablet   Take 1-2 tablets by mouth every 6 hours as needed   Quantity:  30 tablet   Refills:  0       lidocaine-prilocaine cream   Commonly known as:  EMLA   Used for:  Non-small cell carcinoma of lung (H)        Place a quarter sized amount of cream onto port area 30-60 minutes prior to port use. Do not rub in. Cover with tegaderm or saran wrap.   Quantity:  30 g   Refills:  1       LORazepam 0.5 MG tablet   Commonly known as:  ATIVAN   Used for:  Non-small cell carcinoma of lung (H)        Dose:  0.5 mg   Take 1 tablet (0.5 mg) by mouth every 4 hours as needed (Anxiety, Nausea/Vomiting or Sleep)   Quantity:  30 tablet   Refills:  5       megestrol 40 MG/ML suspension   Commonly known as:  MEGACE ORAL   Used for:  Non-small cell carcinoma of lung (H)        Dose:  200 mg   Take 5 mLs (200 mg) by mouth daily   Quantity:  600 mL   Refills:  2       metoprolol 50 MG tablet   Commonly known as:  LOPRESSOR   Used for:  Irregular heart beat, Atrial flutter, unspecified type (H), Cardiomyopathy, unspecified type (H)        Dose:  75 mg   Take 1.5 tablets (75 mg) by mouth 2 times daily   Quantity:  60 tablet   Refills:  1       nicotine 21 MG/24HR 24 hr patch   Commonly known as:  NICODERM CQ   Used for:  Encounter for tobacco use cessation counseling        Dose:  1 patch   Place 1 patch onto the skin daily   Quantity:  30 patch   Refills:  0       omeprazole 40 MG capsule   Commonly known as:  priLOSEC   Used for:  Gastroesophageal reflux disease without esophagitis        Dose:  40 mg   Take 1 capsule (40 mg) by mouth daily Take 30-60 minutes before a meal.   Quantity:  30 capsule   Refills:  1        ondansetron 8 MG tablet   Commonly known as:  ZOFRAN   Used for:  Non-small cell carcinoma of lung (H)        Dose:  8 mg   Take 1 tablet (8 mg) by mouth every 8 hours as needed (Nausea/Vomiting)   Quantity:  10 tablet   Refills:  5       prochlorperazine 10 MG tablet   Commonly known as:  COMPAZINE   Used for:  Non-small cell carcinoma of lung (H)        Dose:  10 mg   Take 1 tablet (10 mg) by mouth every 6 hours as needed (Nausea/Vomiting)   Quantity:  30 tablet   Refills:  5            Where to get your medicines      These medications were sent to [facility] [place], [place] - [address] [facility]  [address] [facility] [address]     Phone:  [phone]     predniSONE 20 MG tablet                Protect others around you: Learn how to safely use, store and throw away your medicines at www.disposemymeds.org.             Medication List: This is a list of all your medications and when to take them. Check marks below indicate your daily home schedule. Keep this list as a reference.      Medications           Morning Afternoon Evening Bedtime As Needed    ALIGN Chew   Take 1 chew tab by mouth 3 times daily as needed                                apixaban ANTICOAGULANT 5 MG tablet   Commonly known as:  ELIQUIS   Take 1 tablet (5 mg) by mouth 2 times daily   Last time this was given:  5 mg on 1/9/2018  8:15 AM                    8 PM               ASPIRIN PO   Take 325 mg by mouth daily   Last time this was given:  325 mg on 1/9/2018  8:15 AM                                HYDROcodone-acetaminophen 5-325 MG per tablet   Commonly known as:  NORCO   Take 1-2 tablets by mouth every 6 hours as needed                                lidocaine-prilocaine cream   Commonly known as:  EMLA   Place a quarter sized amount of cream onto port area 30-60 minutes prior to port use. Do not rub in. Cover with tegaderm or saran wrap.                                LORazepam 0.5 MG tablet   Commonly known as:  ATIVAN    Take 1 tablet (0.5 mg) by mouth every 4 hours as needed (Anxiety, Nausea/Vomiting or Sleep)                                megestrol 40 MG/ML suspension   Commonly known as:  MEGACE ORAL   Take 5 mLs (200 mg) by mouth daily                                metoprolol 50 MG tablet   Commonly known as:  LOPRESSOR   Take 1.5 tablets (75 mg) by mouth 2 times daily   Last time this was given:  75 mg on 1/9/2018  8:14 AM                    8 PM               nicotine 21 MG/24HR 24 hr patch   Commonly known as:  NICODERM CQ   Place 1 patch onto the skin daily   Last time this was given:  1 patch on 1/8/2018  8:49 PM                                omeprazole 40 MG capsule   Commonly known as:  priLOSEC   Take 1 capsule (40 mg) by mouth daily Take 30-60 minutes before a meal.   Last time this was given:  40 mg on 1/8/2018  9:56 AM                                ondansetron 8 MG tablet   Commonly known as:  ZOFRAN   Take 1 tablet (8 mg) by mouth every 8 hours as needed (Nausea/Vomiting)                                predniSONE 20 MG tablet   Commonly known as:  DELTASONE   Take 2 tablets (40 mg) by mouth daily for 5 days   Last time this was given:  40 mg on 1/9/2018  8:14 AM                                prochlorperazine 10 MG tablet   Commonly known as:  COMPAZINE   Take 1 tablet (10 mg) by mouth every 6 hours as needed (Nausea/Vomiting)

## 2018-01-07 NOTE — ED PROVIDER NOTES
History     Chief Complaint   Patient presents with     Shortness of Breath     Feeling SOA today, last had chemo on Friday. n/v last night, coming in with SOA, familiy states his L lung is collapsed.      HPI    Zechariah Ashby is a 57 year old male with a recent episode of of atrial flutter, recently diagnosed with stage IV non-small cell carcinoma of lung, and currently receiving chemotherapy with carboplatin and gemcitabine who presents to the emergency department with his mother and wife for evaluation of shortness of breath. The patient was diagnosed with atrial flutter on 11/03/17 and was found to have a left upper lobe atelectasis on 12/07/17 CT Chest during workup. He began to follow with Dr. May for non-small cell carcinoma of lung, and had vascular port access on 1/3/18 and began chemotherapy treatment with carboplatin and gemcitabine. His last chemotherapy treatment was 3 days ago on 1/4/18. He presents today with concerns of difficulty with breathing starting last night. He has felt short of breath without any improvement since onset. He had some chills through the night. He denies any fever, headache, congestion, sore throat, new cough, abdominal pain, urinary problems, bowel problems, or lower extremity swelling. He has not been smoking.  On his PET/CT he was noted to have increasing pleural effusion on the left.      Problem List:    Patient Active Problem List    Diagnosis Date Noted     Non-small cell carcinoma of lung (H) 12/28/2017     Priority: Medium     Lung mass 12/13/2017     Priority: Medium     Pleural effusion 12/13/2017     Priority: Medium     Mediastinal lymphadenopathy 12/13/2017     Priority: Medium     Atrial flutter (H) 11/16/2017     Priority: Medium     Onset 11/3/2017.  Seen in clinic and ED.       Perforated ulcer (HCC) 04/24/2015     Priority: Medium     Free intraperitoneal air 04/24/2015     Priority: Medium     CARDIOVASCULAR SCREENING; LDL GOAL LESS THAN 130  10/31/2010     Priority: Medium        Past Medical History:    Past Medical History:   Diagnosis Date     Atrial flutter (H) 2017       Past Surgical History:    Past Surgical History:   Procedure Laterality Date     APPENDECTOMY      age 28s     INSERT PORT VASCULAR ACCESS N/A 1/3/2018    Procedure: INSERT PORT VASCULAR ACCESS;  Port-a-Cath Placement;  Surgeon: Tomas Crews MD;  Location: [facility]     SURGICAL HISTORY OF -   2004    Gastroscopy with biopsy for gastric ulcer       Family History:    Family History   Problem Relation Age of Onset     CANCER Mother      pancreatic cancer,  at 38 years     Alzheimer Disease Father       at 84 years.       Social History:  Marital Status:   [2]  Social History   Substance Use Topics     Smoking status: Current Some Day Smoker     Packs/day: 0.25     Years: 50.00     Smokeless tobacco: Never Used      Comment: down to 1-2 cigerattes a day.      Alcohol use No        Medications:      LORazepam (ATIVAN) 0.5 MG tablet   prochlorperazine (COMPAZINE) 10 MG tablet   megestrol (MEGACE ORAL) 40 MG/ML suspension   ASPIRIN PO   apixaban ANTICOAGULANT (ELIQUIS) 5 MG tablet   nicotine (NICODERM CQ) 21 MG/24HR 24 hr patch   metoprolol (LOPRESSOR) 50 MG tablet   Probiotic Product (ALIGN) CHEW   omeprazole (PRILOSEC) 40 MG capsule   HYDROcodone-acetaminophen (NORCO) 5-325 MG per tablet   ondansetron (ZOFRAN) 8 MG tablet   lidocaine-prilocaine (EMLA) cream         Review of Systems  All other systems are reviewed and are negative.    Physical Exam   BP: 120/88  Pulse: 75  Heart Rate: 72  Temp: 98  F (36.7  C)  Resp: (!) 36  Weight: 68 kg (150 lb)  SpO2: 97 %      Physical Exam  Nursing note and vitals were reviewed.  Constitutional: Awake and alert, chronically ill appearing 57-year-old who exhibits increased work of breathing, but who answers questions appropriately and cooperates with examination.  HEENT: EACs clear.  TMs normal.  EOMI. Oropharynx  normal.  Neck: Freely mobile.  Cardiovascular: Cardiac examination reveals normal heart rate and regular rhythm without murmur.  Pulmonary/Chest: Breathing is moderately labored with tachypnea and some prolongation of the expiratory phase with decreased breath sounds in the left lower lung fields.  There are no retractions  Abdomen: Soft, nontender, no HSM or masses rebound or guarding.  Musculoskeletal: Extremities are warm and well-perfused and without edema  Neurological: Alert, oriented, thought content logical, coherent   Skin: Warm, dry, no rashes.  Psychiatric: Affect restricted by illness but appropriate.        ED Course     ED Course     Procedures               EKG Interpretation:      Interpreted by Magdy Woodward  Time reviewed: 16:00  Symptoms at time of EKG: dypnea   Rhythm: normal sinus   Rate: normal  Axis: normal  Ectopy: none  Conduction: normal  ST Segments/ T Waves:  inverted T-wave in lead V5  Q Waves: none  Comparison to prior: Compared to the EKG from November 15, 2017, sinus rhythm replaces atrial flutter; T-wave inversion in lead V5 is new    Clinical Impression: Sinus rhythm with new T-wave inversion in lead V5, nonspecific abnormal EKG      Critical Care time:  none                 Results for orders placed or performed during the hospital encounter of 01/07/18 (from the past 24 hour(s))   CBC with platelets differential   Result Value Ref Range    WBC 10.0 4.0 - 11.0 10e9/L    RBC Count 4.57 4.4 - 5.9 10e12/L    Hemoglobin 13.5 13.3 - 17.7 g/dL    Hematocrit 40.1 40.0 - 53.0 %    MCV 88 78 - 100 fl    MCH 29.5 26.5 - 33.0 pg    MCHC 33.7 31.5 - 36.5 g/dL    RDW 12.3 10.0 - 15.0 %    Platelet Count 180 150 - 450 10e9/L    Diff Method Automated Method     % Neutrophils 92.2 %    % Lymphocytes 6.4 %    % Monocytes 1.1 %    % Eosinophils 0.1 %    % Basophils 0.1 %    % Immature Granulocytes 0.1 %    Absolute Neutrophil 9.2 (H) 1.6 - 8.3 10e9/L    Absolute Lymphocytes 0.6 (L) 0.8 - 5.3 10e9/L     Absolute Monocytes 0.1 0.0 - 1.3 10e9/L    Absolute Eosinophils 0.0 0.0 - 0.7 10e9/L    Absolute Basophils 0.0 0.0 - 0.2 10e9/L    Abs Immature Granulocytes 0.0 0 - 0.4 10e9/L   Comprehensive metabolic panel   Result Value Ref Range    Sodium 132 (L) 133 - 144 mmol/L    Potassium 4.4 3.4 - 5.3 mmol/L    Chloride 101 94 - 109 mmol/L    Carbon Dioxide 25 20 - 32 mmol/L    Anion Gap 6 3 - 14 mmol/L    Glucose 104 (H) 70 - 99 mg/dL    Urea Nitrogen 27 7 - 30 mg/dL    Creatinine 0.94 0.66 - 1.25 mg/dL    GFR Estimate 82 >60 mL/min/1.7m2    GFR Estimate If Black >90 >60 mL/min/1.7m2    Calcium 8.6 8.5 - 10.1 mg/dL    Bilirubin Total 1.5 (H) 0.2 - 1.3 mg/dL    Albumin 3.1 (L) 3.4 - 5.0 g/dL    Protein Total 6.8 6.8 - 8.8 g/dL    Alkaline Phosphatase 130 40 - 150 U/L    ALT 70 0 - 70 U/L    AST 76 (H) 0 - 45 U/L   Nt probnp inpatient (BNP)   Result Value Ref Range    N-Terminal Pro BNP Inpatient 1783 (H) 0 - 900 pg/mL   Troponin I   Result Value Ref Range    Troponin I ES <0.015 0.000 - 0.045 ug/L   CT Chest Pulmonary Embolism w Contrast    Narrative    CT CHEST PULMONARY EMBOLISM WITH CONTRAST   1/7/2018 4:37 PM    HISTORY: Dyspnea. History of lung cancer.    TECHNIQUE: Pulmonary embolism protocol was performed. 69 mL Isovue 370  were injected IV. After contrast injection, volumetric helical  acquisition was performed from the thoracic inlet through the upper  abdomen. Radiation dose for this scan was reduced using automated  exposure control, adjustment of the mA and/or kV according to patient  size, or iterative reconstruction technique.    COMPARISON: CT of the chest, abdomen, and pelvis performed 12/9/2017.    FINDINGS:  No evidence for pulmonary embolism. The thoracic aorta is  of normal caliber, without evidence for thoracic aortic aneurysm or  dissection. There is a small left pleural effusion, not significantly  changed. Small pericardial effusion has increased slightly. No right  pleural effusion. There is  again complete atelectasis of the left  upper lobe within an area of masslike soft tissue thickening  anteriorly and medially (series 7 image 63) likely representing the  patient's known malignancy. This area of masslike soft tissue  thickening is not well-defined on this exam, but appears to have  increased slightly in size, today estimated at 7.8 x 5.7 cm. This mass  again appears to extend into the mediastinum, and abuts the ascending  thoracic aorta and aortic arch, as well as partially encase the main  and left pulmonary arteries as well as several left upper lobe  pulmonary artery branches.    Moderate mediastinal adenopathy has also increased in size. For  example, an enlarged right paratracheal lymph node in the superior  mediastinum (series 7 image 44) measures 2.1 x 1.8 cm (previously 1.6  x 1.3 cm). Mild right hilar adenopathy has also increased slightly.  Left Port-A-Cath, with tip in the right atrium. Emphysematous changes  are noted throughout both lungs. Small calcified granuloma in the left  lower lobe is unchanged. Degenerative changes are noted throughout the  thoracic spine.      Impression    IMPRESSION:   1. No evidence for pulmonary embolism or thoracic aortic dissection.  2. Left upper lobe mass is not well-defined, but appears to have  increased slightly in size.  3. Mediastinal and right hilar adenopathy has also increased slightly.  4. Small left pleural effusion is unchanged.  5. Small pericardial effusion has increased slightly in size.  6. Complete atelectasis of the left upper lobe is again noted.  7. Emphysema.     GIA JAIN MD       Medications   ipratropium - albuterol 0.5 mg/2.5 mg/3 mL (DUONEB) neb solution 3 mL (not administered)   iopamidol (ISOVUE-370) solution 69 mL (69 mLs Intravenous Given 1/7/18 1637)   sodium chloride 0.9 % bag 500mL for CT scan flush use (75 mLs As instructed Given 1/7/18 1636)   predniSONE (DELTASONE) tablet 40 mg (40 mg Oral Given 1/7/18 4087)        15:30 PM Patient assessed. Course of care was outlined.     Assessments & Plan (with Medical Decision Making)     57-year-old male recently diagnosed with stage IV non-small cell lung cancer and a recent episode of atrial flutter status post ablation presented with dyspnea.  Workup in the emergency department showed no significant change on his chest CT in terms of his tumor burden, pleural effusion, or evidence for PE.  In reviewing his records I see that he had poor cardiac function on transthoracic and transesophageal echocardiogram the latter was most recently performed and showed EF of 30-35%.  There has been speculation that this was due to atrial flutter and would improve after treatment of the flutter.  In addition he underwent nuclear medicine stress test that showed a large area of at risk myocardium in the anterior wall.  It is unclear from the records with the follow-up plan for this is.  Today his BNP is elevated and there may be a component to his dyspnea of congestive heart failure.  Otherwise I suspect COPD exacerbation is causing his new hypoxia and worsening dyspnea.  I do not find evidence for pneumonia or viral etiology.  Recommended hospitalization and treatment with supplemental oxygen, steroids, nebulization, and repeat of echocardiogram to see if his LV function has improved.  I reviewed these recommendations and findings with the patient and his family and they are in agreement with the plan.  I discussed his case with Dr. Navarro for the hospital service and she will assume care on admission.    I have reviewed the nursing notes.    I have reviewed the findings, diagnosis, plan and need for follow up with the patient.       New Prescriptions    No medications on file       Final diagnoses:   COPD exacerbation (H)   Acute systolic congestive heart failure (H)     This document serves as a record of the services and decisions personally performed and made by Magdy Woodward MD. It was  created on HIS/HER behalf by Sha Riddle, a trained medical scribe. The creation of this document is based the provider's statements to the medical scribe.  Sha Riddle 3:30 PM 1/7/2018    Provider:   The information in this document, created by the medical scribe for me, accurately reflects the services I personally performed and the decisions made by me. I have reviewed and approved this document for accuracy prior to leaving the patient care area.  Magdy Woodward MD 3:30 PM 1/7/2018 1/7/2018   [facility] EMERGENCY DEPARTMENT     Magdy Woodward MD  01/07/18 1854

## 2018-01-08 PROBLEM — R53.1 WEAKNESS: Status: ACTIVE | Noted: 2018-01-01

## 2018-01-08 NOTE — PROGRESS NOTES
Physical Therapy Evaluation:     01/08/18 1100   Quick Adds   Type of Visit Initial PT Evaluation       Present no   Living Environment   Lives With spouse  (who is disabled)   Living Arrangements house   Home Accessibility stairs to enter home   Number of Stairs to Enter Home 2  (with railing)   Number of Stairs Within Home 0   Transportation Available family or friend will provide   Self-Care   Usual Activity Tolerance moderate   Current Activity Tolerance fair   Regular Exercise no   Equipment Currently Used at Home none   Functional Level Prior   Ambulation 0-->independent   Transferring 0-->independent   Toileting 0-->independent   Bathing 0-->independent   Dressing 0-->independent   Eating 0-->independent   Communication 0-->understands/communicates without difficulty   Swallowing 0-->swallows foods/liquids without difficulty   Cognition 0 - no cognition issues reported   Fall history within last six months no   Which of the above functional risks had a recent onset or change? ambulation;transferring   Prior Functional Level Comment Pt reports he is independent at baseline, no AD. Currently getting chemotherapy with Dr. May.  No O2 at baseline.   General Information   Onset of Illness/Injury or Date of Surgery - Date 01/08/18   Referring Physician Clayton Alvarez MD   Patient/Family Goals Statement to go home; pt agreeable to home therapy   Pertinent History of Current Problem (include personal factors and/or comorbidities that impact the POC) 57 year old male with a recent episode of of atrial flutter, recently diagnosed with stage IV non-small cell carcinoma of lung, and currently receiving chemotherapy with carboplatin and gemcitabine who presents to the emergency department with his mother and wife for evaluation of shortness of breath. The patient was diagnosed with atrial flutter on 11/03/17 and was found to have a left upper lobe atelectasis on 12/07/17 CT Chest during workup. He  began to follow with Dr. May for non-small cell carcinoma of lung, and had vascular port access on 1/3/18 and began chemotherapy treatment with carboplatin and gemcitabine. His last chemotherapy treatment was 3 days ago on 1/4/18. He presents today with concerns of difficulty with breathing starting last night.   Precautions/Limitations no known precautions/limitations   General Info Comments Pt received supine in bed with ex-wife in room. Pt agreeable to therapy evaluation for dc recs   Cognitive Status Examination   Orientation orientation to person, place and time   Level of Consciousness alert   Follows Commands and Answers Questions 100% of the time   Personal Safety and Judgment intact   Memory intact   Pain Assessment   Patient Currently in Pain No   Integumentary/Edema   Integumentary/Edema no deficits were identifed   Posture    Posture Forward head position;Protracted shoulders   Range of Motion (ROM)   ROM Quick Adds No deficits were identified   ROM Comment B hip flex, knee flex/ext, DF/PF WFL   Strength   Strength Comments BLE weakness, 4-/5 throughout   Bed Mobility   Bed Mobility Comments Supine to sit at supervision   Transfer Skills   Transfer Comments STS at SBA/close supervision, no AD; bed <-> bathroom pushing IV pole with single UE at SBA   Gait   Gait Comments Pt ambulated 20 feet in room pushing IV pole with single UE at close SBA, fairly good gait speed, swing through pattern, no LOB   Balance   Balance no deficits were identified   Balance Comments good standing balance without UE support, SBA/close supervision for safety provided today   Sensory Examination   Sensory Perception no deficits were identified   Sensory Perception Comments BLE light touch intact   Coordination   Coordination no deficits were identified   Muscle Tone   Muscle Tone no deficits were identified   General Therapy Interventions   Planned Therapy Interventions gait training;progressive activity/exercise;transfer  "training;balance training;home program guidelines   Clinical Impression   Criteria for Skilled Therapeutic Intervention yes, treatment indicated   PT Diagnosis weakness/deconditioning   Influenced by the following impairments decreased activity tolerance, BLE weakness   Functional limitations due to impairments bed mobility, transfers, ambulation and stairs   Clinical Presentation Stable/Uncomplicated   Clinical Presentation Rationale clinical judgement    Clinical Decision Making (Complexity) Moderate complexity   Therapy Frequency` daily   Predicted Duration of Therapy Intervention (days/wks) 3 days   Anticipated Equipment Needs at Discharge (none; reports his wife has AD that he can use)   Anticipated Discharge Disposition Home with Home Therapy   Risk & Benefits of therapy have been explained Yes   Patient, Family & other staff in agreement with plan of care Yes   [facility] [employer] \"6 Clicks\"   2016, Trustees of [facility], under license to [employer].  All rights reserved.   6 Clicks Short Forms Basic Mobility Inpatient Short Form   [facility] [employer]  \"6 Clicks\" V.2 Basic Mobility Inpatient Short Form   1. Turning from your back to your side while in a flat bed without using bedrails? 4 - None   2. Moving from lying on your back to sitting on the side of a flat bed without using bedrails? 4 - None   3. Moving to and from a bed to a chair (including a wheelchair)? 4 - None   4. Standing up from a chair using your arms (e.g., wheelchair, or bedside chair)? 4 - None   5. To walk in hospital room? 3 - A Little   6. Climbing 3-5 steps with a railing? 3 - A Little   Basic Mobility Raw Score (Score out of 24.Lower scores equate to lower levels of function) 22   Total Evaluation Time   Total Evaluation Time (Minutes) 10     "

## 2018-01-08 NOTE — CONSULTS
Care Transition Initial Assessment - RN      Met with: Patient and Family.    DATA   Active Problems:    COPD exacerbation (H)    Weakness             Contact information and PCP information verified: Yes    ASSESSMENT  Cognitive Status: awake, alert and oriented.             Lives With: spouse (who is disabled)  Living Arrangements: house                 Insurance Concerns: No Insurance issues identified      This writer met with pt and his ex-wife, introduced self and role. Patient is currently living with his wife who per the patient is unable to help care for him. Asked patient if he felt safe going home which he states that he is if can return to his previous respiratory status. Patient's ex-wife has multiple concerns regarding the patients health care. Provided patient with health care directive. Discussed discharge planning and Medicare guidelines in regards to home care and SNF benefits. Pt would like to have home care at home. Pt was provided with Medicare certified home care list. Pt chooses to use [facility] ([phone] Fax: [phone]).     Observation notice given to patient and ex-wife after being notified of change in status    PLAN    Home with home health RN, PT, and HHA    Pt has [facility] worker [place] [phone] for an open adult protection case      Discharge Planner   Discharge Plans in progress: Home with home health  Barriers to discharge plan: medical stability  Follow up plan: Hand off to [facility]       Entered by: GRACIELA PARADA 01/08/2018 12:03 PM         Mary Shaw, MSN, RN, Care Coordinator  [facility] [phone]  [facility] [phone]

## 2018-01-08 NOTE — PROGRESS NOTES
"CLINICAL NUTRITION SERVICES  -  ASSESSMENT NOTE     REASON FOR ASSESSMENT  Zechariah Ashby is a 57 year old male seen by Registered Dietitian for Admission Nutrition Risk Screen - unintentional weight loss of 10#s or more in the past two months      NUTRITION HISTORY  - Information obtained from the patient. He reports that he has been drinking 2 cartons of chocolate Ensure plus a day at home. He is drinking whole milk. He likes desserts with his meals.      CURRENT NUTRITION ORDERS  Diet Order:     Regular    Current Intake/Tolerance:   Intake not observed, patient just starting to eat lunch meal      PHYSICAL FINDINGS  Observed  Subcutaneous fat loss   Obtained from Chart/Interdisciplinary Team  None noted    ANTHROPOMETRICS  Height: 5' 11\"  Weight: 154 lbs 5.15 oz, admission weight 68.8 kg 152#s  Body mass index is 21.52 kg/(m^2).  Weight Status:  Normal BMI  IBW: 172#s  % IBW: 88%  Weight History:   Wt Readings from Last 10 Encounters:   01/08/18 70 kg (154 lb 5.2 oz)   01/03/18 67.6 kg (149 lb)   12/27/17 67.6 kg (149 lb)   12/27/17 67.9 kg (149 lb 9.6 oz)   12/13/17 71.4 kg (157 lb 4.8 oz)   12/12/17 70.8 kg (156 lb)   12/09/17 73.7 kg (162 lb 7.7 oz)   12/07/17 75.3 kg (166 lb)   12/07/17 75.4 kg (166 lb 3.2 oz)   11/15/17 75.3 kg (166 lb)         LABS  Labs reviewed    MEDICATIONS  Medications reviewed    Dosing Weight 69 kg    ASSESSED NUTRITION NEEDS PER APPROVED PRACTICE GUIDELINES:  Estimated Energy Needs: 5412-1824 kcals (30-35 Kcal/Kg)  Justification: underweight, cancer treated with chemotherapy  Estimated Protein Needs:  grams protein (1.2-1.5 g pro/Kg)  Justification: Repletion and preservation of lean body mass, cancer treated with chemotherapy  Estimated Fluid Needs: 1 mL/Kcal  Justification: maintenance    MALNUTRITION:  % Weight Loss:  > 5% in 1 month (severe malnutrition)  % Intake:  </= 75% for >/= 1 month (severe malnutrition)  Subcutaneous Fat Loss:  Orbital region   Muscle Loss:  Not " assessed  Fluid Retention:  No lower extremity edema per provider note    Malnutrition Diagnosis: Severe malnutrition  In Context of:  Chronic illness or disease    NUTRITION DIAGNOSIS:  Inadequate oral intake related to decreased appetite, chemotherapy as evidenced by 12# weight loss over the last month.      NUTRITION INTERVENTIONS  Recommendations / Nutrition Prescription  High calorie, high protein oral nutrition supplement with meals - chocolate Ensure Plus  Magic cup once a day  Dessert with lunch and supper meals  Whole milk with meals  .      Implementation  Nutrition education: No education needs assessed at this time  Composition of Meals and Snacks and Medical Food Supplement  .      Nutrition Goals  Patient to consume % of his meals in 2-3 days.  .      MONITORING AND EVALUATION:  Progress towards goals will be monitored and evaluated per protocol and Practice Guidelines, Food intake and Liquid meal replacement or supplement intake    Catalina Garg RD,LD  Clinical Dietitian

## 2018-01-08 NOTE — PROGRESS NOTES
"[facility] [place] [facility]    LATE ENTRY:    Patient admitted to room 2208 at approximately 2000 via cart from emergency room. Patient was accompanied by significant other.     Verbal SBAR report received from ED RN prior to patient arrival.     Patient ambulated to bed with stand-by assist. Patient alert and oriented X 3. The patient is not having any pain. 0-10 Pain Scale: 0. Admission vital signs: Blood pressure 120/74, pulse 74, temperature 98  F (36.7  C), temperature source Oral, resp. rate 18, height 1.803 m (5' 11\"), weight 68.8 kg (151 lb 10.8 oz), SpO2 99 %. Patient was oriented to plan of care, call light, bed controls, tv, telephone, bathroom and visiting hours.     The following safety risks were identified during admission: fall and skin. Yellow risk band applied: YES.     Dot Iyer RN      Pt has been asleep since arrival on floor.  Pt is bundled up as he is always cold.  I was unable to check under long underwear and pants, pt wants them left on.  Skin is very dry, flaky, but what I saw appeared intact.  Both great toenails have advance fungus and look to be loose.  Wart on R foot, dry.  Pt has had no c/o of discomfort and is breathing easily at 2L maintaining sats in mid 90's.  SHAWNA Iyer RN    "

## 2018-01-08 NOTE — PROGRESS NOTES
"[facility] Internal Medicine Progress Note     Date of Service (when I saw the patient): 01/08/2018    REASON FOR ADMISSION / INTERVAL HISTORY:  Weakness/ sob shortly after first round of chemo. See details below. Feels little better.    CT CHEST 1/7-IMPRESSION:   1. No evidence for pulmonary embolism or thoracic aortic dissection.  2. Left upper lobe mass is not well-defined, but appears to have  increased slightly in size.  3. Mediastinal and right hilar adenopathy has also increased slightly.  4. Small left pleural effusion is unchanged.  5. Small pericardial effusion has increased slightly in size.  6. Complete atelectasis of the left upper lobe is again noted.  7. Emphysema.     ASSESSMENT/PLAN:   WEAKNESS/ SOB  Likely due to malignancy and recent chemo. CT as above. Life long smoker--likely does have COPD. Started on prednisone. PT seen. Pt doing ok. Can go home with HC    CHRONIC AFIB/FLUTTER  Dx in 11/17. S/p ablation 12/7. Maintained on metoprolol/ epiquis    ABNL LEXISCAN  lexiscan done 11/17 as part of fib/flutter w/u. Was abnl.   -would f/u cards OP    GERD  Continue meds    TOBACCO ABUSE  Continue faustina patch    NSCLCA  DX 11/17 -started chemo last week.   -f/u oncology    DISPO  D/c in AM if stable       MALCOLM RIOS MD   Pg [phone]    DVT Prhylaxis: Low Risk/Ambulatory with no VTE prophylaxis indicated  Code Status: Full Code    ROS:  As described in A/P and Exam.  Otherwise ALL are  negative.    PHYSICAL EXAM:  All vitals have been reviewed    Blood pressure 98/58, pulse 77, temperature 97.1  F (36.2  C), temperature source Oral, resp. rate 18, height 1.803 m (5' 11\"), weight 70 kg (154 lb 5.2 oz), SpO2 96 %.         GENERAL APPEARANCE: healthy, alert and no distress  EYES: conjunctiva clear, eyes grossly normal  HENT: external ears and nose normal   NECK: supple, no masses or adenopathy  RESP: lungs clear to auscultation - no rales, rhonchi or wheezes  CV: regular rate and rhythm, normal S1 " S2, no S3 or S4 and no murmur, click or rub   ABDOMEN: soft, nontender, no HSM or masses and bowel sounds normal  MS: no clubbing, cyanosis; no edema  SKIN: clear without significant rashes or lesions  NEURO: -non-focal moves all 4 extr    ROUTINE  LABS (Last four results)  CMP  Recent Labs  Lab 01/08/18  0615 01/07/18  1530 01/03/18  1450    132*  --    POTASSIUM 4.5 4.4  --    CHLORIDE 105 101  --    CO2 26 25  --    ANIONGAP 6 6  --    * 104*  --    BUN 21 27  --    CR 0.67 0.94 0.83   GFRESTIMATED >90 82 >90   GFRESTBLACK >90 >90 >90   VIJAY 8.5 8.6  --    PROTTOTAL  --  6.8  --    ALBUMIN  --  3.1*  --    BILITOTAL  --  1.5*  --    ALKPHOS  --  130  --    AST  --  76*  --    ALT  --  70  --      CBC  Recent Labs  Lab 01/08/18  0615 01/07/18  1530 01/03/18  1450   WBC 9.2 10.0 8.4   RBC 4.32* 4.57 4.56   HGB 12.7* 13.5 13.4   HCT 37.8* 40.1 40.5   MCV 88 88 89   MCH 29.4 29.5 29.4   MCHC 33.6 33.7 33.1   RDW 12.2 12.3 12.3    180 270     INRNo lab results found in last 7 days.  Arterial Blood GasNo lab results found in last 7 days.    Recent Results (from the past 24 hour(s))   CT Chest Pulmonary Embolism w Contrast    Narrative    CT CHEST PULMONARY EMBOLISM WITH CONTRAST   1/7/2018 4:37 PM    HISTORY: Dyspnea. History of lung cancer.    TECHNIQUE: Pulmonary embolism protocol was performed. 69 mL Isovue 370  were injected IV. After contrast injection, volumetric helical  acquisition was performed from the thoracic inlet through the upper  abdomen. Radiation dose for this scan was reduced using automated  exposure control, adjustment of the mA and/or kV according to patient  size, or iterative reconstruction technique.    COMPARISON: CT of the chest, abdomen, and pelvis performed 12/9/2017.    FINDINGS:  No evidence for pulmonary embolism. The thoracic aorta is  of normal caliber, without evidence for thoracic aortic aneurysm or  dissection. There is a small left pleural effusion, not  significantly  changed. Small pericardial effusion has increased slightly. No right  pleural effusion. There is again complete atelectasis of the left  upper lobe within an area of masslike soft tissue thickening  anteriorly and medially (series 7 image 63) likely representing the  patient's known malignancy. This area of masslike soft tissue  thickening is not well-defined on this exam, but appears to have  increased slightly in size, today estimated at 7.8 x 5.7 cm. This mass  again appears to extend into the mediastinum, and abuts the ascending  thoracic aorta and aortic arch, as well as partially encase the main  and left pulmonary arteries as well as several left upper lobe  pulmonary artery branches.    Moderate mediastinal adenopathy has also increased in size. For  example, an enlarged right paratracheal lymph node in the superior  mediastinum (series 7 image 44) measures 2.1 x 1.8 cm (previously 1.6  x 1.3 cm). Mild right hilar adenopathy has also increased slightly.  Left Port-A-Cath, with tip in the right atrium. Emphysematous changes  are noted throughout both lungs. Small calcified granuloma in the left  lower lobe is unchanged. Degenerative changes are noted throughout the  thoracic spine.      Impression    IMPRESSION:   1. No evidence for pulmonary embolism or thoracic aortic dissection.  2. Left upper lobe mass is not well-defined, but appears to have  increased slightly in size.  3. Mediastinal and right hilar adenopathy has also increased slightly.  4. Small left pleural effusion is unchanged.  5. Small pericardial effusion has increased slightly in size.  6. Complete atelectasis of the left upper lobe is again noted.  7. Emphysema.     GIA JAIN MD

## 2018-01-08 NOTE — PLAN OF CARE
Problem: Patient Care Overview  Goal: Plan of Care/Patient Progress Review  Outcome: No Change  Patient slept well overnight. He did have a episode of diaphoresis at the beginning of the shift. But has been afebrile overnight. LS diminished. Oxygen saturations >95% on 2L. RN on evenings reported that patient had not voided. Patient reported voiding overnight, unwitnessed. Updated oncoming RN.

## 2018-01-08 NOTE — PLAN OF CARE
Problem: Patient Care Overview  Goal: Plan of Care/Patient Progress Review  Discharge Planner PT   Patient plan for discharge: home  Current functional status: SBA without AD; limited to short distances due to fatigue  Barriers to return to prior living situation: lives with wife who is disabled; weakness  Recommendations for discharge: Home with home therapy for ongoing strengthening and conditioning as pt with decreased activity tolerance  Rationale for recommendations: pt is safe on feet and able to do stairs safety, just limited to short distances and limited activity due to fatigues easily but has good understanding of how he's feeling and when to take a rest break       Entered by: Gunjan Nails 01/08/2018 11:46 AM

## 2018-01-08 NOTE — TELEPHONE ENCOUNTER
Spoke with patient while he was in hospital because he had a positive screen on the Oncology Distress Screening tool. He is using Ensure plus twice a day at home and trying to eat high calorie foods, he is drinking whole milk. Registered Dietitian contact information provided to patient if further questions arise.    Catalina Garg RD,LD  Clinical Dietitian

## 2018-01-08 NOTE — PROGRESS NOTES
"Patient reports SOB has improved after fan placed in room.   Requested to shower, supplies brought to room and offered to assist and explain to patient skin assessment needed.   Patient refused at this time and stated he has \"company coming any minute\" but would like to shower later.    "

## 2018-01-08 NOTE — PLAN OF CARE
"Problem: Patient Care Overview  Goal: Plan of Care/Patient Progress Review  Outcome: Improving  Patient reports feeling SOB even while at rest.   O2 sats 96-97% RA. Lung sounds diminished.   Encouraged to TCDB. Fan placed in room to help with SOB.   Patient and ex-wife discussed POC with hospitalist. Patient verbalized his agreement with current plan of care, however ex-wife voicing her concerns stating she feels cardiologist should be involved in care.  When spoke privately with patient he stated, \"I know the docs are doing their best, I will try and make her (ex-wife) understand.\"  Will keep MD updated      "

## 2018-01-09 NOTE — PLAN OF CARE
Problem: Patient Care Overview  Goal: Plan of Care/Patient Progress Review  Outcome: No Change  Patient on room air, BP improved from having been on the softer side, afebrile. Patient had complained of chest pain during night, EKG showed no change from prior EKG. Given GI cocktail and PO dose of metoprolol with improvement of chest pain. D dimer was elevated, first of serial trops negative. Patient was taken for CT, had CT on 1/7/18, and showed no pulmonary embolis. Port heparin locked. Placed on Tele, had episode of A fib with HR into 120s, then converted back to sinus rhythm on his own. PRN one time dose order for 5mg metoprolol if HR >120 and in A fib. Next trop and morning labs for 0800. Nicotine patch remains in place on upper left shoulder. His ex-wife remains at bedside providing support. Patient is up with SBA when up to bathroom. Poor appetite, needs encouragement to drink and eat.

## 2018-01-09 NOTE — PROGRESS NOTES
Patient still having minor chest pain, is improving. Taken to CT via WC accompanied by NST. Will place on Tele upon return to floor. Heparin flushed power port.

## 2018-01-09 NOTE — DISCHARGE SUMMARY
[facility] Hospitalist Discharge Summary    Zechariah Ashby MRN# [id number]   Age: 57 year old YOB: 1960     Date of Admission:  1/7/2018  Date of Discharge::  1/9/2018  Admitting Physician:  Clayton Alvarez MD  Discharge Physician:  Clayton Alvarez MD  Primary Physician: Kailash Mason  Transferring Facility: N/A     Home clinic: [facility]          Admission Diagnoses:   COPD exacerbation (H) [J44.1]  Acute systolic congestive heart failure (H) [I50.21]  Malignant neoplasm of upper lobe of left lung (H) [C34.12]          Discharge Diagnosis:   Principle diagnosis: possible COPD exacerbation  Secondary diagnoses:  Patient Active Problem List   Diagnosis     CARDIOVASCULAR SCREENING; LDL GOAL LESS THAN 130     Perforated ulcer (HCC)     Free intraperitoneal air     Atrial flutter (H)     Lung mass     Pleural effusion     Mediastinal lymphadenopathy     Non-small cell carcinoma of lung (H)     COPD exacerbation (H)     Weakness          Brief History of Presenting Illness:   As per admit hx  Patient is a 57 yr old male here for weakness and shortness of breath. His symptoms started last week Thursday shortly after his first round of chemotherapy for treatment of non cell carcinoma of the lung. This was also recently diagnosed. He states that he is coughing . Cough is wet in nature and he is unable to bring up the sputum. He reports no fevers. He reports no chest pain    Recent Results (from the past 24 hour(s))   CT Chest Pulmonary Embolism w Contrast    Narrative    CT CHEST WITH CONTRAST  1/9/2018 3:40 AM     HISTORY: Chest pain. Increasing D-dimer.    COMPARISON: 1/7/2018.    TECHNIQUE: Following the uneventful administration of 80 mL Isovue   intravenous contrast, helical sections were acquired through the lungs  according to the pulmonary embolism protocol. Coronal reconstructions  were generated. Radiation dose for this scan was reduced using  automated exposure control, adjustment of  the mA and/or kV according  to the patient's size, or iterative reconstruction technique.    FINDINGS: No visualized pulmonary embolus.    A large mediastinal mass, additional enlarged mediastinal lymph nodes,  emphysematous changes in the lungs and other findings are again noted.      Impression    IMPRESSION:  1. No significant interval change since the very recent study dated  1/7/2018.  2. No visualized pulmonary embolus.    JOSE MAYBERRY MD     CT CHEST 1/7-IMPRESSION:   1. No evidence for pulmonary embolism or thoracic aortic dissection.  2. Left upper lobe mass is not well-defined, but appears to have  increased slightly in size.  3. Mediastinal and right hilar adenopathy has also increased slightly.  4. Small left pleural effusion is unchanged.  5. Small pericardial effusion has increased slightly in size.  6. Complete atelectasis of the left upper lobe is again noted.  7. Emphysema.          Hospital Course:   WEAKNESS/ SOB  Likely due to malignancy and recent chemo. CT as above. Life long smoker--likely does have COPD. Started on prednisone. PT seen. Pt doing ok. Can go home with HC  Will d/c on brief course of prednisone     CHRONIC AFIB/FLUTTER  Dx in 11/17. S/p ablation 12/7. Maintained on metoprolol/ epiquis. Went in afib last night at 130--had 1/2 hr CP. Negative trops/ EKG. Discussed with cards. Has had such pains on and off last couple months since dx of fib/flutter. Would NOT intervene at this time--poor interventional candidate due to advanced malignancy. Recommended continuing bblockers/ PPI, adding nitrates if BP tolerates. BP is on low side--infact bblocker dose was given 1/2 yestreday due to low BP.   -will continue bblockers at full dose, continue PPI. F/u cards as scheduled Tuesday.      ABNL LEXISCAN  lexiscan done 11/17 as part of fib/flutter w/u. Was abnl.   -plan as above. would f/u cards OP     GERD  Continue meds     TOBACCO ABUSE  Continue faustina patch     NSCLCA  DX 11/17 -started  chemo last week.   -f/u oncology on Friday as scheduled. Discussed with oncology.           Procedures:   No procedures performed during this admission         Allergies:      Allergies   Allergen Reactions     Nka [No Known Allergies]              Medications Prior to Admission:     Prescriptions Prior to Admission   Medication Sig Dispense Refill Last Dose     LORazepam (ATIVAN) 0.5 MG tablet Take 1 tablet (0.5 mg) by mouth every 4 hours as needed (Anxiety, Nausea/Vomiting or Sleep) 30 tablet 5 Past Week at pm     prochlorperazine (COMPAZINE) 10 MG tablet Take 1 tablet (10 mg) by mouth every 6 hours as needed (Nausea/Vomiting) 30 tablet 5 1/7/2018 at am     megestrol (MEGACE ORAL) 40 MG/ML suspension Take 5 mLs (200 mg) by mouth daily 600 mL 2 Past Week at Unknown time     ASPIRIN PO Take 325 mg by mouth daily   Past Month at Unknown time     apixaban ANTICOAGULANT (ELIQUIS) 5 MG tablet Take 1 tablet (5 mg) by mouth 2 times daily 60 tablet 0 1/7/2018 at am     nicotine (NICODERM CQ) 21 MG/24HR 24 hr patch Place 1 patch onto the skin daily 30 patch 0 1/7/2018 at am off     metoprolol (LOPRESSOR) 50 MG tablet Take 1.5 tablets (75 mg) by mouth 2 times daily 60 tablet 1 1/7/2018 at am     Probiotic Product (ALIGN) CHEW Take 1 chew tab by mouth 3 times daily as needed   1/6/2018 at Unknown time     omeprazole (PRILOSEC) 40 MG capsule Take 1 capsule (40 mg) by mouth daily Take 30-60 minutes before a meal. 30 capsule 1 1/7/2018 at am     HYDROcodone-acetaminophen (NORCO) 5-325 MG per tablet Take 1-2 tablets by mouth every 6 hours as needed 30 tablet 0 on hand not using     ondansetron (ZOFRAN) 8 MG tablet Take 1 tablet (8 mg) by mouth every 8 hours as needed (Nausea/Vomiting) 10 tablet 5 Unknown at Unknown time     lidocaine-prilocaine (EMLA) cream Place a quarter sized amount of cream onto port area 30-60 minutes prior to port use. Do not rub in. Cover with tegaderm or saran wrap. 30 g 1              Discharge  Medications:     Current Discharge Medication List      START taking these medications    Details   predniSONE (DELTASONE) 20 MG tablet Take 2 tablets (40 mg) by mouth daily for 5 days  Qty: 10 tablet, Refills: 0    Associated Diagnoses: COPD exacerbation (H)         CONTINUE these medications which have NOT CHANGED    Details   LORazepam (ATIVAN) 0.5 MG tablet Take 1 tablet (0.5 mg) by mouth every 4 hours as needed (Anxiety, Nausea/Vomiting or Sleep)  Qty: 30 tablet, Refills: 5    Associated Diagnoses: Non-small cell carcinoma of lung (H)      prochlorperazine (COMPAZINE) 10 MG tablet Take 1 tablet (10 mg) by mouth every 6 hours as needed (Nausea/Vomiting)  Qty: 30 tablet, Refills: 5    Associated Diagnoses: Non-small cell carcinoma of lung (H)      megestrol (MEGACE ORAL) 40 MG/ML suspension Take 5 mLs (200 mg) by mouth daily  Qty: 600 mL, Refills: 2    Associated Diagnoses: Non-small cell carcinoma of lung (H)      ASPIRIN PO Take 325 mg by mouth daily    Associated Diagnoses: Mediastinal lymphadenopathy; Non-small cell carcinoma of lung (H)      apixaban ANTICOAGULANT (ELIQUIS) 5 MG tablet Take 1 tablet (5 mg) by mouth 2 times daily  Qty: 60 tablet, Refills: 0    Associated Diagnoses: Typical atrial flutter (H)      nicotine (NICODERM CQ) 21 MG/24HR 24 hr patch Place 1 patch onto the skin daily  Qty: 30 patch, Refills: 0    Associated Diagnoses: Encounter for tobacco use cessation counseling      metoprolol (LOPRESSOR) 50 MG tablet Take 1.5 tablets (75 mg) by mouth 2 times daily  Qty: 60 tablet, Refills: 1    Associated Diagnoses: Irregular heart beat; Atrial flutter, unspecified type (H); Cardiomyopathy, unspecified type (H)      Probiotic Product (ALIGN) CHEW Take 1 chew tab by mouth 3 times daily as needed      omeprazole (PRILOSEC) 40 MG capsule Take 1 capsule (40 mg) by mouth daily Take 30-60 minutes before a meal.  Qty: 30 capsule, Refills: 1    Associated Diagnoses: Gastroesophageal reflux disease without  "esophagitis      HYDROcodone-acetaminophen (NORCO) 5-325 MG per tablet Take 1-2 tablets by mouth every 6 hours as needed  Qty: 30 tablet, Refills: 0    Associated Diagnoses: Non-small cell carcinoma of lung (H)      ondansetron (ZOFRAN) 8 MG tablet Take 1 tablet (8 mg) by mouth every 8 hours as needed (Nausea/Vomiting)  Qty: 10 tablet, Refills: 5    Associated Diagnoses: Non-small cell carcinoma of lung (H)      lidocaine-prilocaine (EMLA) cream Place a quarter sized amount of cream onto port area 30-60 minutes prior to port use. Do not rub in. Cover with tegaderm or saran wrap.  Qty: 30 g, Refills: 1    Associated Diagnoses: Non-small cell carcinoma of lung (H)                   Consultations:   No consultations were requested during this admission            Discharge Exam:   Blood pressure 91/64, pulse 70, temperature 97.4  F (36.3  C), temperature source Oral, resp. rate 18, height 1.803 m (5' 11\"), weight 68.6 kg (151 lb 3.8 oz), SpO2 97 %.  GENERAL APPEARANCE: healthy, alert and no distress  EYES: conjunctiva clear, eyes grossly normal  HENT: external ears and nose normal   NECK: supple, no masses or adenopathy  RESP: lungs clear to auscultation - no rales, rhonchi or wheezes  CV: regular rate and rhythm, normal S1 S2, no S3 or S4 and no murmur, click or rub   ABDOMEN: soft, nontender, no HSM or masses and bowel sounds normal  MS: no clubbing, cyanosis; no edema  SKIN: clear without significant rashes or lesions  NEURO: Normal strength and tone, sensory exam grossly normal, mentation intact and speech normal    Unresulted Labs Ordered in the Past 30 Days of this Admission     No orders found from 11/8/2017 to 1/8/2018.          Recent Results (from the past 24 hour(s))   CT Chest Pulmonary Embolism w Contrast    Narrative    CT CHEST WITH CONTRAST  1/9/2018 3:40 AM     HISTORY: Chest pain. Increasing D-dimer.    COMPARISON: 1/7/2018.    TECHNIQUE: Following the uneventful administration of 80 mL Isovue "   intravenous contrast, helical sections were acquired through the lungs  according to the pulmonary embolism protocol. Coronal reconstructions  were generated. Radiation dose for this scan was reduced using  automated exposure control, adjustment of the mA and/or kV according  to the patient's size, or iterative reconstruction technique.    FINDINGS: No visualized pulmonary embolus.    A large mediastinal mass, additional enlarged mediastinal lymph nodes,  emphysematous changes in the lungs and other findings are again noted.      Impression    IMPRESSION:  1. No significant interval change since the very recent study dated  1/7/2018.  2. No visualized pulmonary embolus.    JOSE MAYBERRY MD            Pending Tests at Discharge:   None         Discharge Instructions and Follow-Up:   Discharge diet: Regular   Discharge activity: Activity as tolerated   Discharge follow-up: F/u cards / oncology/ PMD as scheduled.           Discharge Disposition:   Discharged to home      Attestation:  I have reviewed today's vital signs, notes, medications, labs and imaging.    Time Spent on this Encounter   [place], personally saw the patient today and spent greater than 30 minutes discharging this patient.      Clayton Alvarez MD

## 2018-01-09 NOTE — PROGRESS NOTES
Called due to patient having right sided chest pain.  ECG ordered - by my review, minimal change from previous - less t-wave inversion in V5 and more in V1, otherwise unchanged.  Symptoms already resolving after ECG.  Of note, patient says he gets pain like this when he goes too long between doses of his metoprolol.  Rate doing well.  Gave just 25 instead of 75 mg of metoprolol this evening due to blood pressure in the 90's.  Blood pressure currently > 100.  Giving GI cockctail and checking troponin with plan for serial troponins along with d-dimer.  If still having pain after GI cocktail will give additional 25 mg of metoprolol x 1 as long as SBP remains > 100.  Overall, reassuring that symptoms are already resolving.      UPDATE:  D-dimer elevated - at that point symptoms had improved notably but not yet resolved so CT chest was repeated although done recently given high risk patient with new symptoms and now elevated D-dimer.  CT was negative for PE.  Symptoms resolved.  However, patient then did develop an episode of atrial fibrillation with rapid ventricular response shortly before 5am.  He remained asymptomatic with this, but had heart rate 100-120's.  This spontaneously converted to sinus rhythm prior to any treatment being given, and as above he had no change in his symptoms with this.  For now plan to continue on tele and to continue home metoprolol dosing in the AM.  Overall symptoms did not correlate with episode of rapid ventricular response and this does not appear to have been the cause of his symptoms.        Peter Schuster MD

## 2018-01-09 NOTE — PLAN OF CARE
Problem: Patient Care Overview  Goal: Plan of Care/Patient Progress Review  Physical Therapy Discharge Summary    Reason for therapy discharge:    Discharged to home with home therapy.    Progress towards therapy goal(s). See goals on Care Plan in [facility] electronic health record for goal details.  Goals met    Therapy recommendation(s):    Continued therapy is recommended.  Rationale/Recommendations:  Home PT for ongoing strengthening and conditioning as pt with decreased activity tolerance.

## 2018-01-09 NOTE — PROGRESS NOTES
At 0020 patient complaints of tight ache pain in right side of his chest, he thinks it may be due to the lesser dose of metoprolol during the evening. Denies radiating pain, denies reflux, denies shortness of breath at this time. VSS. Ordering EKG, calling RT and will update MD.

## 2018-01-09 NOTE — PHARMACY - DISCHARGE MEDICATION RECONCILIATION
Discharge medication review for this patient is complete. Pharmacist assisted with medication reconciliation of discharge medications with prior to admission medications.     The following changes were made to the discharge medication list based on pharmacist review:  Added:  none  Discontinued: none  Changed: none, but spoke with patient about speaking with primary care provider about discontinuing ASA if not needed now that he is on Eliquis.      Patient's Discharge Medication List  - medications as listed on After Visit Summary (AVS)     Review of your medicines      START taking       Dose / Directions    predniSONE 20 MG tablet   Commonly known as:  DELTASONE        Dose:  40 mg   Take 2 tablets (40 mg) by mouth daily for 5 days   Quantity:  10 tablet   Refills:  0         CONTINUE these medicines which have NOT CHANGED       Dose / Directions    ALIGN Chew        Dose:  1 chew tab   Take 1 chew tab by mouth 3 times daily as needed   Refills:  0       apixaban ANTICOAGULANT 5 MG tablet   Commonly known as:  ELIQUIS   Used for:  Typical atrial flutter (H)        Dose:  5 mg   Take 1 tablet (5 mg) by mouth 2 times daily   Quantity:  60 tablet   Refills:  0       ASPIRIN PO   Used for:  Mediastinal lymphadenopathy, Non-small cell carcinoma of lung (H)        Dose:  325 mg   Take 325 mg by mouth daily   Refills:  0       HYDROcodone-acetaminophen 5-325 MG per tablet   Commonly known as:  NORCO   Used for:  Non-small cell carcinoma of lung (H)        Dose:  1-2 tablet   Take 1-2 tablets by mouth every 6 hours as needed   Quantity:  30 tablet   Refills:  0       lidocaine-prilocaine cream   Commonly known as:  EMLA   Used for:  Non-small cell carcinoma of lung (H)        Place a quarter sized amount of cream onto port area 30-60 minutes prior to port use. Do not rub in. Cover with tegaderm or saran wrap.   Quantity:  30 g   Refills:  1       LORazepam 0.5 MG tablet   Commonly known as:  ATIVAN   Used for:  Non-small  cell carcinoma of lung (H)        Dose:  0.5 mg   Take 1 tablet (0.5 mg) by mouth every 4 hours as needed (Anxiety, Nausea/Vomiting or Sleep)   Quantity:  30 tablet   Refills:  5       megestrol 40 MG/ML suspension   Commonly known as:  MEGACE ORAL   Used for:  Non-small cell carcinoma of lung (H)        Dose:  200 mg   Take 5 mLs (200 mg) by mouth daily   Quantity:  600 mL   Refills:  2       metoprolol 50 MG tablet   Commonly known as:  LOPRESSOR   Used for:  Irregular heart beat, Atrial flutter, unspecified type (H), Cardiomyopathy, unspecified type (H)        Dose:  75 mg   Take 1.5 tablets (75 mg) by mouth 2 times daily   Quantity:  60 tablet   Refills:  1       nicotine 21 MG/24HR 24 hr patch   Commonly known as:  NICODERM CQ   Used for:  Encounter for tobacco use cessation counseling        Dose:  1 patch   Place 1 patch onto the skin daily   Quantity:  30 patch   Refills:  0       omeprazole 40 MG capsule   Commonly known as:  priLOSEC   Used for:  Gastroesophageal reflux disease without esophagitis        Dose:  40 mg   Take 1 capsule (40 mg) by mouth daily Take 30-60 minutes before a meal.   Quantity:  30 capsule   Refills:  1       ondansetron 8 MG tablet   Commonly known as:  ZOFRAN   Used for:  Non-small cell carcinoma of lung (H)        Dose:  8 mg   Take 1 tablet (8 mg) by mouth every 8 hours as needed (Nausea/Vomiting)   Quantity:  10 tablet   Refills:  5       prochlorperazine 10 MG tablet   Commonly known as:  COMPAZINE   Used for:  Non-small cell carcinoma of lung (H)        Dose:  10 mg   Take 1 tablet (10 mg) by mouth every 6 hours as needed (Nausea/Vomiting)   Quantity:  30 tablet   Refills:  5            Where to get your medicines      These medications were sent to [facility] [place], [place] - [address]  [address] [address] [address]     Phone:  [phone]      predniSONE 20 MG tablet

## 2018-01-09 NOTE — PROGRESS NOTES
Dr Schuster notified of patient in A fib, as taking orders patient back into sinus rhythm. Order received for one time PRN dose 5mg IV metoprolol if HR >120 and A fib. Will continue to monitor closely.

## 2018-01-09 NOTE — PROGRESS NOTES
Orders received for stat d Dimer and trop, to give GI cocktail. If no improvement after half hour from GI cocktail, then given 25mg metoprolol once with systolic > 100. Page MD if d Dimer or trop are elevated, or if GI cocktail and metoprolol have no improvement on chest pain.

## 2018-01-09 NOTE — PROGRESS NOTES
Transition Communication Hand-off for Care Transitions to Next Level of Care Provider / Discharge:    Name: Zechariah Ashby  MRN #: [id number]  Primary Care Provider: Kailash Mason  Primary Care MD Name: Dr Mason  Primary Clinic: [address] [place] [address]  Primary Care Clinic Name: ([facility]) [facility]  Reason for Hospitalization:  COPD exacerbation (H) [J44.1]  Acute systolic congestive heart failure (H) [I50.21]  Malignant neoplasm of upper lobe of left lung (H) [C34.12]  Admit Date/Time: 1/7/2018  3:11 PM  Discharge Date: 1/9/18  Payor Source: Payor: [employer] / Plan: Cyn[employer] OPEN ACCESS FULLY INSURED / Product Type: HMO /     Readmission Assessment Measure (SUZETTE) Risk Score/category: Average        Reason for Communication Hand-off Referral: Admission diagnoses: COPD    Discharge Plan:  Home with [facility] ([phone] Fax: [phone])     Concern for non-adherence with plan of care:   Y/N No  Discharge Needs Assessment:  Needs       Most Recent Value    Anticipated Changes Related to Illness other (see comments) [continued weakness]    Equipment Currently Used at Home none    Transportation Available family or friend will provide        Follow-up plan:  Future Appointments  Date Time Provider Department Center   1/11/2018 2:30 PM [facility]   1/12/2018 1:30 PM ROOM 9 [facility]   1/12/2018 2:30 PM Alejandra Rand MD [facility]   1/15/2018 10:00 AM Kailash Mason MD [facility]   1/25/2018 8:00 AM ROOM 1 [facility]   1/25/2018 9:00 AM Joycelyn May MD [facility]   2/1/2018 8:00 AM ROOM 9 [facility]     Piña Recommendations:  Patient has lung cancer, he is currently receiving chemotherapy.  He currently has an open Adult Protection case open with the [facility].  His County Worker is Emiliano (Phone: [phone] Fax:  [phone]).  Faxed hospital information per [facility]'s request.        Carolynn Mesa RN, Care  Coordinator

## 2018-01-09 NOTE — TELEPHONE ENCOUNTER
"Status Check:    Pt is a 57 year old male, recently in clinic for C1D1 carboplatin, gemzar (01.04.18) and ED for Acute systolic congestive heart failure and, COPD exacerbation (01.07.18).  Call placed for status check.    Primary care provider is: Kailash Mason    Diagnosis:   Encounter Diagnosis   Name Primary?     Non-small cell carcinoma of lung (H) Yes     Oncology provider is:  Dr. LASHON May    How are you doing/feeling?: \"Better now, still a bit tight chested\". Pt reports that he feels he did well with the infusion \"for the first day or two then I started with this cough and it got worse\".  Pt developed SOB and intermittent chest pain and was seen in ED.  Denies fever nor chills, pain nor discomfort. Denies nausea nor vomiting. Is eating and drinking, voiding, and stooling without issue.  Is still experiencing the cough but it's better now with the steroid\" and weakness.    Any further issues?: Pt has appt with cardiology Thursday 01.11.18 and Dr. Rand 01.11.18 prior to C1D8 gemzar    Next Follow up/Recommendations: Advised patient to utilize PRN medications as needed, eat nutritious meals, drink plenty of fluids, and keep scheduled appointments. If symptoms or other concerns develop after hours, encouraged patient to call our after hours number: [phone].  Patient instructed to call with any questions or concerns.  Patient states understanding and is in agreement with this plan.      Patient instructed to call with any questions or concerns.  Patient states understanding and is in agreement with this plan.    Approximately 10 minutes spent on telephone with patient reviewing assessment and symptom(s) and providing symptom management.    Camille Lara RN, BSN, OCN  Oncology Hematology   Breast Cancer Navigator  [facility]  [email or web address]  [phone]    "

## 2018-01-09 NOTE — PROGRESS NOTES
BENITA PAYAN DISCHARGE NOTE    Patient discharged to home at 1:55 PM via wheel chair. Accompanied by significant other and staff. Discharge instructions reviewed with patient and significant other, opportunity offered to ask questions. Prescriptions sent to patients preferred pharmacy. All belongings sent with patient.    Sirena Hernandez

## 2018-01-09 NOTE — PLAN OF CARE
"Problem: Patient Care Overview  Goal: Plan of Care/Patient Progress Review  Outcome: Improving  Patient up with stand by assist. Denying any pain. Reports a decrease in shortness of breath. Clear/diminished lung sounds. Patient showered this evening. Soft pressures so b/p decreased to 25mg once this evening. Port TKO. BP 96/64 (BP Location: Right arm)  Pulse 76  Temp 97.6  F (36.4  C) (Oral)  Resp 18  Ht 1.803 m (5' 11\")  Wt 70 kg (154 lb 5.2 oz)  SpO2 96%  BMI 21.52 kg/m2        "

## 2018-01-10 PROBLEM — I42.9 CARDIOMYOPATHY (H): Status: ACTIVE | Noted: 2018-01-01

## 2018-01-10 PROBLEM — I50.23 ACUTE ON CHRONIC SYSTOLIC CONGESTIVE HEART FAILURE (H): Status: ACTIVE | Noted: 2018-01-01

## 2018-01-10 NOTE — TELEPHONE ENCOUNTER
Heather from [facility] says they got orders to see him after hospital discharge. She just talked to him and he doesn't want them to come until Friday 1/12 which falls out of their window that he should be seen. They want to know if Dr Mason is OK for them to wait until Friday to see him.  [phone]  OK to leave message

## 2018-01-11 NOTE — PROGRESS NOTES
HPI and Plan:   See dictation #[id number]    Over 40 minutes of this hour long visit was spent reviewing previous records, discussion with Dr. Roberts and Dr. Damon, coordination of care with Dr. Rand's office and developing plan for KIMBERLY and DCCV.      Orders Placed This Encounter   Procedures     EKG 12-lead complete w/read - Clinics (performed today)       Orders Placed This Encounter   Medications     amiodarone (PACERONE/CODARONE) 200 MG tablet     Sig: Take 2 tablets twice daily x 1 week (1/12 - 1/19) and then start 1 tablet daily 1/20/18     Dispense:  45 tablet     Refill:  1       There are no discontinued medications.      Encounter Diagnoses   Name Primary?     Typical atrial flutter (H)      Paroxysmal atrial fibrillation (H) Yes       CURRENT MEDICATIONS:  Current Outpatient Prescriptions   Medication Sig Dispense Refill     amiodarone (PACERONE/CODARONE) 200 MG tablet Take 2 tablets twice daily x 1 week (1/12 - 1/19) and then start 1 tablet daily 1/20/18 45 tablet 1     predniSONE (DELTASONE) 20 MG tablet Take 2 tablets (40 mg) by mouth daily for 5 days 10 tablet 0     HYDROcodone-acetaminophen (NORCO) 5-325 MG per tablet Take 1-2 tablets by mouth every 6 hours as needed 30 tablet 0     LORazepam (ATIVAN) 0.5 MG tablet Take 1 tablet (0.5 mg) by mouth every 4 hours as needed (Anxiety, Nausea/Vomiting or Sleep) 30 tablet 5     prochlorperazine (COMPAZINE) 10 MG tablet Take 1 tablet (10 mg) by mouth every 6 hours as needed (Nausea/Vomiting) 30 tablet 5     ondansetron (ZOFRAN) 8 MG tablet Take 1 tablet (8 mg) by mouth every 8 hours as needed (Nausea/Vomiting) 10 tablet 5     lidocaine-prilocaine (EMLA) cream Place a quarter sized amount of cream onto port area 30-60 minutes prior to port use. Do not rub in. Cover with tegaderm or saran wrap. 30 g 1     megestrol (MEGACE ORAL) 40 MG/ML suspension Take 5 mLs (200 mg) by mouth daily 600 mL 2     ASPIRIN PO Take 325 mg by mouth daily       apixaban ANTICOAGULANT (ELIQUIS)  5 MG tablet Take 1 tablet (5 mg) by mouth 2 times daily 60 tablet 0     nicotine (NICODERM CQ) 21 MG/24HR 24 hr patch Place 1 patch onto the skin daily 30 patch 0     metoprolol (LOPRESSOR) 50 MG tablet Take 1.5 tablets (75 mg) by mouth 2 times daily 60 tablet 1     Probiotic Product (ALIGN) CHEW Take 1 chew tab by mouth 3 times daily as needed       omeprazole (PRILOSEC) 40 MG capsule Take 1 capsule (40 mg) by mouth daily Take 30-60 minutes before a meal. 30 capsule 1       ALLERGIES     Allergies   Allergen Reactions     Nka [No Known Allergies]        PAST MEDICAL HISTORY:  Past Medical History:   Diagnosis Date     Atrial flutter (H) 2017       PAST SURGICAL HISTORY:  Past Surgical History:   Procedure Laterality Date     APPENDECTOMY      age 28s     INSERT PORT VASCULAR ACCESS N/A 1/3/2018    Procedure: INSERT PORT VASCULAR ACCESS;  Port-a-Cath Placement;  Surgeon: Tomas Crews MD;  Location: [facility] OR     SURGICAL HISTORY OF -   2004    Gastroscopy with biopsy for gastric ulcer       FAMILY HISTORY:  Family History   Problem Relation Age of Onset     CANCER Mother      pancreatic cancer,  at 38 years     Alzheimer Disease Father       at 84 years.       SOCIAL HISTORY:  Social History     Social History     Marital status:      Spouse name: Leonor Shultz     Number of children: 2     Years of education: N/A     Occupational History           Social History Main Topics     Smoking status: Current Some Day Smoker     Packs/day: 0.25     Years: 50.00     Smokeless tobacco: Never Used      Comment: down to 1-2 cigerattes a day.      Alcohol use No     Drug use: No     Sexual activity: Yes     Partners: Female     Other Topics Concern     Parent/Sibling W/ Cabg, Mi Or Angioplasty Before 65f 55m? No     Social History Narrative       Review of Systems:  Skin:  Negative       Eyes:  Positive for glasses    ENT:  Negative      Respiratory:  Positive for shortness of  "breath;dyspnea on exertion;dyspnea at rest;cough     Cardiovascular:  Negative for;edema Positive for;palpitations;fatigue;lightheadedness;dizziness;chest pain;exercise intolerance    Gastroenterology: Negative for melena;hematochezia    Genitourinary:  Negative      Musculoskeletal:  Negative      Neurologic:  Negative      Psychiatric:  Negative      Heme/Lymph/Imm:  Positive for   non-small cell lung CA  Endocrine:  Negative        Physical Exam:  Vitals: BP (!) 79/60  Pulse (!) 47  Ht 1.765 m (5' 9.5\")  Wt 67.2 kg (148 lb 1.6 oz)  BMI 21.56 kg/m2    Constitutional:  cooperative;well developed cachectic      Skin:  warm and dry to the touch, no apparent skin lesions or masses noted     L upper chest Port-A-Cath with well approximated edges; surrounding ecchymosis    Head:  normocephalic, no masses or lesions        Eyes:  pupils equal and round;conjunctivae and lids unremarkable;sclera white;no xanthalasma        Lymph:      ENT:  no pallor or cyanosis, dentition good        Neck:  not assessed this visit        Respiratory:    diminished breath sounds left sided       Cardiac:   irregularly irregular rhythm;tachycardic              not assessed this visit                                        GI:  abdomen soft        Extremities and Muscular Skeletal:  no edema;no deformities, clubbing, cyanosis, erythema observed              Neurological:  no gross motor deficits   Fatigued    Psych:  Alert and Oriented x 3        CC  Kailash Mason MD  [address] [place] [id number]              "

## 2018-01-11 NOTE — PATIENT INSTRUCTIONS
1. EKG today showed you're back in Atrial Fibrillation. This is also going fast so likely causing your low BP and sleepiness.    2. KIMBERLY and Cardioversion scheduled for tomorrow 1/12 AM.      3. STAY ON ELIQUIS    4. No other changes today    5. Our AFib nurses are @ [phone]

## 2018-01-11 NOTE — MR AVS SNAPSHOT
After Visit Summary   1/11/2018    Zechariah Ashby    MRN: [id number]           Patient Information     Date Of Birth          1960        Visit Information        Provider Department      1/11/2018 2:30 PM Daksha Montemayor PA-C [facility]   [place]        Today's Diagnoses     Paroxysmal atrial fibrillation (H)    -  1    Typical atrial flutter (H)          Care Instructions    1. EKG today showed you're back in Atrial Fibrillation. This is also going fast so likely causing your low BP and sleepiness.    2. KIMBERLY and Cardioversion scheduled for tomorrow 1/12 AM.      3. STAY ON ELIQUIS    4. No other changes today    5. Our AFib nurses are @ [phone]          Follow-ups after your visit        Your next 10 appointments already scheduled     Jan 12, 2018  1:30 PM CST   Level 1 with ROOM 9 [facility] ([facility])    [facility]  [address]  [facility] [address]   [phone]            Jan 12, 2018  2:30 PM CST   Return Visit with Alejandra Rand MD   [facility] ([facility])    [facility]  [address]  [facility] [address]   [phone]            Luis Fernando 15, 2018 10:00 AM CST   SHORT with Kailash Mason MD   [facility]    [address] [place] [address]   [phone]            Jan 25, 2018  8:00 AM CST   Level 3 with ROOM 1 [facility] ([facility])    [facility]  [address]  [facility] [address]   [phone]            Jan 25, 2018  9:00 AM CST   Return Visit with Joycelyn May MD   [facility] ([facility])    [facility]  [address]  [facility] [address]   [phone]            Feb 01, 2018  8:00 AM CST   Level 1 with ROOM 9 [place]  "  [facility] ([facility])    [facility]  [address] [facility] [address]  [facility] [address] [phone]              Who to contact     If you have questions or need follow up information about today's clinic visit or your schedule please contact [facility]   ADOL[facility] directly at [phone].  Normal or non-critical lab and imaging results will be communicated to you by MyChart, letter or phone within 4 business days after the clinic has received the results. If you do not hear from us within 7 days, please contact the clinic through Credporthart or phone. If you have a critical or abnormal lab result, we will notify you by phone as soon as possible.  Submit refill requests through Ui Link or call your pharmacy and they will forward the refill request to us. Please allow 3 business days for your refill to be completed.          Additional Information About Your Visit        CredportharLocal Lift Information     Ui Link lets you send messages to your doctor, view your test results, renew your prescriptions, schedule appointments and more. To sign up, go to [email or web address] . Click on \"Log in\" on the left side of the screen, which will take you to the Welcome page. Then click on \"Sign up Now\" on the right side of the page.     You will be asked to enter the access code listed below, as well as some personal information. Please follow the directions to create your username and password.     Your access code is: [email or web address]  Expires: 2018  4:44 PM     Your access code will  in 90 days. If you need help or a new code, please call your [place] clinic or [phone].        Care EveryWhere ID     This is your Care EveryWhere ID. This could be used by other organizations to access your [place] medical records  [id number]        Your Vitals Were     Pulse Height BMI (Body Mass Index)             47 1.765 m (5' 9.5\") 21.56 kg/m2          Blood " Pressure from Last 3 Encounters:   01/11/18 (!) 79/60   01/09/18 91/64   01/04/18 108/78    Weight from Last 3 Encounters:   01/11/18 67.2 kg (148 lb 1.6 oz)   01/09/18 68.6 kg (151 lb 3.8 oz)   01/03/18 67.6 kg (149 lb)              We Performed the Following     EKG 12-lead complete w/read - Clinics (performed today)     Follow-Up with Cardiac Advanced Practice Provider          Today's Medication Changes          These changes are accurate as of: 1/11/18  3:29 PM.  If you have any questions, ask your nurse or doctor.               Start taking these medicines.        Dose/Directions    amiodarone 200 MG tablet   Commonly known as:  PACERONE/CODARONE   Used for:  Paroxysmal atrial fibrillation (H)        Take 2 tablets twice daily x 1 week (1/12 - 1/19) and then start 1 tablet daily 1/20/18   Quantity:  45 tablet   Refills:  1            Where to get your medicines      These medications were sent to [id number] [place] [id number] [facility]  [address] [place] [address]    Hours:  Closed 10-16-08 business to [facility] Phone:  [phone]     amiodarone 200 MG tablet                Primary Care Provider Office Phone # Fax #    Kailash Mason [phone] [id number] [facility] [address]        Equal Access to Services     M[facility]IAM [facility] Center[facility]IA AH: Hadii aad ku hadasho Soaustinali, waaxda luqadaha, qaybta kaalmada adeegyada, harley mcgrath haypeggy boykin. So [facility] [phone].    ATENCIÓN: Si habla español, tiene a morocho disposición servicios gratuitos de asistencia lingüística. Llame al [phone].    We comply with applicable federal civil rights laws and [place] laws. We do not discriminate on the basis of race, color, national origin, age, disability, sex, sexual orientation, or gender identity.            Thank you!     Thank you for choosing [facility]  for your care. Our goal is always to provide you with  excellent care. Hearing back from our patients is one way we can continue to improve our services. Please take a few minutes to complete the written survey that you may receive in the mail after your visit with us. Thank you!             Your Updated Medication List - Protect others around you: Learn how to safely use, store and throw away your medicines at www.disposemymeds.org.          This list is accurate as of: 1/11/18  3:29 PM.  Always use your most recent med list.                   Brand Name Dispense Instructions for use Diagnosis    ALIGN Chew      Take 1 chew tab by mouth 3 times daily as needed        amiodarone 200 MG tablet    PACERONE/CODARONE    45 tablet    Take 2 tablets twice daily x 1 week (1/12 - 1/19) and then start 1 tablet daily 1/20/18    Paroxysmal atrial fibrillation (H)       apixaban ANTICOAGULANT 5 MG tablet    ELIQUIS    60 tablet    Take 1 tablet (5 mg) by mouth 2 times daily    Typical atrial flutter (H)       ASPIRIN PO      Take 325 mg by mouth daily    Mediastinal lymphadenopathy, Non-small cell carcinoma of lung (H)       HYDROcodone-acetaminophen 5-325 MG per tablet    NORCO    30 tablet    Take 1-2 tablets by mouth every 6 hours as needed    Non-small cell carcinoma of lung (H)       lidocaine-prilocaine cream    EMLA    30 g    Place a quarter sized amount of cream onto port area 30-60 minutes prior to port use. Do not rub in. Cover with tegaderm or saran wrap.    Non-small cell carcinoma of lung (H)       LORazepam 0.5 MG tablet    ATIVAN    30 tablet    Take 1 tablet (0.5 mg) by mouth every 4 hours as needed (Anxiety, Nausea/Vomiting or Sleep)    Non-small cell carcinoma of lung (H)       megestrol 40 MG/ML suspension    MEGACE ORAL    600 mL    Take 5 mLs (200 mg) by mouth daily    Non-small cell carcinoma of lung (H)       metoprolol tartrate 50 MG tablet    LOPRESSOR    60 tablet    Take 1.5 tablets (75 mg) by mouth 2 times daily    Irregular heart beat, Atrial flutter,  unspecified type (H), Cardiomyopathy, unspecified type (H)       nicotine 21 MG/24HR 24 hr patch    NICODERM CQ    30 patch    Place 1 patch onto the skin daily    Encounter for tobacco use cessation counseling       omeprazole 40 MG capsule    priLOSEC    30 capsule    Take 1 capsule (40 mg) by mouth daily Take 30-60 minutes before a meal.    Gastroesophageal reflux disease without esophagitis       ondansetron 8 MG tablet    ZOFRAN    10 tablet    Take 1 tablet (8 mg) by mouth every 8 hours as needed (Nausea/Vomiting)    Non-small cell carcinoma of lung (H)       predniSONE 20 MG tablet    DELTASONE    10 tablet    Take 2 tablets (40 mg) by mouth daily for 5 days    COPD exacerbation (H)       prochlorperazine 10 MG tablet    COMPAZINE    30 tablet    Take 1 tablet (10 mg) by mouth every 6 hours as needed (Nausea/Vomiting)    Non-small cell carcinoma of lung (H)

## 2018-01-11 NOTE — LETTER
1/11/2018      Kailash Mason MD  [address] [place] [address]      RE: Zechariah Ronquillogren       Dear Colleague,    I had the pleasure of seeing Zechariah Ashby in the [facility].    HISTORY OF PRESENT ILLNESS:  I had the pleasure of meeting Zechariah today when he came accompanied by his wife, Estefani, and son, Zechariah.  He is a 57-year-old man who was first evaluated by Dr. Damon on 12/07/2017.  He had complained of tachycardia and palpitations with dyspnea and chest tightness for about 5 weeks and presented to [facility] on 11/03/2017.  EKG showed typical atrial flutter with a 2:1 conduction.  Troponins were negative, and he was started on aspirin and metoprolol tartrate 37.5 mg twice daily.  Subsequently, an echocardiogram 11/2017 showed an EF of only 30%-35% with mild valvular abnormalities, moderate biatrial enlargement and enlarged/non-collapsing IVC.  A stress test was done 11/2017 while he was still in atrial flutter.  Interestingly, Dr. Damon's note indicates this showed just a small area of possible nontransmural infarction in the anterior wall and anterior apex without definite reversible ischemia.  I reviewed his test report, however, done on 11/29/2017, which indicates a possible nontransmural infarction in the distal anterior/anteroapex with significant residual ischemia in the remainder of the anterior wall, anteroseptal and apical hypokinesis with normal left ventricular size with an EF of roughly 43%.  It was also noted that the ventricle appeared to dilate significantly on stress imaging, raising concern that there could be significant myocardial ischemia present, and additional evaluation was recommended.      When he saw Dr. Damon, she reviewed his history of 2 packs per day smoking x48 years (88-pack-year history), increasing exertional shortness of breath and 2-pillow orthopnea.  He also noted chest discomfort consistent with angina with a tightness  "radiating down the arm that occurred \"when the metoprolol wore off.\"  She also elicited a history of perforated gastric ulcer but without any significant major bleeding.  At that time, he was tolerating his medications without problems.  She had him come down to [facility], and he underwent atrial flutter ablation (cavotricuspid isthmus) with Dr. Cardenas on 12/08/2017.  He was discharged home the following day in sinus rhythm.  It was noted that his KIMBERLY showed no evidence of clot with an EF of 30%-35%, which was felt to be tachycardia-induced.  He was kept on metoprolol tartrate 75 mg twice daily and started on Eliquis 5 mg twice daily.      Unfortunately, during that hospitalization, chest x-ray had shown elevation of the left hemidiaphragm with focal tenting, and a CT scan was done to further evaluate this.  This showed left upper lobe atelectasis appearing to be related to obstruction of the left upper lobe along with a moderate-sized left pleural effusion and mediastinal lymphadenopathy.  He has since been diagnosed with non-small cell lung cancer for which he sees Dr. May at [facility].  He actually has an appointment with Dr. Rand (Dr. May's partner) in his absence.  At his appointment with Dr. May on 12/27, they discussed palliative chemotherapy with carboplatin and gemcitabine for his stage IV squamous cell lung cancer.      He had his Port-A-Cath placed Wednesday 1/3 and he was reportedly instructed to hold his Eliquis starting Friday 12/29.  He tells me he restarted his Eliquis a few days afterwards and has been taking it twice a day since.      Unfortunately, he then presented to the Emergency Department on 01/07/2018 due to concerns regarding extreme shortness of breath.  He was transferred to [facility], where he was diagnosed with a COPD exacerbation and started on prednisone and nebulizers.  A repeat echocardiogram done during this hospitalization actually looked much " "better with an EF of 50%-55%, with small pericardial effusion, no evidence of tamponade and no significant change in this pericardial effusion.      He did have some atrial fibrillation while hospitalized but spontaneously converted to sinus rhythm.  He was discharged on 01/09/2018 in sinus rhythm on metoprolol tartrate 75 mg twice daily and Eliquis 5 mg twice daily.  He was given prednisone 40 mg for 5 days.      It was noted that he did have episodes of chest discomfort that lasted for roughly half an hour.  It appears that this occurred when he went into atrial fibrillation, and his heart rate was fast at 130 beats per minute.  He had negative troponins, and EKG did not show any ischemia.  It was felt that Intervention would not proceed with any invasive testing given that he was a poor interventional candidate due to advanced malignancy.      He comes in today and I am meeting him for the first time.  Unfortunately, EKG today confirms he is back in atrial fibrillation at 122 beats per minute, blood pressure was 79/60.  He and his wife and son noted that he was \"more sleepy and fatigued\" than normal but denied any problems with ongoing chest discomfort, tightness or pressure, denied edema.  He did note some mild lightheadedness.      He is scheduled to see Dr. Rand tomorrow, get laboratory testing and potentially proceed with additional chemotherapy.      ASSESSMENT AND PLAN:     1.  Recurrent paroxysmal atrial fibrillation.  He was discharged in sinus rhythm on the 9h, and we are not quite sure when he went back into AFib.  He is not tolerating it well with blood pressures about 80 systolically.  He does not think he needs to go into the hospital tonight, and besides feeling \"tired\" he is doing \"okay.\"  He denies chest pain or syncope.      Unfortunately but understandably, his Eliquis therapy was briefly interrupted for his Port-A-Cath surrounding 01/03.      At this time, I discussed his case with Dr. Roberts, who " recommended initiation of amiodarone load 400 mg twice daily x7 days, with reduction then to 200 mg daily starting day 8.       I explained that he will likely continue to have issues with paroxysms of atrial fibrillation given his significant illness and lung disease.      I discussed the risks, benefits and indications of proceeding with a KIMBERLY, including but not limited to use of conscious sedation, discomfort from the IV and sore throat/esophageal injury.  He voiced understanding and is willing to proceed.  A consent form will be signed by the procedural physician.  We also discussed plans for DCCV provided there is no evidence of clot.      We discussed risks, benefits and indications of direct current cardioversion including but not limited to, surface burns, tachy or bradyarrhythmias, use of conscious sedation and stroke.  He voiced understanding and is willing to proceed.      He understands he will need a .  He is hopeful that is able to get to his scheduled appointment with Dr. Rand following his cardioversion.  He agreed that he did not want to wait until next week to try to cardiovert this, as he did not want to spend all weekend feeling this sedated and he was worried that he would require another hospitalization similar to what he had after his first dose of chemotherapy when he had a COPD exacerbation.      We will plan to see him back, though I have not yet made that appointment.  Hopefully, we can see him up at [facility] at least for an EKG to see if he has maintained sinus rhythm with the amiodarone.     He will have gotten 2 doses of amiodarone 400 mg by the time of his cardioversion tomorrow, and I did talk to Dr. Roberts about potentially waiting to start this given the fact that he has not been anticoagulated, in the off chance he does convert with these 2 doses of amiodarone.  He did not feel that was necessary, and at this time he will proceed with amiodarone 400 mg twice daily and metoprolol  tartrate 75 mg twice daily.  He will continue Eliquis therapy and take a dose tomorrow as well.      2.  History of atrial flutter.  As above, he is status post ablation 12/08/2017 with Dr. Cardenas.  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  He does not appear fluid overloaded on exam today.      3.  Non-small cell lung cancer.  He sees Dr. May and Dr. Giorgi pa at [facility].  He is on palliative chemotherapy.      4.  Abnormal stress test.  I did speak with Dr. Damon about her interpretation of the stress test based on her dictation done 12/07/2017.  This seems markedly different from the study.  At this time, we will have him continue his beta blocker and aspirin.      It is felt that he is a poor interventional candidate.  I spoke with Dr. Damon, and she recommended repeating the nuclear stress test once his atrial flutter  is taken care of, as she feels that some of these defects may be related to being in atrial flutter. We can set this up at [facility] when we see him next week.     It has been a pleasure to see him in clinic.     Outpatient Encounter Prescriptions as of 1/11/2018   Medication Sig Dispense Refill     amiodarone (PACERONE/CODARONE) 200 MG tablet Take 2 tablets twice daily x 1 week (1/12 - 1/19) and then start 1 tablet daily 1/20/18 45 tablet 1     predniSONE (DELTASONE) 20 MG tablet Take 2 tablets (40 mg) by mouth daily for 5 days 10 tablet 0     HYDROcodone-acetaminophen (NORCO) 5-325 MG per tablet Take 1-2 tablets by mouth every 6 hours as needed 30 tablet 0     LORazepam (ATIVAN) 0.5 MG tablet Take 1 tablet (0.5 mg) by mouth every 4 hours as needed (Anxiety, Nausea/Vomiting or Sleep) 30 tablet 5     prochlorperazine (COMPAZINE) 10 MG tablet Take 1 tablet (10 mg) by mouth every 6 hours as needed (Nausea/Vomiting) 30 tablet 5     ondansetron (ZOFRAN) 8 MG tablet Take 1 tablet (8 mg) by mouth every 8 hours as needed  (Nausea/Vomiting) 10 tablet 5     lidocaine-prilocaine (EMLA) cream Place a quarter sized amount of cream onto port area 30-60 minutes prior to port use. Do not rub in. Cover with tegaderm or saran wrap. 30 g 1     megestrol (MEGACE ORAL) 40 MG/ML suspension Take 5 mLs (200 mg) by mouth daily 600 mL 2     ASPIRIN PO Take 325 mg by mouth daily       apixaban ANTICOAGULANT (ELIQUIS) 5 MG tablet Take 1 tablet (5 mg) by mouth 2 times daily 60 tablet 0     nicotine (NICODERM CQ) 21 MG/24HR 24 hr patch Place 1 patch onto the skin daily 30 patch 0     metoprolol (LOPRESSOR) 50 MG tablet Take 1.5 tablets (75 mg) by mouth 2 times daily 60 tablet 1     Probiotic Product (ALIGN) CHEW Take 1 chew tab by mouth 3 times daily as needed       omeprazole (PRILOSEC) 40 MG capsule Take 1 capsule (40 mg) by mouth daily Take 30-60 minutes before a meal. 30 capsule 1     No facility-administered encounter medications on file as of 1/11/2018.      Again, thank you for allowing me to participate in the care of your patient.      Sincerely,    Daksha Montemayor PA-C     [facility]

## 2018-01-12 NOTE — PROGRESS NOTES
Pt here today for KIMBERLY with cardioversion.  Procedure cancelled d/t pt in sinus rhythm on arrival.  Pt states he does still feel SOB.  Pt will follow up with his primary and I will place call to Christine Montemayor today to inform here about rhythm.  Pt will remain on all medications until directed differently.  Will page Dr ZACARIAS with update.

## 2018-01-12 NOTE — NURSING NOTE
"Oncology Rooming Note    January 12, 2018 1:52 PM   Zechariah Ashby is a 57 year old male who presents for:    No chief complaint on file.    Initial Vitals: BP 99/62 (BP Location: Right arm, Patient Position: Sitting, Cuff Size: Adult Regular)  Pulse 69  Temp 97.4  F (36.3  C) (Tympanic)  Resp 24  Ht 1.765 m (5' 9.49\")  Wt 67.7 kg (149 lb 4.8 oz)  SpO2 98%  BMI 21.74 kg/m2 Estimated body mass index is 21.74 kg/(m^2) as calculated from the following:    Height as of this encounter: 1.765 m (5' 9.49\").    Weight as of this encounter: 67.7 kg (149 lb 4.8 oz). Body surface area is 1.82 meters squared.  No Pain (0) Comment: Data Unavailable   No LMP for male patient.  Allergies reviewed: Yes  Medications reviewed: Yes    Medications: Medication refills not needed today.  Pharmacy name entered into [email or web address]:    [id number] [address] [facility]    Clinical concerns: Follow up left lung CA. Review biopsy results. C/o shortness of breath, dizziness and very light headed when standing or walking. Noticing extreme night sweats and very fatigued. He is also experiencing numbness and tingling bilateral hands.     7 minutes for nursing intake (face to face time)     Courtney Lopez, [facility]              "

## 2018-01-12 NOTE — MR AVS SNAPSHOT
After Visit Summary   1/12/2018    Zechariah Ashby    MRN: [id number]           Patient Information     Date Of Birth          1960        Visit Information        Provider Department      1/12/2018 1:30 PM ROOM 9 [facility]        Today's Diagnoses     Non-small cell carcinoma of lung (H)    -  1       Follow-ups after your visit        Your next 10 appointments already scheduled     Luis Fernando 15, 2018 10:00 AM CST   SHORT with Kailash Mason MD   [facility] ([facility])    [address] [place] [address]   [phone]            Jan 17, 2018  3:00 PM CST   New Visit with Chace Mitchell MD   [facility] ([facility])    [facility]  [address]  [facility] [address]   [phone]            Jan 25, 2018  8:00 AM CST   Level 3 with ROOM 1 [facility] ([facility])    [facility]  [address]  [facility] [address]   [phone]            Jan 25, 2018  9:00 AM CST   Return Visit with Joycelyn May MD   [facility] ([facility])    [facility]  [address]  [facility] [address]   [phone]            Feb 01, 2018  8:00 AM CST   Level 1 with ROOM 9 [facility] ([facility])    [facility]  [address]  [facility] [address]   [phone]              Future tests that were ordered for you today     Open Future Orders        Priority Expected Expires Ordered    Cardioversion Routine 1/12/2018 1/11/2019 1/11/2018            Who to contact     If you have questions or need follow up information about today's clinic visit or your schedule please contact [facility] directly at [phone].  Normal or non-critical lab and imaging results will be communicated to you by Dee Dee  "letter or phone within 4 business days after the clinic has received the results. If you do not hear from us within 7 days, please contact the clinic through MobileRQ or phone. If you have a critical or abnormal lab result, we will notify you by phone as soon as possible.  Submit refill requests through MobileRQ or call your pharmacy and they will forward the refill request to us. Please allow 3 business days for your refill to be completed.          Additional Information About Your Visit        MobileRQ Information     MobileRQ lets you send messages to your doctor, view your test results, renew your prescriptions, schedule appointments and more. To sign up, go to [email or web address] . Click on \"Log in\" on the left side of the screen, which will take you to the Welcome page. Then click on \"Sign up Now\" on the right side of the page.     You will be asked to enter the access code listed below, as well as some personal information. Please follow the directions to create your username and password.     Your access code is: [email or web address]  Expires: 2018  4:44 PM     Your access code will  in 90 days. If you need help or a new code, please call your [place] clinic or [phone].        Care EveryWhere ID     This is your Care EveryWhere ID. This could be used by other organizations to access your [place] medical records  [id number]        Your Vitals Were     Pulse                   66            Blood Pressure from Last 3 Encounters:   18 99/62   18 111/78   18 100/71    Weight from Last 3 Encounters:   18 67.7 kg (149 lb 4.8 oz)   18 67.2 kg (148 lb 1.6 oz)   18 67.2 kg (148 lb 1.6 oz)              We Performed the Following     CBC with platelets differential        Primary Care Provider Office Phone # Fax #    Kailash Mason [phone] [id number] [address]        Equal Access to Services     ROD PERRY AH: Hadii aad ku " phoenix Zhang, wabusterda luqadaha, qaybta kaalmada judah, harley garrido richdick rupalivan lalilianacristiano lucretia. So [facility] [phone].    ATENCIÓN: Si wesley stern, tiene a morocho disposición servicios gratuitos de asistencia lingüística. Roz al [phone].    We comply with applicable federal civil rights laws and [place] laws. We do not discriminate on the basis of race, color, national origin, age, disability, sex, sexual orientation, or gender identity.            Thank you!     Thank you for choosing [facility]  for your care. Our goal is always to provide you with excellent care. Hearing back from our patients is one way we can continue to improve our services. Please take a few minutes to complete the written survey that you may receive in the mail after your visit with us. Thank you!             Your Updated Medication List - Protect others around you: Learn how to safely use, store and throw away your medicines at www.disposemymeds.org.          This list is accurate as of: 1/12/18  4:16 PM.  Always use your most recent med list.                   Brand Name Dispense Instructions for use Diagnosis    ALIGN Chew      Take 1 chew tab by mouth 3 times daily as needed        amiodarone 200 MG tablet    PACERONE/CODARONE    45 tablet    Take 2 tablets twice daily x 1 week (1/12 - 1/19) and then start 1 tablet daily 1/20/18    Paroxysmal atrial fibrillation (H)       apixaban ANTICOAGULANT 5 MG tablet    ELIQUIS    60 tablet    Take 1 tablet (5 mg) by mouth 2 times daily    Typical atrial flutter (H)       ASPIRIN PO      Take 325 mg by mouth daily    Mediastinal lymphadenopathy, Non-small cell carcinoma of lung (H)       HYDROcodone-acetaminophen 5-325 MG per tablet    NORCO    30 tablet    Take 1-2 tablets by mouth every 6 hours as needed    Non-small cell carcinoma of lung (H)       lidocaine-prilocaine cream    EMLA    30 g    Place a quarter sized amount of cream onto port area 30-60 minutes prior to  port use. Do not rub in. Cover with tegaderm or saran wrap.    Non-small cell carcinoma of lung (H)       LORazepam 0.5 MG tablet    ATIVAN    30 tablet    Take 1 tablet (0.5 mg) by mouth every 4 hours as needed (Anxiety, Nausea/Vomiting or Sleep)    Non-small cell carcinoma of lung (H)       megestrol 40 MG/ML suspension    MEGACE ORAL    600 mL    Take 5 mLs (200 mg) by mouth daily    Non-small cell carcinoma of lung (H)       metoprolol tartrate 50 MG tablet    LOPRESSOR    60 tablet    Take 1.5 tablets (75 mg) by mouth 2 times daily    Irregular heart beat, Atrial flutter, unspecified type (H), Cardiomyopathy, unspecified type (H)       nicotine 21 MG/24HR 24 hr patch    NICODERM CQ    30 patch    Place 1 patch onto the skin daily    Encounter for tobacco use cessation counseling       omeprazole 40 MG capsule    priLOSEC    30 capsule    Take 1 capsule (40 mg) by mouth daily Take 30-60 minutes before a meal.    Gastroesophageal reflux disease without esophagitis       ondansetron 8 MG tablet    ZOFRAN    10 tablet    Take 1 tablet (8 mg) by mouth every 8 hours as needed (Nausea/Vomiting)    Non-small cell carcinoma of lung (H)       predniSONE 20 MG tablet    DELTASONE    10 tablet    Take 2 tablets (40 mg) by mouth daily for 5 days    COPD exacerbation (H)       prochlorperazine 10 MG tablet    COMPAZINE    30 tablet    Take 1 tablet (10 mg) by mouth every 6 hours as needed (Nausea/Vomiting)    Non-small cell carcinoma of lung (H)

## 2018-01-12 NOTE — PROGRESS NOTES
"HISTORY OF PRESENT ILLNESS:  I had the pleasure of meeting Zechariah today when he came accompanied by his wife, Estefani, and son, Zechariah.  He is a 57-year-old man who was first evaluated by Dr. Damon on 12/07/2017.  He had complained of tachycardia and palpitations with dyspnea and chest tightness for about 5 weeks and presented to [facility] on 11/03/2017.  EKG showed typical atrial flutter with a 2:1 conduction.  Troponins were negative, and he was started on aspirin and metoprolol tartrate 37.5 mg twice daily.  Subsequently, an echocardiogram 11/2017 showed an EF of only 30%-35% with mild valvular abnormalities, moderate biatrial enlargement and enlarged/non-collapsing IVC.  A stress test was done 11/2017 while he was still in atrial flutter.  Interestingly, Dr. Damon's note indicates this showed just a small area of possible nontransmural infarction in the anterior wall and anterior apex without definite reversible ischemia.  I reviewed his test report, however, done on 11/29/2017, which indicates a possible nontransmural infarction in the distal anterior/anteroapex with significant residual ischemia in the remainder of the anterior wall, anteroseptal and apical hypokinesis with normal left ventricular size with an EF of roughly 43%.  It was also noted that the ventricle appeared to dilate significantly on stress imaging, raising concern that there could be significant myocardial ischemia present, and additional evaluation was recommended.      When he saw Dr. Damon, she reviewed his history of 2 packs per day smoking x48 years (88-pack-year history), increasing exertional shortness of breath and 2-pillow orthopnea.  He also noted chest discomfort consistent with angina with a tightness radiating down the arm that occurred \"when the metoprolol wore off.\"  She also elicited a history of perforated gastric ulcer but without any significant major bleeding.  At that time, he was tolerating his medications without " problems.  She had him come down to [facility], and he underwent atrial flutter ablation (cavotricuspid isthmus) with Dr. Cardenas on 12/08/2017.  He was discharged home the following day in sinus rhythm.  It was noted that his KIMBERLY showed no evidence of clot with an EF of 30%-35%, which was felt to be tachycardia-induced.  He was kept on metoprolol tartrate 75 mg twice daily and started on Eliquis 5 mg twice daily.      Unfortunately, during that hospitalization, chest x-ray had shown elevation of the left hemidiaphragm with focal tenting, and a CT scan was done to further evaluate this.  This showed left upper lobe atelectasis appearing to be related to obstruction of the left upper lobe along with a moderate-sized left pleural effusion and mediastinal lymphadenopathy.  He has since been diagnosed with non-small cell lung cancer for which he sees Dr. May at [facility].  He actually has an appointment with Dr. Rand (Dr. May's partner) in his absence.  At his appointment with Dr. May on 12/27, they discussed palliative chemotherapy with carboplatin and gemcitabine for his stage IV squamous cell lung cancer.      He had his Port-A-Cath placed Wednesday 1/3 and he was reportedly instructed to hold his Eliquis starting Friday 12/29.  He tells me he restarted his Eliquis a few days afterwards and has been taking it twice a day since.      Unfortunately, he then presented to the Emergency Department on 01/07/2018 due to concerns regarding extreme shortness of breath.  He was transferred to [facility], where he was diagnosed with a COPD exacerbation and started on prednisone and nebulizers.  A repeat echocardiogram done during this hospitalization actually looked much better with an EF of 50%-55%, with small pericardial effusion, no evidence of tamponade and no significant change in this pericardial effusion.      He did have some atrial fibrillation while hospitalized but spontaneously converted  "to sinus rhythm.  He was discharged on 01/09/2018 in sinus rhythm on metoprolol tartrate 75 mg twice daily and Eliquis 5 mg twice daily.  He was given prednisone 40 mg for 5 days.      It was noted that he did have episodes of chest discomfort that lasted for roughly half an hour.  It appears that this occurred when he went into atrial fibrillation, and his heart rate was fast at 130 beats per minute.  He had negative troponins, and EKG did not show any ischemia.  It was felt that Intervention would not proceed with any invasive testing given that he was a poor interventional candidate due to advanced malignancy.      He comes in today and I am meeting him for the first time.  Unfortunately, EKG today confirms he is back in atrial fibrillation at 122 beats per minute, blood pressure was 79/60.  He and his wife and son noted that he was \"more sleepy and fatigued\" than normal but denied any problems with ongoing chest discomfort, tightness or pressure, denied edema.  He did note some mild lightheadedness.      He is scheduled to see Dr. Rand tomorrow, get laboratory testing and potentially proceed with additional chemotherapy.        ASSESSMENT AND PLAN:     1.  Recurrent paroxysmal atrial fibrillation.  He was discharged in sinus rhythm on the 9h, and we are not quite sure when he went back into AFib.  He is not tolerating it well with blood pressures about 80 systolically.  He does not think he needs to go into the hospital tonight, and besides feeling \"tired\" he is doing \"okay.\"  He denies chest pain or syncope.      Unfortunately but understandably, his Eliquis therapy was briefly interrupted for his Port-A-Cath surrounding 01/03.      At this time, I discussed his case with Dr. Roberts, who recommended initiation of amiodarone load 400 mg twice daily x7 days, with reduction then to 200 mg daily starting day 8.       I explained that he will likely continue to have issues with paroxysms of atrial fibrillation given his " significant illness and lung disease.      I discussed the risks, benefits and indications of proceeding with a KIMBERLY, including but not limited to use of conscious sedation, discomfort from the IV and sore throat/esophageal injury.  He voiced understanding and is willing to proceed.  A consent form will be signed by the procedural physician.  We also discussed plans for DCCV provided there is no evidence of clot.      We discussed risks, benefits and indications of direct current cardioversion including but not limited to, surface burns, tachy or bradyarrhythmias, use of conscious sedation and stroke.  He voiced understanding and is willing to proceed.      He understands he will need a .  He is hopeful that is able to get to his scheduled appointment with Dr. Rand following his cardioversion.  He agreed that he did not want to wait until next week to try to cardiovert this, as he did not want to spend all weekend feeling this sedated and he was worried that he would require another hospitalization similar to what he had after his first dose of chemotherapy when he had a COPD exacerbation.      We will plan to see him back, though I have not yet made that appointment.  Hopefully, we can see him up at [facility] at least for an EKG to see if he has maintained sinus rhythm with the amiodarone.     He will have gotten 2 doses of amiodarone 400 mg by the time of his cardioversion tomorrow, and I did talk to Dr. Roberts about potentially waiting to start this given the fact that he has not been anticoagulated, in the off chance he does convert with these 2 doses of amiodarone.  He did not feel that was necessary, and at this time he will proceed with amiodarone 400 mg twice daily and metoprolol tartrate 75 mg twice daily.  He will continue Eliquis therapy and take a dose tomorrow as well.      2.  History of atrial flutter.  As above, he is status post ablation 12/08/2017 with Dr. Cardenas.  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  He does not appear fluid overloaded on exam today.      3.  Non-small cell lung cancer.  He sees Dr. May and Dr. Giorgi pa at [facility].  He is on palliative chemotherapy.      4.  Abnormal stress test.  I did speak with Dr. Damon about her interpretation of the stress test based on her dictation done 2017.  This seems markedly different from the study.  At this time, we will have him continue his beta blocker and aspirin.      It is felt that he is a poor interventional candidate.  I spoke with Dr. Damon, and she recommended repeating the nuclear stress test once his atrial flutter  is taken care of, as she feels that some of these defects may be related to being in atrial flutter. We can set this up at [facility] when we see him next week.     It has been a pleasure to see him in clinic.         OSCAR LONDONO PA-C             D: 2018 18:03   T: 2018 18:48   MT: MIO      Name:     ROQUE SAXENA   MRN:      [id number]        Account:      [id number]   :      1960           Service Date: 2018      Document: [id number]

## 2018-01-12 NOTE — PATIENT INSTRUCTIONS
Proceed with chemotherapy today as scheduled. We would like to see you back in clinic with Dr. May with your next treatment.  When you are in need of a refill, please call your pharmacy and they will send us a request.  Copy of appointments, and after visit summary (AVS) given to patient.  If you have any questions during business hours (M-F 8 AM- 4PM), please call Camille Lara RN, BSN, OCN Oncology Hematology /Breast Cancer Navigator at [facility] [phone].   For questions after business hours, or on holidays/weekends, please call our after hours Nurse Triage line [phone]. Thank you.        Chemo today, PET, cbc diff print out to pt.   F/u with Dr. BRICENO with his next tx. e4

## 2018-01-12 NOTE — LETTER
"    1/12/2018         RE: Zechariah Ashby  [address] [facility] [address]        Dear Colleague,    Thank you for referring your patient, Zechariah Ashby, to the [facility]. Please see a copy of my visit note below.    Oncology Follow up Visit    CC: Stage IV NSCCL SCC on palliative chemo   Pt has been seeing Dr. aMy for work up and tx.     Pt and his family got back from [place] cardiology service earlier today, had planned ablation, was then decided it is not needed.     Pt is here for C1D8 chemo tx.     Pt and family are frustrated about not knowing too much of his lung cancer dx, stage, tx plan, etc.     We went over what Dr. May has discussed with them (base on epic record) include not limit to :  pathology (SCC not adeno), mutation status (informed them SCC usually are none mutated), stage IV, PET findings, prognosis, Tx goal, response assessment, his cervical LN biopsy result,  etc.    PET report and Labs print out are provided to pt and family for better understanding.     Pt is advised to get chemo infusion going along with the discussion.     Lots of Qs were asked during the visit. Some of which are not easy to address or without definitve answer e.g. why stage IV lung cancer is sub classified; explanations to each of his lab value; how his voice will respond to tx, etc.    Answers are provided base on current data with my best capacity and knowledge.     They also expressed frustration on [facility] triage staff \"they have no knowledge about the pt...\".   I explain to them how this service work, the impossibility of over the phone management sometimes and subsequent need of in person evaluation in urgent care or ED if it is after hour.    They then stated \" will never come to [facility] again, since they were told by [facility] not to come here but go straight to [place] ED in the future\".   I indicated to them it is a statement hard to believe, [facility] is capable of handling variety of medical " "situations. It is likely miscommunication or they misheard it. Maybe it was referral to cardiology care for this patient specifically.    Family stated \"you don't believe us on this, we are done\" and refused further conversation.     I approached the pt and family later again and offered to continue the visit, they refused again. Pt also refused to be exammed.    Pt tolerated C1D8 Gemcitabine infusion without incident, discharged in stable condition accompanied by family.     Total time is 40 minutes, all spent in counseling and discussion as above.            Again, thank you for allowing me to participate in the care of your patient.        Sincerely,        Alejandra Rand MD, MD    "

## 2018-01-12 NOTE — PROGRESS NOTES
Infusion Nursing Note:  Zechariah Ashby presents today for C1D8 Gemzar.    Patient seen by provider today: Yes: Dr. Rand   present during visit today: Not Applicable.    Note: N/A.    Intravenous Access:  Implanted Port.    Treatment Conditions:  Lab Results   Component Value Date    HGB 13.1 01/12/2018     Lab Results   Component Value Date    WBC 12.6 01/12/2018      Lab Results   Component Value Date    ANEU 11.4 01/12/2018     Lab Results   Component Value Date     01/12/2018      Results reviewed, labs MET treatment parameters, ok to proceed with treatment.        Post Infusion Assessment:  Patient tolerated infusion without incident.  Blood return noted pre and post infusion.  Site patent and intact, free from redness, edema or discomfort.  No evidence of extravasations.  Access discontinued per protocol.    Discharge Plan:   Patient discharged in stable condition accompanied by: ex-wife, son and daughter.  Departure Mode: Wheelchair.    Ayala aCrlisle RN

## 2018-01-12 NOTE — MR AVS SNAPSHOT
After Visit Summary   1/12/2018    Zechariah Ashby    MRN: [id number]           Patient Information     Date Of Birth          1960        Visit Information        Provider Department      1/12/2018 2:30 PM Alejandra Rand MD [facility]        Care Instructions    Chemo today, PET, cbc diff print out to pt.   F/u with Dr. BRICENO with his next tx.           Follow-ups after your visit        Your next 10 appointments already scheduled     Luis Fernando 15, 2018 10:00 AM CST   SHORT with Kailash Mason MD   [facility] ([facility])    [address] [place] [address]   [phone]            Jan 25, 2018  8:00 AM CST   Level 3 with ROOM 1 [facility] ([facility])    [facility]  [address]  [facility] [address]   [phone]            Jan 25, 2018  9:00 AM CST   Return Visit with Joycelyn May MD   [facility] ([facility])    [facility]  [address]  [facility] [address]   [phone]            Feb 01, 2018  8:00 AM CST   Level 1 with ROOM 9 [facility] ([facility])    [facility]  [address]  [facility] [address]   [phone]              Future tests that were ordered for you today     Open Future Orders        Priority Expected Expires Ordered    Cardioversion Routine 1/12/2018 1/11/2019 1/11/2018            Who to contact     If you have questions or need follow up information about today's clinic visit or your schedule please contact [facility] directly at [phone].  Normal or non-critical lab and imaging results will be communicated to you by MyChart, letter or phone within 4 business days after the clinic has received the results. If you do not hear from us within 7 days, please contact the clinic through MyChart or phone. If you have  "a critical or abnormal lab result, we will notify you by phone as soon as possible.  Submit refill requests through [employer] or call your pharmacy and they will forward the refill request to us. Please allow 3 business days for your refill to be completed.          Additional Information About Your Visit        MyChart Information     [employer] lets you send messages to your doctor, view your test results, renew your prescriptions, schedule appointments and more. To sign up, go to [email or web address] . Click on \"Log in\" on the left side of the screen, which will take you to the Welcome page. Then click on \"Sign up Now\" on the right side of the page.     You will be asked to enter the access code listed below, as well as some personal information. Please follow the directions to create your username and password.     Your access code is: [email or web address]  Expires: 2018  4:44 PM     Your access code will  in 90 days. If you need help or a new code, please call your [place] clinic or [phone].        Care EveryWhere ID     This is your Care EveryWhere ID. This could be used by other organizations to access your [place] medical records  [id number]        Your Vitals Were     Pulse Temperature Respirations Height Pulse Oximetry BMI (Body Mass Index)    69 97.4  F (36.3  C) (Tympanic) 24 1.765 m (5' 9.49\") 98% 21.74 kg/m2       Blood Pressure from Last 3 Encounters:   18 99/62   18 100/71   18 (!) 79/60    Weight from Last 3 Encounters:   18 67.7 kg (149 lb 4.8 oz)   18 67.2 kg (148 lb 1.6 oz)   18 67.2 kg (148 lb 1.6 oz)              Today, you had the following     No orders found for display       Primary Care Provider Office Phone # Fax #    Kailash Mason [phone] [address] [place] [address]        Equal Access to Services     ROD PERRY AH: ronan Bedoya, diane kaalmada adeegyada, harley iniguezin " hema richdick rupalivan laStanleyaacristiano ah. So [facility] [phone].    ATENCIÓN: Si wesley stern, tiene a morocho disposición servicios gratuitos de asistencia lingüística. Roz robbins [phone].    We comply with applicable federal civil rights laws and [place] laws. We do not discriminate on the basis of race, color, national origin, age, disability, sex, sexual orientation, or gender identity.            Thank you!     Thank you for choosing [facility]  for your care. Our goal is always to provide you with excellent care. Hearing back from our patients is one way we can continue to improve our services. Please take a few minutes to complete the written survey that you may receive in the mail after your visit with us. Thank you!             Your Updated Medication List - Protect others around you: Learn how to safely use, store and throw away your medicines at www.disposemymeds.org.          This list is accurate as of: 1/12/18  2:42 PM.  Always use your most recent med list.                   Brand Name Dispense Instructions for use Diagnosis    ALIGN Chew      Take 1 chew tab by mouth 3 times daily as needed        amiodarone 200 MG tablet    PACERONE/CODARONE    45 tablet    Take 2 tablets twice daily x 1 week (1/12 - 1/19) and then start 1 tablet daily 1/20/18    Paroxysmal atrial fibrillation (H)       apixaban ANTICOAGULANT 5 MG tablet    ELIQUIS    60 tablet    Take 1 tablet (5 mg) by mouth 2 times daily    Typical atrial flutter (H)       ASPIRIN PO      Take 325 mg by mouth daily    Mediastinal lymphadenopathy, Non-small cell carcinoma of lung (H)       HYDROcodone-acetaminophen 5-325 MG per tablet    NORCO    30 tablet    Take 1-2 tablets by mouth every 6 hours as needed    Non-small cell carcinoma of lung (H)       lidocaine-prilocaine cream    EMLA    30 g    Place a quarter sized amount of cream onto port area 30-60 minutes prior to port use. Do not rub in. Cover with tegaderm or saran wrap.    Non-small cell  carcinoma of lung (H)       LORazepam 0.5 MG tablet    ATIVAN    30 tablet    Take 1 tablet (0.5 mg) by mouth every 4 hours as needed (Anxiety, Nausea/Vomiting or Sleep)    Non-small cell carcinoma of lung (H)       megestrol 40 MG/ML suspension    MEGACE ORAL    600 mL    Take 5 mLs (200 mg) by mouth daily    Non-small cell carcinoma of lung (H)       metoprolol tartrate 50 MG tablet    LOPRESSOR    60 tablet    Take 1.5 tablets (75 mg) by mouth 2 times daily    Irregular heart beat, Atrial flutter, unspecified type (H), Cardiomyopathy, unspecified type (H)       nicotine 21 MG/24HR 24 hr patch    NICODERM CQ    30 patch    Place 1 patch onto the skin daily    Encounter for tobacco use cessation counseling       omeprazole 40 MG capsule    priLOSEC    30 capsule    Take 1 capsule (40 mg) by mouth daily Take 30-60 minutes before a meal.    Gastroesophageal reflux disease without esophagitis       ondansetron 8 MG tablet    ZOFRAN    10 tablet    Take 1 tablet (8 mg) by mouth every 8 hours as needed (Nausea/Vomiting)    Non-small cell carcinoma of lung (H)       predniSONE 20 MG tablet    DELTASONE    10 tablet    Take 2 tablets (40 mg) by mouth daily for 5 days    COPD exacerbation (H)       prochlorperazine 10 MG tablet    COMPAZINE    30 tablet    Take 1 tablet (10 mg) by mouth every 6 hours as needed (Nausea/Vomiting)    Non-small cell carcinoma of lung (H)

## 2018-01-13 NOTE — PROGRESS NOTES
"Oncology Follow up Visit    CC: Stage IV NSCCL SCC on palliative chemo   Pt has been seeing Dr. May for work up and tx.     Pt and his family got back from [place] cardiology service earlier today, had planned ablation, was then decided it is not needed.     Pt is here for C1D8 chemo tx.     Pt and family are frustrated about not knowing too much of his lung cancer dx, stage, tx plan, etc.     We went over what Dr. May has discussed with them (base on epic record) include not limit to :  pathology (SCC not adeno), mutation status (informed them SCC usually are none mutated), stage IV, PET findings, prognosis, Tx goal, response assessment, his cervical LN biopsy result,  etc.    PET report and Labs print out are provided to pt and family for better understanding.     Pt is advised to get chemo infusion going along with the discussion.     Lots of Qs were asked during the visit. Some of which are not easy to address or without definitve answer e.g. why stage IV lung cancer is sub classified; explanations to each of his lab value; how his voice will respond to tx, etc.    Answers are provided base on current data with my best capacity and knowledge.     They also expressed frustration on  triage staff \"they have no knowledge about the pt...\".   I explain to them how this service work, the impossibility of over the phone management sometimes and subsequent need of in person evaluation in urgent care or ED if it is after hour.    They then stated \" will never come to [facility] again, since they were told by [facility] not to come here but go straight to [place] ED in the future\".   I indicated to them it is a statement hard to believe, [facility] is capable of handling variety of medical situations. It is likely miscommunication or they misheard it. Maybe it was referral to cardiology care for this patient specifically.    Family stated \"you don't believe us on this, we are done\" and refused further conversation.     I " approached the pt and family later again and offered to continue the visit, they refused again. Pt also refused to be exammed.    Pt tolerated C1D8 Gemcitabine infusion without incident, discharged in stable condition accompanied by family.     Total time is 40 minutes, all spent in counseling and discussion as above.

## 2018-01-15 PROBLEM — C34.90 NON-SMALL CELL CARCINOMA OF LUNG (H): Status: ACTIVE | Noted: 2017-01-01

## 2018-01-15 NOTE — PATIENT INSTRUCTIONS
ASSESSMENT:   (J44.1) COPD exacerbation (H)  (primary encounter diagnosis)  Comment: doing better  Plan: predniSONE (DELTASONE) 20 MG tablet        Continue the prednisone for 5 more days at 20mg once daily dose.     (K21.9) Gastroesophageal reflux disease without esophagitis  Comment: OK  Plan: omeprazole (PRILOSEC) 40 MG capsule        No change in current treatment plan.  REfills    (Z71.6) Encounter for tobacco use cessation counseling  Comment:   Plan: nicotine (NICODERM CQ) 14 MG/24HR 24 hr patch        Place 21mg nicotine patches once daily for 6-8 weeks, then 14mg patches once daily for 2 weeks and finally 7mg patches once daily for 2 weeks.   If you need a longer time on any of the strength patches, we can do refills as long as you need them.     (I49.9) Irregular heart beat  Comment: BP and heart rate now low  Plan: metoprolol tartrate (LOPRESSOR) 50 MG tablet        Cut back on metoprolol to 50mg twice daily.  If the irregular heart rate comes back you can go to the 75mg twice daily dose you have been on.     (I48.92) Atrial flutter, unspecified type (H)  Comment:   Plan: metoprolol tartrate (LOPRESSOR) 50 MG tablet          (I42.9) Cardiomyopathy, unspecified type (H)  Comment:   Plan: metoprolol tartrate (LOPRESSOR) 50 MG tablet          (Z72.0) Tobacco abuse  Comment:   Plan: nicotine (NICODERM CQ) 14 MG/24HR 24 hr patch,         nicotine (NICODERM CQ) 7 MG/24HR 24 hr patch

## 2018-01-15 NOTE — PROGRESS NOTES
"  SUBJECTIVE:   Zechariah Ashby is a 57 year old male who presents to clinic today for the following health issues:  Chief Complaint   Patient presents with     Hospital F/U     Medication Reconciliation     SHould he be taking probiotic??   Nicotine patch was on 21 mg should he go to lower dose? Not taking the ASA and not on [place].      Hospital Follow-up Visit:    Hospital/Nursing Home/IP Rehab Facility: [facility]  Date of Admission: 1/7  Date of Discharge: 1/9  Reason(s) for Admission: SOB            Problems taking medications regularly:  None       Medication changes since discharge: None       Problems adhering to non-medication therapy:  None    Summary of hospitalization:  [facility] discharge summary reviewed  See abstracted notes below.  Diagnostic Tests/Treatments reviewed.  Follow up needed: none  Other Healthcare Providers Involved in Patient s Care:         cardiology, oncology  Update since discharge: improved.     Post Discharge Medication Reconciliation: discharge medications reconciled, continue medications without change.  Plan of care communicated with patient, spouse and son.      Coding guidelines for this visit:  Type of Medical   Decision Making Face-to-Face Visit       within 7 Days of discharge Face-to-Face Visit        within 14 days of discharge   Moderate Complexity [id number]   High Complexity [id number]        I saw him initially on 11/3 for tachycardia and atrial flutter.  Sent to ED.   He has seen cardiology in the interval, had abnormal stress test.    Has been diagnosed with stage IV lung cancer.   He has started chemo-palliative.     Feeling \"pretty good\" since hospital.  Still shortness of breath.  Not as bad.    He has been on prednisone.  Has 1-2 pills left.   Little cough.  No phlegm.   Some chest pain anterior chest just right  of center.  He notes more when in atrial fibrillation.   He has been in active.    No other pains-a little upper shoulder " "blades.   Appetite \"pretty good\".  Sleep not the best.    Mood:\"not too bad\".   Heart irregular intermittently about once a week.  Duration: 1-2 hours.  He has been on amiodarone.  Lying down seems to help.   Stomach \"not too bad\".  Heartburn OK.  Takes pills for nausea daily.  Emesis-dry heaves.  Had emesis with first chemo.  Bowels OK.  He has prochlorperazine and ondansetron and lorazepam for nausea if needed.  Mostly takes the prochlorperazine.    He started chemo on 1/4.   He has had two rounds.  Next one on 1/25.      Voice sore to talk.  HE has been whispering for the past 1 and 1/2 months.     Needs refill on omeprazole.     May need more nicotine patches.   He feels he is doing OK.  He has been on 21 mg.     Patient Active Problem List   Diagnosis     CARDIOVASCULAR SCREENING; LDL GOAL LESS THAN 130     Perforated ulcer (HCC)     Free intraperitoneal air     Atrial flutter (H)     Lung mass     Pleural effusion     Mediastinal lymphadenopathy     Non-small cell carcinoma of lung (H)     COPD exacerbation (H)     Weakness     Cardiomyopathy (H)     Acute on chronic systolic congestive heart failure (H)      ROS:  A little lightheadedness getting up.   Not taking hydrocodone/acetaminophen 5/325mg.  He has not used yet.      OBJECTIVE:Blood pressure (!) 82/56, pulse 56, temperature 99.4  F (37.4  C), temperature source Tympanic, resp. rate 30, height 5' 9.5\" (1.765 m), weight 152 lb 9.6 oz (69.2 kg), SpO2 98 %. BMI=Body mass index is 22.21 kg/(m^2).  GENERAL APPEARANCE ADULT: Alert, no acute distress, sallow complexion  EYES: PERRL, EOM normal, conjunctiva and lids normal  NECK: palpable node left neck.   RESP: lungs clear to auscultation , diminished breath sounds throughout  CV: normal rate, regular rhythm, no murmur or gallop, rate=56  ABDOMEN: soft, no organomegaly, masses or tenderness  MS: extremities normal, no peripheral edema     ASSESSMENT:   (J44.1) COPD exacerbation (H)  (primary encounter " diagnosis)  Comment: doing better  Plan: predniSONE (DELTASONE) 20 MG tablet        Continue the prednisone for 5 more days at 20mg once daily dose.     (K21.9) Gastroesophageal reflux disease without esophagitis  Comment: OK  Plan: omeprazole (PRILOSEC) 40 MG capsule        No change in current treatment plan.  REfills    (Z71.6) Encounter for tobacco use cessation counseling  Comment:   Plan: nicotine (NICODERM CQ) 14 MG/24HR 24 hr patch        Place 21mg nicotine patches once daily for 6-8 weeks, then 14mg patches once daily for 2 weeks and finally 7mg patches once daily for 2 weeks.   If you need a longer time on any of the strength patches, we can do refills as long as you need them.     (I49.9) Irregular heart beat  Comment: BP and heart rate now low  Plan: metoprolol tartrate (LOPRESSOR) 50 MG tablet        Cut back on metoprolol to 50mg twice daily.  If the irregular heart rate comes back you can go to the 75mg twice daily dose you have been on.     (I48.92) Atrial flutter, unspecified type (H)  Comment:   Plan: metoprolol tartrate (LOPRESSOR) 50 MG tablet          (I42.9) Cardiomyopathy, unspecified type (H)  Comment:   Plan: metoprolol tartrate (LOPRESSOR) 50 MG tablet          (Z72.0) Tobacco abuse  Comment:   Plan: nicotine (NICODERM CQ) 14 MG/24HR 24 hr patch,         nicotine (NICODERM CQ) 7 MG/24HR 24 hr patch                     =================================  [facility]     Zechariah Ashby MRN# [id number]   Age: 57 year old YOB: 1960      Date of Admission:                                      1/7/2018  Date of Discharge::                                     1/9/2018        Discharge Diagnosis:   Principle diagnosis: possible COPD exacerbation             Brief History of Presenting Illness:   As per admit hx  Patient is a 57 yr old male here for weakness and shortness of breath. His symptoms started last week Thursday shortly after his first round of  chemotherapy for treatment of non cell carcinoma of the lung. This was also recently diagnosed. He states that he is coughing . Cough is wet in nature and he is unable to bring up the sputum. He reports no fevers. He reports no chest pain        Hospital Course:   WEAKNESS/ SOB  Likely due to malignancy and recent chemo. CT as above. Life long smoker--likely does have COPD. Started on prednisone. PT seen. Pt doing ok. Can go home with HC  Will d/c on brief course of prednisone      CHRONIC AFIB/FLUTTER  Dx in 11/17. S/p ablation 12/7. Maintained on metoprolol/ epiquis. Went in afib last night at 130--had 1/2 hr CP. Negative trops/ EKG. Discussed with cards. Has had such pains on and off last couple months since dx of fib/flutter. Would NOT intervene at this time--poor interventional candidate due to advanced malignancy. Recommended continuing bblockers/ PPI, adding nitrates if BP tolerates. BP is on low side--infact bblocker dose was given 1/2 yestreday due to low BP.   -will continue bblockers at full dose, continue PPI. F/u cards as scheduled Tuesday.       ABNL LEXISCAN  lexiscan done 11/17 as part of fib/flutter w/u. Was abnl.   -plan as above. would f/u cards OP      GERD  Continue meds      TOBACCO ABUSE  Continue faustina patch      NSCLCA  DX 11/17 -started chemo last week.   -f/u oncology on Friday as scheduled. Discussed with oncology.

## 2018-01-15 NOTE — MR AVS SNAPSHOT
After Visit Summary   1/15/2018    Zechariah Ashby    MRN: [id number]           Patient Information     Date Of Birth          1960        Visit Information        Provider Department      1/15/2018 10:00 AM Kailash Mason MD [facility]        Today's Diagnoses     COPD exacerbation (H)    -  1    Gastroesophageal reflux disease without esophagitis        Encounter for tobacco use cessation counseling        Irregular heart beat        Atrial flutter, unspecified type (H)        Cardiomyopathy, unspecified type (H)        Tobacco abuse          Care Instructions    ASSESSMENT:   (J44.1) COPD exacerbation (H)  (primary encounter diagnosis)  Comment: doing better  Plan: predniSONE (DELTASONE) 20 MG tablet        Continue the prednisone for 5 more days at 20mg once daily dose.     (K21.9) Gastroesophageal reflux disease without esophagitis  Comment: OK  Plan: omeprazole (PRILOSEC) 40 MG capsule        No change in current treatment plan.  REfills    (Z71.6) Encounter for tobacco use cessation counseling  Comment:   Plan: nicotine (NICODERM CQ) 14 MG/24HR 24 hr patch        Place 21mg nicotine patches once daily for 6-8 weeks, then 14mg patches once daily for 2 weeks and finally 7mg patches once daily for 2 weeks.   If you need a longer time on any of the strength patches, we can do refills as long as you need them.     (I49.9) Irregular heart beat  Comment: BP and heart rate now low  Plan: metoprolol tartrate (LOPRESSOR) 50 MG tablet        Cut back on metoprolol to 50mg twice daily.  If the irregular heart rate comes back you can go to the 75mg twice daily dose you have been on.     (I48.92) Atrial flutter, unspecified type (H)  Comment:   Plan: metoprolol tartrate (LOPRESSOR) 50 MG tablet          (I42.9) Cardiomyopathy, unspecified type (H)  Comment:   Plan: metoprolol tartrate (LOPRESSOR) 50 MG tablet          (Z72.0) Tobacco abuse  Comment:   Plan: nicotine (NICODERM CQ) 14  MG/24HR 24 hr patch,         nicotine (NICODERM CQ) 7 MG/24HR 24 hr patch          Follow-ups after your visit        Your next 10 appointments already scheduled     Jan 17, 2018  3:00 PM CST   New Visit with Chace Mitchell MD   [facility] ([facility])    [facility]  [address]  [facility] [address]   [phone]            Jan 25, 2018  8:00 AM CST   Level 3 with ROOM 1 [facility] ([facility])    [facility]  [address] [place] [address] [address]  [facility] [address]   [phone]            Jan 25, 2018  9:00 AM CST   Return Visit with Joycelyn May MD   [facility] ([facility])    [facility]  [address]  [facility] [address]   [phone]            Feb 01, 2018  8:00 AM CST   Level 1 with ROOM 9 [facility] ([facility])    [facility]  [address] [place] [address] [address]  [facility] [address]   [phone]            Feb 13, 2018 11:30 AM CST   Return Visit with Daksha Montemayor PA-C   [facility] ([facility])    [address] [facility] [address]   [phone]              Future tests that were ordered for you today     Open Future Orders        Priority Expected Expires Ordered    EKG 12-LEAD CLINIC READ ([facility])- performed today Routine 2/13/2018 5/15/2018 1/15/2018            Who to contact     If you have questions or need follow up information about today's clinic visit or your schedule please contact [facility] directly at [phone].  Normal or non-critical lab and imaging results will be communicated to you by MyChart, letter or phone within 4 business days after the clinic has received the results. If you do not hear from us within 7 days, please contact the clinic through MyChart or phone.  "If you have a critical or abnormal lab result, we will notify you by phone as soon as possible.  Submit refill requests through [employer] or call your pharmacy and they will forward the refill request to us. Please allow 3 business days for your refill to be completed.          Additional Information About Your Visit        [employer] Information     [employer] lets you send messages to your doctor, view your test results, renew your prescriptions, schedule appointments and more. To sign up, go to [email or web address] . Click on \"Log in\" on the left side of the screen, which will take you to the Welcome page. Then click on \"Sign up Now\" on the right side of the page.     You will be asked to enter the access code listed below, as well as some personal information. Please follow the directions to create your username and password.     Your access code is: [email or web address]  Expires: 2018  4:44 PM     Your access code will  in 90 days. If you need help or a new code, please call your [place] clinic or [phone].        Care EveryWhere ID     This is your Care EveryWhere ID. This could be used by other organizations to access your [place] medical records  [id number]        Your Vitals Were     Pulse Temperature Respirations Height Pulse Oximetry BMI (Body Mass Index)    56 99.4  F (37.4  C) (Tympanic) 30 5' 9.5\" (1.765 m) 98% 22.21 kg/m2       Blood Pressure from Last 3 Encounters:   01/15/18 (!) 82/56   18 99/62   18 111/78    Weight from Last 3 Encounters:   01/15/18 152 lb 9.6 oz (69.2 kg)   18 149 lb 4.8 oz (67.7 kg)   18 148 lb 1.6 oz (67.2 kg)              Today, you had the following     No orders found for display         Today's Medication Changes          These changes are accurate as of: 1/15/18 10:52 AM.  If you have any questions, ask your nurse or doctor.               Start taking these medicines.        Dose/Directions    * nicotine 14 MG/24HR 24 hr patch   Commonly known " as:  NICODERM CQ   Used for:  Encounter for tobacco use cessation counseling, Tobacco abuse   Replaces:  nicotine 21 MG/24HR 24 hr patch   Started by:  Kailash Mason MD        Dose:  1 patch   Place 1 patch onto the skin every 24 hours   Quantity:  14 patch   Refills:  5       * nicotine 7 MG/24HR 24 hr patch   Commonly known as:  NICODERM CQ   Used for:  Tobacco abuse   Started by:  Kailash Mason MD        Dose:  1 patch   Place 1 patch onto the skin every 24 hours   Quantity:  30 patch   Refills:  5       predniSONE 20 MG tablet   Commonly known as:  DELTASONE   Used for:  COPD exacerbation (H)   Started by:  Kailash Mason MD        Dose:  20 mg   Take 1 tablet (20 mg) by mouth daily Take prednisone 20mg 2 pills daily for 6 days, then 1 pill daily for another 4 days.   Quantity:  5 tablet   Refills:  0       * Notice:  This list has 2 medication(s) that are the same as other medications prescribed for you. Read the directions carefully, and ask your doctor or other care provider to review them with you.      These medicines have changed or have updated prescriptions.        Dose/Directions    metoprolol tartrate 50 MG tablet   Commonly known as:  LOPRESSOR   This may have changed:  how much to take   Used for:  Irregular heart beat, Atrial flutter, unspecified type (H), Cardiomyopathy, unspecified type (H)   Changed by:  Kailash Mason MD        Dose:  50 mg   Take 1 tablet (50 mg) by mouth 2 times daily   Quantity:  60 tablet   Refills:  1         Stop taking these medicines if you haven't already. Please contact your care team if you have questions.     nicotine 21 MG/24HR 24 hr patch   Commonly known as:  NICODERM CQ   Replaced by:  nicotine 14 MG/24HR 24 hr patch   Stopped by:  Kailash Mason MD                Where to get your medicines      These medications were sent to [id number] - [place], [place] - [address]  [address] [facility] [address]    Hours:  Closed  10-16-08 business to [facility] Phone:  [phone]     nicotine 14 MG/24HR 24 hr patch    nicotine 7 MG/24HR 24 hr patch    omeprazole 40 MG capsule    predniSONE 20 MG tablet         Some of these will need a paper prescription and others can be bought over the counter.  Ask your nurse if you have questions.     You don't need a prescription for these medications     metoprolol tartrate 50 MG tablet                Primary Care Provider Office Phone # Fax #    Kailash Mason [phone] [address] [place] [address]        Equal Access to Services     [facility]: Hadii aad ku hadasho Soomaali, waaxda luqadaha, qaybta kaalmada adedickyada, harley carrasquillo . So [facility] [phone].    ATENCIÓN: Si habla español, tiene a morocho disposición servicios gratuitos de asistencia lingüística. [facility] [phone].    We comply with applicable federal civil rights laws and [place] laws. We do not discriminate on the basis of race, color, national origin, age, disability, sex, sexual orientation, or gender identity.            Thank you!     Thank you for choosing [facility]  for your care. Our goal is always to provide you with excellent care. Hearing back from our patients is one way we can continue to improve our services. Please take a few minutes to complete the written survey that you may receive in the mail after your visit with us. Thank you!             Your Updated Medication List - Protect others around you: Learn how to safely use, store and throw away your medicines at www.disposemymeds.org.          This list is accurate as of: 1/15/18 10:52 AM.  Always use your most recent med list.                   Brand Name Dispense Instructions for use Diagnosis    ALIGN Chew      Take 1 chew tab by mouth 3 times daily as needed        amiodarone 200 MG tablet    PACERONE/CODARONE    45 tablet    Take 2 tablets twice daily x 1 week (1/12 -  1/19) and then start 1 tablet daily 1/20/18    Paroxysmal atrial fibrillation (H)       apixaban ANTICOAGULANT 5 MG tablet    ELIQUIS    60 tablet    Take 1 tablet (5 mg) by mouth 2 times daily    Typical atrial flutter (H)       ASPIRIN PO      Take 325 mg by mouth daily    Mediastinal lymphadenopathy, Non-small cell carcinoma of lung (H)       HYDROcodone-acetaminophen 5-325 MG per tablet    NORCO    30 tablet    Take 1-2 tablets by mouth every 6 hours as needed    Non-small cell carcinoma of lung (H)       lidocaine-prilocaine cream    EMLA    30 g    Place a quarter sized amount of cream onto port area 30-60 minutes prior to port use. Do not rub in. Cover with tegaderm or saran wrap.    Non-small cell carcinoma of lung (H)       LORazepam 0.5 MG tablet    ATIVAN    30 tablet    Take 1 tablet (0.5 mg) by mouth every 4 hours as needed (Anxiety, Nausea/Vomiting or Sleep)    Non-small cell carcinoma of lung (H)       megestrol 40 MG/ML suspension    MEGACE ORAL    600 mL    Take 5 mLs (200 mg) by mouth daily    Non-small cell carcinoma of lung (H)       metoprolol tartrate 50 MG tablet    LOPRESSOR    60 tablet    Take 1 tablet (50 mg) by mouth 2 times daily    Irregular heart beat, Atrial flutter, unspecified type (H), Cardiomyopathy, unspecified type (H)       * nicotine 14 MG/24HR 24 hr patch    NICODERM CQ    14 patch    Place 1 patch onto the skin every 24 hours    Encounter for tobacco use cessation counseling, Tobacco abuse       * nicotine 7 MG/24HR 24 hr patch    NICODERM CQ    30 patch    Place 1 patch onto the skin every 24 hours    Tobacco abuse       omeprazole 40 MG capsule    priLOSEC    30 capsule    Take 1 capsule (40 mg) by mouth daily Take 30-60 minutes before a meal.    Gastroesophageal reflux disease without esophagitis       ondansetron 8 MG tablet    ZOFRAN    10 tablet    Take 1 tablet (8 mg) by mouth every 8 hours as needed (Nausea/Vomiting)    Non-small cell carcinoma of lung (H)        predniSONE 20 MG tablet    DELTASONE    5 tablet    Take 1 tablet (20 mg) by mouth daily Take prednisone 20mg 2 pills daily for 6 days, then 1 pill daily for another 4 days.    COPD exacerbation (H)       prochlorperazine 10 MG tablet    COMPAZINE    30 tablet    Take 1 tablet (10 mg) by mouth every 6 hours as needed (Nausea/Vomiting)    Non-small cell carcinoma of lung (H)       * Notice:  This list has 2 medication(s) that are the same as other medications prescribed for you. Read the directions carefully, and ask your doctor or other care provider to review them with you.

## 2018-01-15 NOTE — TELEPHONE ENCOUNTER
"Requested Prescriptions   Pending Prescriptions Disp Refills     omeprazole (PRILOSEC) 40 MG capsule [Pharmacy Med Name: OMEPRAZOLE 40 MG    CAP APOT] 30 capsule 0     Sig: TAKE 1 CAPSULE BY MOUTH DAILY 30-60 MINUTES BEFORE A MEAL    PPI Protocol Passed    1/15/2018  8:54 AM       Passed - Not on Clopidogrel (unless Pantoprazole ordered)       Passed - No diagnosis of osteoporosis on record       Passed - Recent or future visit with authorizing provider's specialty    Patient had office visit in the last year or has a visit in the next 30 days with authorizing provider.  See \"Patient Info\" tab in inbasket, or \"Choose Columns\" in Meds & Orders section of the refill encounter.              Passed - Patient is age 18 or older        omeprazole (PRILOSEC) 40 MG capsule  Last Written Prescription Date:  11/15/2017  Last Fill Quantity: 30 capsule,  # refills: 1   Last Office Visit with [facility] or [facility] prescribing provider:  12/12/2017   Future Office Visit:    Next 5 appointments (look out 90 days)     Luis Fernando 15, 2018 10:00 AM CST   SHORT with Kailash Mason MD   [facility] ([facility])    [address] [place] [address]   [phone]            Jan 25, 2018  9:00 AM CST   Return Visit with Joycelyn May MD   [facility] ([facility])    [facility] [address] [address]   [phone]            Feb 13, 2018 11:30 AM CST   Return Visit with Daksha Montemayor PA-C   [facility] ([facility])    [address] [facility] [address]   [phone]                 Belinda Raza RT (R) (M)    "

## 2018-01-15 NOTE — NURSING NOTE
"Chief Complaint   Patient presents with     Hospital F/U     Medication Reconciliation     SHould he be taking probiotic??   Nicotine patch was on 21 mg should he go to lower dose? Not taking the ASA and not on Norco.       Initial BP (!) 82/56  Pulse 56  Temp 99.4  F (37.4  C) (Tympanic)  Resp 30  Ht 5' 9.5\" (1.765 m)  Wt 152 lb 9.6 oz (69.2 kg)  SpO2 98%  BMI 22.21 kg/m2 Estimated body mass index is 22.21 kg/(m^2) as calculated from the following:    Height as of this encounter: 5' 9.5\" (1.765 m).    Weight as of this encounter: 152 lb 9.6 oz (69.2 kg).  Medication Reconciliation: complete    Health Maintenance that is potentially due pending provider review:          Is there anyone who you would like to be able to receive your results? If yes have patient fill out ARIK    "

## 2018-01-15 NOTE — PROGRESS NOTES
Called pt and left VM requesting call back.    I saw Thursday - was in rapid AFib with hypotension. Added Amiodarone loading dose 200 mg BID x 1 week (1/12-1/19), then 200 mg daily. Continued Eliquis 5 mg BID and metoprolol tartrate 75 mg BID with plans for KIMBERLY/DCCV.     On Friday 1/12, noted to be back in SR so did NOT require KIMBERLY/DCCV.    Noted that PCP decreased metoprolol to 50 mg BID due to BPs in 80s today 1/15.    ** Asked him to call me with update on 1) any more AFib?  2) Any problems on Amiodarone?   3) Appt made with me 2/13/17 in [place] @ 11:30 - would that work?  Would get EKG at that appt and discuss potentially proceeding with repeat stress test (Dr. Damon concerned that abnormalities on stress may have been related to AFlutter) but note that as a poor candidate for intervention if stress test remains abnl, may continue med mgmt regardless.

## 2018-01-17 NOTE — LETTER
1/17/2018         RE: Zechariah Ashby  [address] [facility] [id number]        Dear Colleague,    Thank you for referring your patient, Zechariah Ashby, to the [facility], [place], operated by [facility]. Please see a copy of my visit note below.      Palliative Care Outpatient Clinic Consultation Note    Patient:  Zechariah Ashby    Chief Complaint:   Zechariah Ashby 57 year old male who is presenting to the palliative medicine clinic today at the request of Lalo for a palliative care consultation secondary to NSCLC.   The patient's primary care provider is:  Kailash Mason.     History of Present Illness:  The gentleman is here with daughter.  He was diagnosed with non-small cell lung cancer just within the last few months.  This all started when he had some cardiac issues and actually thought that was primary issue yet it was then discovered that he had a large mediastinal mass.  Is found to be non-small cell lung cancer.  He has left-sided effusion, mediastinal mass, left adrenal involvement.  He has been started on palliative chemotherapy.  He states it has been quite overwhelming both for he and his family trying to take in all the information and significant change in his health status.  He has developed increasing problems with shortness of breath greatest complaint but he also admits to chest pains, something his family had not heard expressed previously.  Family is very concerned about decreased appetite and weight loss.    Distressing Symptom/s:  Location: Dyspnea both at rest and with exertion  Chronicity: Onset 6 weeks ago, gradually worsening since    Relieving Factors: Uses a fan at home which helps  Prior & Current Treatments:     Patient's Disease Understanding: He admits to confusion about his in treatment plan.  We discussed that this chemotherapy is identified as palliative and the implications of that, primarily getting this is unlikely to cure the cancer.  He states he had not heard it before  though clinic notes indicate that this had been discussed previously.    Coping: He is struggling.  He has not been to work for the last 2 weeks and his shortness of breath is getting worse.    Social History  Living Situation: Lives in a house does not have to use stairs.  He lives with his wife who has beginning Alzheimer's and is not really involved in this new health situation 3 children,  Children: 2 are nearby and are of some help  Actual/Potential Caregiver(s): Family  Support System: Family  Occupation:   Hobbies: None  Substance Use/History of misuse: None  Financial Concerns: Significant issues that he is working through at this time  Spiritual Background: Not active  Spiritual Concerns/Needs: None  Social History   Substance Use Topics     Smoking status: Former Smoker     Packs/day: 0.25     Years: 50.00     Start date: 2017     Smokeless tobacco: Never Used      Comment: quit smoking over the last few days.      Alcohol use No       Family History  Family History   Problem Relation Age of Onset     CANCER Mother      pancreatic cancer,  at 38 years     Alzheimer Disease Father       at 84 years.     Patient's Involvement with Prior History of Serious Illness in Family:     Advance Care Planning:  Advance Directive:      Where is written copy located:   Health Care Agent Contact Information:   POLST:       Allergies   Allergen Reactions     Nka [No Known Allergies]      Current Outpatient Prescriptions   Medication Sig Dispense Refill     morphine 2.5 MG solu-tab Take 1 tablet (2.5 mg) by mouth every 4 hours as needed for shortness of breath / dyspnea or moderate to severe pain 60 tablet 0     omeprazole (PRILOSEC) 40 MG capsule Take 1 capsule (40 mg) by mouth daily Take 30-60 minutes before a meal. 30 capsule 11     nicotine (NICODERM CQ) 14 MG/24HR 24 hr patch Place 1 patch onto the skin every 24 hours 14 patch 5     metoprolol tartrate (LOPRESSOR) 50 MG tablet  Take 1 tablet (50 mg) by mouth 2 times daily 60 tablet 1     amiodarone (PACERONE/CODARONE) 200 MG tablet Take 2 tablets twice daily x 1 week (1/12 - 1/19) and then start 1 tablet daily 1/20/18 45 tablet 1     LORazepam (ATIVAN) 0.5 MG tablet Take 1 tablet (0.5 mg) by mouth every 4 hours as needed (Anxiety, Nausea/Vomiting or Sleep) 30 tablet 5     prochlorperazine (COMPAZINE) 10 MG tablet Take 1 tablet (10 mg) by mouth every 6 hours as needed (Nausea/Vomiting) 30 tablet 5     ondansetron (ZOFRAN) 8 MG tablet Take 1 tablet (8 mg) by mouth every 8 hours as needed (Nausea/Vomiting) 10 tablet 5     lidocaine-prilocaine (EMLA) cream Place a quarter sized amount of cream onto port area 30-60 minutes prior to port use. Do not rub in. Cover with tegaderm or saran wrap. 30 g 1     megestrol (MEGACE ORAL) 40 MG/ML suspension Take 5 mLs (200 mg) by mouth daily 600 mL 2     apixaban ANTICOAGULANT (ELIQUIS) 5 MG tablet Take 1 tablet (5 mg) by mouth 2 times daily 60 tablet 0     Probiotic Product (ALIGN) CHEW Take 1 chew tab by mouth 3 times daily as needed       nicotine (NICODERM CQ) 21 MG/24HR 24 hr patch        nicotine (NICODERM CQ) 7 MG/24HR 24 hr patch Place 1 patch onto the skin every 24 hours (Patient not taking: Reported on 1/17/2018) 30 patch 5     HYDROcodone-acetaminophen (NORCO) 5-325 MG per tablet Take 1-2 tablets by mouth every 6 hours as needed (Patient not taking: Reported on 1/12/2018) 30 tablet 0     Past Medical History:   Diagnosis Date     Atrial flutter (H) 11/2017     Cancer (H)     Lung Ca     Past Surgical History:   Procedure Laterality Date     ANESTHESIA CARDIOVERSION N/A 1/12/2018    Procedure: ANESTHESIA CARDIOVERSION;  CARDIOVERSION (FOLLOWING KIMBERLY AT 0830);  Surgeon: GENERIC ANESTHESIA PROVIDER;  Location: [facility]     APPENDECTOMY      age 28s     INSERT PORT VASCULAR ACCESS N/A 1/3/2018    Procedure: INSERT PORT VASCULAR ACCESS;  Port-a-Cath Placement;  Surgeon: Tomas Crews MD;  Location: [facility]  "    SURGICAL HISTORY OF -   2004    Gastroscopy with biopsy for gastric ulcer       REVIEW OF SYSTEMS:   ROS: 10 point ROS neg other than the symptoms noted above in the HPI and here:  Palliative Symptom Review (0=no symptom/no concern, 1=mild, 2=moderate, 3=severe):      Pain: 2      Fatigue: 2      Nausea: 1      Constipation: 0      Diarrhea: 0      Depressive Symptoms: 0      Anxiety: 0      Drowsiness: 1      Poor Appetite: 2      Shortness of Breath: 2      Insomnia: 0      Other: 0      Overall (0 good/no concerns, 3 very poor):  2      BP (!) 80/55 (BP Location: Right arm, Patient Position: Sitting, Cuff Size: Adult Regular)  Pulse 62  Temp 98.5  F (36.9  C) (Tympanic)  Resp (!) 32  Ht 5' 9.49\" (1.765 m)  Wt 153 lb 6.4 oz (69.6 kg)  SpO2 99%  BMI 22.34 kg/m2   GEN: He appears pale and cachectic.  His voice is weak and breathy  HEAD: Atraumatic, normocephalic  EYES: PERRLA, EOMI, Sclerae clear, Fundi normal with sharp discs  NECK: Supple without adenopathy or thyromegaly  LUNGS: clear to auscultation, normal breath sounds  CV: RRR without murmur, no carotid bruits  ABD: BS+, soft, nontender, no masses, no hepatosplenomegaly  EXTREMITIES: without joint tenderness, swelling or erythema.  No muscle tenderness or abnormality.  BACK: straight, full ROM, no tenderness, no spasm  SKIN: No rashes or abnormalities  NEURO: Alert and oriented X 3, non focal exam, DTRs normal, motor and sensation normal, coordination and gait without abnormality.          Impressions:  Palliative Performance Score:  50  Decision Making Capacity:  intact    Advanced, metastatic non-small cell lung cancer currently receiving palliative chemotherapy.  Moderate to severe dyspnea  Chest pain  Decreased appetite and weight loss  Cardiovascular disease and atrial fibrillation    Recommendations & Counselin.  I have given a prescription for morphine 2.5 mg to use every 4 hours as needed for shortness of breath  2.  He will hold " off on hydrocodone which he actually has not been using  3.  Talked at length with he and his daughter about appetite and weight loss and he will continue with Ensure or other supplements as able.  4.  Return to palliative clinic in 2-4 weeks or as needed.  5.  Dr. Mason had recommended he cut down his metoprolol dose and I told him I would concur given his hypotension today and some complaints of lightheadedness.          Again, thank you for allowing me to participate in the care of your patient.        Sincerely,        Chace Mitchell MD

## 2018-01-17 NOTE — NURSING NOTE
"Oncology Rooming Note    January 17, 2018 3:13 PM   Zechariah Ashby is a 57 year old male who presents for:    Chief Complaint   Patient presents with     Palliative     New Patient - Non-small cell carcinoma of lung - Pain Management     Initial Vitals: BP (!) 80/55 (BP Location: Right arm, Patient Position: Sitting, Cuff Size: Adult Regular)  Pulse 62  Temp 98.5  F (36.9  C) (Tympanic)  Resp (!) 32  Ht 1.765 m (5' 9.49\")  Wt 69.6 kg (153 lb 6.4 oz)  SpO2 99%  BMI 22.34 kg/m2 Estimated body mass index is 22.34 kg/(m^2) as calculated from the following:    Height as of this encounter: 1.765 m (5' 9.49\").    Weight as of this encounter: 69.6 kg (153 lb 6.4 oz). Body surface area is 1.85 meters squared.  Moderate Pain (5) Comment: middle     Can be a 8/10   No LMP for male patient.  Allergies reviewed: Yes  Medications reviewed: Yes    Medications: Medication refills not needed today.  Pharmacy name entered into [employer]:      [id number] - [facility] [address]    Clinical concerns: New Patient - Non-small cell carcinoma of lung - Pain Management        10 minutes for nursing intake (face to face time)     Carmen Barreto CMA              "

## 2018-01-17 NOTE — PROGRESS NOTES
Palliative Care Outpatient Clinic Consultation Note    Patient:  Zechariah Ashby    Chief Complaint:   Zechariah Ashby 57 year old male who is presenting to the palliative medicine clinic today at the request of Lalo for a palliative care consultation secondary to NSCLC.   The patient's primary care provider is:  Kailash Mason.     History of Present Illness:  The gentleman is here with daughter.  He was diagnosed with non-small cell lung cancer just within the last few months.  This all started when he had some cardiac issues and actually thought that was primary issue yet it was then discovered that he had a large mediastinal mass.  Is found to be non-small cell lung cancer.  He has left-sided effusion, mediastinal mass, left adrenal involvement.  He has been started on palliative chemotherapy.  He states it has been quite overwhelming both for he and his family trying to take in all the information and significant change in his health status.  He has developed increasing problems with shortness of breath greatest complaint but he also admits to chest pains, something his family had not heard expressed previously.  Family is very concerned about decreased appetite and weight loss.    Distressing Symptom/s:  Location: Dyspnea both at rest and with exertion  Chronicity: Onset 6 weeks ago, gradually worsening since    Relieving Factors: Uses a fan at home which helps  Prior & Current Treatments:     Patient's Disease Understanding: He admits to confusion about his in treatment plan.  We discussed that this chemotherapy is identified as palliative and the implications of that, primarily getting this is unlikely to cure the cancer.  He states he had not heard it before though clinic notes indicate that this had been discussed previously.    Coping: He is struggling.  He has not been to work for the last 2 weeks and his shortness of breath is getting worse.    Social History  Living Situation: Lives in a house  does not have to use stairs.  He lives with his wife who has beginning Alzheimer's and is not really involved in this new health situation 3 children,  Children: 2 are nearby and are of some help  Actual/Potential Caregiver(s): Family  Support System: Family  Occupation:   Hobbies: None  Substance Use/History of misuse: None  Financial Concerns: Significant issues that he is working through at this time  Spiritual Background: Not active  Spiritual Concerns/Needs: None  Social History   Substance Use Topics     Smoking status: Former Smoker     Packs/day: 0.25     Years: 50.00     Start date: 2017     Smokeless tobacco: Never Used      Comment: quit smoking over the last few days.      Alcohol use No       Family History  Family History   Problem Relation Age of Onset     CANCER Mother      pancreatic cancer,  at 38 years     Alzheimer Disease Father       at 84 years.     Patient's Involvement with Prior History of Serious Illness in Family:     Advance Care Planning:  Advance Directive:      Where is written copy located:   Health Care Agent Contact Information:   POLST:       Allergies   Allergen Reactions     Nka [No Known Allergies]      Current Outpatient Prescriptions   Medication Sig Dispense Refill     morphine 2.5 MG solu-tab Take 1 tablet (2.5 mg) by mouth every 4 hours as needed for shortness of breath / dyspnea or moderate to severe pain 60 tablet 0     omeprazole (PRILOSEC) 40 MG capsule Take 1 capsule (40 mg) by mouth daily Take 30-60 minutes before a meal. 30 capsule 11     nicotine (NICODERM CQ) 14 MG/24HR 24 hr patch Place 1 patch onto the skin every 24 hours 14 patch 5     metoprolol tartrate (LOPRESSOR) 50 MG tablet Take 1 tablet (50 mg) by mouth 2 times daily 60 tablet 1     amiodarone (PACERONE/CODARONE) 200 MG tablet Take 2 tablets twice daily x 1 week ( - ) and then start 1 tablet daily 18 45 tablet 1     LORazepam (ATIVAN) 0.5 MG tablet Take  1 tablet (0.5 mg) by mouth every 4 hours as needed (Anxiety, Nausea/Vomiting or Sleep) 30 tablet 5     prochlorperazine (COMPAZINE) 10 MG tablet Take 1 tablet (10 mg) by mouth every 6 hours as needed (Nausea/Vomiting) 30 tablet 5     ondansetron (ZOFRAN) 8 MG tablet Take 1 tablet (8 mg) by mouth every 8 hours as needed (Nausea/Vomiting) 10 tablet 5     lidocaine-prilocaine (EMLA) cream Place a quarter sized amount of cream onto port area 30-60 minutes prior to port use. Do not rub in. Cover with tegaderm or saran wrap. 30 g 1     megestrol (MEGACE ORAL) 40 MG/ML suspension Take 5 mLs (200 mg) by mouth daily 600 mL 2     apixaban ANTICOAGULANT (ELIQUIS) 5 MG tablet Take 1 tablet (5 mg) by mouth 2 times daily 60 tablet 0     Probiotic Product (ALIGN) CHEW Take 1 chew tab by mouth 3 times daily as needed       nicotine (NICODERM CQ) 21 MG/24HR 24 hr patch        nicotine (NICODERM CQ) 7 MG/24HR 24 hr patch Place 1 patch onto the skin every 24 hours (Patient not taking: Reported on 1/17/2018) 30 patch 5     HYDROcodone-acetaminophen (NORCO) 5-325 MG per tablet Take 1-2 tablets by mouth every 6 hours as needed (Patient not taking: Reported on 1/12/2018) 30 tablet 0     Past Medical History:   Diagnosis Date     Atrial flutter (H) 11/2017     Cancer (H)     Lung Ca     Past Surgical History:   Procedure Laterality Date     ANESTHESIA CARDIOVERSION N/A 1/12/2018    Procedure: ANESTHESIA CARDIOVERSION;  CARDIOVERSION (FOLLOWING KIMBERLY AT 0830);  Surgeon: GENERIC ANESTHESIA PROVIDER;  Location: [facility]     APPENDECTOMY      age 28s     INSERT PORT VASCULAR ACCESS N/A 1/3/2018    Procedure: INSERT PORT VASCULAR ACCESS;  Port-a-Cath Placement;  Surgeon: Tomas Crews MD;  Location: [facility]     SURGICAL HISTORY OF -   08/24/2004    Gastroscopy with biopsy for gastric ulcer       REVIEW OF SYSTEMS:   ROS: 10 point ROS neg other than the symptoms noted above in the HPI and here:  Palliative Symptom Review (0=no symptom/no concern,  "1=mild, 2=moderate, 3=severe):      Pain: 2      Fatigue: 2      Nausea: 1      Constipation: 0      Diarrhea: 0      Depressive Symptoms: 0      Anxiety: 0      Drowsiness: 1      Poor Appetite: 2      Shortness of Breath: 2      Insomnia: 0      Other: 0      Overall (0 good/no concerns, 3 very poor):  2      BP (!) 80/55 (BP Location: Right arm, Patient Position: Sitting, Cuff Size: Adult Regular)  Pulse 62  Temp 98.5  F (36.9  C) (Tympanic)  Resp (!) 32  Ht 5' 9.49\" (1.765 m)  Wt 153 lb 6.4 oz (69.6 kg)  SpO2 99%  BMI 22.34 kg/m2   GEN: He appears pale and cachectic.  His voice is weak and breathy  HEAD: Atraumatic, normocephalic  EYES: PERRLA, EOMI, Sclerae clear, Fundi normal with sharp discs  NECK: Supple without adenopathy or thyromegaly  LUNGS: clear to auscultation, normal breath sounds  CV: RRR without murmur, no carotid bruits  ABD: BS+, soft, nontender, no masses, no hepatosplenomegaly  EXTREMITIES: without joint tenderness, swelling or erythema.  No muscle tenderness or abnormality.  BACK: straight, full ROM, no tenderness, no spasm  SKIN: No rashes or abnormalities  NEURO: Alert and oriented X 3, non focal exam, DTRs normal, motor and sensation normal, coordination and gait without abnormality.          Impressions:  Palliative Performance Score:  50  Decision Making Capacity:  intact    Advanced, metastatic non-small cell lung cancer currently receiving palliative chemotherapy.  Moderate to severe dyspnea  Chest pain  Decreased appetite and weight loss  Cardiovascular disease and atrial fibrillation    Recommendations & Counselin.  I have given a prescription for morphine 2.5 mg to use every 4 hours as needed for shortness of breath  2.  He will hold off on hydrocodone which he actually has not been using  3.  Talked at length with he and his daughter about appetite and weight loss and he will continue with Ensure or other supplements as able.  4.  Return to palliative clinic in 2-4 weeks " or as needed.  5.  Dr. Mason had recommended he cut down his metoprolol dose and I told him I would concur given his hypotension today and some complaints of lightheadedness.

## 2018-01-17 NOTE — MR AVS SNAPSHOT
After Visit Summary   1/17/2018    Zechariah Ashby    MRN: [id number]           Patient Information     Date Of Birth          1960        Visit Information        Provider Department      1/17/2018 3:00 PM Chace Mitchell MD [facility]        Today's Diagnoses     Air hunger    -  1    Non-small cell carcinoma of lung (H)        COPD exacerbation (H)        Weakness           Follow-ups after your visit        Your next 10 appointments already scheduled     Jan 25, 2018  8:00 AM CST   Level 3 with ROOM 1 [facility] ([facility])    [facility]  [address] [place] [address] [address]  [facility] [address]   [phone]            Jan 25, 2018  9:00 AM CST   Return Visit with Joycelyn May MD   [facility] ([facility])    [facility]  [address]  [facility] [address]   [phone]            Feb 01, 2018  8:00 AM CST   Level 1 with ROOM 9 [facility] ([facility])    [facility]  [address]  [facility] [address]   [phone]            Feb 13, 2018 11:30 AM CST   Return Visit with Daksha Montemayor PA-C   [facility] ([facility])    [address] [facility] [address]   [phone]              Who to contact     If you have questions or need follow up information about today's clinic visit or your schedule please contact [facility] directly at [phone].  Normal or non-critical lab and imaging results will be communicated to you by MyChart, letter or phone within 4 business days after the clinic has received the results. If you do not hear from us within 7 days, please contact the clinic through MyChart or phone. If you have a critical or abnormal lab result, we will notify you by phone as soon as possible.  Submit refill requests through  "[employer]ot or call your pharmacy and they will forward the refill request to us. Please allow 3 business days for your refill to be completed.          Additional Information About Your Visit        MyChart Information     [employer] lets you send messages to your doctor, view your test results, renew your prescriptions, schedule appointments and more. To sign up, go to [email or web address] . Click on \"Log in\" on the left side of the screen, which will take you to the Welcome page. Then click on \"Sign up Now\" on the right side of the page.     You will be asked to enter the access code listed below, as well as some personal information. Please follow the directions to create your username and password.     Your access code is: [email or web address]  Expires: 2018  4:44 PM     Your access code will  in 90 days. If you need help or a new code, please call your [place] clinic or [phone].        Care EveryWhere ID     This is your Care EveryWhere ID. This could be used by other organizations to access your [place] medical records  [id number]        Your Vitals Were     Pulse Temperature Respirations Height Pulse Oximetry BMI (Body Mass Index)    62 98.5  F (36.9  C) (Tympanic) 32 5' 9.49\" (1.765 m) 99% 22.34 kg/m2       Blood Pressure from Last 3 Encounters:   18 (!) 80/55   01/15/18 (!) 82/56   18 99/62    Weight from Last 3 Encounters:   18 153 lb 6.4 oz (69.6 kg)   01/15/18 152 lb 9.6 oz (69.2 kg)   18 149 lb 4.8 oz (67.7 kg)              Today, you had the following     No orders found for display         Today's Medication Changes          These changes are accurate as of: 18  4:10 PM.  If you have any questions, ask your nurse or doctor.               Start taking these medicines.        Dose/Directions    morphine 2.5 MG solu-tab   Used for:  Air hunger   Started by:  Chace Mitchell MD        Dose:  2.5 mg   Take 1 tablet (2.5 mg) by mouth every 4 hours as needed for shortness " of breath / dyspnea or moderate to severe pain   Quantity:  60 tablet   Refills:  0            Where to get your medicines      Some of these will need a paper prescription and others can be bought over the counter.  Ask your nurse if you have questions.     Bring a paper prescription for each of these medications     morphine 2.5 MG solu-tab                Primary Care Provider Office Phone # Fax #    Kailash Mason [phone] [address] [place] [address]        Equal Access to Services     ROD PERRY : Hadii aad ku hadasho Soomaali, waaxda luqadaha, qaybta kaalmada adeegyada, waxay idiin hayaan adeeg kharash lalincoln . So [facility] [phone].    ATENCIÓN: Si habla español, tiene a morocho disposición servicios gratuitos de asistencia lingüística. [facility] [phone].    We comply with applicable federal civil rights laws and [place] laws. We do not discriminate on the basis of race, color, national origin, age, disability, sex, sexual orientation, or gender identity.            Thank you!     Thank you for choosing [facility]  for your care. Our goal is always to provide you with excellent care. Hearing back from our patients is one way we can continue to improve our services. Please take a few minutes to complete the written survey that you may receive in the mail after your visit with us. Thank you!             Your Updated Medication List - Protect others around you: Learn how to safely use, store and throw away your medicines at www.disposemymeds.org.          This list is accurate as of: 1/17/18  4:10 PM.  Always use your most recent med list.                   Brand Name Dispense Instructions for use Diagnosis    ALIGN Chew      Take 1 chew tab by mouth 3 times daily as needed        amiodarone 200 MG tablet    PACERONE/CODARONE    45 tablet    Take 2 tablets twice daily x 1 week (1/12 - 1/19) and then start 1 tablet daily 1/20/18    Paroxysmal atrial fibrillation  (H)       apixaban ANTICOAGULANT 5 MG tablet    ELIQUIS    60 tablet    Take 1 tablet (5 mg) by mouth 2 times daily    Typical atrial flutter (H)       HYDROcodone-acetaminophen 5-325 MG per tablet    NORCO    30 tablet    Take 1-2 tablets by mouth every 6 hours as needed    Non-small cell carcinoma of lung (H)       lidocaine-prilocaine cream    EMLA    30 g    Place a quarter sized amount of cream onto port area 30-60 minutes prior to port use. Do not rub in. Cover with tegaderm or saran wrap.    Non-small cell carcinoma of lung (H)       LORazepam 0.5 MG tablet    ATIVAN    30 tablet    Take 1 tablet (0.5 mg) by mouth every 4 hours as needed (Anxiety, Nausea/Vomiting or Sleep)    Non-small cell carcinoma of lung (H)       megestrol 40 MG/ML suspension    MEGACE ORAL    600 mL    Take 5 mLs (200 mg) by mouth daily    Non-small cell carcinoma of lung (H)       metoprolol tartrate 50 MG tablet    LOPRESSOR    60 tablet    Take 1 tablet (50 mg) by mouth 2 times daily    Irregular heart beat, Atrial flutter, unspecified type (H), Cardiomyopathy, unspecified type (H)       morphine 2.5 MG solu-tab     60 tablet    Take 1 tablet (2.5 mg) by mouth every 4 hours as needed for shortness of breath / dyspnea or moderate to severe pain    Air hunger       * nicotine 21 MG/24HR 24 hr patch    NICODERM CQ          * nicotine 14 MG/24HR 24 hr patch    NICODERM CQ    14 patch    Place 1 patch onto the skin every 24 hours    Encounter for tobacco use cessation counseling, Tobacco abuse       * nicotine 7 MG/24HR 24 hr patch    NICODERM CQ    30 patch    Place 1 patch onto the skin every 24 hours    Tobacco abuse       omeprazole 40 MG capsule    priLOSEC    30 capsule    Take 1 capsule (40 mg) by mouth daily Take 30-60 minutes before a meal.    Gastroesophageal reflux disease without esophagitis       ondansetron 8 MG tablet    ZOFRAN    10 tablet    Take 1 tablet (8 mg) by mouth every 8 hours as needed (Nausea/Vomiting)     Non-small cell carcinoma of lung (H)       prochlorperazine 10 MG tablet    COMPAZINE    30 tablet    Take 1 tablet (10 mg) by mouth every 6 hours as needed (Nausea/Vomiting)    Non-small cell carcinoma of lung (H)       * Notice:  This list has 3 medication(s) that are the same as other medications prescribed for you. Read the directions carefully, and ask your doctor or other care provider to review them with you.

## 2018-01-19 PROBLEM — E86.0 DEHYDRATION: Status: ACTIVE | Noted: 2018-01-01

## 2018-01-19 NOTE — PROGRESS NOTES
Pt here with dizziness and almost passing out on and off throughout the week. Denies chest pain. History of afib. Hypotensive. Port accessed. IV infusing and labs sent.

## 2018-01-19 NOTE — TELEPHONE ENCOUNTER
"Requested Prescriptions   Pending Prescriptions Disp Refills     metoprolol tartrate (LOPRESSOR) 50 MG tablet [Pharmacy Med Name: METOPROL TAR 50 MG  TAB AURO] 60 tablet 0     Sig: TAKE ONE TABLET BY MOUTH TWICE DAILY    Beta-Blockers Protocol Passed    1/19/2018  9:51 AM       Passed - Blood pressure under 140/90    BP Readings from Last 3 Encounters:   01/17/18 (!) 80/55   01/15/18 (!) 82/56   01/12/18 99/62                Passed - Patient is age 6 or older       Passed - Recent or future visit with authorizing provider's specialty    Patient had office visit in the last year or has a visit in the next 30 days with authorizing provider.  See \"Patient Info\" tab in inbasket, or \"Choose Columns\" in Meds & Orders section of the refill encounter.             Last Written Prescription Date:  1/15/18  (WAS NOT SENT TO PHARMACY- NO PRINT)  Last Fill Quantity: 60,  # refills: 1   [facility] or [facility] prescribing provider:  1/15/18   Future Office Visit:    Next 5 appointments (look out 90 days)     Jan 25, 2018  9:00 AM CST   Return Visit with Joycelyn May MD   [facility] ([facility])    [facility]  [address] [facility] [address]  [facility] [address]   [phone]            Feb 13, 2018 11:30 AM CST   Return Visit with Daksha Montemayor PA-C   [facility] ([facility])    [address] [facility] [address]   [phone]                   "

## 2018-01-19 NOTE — LETTER
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, T45.1X5A]  Hypotension, unspecified hypotension type [I95.9]  Admit Date/Time: 1/19/2018 11:06 AM  Discharge Date: 1/20/18  Payor Source: Payor: [employer] / Plan: [employer] OPEN ACCESS FULLY INSURED / Product Type: HMO /     Readmission Assessment Measure (SUZETTE) Risk Score/category: average    Reason for Communication Hand-off Referral: Fragility    Discharge Plan:home       Concern for non-adherence with plan of care:   Y/N no  Discharge Needs Assessment:      Already enrolled in Tele-monitoring program and name of program:  no  Follow-up specialty is recommended: Yes    Follow-up plan:  Future Appointments  Date Time Provider Department Center   1/25/2018 8:00 AM ROOM 1 [facility]   1/25/2018 9:00 AM Joycelyn May MD [facility]   2/1/2018 8:00 AM ROOM 9 [facility]   2/13/2018 11:30 AM Daksha Montemayor PA-C [facility]       Key Recommendations:  Pt admitted with low blood pressure. Pt will dc home today. Pt reports that he is not current with home care and is not interested in this service. Goal is to remain at home.     Deidre Barreto MSW, LICSW, [facility] [phone]    AVS/Discharge Summary is the source of truth; this is a helpful guide for improved communication of patient story

## 2018-01-19 NOTE — IP AVS SNAPSHOT
MRN:[id number]                      After Visit Summary   1/19/2018    Zechariah Ashby    MRN: [id number]           Thank you!     Thank you for choosing [facility] for your care. Our goal is always to provide you with excellent care. Hearing back from our patients is one way we can continue to improve our services. Please take a few minutes to complete the written survey that you may receive in the mail after you visit with us. Thank you!        Patient Information     Date Of Birth          1960        About your hospital stay     You were admitted on:  January 19, 2018 You last received care in the:  [facility] [place]    You were discharged on:  January 20, 2018       Who to Call     For medical emergencies, please call 911.  For non-urgent questions about your medical care, please call your primary care provider or clinic, [phone]          Attending Provider     Provider Specialty    Magdy Woodward MD Emergency Medicine    [facility], Osmel Romano MD Internal Medicine    [facility], Brett Zuniga MD [facility], MD Zechariah [facility]       Primary Care Provider Office Phone # Fax #    Kailash Missael Mason [phone]      Your next 10 appointments already scheduled     Jan 25, 2018  8:00 AM CST   Level 3 with ROOM 1 [facility] ([facility])    [facility] [address]  [facility] [address]   [phone]            Jan 25, 2018  9:00 AM CST   Return Visit with Joycelyn May MD   [facility] ([facility])    [facility]  [address]  [facility] [address]   [phone]            Feb 01, 2018  8:00 AM CST   Level 1 with ROOM 9 [facility] ([facility])    [facility] [place] [address]  [facility] [address]   [phone]            Feb 05, 2018  8:20 AM CST    Office Visit with Kailash Mason MD   [facility] ([facility])    [address] [place] [address] [phone]           Bring a current list of meds and any records pertaining to this visit. For Physicals, please bring immunization records and any forms needing to be filled out. Please arrive 10 minutes early to complete paperwork.            Feb 13, 2018 11:30 AM CST   Return Visit with Daksha Montemayor PA-C   [facility] ([facility])    [address] [facility] [id number] [phone]              Further instructions from your care team       [facility] Discharge Instructions     Discharge disposition:  Discharged to home       Diet:  Regular       Activity Activity as tolerated       Follow-up: Follow up with primary care provider within 2 weeks       Additional instructions:  follow up with cardiology as scheduled.          Pending Results     Date and Time Order Name Status Description    1/19/2018 1402 Urine Culture Preliminary     1/19/2018 1212 Blood culture Preliminary     1/19/2018 1212 Blood culture Preliminary             Statement of Approval     Ordered          01/20/18 1048  I have reviewed and agree with all the recommendations and orders detailed in this document.  EFFECTIVE NOW     Approved and electronically signed by:  Zechariah Butler MD           01/20/18 1057  I have reviewed and agree with all the recommendations and orders detailed in this document.  EFFECTIVE NOW     Approved and electronically signed by:  Zechariah Butler MD             Admission Information     Date & Time Provider Department Dept. Phone    1/19/2018 Zechariah Butler MD [facility] [phone]      Your Vitals Were     Blood Pressure Pulse Temperature Respirations Height Weight    107/73 (BP Location: Right arm) 56 97.7  F (36.5  C) (Oral) 20 1.803 m (5'  "11\") 68.9 kg (151 lb 14.4 oz)    Pulse Oximetry BMI (Body Mass Index)                98% 21.19 kg/m2          [employer] Information     [employer] lets you send messages to your doctor, view your test results, renew your prescriptions, schedule appointments and more. To sign up, go to [email or web address] . Click on \"Log in\" on the left side of the screen, which will take you to the Welcome page. Then click on \"Sign up Now\" on the right side of the page.     You will be asked to enter the access code listed below, as well as some personal information. Please follow the directions to create your username and password.     Your access code is: [email or web address]  Expires: 2018  4:44 PM     Your access code will  in 90 days. If you need help or a new code, please call your [facility] clinic or [phone].        Care EveryWhere ID     This is your Care EveryWhere ID. This could be used by other organizations to access your [facility] medical records  [id number]        Equal Access to Services     [facility] : Hadii arnol Zhang, warhett pendleton, diane so, harley boykin. So [facility] [phone].    ATENCIÓN: Si habla español, tiene a morocho disposición servicios gratuitos de asistencia lingüística. Roz al [phone].    We comply with applicable federal civil rights laws and [place] laws. We do not discriminate on the basis of race, color, national origin, age, disability, sex, sexual orientation, or gender identity.               Review of your medicines      CONTINUE these medicines which have NOT CHANGED        Dose / Directions    ALIGN Chew        Dose:  1 chew tab   Take 1 chew tab by mouth 3 times daily as needed   Refills:  0       amiodarone 200 MG tablet   Commonly known as:  PACERONE/CODARONE   Used for:  Paroxysmal atrial fibrillation (H)        Take 2 tablets twice daily x 1 week ( - ) and then start 1 tablet daily 18   Quantity:  45 " tablet   Refills:  1       apixaban ANTICOAGULANT 5 MG tablet   Commonly known as:  ELIQUIS   Used for:  Typical atrial flutter (H)        Dose:  5 mg   Take 1 tablet (5 mg) by mouth 2 times daily   Quantity:  60 tablet   Refills:  0       lidocaine-prilocaine cream   Commonly known as:  EMLA   Used for:  Non-small cell carcinoma of lung (H)        Place a quarter sized amount of cream onto port area 30-60 minutes prior to port use. Do not rub in. Cover with tegaderm or saran wrap.   Quantity:  30 g   Refills:  1       LORazepam 0.5 MG tablet   Commonly known as:  ATIVAN   Used for:  Non-small cell carcinoma of lung (H)        Dose:  0.5 mg   Take 1 tablet (0.5 mg) by mouth every 4 hours as needed (Anxiety, Nausea/Vomiting or Sleep)   Quantity:  30 tablet   Refills:  5       megestrol 40 MG/ML suspension   Commonly known as:  MEGACE ORAL   Used for:  Non-small cell carcinoma of lung (H)        Dose:  200 mg   Take 5 mLs (200 mg) by mouth daily   Quantity:  600 mL   Refills:  2       morphine 15 MG IR tablet   Commonly known as:  MSIR   Used for:  Non-small cell carcinoma of lung (H)        1/2 to 1 pill every 4 hours as needed for air hunger.   Quantity:  30 tablet   Refills:  0       * nicotine 21 MG/24HR 24 hr patch   Commonly known as:  NICODERM CQ        Refills:  0       * nicotine 14 MG/24HR 24 hr patch   Commonly known as:  NICODERM CQ   Used for:  Encounter for tobacco use cessation counseling, Tobacco abuse        Dose:  1 patch   Place 1 patch onto the skin every 24 hours   Quantity:  14 patch   Refills:  5       * nicotine 7 MG/24HR 24 hr patch   Commonly known as:  NICODERM CQ   Used for:  Tobacco abuse        Dose:  1 patch   Place 1 patch onto the skin every 24 hours   Quantity:  30 patch   Refills:  5       omeprazole 40 MG capsule   Commonly known as:  priLOSEC   Used for:  Gastroesophageal reflux disease without esophagitis        Dose:  40 mg   Take 1 capsule (40 mg) by mouth daily Take 30-60  minutes before a meal.   Quantity:  30 capsule   Refills:  11       ondansetron 8 MG tablet   Commonly known as:  ZOFRAN   Used for:  Non-small cell carcinoma of lung (H)        Dose:  8 mg   Take 1 tablet (8 mg) by mouth every 8 hours as needed (Nausea/Vomiting)   Quantity:  10 tablet   Refills:  5       prochlorperazine 10 MG tablet   Commonly known as:  COMPAZINE   Used for:  Non-small cell carcinoma of lung (H)        Dose:  10 mg   Take 1 tablet (10 mg) by mouth every 6 hours as needed (Nausea/Vomiting)   Quantity:  30 tablet   Refills:  5       * Notice:  This list has 3 medication(s) that are the same as other medications prescribed for you. Read the directions carefully, and ask your doctor or other care provider to review them with you.      STOP taking     metoprolol tartrate 50 MG tablet   Commonly known as:  LOPRESSOR                    Protect others around you: Learn how to safely use, store and throw away your medicines at www.disposemymeds.org.             Medication List: This is a list of all your medications and when to take them. Check marks below indicate your daily home schedule. Keep this list as a reference.      Medications           Morning Afternoon Evening Bedtime As Needed    ALIGN Chew   Take 1 chew tab by mouth 3 times daily as needed                                amiodarone 200 MG tablet   Commonly known as:  PACERONE/CODARONE   Take 2 tablets twice daily x 1 week (1/12 - 1/19) and then start 1 tablet daily 1/20/18   Last time this was given:  200 mg on 1/20/2018  8:32 AM                                   apixaban ANTICOAGULANT 5 MG tablet   Commonly known as:  ELIQUIS   Take 1 tablet (5 mg) by mouth 2 times daily   Last time this was given:  5 mg on 1/20/2018  8:35 AM                                   lidocaine-prilocaine cream   Commonly known as:  EMLA   Place a quarter sized amount of cream onto port area 30-60 minutes prior to port use. Do not rub in. Cover with tegaderm or  saran wrap.                                LORazepam 0.5 MG tablet   Commonly known as:  ATIVAN   Take 1 tablet (0.5 mg) by mouth every 4 hours as needed (Anxiety, Nausea/Vomiting or Sleep)                                megestrol 40 MG/ML suspension   Commonly known as:  MEGACE ORAL   Take 5 mLs (200 mg) by mouth daily                                morphine 15 MG IR tablet   Commonly known as:  MSIR   1/2 to 1 pill every 4 hours as needed for air hunger.                                * nicotine 21 MG/24HR 24 hr patch   Commonly known as:  NICODERM CQ   Last time this was given:  1 patch on 1/19/2018  8:01 PM                    Take off tonight 8pm               * nicotine 14 MG/24HR 24 hr patch   Commonly known as:  NICODERM CQ   Place 1 patch onto the skin every 24 hours                                * nicotine 7 MG/24HR 24 hr patch   Commonly known as:  NICODERM CQ   Place 1 patch onto the skin every 24 hours                                omeprazole 40 MG capsule   Commonly known as:  priLOSEC   Take 1 capsule (40 mg) by mouth daily Take 30-60 minutes before a meal.   Last time this was given:  40 mg on 1/20/2018  6:43 AM                                   ondansetron 8 MG tablet   Commonly known as:  ZOFRAN   Take 1 tablet (8 mg) by mouth every 8 hours as needed (Nausea/Vomiting)                                prochlorperazine 10 MG tablet   Commonly known as:  COMPAZINE   Take 1 tablet (10 mg) by mouth every 6 hours as needed (Nausea/Vomiting)                                * Notice:  This list has 3 medication(s) that are the same as other medications prescribed for you. Read the directions carefully, and ask your doctor or other care provider to review them with you.

## 2018-01-19 NOTE — ED NOTES
"Pt states he is feeling a little better. BP improving. Pt does not have to void, need UA, 3liter of fluids administered. Dr. Woodward at bedside discussing pt's admission. Pt reminded to call his family because they wanted to hear the plan, pt states \"its fine.\"  "

## 2018-01-19 NOTE — IP AVS SNAPSHOT
[facility]    [address] [facility] [address]    Phone:  [phone]    Fax:  [phone]                                       After Visit Summary   1/19/2018    Zechariah Ashby    MRN: [id number]           After Visit Summary Signature Page     I have received my discharge instructions, and my questions have been answered. I have discussed any challenges I see with this plan with the nurse or doctor.    ..........................................................................................................................................  Patient/Patient Representative Signature      ..........................................................................................................................................  Patient Representative Print Name and Relationship to Patient    ..................................................               ................................................  Date                                            Time    ..........................................................................................................................................  Reviewed by Signature/Title    ...................................................              ..............................................  Date                                                            Time

## 2018-01-19 NOTE — PROGRESS NOTES
"[facility] ADMISSION NOTE    Patient admitted to room 2309 at approximately 1700 via cart from emergency room. Patient was accompanied by other:none.     Verbal SBAR report received from Janet prior to patient arrival.     Patient ambulated to bed with stand-by assist. Patient alert and oriented X 3. The patient is not having any pain. 0-10 Pain Scale: 0. Admission vital signs: Blood pressure 97/71, pulse 63, temperature 95.8  F (35.4  C), temperature source Oral, resp. rate 20, height 1.803 m (5' 11\"), weight 69.7 kg (153 lb 10.6 oz), SpO2 98 %. Patient was oriented to plan of care, call light, bed controls, tv, telephone, bathroom and visiting hours.     The following safety risks were identified during admission: none. Yellow risk band applied: NO.     Kylah La    "

## 2018-01-19 NOTE — ED PROVIDER NOTES
History     Chief Complaint   Patient presents with     Generalized Weakness     generalized weakness, fatigue, SOA, lightheaded. pale feeling worse over the lase few days. 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 Patient's wife states that he almost fainted this morning as he became very dizzy. He was going to have a cardioversion for his atrial fibrillation, but right before they were going to do that he went back into normal sinus rhythm. He has been doing well with chemotherapy, with very few side effects. This week was his week off of chemotherapy. He has been eating well and drinking small amounts of fluids. He does mention that he has had two episodes of dysuria and has been voiding small amounts. He has not had a bowel movement in one week, and denies taking any stool softeners. He had a recent change in his Metoprolol. It was decreased from 1.5 tablets daily to 1 tablet daily. He also started taking morphine for air hunger. He denies chest pain, abdominal pain, diarrhea, cough, nasal congestion, fever or any other associated symptoms.  He was in the clinic 2 days ago and at that time had a blood pressure of 84 systolic and at that time his metoprolol dose was decreased.      Problem List:    Patient Active Problem List    Diagnosis Date Noted     Cardiomyopathy (H) 01/10/2018     Priority: Medium     Acute on chronic systolic congestive heart failure (H) 01/10/2018     Priority: Medium     Weakness 01/08/2018     Priority: Medium     COPD exacerbation (H) 01/07/2018     Priority: Medium     Non-small cell carcinoma of lung (H) 12/28/2017     Priority: Medium     Initial CXR and CT chest abnormal on 12/7/2017 12/27/2017: oncology notes:PET scan showing a large FDG avid  mediastinal mass extending to the left hilum with obliteration of the left upper lobe bronchus and atelectasis of the upper lobe.  There are also multiple left cervical left cervical and mediastinal adenopathy.  There is increased left pleural effusion.  There is hypermetabolic left adrenal lesion.  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  I reviewed with the patient today management of stage IV squamous cell lung cancer.       Lung mass 2017     Priority: Medium     Pleural effusion 2017     Priority: Medium     Mediastinal lymphadenopathy 2017     Priority: Medium     Atrial flutter (H) 2017     Priority: Medium     Onset 11/3/2017.  Seen in clinic and ED.       Perforated ulcer (HCC) 2015     Priority: Medium     Free intraperitoneal air 2015     Priority: Medium     CARDIOVASCULAR SCREENING; LDL GOAL LESS THAN 130 10/31/2010     Priority: Medium        Past Medical History:    Past Medical History:   Diagnosis Date     Atrial flutter (H) 2017     Cancer (H)        Past Surgical History:    Past Surgical History:   Procedure Laterality Date     ANESTHESIA CARDIOVERSION N/A 2018    Procedure: ANESTHESIA CARDIOVERSION;  CARDIOVERSION (FOLLOWING KIMBERLY AT 0830);  Surgeon: GENERIC ANESTHESIA PROVIDER;  Location: [facility]     APPENDECTOMY      age 28s     INSERT PORT VASCULAR ACCESS N/A 1/3/2018    Procedure: INSERT PORT VASCULAR ACCESS;  Port-a-Cath Placement;  Surgeon: Tomas Crews MD;  Location: [facility]     SURGICAL HISTORY OF -   2004    Gastroscopy with biopsy for gastric ulcer       Family History:    Family History   Problem Relation Age of Onset     CANCER Mother      pancreatic cancer,  at 38 years     Alzheimer Disease Father       at 84 years.       Social History:  Marital Status:   [2]  Social History   Substance Use Topics     Smoking status: Former Smoker      Packs/day: 0.25     Years: 50.00     Start date: 12/12/2017     Smokeless tobacco: Never Used      Comment: quit smoking over the last few days.      Alcohol use No        Medications:      metoprolol tartrate (LOPRESSOR) 50 MG tablet   nicotine (NICODERM CQ) 21 MG/24HR 24 hr patch   morphine (MSIR) 15 MG IR tablet   omeprazole (PRILOSEC) 40 MG capsule   amiodarone (PACERONE/CODARONE) 200 MG tablet   LORazepam (ATIVAN) 0.5 MG tablet   prochlorperazine (COMPAZINE) 10 MG tablet   ondansetron (ZOFRAN) 8 MG tablet   lidocaine-prilocaine (EMLA) cream   megestrol (MEGACE ORAL) 40 MG/ML suspension   apixaban ANTICOAGULANT (ELIQUIS) 5 MG tablet   Probiotic Product (ALIGN) CHEW   nicotine (NICODERM CQ) 14 MG/24HR 24 hr patch   nicotine (NICODERM CQ) 7 MG/24HR 24 hr patch   [DISCONTINUED] metoprolol tartrate (LOPRESSOR) 50 MG tablet         Review of Systems    All other systems are reviewed and are negative.    Physical Exam   BP:  (attempt X 2 unable to read)  Pulse: 59  Heart Rate: 40  Temp: 98.5  F (36.9  C)  Resp: 18  SpO2: 99 %      Physical Exam  Nursing note and vitals were reviewed.  Constitutional: Awake and alert, dehydrated and acutely and chronically ill appearing 57-year-old in no apparent discomfort,  who answers questions appropriately and cooperates with examination.  HEENT: EACs clear.  TMs normal.  EOMI. oropharynx shows dry mucous membranes and is otherwise unremarkable  Neck: Freely mobile.  Cardiovascular: Cardiac examination reveals normal heart rate and regular rhythm without murmur.  Pulmonary/Chest: Breathing is unlabored.  Breath sounds are clear and equal bilaterally.  There no retractions, tachypnea, rales, wheezes, or rhonchi.  Abdomen: Soft, nontender, no HSM or masses rebound or guarding.  Musculoskeletal: Extremities are warm and well-perfused and without edema  Neurological: Alert, oriented, thought content logical, coherent   Skin: Warm, dry, no rashes.  Psychiatric: Affect consistent with  acute illness but appropriate.      ED Course     ED Course     Procedures               EKG Interpretation:      Interpreted by Magdy Woodward  Time reviewed: 11:18  Symptoms at time of EKG: hypotension   Rhythm: normal sinus   Rate: normal  Axis: normal  Ectopy: Frequent PACs  Conduction: normal  ST Segments/ T Waves: T waves are inverted in leads V4 5 and 6  Q Waves: none  Comparison to prior: Unchanged from 11/12/18    Clinical Impression: abnormal, nondiagnostic EKG      Critical Care time:  none                  Results for orders placed or performed during the hospital encounter of 01/19/18 (from the past 24 hour(s))   CBC with platelets differential   Result Value Ref Range    WBC 1.9 (L) 4.0 - 11.0 10e9/L    RBC Count 4.12 (L) 4.4 - 5.9 10e12/L    Hemoglobin 12.1 (L) 13.3 - 17.7 g/dL    Hematocrit 35.9 (L) 40.0 - 53.0 %    MCV 87 78 - 100 fl    MCH 29.4 26.5 - 33.0 pg    MCHC 33.7 31.5 - 36.5 g/dL    RDW 12.1 10.0 - 15.0 %    Platelet Count 115 (L) 150 - 450 10e9/L    Diff Method Automated Method     % Neutrophils 42.9 %    % Lymphocytes 36.5 %    % Monocytes 20.6 %    % Eosinophils 0.0 %    % Basophils 0.0 %    % Immature Granulocytes 0.0 %    Absolute Neutrophil 0.8 (L) 1.6 - 8.3 10e9/L    Absolute Lymphocytes 0.7 (L) 0.8 - 5.3 10e9/L    Absolute Monocytes 0.4 0.0 - 1.3 10e9/L    Absolute Eosinophils 0.0 0.0 - 0.7 10e9/L    Absolute Basophils 0.0 0.0 - 0.2 10e9/L    Abs Immature Granulocytes 0.0 0 - 0.4 10e9/L    Reactive Lymphs Present     RBC Morphology Normal     Platelet Estimate       Automated count confirmed.  Platelet morphology is normal.   Basic metabolic panel   Result Value Ref Range    Sodium 138 133 - 144 mmol/L    Potassium 4.1 3.4 - 5.3 mmol/L    Chloride 105 94 - 109 mmol/L    Carbon Dioxide 22 20 - 32 mmol/L    Anion Gap 11 3 - 14 mmol/L    Glucose 150 (H) 70 - 99 mg/dL    Urea Nitrogen 25 7 - 30 mg/dL    Creatinine 1.07 0.66 - 1.25 mg/dL    GFR Estimate 71 >60 mL/min/1.7m2    GFR  Estimate If Black 85 >60 mL/min/1.7m2    Calcium 8.0 (L) 8.5 - 10.1 mg/dL   Lactic acid whole blood   Result Value Ref Range    Lactic Acid 1.6 0.7 - 2.0 mmol/L   Troponin I   Result Value Ref Range    Troponin I ES <0.015 0.000 - 0.045 ug/L   Blood culture   Result Value Ref Range    Specimen Description Blood Left Port     Special Requests Aerobic and anaerobic bottles received     Culture Micro No growth after 2 hours    NT pro BNP   Result Value Ref Range    N-Terminal Pro BNP Inpatient 2681 (H) 0 - 900 pg/mL   Blood culture   Result Value Ref Range    Specimen Description Blood Right Arm     Special Requests DAVID     Culture Micro No growth after 1 hour    Blood gas venous   Result Value Ref Range    Ph Venous 7.38 7.32 - 7.43 pH    PCO2 Venous 44 40 - 50 mm Hg    PO2 Venous 23 (L) 25 - 47 mm Hg    Bicarbonate Venous 26 21 - 28 mmol/L    Base Excess Venous 0.2 mmol/L   XR Chest 2 Views    Narrative    XR CHEST 2 VW   1/19/2018 2:52 PM     HISTORY: hypotension;     COMPARISON: 1/3/2018      Impression    IMPRESSION: Left subclavian port is in satisfactory position with tip  in the SVC. There remains elevation of the left hemidiaphragm and  consolidation/atelectasis of the left upper lobe due to left hilar  mass.    Overall, no significant change.    REMINGTON ALEXIS MD       Medications   0.9% sodium chloride BOLUS (0 mLs Intravenous Stopped 1/19/18 1303)     Followed by   0.9% sodium chloride infusion (1,000 mLs Intravenous Not Given 1/19/18 1304)   lactated ringers BOLUS 1,000 mL (1,000 mLs Intravenous New Bag 1/19/18 1529)   lactated ringers BOLUS 1,000 mL (0 mLs Intravenous Stopped 1/19/18 1527)       11:58 AM. Patient assessed. Course of Care Outlined.  Assessments & Plan (with Medical Decision Making)     57-year-old male with recent diagnosis of stage IV non-small cell lung cancer currently in the process of receiving palliative chemotherapy presented with hypotension and generalized weakness.  He is also  had a recent history of a cardiomyopathy and atrial fibrillation.  His cardiomyopathy had been attributed to the A. fib and his LV function had improved after resumption of sinus rhythm.  He presents today with generalized weakness and low blood pressures.  Blood pressures in the clinic 2 days ago were also low.  At that time metoprolol dose was decreased.  In the emergency department does not show a cause for his low blood pressure.  I suspect it is related to his chemotherapy in combination with cardiomyopathy and his medications.  He received 2 L of Ringer's lactate with improvement in his blood pressure but still was only up to about 99/60.  He will require additional monitoring and treatment ensure there is no other cause for his low blood pressures.  At this time I have low suspicion for sepsis as a cause and we will not initiate antibiotic therapy or pressor therapy.  We are awaiting results of a urine analysis and culture and if there is evidence of UTI on urine analysis, will institute antibiotic therapy.  I discussed my recommendations with he and his family and they are in agreement with the plan.  I discussed his case with Dr. Gunderson of the hospital service and they will assume care on admission.    I have reviewed the nursing notes.    I have reviewed the findings, diagnosis, plan and need for follow up with the patient.       New Prescriptions    No medications on file       Final diagnoses:   Dehydration   Hypotension, unspecified hypotension type   Antineoplastic chemotherapy induced pancytopenia (H)     This document serves as a record of services personally performed by Magdy Woodward MD. It was created on their behalf by Jaclyn Gerber, a trained medical scribe. The creation of this record is based on the provider's personal observations and the statements of the patient. This document has been checked and approved by the attending provider.    Note: Chart documentation done in part with [employer]  Voice Recognition software. Although reviewed after completion, some word and grammatical errors may remain.        1/19/2018   [facility] EMERGENCY DEPARTMENT     Magdy Woodward MD  01/19/18 9533

## 2018-01-19 NOTE — ED NOTES
"Patient has  [place] to Observation  order. Patient has been given the Observation brochure -  What does Observation mean to me.\"  Patient has been given the opportunity to ask questions about observation status and their plan of care.      Samantha Alcala  "

## 2018-01-20 NOTE — PROGRESS NOTES
CARE TRANSITION SOCIAL WORK INITIAL ASSESSMENT:      Met with: Patient.    DATA  Active Problems:    Dehydration       Primary Care [facility] Name:  ([facility])  Primary Care MD Name:  (Marlon)  Contact information and PCP information verified: Yes      ASSESSMENT  Cognitive Status: awake, alert and oriented.       Resources List: Home Care     Lives With: spouse  Living Arrangements: house     Description of Support System: Supportive, Uninvolved   Who is your support system?: Wife   Support Assessment: Adequate family and caregiver support, Adequate social supports   Insurance Concerns: No Insurance issues identified        This writer met with pt introduced self and role. Discussed discharge planning and medicare guidelines in regards to home care and SNF benefits. Pt reports that he lives at home and has several family members assisting him. He reports that he currently does not have home care. He is not interested in the service. Pt will dc home when stable, no other CTS needs identified.       PLAN    home    Discharge Planner   Discharge Plans in progress: home  Barriers to discharge plan: medical stabiltiy  Follow up plan: DC today       Entered by: Deidre Barreto 01/20/2018 9:14 AM             Deidre HOPKINS, LICSW, [facility] [phone]

## 2018-01-20 NOTE — PROGRESS NOTES
[facility] DISCHARGE NOTE    Patient discharged to home at 12:12 PM via wheel chair. Accompanied by partner and staff. Discharge instructions reviewed with patient, opportunity offered to ask questions. Prescriptions - None ordered for discharge. All belongings sent with patient.    Kylah La

## 2018-01-20 NOTE — H&P
[facility]   History and Physical    Zechariah Ashby MRN# [id number]   Age: 57 year old YOB: 1960     Date of Admission:  1/19/2018    Home clinic: [facility]  Primary care provider: Kailash Mason         Assessment and Plan:     (E86.0) Dehydration  (primary encounter diagnosis)  Comment: 57 yr old male here for dehydration , weakness and hypotension , likely due to chemotherapy   Plan: IV fluids, Monitor I/Os , monitor vitals especially BP     (I95.9) Hypotension, unspecified hypotension type  Comment: Ongoing . Hold BP medication when BP is less than 90/60   Plan: Continue IV fluids    (D61.810,  T45.1X5A) Antineoplastic chemotherapy induced pancytopenia (H)  Comment: Ongoing   Plan: Continue to monitor labs     (N39.0) Urinary tract infection without hematuria, site unspecified  Comment: Mild pyuria.   Plan: Start antibiotics, await culture      Anticipate 1-2 days in hospital  DVT prophylaxis - Already on chronic anticoagulation   Disposition - Fair                Chief Complaint:   Weakness   Near syncope    History is obtained from the patient          History of Present Illness:   This patient is a 57 year old  male with a significant past medical history of cardiomyopathy and malignancy lung CA  who presents with the following condition requiring a hospital admission:    Patient is a 57 yr old male with past medical history that is significant for recent diagnosis of Lung CA, atrial fibrillation  cardiomyopathy here for weakness and near syncope. History was from patient . He became quite weak and felt dizzy this morning almost fainting. He did not hit his head. He recently started chemotherapy for the Lung Ca and has had two sessions. He had been eating well but voiding small amounts, he has not had a bowel movement in a week. Patient reports some mild fevers no chills. He denies any URI symptoms. No cough or shortness of breath.   His  cardiac history includes atrial fibrillation which is rate controlled and he is also on chronic anticoagulation .     Of note he was seen by his primary a couple of days ago and his blood pressure at the time was noted to be low, his metoprolol dose was reduced at the time.   On presentation to the ED, BPs was still on the low end of normal . It perked up with two liters of ringer's lactate. His labs were unremarkable but UA showed mild pyuria. He had mentioned that he had dysuria earlier on this week but that seemed to resolve.  He will be admitted under observation and started on antibiotics for his UTI . He will also be receiving IV fluids to help his hypotension.          Past Medical History:     Patient Active Problem List    Diagnosis Date Noted     Dehydration 01/19/2018     Priority: Medium     Cardiomyopathy (H) 01/10/2018     Priority: Medium     Acute on chronic systolic congestive heart failure (H) 01/10/2018     Priority: Medium     Weakness 01/08/2018     Priority: Medium     COPD exacerbation (H) 01/07/2018     Priority: Medium     Non-small cell carcinoma of lung (H) 12/28/2017     Priority: Medium     Initial CXR and CT chest abnormal on 12/7/2017 12/27/2017: oncology notes:PET scan showing a large FDG avid mediastinal mass extending to the left hilum with obliteration of the left upper lobe bronchus and atelectasis of the upper lobe.  There are also multiple left cervical left cervical and mediastinal adenopathy.  There is increased left pleural effusion.  There is hypermetabolic left adrenal lesion.  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  I reviewed with the patient today management of stage IV squamous cell lung cancer.       Lung mass 12/13/2017     Priority: Medium     Pleural effusion 12/13/2017     Priority: Medium     Mediastinal lymphadenopathy 12/13/2017     Priority: Medium     Atrial flutter (H) 11/16/2017      Priority: Medium     Onset 11/3/2017.  Seen in clinic and ED.       Perforated ulcer (HCC) 2015     Priority: Medium     Free intraperitoneal air 2015     Priority: Medium     CARDIOVASCULAR SCREENING; LDL GOAL LESS THAN 130 10/31/2010     Priority: Medium               Past Surgical History:      Past Surgical History:   Procedure Laterality Date     ANESTHESIA CARDIOVERSION N/A 2018    Procedure: ANESTHESIA CARDIOVERSION;  CARDIOVERSION (FOLLOWING KIMBERLY AT 0830);  Surgeon: GENERIC ANESTHESIA PROVIDER;  Location: [facility] OR     APPENDECTOMY      age 28s     INSERT PORT VASCULAR ACCESS N/A 1/3/2018    Procedure: INSERT PORT VASCULAR ACCESS;  Port-a-Cath Placement;  Surgeon: Tomas Crews MD;  Location: [facility]     SURGICAL HISTORY OF -   2004    Gastroscopy with biopsy for gastric ulcer             Social History:     Social History     Social History     Marital status:      Spouse name: Leonor Shultz     Number of children: 2     Years of education: N/A     Occupational History           Social History Main Topics     Smoking status: Former Smoker     Packs/day: 0.25     Years: 50.00     Start date: 2017     Smokeless tobacco: Never Used      Comment: quit smoking over the last few days.      Alcohol use No     Drug use: No     Sexual activity: Yes     Partners: Female     Other Topics Concern     Parent/Sibling W/ Cabg, Mi Or Angioplasty Before 65f 55m? No     Social History Narrative          Family History:     Family History   Problem Relation Age of Onset     CANCER Mother      pancreatic cancer,  at 38 years     Alzheimer Disease Father       at 84 years.        Allergies:     Allergies   Allergen Reactions     Nka [No Known Allergies]           Medications:     Prescriptions Prior to Admission   Medication Sig Dispense Refill Last Dose     metoprolol tartrate (LOPRESSOR) 50 MG tablet TAKE ONE TABLET BY MOUTH TWICE DAILY 60 tablet 1 2018 at  am     nicotine (NICODERM CQ) 21 MG/24HR 24 hr patch    1/19/2018 at Unknown time     morphine (MSIR) 15 MG IR tablet 1/2 to 1 pill every 4 hours as needed for air hunger. 30 tablet 0 1/19/2018 at 0830     omeprazole (PRILOSEC) 40 MG capsule Take 1 capsule (40 mg) by mouth daily Take 30-60 minutes before a meal. 30 capsule 11 1/19/2018 at am     amiodarone (PACERONE/CODARONE) 200 MG tablet Take 2 tablets twice daily x 1 week (1/12 - 1/19) and then start 1 tablet daily 1/20/18 45 tablet 1 1/19/2018 at am     LORazepam (ATIVAN) 0.5 MG tablet Take 1 tablet (0.5 mg) by mouth every 4 hours as needed (Anxiety, Nausea/Vomiting or Sleep) 30 tablet 5 Past Week at Unknown time     prochlorperazine (COMPAZINE) 10 MG tablet Take 1 tablet (10 mg) by mouth every 6 hours as needed (Nausea/Vomiting) 30 tablet 5 Past Week at Unknown time     ondansetron (ZOFRAN) 8 MG tablet Take 1 tablet (8 mg) by mouth every 8 hours as needed (Nausea/Vomiting) 10 tablet 5 Past Week at Unknown time     lidocaine-prilocaine (EMLA) cream Place a quarter sized amount of cream onto port area 30-60 minutes prior to port use. Do not rub in. Cover with tegaderm or saran wrap. 30 g 1 Past Week at Unknown time     megestrol (MEGACE ORAL) 40 MG/ML suspension Take 5 mLs (200 mg) by mouth daily 600 mL 2 Past Week at Unknown time     apixaban ANTICOAGULANT (ELIQUIS) 5 MG tablet Take 1 tablet (5 mg) by mouth 2 times daily 60 tablet 0 1/19/2018 at am     Probiotic Product (ALIGN) CHEW Take 1 chew tab by mouth 3 times daily as needed   Past Week at Unknown time     nicotine (NICODERM CQ) 14 MG/24HR 24 hr patch Place 1 patch onto the skin every 24 hours 14 patch 5 Taking     nicotine (NICODERM CQ) 7 MG/24HR 24 hr patch Place 1 patch onto the skin every 24 hours (Patient not taking: Reported on 1/17/2018) 30 patch 5 Not Taking        Review of Systems:   A 10 point review of systems was performed and all else is negative except as stated in the HPI         Physical  "Exam:   Initial vitals were reviewed   Blood pressure 97/71, pulse 63, temperature 95.8  F (35.4  C), temperature source Oral, resp. rate 20, height 1.803 m (5' 11\"), weight 69.7 kg (153 lb 10.6 oz), SpO2 98 %.  Constitutional:   awake, alert, cooperative, no apparent distress, sickly and gaunt     Eyes:   Lids and lashes normal, pupils equal, round and reactive to light, extra ocular muscles intact, sclera clear, conjunctiva normal   ENT:   normocepalic, without obvious abnormality   Neck:   supple, symmetrical, trachea midline   Hematologic / Lymphatic:   no cervical lymphadenopathy   Back:   symmetric   Lungs:   no increased work of breathing, moderate air exchange, no retractions and clear to auscultation   Cardiovascular:   Normal apical impulse, regular rate and rhythm, normal S1 and S2, no S3 or S4, and no murmur noted   Abdomen:   No scars, normal bowel sounds, soft, non-distended, non-tender, no masses palpated, no hepatosplenomegally   Genitounirinary:   deferred   Musculoskeletal:   no lower extremity pitting edema present  there is no redness, warmth, or swelling of the joints   Neurologic:   Mental Status Exam:  Level of Alertness:   awake  Orientation:   person, place, time  Memory:   normal  Fund of Knowledge:  normal  Attention/Concentration:  normal  Language:  normal   Skin:   no bruising or bleeding and normal skin color, texture, turgor          Data:   All laboratory data reviewed    Results for orders placed or performed during the hospital encounter of 01/19/18   XR Chest 2 Views    Narrative    XR CHEST 2 VW   1/19/2018 2:52 PM     HISTORY: hypotension;     COMPARISON: 1/3/2018      Impression    IMPRESSION: Left subclavian port is in satisfactory position with tip  in the SVC. There remains elevation of the left hemidiaphragm and  consolidation/atelectasis of the left upper lobe due to left hilar  mass.    Overall, no significant change.    REMINGTON ALEXIS MD   CBC with platelets " differential   Result Value Ref Range    WBC 1.9 (L) 4.0 - 11.0 10e9/L    RBC Count 4.12 (L) 4.4 - 5.9 10e12/L    Hemoglobin 12.1 (L) 13.3 - 17.7 g/dL    Hematocrit 35.9 (L) 40.0 - 53.0 %    MCV 87 78 - 100 fl    MCH 29.4 26.5 - 33.0 pg    MCHC 33.7 31.5 - 36.5 g/dL    RDW 12.1 10.0 - 15.0 %    Platelet Count 115 (L) 150 - 450 10e9/L    Diff Method Automated Method     % Neutrophils 42.9 %    % Lymphocytes 36.5 %    % Monocytes 20.6 %    % Eosinophils 0.0 %    % Basophils 0.0 %    % Immature Granulocytes 0.0 %    Absolute Neutrophil 0.8 (L) 1.6 - 8.3 10e9/L    Absolute Lymphocytes 0.7 (L) 0.8 - 5.3 10e9/L    Absolute Monocytes 0.4 0.0 - 1.3 10e9/L    Absolute Eosinophils 0.0 0.0 - 0.7 10e9/L    Absolute Basophils 0.0 0.0 - 0.2 10e9/L    Abs Immature Granulocytes 0.0 0 - 0.4 10e9/L    Reactive Lymphs Present     RBC Morphology Normal     Platelet Estimate       Automated count confirmed.  Platelet morphology is normal.   Basic metabolic panel   Result Value Ref Range    Sodium 138 133 - 144 mmol/L    Potassium 4.1 3.4 - 5.3 mmol/L    Chloride 105 94 - 109 mmol/L    Carbon Dioxide 22 20 - 32 mmol/L    Anion Gap 11 3 - 14 mmol/L    Glucose 150 (H) 70 - 99 mg/dL    Urea Nitrogen 25 7 - 30 mg/dL    Creatinine 1.07 0.66 - 1.25 mg/dL    GFR Estimate 71 >60 mL/min/1.7m2    GFR Estimate If Black 85 >60 mL/min/1.7m2    Calcium 8.0 (L) 8.5 - 10.1 mg/dL   Lactic acid whole blood   Result Value Ref Range    Lactic Acid 1.6 0.7 - 2.0 mmol/L   Troponin I   Result Value Ref Range    Troponin I ES <0.015 0.000 - 0.045 ug/L   NT pro BNP   Result Value Ref Range    N-Terminal Pro BNP Inpatient 2681 (H) 0 - 900 pg/mL   Blood gas venous   Result Value Ref Range    Ph Venous 7.38 7.32 - 7.43 pH    PCO2 Venous 44 40 - 50 mm Hg    PO2 Venous 23 (L) 25 - 47 mm Hg    Bicarbonate Venous 26 21 - 28 mmol/L    Base Excess Venous 0.2 mmol/L   UA reflex to Microscopic   Result Value Ref Range    Color Urine Yellow     Appearance Urine Slightly  Cloudy     Glucose Urine Negative NEG^Negative mg/dL    Bilirubin Urine Negative NEG^Negative    Ketones Urine Negative NEG^Negative mg/dL    Specific Gravity Urine 1.023 1.003 - 1.035    Blood Urine Negative NEG^Negative    pH Urine 6.0 5.0 - 7.0 pH    Protein Albumin Urine 10 (A) NEG^Negative mg/dL    Urobilinogen mg/dL 4.0 (H) 0.0 - 2.0 mg/dL    Nitrite Urine Negative NEG^Negative    Leukocyte Esterase Urine Negative NEG^Negative    Source Midstream Urine     RBC Urine 7 (H) 0 - 2 /HPF    WBC Urine 7 (H) 0 - 2 /HPF    Bacteria Urine Few (A) NEG^Negative /HPF    Squamous Epithelial /HPF Urine 1 0 - 1 /HPF    Mucous Urine Present (A) NEG^Negative /LPF    Hyaline Casts 9 (H) 0 - 2 /LPF   Blood culture   Result Value Ref Range    Specimen Description Blood Left Port     Special Requests Aerobic and anaerobic bottles received     Culture Micro No growth after 2 hours    Blood culture   Result Value Ref Range    Specimen Description Blood Right Arm     Special Requests DAVID     Culture Micro No growth after 1 hour             Attestation:  I have reviewed today's vital signs, notes, medications, labs and imaging.     Brett Navarro MD

## 2018-01-20 NOTE — DISCHARGE INSTRUCTIONS
[facility] Discharge Instructions     Discharge disposition:  Discharged to home       Diet:  Regular       Activity Activity as tolerated       Follow-up: Follow up with primary care provider within 2 weeks       Additional instructions:  follow up with cardiology as scheduled.

## 2018-01-20 NOTE — PLAN OF CARE
Problem: Patient Care Overview  Goal: Plan of Care/Patient Progress Review  Outcome: Improving  Pt's BP's continue to be 90s/60s. Denies any dizziness, lightheadedness, N/V. Pt had a BM. LS are clear. Pt gets dyspneic with exertion. On RA with O2 sat's in the upper 90s. VSS. Alert and oriented. Uses call light appropriately.

## 2018-01-22 NOTE — LETTER
Health Care Home - Access Care Plan    About Me  Patient Name:  Roque Saxena    YOB: 1960  Age:                             57 year old   Radha MRN:            [id number] Telephone Information:     Home Phone [phone]   Mobile [phone]       Address:    [address] [address] Email address:  No e-mail address on record      Emergency Contact(s)  Name Relationship Lgl Grd Work Phone Home Phone Mobile Phone   1. ELI EDIE * Other   [phone]   2. ROQUE SAXENA* Son   [phone]   3. ISIDRA HEMPHILL Friend   [phone]              Health Maintenance: Routine Health maintenance Reviewed: Due/Overdue   Health Maintenance Due   Topic Date Due     HF ACTION PLAN Q3 YR  1960     SPIROMETRY ONETIME  1960     COPD ACTION PLAN Q1 YR  1960     HEPATITIS C SCREENING  04/01/1976     LIPID MONITORING Q1 YEAR  02/21/2012     ADVANCE DIRECTIVE PLANNING Q5 YRS  04/01/2013     COLONOSCOPY Q10 YR  08/24/2014     INFLUENZA VACCINE (SYSTEM ASSIGNED)  09/01/2017         My Access Plan  Medical Emergency 911   Questions or concerns during clinic hours Primary Clinic Line, I will call the clinic directly: Primary Clinic: [facility]- [phone]   24 Hour Appointment Line [phone] or  [phone]) (toll free)   24 Hour Nurse Line [phone] (toll free)   Questions or concerns outside clinic hours 24 Hour Appointment Line, I will call the after-hours on-call line:   [facility] [phone] or [phone] ([phone]) (toll-free)   Preferred Urgent Care Preferred Urgent Care: [facility], [phone]   Preferred Hospital Preferred Hospital: [place], [place]  [phone]   Preferred Pharmacy [facility]     Behavioral Health Crisis Line The National Suicide Prevention Lifeline at 1-411.416.4795 or 911     My Care Team  Members  Patient Care Team       Relationship Specialty Notifications Start End    Kailash Mason MD PCP - [facility]  10/31/17     Phone: [phone] Fax: [phone] [address] [place] [address]    Joycelyn May MD MD Hematology & Oncology  12/13/17     Phone: [phone] Fax: [phone]         [address] [facility] [address]    Camille Lara, RN Case Manager Hematology & Oncology Admissions 1/9/18     Chiqui Marcum, RN Clinic Care Coordinator  Admissions 1/22/18     Phone: [phone] Fax: [phone]            My Medical and Care Information  Problem List   Patient Active Problem List   Diagnosis     CARDIOVASCULAR SCREENING; LDL GOAL LESS THAN 130     Perforated ulcer (HCC)     Free intraperitoneal air     Atrial flutter (H)     Lung mass     Pleural effusion     Mediastinal lymphadenopathy     Non-small cell carcinoma of lung (H)     COPD exacerbation (H)     Weakness     Cardiomyopathy (H)     Acute on chronic systolic congestive heart failure (H)     Dehydration      Current Medications and Allergies:  See printed Medication Report

## 2018-01-22 NOTE — DISCHARGE SUMMARY
"Discharge Summary    Zechariah Ashby MRN# [id number]   YOB: 1960 Age: 57 year old     Date of Admission:  1/19/2018  Date of Discharge:  1/20/2018 12:20 PM  Admitting Physician:  Brett Navarro MD  Discharge Physician:  Zechariah Butler MD  Discharging Service:  Hospitalist     Home clinic: [facility]  Primary Provider: Kailash Mason          Admission Diagnoses:   Dehydration [E86.0]  Antineoplastic chemotherapy induced pancytopenia (H) [D61.810, T45.1X5A]  Hypotension, unspecified hypotension type [I95.9]  uti  A fib       Discharge Diagnosis:   Dehydration [E86.0]  Antineoplastic chemotherapy induced pancytopenia (H) [D61.810, T45.1X5A]  Hypotension, unspecified hypotension type [I95.9]  uti  A fib         Discharge Disposition:   Discharged to home           Condition on Discharge:   Discharge condition: Stable   Discharge vitals: Blood pressure 107/73, pulse 56, temperature 97.7  F (36.5  C), temperature source Oral, resp. rate 20, height 5' 11\" (1.803 m), weight 151 lb 14.4 oz (68.9 kg), SpO2 98 %.     Code status on discharge: Full Code           Procedures / Labs / Imaging:   No procedures performed during this admission           Discharge Medications:     Discharge Medication List as of 1/20/2018 11:37 AM      CONTINUE these medications which have NOT CHANGED    Details   nicotine (NICODERM CQ) 21 MG/24HR 24 hr patch Historical      morphine (MSIR) 15 MG IR tablet 1/2 to 1 pill every 4 hours as needed for air hunger., Disp-30 tablet, R-0, Local Print      omeprazole (PRILOSEC) 40 MG capsule Take 1 capsule (40 mg) by mouth daily Take 30-60 minutes before a meal., Disp-30 capsule, R-11, E-Prescribe      nicotine (NICODERM CQ) 14 MG/24HR 24 hr patch Place 1 patch onto the skin every 24 hours, Disp-14 patch, R-5, [id number] patches once daily for 2 weeks and finally 7mg patches once daily for 2 weeks.      nicotine (NICODERM CQ) 7 MG/24HR 24 hr patch Place 1 " "patch onto the skin every 24 hours, Disp-30 patch, R-5, [id number] patches once daily for 2 weeks and finally 7mg patches once daily for 2 weeks.      amiodarone (PACERONE/CODARONE) 200 MG tablet Take 2 tablets twice daily x 1 week (1/12 - 1/19) and then start 1 tablet daily 1/20/18, Disp-45 tablet, R-1, E-Prescribe      LORazepam (ATIVAN) 0.5 MG tablet Take 1 tablet (0.5 mg) by mouth every 4 hours as needed (Anxiety, Nausea/Vomiting or Sleep), Disp-30 tablet, R-5, Local Print      prochlorperazine (COMPAZINE) 10 MG tablet Take 1 tablet (10 mg) by mouth every 6 hours as needed (Nausea/Vomiting), Disp-30 tablet, R-5, E-Prescribe      ondansetron (ZOFRAN) 8 MG tablet Take 1 tablet (8 mg) by mouth every 8 hours as needed (Nausea/Vomiting), Disp-10 tablet, R-5, E-Prescribe      lidocaine-prilocaine (EMLA) cream Place a quarter sized amount of cream onto port area 30-60 minutes prior to port use. Do not rub in. Cover with tegaderm or saran wrap.Disp-30 g, [id number]      megestrol (MEGACE ORAL) 40 MG/ML suspension Take 5 mLs (200 mg) by mouth daily, Disp-600 mL, R-2, E-Prescribe      apixaban ANTICOAGULANT (ELIQUIS) 5 MG tablet Take 1 tablet (5 mg) by mouth 2 times daily, Disp-60 tablet, R-0, E-Prescribe      Probiotic Product (ALIGN) CHEW Take 1 chew tab by mouth 3 times daily as needed, Historical         STOP taking these medications       metoprolol tartrate (LOPRESSOR) 50 MG tablet Comments:   Reason for Stopping:                     Consultations:   No consultations were requested during this admission             Brief History of Illness:             Hospital Course:   It was felt beta blocker was contibuting to hypotesion.  Since loaded with pacerone had not had any tachycardia so metoprolol was stopped.       Physical exam     /73 (BP Location: Right arm)  Pulse 56  Temp 97.7  F (36.5  C) (Oral)  Resp 20  Ht 5' 11\" (1.803 m)  Wt 151 lb 14.4 oz (68.9 kg)  SpO2 98%  BMI 21.19 kg/m2  General: " healthy,alert,no distress  HENT: Normocephalic. TM's grossly normal, oropharynx without significant findings.  Neck: supple,without thyromegaly or thyroid nodularity,without cervical or jugular adenopathy  Lungs: clear of wheezes or rales  Heart:RRR. No murmurs, clicks gallops or rub}  Abdomen: non tender nondistended, active bowel sounds no organomegally  Extremities:extremities normal- no gross deformities noted, normal range of motion, no edema         Significant Results:   None             Pending Results:   None           Discharge Instructions and Follow-Up:   Discharge diet: Regular   Discharge activity: Activity as tolerated   Discharge follow-up: Follow up with primary care provider within 7 days

## 2018-01-22 NOTE — LETTER
[place] CARE COORDINATION  [place] [id number] [place] [address]  [phone]          January 22, 2018        Zechariah Ashby  [address] [address]      Dear Zechariah,    I am a clinic care coordinator who works with Kailash Mason MD at [facility]. I have been trying to reach you recently to introduce Clinic Care Coordination and to see if there was anything I could assist you with.  I wanted to introduce myself and provide you with my contact information so that you can call me with questions or concerns about your health care. Below is a description of clinic care coordination and how I can further assist you.     The clinic care coordinator is a registered nurse and/or  who understand the health care system. The goal of clinic care coordination is to help you manage your health and improve access to the [facility] system in the most efficient manner. The registered nurse can assist you in meeting your health care goals by providing education, coordinating services, and strengthening the communication among your providers. The  can assist you with financial, behavioral, psychosocial, chemical dependency, counseling, and/or psychiatric resources.    Please feel free to contact me at [phone], with any questions or concerns. We at [facility] are focused on providing you with the highest-quality healthcare experience possible and that all starts with you.     Sincerely,     Chiqui Marcum    Enclosed: I have enclosed a copy of a 24 Hour Access Plan. This has helpful phone numbers for you to call when needed. Please keep this in an easy to access place to use as needed.

## 2018-01-24 NOTE — PROGRESS NOTES
Clinic Care Coordination Contact  [facility]/Voicemail    Referral Source: [facility]    Clinical Data: Care Coordinator Outreach, d/c [place] [facility] [place]20[place] for dehydration from chemotherapy.    Outreach attempted x 2.  Left message on voicemail with call back information and requested return call.    Plan: Care Coordinator mailed out care coordination introduction letter on 1/22/18. Care Coordinator will do no further outreaches at this time.    Chiqui Marcum RN, [facility]  RN Care Coordinator  [facility], [place], [facility]  Phone # [phone]  1/24/2018 1:49 PM

## 2018-01-25 NOTE — MR AVS SNAPSHOT
After Visit Summary   1/25/2018    Zechariah Ashby    MRN: [id number]           Patient Information     Date Of Birth          1960        Visit Information        Provider Department      1/25/2018 8:00 AM ROOM 1 [facility]        Today's Diagnoses     Non-small cell carcinoma of lung (H)    -  1       Follow-ups after your visit        Your next 10 appointments already scheduled     Feb 01, 2018  8:00 AM CST   Level 1 with ROOM 9 [facility] ([facility])    [facility]  [address]  [facility] [address]   [phone]            Feb 05, 2018  8:20 AM CST   [facility]    [address] [place] [address] [phone]           Bring a current list of meds and any records pertaining to this visit. For Physicals, please bring immunization records and any forms needing to be filled out. Please arrive 10 minutes early to complete paperwork.            Feb 13, 2018 10:45 AM CST   (Arrive by 10:30 AM)   CT CHEST/ABDOMEN/PELVIS W CONTRAST with WYCT1   [facility] CT ([facility])    [address]  [facility] [address]   [phone]           Please bring any scans or X-rays taken at other hospitals, if similar tests were done. Also bring a list of your medicines, including vitamins, minerals and over-the-counter drugs. It is safest to leave personal items at home.  Be sure to tell your doctor:   If you have any allergies.   If there s any chance you are pregnant.   If you are breastfeeding.   If you have any special needs.  You may have contrast for this exam. To prepare:   Do not eat or drink for 2 hours before your exam. If you need to take medicine, you may take it with small sips of water. (We may ask you to take liquid medicine as well.)   The day before your exam, drink extra fluids at  least six 8-ounce glasses (unless your doctor tells you to restrict your fluids).  Patients over 70 or patients with diabetes or kidney problems:   If you haven t had a blood test (creatinine test) within the last 30 days, go to your clinic or Diagnostic Imaging Department for this test.  If you have diabetes:   If your kidney function is normal, continue taking your metformin (Avandamet, Glucophage, Glucovance, Metaglip) on the day of your exam.   If your kidney function is abnormal, wait 48 hours before restarting this medicine.  You will have oral contrast for this exam:   You will drink the contrast at home. Get this from your clinic or Diagnostic Imaging Department. Please follow the directions given.  Please wear loose clothing, such as a sweat suit or jogging clothes. Avoid snaps, zippers and other metal. We may ask you to undress and put on a hospital gown.  If you have any questions, please call the Imaging Department where you will have your exam.            Feb 13, 2018 11:30 AM CST   Return Visit with Daksha Montemayor PA-C   [facility] ([facility])    [address] [facility] [address]   [phone]            Feb 15, 2018  8:00 AM CST   Level 3 with ROOM 10 [facility] ([facility])    [facility] [address]  [facility] [address]   [phone]            Feb 15, 2018  9:00 AM CST   Return Visit with Joycelyn May MD   [facility] ([facility])    [facility]  [address]  [facility] [address]   [phone]            Feb 22, 2018  8:00 AM CST   Level 1 with ROOM 9 [facility] ([facility])    [facility] [place] [address]  [facility] [address]   [phone]              Future tests that were ordered for you today     Open Future Orders         "Priority Expected Expires Ordered    CT Chest/Abdomen/Pelvis w Contrast Routine 2018            Who to contact     If you have questions or need follow up information about today's clinic visit or your schedule please contact [facility] directly at [phone].  Normal or non-critical lab and imaging results will be communicated to you by MyChart, letter or phone within 4 business days after the clinic has received the results. If you do not hear from us within 7 days, please contact the clinic through Seeker-Industrieshart or phone. If you have a critical or abnormal lab result, we will notify you by phone as soon as possible.  Submit refill requests through Surefield or call your pharmacy and they will forward the refill request to us. Please allow 3 business days for your refill to be completed.          Additional Information About Your Visit        MyChart Information     Surefield lets you send messages to your doctor, view your test results, renew your prescriptions, schedule appointments and more. To sign up, go to [email or web address] . Click on \"Log in\" on the left side of the screen, which will take you to the Welcome page. Then click on \"Sign up Now\" on the right side of the page.     You will be asked to enter the access code listed below, as well as some personal information. Please follow the directions to create your username and password.     Your access code is: [email or web address]  Expires: 2018  4:44 PM     Your access code will  in 90 days. If you need help or a new code, please call your [facility] clinic or [phone].        Care EveryWhere ID     This is your Care EveryWhere ID. This could be used by other organizations to access your [facility] medical records  [id number]        Your Vitals Were     Pulse                   75            Blood Pressure from Last 3 Encounters:   18 105/74   18 121/75   18 107/73    Weight from Last 3 Encounters: "   01/25/18 71.2 kg (157 lb)   01/20/18 68.9 kg (151 lb 14.4 oz)   01/17/18 69.6 kg (153 lb 6.4 oz)              We Performed the Following     CBC with platelets differential     Creatinine        Primary Care Provider Office Phone # Fax #    Kailash Mason [phone] [address] [place] [address]        Equal Access to Services     ROD PERRY : Hadii aad ku hadasho Soomaali, waaxda luqadaha, qaybta kaalmada adeegyada, waxay idiin hayaan adeeg margot lalincoln . So [facility] [phone].    ATENCIÓN: Si teela jarred, tiene a morocho disposición servicios gratuitos de asistencia lingüística. Llame al [phone].    We comply with applicable federal civil rights laws and [place] laws. We do not discriminate on the basis of race, color, national origin, age, disability, sex, sexual orientation, or gender identity.            Thank you!     Thank you for choosing [facility]  for your care. Our goal is always to provide you with excellent care. Hearing back from our patients is one way we can continue to improve our services. Please take a few minutes to complete the written survey that you may receive in the mail after your visit with us. Thank you!             Your Updated Medication List - Protect others around you: Learn how to safely use, store and throw away your medicines at www.disposemymeds.org.          This list is accurate as of 1/25/18  1:33 PM.  Always use your most recent med list.                   Brand Name Dispense Instructions for use Diagnosis    ALIGN Chew      Take 1 chew tab by mouth 3 times daily as needed        amiodarone 200 MG tablet    PACERONE/CODARONE    45 tablet    Take 2 tablets twice daily x 1 week (1/12 - 1/19) and then start 1 tablet daily 1/20/18    Paroxysmal atrial fibrillation (H)       apixaban ANTICOAGULANT 5 MG tablet    ELIQUIS    60 tablet    Take 1 tablet (5 mg) by mouth 2 times daily    Typical atrial flutter (H)        lidocaine-prilocaine cream    EMLA    30 g    Place a quarter sized amount of cream onto port area 30-60 minutes prior to port use. Do not rub in. Cover with tegaderm or saran wrap.    Non-small cell carcinoma of lung (H)       LORazepam 0.5 MG tablet    ATIVAN    30 tablet    Take 1 tablet (0.5 mg) by mouth every 4 hours as needed (Anxiety, Nausea/Vomiting or Sleep)    Non-small cell carcinoma of lung (H)       megestrol 40 MG/ML suspension    MEGACE ORAL    600 mL    Take 5 mLs (200 mg) by mouth daily    Non-small cell carcinoma of lung (H)       morphine 15 MG IR tablet    MSIR    30 tablet    1/2 to 1 pill every 4 hours as needed for air hunger.    Non-small cell carcinoma of lung (H)       * nicotine 21 MG/24HR 24 hr patch    NICODERM CQ          * nicotine 14 MG/24HR 24 hr patch    NICODERM CQ    14 patch    Place 1 patch onto the skin every 24 hours    Encounter for tobacco use cessation counseling, Tobacco abuse       * nicotine 7 MG/24HR 24 hr patch    NICODERM CQ    30 patch    Place 1 patch onto the skin every 24 hours    Tobacco abuse       omeprazole 40 MG capsule    priLOSEC    30 capsule    Take 1 capsule (40 mg) by mouth daily Take 30-60 minutes before a meal.    Gastroesophageal reflux disease without esophagitis       ondansetron 8 MG tablet    ZOFRAN    10 tablet    Take 1 tablet (8 mg) by mouth every 8 hours as needed (Nausea/Vomiting)    Non-small cell carcinoma of lung (H)       prochlorperazine 10 MG tablet    COMPAZINE    30 tablet    Take 1 tablet (10 mg) by mouth every 6 hours as needed (Nausea/Vomiting)    Non-small cell carcinoma of lung (H)       * Notice:  This list has 3 medication(s) that are the same as other medications prescribed for you. Read the directions carefully, and ask your doctor or other care provider to review them with you.

## 2018-01-25 NOTE — MR AVS SNAPSHOT
After Visit Summary   1/25/2018    Zechariah Ashby    MRN: [id number]           Patient Information     Date Of Birth          1960        Visit Information        Provider Department      1/25/2018 9:00 AM Joycelyn May MD [facility] ONCOLOG[facility]      Today's Diagnoses     Non-small cell carcinoma of lung (H)    -  1       Follow-ups after your visit        Follow-up notes from your care team     Return in about 3 weeks (around 2/15/2018) for Imaging ordered before next appointment, Schedule for chemotherapy as per treatment plan.      Your next 10 appointments already scheduled     Feb 01, 2018  8:00 AM CST   Level 1 with ROOM 9 [facility] ([facility])    [facility]  [address]  [facility] [address]   [phone]            Feb 05, 2018  8:20 AM CST   Office Visit with Kailash Mason MD   [facility] ([facility])    [address] [place] [address] [phone]           Bring a current list of meds and any records pertaining to this visit. For Physicals, please bring immunization records and any forms needing to be filled out. Please arrive 10 minutes early to complete paperwork.            Feb 13, 2018 10:45 AM CST   (Arrive by 10:30 AM)   CT CHEST/ABDOMEN/PELVIS W CONTRAST with WYCT1   [facility] CT ([facility])    [address] [place]  [facility] [address]   [phone]           Please bring any scans or X-rays taken at other hospitals, if similar tests were done. Also bring a list of your medicines, including vitamins, minerals and over-the-counter drugs. It is safest to leave personal items at home.  Be sure to tell your doctor:   If you have any allergies.   If there s any chance you are pregnant.   If you are breastfeeding.   If you have any special needs.  You may have contrast for this exam. To prepare:   Do not eat or drink  for 2 hours before your exam. If you need to take medicine, you may take it with small sips of water. (We may ask you to take liquid medicine as well.)   The day before your exam, drink extra fluids at least six 8-ounce glasses (unless your doctor tells you to restrict your fluids).  Patients over 70 or patients with diabetes or kidney problems:   If you haven t had a blood test (creatinine test) within the last 30 days, go to your clinic or Diagnostic Imaging Department for this test.  If you have diabetes:   If your kidney function is normal, continue taking your metformin (Avandamet, Glucophage, Glucovance, Metaglip) on the day of your exam.   If your kidney function is abnormal, wait 48 hours before restarting this medicine.  You will have oral contrast for this exam:   You will drink the contrast at home. Get this from your clinic or Diagnostic Imaging Department. Please follow the directions given.  Please wear loose clothing, such as a sweat suit or jogging clothes. Avoid snaps, zippers and other metal. We may ask you to undress and put on a hospital gown.  If you have any questions, please call the Imaging Department where you will have your exam.            Feb 13, 2018 11:30 AM CST   Return Visit with Daksha Montemayor PA-C   [facility] ([facility])    [address] [facility] [address]   [phone]            Feb 15, 2018  8:00 AM CST   Level 3 with ROOM 10 [facility] ([facility])    [facility]  [address]  [facility] [address]   [phone]            Feb 15, 2018  9:00 AM CST   Return Visit with Joycelyn May MD   [facility] ([facility])    [facility]  [address]  [facility] [address]   [phone]            Feb 22, 2018  8:00 AM CST   Level 1 with ROOM 9 [facility] ([facility]  "[facility])    [facility]  [address] [place] [address]  [facility] [address]   [phone]              Future tests that were ordered for you today     Open Future Orders        Priority Expected Expires Ordered    CT Chest/Abdomen/Pelvis w Contrast Routine 2018            Who to contact     If you have questions or need follow up information about today's clinic visit or your schedule please contact [facility] directly at [phone].  Normal or non-critical lab and imaging results will be communicated to you by AntFarmhart, letter or phone within 4 business days after the clinic has received the results. If you do not hear from us within 7 days, please contact the clinic through [employer] or phone. If you have a critical or abnormal lab result, we will notify you by phone as soon as possible.  Submit refill requests through CoinBatch or call your pharmacy and they will forward the refill request to us. Please allow 3 business days for your refill to be completed.          Additional Information About Your Visit        CoinBatch Information     CoinBatch lets you send messages to your doctor, view your test results, renew your prescriptions, schedule appointments and more. To sign up, go to [email or web address] . Click on \"Log in\" on the left side of the screen, which will take you to the Welcome page. Then click on \"Sign up Now\" on the right side of the page.     You will be asked to enter the access code listed below, as well as some personal information. Please follow the directions to create your username and password.     Your access code is: [email or web address]  Expires: 2018  4:44 PM     Your access code will  in 90 days. If you need help or a new code, please call your [facility] or [phone].        Care EveryWhere ID     This is your Care EveryWhere ID. This could be used by other organizations to access your [place] medical " "records  [id number]        Your Vitals Were     Pulse Temperature Respirations Height Pulse Oximetry BMI (Body Mass Index)    80 98.1  F (36.7  C) (Tympanic) 20 1.803 m (5' 10.98\") 96% 21.91 kg/m2       Blood Pressure from Last 3 Encounters:   01/25/18 105/74   01/20/18 107/73   01/17/18 (!) 80/55    Weight from Last 3 Encounters:   01/25/18 71.2 kg (157 lb)   01/20/18 68.9 kg (151 lb 14.4 oz)   01/17/18 69.6 kg (153 lb 6.4 oz)               Primary Care Provider Office Phone # Fax #    Kailash Mason [phone] [address] [place] [address]        Equal Access to Services     [facility] California[facility]HL[facility] : Hadii arnol garibay Soasher, waaxda luqteressa, qaybta kaalmaturner so, harley carrasquillo . So [facility] [phone].    ATENCIÓN: Si habla español, tiene a morocho disposición servicios gratuitos de asistencia lingüística. Lauriame al [phone].    We comply with applicable federal civil rights laws and [place] laws. We do not discriminate on the basis of race, color, national origin, age, disability, sex, sexual orientation, or gender identity.            Thank you!     Thank you for choosing [facility]  for your care. Our goal is always to provide you with excellent care. Hearing back from our patients is one way we can continue to improve our services. Please take a few minutes to complete the written survey that you may receive in the mail after your visit with us. Thank you!             Your Updated Medication List - Protect others around you: Learn how to safely use, store and throw away your medicines at www.disposemymeds.org.          This list is accurate as of 1/25/18  9:52 AM.  Always use your most recent med list.                   Brand Name Dispense Instructions for use Diagnosis    ALIGN Chew      Take 1 chew tab by mouth 3 times daily as needed        amiodarone 200 MG tablet    PACERONE/CODARONE    45 tablet    Take 2 tablets twice daily x 1 week " (1/12 - 1/19) and then start 1 tablet daily 1/20/18    Paroxysmal atrial fibrillation (H)       apixaban ANTICOAGULANT 5 MG tablet    ELIQUIS    60 tablet    Take 1 tablet (5 mg) by mouth 2 times daily    Typical atrial flutter (H)       lidocaine-prilocaine cream    EMLA    30 g    Place a quarter sized amount of cream onto port area 30-60 minutes prior to port use. Do not rub in. Cover with tegaderm or saran wrap.    Non-small cell carcinoma of lung (H)       LORazepam 0.5 MG tablet    ATIVAN    30 tablet    Take 1 tablet (0.5 mg) by mouth every 4 hours as needed (Anxiety, Nausea/Vomiting or Sleep)    Non-small cell carcinoma of lung (H)       megestrol 40 MG/ML suspension    MEGACE ORAL    600 mL    Take 5 mLs (200 mg) by mouth daily    Non-small cell carcinoma of lung (H)       morphine 15 MG IR tablet    MSIR    30 tablet    1/2 to 1 pill every 4 hours as needed for air hunger.    Non-small cell carcinoma of lung (H)       * nicotine 21 MG/24HR 24 hr patch    NICODERM CQ          * nicotine 14 MG/24HR 24 hr patch    NICODERM CQ    14 patch    Place 1 patch onto the skin every 24 hours    Encounter for tobacco use cessation counseling, Tobacco abuse       * nicotine 7 MG/24HR 24 hr patch    NICODERM CQ    30 patch    Place 1 patch onto the skin every 24 hours    Tobacco abuse       omeprazole 40 MG capsule    priLOSEC    30 capsule    Take 1 capsule (40 mg) by mouth daily Take 30-60 minutes before a meal.    Gastroesophageal reflux disease without esophagitis       ondansetron 8 MG tablet    ZOFRAN    10 tablet    Take 1 tablet (8 mg) by mouth every 8 hours as needed (Nausea/Vomiting)    Non-small cell carcinoma of lung (H)       prochlorperazine 10 MG tablet    COMPAZINE    30 tablet    Take 1 tablet (10 mg) by mouth every 6 hours as needed (Nausea/Vomiting)    Non-small cell carcinoma of lung (H)       * Notice:  This list has 3 medication(s) that are the same as other medications prescribed for you. Read the  directions carefully, and ask your doctor or other care provider to review them with you.

## 2018-01-25 NOTE — NURSING NOTE
"Oncology Rooming Note    January 25, 2018 8:46 AM   Zechariah Ashby is a 57 year old male who presents for:    Chief Complaint   Patient presents with     Oncology Clinic Visit     Recheck Non-small cell carcinoma of lung, post hospital follow up      Initial Vitals: /74 (BP Location: Right arm, Patient Position: Sitting, Cuff Size: Adult Regular)  Pulse 80  Temp 98.1  F (36.7  C) (Tympanic)  Resp 20  Ht 1.803 m (5' 10.98\")  Wt 71.2 kg (157 lb)  SpO2 96%  BMI 21.91 kg/m2 Estimated body mass index is 21.91 kg/(m^2) as calculated from the following:    Height as of this encounter: 1.803 m (5' 10.98\").    Weight as of this encounter: 71.2 kg (157 lb). Body surface area is 1.89 meters squared.  No Pain (0) Comment: Data Unavailable   No LMP for male patient.  Allergies reviewed: Yes  Medications reviewed: Yes    Medications: Medication refills not needed today.  Pharmacy name entered into MobiWork:      [id number] [address]    Clinical concerns:  Recheck Non-small cell carcinoma of lung, post hospital follow up     1. Estefani Armendariz ex wife has many questions for Dr. May.     7 minutes for nursing intake (face to face time)     Carmen Barreto [facility]              "

## 2018-01-25 NOTE — PATIENT INSTRUCTIONS
We would like to see you back in clinic with Dr. May in 3 weeks with CT scan prior and chemotherapy to be scheduled per treatment plan.  When you are in need of a refill, please call your pharmacy and they will send us a request.    Copy of appointments, and after visit summary (AVS) given to patient.  If you have any questions during business hours (M-F 8 AM- 4PM), please call Camille Lara RN, BSN, OCN Oncology Hematology /Breast Cancer Navigator at [facility] [phone].   For questions after business hours, or on holidays/weekends, please call our after hours Nurse Triage line [phone]. Thank you.

## 2018-01-25 NOTE — LETTER
1/25/2018         RE: Zechariah Ashby  [address] [facility] [id number]        Dear Colleague,    Thank you for referring your patient, Zechariah Ashby, to the [facility]. Please see a copy of my visit note below.    Hematology/ Oncology Follow-up Visit:  Jan 25, 2018    Reason for Visit:   Chief Complaint   Patient presents with     Oncology Clinic Visit     Recheck Non-small cell carcinoma of lung, post hospital follow up        Oncologic History:  Metastatic squamous cell carcinoma.    Interval History:  Patient is here today for follow-up.  He had his first cycle of chemotherapy was carboplatin and gemcitabine.  First cycle of chemotherapy was complicated with severe nausea and dehydration.  He was hospitalized because of atrial flutter.  Is gradually is getting better with no shortness of breath or cough or wheezing.  His appetite has improved.  Denies any chest pain.    Review Of Systems:  Constitutional: Negative for fever, chills, and night sweats.  Fatigue  Skin: negative.  Eyes: negative.  Ears/Nose/Throat: negative.  Respiratory: No shortness of breath, dyspnea on exertion, cough, or hemoptysis.  Cardiovascular: negative.  Gastrointestinal: negative.  Genitourinary: negative.  Musculoskeletal: negative.  Neurologic: negative.  Psychiatric: negative.  Hematologic/Lymphatic/Immunologic: negative.  Endocrine: negative.    All other ROS negative unless mentioned in interval history.    Past medical, social, surgical, and family histories reviewed.    Allergies:  Allergies as of 01/25/2018 - Osmel as Reviewed 01/25/2018   Allergen Reaction Noted     Nka [no known allergies]  01/03/2018       Current Medications:  Current Outpatient Prescriptions   Medication Sig Dispense Refill     amiodarone (PACERONE/CODARONE) 200 MG tablet Take 2 tablets twice daily x 1 week (1/12 - 1/19) and then start 1 tablet daily 1/20/18 45 tablet 1     nicotine (NICODERM CQ) 21 MG/24HR 24 hr patch         "morphine (MSIR) 15 MG IR tablet 1/2 to 1 pill every 4 hours as needed for air hunger. 30 tablet 0     omeprazole (PRILOSEC) 40 MG capsule Take 1 capsule (40 mg) by mouth daily Take 30-60 minutes before a meal. 30 capsule 11     nicotine (NICODERM CQ) 14 MG/24HR 24 hr patch Place 1 patch onto the skin every 24 hours 14 patch 5     nicotine (NICODERM CQ) 7 MG/24HR 24 hr patch Place 1 patch onto the skin every 24 hours (Patient not taking: Reported on 1/17/2018) 30 patch 5     LORazepam (ATIVAN) 0.5 MG tablet Take 1 tablet (0.5 mg) by mouth every 4 hours as needed (Anxiety, Nausea/Vomiting or Sleep) 30 tablet 5     prochlorperazine (COMPAZINE) 10 MG tablet Take 1 tablet (10 mg) by mouth every 6 hours as needed (Nausea/Vomiting) 30 tablet 5     ondansetron (ZOFRAN) 8 MG tablet Take 1 tablet (8 mg) by mouth every 8 hours as needed (Nausea/Vomiting) 10 tablet 5     lidocaine-prilocaine (EMLA) cream Place a quarter sized amount of cream onto port area 30-60 minutes prior to port use. Do not rub in. Cover with tegaderm or saran wrap. 30 g 1     megestrol (MEGACE ORAL) 40 MG/ML suspension Take 5 mLs (200 mg) by mouth daily 600 mL 2     apixaban ANTICOAGULANT (ELIQUIS) 5 MG tablet Take 1 tablet (5 mg) by mouth 2 times daily 60 tablet 0     Probiotic Product (ALIGN) CHEW Take 1 chew tab by mouth 3 times daily as needed          Physical Exam:  /74 (BP Location: Right arm, Patient Position: Sitting, Cuff Size: Adult Regular)  Pulse 80  Temp 98.1  F (36.7  C) (Tympanic)  Resp 20  Ht 1.803 m (5' 10.98\")  Wt 71.2 kg (157 lb)  SpO2 96%  BMI 21.91 kg/m2  Wt Readings from Last 12 Encounters:   01/25/18 71.2 kg (157 lb)   01/20/18 68.9 kg (151 lb 14.4 oz)   01/17/18 69.6 kg (153 lb 6.4 oz)   01/15/18 69.2 kg (152 lb 9.6 oz)   01/12/18 67.7 kg (149 lb 4.8 oz)   01/12/18 67.2 kg (148 lb 1.6 oz)   01/11/18 67.2 kg (148 lb 1.6 oz)   01/09/18 68.6 kg (151 lb 3.8 oz)   01/03/18 67.6 kg (149 lb)   12/27/17 67.6 kg (149 lb) "   12/27/17 67.9 kg (149 lb 9.6 oz)   12/13/17 71.4 kg (157 lb 4.8 oz)     ECOG performance status: 1  GENERAL APPEARANCE: Healthy, alert and in no acute distress.  HEENT: Sclerae anicteric. PERRLA. Oropharynx without ulcers, lesions, or thrush.  NECK: Supple. No asymmetry or masses.  LYMPHATICS: No palpable cervical, supraclavicular, axillary, or inguinal lymphadenopathy.  RESP: Lungs clear to auscultation bilaterally without rales, rhonchi or wheezes.  CARDIOVASCULAR: Regular rate and rhythm. Normal S1, S2; no S3 or S4. No murmur, gallop, or rub.  ABDOMEN: Soft, nontender. Bowel sounds normal. No palpable organomegaly or masses.  MUSCULOSKELETAL: Extremities without gross deformities noted. No edema of bilateral lower extremities.  SKIN: No suspicious lesions or rashes.  NEURO: Alert and oriented x 3. Cranial nerves II-XII grossly intact.  PSYCHIATRIC: Mentation and affect appear normal.    Laboratory/Imaging Studies:  Infusion Therapy Visit on 01/25/2018   Component Date Value Ref Range Status     WBC 01/25/2018 5.0  4.0 - 11.0 10e9/L Final     RBC Count 01/25/2018 3.93* 4.4 - 5.9 10e12/L Final     Hemoglobin 01/25/2018 11.4* 13.3 - 17.7 g/dL Final     Hematocrit 01/25/2018 34.7* 40.0 - 53.0 % Final     MCV 01/25/2018 88  78 - 100 fl Final     MCH 01/25/2018 29.0  26.5 - 33.0 pg Final     MCHC 01/25/2018 32.9  31.5 - 36.5 g/dL Final     RDW 01/25/2018 12.7  10.0 - 15.0 % Final     Platelet Count 01/25/2018 379  150 - 450 10e9/L Final     Diff Method 01/25/2018 Automated Method   Final     % Neutrophils 01/25/2018 58.8  % Final     % Lymphocytes 01/25/2018 26.0  % Final     % Monocytes 01/25/2018 14.6  % Final     % Eosinophils 01/25/2018 0.2  % Final     % Basophils 01/25/2018 0.2  % Final     % Immature Granulocytes 01/25/2018 0.2  % Final     Absolute Neutrophil 01/25/2018 2.9  1.6 - 8.3 10e9/L Final     Absolute Lymphocytes 01/25/2018 1.3  0.8 - 5.3 10e9/L Final     Absolute Monocytes 01/25/2018 0.7  0.0 - 1.3  10e9/L Final     Absolute Eosinophils 01/25/2018 0.0  0.0 - 0.7 10e9/L Final     Absolute Basophils 01/25/2018 0.0  0.0 - 0.2 10e9/L Final     Abs Immature Granulocytes 01/25/2018 0.0  0 - 0.4 10e9/L Final     Creatinine 01/25/2018 0.90  0.66 - 1.25 mg/dL Final     GFR Estimate 01/25/2018 86  >60 mL/min/1.7m2 Final    Non  GFR Calc     GFR Estimate If Black 01/25/2018 >90  >60 mL/min/1.7m2 Final    African American GFR Calc   Infusion Therapy Visit on 01/12/2018   Component Date Value Ref Range Status     WBC 01/12/2018 12.6* 4.0 - 11.0 10e9/L Final     RBC Count 01/12/2018 4.47  4.4 - 5.9 10e12/L Final     Hemoglobin 01/12/2018 13.1* 13.3 - 17.7 g/dL Final     Hematocrit 01/12/2018 38.7* 40.0 - 53.0 % Final     MCV 01/12/2018 87  78 - 100 fl Final     MCH 01/12/2018 29.3  26.5 - 33.0 pg Final     MCHC 01/12/2018 33.9  31.5 - 36.5 g/dL Final     RDW 01/12/2018 12.0  10.0 - 15.0 % Final     Platelet Count 01/12/2018 115* 150 - 450 10e9/L Final     Diff Method 01/12/2018 Automated Method   Final     % Neutrophils 01/12/2018 90.7  % Final     % Lymphocytes 01/12/2018 4.7  % Final     % Monocytes 01/12/2018 4.4  % Final     % Eosinophils 01/12/2018 0.0  % Final     % Basophils 01/12/2018 0.0  % Final     % Immature Granulocytes 01/12/2018 0.2  % Final     Absolute Neutrophil 01/12/2018 11.4* 1.6 - 8.3 10e9/L Final     Absolute Lymphocytes 01/12/2018 0.6* 0.8 - 5.3 10e9/L Final     Absolute Monocytes 01/12/2018 0.6  0.0 - 1.3 10e9/L Final     Absolute Eosinophils 01/12/2018 0.0  0.0 - 0.7 10e9/L Final     Absolute Basophils 01/12/2018 0.0  0.0 - 0.2 10e9/L Final     Abs Immature Granulocytes 01/12/2018 0.0  0 - 0.4 10e9/L Final        Recent Results (from the past 744 hour(s))   MR Brain w/o & w Contrast    Narrative    MRI BRAIN WITHOUT AND WITH CONTRAST  12/29/2017 1:11 PM    HISTORY:  Check for metastatic disease. Mediastinal lymphadenopathy.  Non-small cell carcinoma of lung (H).    TECHNIQUE:   Multiplanar, multisequence MRI of the brain without and  with 7 mL Gadavist.    COMPARISON: None.    FINDINGS:  There are a few tiny foci of prolonged T2 relaxation in the  white matter of the cerebral hemispheres, probably early sequelae of  small vessel ischemic disease but nonspecific as to etiology.  Ventricles and subarachnoid spaces appear normal.  There is no  evidence of hemorrhage, mass, acute infarct, or anomaly.  There are no  gadolinium enhancing lesions.  No bone lesions are seen.    The facial structures appear normal. The arteries at the base of the  brain and the dural venous sinuses appear patent.       Impression    IMPRESSION:    1. No evidence of metastases.  2. Scattered nonspecific white matter T2 hyperintensities, most likely  early sequelae of small vessel ischemic disease in this age patient.     PRISCILLA MCKEON MD   XR Surgery SHREAY Fluoro L/T 5 Min w Stills    Narrative    SURGERY C-ARM FLUORO LESS THAN 5 MIN W STILLS  1/3/2018 12:33 PM     HISTORY: Insert port for vascular access.     COMPARISON: None.      Impression    IMPRESSION: Two spot fluoroscopic images obtained intraoperatively  demonstrating a catheter with tip in the SVC. Total fluoroscopic time  is 0.2 minutes.    BARBRA METZ MD   XR Chest Port 1 View    Narrative    CHEST PORTABLE ONE VIEW  1/3/2018 1:20 PM     HISTORY: Status post Port-A-Cath placement. Rule out pneumothorax.     COMPARISON: December 20, 2017      Impression    IMPRESSION: New left subclavian port and catheter visible. Increased  opacity in the left upper lobe since previous exam due to tumor.  Patient may be partially rotated given slightly different appearance  since previous exam. No pneumothorax. No pleural effusion. Right lung  is clear. Port catheter tip at the RA/SVC junction, in good position.    BARBRA METZ MD   US Biopsy Lymph Node Core    Narrative    HISTORY: Lung cancer. Abnormal left level 5 lymph node on PET CT.    COMPARISON: PET CT  dated 12/15/2017.    ULTRASOUND-GUIDED NEEDLE CORE BIOPSY OF LEFT NECK LYMPH NODE: Risks  and benefits of the procedure are discussed with the patient.  Sonographic guidance and 8 mL of 1% lidocaine (local anesthetic) are  utilized. Three 18-gauge core biopsy samples are obtained. The patient  tolerated the procedure well.  There is no significant blood loss.      Impression    IMPRESSION: Technically uneventful ultrasound-guided needle core  biopsy, as described above. Pathologic results are pending.    BERNA JENNINGS MD   CT Chest Pulmonary Embolism w Contrast    Narrative    CT CHEST PULMONARY EMBOLISM WITH CONTRAST   1/7/2018 4:37 PM    HISTORY: Dyspnea. History of lung cancer.    TECHNIQUE: Pulmonary embolism protocol was performed. 69 mL Isovue 370  were injected IV. After contrast injection, volumetric helical  acquisition was performed from the thoracic inlet through the upper  abdomen. Radiation dose for this scan was reduced using automated  exposure control, adjustment of the mA and/or kV according to patient  size, or iterative reconstruction technique.    COMPARISON: CT of the chest, abdomen, and pelvis performed 12/9/2017.    FINDINGS:  No evidence for pulmonary embolism. The thoracic aorta is  of normal caliber, without evidence for thoracic aortic aneurysm or  dissection. There is a small left pleural effusion, not significantly  changed. Small pericardial effusion has increased slightly. No right  pleural effusion. There is again complete atelectasis of the left  upper lobe within an area of masslike soft tissue thickening  anteriorly and medially (series 7 image 63) likely representing the  patient's known malignancy. This area of masslike soft tissue  thickening is not well-defined on this exam, but appears to have  increased slightly in size, today estimated at 7.8 x 5.7 cm. This mass  again appears to extend into the mediastinum, and abuts the ascending  thoracic aorta and aortic arch, as well as  partially encase the main  and left pulmonary arteries as well as several left upper lobe  pulmonary artery branches.    Moderate mediastinal adenopathy has also increased in size. For  example, an enlarged right paratracheal lymph node in the superior  mediastinum (series 7 image 44) measures 2.1 x 1.8 cm (previously 1.6  x 1.3 cm). Mild right hilar adenopathy has also increased slightly.  Left Port-A-Cath, with tip in the right atrium. Emphysematous changes  are noted throughout both lungs. Small calcified granuloma in the left  lower lobe is unchanged. Degenerative changes are noted throughout the  thoracic spine.      Impression    IMPRESSION:   1. No evidence for pulmonary embolism or thoracic aortic dissection.  2. Left upper lobe mass is not well-defined, but appears to have  increased slightly in size.  3. Mediastinal and right hilar adenopathy has also increased slightly.  4. Small left pleural effusion is unchanged.  5. Small pericardial effusion has increased slightly in size.  6. Complete atelectasis of the left upper lobe is again noted.  7. Emphysema.     GIA JAIN MD   CT Chest Pulmonary Embolism w Contrast    Narrative    CT CHEST WITH CONTRAST  1/9/2018 3:40 AM     HISTORY: Chest pain. Increasing D-dimer.    COMPARISON: 1/7/2018.    TECHNIQUE: Following the uneventful administration of 80 mL Isovue   intravenous contrast, helical sections were acquired through the lungs  according to the pulmonary embolism protocol. Coronal reconstructions  were generated. Radiation dose for this scan was reduced using  automated exposure control, adjustment of the mA and/or kV according  to the patient's size, or iterative reconstruction technique.    FINDINGS: No visualized pulmonary embolus.    A large mediastinal mass, additional enlarged mediastinal lymph nodes,  emphysematous changes in the lungs and other findings are again noted.      Impression    IMPRESSION:  1. No significant interval change since  the very recent study dated  1/7/2018.  2. No visualized pulmonary embolus.    JOSE MAYBERRY MD   XR Chest 2 Views    Narrative    XR CHEST 2 VW   1/19/2018 2:52 PM     HISTORY: hypotension;     COMPARISON: 1/3/2018      Impression    IMPRESSION: Left subclavian port is in satisfactory position with tip  in the SVC. There remains elevation of the left hemidiaphragm and  consolidation/atelectasis of the left upper lobe due to left hilar  mass.    Overall, no significant change.    REMINGTON ALEXIS MD       Assessment and plan:  (C34.90) Non-small cell carcinoma of lung (H)  (primary encounter diagnosis)  Had a long discussion with the patient his family today about management of his disease.  We talked about the natural history of the disease.  We talked about staging, biology, management, prognosis and follow-up.  We talked about potential side effects of chemotherapy in great details.  We talked about management of side effects including nausea and vomiting.  We also talked in details about indications of chemotherapy.  We talked about immunotherapy and targeted therapy in squamous cell lung cancer.  I have explained to the patient that he is stage IV based on metastasis to the cervical lymph nodes and adrenal gland.  Chemotherapy is palliative in nature.  His disease is not curable.  We talked about prognosis of his disease in great details.  I answered all of the patient questions to his satisfaction.  He will be proceeding with cycle #2 of chemotherapy today.  I am going to arrange for imaging studies to assess response to treatment following cycle #2.  I will see the patient again in 3 weeks or sooner if there are new developments or concerns.    (R11.2,  T45.1X5A) Chemotherapy induced nausea and vomiting  I reviewed with the patient nausea medication once more.  Today he will be receiving Emand on day 1 of treatment.    (I48.3) Typical atrial flutter (H)  Patient currently on amiodarone.  Is on  anticoagulation with Eliquis.    The patient is ready to learn, no apparent learning barriers were identified.  Diagnosis and treatment plans were explained to the patient. The patient expressed understanding of the content. The patient asked appropriate questions. The patient questions were answered to his satisfaction.    Time spent 40 minutes more than 50% of the time in counseling and coordination of care including discussion of natural history of metastatic squamous cell carcinoma of the lung, biology, management and prognosis.  We also talked about potential side effects of chemotherapy and management of the side effects.    Chart documentation with Dragon Voice recognition Software. Although reviewed after completion, some words and grammatical errors may remain.    Again, thank you for allowing me to participate in the care of your patient.        Sincerely,        Joycelyn May MD

## 2018-01-25 NOTE — PROGRESS NOTES
Infusion Nursing Note:  Zechariah WOODALL Isma presents today for C2D1 Carbo/Gemzar.    Patient seen by provider today: Yes: Dr. May   present during visit today: Not Applicable.    Note: N/A.    Intravenous Access:  Implanted Port.    Treatment Conditions:  Lab Results   Component Value Date    HGB 11.4 01/25/2018     Lab Results   Component Value Date    WBC 5.0 01/25/2018      Lab Results   Component Value Date    ANEU 2.9 01/25/2018     Lab Results   Component Value Date     01/25/2018      Results reviewed, labs MET treatment parameters, ok to proceed with treatment.      Post Infusion Assessment:  Patient tolerated infusion without incident.  Blood return noted pre and post infusion.  Site patent and intact, free from redness, edema or discomfort.  No evidence of extravasations.  Access discontinued per protocol.    Discharge Plan:   Patient discharged in stable condition accompanied by: friend.  Departure Mode: Wheelchair.  Pt returns 2/2/18 for next tx.    Ayala Carlisle RN

## 2018-01-26 PROBLEM — T45.1X5A CHEMOTHERAPY INDUCED NAUSEA AND VOMITING: Status: ACTIVE | Noted: 2018-01-01

## 2018-01-26 PROBLEM — R11.2 CHEMOTHERAPY INDUCED NAUSEA AND VOMITING: Status: ACTIVE | Noted: 2018-01-01

## 2018-01-26 NOTE — PROGRESS NOTES
Hematology/ Oncology Follow-up Visit:  Jan 25, 2018    Reason for Visit:   Chief Complaint   Patient presents with     Oncology Clinic Visit     Recheck Non-small cell carcinoma of lung, post hospital follow up        Oncologic History:  Metastatic squamous cell carcinoma.    Interval History:  Patient is here today for follow-up.  He had his first cycle of chemotherapy was carboplatin and gemcitabine.  First cycle of chemotherapy was complicated with severe nausea and dehydration.  He was hospitalized because of atrial flutter.  Is gradually is getting better with no shortness of breath or cough or wheezing.  His appetite has improved.  Denies any chest pain.    Review Of Systems:  Constitutional: Negative for fever, chills, and night sweats.  Fatigue  Skin: negative.  Eyes: negative.  Ears/Nose/Throat: negative.  Respiratory: No shortness of breath, dyspnea on exertion, cough, or hemoptysis.  Cardiovascular: negative.  Gastrointestinal: negative.  Genitourinary: negative.  Musculoskeletal: negative.  Neurologic: negative.  Psychiatric: negative.  Hematologic/Lymphatic/Immunologic: negative.  Endocrine: negative.    All other ROS negative unless mentioned in interval history.    Past medical, social, surgical, and family histories reviewed.    Allergies:  Allergies as of 01/25/2018 - Osmel as Reviewed 01/25/2018   Allergen Reaction Noted     Nka [no known allergies]  01/03/2018       Current Medications:  Current Outpatient Prescriptions   Medication Sig Dispense Refill     amiodarone (PACERONE/CODARONE) 200 MG tablet Take 2 tablets twice daily x 1 week (1/12 - 1/19) and then start 1 tablet daily 1/20/18 45 tablet 1     nicotine (NICODERM CQ) 21 MG/24HR 24 hr patch        morphine (MSIR) 15 MG IR tablet 1/2 to 1 pill every 4 hours as needed for air hunger. 30 tablet 0     omeprazole (PRILOSEC) 40 MG capsule Take 1 capsule (40 mg) by mouth daily Take 30-60 minutes before a meal. 30 capsule 11     nicotine (NICODERM  "CQ) 14 MG/24HR 24 hr patch Place 1 patch onto the skin every 24 hours 14 patch 5     nicotine (NICODERM CQ) 7 MG/24HR 24 hr patch Place 1 patch onto the skin every 24 hours (Patient not taking: Reported on 1/17/2018) 30 patch 5     LORazepam (ATIVAN) 0.5 MG tablet Take 1 tablet (0.5 mg) by mouth every 4 hours as needed (Anxiety, Nausea/Vomiting or Sleep) 30 tablet 5     prochlorperazine (COMPAZINE) 10 MG tablet Take 1 tablet (10 mg) by mouth every 6 hours as needed (Nausea/Vomiting) 30 tablet 5     ondansetron (ZOFRAN) 8 MG tablet Take 1 tablet (8 mg) by mouth every 8 hours as needed (Nausea/Vomiting) 10 tablet 5     lidocaine-prilocaine (EMLA) cream Place a quarter sized amount of cream onto port area 30-60 minutes prior to port use. Do not rub in. Cover with tegaderm or saran wrap. 30 g 1     megestrol (MEGACE ORAL) 40 MG/ML suspension Take 5 mLs (200 mg) by mouth daily 600 mL 2     apixaban ANTICOAGULANT (ELIQUIS) 5 MG tablet Take 1 tablet (5 mg) by mouth 2 times daily 60 tablet 0     Probiotic Product (ALIGN) CHEW Take 1 chew tab by mouth 3 times daily as needed          Physical Exam:  /74 (BP Location: Right arm, Patient Position: Sitting, Cuff Size: Adult Regular)  Pulse 80  Temp 98.1  F (36.7  C) (Tympanic)  Resp 20  Ht 1.803 m (5' 10.98\")  Wt 71.2 kg (157 lb)  SpO2 96%  BMI 21.91 kg/m2  Wt Readings from Last 12 Encounters:   01/25/18 71.2 kg (157 lb)   01/20/18 68.9 kg (151 lb 14.4 oz)   01/17/18 69.6 kg (153 lb 6.4 oz)   01/15/18 69.2 kg (152 lb 9.6 oz)   01/12/18 67.7 kg (149 lb 4.8 oz)   01/12/18 67.2 kg (148 lb 1.6 oz)   01/11/18 67.2 kg (148 lb 1.6 oz)   01/09/18 68.6 kg (151 lb 3.8 oz)   01/03/18 67.6 kg (149 lb)   12/27/17 67.6 kg (149 lb)   12/27/17 67.9 kg (149 lb 9.6 oz)   12/13/17 71.4 kg (157 lb 4.8 oz)     ECOG performance status: 1  GENERAL APPEARANCE: Healthy, alert and in no acute distress.  HEENT: Sclerae anicteric. PERRLA. Oropharynx without ulcers, lesions, or thrush.  NECK: " Supple. No asymmetry or masses.  LYMPHATICS: No palpable cervical, supraclavicular, axillary, or inguinal lymphadenopathy.  RESP: Lungs clear to auscultation bilaterally without rales, rhonchi or wheezes.  CARDIOVASCULAR: Regular rate and rhythm. Normal S1, S2; no S3 or S4. No murmur, gallop, or rub.  ABDOMEN: Soft, nontender. Bowel sounds normal. No palpable organomegaly or masses.  MUSCULOSKELETAL: Extremities without gross deformities noted. No edema of bilateral lower extremities.  SKIN: No suspicious lesions or rashes.  NEURO: Alert and oriented x 3. Cranial nerves II-XII grossly intact.  PSYCHIATRIC: Mentation and affect appear normal.    Laboratory/Imaging Studies:  Infusion Therapy Visit on 01/25/2018   Component Date Value Ref Range Status     WBC 01/25/2018 5.0  4.0 - 11.0 10e9/L Final     RBC Count 01/25/2018 3.93* 4.4 - 5.9 10e12/L Final     Hemoglobin 01/25/2018 11.4* 13.3 - 17.7 g/dL Final     Hematocrit 01/25/2018 34.7* 40.0 - 53.0 % Final     MCV 01/25/2018 88  78 - 100 fl Final     MCH 01/25/2018 29.0  26.5 - 33.0 pg Final     MCHC 01/25/2018 32.9  31.5 - 36.5 g/dL Final     RDW 01/25/2018 12.7  10.0 - 15.0 % Final     Platelet Count 01/25/2018 379  150 - 450 10e9/L Final     Diff Method 01/25/2018 Automated Method   Final     % Neutrophils 01/25/2018 58.8  % Final     % Lymphocytes 01/25/2018 26.0  % Final     % Monocytes 01/25/2018 14.6  % Final     % Eosinophils 01/25/2018 0.2  % Final     % Basophils 01/25/2018 0.2  % Final     % Immature Granulocytes 01/25/2018 0.2  % Final     Absolute Neutrophil 01/25/2018 2.9  1.6 - 8.3 10e9/L Final     Absolute Lymphocytes 01/25/2018 1.3  0.8 - 5.3 10e9/L Final     Absolute Monocytes 01/25/2018 0.7  0.0 - 1.3 10e9/L Final     Absolute Eosinophils 01/25/2018 0.0  0.0 - 0.7 10e9/L Final     Absolute Basophils 01/25/2018 0.0  0.0 - 0.2 10e9/L Final     Abs Immature Granulocytes 01/25/2018 0.0  0 - 0.4 10e9/L Final     Creatinine 01/25/2018 0.90  0.66 - 1.25  mg/dL Final     GFR Estimate 01/25/2018 86  >60 mL/min/1.7m2 Final    Non  GFR Calc     GFR Estimate If Black 01/25/2018 >90  >60 mL/min/1.7m2 Final    African American GFR Calc   Infusion Therapy Visit on 01/12/2018   Component Date Value Ref Range Status     WBC 01/12/2018 12.6* 4.0 - 11.0 10e9/L Final     RBC Count 01/12/2018 4.47  4.4 - 5.9 10e12/L Final     Hemoglobin 01/12/2018 13.1* 13.3 - 17.7 g/dL Final     Hematocrit 01/12/2018 38.7* 40.0 - 53.0 % Final     MCV 01/12/2018 87  78 - 100 fl Final     MCH 01/12/2018 29.3  26.5 - 33.0 pg Final     MCHC 01/12/2018 33.9  31.5 - 36.5 g/dL Final     RDW 01/12/2018 12.0  10.0 - 15.0 % Final     Platelet Count 01/12/2018 115* 150 - 450 10e9/L Final     Diff Method 01/12/2018 Automated Method   Final     % Neutrophils 01/12/2018 90.7  % Final     % Lymphocytes 01/12/2018 4.7  % Final     % Monocytes 01/12/2018 4.4  % Final     % Eosinophils 01/12/2018 0.0  % Final     % Basophils 01/12/2018 0.0  % Final     % Immature Granulocytes 01/12/2018 0.2  % Final     Absolute Neutrophil 01/12/2018 11.4* 1.6 - 8.3 10e9/L Final     Absolute Lymphocytes 01/12/2018 0.6* 0.8 - 5.3 10e9/L Final     Absolute Monocytes 01/12/2018 0.6  0.0 - 1.3 10e9/L Final     Absolute Eosinophils 01/12/2018 0.0  0.0 - 0.7 10e9/L Final     Absolute Basophils 01/12/2018 0.0  0.0 - 0.2 10e9/L Final     Abs Immature Granulocytes 01/12/2018 0.0  0 - 0.4 10e9/L Final        Recent Results (from the past 744 hour(s))   MR Brain w/o & w Contrast    Narrative    MRI BRAIN WITHOUT AND WITH CONTRAST  12/29/2017 1:11 PM    HISTORY:  Check for metastatic disease. Mediastinal lymphadenopathy.  Non-small cell carcinoma of lung (H).    TECHNIQUE:  Multiplanar, multisequence MRI of the brain without and  with 7 mL Gadavist.    COMPARISON: None.    FINDINGS:  There are a few tiny foci of prolonged T2 relaxation in the  white matter of the cerebral hemispheres, probably early sequelae of  small vessel  ischemic disease but nonspecific as to etiology.  Ventricles and subarachnoid spaces appear normal.  There is no  evidence of hemorrhage, mass, acute infarct, or anomaly.  There are no  gadolinium enhancing lesions.  No bone lesions are seen.    The facial structures appear normal. The arteries at the base of the  brain and the dural venous sinuses appear patent.       Impression    IMPRESSION:    1. No evidence of metastases.  2. Scattered nonspecific white matter T2 hyperintensities, most likely  early sequelae of small vessel ischemic disease in this age patient.     PRISCILLA MCKEON MD   XR Surgery SHREYA Fluoro L/T 5 Min w Stills    Narrative    SURGERY C-ARM FLUORO LESS THAN 5 MIN W STILLS  1/3/2018 12:33 PM     HISTORY: Insert port for vascular access.     COMPARISON: None.      Impression    IMPRESSION: Two spot fluoroscopic images obtained intraoperatively  demonstrating a catheter with tip in the SVC. Total fluoroscopic time  is 0.2 minutes.    BARBRA METZ MD   XR Chest Port 1 View    Narrative    CHEST PORTABLE ONE VIEW  1/3/2018 1:20 PM     HISTORY: Status post Port-A-Cath placement. Rule out pneumothorax.     COMPARISON: December 20, 2017      Impression    IMPRESSION: New left subclavian port and catheter visible. Increased  opacity in the left upper lobe since previous exam due to tumor.  Patient may be partially rotated given slightly different appearance  since previous exam. No pneumothorax. No pleural effusion. Right lung  is clear. Port catheter tip at the RA/SVC junction, in good position.    BARBRA METZ MD   US Biopsy Lymph Node Core    Narrative    HISTORY: Lung cancer. Abnormal left level 5 lymph node on PET CT.    COMPARISON: PET CT dated 12/15/2017.    ULTRASOUND-GUIDED NEEDLE CORE BIOPSY OF LEFT NECK LYMPH NODE: Risks  and benefits of the procedure are discussed with the patient.  Sonographic guidance and 8 mL of 1% lidocaine (local anesthetic) are  utilized. Three 18-gauge core biopsy  samples are obtained. The patient  tolerated the procedure well.  There is no significant blood loss.      Impression    IMPRESSION: Technically uneventful ultrasound-guided needle core  biopsy, as described above. Pathologic results are pending.    BERNA JENNINGS MD   CT Chest Pulmonary Embolism w Contrast    Narrative    CT CHEST PULMONARY EMBOLISM WITH CONTRAST   1/7/2018 4:37 PM    HISTORY: Dyspnea. History of lung cancer.    TECHNIQUE: Pulmonary embolism protocol was performed. 69 mL Isovue 370  were injected IV. After contrast injection, volumetric helical  acquisition was performed from the thoracic inlet through the upper  abdomen. Radiation dose for this scan was reduced using automated  exposure control, adjustment of the mA and/or kV according to patient  size, or iterative reconstruction technique.    COMPARISON: CT of the chest, abdomen, and pelvis performed 12/9/2017.    FINDINGS:  No evidence for pulmonary embolism. The thoracic aorta is  of normal caliber, without evidence for thoracic aortic aneurysm or  dissection. There is a small left pleural effusion, not significantly  changed. Small pericardial effusion has increased slightly. No right  pleural effusion. There is again complete atelectasis of the left  upper lobe within an area of masslike soft tissue thickening  anteriorly and medially (series 7 image 63) likely representing the  patient's known malignancy. This area of masslike soft tissue  thickening is not well-defined on this exam, but appears to have  increased slightly in size, today estimated at 7.8 x 5.7 cm. This mass  again appears to extend into the mediastinum, and abuts the ascending  thoracic aorta and aortic arch, as well as partially encase the main  and left pulmonary arteries as well as several left upper lobe  pulmonary artery branches.    Moderate mediastinal adenopathy has also increased in size. For  example, an enlarged right paratracheal lymph node in the  superior  mediastinum (series 7 image 44) measures 2.1 x 1.8 cm (previously 1.6  x 1.3 cm). Mild right hilar adenopathy has also increased slightly.  Left Port-A-Cath, with tip in the right atrium. Emphysematous changes  are noted throughout both lungs. Small calcified granuloma in the left  lower lobe is unchanged. Degenerative changes are noted throughout the  thoracic spine.      Impression    IMPRESSION:   1. No evidence for pulmonary embolism or thoracic aortic dissection.  2. Left upper lobe mass is not well-defined, but appears to have  increased slightly in size.  3. Mediastinal and right hilar adenopathy has also increased slightly.  4. Small left pleural effusion is unchanged.  5. Small pericardial effusion has increased slightly in size.  6. Complete atelectasis of the left upper lobe is again noted.  7. Emphysema.     GIA JAIN MD   CT Chest Pulmonary Embolism w Contrast    Narrative    CT CHEST WITH CONTRAST  1/9/2018 3:40 AM     HISTORY: Chest pain. Increasing D-dimer.    COMPARISON: 1/7/2018.    TECHNIQUE: Following the uneventful administration of 80 mL Isovue   intravenous contrast, helical sections were acquired through the lungs  according to the pulmonary embolism protocol. Coronal reconstructions  were generated. Radiation dose for this scan was reduced using  automated exposure control, adjustment of the mA and/or kV according  to the patient's size, or iterative reconstruction technique.    FINDINGS: No visualized pulmonary embolus.    A large mediastinal mass, additional enlarged mediastinal lymph nodes,  emphysematous changes in the lungs and other findings are again noted.      Impression    IMPRESSION:  1. No significant interval change since the very recent study dated  1/7/2018.  2. No visualized pulmonary embolus.    JOSE MAYBERRY MD   XR Chest 2 Views    Narrative    XR CHEST 2 VW   1/19/2018 2:52 PM     HISTORY: hypotension;     COMPARISON: 1/3/2018      Impression     IMPRESSION: Left subclavian port is in satisfactory position with tip  in the SVC. There remains elevation of the left hemidiaphragm and  consolidation/atelectasis of the left upper lobe due to left hilar  mass.    Overall, no significant change.    REMINGTON ALEXIS MD       Assessment and plan:  (C34.90) Non-small cell carcinoma of lung (H)  (primary encounter diagnosis)  Had a long discussion with the patient his family today about management of his disease.  We talked about the natural history of the disease.  We talked about staging, biology, management, prognosis and follow-up.  We talked about potential side effects of chemotherapy in great details.  We talked about management of side effects including nausea and vomiting.  We also talked in details about indications of chemotherapy.  We talked about immunotherapy and targeted therapy in squamous cell lung cancer.  I have explained to the patient that he is stage IV based on metastasis to the cervical lymph nodes and adrenal gland.  Chemotherapy is palliative in nature.  His disease is not curable.  We talked about prognosis of his disease in great details.  I answered all of the patient questions to his satisfaction.  He will be proceeding with cycle #2 of chemotherapy today.  I am going to arrange for imaging studies to assess response to treatment following cycle #2.  I will see the patient again in 3 weeks or sooner if there are new developments or concerns.    (R11.2,  T45.1X5A) Chemotherapy induced nausea and vomiting  I reviewed with the patient nausea medication once more.  Today he will be receiving Emand on day 1 of treatment.    (I48.3) Typical atrial flutter (H)  Patient currently on amiodarone.  Is on anticoagulation with Eliquis.    The patient is ready to learn, no apparent learning barriers were identified.  Diagnosis and treatment plans were explained to the patient. The patient expressed understanding of the content. The patient asked  appropriate questions. The patient questions were answered to his satisfaction.    Time spent 40 minutes more than 50% of the time in counseling and coordination of care including discussion of natural history of metastatic squamous cell carcinoma of the lung, biology, management and prognosis.  We also talked about potential side effects of chemotherapy and management of the side effects.    Chart documentation with Dragon Voice recognition Software. Although reviewed after completion, some words and grammatical errors may remain.

## 2018-01-27 NOTE — TELEPHONE ENCOUNTER
Jessie called, and conferenced Zechariah in to call. They were concerned his blood pressure was low, but after repeating BP with writer on phone we clarified that he was reading the screen incorrectly. Currently BP is 124/79, and pulse is 76. He had chemo today, and is a bit nauseated, and feels short of breath, but not worse than his normal .  He is afebrile, and not dizzy. They are aware to call back with any other concerns.    31-40 (obesity)

## 2018-02-01 NOTE — MR AVS SNAPSHOT
After Visit Summary   2/1/2018    Zechariah Ashby    MRN: [id number]           Patient Information     Date Of Birth          1960        Visit Information        Provider Department      2/1/2018 8:00 AM ROOM 9 [facility]        Today's Diagnoses     Non-small cell carcinoma of lung (H)    -  1       Follow-ups after your visit        Your next 10 appointments already scheduled     Feb 05, 2018  8:20 AM CST   Office Visit with Kailash Mason MD   [facility] ([facility]    [address] [place] [id number] [phone]           Bring a current list of meds and any records pertaining to this visit. For Physicals, please bring immunization records and any forms needing to be filled out. Please arrive 10 minutes early to complete paperwork.            Feb 13, 2018 10:45 AM CST   (Arrive by 10:30 AM)   CT CHEST/ABDOMEN/PELVIS W CONTRAST with [place]   [facility] ([facility])    [address] [facility] [address] [phone]           Please bring any scans or X-rays taken at other hospitals, if similar tests were done. Also bring a list of your medicines, including vitamins, minerals and over-the-counter drugs. It is safest to leave personal items at home.  Be sure to tell your doctor:   If you have any allergies.   If there s any chance you are pregnant.   If you are breastfeeding.   If you have any special needs.  You may have contrast for this exam. To prepare:   Do not eat or drink for 2 hours before your exam. If you need to take medicine, you may take it with small sips of water. (We may ask you to take liquid medicine as well.)   The day before your exam, drink extra fluids at least six 8-ounce glasses (unless your doctor tells you to restrict your fluids).  Patients over 70 or patients with diabetes or kidney problems:   If you haven t had a blood test (creatinine test) within the last 30  days, go to your clinic or Diagnostic Imaging Department for this test.  If you have diabetes:   If your kidney function is normal, continue taking your metformin (Avandamet, Glucophage, Glucovance, Metaglip) on the day of your exam.   If your kidney function is abnormal, wait 48 hours before restarting this medicine.  You will have oral contrast for this exam:   You will drink the contrast at home. Get this from your clinic or Diagnostic Imaging Department. Please follow the directions given.  Please wear loose clothing, such as a sweat suit or jogging clothes. Avoid snaps, zippers and other metal. We may ask you to undress and put on a hospital gown.  If you have any questions, please call the Imaging Department where you will have your exam.            Feb 13, 2018 11:00 AM CST   ecg with [place] [facility]   [facility] ([facility])    [address] [facility] [address]   [phone]            Feb 13, 2018 11:30 AM CST   Return Visit with Daksha Montemayor PA-C   [facility] ([facility])    [address] [facility] [address]   [phone]            Feb 15, 2018  8:00 AM CST   Level 3 with ROOM 10 [facility] ([facility])    [facility]  [address] [place] [address] [address]  [facility] [address]   [phone]            Feb 15, 2018  9:00 AM CST   Return Visit with Joycelyn May MD   [facility] ([facility])    [facility]  [address]  [facility] [address]   [phone]            Feb 22, 2018  8:00 AM CST   Level 1 with ROOM 9 [facility] ([facility])    [facility] [address]  [facility] [address]   [phone]              Who to contact     If you have questions or need follow up information about today's clinic visit or your  "schedule please contact [facility] directly at [phone].  Normal or non-critical lab and imaging results will be communicated to you by MyChart, letter or phone within 4 business days after the clinic has received the results. If you do not hear from us within 7 days, please contact the clinic through [employer] or phone. If you have a critical or abnormal lab result, we will notify you by phone as soon as possible.  Submit refill requests through [employer] or call your pharmacy and they will forward the refill request to us. Please allow 3 business days for your refill to be completed.          Additional Information About Your Visit        [employer] Information     [employer] lets you send messages to your doctor, view your test results, renew your prescriptions, schedule appointments and more. To sign up, go to [email or web address] . Click on \"Log in\" on the left side of the screen, which will take you to the Welcome page. Then click on \"Sign up Now\" on the right side of the page.     You will be asked to enter the access code listed below, as well as some personal information. Please follow the directions to create your username and password.     Your access code is: [email or web address]  Expires: 2018  4:44 PM     Your access code will  in 90 days. If you need help or a new code, please call your [facility] clinic or [phone].        Care EveryWhere ID     This is your Care EveryWhere ID. This could be used by other organizations to access your [facility] medical records  [id number]        Your Vitals Were     Pulse Temperature BMI (Body Mass Index)             65 98.8  F (37.1  C) 19.81 kg/m2          Blood Pressure from Last 3 Encounters:   18 113/73   18 105/74   18 121/75    Weight from Last 3 Encounters:   18 64.4 kg (142 lb)   18 71.2 kg (157 lb)   18 68.9 kg (151 lb 14.4 oz)              We Performed the Following     CBC with platelets differential        " Primary Care Provider Office Phone # Fax #    Kailash Mason [phone] [address] [place] [address]        Equal Access to Services     ROD VICKY : Diego arnol lai janao Vicente, waaxda luqadaha, qaybta kaalmada judah, harley frost lalilianacristiano lucretia. So [facility] [phone].    ATENCIÓN: Si teela español, tiene a morocho disposición servicios gratuitos de asistencia lingüística. Llame al [phone].    We comply with applicable federal civil rights laws and [place] laws. We do not discriminate on the basis of race, color, national origin, age, disability, sex, sexual orientation, or gender identity.            Thank you!     Thank you for choosing [facility]  for your care. Our goal is always to provide you with excellent care. Hearing back from our patients is one way we can continue to improve our services. Please take a few minutes to complete the written survey that you may receive in the mail after your visit with us. Thank you!             Your Updated Medication List - Protect others around you: Learn how to safely use, store and throw away your medicines at www.disposemymeds.org.          This list is accurate as of 2/1/18 11:49 AM.  Always use your most recent med list.                   Brand Name Dispense Instructions for use Diagnosis    ALIGN Chew      Take 1 chew tab by mouth 3 times daily as needed        amiodarone 200 MG tablet    PACERONE/CODARONE    45 tablet    Take 2 tablets twice daily x 1 week (1/12 - 1/19) and then start 1 tablet daily 1/20/18    Paroxysmal atrial fibrillation (H)       apixaban ANTICOAGULANT 5 MG tablet    ELIQUIS    60 tablet    Take 1 tablet (5 mg) by mouth 2 times daily    Typical atrial flutter (H)       lidocaine-prilocaine cream    EMLA    30 g    Place a quarter sized amount of cream onto port area 30-60 minutes prior to port use. Do not rub in. Cover with tegaderm or saran wrap.    Non-small cell carcinoma  of lung (H)       LORazepam 0.5 MG tablet    ATIVAN    30 tablet    Take 1 tablet (0.5 mg) by mouth every 4 hours as needed (Anxiety, Nausea/Vomiting or Sleep)    Non-small cell carcinoma of lung (H)       megestrol 40 MG/ML suspension    MEGACE ORAL    600 mL    Take 5 mLs (200 mg) by mouth daily    Non-small cell carcinoma of lung (H)       morphine 15 MG IR tablet    MSIR    30 tablet    1/2 to 1 pill every 4 hours as needed for air hunger.    Non-small cell carcinoma of lung (H)       * nicotine 21 MG/24HR 24 hr patch    NICODERM CQ          * nicotine 14 MG/24HR 24 hr patch    NICODERM CQ    14 patch    Place 1 patch onto the skin every 24 hours    Encounter for tobacco use cessation counseling, Tobacco abuse       * nicotine 7 MG/24HR 24 hr patch    NICODERM CQ    30 patch    Place 1 patch onto the skin every 24 hours    Tobacco abuse       omeprazole 40 MG capsule    priLOSEC    30 capsule    Take 1 capsule (40 mg) by mouth daily Take 30-60 minutes before a meal.    Gastroesophageal reflux disease without esophagitis       prochlorperazine 10 MG tablet    COMPAZINE    30 tablet    Take 1 tablet (10 mg) by mouth every 6 hours as needed (Nausea/Vomiting)    Non-small cell carcinoma of lung (H)       * Notice:  This list has 3 medication(s) that are the same as other medications prescribed for you. Read the directions carefully, and ask your doctor or other care provider to review them with you.

## 2018-02-01 NOTE — TELEPHONE ENCOUNTER
"Requested Prescriptions   Pending Prescriptions Disp Refills     apixaban ANTICOAGULANT (ELIQUIS) 5 MG tablet 60 tablet 0     Sig: Take 1 tablet (5 mg) by mouth 2 times daily    Platelet Inhibitors Passed    2/1/2018  1:30 PM       Passed - Normal ALT on file in past 12 months    Recent Labs   Lab Test  01/07/18   1530   ALT  70            Passed - Normal HGB on file in past 12 months    Recent Labs   Lab Test  02/01/18   0830   HGB  11.3*              Passed - Normal AST on file in past 12 months    Recent Labs   Lab Test  01/07/18   1530   AST  76*            Passed - Normal Platelets on file in past 12 months    Recent Labs   Lab Test  02/01/18   0830   PLT  275              Passed - Recent or future visit with authorizing provider's specialty    Patient had office visit in the last year or has a visit in the next 30 days with authorizing provider.  See \"Patient Info\" tab in inbasket, or \"Choose Columns\" in Meds & Orders section of the refill encounter.            Passed - Patient is age 18 or older       Passed - Normal serum creatinine on file in past 12 months    Recent Labs   Lab Test  01/25/18   0810   CR  0.90           Last Written Prescription Date:  12092017  Last Fill Quantity: 60,  # refills: 0   Last Office Visit with:  01/15/2018   Future Office Visit:    Next 5 appointments (look out 90 days)     Feb 05, 2018  8:20 AM CST   Office Visit with Kailash Mason MD   [facility] ([facility])    [address] [place] [address]   [phone]            Feb 13, 2018 11:30 AM CST   Return Visit with Daksha Montemayor PA-C   [facility] ([facility])    [address] [place] [address]   [phone]            Feb 15, 2018  9:00 AM CST   Return Visit with Joycelyn May MD   [facility] ([facility])    [facility] [address]  [facility] [address]   [phone]

## 2018-02-04 NOTE — PROGRESS NOTES
Pls see my note below & call pt - I have not heard back from patient, so pls make sure he got message I left 1/15.    * Pls ask him if 2/13 appt in [place] will work for him (we'll get EKG at 11 and he'll see me @ 1130).  We will discuss continued use of amiodarone. If not, would reschedule for another time I'm up there. No need to come to [place] unless he'd like to    * Let him know EKG 1/19 in ER looked good - still in SR    * I see that metoprolol tartrate has been DCd as of 1/15/18 due to low BP/dizziness.  Any improvement?    aj King

## 2018-02-05 PROBLEM — Z79.01 LONG-TERM (CURRENT) USE OF ANTICOAGULANTS: Status: ACTIVE | Noted: 2018-01-01

## 2018-02-05 PROBLEM — I48.92 ATRIAL FLUTTER, UNSPECIFIED TYPE (H): Status: ACTIVE | Noted: 2018-01-01

## 2018-02-05 PROBLEM — I48.92 ATRIAL FLUTTER (H): Status: ACTIVE | Noted: 2017-01-01

## 2018-02-05 NOTE — PROGRESS NOTES
Called and left message reminding him of Christine Kensington appt on 2/13/18 at 11 and 11:30 for EKG and f/u. Requested call back to confirm MJBrn

## 2018-02-05 NOTE — PROGRESS NOTES
"  ANTICOAGULATION FOLLOW-UP CLINIC VISIT    Patient Name:  Zechariah Ashby  Date:  2/5/2018  Contact Type:  pt enrollment (new referral)    SUBJECTIVE:     Patient Findings     Positives Change in medications (switching from Eliquis to warfarin ), Initiation of therapy (2/5/18)    Comments Per Dr. Mason's note today:  '..Insurance will not cover to apixaban.  Switch to warfarin.    Start with 5mg once daily.   Schedule an appointment with anticoagulation clinic for Thursday.  Don't take warfarin dose that day.  They will let you know what dose to be on.   Check with cardiology next week on how long you should be on the blood thinners. They may reccomend heart monitor to make sure you are in a regular rhythm before stopping the warfarin.     Electronically signed by Kailash Mason MD at 2/5/2018  9:55 AM\"               OBJECTIVE    INR   Date Value Ref Range Status   12/08/2017 1.05 0.86 - 1.14 Final       ASSESSMENT / PLAN  No question data found.  Anticoagulation Summary as of 2/5/2018     INR goal 2.0-3.0   Today's INR No new INR was available at the time of this encounter.   Maintenance plan No maintenance plan   Full instructions 2/5: 5 mg; 2/6: 5 mg; 2/7: 5 mg; Otherwise No maintenance plan   Next INR check 2/8/2018   Target end date     Indications   Atrial flutter  unspecified type (H) [I48.92]  Long-term (current) use of anticoagulants [Z79.01] [Z79.01]         Anticoagulation Episode Summary     INR check location     Preferred lab     Send INR reminders to [facility]    Comments *       Anticoagulation Care Providers     Provider Role Specialty Phone number    Kailash Mason MD [facility] [phone]            See the Encounter Report to view Anticoagulation Flowsheet and Dosing Calendar (Go to Encounters tab in chart review, and find the Anticoagulation Therapy Visit)    Malathi Carrera RN               "

## 2018-02-05 NOTE — NURSING NOTE
"Chief Complaint   Patient presents with     Hospital F/U     Medication Reconciliation     Eliquis no longer covered under his insurance. Took last tablet today.       Initial /60  Pulse 78  Temp 98.7  F (37.1  C) (Tympanic)  Resp 18  Ht 5' 10.98\" (1.803 m)  Wt 145 lb (65.8 kg)  SpO2 99%  BMI 20.23 kg/m2 Estimated body mass index is 20.23 kg/(m^2) as calculated from the following:    Height as of this encounter: 5' 10.98\" (1.803 m).    Weight as of this encounter: 145 lb (65.8 kg).      Health Maintenance that is potentially due pending provider review:          Is there anyone who you would like to be able to receive your results? If yes have patient fill out ARIK    "

## 2018-02-05 NOTE — MR AVS SNAPSHOT
Zechariah Ashby   2/5/2018   Anticoagulation Therapy Visit    Description:  57 year old male   Provider:  Malathi Carrera, RN   Department:  [place] [facility]           INR as of 2/5/2018     Today's INR No new INR was available at the time of this encounter.      Anticoagulation Summary as of 2/5/2018     INR goal 2.0-3.0   Today's INR No new INR was available at the time of this encounter.   Full instructions 2/5: 5 mg; 2/6: 5 mg; 2/7: 5 mg; Otherwise No maintenance plan   Next INR check 2/8/2018    Indications   Atrial flutter  unspecified type (H) [I48.92]  Long-term (current) use of anticoagulants [Z79.01] [Z79.01]         Your next Anticoagulation Clinic appointment(s)     Feb 08, 2018  1:45 PM CST   Anticoagulation Visit with NB ANTI COAG   [facility]    [address] [place] [phone]   [phone]              February 2018 Details    Sun Mon Tue Wed Thu Fri Sat         1               2               3                 4               5      5 mg   See details      6      5 mg         7      5 mg         8            9               10                 11               12               13               14               15               16               17                 18               19               20               21               22               23               24                 25               26               27               28                   Date Details   02/05 This INR check       Date of next INR:  2/8/2018         How to take your warfarin dose     To take:  5 mg Take 1 of the 5 mg tablets.

## 2018-02-05 NOTE — PROGRESS NOTES
SUBJECTIVE:   Zechariah Ashby is a 57 year old male who presents to clinic today for the following health issues:  Chief Complaint   Patient presents with     Hospital F/U     Medication Reconciliation     Eliquis no longer covered under his insurance. Took last tablet today.      Hospital Follow-up Visit:    Hospital/Nursing Home/IP Rehab Facility: [facility]  Date of Admission: 01/19/2018  Date of Discharge: 01/20/2018  Reason(s) for Admission: Dehydration            Problems taking medications regularly:  None       Medication changes since discharge: None       Problems adhering to non-medication therapy:  None    Summary of hospitalization:  [facility] discharge summary reviewed  See abstracted notes from recent hospital stay below.   Diagnostic Tests/Treatments reviewed.  Follow up needed: none  Other Healthcare Providers Involved in Patient s Care:         Hematology/Oncology and cardiology.   Update since discharge: improved.     Post Discharge Medication Reconciliation: discharge medications reconciled, continue medications without change.  Plan of care communicated with patient     Coding guidelines for this visit:  Type of Medical   Decision Making Face-to-Face Visit       within 7 Days of discharge Face-to-Face Visit        within 14 days of discharge   Moderate Complexity [id number]   High Complexity [id number]        Hospitalized 1/19 for dehydration secondary to chemo for metastatic lung cancer.  UTI listed as diagnosis but urine culture was negative.   He has seen Hematology/Oncology on 1/25 and had second round of chemo.  He has been getting palliative chemo.  Last chemo 2/1.  He also had palliative care consult on 1/17.   metoprolol was stopped after hospital stay.     He has been taking Nicotine patches-last 21mg patch today-will go to the 14mg patches.     Today in for follow-up after hospital stay.   He does note dyspnea on exertion.  Normally could walk several blocks.   "Now intermittent shortness of breath.  Can have shortness of breath with exertion but it is not consistent.  He notes dyspnea on exertion sometimes just walking in house.  He has not been using any inhalers.   No cough.  No fever.   Taking Morphine daily in AM-1/2 pill.  Not sure if it helps.   Not taking lorazepam.    Insurance will not cover apixaban.  Took last pill today.  On Medicare.  Pharmacy-[facility].   He has not been interested in being on warfarin.     Heart \"pretty good\".  No chest pains or irregular beats.     He had atrial flutter in November, 2017.  EF decreased to 30-35% when in the flutter. Treated initially with metoprolol.   Underwent ablation after KIMBERLY on 12/8/2017.  EF improved to 50-55% after the ablation.  Had atrial fibrillation on 1/7 spontaneously converted to sinus.  Back in atrial fibrillation on 1/11/2018.  BP low on metoprolol.  Started amiodarone.  Again converted to sinus when scheduled for cardioversion on 1/12/2018.     He feels chemo has been going OK.  Next visit on 2/13.    He has nausea.  Compazine helps.  No emesis.   Able to eat.    Sleep OK.   He had dysuria last week for 2 days-No symptoms since.     Weight was 158# on 11/3/2017.  He had been 183# on 10/16/2015  Wt Readings from Last 5 Encounters:   02/05/18 145 lb (65.8 kg)   02/01/18 142 lb (64.4 kg)   01/25/18 157 lb (71.2 kg)   01/20/18 151 lb 14.4 oz (68.9 kg)   01/17/18 153 lb 6.4 oz (69.6 kg)      Patient Active Problem List   Diagnosis     CARDIOVASCULAR SCREENING; LDL GOAL LESS THAN 130     Perforated ulcer (HCC)     Free intraperitoneal air     Atrial flutter (H)     Pleural effusion     Mediastinal lymphadenopathy     Non-small cell carcinoma of lung (H)     COPD exacerbation (H)     Weakness     Cardiomyopathy (H)     Acute on chronic systolic congestive heart failure (H)     Dehydration     Chemotherapy induced nausea and vomiting      ROS:General: POSITIVE for:, low energy.   GI: No problems with bowels. " "  Musculoskeletal: No significant muscle or joint pains.  No pain problems.   Psychiatric: No problems with anxiety, depression or mental health    OBJECTIVE:Blood pressure 100/60, pulse 78, temperature 98.7  F (37.1  C), temperature source Tympanic, resp. rate 18, height 5' 10.98\" (1.803 m), weight 145 lb (65.8 kg), SpO2 99 %. BMI=Body mass index is 20.23 kg/(m^2).  GENERAL APPEARANCE ADULT: Alert, no acute distress, tired appearing  EYES: PERRL, EOM normal.  Mild conjunctival pallor  NECK: No adenopathy,masses or thyromegaly  RESP: lungs clear to auscultation   CV: irregular rhythm-extrasystoles, rate-normal, no murmur  ABDOMEN: soft, no organomegaly, masses or tenderness  MS: extremities normal, no peripheral edema   EKG:NSR, non-specific T wave changes inferior and lateral.  Right axis.  Unchanged.     ASSESSMENT:     ICD-10-CM    1. Chemotherapy induced nausea and vomiting R11.2     T45.1X5A    2. Dehydration E86.0    3. Weakness R53.1    4. Non-small cell carcinoma of lung (H) C34.90    5. Chronic obstructive pulmonary disease, unspecified COPD type (H) J44.9 albuterol (PROAIR HFA) 108 (90 BASE) MCG/ACT Inhaler   6. Atrial flutter, unspecified type (H) I48.92 warfarin (COUMADIN) 5 MG tablet     INR CLINIC REFERRAL      Doing much better after hospital stay.  Weigh up a little.  EAting and drinking OK.  Some nausea from the chemo which has been controlled.   No signs of urinary tract infection.    dyspnea on exertion-shortness of breath with activity due to lung cancer and likely some COPD-emphysema.   PLAN: Use albuteral inhaler as needed.  Albuteral inhaler can be used taking 2 inhalations every 4 hours as needed for coughing, wheezing or shortness of breath.  You can use 15 minutes before going out and doing something exertional.     Handicapped permit for the dyspnea on exertion.     Insurance will not cover to apixaban.  Switch to warfarin.    Start with 5mg once daily.   Schedule an appointment with " anticoagulation clinic for Thursday.  Don't take warfarin dose that day.  They will let you know what dose to be on.   Check with cardiology next week on how long you should be on the blood thinners. They may reccomend heart monitor to make sure you are in a regular rhythm before stopping the warfarin.     follow-up with oncology as planned.     Recheck in about a month.          =======================================  See abstracted notes from recent hospital stay below.     Discharge Summary     Zechariah Ashyb MRN# [id number]   YOB: 1960 Age: 57 year old      Date of Admission:                                      1/19/2018  Date of Discharge:                                      1/20/2018 12:20 PM         Discharge Diagnosis:   Dehydration [E86.0]  Antineoplastic chemotherapy induced pancytopenia (H) [D61.810, T45.1X5A]  Hypotension, unspecified hypotension type [I95.9]  uti  A fib             Hospital Course:   It was felt beta blocker was contibuting to hypotesion.  Since loaded with pacerone had not had any tachycardia so metoprolol was stopped.          Discharge Instructions and Follow-Up:   Discharge diet: Regular   Discharge activity: Activity as tolerated   Discharge follow-up: Follow up with primary care provider within 7 days

## 2018-02-05 NOTE — MR AVS SNAPSHOT
After Visit Summary   2/5/2018    Zechariah Ashby    MRN: [id number]           Patient Information     Date Of Birth          1960        Visit Information        Provider Department      2/5/2018 8:20 AM Kailash Mason MD [facility]        Today's Diagnoses     Chemotherapy induced nausea and vomiting    -  1    Dehydration        Weakness        Non-small cell carcinoma of lung (H)        Chronic obstructive pulmonary disease, unspecified COPD type (H)        Atrial flutter, unspecified type (H)          Care Instructions    ASSESSMENT:     ICD-10-CM    1. Chemotherapy induced nausea and vomiting R11.2     T45.1X5A    2. Dehydration E86.0    3. Weakness R53.1    4. Non-small cell carcinoma of lung (H) C34.90    5. Chronic obstructive pulmonary disease, unspecified COPD type (H) J44.9 albuterol (PROAIR HFA) 108 (90 BASE) MCG/ACT Inhaler   6. Atrial flutter, unspecified type (H) I48.92 warfarin (COUMADIN) 5 MG tablet     INR CLINIC REFERRAL      Doing much better after hospital stay.  Weigh up a little.  EAting and drinking OK.  Some nausea from the chemo which has been controlled.   No signs of urinary tract infection.    dyspnea on exertion-shortness of breath with activity due to lung cancer and likely some COPD-emphysema.   PLAN: Use albuteral inhaler as needed.  Albuteral inhaler can be used taking 2 inhalations every 4 hours as needed for coughing, wheezing or shortness of breath.  You can use 15 minutes before going out and doing something exertional.     Handicapped permit for the dyspnea on exertion.     Insurance will not cover to apixaban.  Switch to warfarin.    Start with 5mg once daily.   Schedule an appointment with anticoagulation clinic for Thursday.  Don't take warfarin dose that day.  They will let you know what dose to be on.   Check with cardiology next week on how long you should be on the blood thinners. They may reccomend heart monitor to make sure you  are in a regular rhythm before stopping the warfarin.           Follow-ups after your visit        Additional Services     INR CLINIC REFERRAL       Your provider has referred you to INR Services.    Please be aware that coverage of these services is subject to the terms and limitations of your health insurance plan.  Call member services at your health plan with any benefit or coverage questions.    Indication for Anticoagulation: atrial flutter  If nonstandard INR is desired, indicate goal range and explanation:   Expected Duration of Therapy: Other: possibly lifetime depending upon if he has any continuing paroxysmal atrial arrhythmias.                  Your next 10 appointments already scheduled     Feb 13, 2018 10:45 AM CST   (Arrive by 10:30 AM)   CT CHEST/ABDOMEN/PELVIS W CONTRAST with [facility]   [facility] ([facility])    [address] [address] [phone]           Please bring any scans or X-rays taken at other hospitals, if similar tests were done. Also bring a list of your medicines, including vitamins, minerals and over-the-counter drugs. It is safest to leave personal items at home.  Be sure to tell your doctor:   If you have any allergies.   If there s any chance you are pregnant.   If you are breastfeeding.   If you have any special needs.  You may have contrast for this exam. To prepare:   Do not eat or drink for 2 hours before your exam. If you need to take medicine, you may take it with small sips of water. (We may ask you to take liquid medicine as well.)   The day before your exam, drink extra fluids at least six 8-ounce glasses (unless your doctor tells you to restrict your fluids).  Patients over 70 or patients with diabetes or kidney problems:   If you haven t had a blood test (creatinine test) within the last 30 days, go to your clinic or Diagnostic Imaging Department for this test.  If you have diabetes:   If your kidney function is normal,  continue taking your metformin (Avandamet, Glucophage, Glucovance, Metaglip) on the day of your exam.   If your kidney function is abnormal, wait 48 hours before restarting this medicine.  You will have oral contrast for this exam:   You will drink the contrast at home. Get this from your clinic or Diagnostic Imaging Department. Please follow the directions given.  Please wear loose clothing, such as a sweat suit or jogging clothes. Avoid snaps, zippers and other metal. We may ask you to undress and put on a hospital gown.  If you have any questions, please call the Imaging Department where you will have your exam.            Feb 13, 2018 11:00 AM CST   ecg with [facility]   [facility] ([facility])    [address] [address]   [phone]            Feb 13, 2018 11:30 AM CST   Return Visit with Daksha Montemayor PA-C   [facility] ([facility])    [address] [address]   [phone]            Feb 15, 2018  8:00 AM CST   Level 3 with ROOM 10 [facility] ([facility])    [facility]  [address]  [facility] [address]   [phone]            Feb 15, 2018  9:00 AM CST   Return Visit with Joycelyn May MD   [facility] ([facility])    [facility]  [address]  [facility] [address]   [phone]            Feb 22, 2018  8:00 AM CST   Level 1 with ROOM 9 [facility] ([facility])    [facility]  [address]  [facility] [address]   [phone]              Who to contact     If you have questions or need follow up information about today's clinic visit or your schedule please contact [facility] directly at [phone].  Normal or non-critical lab and imaging results will  "be communicated to you by MyChart, letter or phone within 4 business days after the clinic has received the results. If you do not hear from us within 7 days, please contact the clinic through [employer] or phone. If you have a critical or abnormal lab result, we will notify you by phone as soon as possible.  Submit refill requests through [employer] or call your pharmacy and they will forward the refill request to us. Please allow 3 business days for your refill to be completed.          Additional Information About Your Visit        [employer] Information     [employer] lets you send messages to your doctor, view your test results, renew your prescriptions, schedule appointments and more. To sign up, go to [email or web address] . Click on \"Log in\" on the left side of the screen, which will take you to the Welcome page. Then click on \"Sign up Now\" on the right side of the page.     You will be asked to enter the access code listed below, as well as some personal information. Please follow the directions to create your username and password.     Your access code is: [email or web address]  Expires: 2018  9:55 AM     Your access code will  in 90 days. If you need help or a new code, please call your [facility] clinic or [phone].        Care EveryWhere ID     This is your Care EveryWhere ID. This could be used by other organizations to access your [facility] medical records  [id number]        Your Vitals Were     Pulse Temperature Respirations Height Pulse Oximetry BMI (Body Mass Index)    78 98.7  F (37.1  C) (Tympanic) 18 5' 10.98\" (1.803 m) 99% 20.23 kg/m2       Blood Pressure from Last 3 Encounters:   18 100/60   18 113/73   18 105/74    Weight from Last 3 Encounters:   18 145 lb (65.8 kg)   18 142 lb (64.4 kg)   18 157 lb (71.2 kg)              We Performed the Following     EKG 12-lead complete w/read - Clinics     INR CLINIC REFERRAL          Today's Medication Changes        "   These changes are accurate as of 2/5/18  9:55 AM.  If you have any questions, ask your nurse or doctor.               Start taking these medicines.        Dose/Directions    albuterol 108 (90 BASE) MCG/ACT Inhaler   Commonly known as:  PROAIR HFA   Used for:  Chronic obstructive pulmonary disease, unspecified COPD type (H)   Started by:  Kailash Mason MD        Dose:  2 puff   Inhale 2 puffs into the lungs every 4 hours as needed for shortness of breath / dyspnea or wheezing   Quantity:  1 Inhaler   Refills:  11       warfarin 5 MG tablet   Commonly known as:  COUMADIN   Used for:  Atrial flutter, unspecified type (H)   Started by:  Kailash Mason MD        Dose:  5 mg   Take 1 tablet (5 mg) by mouth daily   Quantity:  30 tablet   Refills:  1            Where to get your medicines      These medications were sent to [id number] [address] [facility]  [address] [place] [address]    Hours:  Closed 10-16-08 business to [facility] Phone:  [phone]     albuterol 108 (90 BASE) MCG/ACT Inhaler    warfarin 5 MG tablet                Primary Care Provider Office Phone # Fax #    Kailash Mason [phone] [address] [facility] [address]        Equal Access to Services     ROD PERRY AH: Diego bateso Soasher, waaxda luqadaha, qaybta kaalmada adeegyada, harley boykin. So [facility] [phone].    ATENCIÓN: Si habla español, tiene a morocho disposición servicios gratuitos de asistencia lingüística. ame al [phone].    We comply with applicable federal civil rights laws and [place] laws. We do not discriminate on the basis of race, color, national origin, age, disability, sex, sexual orientation, or gender identity.            Thank you!     Thank you for choosing [facility]  for your care. Our goal is always to provide you with excellent care. Hearing back from our patients is one way  we can continue to improve our services. Please take a few minutes to complete the written survey that you may receive in the mail after your visit with us. Thank you!             Your Updated Medication List - Protect others around you: Learn how to safely use, store and throw away your medicines at www.disposemymeds.org.          This list is accurate as of 2/5/18  9:55 AM.  Always use your most recent med list.                   Brand Name Dispense Instructions for use Diagnosis    albuterol 108 (90 BASE) MCG/ACT Inhaler    PROAIR HFA    1 Inhaler    Inhale 2 puffs into the lungs every 4 hours as needed for shortness of breath / dyspnea or wheezing    Chronic obstructive pulmonary disease, unspecified COPD type (H)       ALIGN Chew      Take 1 chew tab by mouth 3 times daily as needed        amiodarone 200 MG tablet    PACERONE/CODARONE    45 tablet    Take 2 tablets twice daily x 1 week (1/12 - 1/19) and then start 1 tablet daily 1/20/18    Paroxysmal atrial fibrillation (H)       apixaban ANTICOAGULANT 5 MG tablet    ELIQUIS    60 tablet    Take 1 tablet (5 mg) by mouth 2 times daily    Typical atrial flutter (H)       lidocaine-prilocaine cream    EMLA    30 g    Place a quarter sized amount of cream onto port area 30-60 minutes prior to port use. Do not rub in. Cover with tegaderm or saran wrap.    Non-small cell carcinoma of lung (H)       LORazepam 0.5 MG tablet    ATIVAN    30 tablet    Take 1 tablet (0.5 mg) by mouth every 4 hours as needed (Anxiety, Nausea/Vomiting or Sleep)    Non-small cell carcinoma of lung (H)       megestrol 40 MG/ML suspension    MEGACE ORAL    600 mL    Take 5 mLs (200 mg) by mouth daily    Non-small cell carcinoma of lung (H)       morphine 15 MG IR tablet    MSIR    30 tablet    1/2 to 1 pill every 4 hours as needed for air hunger.    Non-small cell carcinoma of lung (H)       * nicotine 21 MG/24HR 24 hr patch    NICODERM CQ          * nicotine 14 MG/24HR 24 hr patch    NICODERM  CQ    14 patch    Place 1 patch onto the skin every 24 hours    Encounter for tobacco use cessation counseling, Tobacco abuse       * nicotine 7 MG/24HR 24 hr patch    NICODERM CQ    30 patch    Place 1 patch onto the skin every 24 hours    Tobacco abuse       omeprazole 40 MG capsule    priLOSEC    30 capsule    Take 1 capsule (40 mg) by mouth daily Take 30-60 minutes before a meal.    Gastroesophageal reflux disease without esophagitis       prochlorperazine 10 MG tablet    COMPAZINE    30 tablet    Take 1 tablet (10 mg) by mouth every 6 hours as needed (Nausea/Vomiting)    Non-small cell carcinoma of lung (H)       warfarin 5 MG tablet    COUMADIN    30 tablet    Take 1 tablet (5 mg) by mouth daily    Atrial flutter, unspecified type (H)       * Notice:  This list has 3 medication(s) that are the same as other medications prescribed for you. Read the directions carefully, and ask your doctor or other care provider to review them with you.

## 2018-02-05 NOTE — PATIENT INSTRUCTIONS
ASSESSMENT:     ICD-10-CM    1. Chemotherapy induced nausea and vomiting R11.2     T45.1X5A    2. Dehydration E86.0    3. Weakness R53.1    4. Non-small cell carcinoma of lung (H) C34.90    5. Chronic obstructive pulmonary disease, unspecified COPD type (H) J44.9 albuterol (PROAIR HFA) 108 (90 BASE) MCG/ACT Inhaler   6. Atrial flutter, unspecified type (H) I48.92 warfarin (COUMADIN) 5 MG tablet     INR CLINIC REFERRAL      Doing much better after hospital stay.  Weigh up a little.  EAting and drinking OK.  Some nausea from the chemo which has been controlled.   No signs of urinary tract infection.    dyspnea on exertion-shortness of breath with activity due to lung cancer and likely some COPD-emphysema.   PLAN: Use albuteral inhaler as needed.  Albuteral inhaler can be used taking 2 inhalations every 4 hours as needed for coughing, wheezing or shortness of breath.  You can use 15 minutes before going out and doing something exertional.     Handicapped permit for the dyspnea on exertion.     Insurance will not cover to apixaban.  Switch to warfarin.    Start with 5mg once daily.   Schedule an appointment with anticoagulation clinic for Thursday.  Don't take warfarin dose that day.  They will let you know what dose to be on.   Check with cardiology next week on how long you should be on the blood thinners. They may reccomend heart monitor to make sure you are in a regular rhythm before stopping the warfarin.

## 2018-02-08 NOTE — PROGRESS NOTES
ANTICOAGULATION INITIAL CLINIC VISIT    Patient Name:  Zechariah Ashby  Date:  2/8/2018  Referred by: Dr. Mason  Contact Type:  Face to Face    SUBJECTIVE:  Coumadin education was completed today.  Topics covered include:  -Introduction to coumadin (pt has familiarity with warfarin as his spouse takes it, he also sets up her meds)  -Proper Administration  -INR Testing  -Sign/Symptoms of Bleeding  -Signs/Symptoms of Clot Formation or Stroke  -Dietary Intake of Vitamin K  -Drug Interactions (amidarone, also on chemo carbo/Gemzar)  -Anticoagulation Identification (bracelet, necklace or wallet card)  -Future Surgery  -Effects of Alcohol, Tobacco, and Exercise on Coumadin (states he may have one cigarette weekly)     Coumadin Education Booklet and Coumadin Identification Wallet Card were given to the patient.       Patient Findings     Positives Change in medications (took last dose of apixiban on Monday 2/5/18 AM prior to appt with MD. Last chemo 2/1/18 was just Gemzar. Next run due 2/15/18 (Gemzar and Carbo due)), Initiation of therapy (started warfarin Monday 2/5/18 evening)    Comments Transition of Anticoagulants recommends: 'When going from apixaban to warfarin, consider the use of heparin or enoxaparin as a bridge (ie, start heparin infusion/enoxaparin and warfarin 12 hours after last dose of apixaban and discontinue parenteral anticoagulant when INR is therapeutic).' Pt stopped apixiban Monday AM, started warfarin Monday PM and is already therapeutic today. No parenteral anticoagulant was ordered as a bridge.     Pt has cardiology appt scheduled for 2/13/18 per EPIC note, '..We will discuss continued use of amiodarone..'    Dx of stage IV lung cancer with metastasis to the cervical lymph nodes and adrenal gland.          OBJECTIVE    INR Protime   Date Value Ref Range Status   02/08/2018 2.3 (A) 0.86 - 1.14 Final       ASSESSMENT / PLAN  INR assessment THER    Recheck INR In: 4 DAYS    INR Location Clinic       Anticoagulation Summary as of 2/8/2018     INR goal 2.0-3.0   Today's INR 2.3   Maintenance plan No maintenance plan   Full instructions 2/8: 2.5 mg; 2/9: 2.5 mg; 2/10: 2.5 mg; 2/11: 2.5 mg; Otherwise No maintenance plan   Next INR check 2/12/2018   Target end date     Indications   Atrial flutter  unspecified type (H) [I48.92]  Long-term (current) use of anticoagulants [Z79.01] [Z79.01]         Anticoagulation Episode Summary     INR check location     Preferred lab     Send INR reminders to [facility]    Comments * have INR drawn with other labs in infusion on 2/15/18 (order placed). Pt is on AMIODARONE. On palliative chemo (CARBOplatin / Gemcitabine)      Anticoagulation Care Providers     Provider Role Specialty Phone number    Kailash Mason MD [facility] [phone]            See the Encounter Report to view Anticoagulation Flowsheet and Dosing Calendar (Go to Encounters tab in chart review, and find the Anticoagulation Therapy Visit)    Dosage adjustment made based on physician directed care plan.    Malathi Carrera RN

## 2018-02-08 NOTE — MR AVS SNAPSHOT
Zechariah WOODALL Isma   2/8/2018 1:45 PM   Anticoagulation Therapy Visit    Description:  57 year old male   Provider:  NB ANTI COAG   Department:  Nb Anticoag           INR as of 2/8/2018     Today's INR 2.3      Anticoagulation Summary as of 2/8/2018     INR goal 2.0-3.0   Today's INR 2.3   Full instructions 2/8: 2.5 mg; 2/9: 2.5 mg; 2/10: 2.5 mg; 2/11: 2.5 mg; Otherwise No maintenance plan   Next INR check 2/12/2018    Indications   Atrial flutter  unspecified type (H) [I48.92]  Long-term (current) use of anticoagulants [Z79.01] [Z79.01]         Description     Take 2.5mg (= 1/2 tablet) Thurs, Fri, Sat, Sun.  Recheck INR Monday 2/12/18.      Your next Anticoagulation Clinic appointment(s)     Feb 12, 2018  1:15 PM CST   Anticoagulation Visit with NB ANTI COAG   [facility] ([facility])    [address] [place] [address] [phone]              Contact Numbers     Please call [phone] with any problems or questions regarding your therapy.    If you need to cancel and/or reschedule your appointment please call one of the following numbers:  [facility] - [phone]  [place] - [phone]  [place] - [phone]  [place] - [phone]  [place] - [phone]            February 2018 Details    Sun Mon Tue Wed Thu Fri Sat         1               2               3                 4               5               6               7               8      2.5 mg   See details      9      2.5 mg         10      2.5 mg           11      2.5 mg         12            13               14               15               16               17                 18               19               20               21               22               23               24                 25               26               27               28                   Date Details   02/08 This INR check       Date of next INR:  2/12/2018         How to take your warfarin dose     To  take:  2.5 mg Take 0.5 of a 5 mg tablet.

## 2018-02-09 NOTE — PROGRESS NOTES
Pt returned call and he will f/u with Christine Montemayor as scheduled on 2/13/18 for EKG and f/u. He has a CT scheduled at 10: 45 that same day.

## 2018-02-12 NOTE — MR AVS SNAPSHOT
FredyJOSE C Ashby   2/12/2018 1:15 PM   Anticoagulation Therapy Visit    Description:  57 year old male   Provider:  NB ANTI COAG   Department:  Nb Anticoag           INR as of 2/12/2018     Today's INR 3.6!      Anticoagulation Summary as of 2/12/2018     INR goal 2.0-3.0   Today's INR 3.6!   Full instructions 2/12: 2.5 mg; 2/13: Hold; 2/14: 2.5 mg; Otherwise No maintenance plan   Next INR check 2/15/2018    Indications   Atrial flutter  unspecified type (H) [I48.92]  Long-term (current) use of anticoagulants [Z79.01] [Z79.01]         Contact Numbers     Please call [phone] with any problems or questions regarding your therapy.    If you need to cancel and/or reschedule your appointment please call one of the following numbers:  [facility] [phone]  [place] - [phone]  [place] - [phone]  [facility] [phone]  [place] - [phone]            February 2018 Details    Sun Mon Tue Wed Thu Fri Sat         1               2               3                 4               5               6               7               8               9               10                 11               12      2.5 mg   See details      13      Hold         14      2.5 mg         15            16               17                 18               19               20               21               22               23               24                 25               26               27               28                   Date Details   02/12 This INR check       Date of next INR:  2/15/2018         How to take your warfarin dose     To take:  2.5 mg Take 0.5 of a 5 mg tablet.    Hold Do not take your warfarin dose. See the Details table to the right for additional instructions.

## 2018-02-12 NOTE — PROGRESS NOTES
ANTICOAGULATION FOLLOW-UP CLINIC VISIT    Patient Name:  Zechariah Ashby  Date:  2/12/2018  Contact Type:  Face to Face    SUBJECTIVE:     Patient Findings     Positives Extra doses    Comments Patient arrived at 2:05 for a 1:15 pm appointment. He states he lost track of time. He stated that he took a 1/2 tablet of warfarin today as well. Writer educated him to not take warfarin until he has spoke to an [facility] RN on the phone about dose instructions or has had a face to face visit on the day he checks his INR. He verbalized understanding. He will have an INR drawn with infusion labs on Thursday, if INR is still supra-therapeutic a new warfarin tablet strength should be ordered.    Pt is on CARBOplatin/Gemcitabine-Concurrent use of CARBOPLATIN and WARFARIN may result in increased risk for elevated INR and subsequent bleeding.            OBJECTIVE    INR Protime   Date Value Ref Range Status   02/12/2018 3.6 (A) 0.86 - 1.14 Final       ASSESSMENT / PLAN  INR assessment SUPRA    Recheck INR In: 3 DAYS    INR Location Clinic      Anticoagulation Summary as of 2/12/2018     INR goal 2.0-3.0   Today's INR 3.6!   Maintenance plan No maintenance plan   Full instructions 2/12: 2.5 mg; 2/13: Hold; 2/14: 2.5 mg; Otherwise No maintenance plan   Next INR check 2/15/2018   Target end date     Indications   Atrial flutter  unspecified type (H) [I48.92]  Long-term (current) use of anticoagulants [Z79.01] [Z79.01]         Anticoagulation Episode Summary     INR check location     Preferred lab     Send INR reminders to [facility]    Comments * have INR drawn with other labs in infusion on 2/15/18 (order placed). Pt is on AMIODARONE. On palliative chemo (CARBOplatin / Gemcitabine) call home number which is his cell      Anticoagulation Care Providers     Provider Role Specialty Phone number    Kailash Mason MD [facility] [phone]            See the Encounter Report to view Anticoagulation  Flowsheet and Dosing Calendar (Go to Encounters tab in chart review, and find the Anticoagulation Therapy Visit)    Patient was instructed to hold warfarin dose tomorrow and take 2.5mg Wednesday. He will recheck INR on Thursday at the infusion center with his labs.    Betsey Maldonado RN

## 2018-02-13 NOTE — PATIENT INSTRUCTIONS
1. EKG today and at ER/hospitalizations have shown you are still in normal rhythm.  It appears that the amiodarone is working!  As we discussed, amiodarone can have some potential long-term side effects BUT as it's working, we will continue it at least until the Spring    2. We will also discuss possible stress testing at that time (Dr. Damon thought that the abnormalities might have been due to the atrial flutter)    3. My [facility] nurses: [phone] or [place] nurses: [phone]

## 2018-02-13 NOTE — MR AVS SNAPSHOT
After Visit Summary   2/13/2018    Zechariah Ashby    MRN: [id number]           Patient Information     Date Of Birth          1960        Visit Information        Provider Department      2/13/2018 11:30 AM Daksha Montemayor PA-C [facility]        Today's Diagnoses     Persistent atrial fibrillation (H)    -  1      Care Instructions    1. EKG today and at ER/hospitalizations have shown you are still in normal rhythm.  It appears that the amiodarone is working!  As we discussed, amiodarone can have some potential long-term side effects BUT as it's working, we will continue it at least until the Spring    2. We will also discuss possible stress testing at that time (Dr. Damon thought that the abnormalities might have been due to the atrial flutter)    3. My [facility] nurses: [phone] or [place] nurses: [phone]          Follow-ups after your visit        Additional Services     Follow-Up with Cardiac Advanced Practice Provider                 Your next 10 appointments already scheduled     Feb 15, 2018  8:00 AM CST   Level 3 with ROOM 10 [facility] ([facility])    [facility]  [address]  [facility] [address]   [phone]            Feb 15, 2018  9:00 AM CST   Return Visit with Joycelyn May MD   [facility] ([facility])    [facility]  [address]  [facility] [address]   [phone]            Feb 22, 2018  8:00 AM CST   Level 1 with ROOM 9 [facility] ([facility])    [facility]  [address]  [place] [address]   [phone]              Future tests that were ordered for you today     Open Standing Orders        Priority Remaining Interval Expires Ordered    INR Routine 20/20 2/12/2019 2/12/2018          Open Future Orders        Priority  "Expected Expires Ordered    EKG 12-lead complete w/read - Clinics (to be scheduled) Routine 3/15/2018 2019 2018    Follow-Up with Cardiac Advanced Practice Provider Routine 3/15/2018 2019 2018            Who to contact     If you have questions or need follow up information about today's clinic visit or your schedule please contact [facility] directly at [phone].  Normal or non-critical lab and imaging results will be communicated to you by MyChart, letter or phone within 4 business days after the clinic has received the results. If you do not hear from us within 7 days, please contact the clinic through Zen99hart or phone. If you have a critical or abnormal lab result, we will notify you by phone as soon as possible.  Submit refill requests through Sypherlink or call your pharmacy and they will forward the refill request to us. Please allow 3 business days for your refill to be completed.          Additional Information About Your Visit        [email or web address] Information     Sypherlink lets you send messages to your doctor, view your test results, renew your prescriptions, schedule appointments and more. To sign up, go to [email or web address] . Click on \"Log in\" on the left side of the screen, which will take you to the Welcome page. Then click on \"Sign up Now\" on the right side of the page.     You will be asked to enter the access code listed below, as well as some personal information. Please follow the directions to create your username and password.     Your access code is: [email or web address]  Expires: 2018  9:55 AM     Your access code will  in 90 days. If you need help or a new code, please call your [place] clinic or [phone].        Care EveryWhere ID     This is your Care EveryWhere ID. This could be used by other organizations to access your [place] medical records  [id number]        Your Vitals Were     Pulse Pulse Oximetry BMI (Body Mass " Index)             70 99% 20.65 kg/m2          Blood Pressure from Last 3 Encounters:   02/13/18 (!) 136/94   02/05/18 100/60   02/01/18 113/73    Weight from Last 3 Encounters:   02/13/18 67.1 kg (148 lb)   02/05/18 65.8 kg (145 lb)   02/01/18 64.4 kg (142 lb)               Primary Care Provider Office Phone # Fax #    Kailash Mason [phone] [address] [place] [address]        Equal Access to Services     [facility]: Hadii aad ku hadasho Soomaali, waaxda luqadaha, qaybta kaalmada adeegyaturner, harley carrasquillo . So [facility] [phone].    ATENCIÓN: Si habla español, tiene a morocho disposición servicios gratuitos de asistencia lingüística. [facility] [phone].    We comply with applicable federal civil rights laws and [place] laws. We do not discriminate on the basis of race, color, national origin, age, disability, sex, sexual orientation, or gender identity.            Thank you!     Thank you for choosing [facility]  for your care. Our goal is always to provide you with excellent care. Hearing back from our patients is one way we can continue to improve our services. Please take a few minutes to complete the written survey that you may receive in the mail after your visit with us. Thank you!             Your Updated Medication List - Protect others around you: Learn how to safely use, store and throw away your medicines at www.disposemymeds.org.          This list is accurate as of 2/13/18 12:01 PM.  Always use your most recent med list.                   Brand Name Dispense Instructions for use Diagnosis    albuterol 108 (90 BASE) MCG/ACT Inhaler    PROAIR HFA    1 Inhaler    Inhale 2 puffs into the lungs every 4 hours as needed for shortness of breath / dyspnea or wheezing    Chronic obstructive pulmonary disease, unspecified COPD type (H)       ALIGN Chew      Take 1 chew tab by mouth 3 times daily as needed         amiodarone 200 MG tablet    PACERONE/CODARONE    45 tablet    Take 2 tablets twice daily x 1 week (1/12 - 1/19) and then start 1 tablet daily 1/20/18    Paroxysmal atrial fibrillation (H)       lidocaine-prilocaine cream    EMLA    30 g    Place a quarter sized amount of cream onto port area 30-60 minutes prior to port use. Do not rub in. Cover with tegaderm or saran wrap.    Non-small cell carcinoma of lung (H)       LORazepam 0.5 MG tablet    ATIVAN    30 tablet    Take 1 tablet (0.5 mg) by mouth every 4 hours as needed (Anxiety, Nausea/Vomiting or Sleep)    Non-small cell carcinoma of lung (H)       megestrol 40 MG/ML suspension    MEGACE ORAL    600 mL    Take 5 mLs (200 mg) by mouth daily    Non-small cell carcinoma of lung (H)       morphine 15 MG IR tablet    MSIR    30 tablet    1/2 to 1 pill every 4 hours as needed for air hunger.    Non-small cell carcinoma of lung (H)       * nicotine 21 MG/24HR 24 hr patch    NICODERM CQ          * nicotine 14 MG/24HR 24 hr patch    NICODERM CQ    14 patch    Place 1 patch onto the skin every 24 hours    Encounter for tobacco use cessation counseling, Tobacco abuse       * nicotine 7 MG/24HR 24 hr patch    NICODERM CQ    30 patch    Place 1 patch onto the skin every 24 hours    Tobacco abuse       omeprazole 40 MG capsule    priLOSEC    30 capsule    Take 1 capsule (40 mg) by mouth daily Take 30-60 minutes before a meal.    Gastroesophageal reflux disease without esophagitis       prochlorperazine 10 MG tablet    COMPAZINE    30 tablet    Take 1 tablet (10 mg) by mouth every 6 hours as needed (Nausea/Vomiting)    Non-small cell carcinoma of lung (H)       warfarin 5 MG tablet    COUMADIN    30 tablet    Take 1 tablet (5 mg) by mouth daily    Atrial flutter, unspecified type (H)       * Notice:  This list has 3 medication(s) that are the same as other medications prescribed for you. Read the directions carefully, and ask your doctor or other care provider to review  them with you.

## 2018-02-13 NOTE — PROGRESS NOTES
Service Date: 02/13/2018      HISTORY OF PRESENT ILLNESS:  I had the pleasure of seeing Zechariah today when he came accompanied by his wife, Estefani, for followup of his atrial fibrillation.  He is a very pleasant 57-year-old who was first seen by Dr. Damon in 12/2017.  At that time, he complained of chest tightness, dyspnea and palpitations and was noted to be in atrial flutter with a 2:1 atrial flutter.  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  He was sent to [facility] and underwent atrial flutter ablation (cavotricuspid isthmus, typical counterclockwise flutter) on 12/08/17 with Dr. Cardenas.  He was placed on Eliquis and metoprolol was continued.  Unfortunately, during that visit, he had routine testing done that showed significant abnormalities in the lungs.  He was subsequently diagnosed with stage IV non-small cell lung cancer for which he sees Dr. May in [facility].  He is currently undergoing palliative chemotherapy and understands there is no cure for this.  I believe he is getting carboplatin and gemcitabine.  Other history includes an 88-pack-year history of tobacco and history of gastric ulcer perforation with no recent bleeding.  On 01/07, he was in the ER with COPD exacerbation.  An echocardiogram was done showing an improved EF to 50-55% roughly 1 month after his atrial flutter ablation.  He was continued on Eliquis and metoprolol at that time.  Apparently during that hospitalization, he did have brief runs of atrial fibrillation.      I saw him 01/11/2018 due to concerns regarding fatigue, hypotension and palpitations.  He was in atrial fibrillation with a rapid ventricular response.  I spoke with Dr. Roberts at that time and he was placed on amiodarone 200 mg twice daily for 1 week with plans for DC cardioversion the following day.  We ordered a KIMBERLY as he has had his Eliquis briefly interrupted due to a port placement.      He actually came back to  [facility] on 01/12 for his KIMBERLY and cardioversion and had spontaneously converted to sinus rhythm.  Subsequently, metoprolol has been decreased and now discontinued given hypotension with systolic blood pressures to the 80s.  He was recently in the ER 01/19 to 01/20 for dehydration.  EKG has shown that he has maintained sinus rhythm.      On 02/05, he saw Dr. Mason.  He was placed on an albuterol inhaler.  Warfarin was recommended in lieu of Eliquis given insurance formulary issues.      He is seeing me back for the first time since I saw him 01/11 and tells me that from a heart standpoint he thinks he is doing okay.  He has not had recurrent atrial fibrillation that he is aware of, specifically denying significant episodes of fatigue or palpitations.  He does note lightheadedness when he first stands up.  This usually lasts 15-30 seconds.  He has never fallen or fainted.  He has not had chest pain or bilateral arm discomfort (which he had previously described and which led to consultation with Dr. Damon). He denies edema, orthopnea or PND.     His main complaint is that of dyspnea and he understands that he has significant lung issues that could account for this including COPD and stage IV lung cancer.      EKG today, which I overread, continues to show sinus rhythm at 68 beats per minute.  He has normal intervals, though ND interval is slightly short.  There is certainly no evidence of delta wave.        ASSESSMENT AND PLAN:     1.  Atrial arrhythmias.  As above, he had 2:1 atrial flutter and underwent successful atrial flutter ablation on 12/08/2017.  He had paroxysmal atrial fibrillation following that and has remained on amiodarone.  He did not require cardioversion that we set up for him on 01/12/2018 as he had spontaneously converted to sinus rhythm.       We talked about potential side effects of amiodarone and at this time we agreed that he will stay on this medication.  I would like to see him back in a few  months to further discuss how he is doing from a cancer standpoint and if amiodarone should be continued in a palliative measure to help prevent recurrence of atrial fibrillation.  The onset of pulmonary fibrosis is typically longer than what I believe his expected lifespan will be given his stage IV metastatic lung cancer.  He sees Dr. May this coming Thursday to see how his palliative chemo (now finishing around 3, I believe) is going.      He will remain on anticoagulation.  His CHADS-VASc score is not terribly high, but he is prothrombotic with his cancer diagnosis and at this time I think it would be prudent to keep him on it given the high chance he will go back into atrial fibrillation, especially if we discontinue amiodarone therapy.      2. Abnormal stress test. Dr. Damon reviewed a stress test done secondary to significant chest tightness and dyspnea as well as bilateral arm discomfort.  I spoke with her about this after I had met with Zechariah and his family on 01/11 as her dictation did not seem to match the stress test results that were in Epic.  She believes that some of the abnormalities noted on stress test results could have been related to atrial flutter and recommended that we repeat this.      Zechariah Jara and I decided that we would talk about this when I see him this spring to determine if followup stress testing is needed.  I explained that if he would be a poor candidate for any intervention we would likely continue medical management no matter what the stress test showed us and they have voiced understanding.      We will plan to see him back this spring, talk about discontinuation of amiodarone and/or amiodarone testing as well as potentially repeating a stress test.  He has not seen Electrophysiology at our practice, but does follow with Dr. Damon and saw her last in December.  Depending on how our talk goes this spring, we will plan to get him back in to see Dr. Damon.      Zechariah certainly  looks quite a bit better than when I saw him last time and I am hopeful that his appetite continues to improve.  I did note that his blood pressure was a bit high today, but certainly as blood pressures have previously been quite a bit lower causing discontinuation of metoprolol, I will not make any changes as he is starting to feel quite a bit better.         OSCAR LONDONO PA-C             D: 2018   T: 2018   MT: MELO      Name:     ROQUE SAXENA   MRN:      [id number]        Account:      [id number]   :      1960           Service Date: 2018      Document: [id number]

## 2018-02-13 NOTE — LETTER
2/13/2018    Kailash Mason MD  [address] [place] [address]    RE: Zechariah Ashby       Dear Colleague,    I had the pleasure of seeing Zechariah Ashby in the [facility].    HPI and Plan:   See dictation #[id number]    Orders Placed This Encounter   Procedures     Follow-Up with Cardiac Advanced Practice Provider     EKG 12-LEAD CLINIC READ ([facility])- to be scheduled       No orders of the defined types were placed in this encounter.      There are no discontinued medications.      Encounter Diagnosis   Name Primary?     Persistent atrial fibrillation (H) Yes       CURRENT MEDICATIONS:  Current Outpatient Prescriptions   Medication Sig Dispense Refill     albuterol (PROAIR HFA) 108 (90 BASE) MCG/ACT Inhaler Inhale 2 puffs into the lungs every 4 hours as needed for shortness of breath / dyspnea or wheezing 1 Inhaler 11     warfarin (COUMADIN) 5 MG tablet Take 1 tablet (5 mg) by mouth daily 30 tablet 1     nicotine (NICODERM CQ) 21 MG/24HR 24 hr patch        morphine (MSIR) 15 MG IR tablet 1/2 to 1 pill every 4 hours as needed for air hunger. 30 tablet 0     omeprazole (PRILOSEC) 40 MG capsule Take 1 capsule (40 mg) by mouth daily Take 30-60 minutes before a meal. 30 capsule 11     nicotine (NICODERM CQ) 14 MG/24HR 24 hr patch Place 1 patch onto the skin every 24 hours 14 patch 5     nicotine (NICODERM CQ) 7 MG/24HR 24 hr patch Place 1 patch onto the skin every 24 hours 30 patch 5     amiodarone (PACERONE/CODARONE) 200 MG tablet Take 2 tablets twice daily x 1 week (1/12 - 1/19) and then start 1 tablet daily 1/20/18 45 tablet 1     prochlorperazine (COMPAZINE) 10 MG tablet Take 1 tablet (10 mg) by mouth every 6 hours as needed (Nausea/Vomiting) 30 tablet 5     megestrol (MEGACE ORAL) 40 MG/ML suspension Take 5 mLs (200 mg) by mouth daily 600 mL 2     LORazepam (ATIVAN) 0.5 MG tablet Take 1 tablet (0.5 mg) by mouth every 4 hours as needed (Anxiety, Nausea/Vomiting or  Sleep) (Patient not taking: Reported on 2018) 30 tablet 5     lidocaine-prilocaine (EMLA) cream Place a quarter sized amount of cream onto port area 30-60 minutes prior to port use. Do not rub in. Cover with tegaderm or saran wrap. (Patient not taking: Reported on 2018) 30 g 1     Probiotic Product (ALIGN) CHEW Take 1 chew tab by mouth 3 times daily as needed         ALLERGIES     Allergies   Allergen Reactions     Nka [No Known Allergies]        PAST MEDICAL HISTORY:  Past Medical History:   Diagnosis Date     Atrial flutter (H) 2017     Cancer (H)     Lung Ca       PAST SURGICAL HISTORY:  Past Surgical History:   Procedure Laterality Date     ANESTHESIA CARDIOVERSION N/A 2018    Procedure: ANESTHESIA CARDIOVERSION;  CARDIOVERSION (FOLLOWING KIMBERLY AT 0830);  Surgeon: GENERIC ANESTHESIA PROVIDER;  Location: [facility] OR     APPENDECTOMY      age 28s     INSERT PORT VASCULAR ACCESS N/A 1/3/2018    Procedure: INSERT PORT VASCULAR ACCESS;  Port-a-Cath Placement;  Surgeon: Tomas Crews MD;  Location: [facility] OR     SURGICAL HISTORY OF -   2004    Gastroscopy with biopsy for gastric ulcer       FAMILY HISTORY:  Family History   Problem Relation Age of Onset     CANCER Mother      pancreatic cancer,  at 38 years     Alzheimer Disease Father       at 84 years.       SOCIAL HISTORY:  Social History     Social History     Marital status:      Spouse name: Leonor Shultz     Number of children: 2     Years of education: N/A     Occupational History           Social History Main Topics     Smoking status: Former Smoker     Packs/day: 0.25     Years: 50.00     Start date: 2017     Smokeless tobacco: Never Used      Comment: quit smoking over the last few days.      Alcohol use No     Drug use: No     Sexual activity: Yes     Partners: Female     Other Topics Concern     Parent/Sibling W/ Cabg, Mi Or Angioplasty Before 65f 55m? No     Social History Narrative       Review of  Systems:  Skin:  Negative       Eyes:  Positive for glasses    ENT:  Positive for nasal congestion    Respiratory:  Positive for dyspnea on exertion;cough     Cardiovascular:  Negative for;edema;palpitations;chest pain Positive for;fatigue;lightheadedness;dizziness;exercise intolerance    Gastroenterology: Negative for melena;hematochezia    Genitourinary:  Negative      Musculoskeletal:  Negative      Neurologic:  Negative      Psychiatric:  Negative      Heme/Lymph/Imm:  Positive for   non-small cell lung CA  Endocrine:  Negative        Physical Exam:  Vitals: BP (!) 136/94  Pulse 70  Wt 67.1 kg (148 lb)  SpO2 99%  BMI 20.65 kg/m2    Constitutional:  cooperative;well developed cachectic      Skin:  warm and dry to the touch, no apparent skin lesions or masses noted     L upper chest Port-A-Cath with well approximated edges; surrounding ecchymosis    Head:  normocephalic, no masses or lesions        Eyes:  pupils equal and round;conjunctivae and lids unremarkable;sclera white;no xanthalasma        Lymph:      ENT:  not assessed this visit        Neck:  JVP normal        Respiratory:    diminished breath sounds bilaterally       Cardiac: regular rhythm;no murmurs, gallops or rubs detected                not assessed this visit                                        GI:  abdomen soft        Extremities and Muscular Skeletal:  no edema;no deformities, clubbing, cyanosis, erythema observed              Neurological:  no gross motor deficits   Fatigued    Psych:  Alert and Oriented x 3          Thank you for allowing me to participate in the care of your patient.      Sincerely,     Daksha Montemayor PA-C     [facility]    cc:   No referring provider defined for this encounter.

## 2018-02-13 NOTE — PROGRESS NOTES
HPI and Plan:   See dictation #[id number]    Orders Placed This Encounter   Procedures     Follow-Up with Cardiac Advanced Practice Provider     EKG 12-LEAD CLINIC READ (Mikey)- to be scheduled       No orders of the defined types were placed in this encounter.      There are no discontinued medications.      Encounter Diagnosis   Name Primary?     Persistent atrial fibrillation (H) Yes       CURRENT MEDICATIONS:  Current Outpatient Prescriptions   Medication Sig Dispense Refill     albuterol (PROAIR HFA) 108 (90 BASE) MCG/ACT Inhaler Inhale 2 puffs into the lungs every 4 hours as needed for shortness of breath / dyspnea or wheezing 1 Inhaler 11     warfarin (COUMADIN) 5 MG tablet Take 1 tablet (5 mg) by mouth daily 30 tablet 1     nicotine (NICODERM CQ) 21 MG/24HR 24 hr patch        morphine (MSIR) 15 MG IR tablet 1/2 to 1 pill every 4 hours as needed for air hunger. 30 tablet 0     omeprazole (PRILOSEC) 40 MG capsule Take 1 capsule (40 mg) by mouth daily Take 30-60 minutes before a meal. 30 capsule 11     nicotine (NICODERM CQ) 14 MG/24HR 24 hr patch Place 1 patch onto the skin every 24 hours 14 patch 5     nicotine (NICODERM CQ) 7 MG/24HR 24 hr patch Place 1 patch onto the skin every 24 hours 30 patch 5     amiodarone (PACERONE/CODARONE) 200 MG tablet Take 2 tablets twice daily x 1 week (1/12 - 1/19) and then start 1 tablet daily 1/20/18 45 tablet 1     prochlorperazine (COMPAZINE) 10 MG tablet Take 1 tablet (10 mg) by mouth every 6 hours as needed (Nausea/Vomiting) 30 tablet 5     megestrol (MEGACE ORAL) 40 MG/ML suspension Take 5 mLs (200 mg) by mouth daily 600 mL 2     LORazepam (ATIVAN) 0.5 MG tablet Take 1 tablet (0.5 mg) by mouth every 4 hours as needed (Anxiety, Nausea/Vomiting or Sleep) (Patient not taking: Reported on 2/13/2018) 30 tablet 5     lidocaine-prilocaine (EMLA) cream Place a quarter sized amount of cream onto port area 30-60 minutes prior to port use. Do not rub in. Cover with  tegaderm or saran wrap. (Patient not taking: Reported on 2018) 30 g 1     Probiotic Product (ALIGN) CHEW Take 1 chew tab by mouth 3 times daily as needed         ALLERGIES     Allergies   Allergen Reactions     Nka [No Known Allergies]        PAST MEDICAL HISTORY:  Past Medical History:   Diagnosis Date     Atrial flutter (H) 2017     Cancer (H)     Lung Ca       PAST SURGICAL HISTORY:  Past Surgical History:   Procedure Laterality Date     ANESTHESIA CARDIOVERSION N/A 2018    Procedure: ANESTHESIA CARDIOVERSION;  CARDIOVERSION (FOLLOWING KIMBERLY AT 0830);  Surgeon: GENERIC ANESTHESIA PROVIDER;  Location: [facility]     APPENDECTOMY      age 28s     INSERT PORT VASCULAR ACCESS N/A 1/3/2018    Procedure: INSERT PORT VASCULAR ACCESS;  Port-a-Cath Placement;  Surgeon: Tomas Crews MD;  Location: [facility]     SURGICAL HISTORY OF -   2004    Gastroscopy with biopsy for gastric ulcer       FAMILY HISTORY:  Family History   Problem Relation Age of Onset     CANCER Mother      pancreatic cancer,  at 38 years     Alzheimer Disease Father       at 84 years.       SOCIAL HISTORY:  Social History     Social History     Marital status:      Spouse name: Leonor Shultz     Number of children: 2     Years of education: N/A     Occupational History           Social History Main Topics     Smoking status: Former Smoker     Packs/day: 0.25     Years: 50.00     Start date: 2017     Smokeless tobacco: Never Used      Comment: quit smoking over the last few days.      Alcohol use No     Drug use: No     Sexual activity: Yes     Partners: Female     Other Topics Concern     Parent/Sibling W/ Cabg, Mi Or Angioplasty Before 65f 55m? No     Social History Narrative       Review of Systems:  Skin:  Negative       Eyes:  Positive for glasses    ENT:  Positive for nasal congestion    Respiratory:  Positive for dyspnea on exertion;cough     Cardiovascular:  Negative  for;edema;palpitations;chest pain Positive for;fatigue;lightheadedness;dizziness;exercise intolerance    Gastroenterology: Negative for melena;hematochezia    Genitourinary:  Negative      Musculoskeletal:  Negative      Neurologic:  Negative      Psychiatric:  Negative      Heme/Lymph/Imm:  Positive for   non-small cell lung CA  Endocrine:  Negative        Physical Exam:  Vitals: BP (!) 136/94  Pulse 70  Wt 67.1 kg (148 lb)  SpO2 99%  BMI 20.65 kg/m2    Constitutional:  cooperative;well developed cachectic      Skin:  warm and dry to the touch, no apparent skin lesions or masses noted     L upper chest Port-A-Cath with well approximated edges; surrounding ecchymosis    Head:  normocephalic, no masses or lesions        Eyes:  pupils equal and round;conjunctivae and lids unremarkable;sclera white;no xanthalasma        Lymph:      ENT:  not assessed this visit        Neck:  JVP normal        Respiratory:    diminished breath sounds bilaterally       Cardiac: regular rhythm;no murmurs, gallops or rubs detected                not assessed this visit                                        GI:  abdomen soft        Extremities and Muscular Skeletal:  no edema;no deformities, clubbing, cyanosis, erythema observed              Neurological:  no gross motor deficits   Fatigued    Psych:  Alert and Oriented x 3

## 2018-02-13 NOTE — LETTER
2/13/2018      Kailash Mason MD  [address] [place] [id number]      RE: Zechariah Ashby       Dear Colleague,    I had the pleasure of seeing Zechariah Ashby in the [facility].    Service Date: 02/13/2018      HISTORY OF PRESENT ILLNESS:  I had the pleasure of seeing Zechariah today when he came accompanied by his wife, Estefani, for followup of his atrial fibrillation.  He is a very pleasant 57-year-old who was first seen by Dr. Damon in 12/2017.  At that time, he complained of chest tightness, dyspnea and palpitations and was noted to be in atrial flutter with a 2:1 atrial flutter.  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  He was sent to [facility] and underwent atrial flutter ablation (cavotricuspid isthmus, typical counterclockwise flutter) on 12/08/17 with Dr. Cardenas.  He was placed on Eliquis and metoprolol was continued.  Unfortunately, during that visit, he had routine testing done that showed significant abnormalities in the lungs.  He was subsequently diagnosed with stage IV non-small cell lung cancer for which he sees Dr. May in [facility].  He is currently undergoing palliative chemotherapy and understands there is no cure for this.  I believe he is getting carboplatin and gemcitabine.  Other history includes an 88-pack-year history of tobacco and history of gastric ulcer perforation with no recent bleeding.  On 01/07, he was in the ER with COPD exacerbation.  An echocardiogram was done showing an improved EF to 50-55% roughly 1 month after his atrial flutter ablation.  He was continued on Eliquis and metoprolol at that time.  Apparently during that hospitalization, he did have brief runs of atrial fibrillation.      I saw him 01/11/2018 due to concerns regarding fatigue, hypotension and palpitations.  He was in atrial fibrillation with a rapid ventricular response.  I spoke with Dr. Roberts at that time and he was  placed on amiodarone 200 mg twice daily for 1 week with plans for DC cardioversion the following day.  We ordered a KIMBERLY as he has had his Eliquis briefly interrupted due to a port placement.      He actually came back to [facility] on 01/12 for his KIMBERLY and cardioversion and had spontaneously converted to sinus rhythm.  Subsequently, metoprolol has been decreased and now discontinued given hypotension with systolic blood pressures to the 80s.  He was recently in the ER 01/19 to 01/20 for dehydration.  EKG has shown that he has maintained sinus rhythm.      On 02/05, he saw Dr. Mason.  He was placed on an albuterol inhaler.  Warfarin was recommended in lieu of Eliquis given insurance formulary issues.      He is seeing me back for the first time since I saw him 01/11 and tells me that from a heart standpoint he thinks he is doing okay.  He has not had recurrent atrial fibrillation that he is aware of, specifically denying significant episodes of fatigue or palpitations.  He does note lightheadedness when he first stands up.  This usually lasts 15-30 seconds.  He has never fallen or fainted.  He has not had chest pain or bilateral arm discomfort (which he had previously described and which led to consultation with Dr. Damon). He denies edema, orthopnea or PND.     His main complaint is that of dyspnea and he understands that he has significant lung issues that could account for this including COPD and stage IV lung cancer.      EKG today, which I overread, continues to show sinus rhythm at 68 beats per minute.  He has normal intervals, though NE interval is slightly short.  There is certainly no evidence of delta wave.        ASSESSMENT AND PLAN:     1.  Atrial arrhythmias.  As above, he had 2:1 atrial flutter and underwent successful atrial flutter ablation on 12/08/2017.  He had paroxysmal atrial fibrillation following that and has remained on amiodarone.  He did not require cardioversion that we set up for him on  01/12/2018 as he had spontaneously converted to sinus rhythm.       We talked about potential side effects of amiodarone and at this time we agreed that he will stay on this medication.  I would like to see him back in a few months to further discuss how he is doing from a cancer standpoint and if amiodarone should be continued in a palliative measure to help prevent recurrence of atrial fibrillation.  The onset of pulmonary fibrosis is typically longer than what I believe his expected lifespan will be given his stage IV metastatic lung cancer.  He sees Dr. May this coming Thursday to see how his palliative chemo (now finishing around 3, I believe) is going.      He will remain on anticoagulation.  His CHADS-VASc score is not terribly high, but he is prothrombotic with his cancer diagnosis and at this time I think it would be prudent to keep him on it given the high chance he will go back into atrial fibrillation, especially if we discontinue amiodarone therapy.      2. Abnormal stress test. Dr. Damon reviewed a stress test done secondary to significant chest tightness and dyspnea as well as bilateral arm discomfort.  I spoke with her about this after I had met with Zechariah and his family on 01/11 as her dictation did not seem to match the stress test results that were in Epic.  She believes that some of the abnormalities noted on stress test results could have been related to atrial flutter and recommended that we repeat this.      Zechariah Jara and I decided that we would talk about this when I see him this spring to determine if followup stress testing is needed.  I explained that if he would be a poor candidate for any intervention we would likely continue medical management no matter what the stress test showed us and they have voiced understanding.      We will plan to see him back this spring, talk about discontinuation of amiodarone and/or amiodarone testing as well as potentially repeating a stress test.  He  has not seen Electrophysiology at our practice, but does follow with Dr. Damon and saw her last in December.  Depending on how our talk goes this spring, we will plan to get him back in to see Dr. Damon.      Roque certainly looks quite a bit better than when I saw him last time and I am hopeful that his appetite continues to improve.  I did note that his blood pressure was a bit high today, but certainly as blood pressures have previously been quite a bit lower causing discontinuation of metoprolol, I will not make any changes as he is starting to feel quite a bit better.         OSCAR LONDONO PA-C             D: 2018   T: 2018   MT: MELO      Name:     ROQUE SAXENA   MRN:      [id number]        Account:      [id number]   :      1960           Service Date: 2018      Document: [id number]           Outpatient Encounter Prescriptions as of 2018   Medication Sig Dispense Refill     albuterol (PROAIR HFA) 108 (90 BASE) MCG/ACT Inhaler Inhale 2 puffs into the lungs every 4 hours as needed for shortness of breath / dyspnea or wheezing 1 Inhaler 11     warfarin (COUMADIN) 5 MG tablet Take 1 tablet (5 mg) by mouth daily 30 tablet 1     nicotine (NICODERM CQ) 21 MG/24HR 24 hr patch        morphine (MSIR) 15 MG IR tablet 1/2 to 1 pill every 4 hours as needed for air hunger. 30 tablet 0     omeprazole (PRILOSEC) 40 MG capsule Take 1 capsule (40 mg) by mouth daily Take 30-60 minutes before a meal. 30 capsule 11     nicotine (NICODERM CQ) 14 MG/24HR 24 hr patch Place 1 patch onto the skin every 24 hours 14 patch 5     nicotine (NICODERM CQ) 7 MG/24HR 24 hr patch Place 1 patch onto the skin every 24 hours (Patient not taking: Reported on 2/15/2018) 30 patch 5     amiodarone (PACERONE/CODARONE) 200 MG tablet Take 2 tablets twice daily x 1 week ( - ) and then start 1 tablet daily 18 45 tablet 1     prochlorperazine (COMPAZINE) 10 MG tablet Take 1 tablet (10 mg) by mouth every 6  hours as needed (Nausea/Vomiting) 30 tablet 5     megestrol (MEGACE ORAL) 40 MG/ML suspension Take 5 mLs (200 mg) by mouth daily 600 mL 2     LORazepam (ATIVAN) 0.5 MG tablet Take 1 tablet (0.5 mg) by mouth every 4 hours as needed (Anxiety, Nausea/Vomiting or Sleep) (Patient not taking: Reported on 2/13/2018) 30 tablet 5     lidocaine-prilocaine (EMLA) cream Place a quarter sized amount of cream onto port area 30-60 minutes prior to port use. Do not rub in. Cover with tegaderm or saran wrap. 30 g 1     Probiotic Product (ALIGN) CHEW Take 1 chew tab by mouth 3 times daily as needed       [DISCONTINUED] sodium chloride (PF) 0.9% PF flush 10-20 mL        [DISCONTINUED] heparin 100 UNIT/ML injection 5 mL        No facility-administered encounter medications on file as of 2/13/2018.        Again, thank you for allowing me to participate in the care of your patient.      Sincerely,    Daksha Montemayor PA-C     [facility]

## 2018-02-15 NOTE — PROGRESS NOTES
ANTICOAGULATION FOLLOW-UP CLINIC VISIT    Patient Name:  Zechariah Ashby  Date:  2/15/2018  Contact Type:  Telephone/ spoke with pt    SUBJECTIVE:     Patient Findings     Comments Pt in infusion therapy when writer called, denies changes in medications, diet, activity or health, reports taking warfarin as directed.    Pt is in therapeutic range today on 15 mg warfarin/7 days, so writer adjusted dose to keep pt at that level, ordered 90 day refill of 2.5 mg tabs as we cannot give him that weekly dose with current strength tabs (5 mg).  Pt able to verbalize correctly back to writer what new dose is. Writer instructed pt to call [facility] if he has questions when he gets home, with understanding voiced.    Pt reports he would like to have INR drawn with infusion labs again next Thurs.             OBJECTIVE    INR   Date Value Ref Range Status   02/15/2018 2.77 (H) 0.86 - 1.14 Final       ASSESSMENT / PLAN  INR assessment THER    Recheck INR In: 1 WEEK    INR Location Clinic lab     Anticoagulation Summary as of 2/15/2018     INR goal 2.0-3.0   Today's INR 2.77   Maintenance plan 1.25 mg (2.5 mg x 0.5) on Mon, Fri; 2.5 mg (2.5 mg x 1) all other days   Full instructions 1.25 mg on Mon, Fri; 2.5 mg all other days   Weekly total 15 mg   Plan last modified Mya Frederick RN (2/15/2018)   Next INR check 2/22/2018   Target end date     Indications   Atrial flutter  unspecified type (H) [I48.92]  Long-term (current) use of anticoagulants [Z79.01] [Z79.01]         Anticoagulation Episode Summary     INR check location     Preferred lab     Send INR reminders to [facility]    Comments * have INR drawn with other labs in infusion on 2/15/18 (order placed). Pt is on AMIODARONE. On palliative chemo (CARBOplatin / Gemcitabine) call home number which is his cell      Anticoagulation Care Providers     Provider Role Specialty Phone number    Kailash Mason MD [facility] [phone]            See  the Encounter Report to view Anticoagulation Flowsheet and Dosing Calendar (Go to Encounters tab in chart review, and find the Anticoagulation Therapy Visit)        Mya Frederick RN

## 2018-02-15 NOTE — PROGRESS NOTES
Infusion Nursing Note:  Zechariah Ashby presents today for Eva Espinoza.    Patient seen by provider today: Yes. Dr. May   present during visit today: Not Applicable.    Note: N/A.    Intravenous Access:  Implanted Port.    Treatment Conditions:  Results reviewed, labs MET treatment parameters, ok to proceed with treatment.      Post Infusion Assessment:  Patient tolerated infusion without incident.    Discharge Plan:   Patient discharged in stable condition accompanied by: friend.    Alex Lange RN

## 2018-02-15 NOTE — MR AVS SNAPSHOT
After Visit Summary   2/15/2018    Zechariah Ashby    MRN: [id number]           Patient Information     Date Of Birth          1960        Visit Information        Provider Department      2/15/2018 9:00 AM Joycelyn May MD [facility] ONCOLOGY      Care Instructions    We would like you to continue with chemotherapy as planned to see you back in 3 weeks for a follow up appointment.  When you are in need of a refill, please call your pharmacy and they will send us a request.  Copy of appointments, and after visit summary (AVS) given to patient.  If you have any questions during business hours (M-F 8 AM- 4PM), please call Camille Lara, RN, BSN, OCN Oncology Hematology /Breast Cancer Navigator at [facility] [phone].   For questions after business hours, or on holidays/weekends, please call our after hours Nurse Triage line [phone]. Thank you.            Follow-ups after your visit        Follow-up notes from your care team     Return in about 3 weeks (around 3/8/2018) for Schedule for chemotherapy as per treatment plan.      Your next 10 appointments already scheduled     Feb 22, 2018  8:00 AM CST   Level 1 with ROOM 9 [facility] ([facility])    [facility]  [address]  [place] [address]   [phone]            Mar 08, 2018  8:00 AM CST   Level 3 with ROOM 4 [facility] ([facility])    [facility]  [address]  [place] [address]   [phone]            Mar 08, 2018  9:00 AM CST   Return Visit with Joycelyn May MD   [facility] ([facility])    [facility]  [address]  [place] [address]   [phone]            Mar 15, 2018 10:00 AM CDT   Level 1 with ROOM 3 [facility] ([facility])    [facility]  [address]  [facility] [address]   [phone]            Apr 10, 2018 10:45 AM CDT   ecg with [place] [facility]   [facility] ([facility])    [address]  [facility] [place] [address]   [phone]            Apr 10, 2018 11:00 AM CDT   Return Visit with Daksha Montemayor PA-C   [facility] ([facility])    [address]  [facility] [place] [address]   [phone]              Who to contact     If you have questions or need follow up information about today's clinic visit or your schedule please contact [facility] directly at [phone].  Normal or non-critical lab and imaging results will be communicated to you by "[employer]hart, letter or phone within 4 business days after the clinic has received the results. If you do not hear from us within 7 days, please contact the clinic through "[employer]hart or phone. If you have a critical or abnormal lab result, we will notify you by phone as soon as possible.  Submit refill requests through [employer] or call your pharmacy and they will forward the refill request to us. Please allow 3 business days for your refill to be completed.          Additional Information About Your Visit        "[employer] Information     [employer] lets you send messages to your doctor, view your test results, renew your prescriptions, schedule appointments and more. To sign up, go to [email or web address] . Click on \"Log in\" on the left side of the screen, which will take you to the Welcome page. Then click on \"Sign up Now\" on the right side of the page.     You will be asked to enter the access code listed below, as well as some personal information. Please follow the directions to create your username and password.     Your access code is: [email or web address]  Expires: 2018  9:55 AM     Your access code will  in 90 days. If you need help or a new code, please " "call your [facility] clinic or [phone].        Care EveryWhere ID     This is your Care EveryWhere ID. This could be used by other organizations to access your [facility] medical records  [id number]        Your Vitals Were     Pulse Temperature Respirations Height Pulse Oximetry BMI (Body Mass Index)    74 98.4  F (36.9  C) (Tympanic) 20 1.803 m (5' 10.98\") 98% 20.76 kg/m2       Blood Pressure from Last 3 Encounters:   02/15/18 125/87   02/13/18 (!) 136/94   02/05/18 100/60    Weight from Last 3 Encounters:   02/15/18 67.5 kg (148 lb 12.8 oz)   02/13/18 67.1 kg (148 lb)   02/05/18 65.8 kg (145 lb)              Today, you had the following     No orders found for display         Today's Medication Changes          These changes are accurate as of 2/15/18 11:11 AM.  If you have any questions, ask your nurse or doctor.               These medicines have changed or have updated prescriptions.        Dose/Directions    * warfarin 5 MG tablet   Commonly known as:  COUMADIN   This may have changed:  Another medication with the same name was added. Make sure you understand how and when to take each.   Used for:  Atrial flutter, unspecified type (H)   Changed by:  Mya Frederick RN        Dose:  5 mg   Take 1 tablet (5 mg) by mouth daily   Quantity:  30 tablet   Refills:  1       * warfarin 2.5 MG tablet   Commonly known as:  COUMADIN   This may have changed:  You were already taking a medication with the same name, and this prescription was added. Make sure you understand how and when to take each.   Used for:  Long-term (current) use of anticoagulants, Atrial flutter, unspecified type (H)   Changed by:  Mya Frederick RN        Take 1.25 mg M&F, 2.5 mg rest of the week or as directed by the Anticoagulation Clinic (establishing maintenance dose).   Quantity:  75 tablet   Refills:  0       * Notice:  This list has 2 medication(s) that are the same as other medications prescribed for you. Read the directions carefully, " and ask your doctor or other care provider to review them with you.         Where to get your medicines      These medications were sent to [id number] - [place], [place] - [address] [facility] [address]  [address] [address]    Hours:  Closed 10-16-08 business to [facility] [place] [facility] Phone:  [phone]     warfarin 2.5 MG tablet                Primary Care Provider Office Phone # Fax #    Kailash Mason [phone] [address] [facility] [address]        Equal Access to Services     [facility]: Hadii aad ming hadasho Soomaali, waaxda luqadaha, qaybta kaalmada adedickyaturner, harley carrasquillo . So [facility] [phone].    ATENCIÓN: Si habla español, tiene a morocho disposición servicios gratuitos de asistencia lingüística. [facility] [phone].    We comply with applicable federal civil rights laws and [place] laws. We do not discriminate on the basis of race, color, national origin, age, disability, sex, sexual orientation, or gender identity.            Thank you!     Thank you for choosing [facility]  for your care. Our goal is always to provide you with excellent care. Hearing back from our patients is one way we can continue to improve our services. Please take a few minutes to complete the written survey that you may receive in the mail after your visit with us. Thank you!             Your Updated Medication List - Protect others around you: Learn how to safely use, store and throw away your medicines at www.disposemymeds.org.          This list is accurate as of 2/15/18 11:11 AM.  Always use your most recent med list.                   Brand Name Dispense Instructions for use Diagnosis    albuterol 108 (90 BASE) MCG/ACT Inhaler    PROAIR HFA    1 Inhaler    Inhale 2 puffs into the lungs every 4 hours as needed for shortness of breath / dyspnea or wheezing    Chronic obstructive pulmonary disease, unspecified COPD type (H)       ALIGN Chew       Take 1 chew tab by mouth 3 times daily as needed        amiodarone 200 MG tablet    PACERONE/CODARONE    45 tablet    Take 2 tablets twice daily x 1 week (1/12 - 1/19) and then start 1 tablet daily 1/20/18    Paroxysmal atrial fibrillation (H)       lidocaine-prilocaine cream    EMLA    30 g    Place a quarter sized amount of cream onto port area 30-60 minutes prior to port use. Do not rub in. Cover with tegaderm or saran wrap.    Non-small cell carcinoma of lung (H)       LORazepam 0.5 MG tablet    ATIVAN    30 tablet    Take 1 tablet (0.5 mg) by mouth every 4 hours as needed (Anxiety, Nausea/Vomiting or Sleep)    Non-small cell carcinoma of lung (H)       megestrol 40 MG/ML suspension    MEGACE ORAL    600 mL    Take 5 mLs (200 mg) by mouth daily    Non-small cell carcinoma of lung (H)       morphine 15 MG IR tablet    MSIR    30 tablet    1/2 to 1 pill every 4 hours as needed for air hunger.    Non-small cell carcinoma of lung (H)       * nicotine 21 MG/24HR 24 hr patch    NICODERM CQ          * nicotine 14 MG/24HR 24 hr patch    NICODERM CQ    14 patch    Place 1 patch onto the skin every 24 hours    Encounter for tobacco use cessation counseling, Tobacco abuse       * nicotine 7 MG/24HR 24 hr patch    NICODERM CQ    30 patch    Place 1 patch onto the skin every 24 hours    Tobacco abuse       omeprazole 40 MG capsule    priLOSEC    30 capsule    Take 1 capsule (40 mg) by mouth daily Take 30-60 minutes before a meal.    Gastroesophageal reflux disease without esophagitis       prochlorperazine 10 MG tablet    COMPAZINE    30 tablet    Take 1 tablet (10 mg) by mouth every 6 hours as needed (Nausea/Vomiting)    Non-small cell carcinoma of lung (H)       * warfarin 5 MG tablet    COUMADIN    30 tablet    Take 1 tablet (5 mg) by mouth daily    Atrial flutter, unspecified type (H)       * warfarin 2.5 MG tablet    COUMADIN    75 tablet    Take 1.25 mg M&F, 2.5 mg rest of the week or as directed by the Anticoagulation  Clinic (establishing maintenance dose).    Long-term (current) use of anticoagulants, Atrial flutter, unspecified type (H)       * Notice:  This list has 5 medication(s) that are the same as other medications prescribed for you. Read the directions carefully, and ask your doctor or other care provider to review them with you.

## 2018-02-15 NOTE — MR AVS SNAPSHOT
Zechariah Ashby   2/15/2018   Anticoagulation Therapy Visit    Description:  57 year old male   Provider:  Mya Frederick RN   Department:  [facility]           INR as of 2/15/2018     Today's INR 2.77      Anticoagulation Summary as of 2/15/2018     INR goal 2.0-3.0   Today's INR 2.77   Full instructions 1.25 mg on Mon, Fri; 2.5 mg all other days   Next INR check 2/22/2018    Indications   Atrial flutter  unspecified type (H) [I48.92]  Long-term (current) use of anticoagulants [Z79.01] [Z79.01]         February 2018 Details    Sun Mon Tue Wed Thu Fri Sat         1               2               3                 4               5               6               7               8               9               10                 11               12               13               14               15      2.5 mg   See details      16      1.25 mg         17      2.5 mg           18      2.5 mg         19      1.25 mg         20      2.5 mg         21      2.5 mg         22            23               24                 25               26               27               28                   Date Details   02/15 This INR check       Date of next INR:  2/22/2018         How to take your warfarin dose     To take:  1.25 mg Take 0.5 of a 2.5 mg tablet.    To take:  2.5 mg Take 1 of the 2.5 mg tablets.

## 2018-02-15 NOTE — MR AVS SNAPSHOT
After Visit Summary   2/15/2018    Zechariah Ashby    MRN: [id number]           Patient Information     Date Of Birth          1960        Visit Information        Provider Department      2/15/2018 8:00 AM ROOM 10 [facility]fusion        Today's Diagnoses     Atrial flutter, unspecified type (H)    -  1    Long-term (current) use of anticoagulants        Non-small cell carcinoma of lung (H)           Follow-ups after your visit        Your next 10 appointments already scheduled     Feb 22, 2018  8:00 AM CST   Level 1 with ROOM 9 [facility] ([facility])    [facility]  [address]  [facility] [address]   [phone]            Mar 08, 2018  8:00 AM CST   Level 3 with ROOM 4 [facility] ([facility])    [facility]  [address]  [facility] [address]   [phone]            Mar 08, 2018  9:00 AM CST   Return Visit with Joycelyn May MD   [facility] ([facility])    [facility]  [address]  [facility] [address]   [phone]            Mar 15, 2018 10:00 AM CDT   Level 1 with ROOM 3 [facility] ([facility])    [facility]  [address]  [facility] [address]   [phone]            Apr 10, 2018 10:45 AM CDT   ecg with [place] [facility]   [facility] ([facility])    [address] [address]   [phone]            Apr 10, 2018 11:00 AM CDT   Return Visit with Daksha Montemayor PA-C   [facility] ([facility])    [address] [facility] [address]   [phone]              Who to contact     If you have questions or need follow up information about today's clinic visit or your schedule please contact [facility]  "INFUSION directly at [phone].  Normal or non-critical lab and imaging results will be communicated to you by [employer], letter or phone within 4 business days after the clinic has received the results. If you do not hear from us within 7 days, please contact the clinic through [employer] or phone. If you have a critical or abnormal lab result, we will notify you by phone as soon as possible.  Submit refill requests through [employer] or call your pharmacy and they will forward the refill request to us. Please allow 3 business days for your refill to be completed.          Additional Information About Your Visit        [employer] Information     [employer] lets you send messages to your doctor, view your test results, renew your prescriptions, schedule appointments and more. To sign up, go to [email or web address] . Click on \"Log in\" on the left side of the screen, which will take you to the Welcome page. Then click on \"Sign up Now\" on the right side of the page.     You will be asked to enter the access code listed below, as well as some personal information. Please follow the directions to create your username and password.     Your access code is: [email or web address]  Expires: 2018  9:55 AM     Your access code will  in 90 days. If you need help or a new code, please call your [facility] clinic or [phone].        Care EveryWhere ID     This is your Care EveryWhere ID. This could be used by other organizations to access your [facility] medical records  [id number]         Blood Pressure from Last 3 Encounters:   02/15/18 125/87   18 (!) 136/94   18 100/60    Weight from Last 3 Encounters:   02/15/18 67.5 kg (148 lb 12.8 oz)   18 67.1 kg (148 lb)   18 65.8 kg (145 lb)              We Performed the Following     CBC with platelets differential     Creatinine     INR          Today's Medication Changes          These changes are accurate as of 2/15/18 11:18 AM.  If you have any questions, ask " your nurse or doctor.               These medicines have changed or have updated prescriptions.        Dose/Directions    * warfarin 5 MG tablet   Commonly known as:  COUMADIN   This may have changed:  Another medication with the same name was added. Make sure you understand how and when to take each.   Used for:  Atrial flutter, unspecified type (H)   Changed by:  Mya Frederick RN        Dose:  5 mg   Take 1 tablet (5 mg) by mouth daily   Quantity:  30 tablet   Refills:  1       * warfarin 2.5 MG tablet   Commonly known as:  COUMADIN   This may have changed:  You were already taking a medication with the same name, and this prescription was added. Make sure you understand how and when to take each.   Used for:  Long-term (current) use of anticoagulants, Atrial flutter, unspecified type (H)   Changed by:  Mya Frederick RN        Take 1.25 mg M&F, 2.5 mg rest of the week or as directed by the Anticoagulation Clinic (establishing maintenance dose).   Quantity:  75 tablet   Refills:  0       * Notice:  This list has 2 medication(s) that are the same as other medications prescribed for you. Read the directions carefully, and ask your doctor or other care provider to review them with you.         Where to get your medicines      These medications were sent to [id number] [place] [id number]    Hours:  Closed 10-16-08 business to [facility] Phone:  [phone]     warfarin 2.5 MG tablet                Primary Care Provider Office Phone # Fax #    Kailash Mason [phone] [address] [place] [address]        Equal Access to Services     [facility]: Hadii arnol garibay Soasher, waaxda luqadaha, qaybta kaalmaharley ulloa. So [facility] [phone].    ATENCIÓN: Si habla español, tiene a morocho disposición servicios gratuitos de asistencia lingüística. Llame al  [phone].    We comply with applicable federal civil rights laws and [place] laws. We do not discriminate on the basis of race, color, national origin, age, disability, sex, sexual orientation, or gender identity.            Thank you!     Thank you for choosing [facility]  for your care. Our goal is always to provide you with excellent care. Hearing back from our patients is one way we can continue to improve our services. Please take a few minutes to complete the written survey that you may receive in the mail after your visit with us. Thank you!             Your Updated Medication List - Protect others around you: Learn how to safely use, store and throw away your medicines at www.disposemymeds.org.          This list is accurate as of 2/15/18 11:18 AM.  Always use your most recent med list.                   Brand Name Dispense Instructions for use Diagnosis    albuterol 108 (90 BASE) MCG/ACT Inhaler    PROAIR HFA    1 Inhaler    Inhale 2 puffs into the lungs every 4 hours as needed for shortness of breath / dyspnea or wheezing    Chronic obstructive pulmonary disease, unspecified COPD type (H)       ALIGN Chew      Take 1 chew tab by mouth 3 times daily as needed        amiodarone 200 MG tablet    PACERONE/CODARONE    45 tablet    Take 2 tablets twice daily x 1 week (1/12 - 1/19) and then start 1 tablet daily 1/20/18    Paroxysmal atrial fibrillation (H)       lidocaine-prilocaine cream    EMLA    30 g    Place a quarter sized amount of cream onto port area 30-60 minutes prior to port use. Do not rub in. Cover with tegaderm or saran wrap.    Non-small cell carcinoma of lung (H)       LORazepam 0.5 MG tablet    ATIVAN    30 tablet    Take 1 tablet (0.5 mg) by mouth every 4 hours as needed (Anxiety, Nausea/Vomiting or Sleep)    Non-small cell carcinoma of lung (H)       megestrol 40 MG/ML suspension    MEGACE ORAL    600 mL    Take 5 mLs (200 mg) by mouth daily    Non-small cell carcinoma of lung  (H)       morphine 15 MG IR tablet    MSIR    30 tablet    1/2 to 1 pill every 4 hours as needed for air hunger.    Non-small cell carcinoma of lung (H)       * nicotine 21 MG/24HR 24 hr patch    NICODERM CQ          * nicotine 14 MG/24HR 24 hr patch    NICODERM CQ    14 patch    Place 1 patch onto the skin every 24 hours    Encounter for tobacco use cessation counseling, Tobacco abuse       * nicotine 7 MG/24HR 24 hr patch    NICODERM CQ    30 patch    Place 1 patch onto the skin every 24 hours    Tobacco abuse       omeprazole 40 MG capsule    priLOSEC    30 capsule    Take 1 capsule (40 mg) by mouth daily Take 30-60 minutes before a meal.    Gastroesophageal reflux disease without esophagitis       prochlorperazine 10 MG tablet    COMPAZINE    30 tablet    Take 1 tablet (10 mg) by mouth every 6 hours as needed (Nausea/Vomiting)    Non-small cell carcinoma of lung (H)       * warfarin 5 MG tablet    COUMADIN    30 tablet    Take 1 tablet (5 mg) by mouth daily    Atrial flutter, unspecified type (H)       * warfarin 2.5 MG tablet    COUMADIN    75 tablet    Take 1.25 mg M&F, 2.5 mg rest of the week or as directed by the Anticoagulation Clinic (establishing maintenance dose).    Long-term (current) use of anticoagulants, Atrial flutter, unspecified type (H)       * Notice:  This list has 5 medication(s) that are the same as other medications prescribed for you. Read the directions carefully, and ask your doctor or other care provider to review them with you.

## 2018-02-15 NOTE — NURSING NOTE
"Oncology Rooming Note    February 15, 2018 8:48 AM   Zechariah Ashby is a 57 year old male who presents for:    Chief Complaint   Patient presents with     Oncology Clinic Visit     Recheck Non-small cell carcinoma of lung, Labs & Chemo today     Initial Vitals: /87 (BP Location: Right arm, Patient Position: Sitting, Cuff Size: Adult Regular)  Pulse 74  Temp 98.4  F (36.9  C) (Tympanic)  Resp 20  Ht 1.803 m (5' 10.98\")  Wt 67.5 kg (148 lb 12.8 oz)  SpO2 98%  BMI 20.76 kg/m2 Estimated body mass index is 20.76 kg/(m^2) as calculated from the following:    Height as of this encounter: 1.803 m (5' 10.98\").    Weight as of this encounter: 67.5 kg (148 lb 12.8 oz). Body surface area is 1.84 meters squared.  No Pain (0) Comment: Data Unavailable   No LMP for male patient.  Allergies reviewed: Yes  Medications reviewed: Yes    Medications: Medication refills not needed today.  Pharmacy name entered into Em[facility]:      [id number] - [facility] [address]    Clinical concerns  Recheck Non-small cell carcinoma of lung, Labs & Chemo today.      7  minutes for nursing intake (face to face time)     Carmen Barreto CMA            "

## 2018-02-15 NOTE — PATIENT INSTRUCTIONS
We would like you to continue with chemotherapy as planned to see you back in 3 weeks for a follow up appointment.  When you are in need of a refill, please call your pharmacy and they will send us a request.  Copy of appointments, and after visit summary (AVS) given to patient.  If you have any questions during business hours (M-F 8 AM- 4PM), please call Camille Lara RN, BSN, OCN Oncology Hematology /Breast Cancer Navigator at [facility] [place][note 2] [phone].   For questions after business hours, or on holidays/weekends, please call our after hours Nurse Triage line [phone]. Thank you.

## 2018-02-15 NOTE — LETTER
2/15/2018         RE: Zechariah Ashby  [address] [facility] [id number]        Dear Colleague,    Thank you for referring your patient, Zechariah Ashby, to the [facility]. Please see a copy of my visit note below.    Hematology/ Oncology Follow-up Visit:  Feb 15, 2018    Reason for Visit:   Chief Complaint   Patient presents with     Oncology Clinic Visit     Recheck Non-small cell carcinoma of lung, Labs & Chemo today       Oncologic History:    Non-small cell carcinoma of lung (H)  Zechariah Ashby is a 57 year old male who was recently diagnosed with atrial fib/flutter in early November 2017. During workup just x-ray showed elevation of the left hemidiaphragm with a focal tenting. The CT scan was done for further evaluation showing left upper lobe atelectasis and a moderate-sized left pleural effusion. Patient also had mediastinal adenopathy. Patient had left thoracocentesis. Cytology came back as transudate and cytology came back negative. Patient has a long history of smoking. PET scan showed a large FDG avid mediastinal mass extending to the left hilum with obliteration of the left upper lobe bronchus and atelectasis of the upper lobe.  There are also multiple left cervical left cervical and mediastinal adenopathy.  There is increased left pleural effusion.  There is hypermetabolic left adrenal lesion.  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 He was started on carboplatin and gemcitabine.    Interval History:  Patient is here today for follow-up.  He has been tolerating chemotherapy well without significant side effects.  He denies any recent nausea or vomiting.  Denies any shortness of breath or cough or wheezing.  He denies any chest pain.  Patient currently on anticoagulation because of his atrial fibrillation.  He denies any bruising or bleeding.    Review Of Systems:  Constitutional: Negative for  fever, chills, and night sweats.  Skin: negative.  Eyes: negative.  Ears/Nose/Throat: negative.  Respiratory: No shortness of breath, dyspnea on exertion, cough, or hemoptysis.  Cardiovascular: negative.  Gastrointestinal: negative.  Genitourinary: negative.  Musculoskeletal: negative.  Neurologic: negative.  Psychiatric: negative.  Hematologic/Lymphatic/Immunologic: negative.  Endocrine: negative.    All other ROS negative unless mentioned in interval history.    Past medical, social, surgical, and family histories reviewed.    Allergies:  Allergies as of 02/15/2018 - Osmel as Reviewed 02/15/2018   Allergen Reaction Noted     Nka [no known allergies]  01/03/2018       Current Medications:  Current Outpatient Prescriptions   Medication Sig Dispense Refill     albuterol (PROAIR HFA) 108 (90 BASE) MCG/ACT Inhaler Inhale 2 puffs into the lungs every 4 hours as needed for shortness of breath / dyspnea or wheezing 1 Inhaler 11     warfarin (COUMADIN) 5 MG tablet Take 1 tablet (5 mg) by mouth daily 30 tablet 1     morphine (MSIR) 15 MG IR tablet 1/2 to 1 pill every 4 hours as needed for air hunger. 30 tablet 0     omeprazole (PRILOSEC) 40 MG capsule Take 1 capsule (40 mg) by mouth daily Take 30-60 minutes before a meal. 30 capsule 11     nicotine (NICODERM CQ) 14 MG/24HR 24 hr patch Place 1 patch onto the skin every 24 hours 14 patch 5     amiodarone (PACERONE/CODARONE) 200 MG tablet Take 2 tablets twice daily x 1 week (1/12 - 1/19) and then start 1 tablet daily 1/20/18 45 tablet 1     prochlorperazine (COMPAZINE) 10 MG tablet Take 1 tablet (10 mg) by mouth every 6 hours as needed (Nausea/Vomiting) 30 tablet 5     lidocaine-prilocaine (EMLA) cream Place a quarter sized amount of cream onto port area 30-60 minutes prior to port use. Do not rub in. Cover with tegaderm or saran wrap. 30 g 1     megestrol (MEGACE ORAL) 40 MG/ML suspension Take 5 mLs (200 mg) by mouth daily 600 mL 2     warfarin (COUMADIN) 2.5 MG tablet Take  "1.25 mg M&F, 2.5 mg rest of the week or as directed by the Anticoagulation Clinic (establishing maintenance dose). 75 tablet 0     nicotine (NICODERM CQ) 21 MG/24HR 24 hr patch        nicotine (NICODERM CQ) 7 MG/24HR 24 hr patch Place 1 patch onto the skin every 24 hours (Patient not taking: Reported on 2/15/2018) 30 patch 5     LORazepam (ATIVAN) 0.5 MG tablet Take 1 tablet (0.5 mg) by mouth every 4 hours as needed (Anxiety, Nausea/Vomiting or Sleep) (Patient not taking: Reported on 2/13/2018) 30 tablet 5     Probiotic Product (ALIGN) CHEW Take 1 chew tab by mouth 3 times daily as needed          Physical Exam:  /87 (BP Location: Right arm, Patient Position: Sitting, Cuff Size: Adult Regular)  Pulse 74  Temp 98.4  F (36.9  C) (Tympanic)  Resp 20  Ht 1.803 m (5' 10.98\")  Wt 67.5 kg (148 lb 12.8 oz)  SpO2 98%  BMI 20.76 kg/m2  Wt Readings from Last 12 Encounters:   02/15/18 67.5 kg (148 lb 12.8 oz)   02/13/18 67.1 kg (148 lb)   02/05/18 65.8 kg (145 lb)   02/01/18 64.4 kg (142 lb)   01/25/18 71.2 kg (157 lb)   01/20/18 68.9 kg (151 lb 14.4 oz)   01/17/18 69.6 kg (153 lb 6.4 oz)   01/15/18 69.2 kg (152 lb 9.6 oz)   01/12/18 67.7 kg (149 lb 4.8 oz)   01/12/18 67.2 kg (148 lb 1.6 oz)   01/11/18 67.2 kg (148 lb 1.6 oz)   01/09/18 68.6 kg (151 lb 3.8 oz)     ECOG performance status: 0  GENERAL APPEARANCE: Healthy, alert and in no acute distress.  HEENT: Sclerae anicteric. PERRLA. Oropharynx without ulcers, lesions, or thrush.  NECK: Supple. No asymmetry or masses.  LYMPHATICS: No palpable cervical, supraclavicular, axillary, or inguinal lymphadenopathy.  RESP: Lungs clear to auscultation bilaterally without rales, rhonchi or wheezes.  CARDIOVASCULAR: Regular rate and rhythm. Normal S1, S2; no S3 or S4. No murmur, gallop, or rub.  ABDOMEN: Soft, nontender. Bowel sounds normal. No palpable organomegaly or masses.  MUSCULOSKELETAL: Extremities without gross deformities noted. No edema of bilateral lower " extremities.  SKIN: No suspicious lesions or rashes.  NEURO: Alert and oriented x 3. Cranial nerves II-XII grossly intact.  PSYCHIATRIC: Mentation and affect appear normal.    Laboratory/Imaging Studies:  Infusion Therapy Visit on 02/15/2018   Component Date Value Ref Range Status     INR 02/15/2018 2.77* 0.86 - 1.14 Final     WBC 02/15/2018 3.9* 4.0 - 11.0 10e9/L Final     RBC Count 02/15/2018 3.45* 4.4 - 5.9 10e12/L Final     Hemoglobin 02/15/2018 9.9* 13.3 - 17.7 g/dL Final     Hematocrit 02/15/2018 31.8* 40.0 - 53.0 % Final     MCV 02/15/2018 92  78 - 100 fl Final     MCH 02/15/2018 28.7  26.5 - 33.0 pg Final     MCHC 02/15/2018 31.1* 31.5 - 36.5 g/dL Final     RDW 02/15/2018 13.6  10.0 - 15.0 % Final     Platelet Count 02/15/2018 350  150 - 450 10e9/L Final     Diff Method 02/15/2018 Automated Method   Final     % Neutrophils 02/15/2018 53.6  % Final     % Lymphocytes 02/15/2018 24.6  % Final     % Monocytes 02/15/2018 18.9  % Final     % Eosinophils 02/15/2018 2.1  % Final     % Basophils 02/15/2018 0.5  % Final     % Immature Granulocytes 02/15/2018 0.3  % Final     Absolute Neutrophil 02/15/2018 2.1  1.6 - 8.3 10e9/L Final     Absolute Lymphocytes 02/15/2018 1.0  0.8 - 5.3 10e9/L Final     Absolute Monocytes 02/15/2018 0.7  0.0 - 1.3 10e9/L Final     Absolute Eosinophils 02/15/2018 0.1  0.0 - 0.7 10e9/L Final     Absolute Basophils 02/15/2018 0.0  0.0 - 0.2 10e9/L Final     Abs Immature Granulocytes 02/15/2018 0.0  0 - 0.4 10e9/L Final     Creatinine 02/15/2018 0.87  0.66 - 1.25 mg/dL Final     GFR Estimate 02/15/2018 90  >60 mL/min/1.7m2 Final    Non  GFR Calc     GFR Estimate If Black 02/15/2018 >90  >60 mL/min/1.7m2 Final    African American GFR Calc   Anticoagulation Therapy Visit on 02/12/2018   Component Date Value Ref Range Status     INR Protime 02/12/2018 3.6* 0.86 - 1.14 Final   Anticoagulation Therapy Visit on 02/08/2018   Component Date Value Ref Range Status     INR Protime  02/08/2018 2.3* 0.86 - 1.14 Final        Recent Results (from the past 744 hour(s))   XR Chest 2 Views    Narrative    XR CHEST 2 VW   1/19/2018 2:52 PM     HISTORY: hypotension;     COMPARISON: 1/3/2018      Impression    IMPRESSION: Left subclavian port is in satisfactory position with tip  in the SVC. There remains elevation of the left hemidiaphragm and  consolidation/atelectasis of the left upper lobe due to left hilar  mass.    Overall, no significant change.    REMINGTON ALEXIS MD   CT Chest/Abdomen/Pelvis w Contrast    Narrative    CT CHEST/ABDOMEN/PELVIS W CONTRAST 2/13/2018 10:48 AM    TECHNIQUE: Volumetric acquisition of CT images from the lung apices  through the pelvis.    Radiation dose for this scan was reduced using automated exposure  control, adjustment of the mA and/or KV according to patient size, or  iterative reconstruction technique.     COMPARISON: 12/9/2017, 12/15/2017, 1/9/2018..    HISTORY: follow up; Non-small cell carcinoma of lung (H)     FINDINGS:   Chest: Anterior mediastinal mass has decreased in size considerably  measuring 2.9 x 1.4 cm today compared to 7.9 x 5.9 cm previously.  Subcarinal adenopathy is smaller measuring 1.5 cm today compared to  2.9 cm previously. Right hilar lymphadenopathy is slightly smaller as  well. There are few scattered pulmonary nodules in the right lung  which are minimally smaller compared to the previous exam. There is  some new infiltrate in the right middle lobe most likely related to  radiation change. There is a nodule in this area measuring 1.2 cm  which may be a part of the infiltrate, however residual or recurrent  neoplasm at this site cannot be excluded. This entire area was  occupied by tumor and atelectatic lung previously. Tiny left pleural  effusion which is improved. Cervical lymphadenopathy is partially  visualized without significant change. CT of the neck may be helpful  if clinically indicated.    Abdomen/pelvis: There are couple tiny  low-density lesions in the left  lobe of the liver possibly small cysts without change compared to  12/9/2017. Liver is otherwise unremarkable. Mild nodular generalized  thickening of the left adrenal gland is unchanged. Negative pancreas,  spleen and kidneys. Negative right adrenal. Negative retroperitoneum.  Negative GI tract and pelvic structures. Again seen are pars defects  at the L5 level.      Impression    IMPRESSION:     1. Anterior mediastinal mass is decreased in size considerably  measuring 2.9 x 1.4 cm today compared to previous exams.  2. There are few scattered pulmonary nodules in the right lobe which  may be minimally smaller.  3. Mediastinal and hilar lymphadenopathy which is slightly improved  compared to previous exams.  4. Lymphadenopathy in the cervical region incompletely visualized  without significant change.  5. 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  6. Nodular thickening of the left adrenal gland which is unchanged.  7. Tiny left pleural effusion improved compared to the previous exam.  8. New infiltrate in the left middle lobe medially most likely related  to radiation change. There is a 1.2 cm nodule in this area which may  be related to the treatment, however residual or recurrent neoplasm at  this site cannot be excluded.    SANJU GARDUNO MD       Assessment and plan:    (C34.90) Non-small cell carcinoma of lung (H)  I reviewed with the patient and his family today the imaging studies showing adequate response to chemotherapy.  We would recommend to continue on the current regimen including carboplatin and gemcitabine according to the treatment plan.  I will see the patient again in 3 weeks or sooner if there are new developments or concerns.    (R11.2,  T45.1X5A) Chemotherapy induced nausea and vomiting  Nausea has been controlled on the current regimen.    (I48.92) Atrial flutter, unspecified type (H) patient currently on  anticoagulation with Coumadin.  INR has been managed through the anticoagulation clinic.    The patient is ready to learn, no apparent learning barriers were identified.  Diagnosis and treatment plans were explained to the patient. The patient expressed understanding of the content. The patient asked appropriate questions. The patient questions were answered to his satisfaction.    Chart documentation with Dragon Voice recognition Software. Although reviewed after completion, some words and grammatical errors may remain.    Again, thank you for allowing me to participate in the care of your patient.        Sincerely,        Joycelyn May MD

## 2018-02-16 NOTE — PROGRESS NOTES
Hematology/ Oncology Follow-up Visit:  Feb 15, 2018    Reason for Visit:   Chief Complaint   Patient presents with     Oncology Clinic Visit     Recheck Non-small cell carcinoma of lung, Labs & Chemo today       Oncologic History:    Non-small cell carcinoma of lung (H)  Zechariah Ashby is a 57 year old male who was recently diagnosed with atrial fib/flutter in early November 2017. During workup just x-ray showed elevation of the left hemidiaphragm with a focal tenting. The CT scan was done for further evaluation showing left upper lobe atelectasis and a moderate-sized left pleural effusion. Patient also had mediastinal adenopathy. Patient had left thoracocentesis. Cytology came back as transudate and cytology came back negative. Patient has a long history of smoking. PET scan showed a large FDG avid mediastinal mass extending to the left hilum with obliteration of the left upper lobe bronchus and atelectasis of the upper lobe.  There are also multiple left cervical left cervical and mediastinal adenopathy.  There is increased left pleural effusion.  There is hypermetabolic left adrenal lesion.  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 He was started on carboplatin and gemcitabine.    Interval History:  Patient is here today for follow-up.  He has been tolerating chemotherapy well without significant side effects.  He denies any recent nausea or vomiting.  Denies any shortness of breath or cough or wheezing.  He denies any chest pain.  Patient currently on anticoagulation because of his atrial fibrillation.  He denies any bruising or bleeding.    Review Of Systems:  Constitutional: Negative for fever, chills, and night sweats.  Skin: negative.  Eyes: negative.  Ears/Nose/Throat: negative.  Respiratory: No shortness of breath, dyspnea on exertion, cough, or hemoptysis.  Cardiovascular: negative.  Gastrointestinal: negative.  Genitourinary:  negative.  Musculoskeletal: negative.  Neurologic: negative.  Psychiatric: negative.  Hematologic/Lymphatic/Immunologic: negative.  Endocrine: negative.    All other ROS negative unless mentioned in interval history.    Past medical, social, surgical, and family histories reviewed.    Allergies:  Allergies as of 02/15/2018 - Osmel as Reviewed 02/15/2018   Allergen Reaction Noted     Nka [no known allergies]  01/03/2018       Current Medications:  Current Outpatient Prescriptions   Medication Sig Dispense Refill     albuterol (PROAIR HFA) 108 (90 BASE) MCG/ACT Inhaler Inhale 2 puffs into the lungs every 4 hours as needed for shortness of breath / dyspnea or wheezing 1 Inhaler 11     warfarin (COUMADIN) 5 MG tablet Take 1 tablet (5 mg) by mouth daily 30 tablet 1     morphine (MSIR) 15 MG IR tablet 1/2 to 1 pill every 4 hours as needed for air hunger. 30 tablet 0     omeprazole (PRILOSEC) 40 MG capsule Take 1 capsule (40 mg) by mouth daily Take 30-60 minutes before a meal. 30 capsule 11     nicotine (NICODERM CQ) 14 MG/24HR 24 hr patch Place 1 patch onto the skin every 24 hours 14 patch 5     amiodarone (PACERONE/CODARONE) 200 MG tablet Take 2 tablets twice daily x 1 week (1/12 - 1/19) and then start 1 tablet daily 1/20/18 45 tablet 1     prochlorperazine (COMPAZINE) 10 MG tablet Take 1 tablet (10 mg) by mouth every 6 hours as needed (Nausea/Vomiting) 30 tablet 5     lidocaine-prilocaine (EMLA) cream Place a quarter sized amount of cream onto port area 30-60 minutes prior to port use. Do not rub in. Cover with tegaderm or saran wrap. 30 g 1     megestrol (MEGACE ORAL) 40 MG/ML suspension Take 5 mLs (200 mg) by mouth daily 600 mL 2     warfarin (COUMADIN) 2.5 MG tablet Take 1.25 mg M&F, 2.5 mg rest of the week or as directed by the Anticoagulation Clinic (establishing maintenance dose). 75 tablet 0     nicotine (NICODERM CQ) 21 MG/24HR 24 hr patch        nicotine (NICODERM CQ) 7 MG/24HR 24 hr patch Place 1 patch onto the  "skin every 24 hours (Patient not taking: Reported on 2/15/2018) 30 patch 5     LORazepam (ATIVAN) 0.5 MG tablet Take 1 tablet (0.5 mg) by mouth every 4 hours as needed (Anxiety, Nausea/Vomiting or Sleep) (Patient not taking: Reported on 2/13/2018) 30 tablet 5     Probiotic Product (ALIGN) CHEW Take 1 chew tab by mouth 3 times daily as needed          Physical Exam:  /87 (BP Location: Right arm, Patient Position: Sitting, Cuff Size: Adult Regular)  Pulse 74  Temp 98.4  F (36.9  C) (Tympanic)  Resp 20  Ht 1.803 m (5' 10.98\")  Wt 67.5 kg (148 lb 12.8 oz)  SpO2 98%  BMI 20.76 kg/m2  Wt Readings from Last 12 Encounters:   02/15/18 67.5 kg (148 lb 12.8 oz)   02/13/18 67.1 kg (148 lb)   02/05/18 65.8 kg (145 lb)   02/01/18 64.4 kg (142 lb)   01/25/18 71.2 kg (157 lb)   01/20/18 68.9 kg (151 lb 14.4 oz)   01/17/18 69.6 kg (153 lb 6.4 oz)   01/15/18 69.2 kg (152 lb 9.6 oz)   01/12/18 67.7 kg (149 lb 4.8 oz)   01/12/18 67.2 kg (148 lb 1.6 oz)   01/11/18 67.2 kg (148 lb 1.6 oz)   01/09/18 68.6 kg (151 lb 3.8 oz)     ECOG performance status: 0  GENERAL APPEARANCE: Healthy, alert and in no acute distress.  HEENT: Sclerae anicteric. PERRLA. Oropharynx without ulcers, lesions, or thrush.  NECK: Supple. No asymmetry or masses.  LYMPHATICS: No palpable cervical, supraclavicular, axillary, or inguinal lymphadenopathy.  RESP: Lungs clear to auscultation bilaterally without rales, rhonchi or wheezes.  CARDIOVASCULAR: Regular rate and rhythm. Normal S1, S2; no S3 or S4. No murmur, gallop, or rub.  ABDOMEN: Soft, nontender. Bowel sounds normal. No palpable organomegaly or masses.  MUSCULOSKELETAL: Extremities without gross deformities noted. No edema of bilateral lower extremities.  SKIN: No suspicious lesions or rashes.  NEURO: Alert and oriented x 3. Cranial nerves II-XII grossly intact.  PSYCHIATRIC: Mentation and affect appear normal.    Laboratory/Imaging Studies:  Infusion Therapy Visit on 02/15/2018   Component Date " Value Ref Range Status     INR 02/15/2018 2.77* 0.86 - 1.14 Final     WBC 02/15/2018 3.9* 4.0 - 11.0 10e9/L Final     RBC Count 02/15/2018 3.45* 4.4 - 5.9 10e12/L Final     Hemoglobin 02/15/2018 9.9* 13.3 - 17.7 g/dL Final     Hematocrit 02/15/2018 31.8* 40.0 - 53.0 % Final     MCV 02/15/2018 92  78 - 100 fl Final     MCH 02/15/2018 28.7  26.5 - 33.0 pg Final     MCHC 02/15/2018 31.1* 31.5 - 36.5 g/dL Final     RDW 02/15/2018 13.6  10.0 - 15.0 % Final     Platelet Count 02/15/2018 350  150 - 450 10e9/L Final     Diff Method 02/15/2018 Automated Method   Final     % Neutrophils 02/15/2018 53.6  % Final     % Lymphocytes 02/15/2018 24.6  % Final     % Monocytes 02/15/2018 18.9  % Final     % Eosinophils 02/15/2018 2.1  % Final     % Basophils 02/15/2018 0.5  % Final     % Immature Granulocytes 02/15/2018 0.3  % Final     Absolute Neutrophil 02/15/2018 2.1  1.6 - 8.3 10e9/L Final     Absolute Lymphocytes 02/15/2018 1.0  0.8 - 5.3 10e9/L Final     Absolute Monocytes 02/15/2018 0.7  0.0 - 1.3 10e9/L Final     Absolute Eosinophils 02/15/2018 0.1  0.0 - 0.7 10e9/L Final     Absolute Basophils 02/15/2018 0.0  0.0 - 0.2 10e9/L Final     Abs Immature Granulocytes 02/15/2018 0.0  0 - 0.4 10e9/L Final     Creatinine 02/15/2018 0.87  0.66 - 1.25 mg/dL Final     GFR Estimate 02/15/2018 90  >60 mL/min/1.7m2 Final    Non  GFR Calc     GFR Estimate If Black 02/15/2018 >90  >60 mL/min/1.7m2 Final    African American GFR Calc   Anticoagulation Therapy Visit on 02/12/2018   Component Date Value Ref Range Status     INR Protime 02/12/2018 3.6* 0.86 - 1.14 Final   Anticoagulation Therapy Visit on 02/08/2018   Component Date Value Ref Range Status     INR Protime 02/08/2018 2.3* 0.86 - 1.14 Final        Recent Results (from the past 744 hour(s))   XR Chest 2 Views    Narrative    XR CHEST 2 VW   1/19/2018 2:52 PM     HISTORY: hypotension;     COMPARISON: 1/3/2018      Impression    IMPRESSION: Left subclavian port is in  satisfactory position with tip  in the SVC. There remains elevation of the left hemidiaphragm and  consolidation/atelectasis of the left upper lobe due to left hilar  mass.    Overall, no significant change.    REMINGTON ALEXIS MD   CT Chest/Abdomen/Pelvis w Contrast    Narrative    CT CHEST/ABDOMEN/PELVIS W CONTRAST 2/13/2018 10:48 AM    TECHNIQUE: Volumetric acquisition of CT images from the lung apices  through the pelvis.    Radiation dose for this scan was reduced using automated exposure  control, adjustment of the mA and/or KV according to patient size, or  iterative reconstruction technique.     COMPARISON: 12/9/2017, 12/15/2017, 1/9/2018..    HISTORY: follow up; Non-small cell carcinoma of lung (H)     FINDINGS:   Chest: Anterior mediastinal mass has decreased in size considerably  measuring 2.9 x 1.4 cm today compared to 7.9 x 5.9 cm previously.  Subcarinal adenopathy is smaller measuring 1.5 cm today compared to  2.9 cm previously. Right hilar lymphadenopathy is slightly smaller as  well. There are few scattered pulmonary nodules in the right lung  which are minimally smaller compared to the previous exam. There is  some new infiltrate in the right middle lobe most likely related to  radiation change. There is a nodule in this area measuring 1.2 cm  which may be a part of the infiltrate, however residual or recurrent  neoplasm at this site cannot be excluded. This entire area was  occupied by tumor and atelectatic lung previously. Tiny left pleural  effusion which is improved. Cervical lymphadenopathy is partially  visualized without significant change. CT of the neck may be helpful  if clinically indicated.    Abdomen/pelvis: There are couple tiny low-density lesions in the left  lobe of the liver possibly small cysts without change compared to  12/9/2017. Liver is otherwise unremarkable. Mild nodular generalized  thickening of the left adrenal gland is unchanged. Negative pancreas,  spleen and kidneys.  Negative right adrenal. Negative retroperitoneum.  Negative GI tract and pelvic structures. Again seen are pars defects  at the L5 level.      Impression    IMPRESSION:     1. Anterior mediastinal mass is decreased in size considerably  measuring 2.9 x 1.4 cm today compared to previous exams.  2. There are few scattered pulmonary nodules in the right lobe which  may be minimally smaller.  3. Mediastinal and hilar lymphadenopathy which is slightly improved  compared to previous exams.  4. Lymphadenopathy in the cervical region incompletely visualized  without significant change.  5. 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  6. Nodular thickening of the left adrenal gland which is unchanged.  7. Tiny left pleural effusion improved compared to the previous exam.  8. New infiltrate in the left middle lobe medially most likely related  to radiation change. There is a 1.2 cm nodule in this area which may  be related to the treatment, however residual or recurrent neoplasm at  this site cannot be excluded.    SANJU GARDUNO MD       Assessment and plan:    (C34.90) Non-small cell carcinoma of lung (H)  I reviewed with the patient and his family today the imaging studies showing adequate response to chemotherapy.  We would recommend to continue on the current regimen including carboplatin and gemcitabine according to the treatment plan.  I will see the patient again in 3 weeks or sooner if there are new developments or concerns.    (R11.2,  T45.1X5A) Chemotherapy induced nausea and vomiting  Nausea has been controlled on the current regimen.    (I48.92) Atrial flutter, unspecified type (H) patient currently on anticoagulation with Coumadin.  INR has been managed through the anticoagulation clinic.    The patient is ready to learn, no apparent learning barriers were identified.  Diagnosis and treatment plans were explained to the patient. The patient expressed  understanding of the content. The patient asked appropriate questions. The patient questions were answered to his satisfaction.    Chart documentation with Dragon Voice recognition Software. Although reviewed after completion, some words and grammatical errors may remain.

## 2018-02-16 NOTE — ASSESSMENT & PLAN NOTE
Zechariah Ashby is a 57 year old male who was recently diagnosed with atrial fib/flutter in early November 2017. During workup just x-ray showed elevation of the left hemidiaphragm with a focal tenting. The CT scan was done for further evaluation showing left upper lobe atelectasis and a moderate-sized left pleural effusion. Patient also had mediastinal adenopathy. Patient had left thoracocentesis. Cytology came back as transudate and cytology came back negative. Patient has a long history of smoking. PET scan showed a large FDG avid mediastinal mass extending to the left hilum with obliteration of the left upper lobe bronchus and atelectasis of the upper lobe.  There are also multiple left cervical left cervical and mediastinal adenopathy.  There is increased left pleural effusion.  There is hypermetabolic left adrenal lesion.  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 He was started on carboplatin and gemcitabine.

## 2018-02-19 NOTE — TELEPHONE ENCOUNTER
Patient was contacted by phone due to a positive screen on the Oncology Distress Screening tool. Spoke with patient who reported that he did not have any questions for the dietitian. The patient reported that he had stopped drinking Ensure because he is eating ok, he had been drinking two bottles a day. Encouraged the patient to go back to at least one bottle of ensure a day. He reported that his usual weight was around 175#s and his current weight was 149#s, he felt that he is not losing weight and his weight is stable. Registered Dietitian contact information provided to patient if further questions arise.    Ctaalina Garg RD,LD  Clinical Dietitian

## 2018-02-22 NOTE — PROGRESS NOTES
Infusion Nursing Note:  Zechariah Ashby presents today for Gemzar.    Patient seen by provider today: No   present during visit today: Not Applicable.    Note: Reviewed use of home antiemetics with pt and friend. Discussed eating bkfst before tmt and importance of pushing p.o. fluids.  Encouraged them to call with any questions or concerns.    Intravenous Access:  Implanted Port.    Treatment Conditions:  Results reviewed, labs MET treatment parameters, ok to proceed with treatment.      Post Infusion Assessment:  Patient tolerated infusion without incident.  Blood return noted pre and post infusion.  Site patent and intact, free from redness, edema or discomfort.  No evidence of extravasations.  Access discontinued per protocol.    Discharge Plan:   Patient discharged in stable condition accompanied by: friend.    Neisha Rodriguez RN

## 2018-02-22 NOTE — PROGRESS NOTES
3/1/18 ADDENDUM: Patient thought his appt was scheduled for [place], not [place] so he did not come to his appointment. Writer called patient and scheduled him for a lab tomorrow AM. Jacinto FRANCES RN    ANTICOAGULATION FOLLOW-UP CLINIC VISIT    Patient Name:  Zechariah Ashby  Date:  2/22/2018  Contact Type:  Telephone/ spoke with pt    SUBJECTIVE:     Patient Findings     Positives No Problem Findings    Comments Pt denies changes in medications, diet, activity or health, reports taking warfarin as directed.      Pt's next infusion is in 2 weeks, so will come into clinic for INR in 1 week, rather than having INR drawn with labs.             OBJECTIVE    INR   Date Value Ref Range Status   02/22/2018 2.92 (H) 0.86 - 1.14 Final       ASSESSMENT / PLAN  No question data found.  Anticoagulation Summary as of 2/22/2018     INR goal 2.0-3.0   Today's INR 2.92   Maintenance plan 1.25 mg (2.5 mg x 0.5) on Mon, Fri; 2.5 mg (2.5 mg x 1) all other days   Full instructions 1.25 mg on Mon, Fri; 2.5 mg all other days   Weekly total 15 mg   Plan last modified Mya Frederick RN (2/15/2018)   Next INR check 3/1/2018   Target end date     Indications   Atrial flutter  unspecified type (H) [I48.92]  Long-term (current) use of anticoagulants [Z79.01] [Z79.01]         Anticoagulation Episode Summary     INR check location     Preferred lab     Send INR reminders to [place] PHONE ANTICOAG POOL    Comments * have INR drawn with other labs in infusion on 2/15/18 (order placed). Pt is on AMIODARONE. On palliative chemo (CARBOplatin / Gemcitabine) call home number which is his cell      Anticoagulation Care Providers     Provider Role Specialty Phone number    Kailash Mason MD [facility] [phone]            See the Encounter Report to view Anticoagulation Flowsheet and Dosing Calendar (Go to Encounters tab in chart review, and find the Anticoagulation Therapy Visit)    Dosage adjustment made based on physician directed care  plan.    Mya Frederick RN

## 2018-02-22 NOTE — MR AVS SNAPSHOT
Zechariah Ashby   2/22/2018   Anticoagulation Therapy Visit    Description:  57 year old male   Provider:  Mya Frederick, RN   Department:  [facility]           INR as of 2/22/2018     Today's INR 2.92      Anticoagulation Summary as of 2/22/2018     INR goal 2.0-3.0   Today's INR 2.92   Full instructions 1.25 mg on Mon, Fri; 2.5 mg all other days   Next INR check 3/1/2018    Indications   Atrial flutter  unspecified type (H) [I48.92]  Long-term (current) use of anticoagulants [Z79.01] [Z79.01]         Your next Anticoagulation Clinic appointment(s)     Mar 01, 2018  1:15 PM CST   [facility] with [place] [facility]   [facility]    [address] [address]   [phone]              February 2018 Details    Sun Mon Tue Wed Thu Fri Sat         1               2               3                 4               5               6               7               8               9               10                 11               12               13               14               15               16               17                 18               19               20               21               22      2.5 mg   See details      23      1.25 mg         24      2.5 mg           25      2.5 mg         26      1.25 mg         27      2.5 mg         28      2.5 mg             Date Details   02/22 This INR check               How to take your warfarin dose     To take:  1.25 mg Take 0.5 of a 2.5 mg tablet.    To take:  2.5 mg Take 1 of the 2.5 mg tablets.           March 2018 Details    Sun Mon Tue Wed Thu Fri Sat         1            2               3                 4               5               6               7               8               9               10                 11               12               13               14               15               16               17                 18               19               20               21                22               23               24                 25               26               27               28               29               30               31                Date Details   No additional details    Date of next INR:  3/1/2018         How to take your warfarin dose     To take:  2.5 mg Take 1 of the 2.5 mg tablets.

## 2018-02-22 NOTE — MR AVS SNAPSHOT
After Visit Summary   2/22/2018    Zechariah Ashby    MRN: [id number]           Patient Information     Date Of Birth          1960        Visit Information        Provider Department      2/22/2018 8:00 AM ROOM 9 [facility]        Today's Diagnoses     Non-small cell carcinoma of lung (H)    -  1    Atrial flutter, unspecified type (H)        Long-term (current) use of anticoagulants           Follow-ups after your visit        Your next 10 appointments already scheduled     Mar 08, 2018  8:00 AM CST   Level 3 with ROOM 4 [facility] ([facility])    [facility]  [address]  [facility] [address]   [phone]            Mar 08, 2018  9:00 AM CST   Return Visit with Joycelyn May MD   [facility] ([facility])    [facility]  [address]  [facility] [address]   [phone]            Mar 15, 2018 10:00 AM CDT   Level 1 with ROOM 3 [facility] ([facility])    [facility]  [address]  [facility] [address]   [phone]            Apr 10, 2018 10:45 AM CDT   [place] with [place] [facility]   [facility] ([facility])    [address] [facility] [address]   [phone]            Apr 10, 2018 11:00 AM CDT   Return Visit with Daksha Montemayor PA-C   [facility] ([facility])    [address] [facility] [address]   [phone]              Who to contact     If you have questions or need follow up information about today's clinic visit or your schedule please contact [facility] directly at [phone].  Normal or non-critical lab and imaging results will be communicated to you by MyChart, letter or phone within 4 business days after the clinic has received the results. If you do  "not hear from us within 7 days, please contact the clinic through [facility] or phone. If you have a critical or abnormal lab result, we will notify you by phone as soon as possible.  Submit refill requests through [facility] or call your pharmacy and they will forward the refill request to us. Please allow 3 business days for your refill to be completed.          Additional Information About Your Visit        [employer] Information     [facility] lets you send messages to your doctor, view your test results, renew your prescriptions, schedule appointments and more. To sign up, go to [email or web address] . Click on \"Log in\" on the left side of the screen, which will take you to the Welcome page. Then click on \"Sign up Now\" on the right side of the page.     You will be asked to enter the access code listed below, as well as some personal information. Please follow the directions to create your username and password.     Your access code is: [email or web address]  Expires: 2018  9:55 AM     Your access code will  in 90 days. If you need help or a new code, please call your [facility] clinic or [phone].        Care EveryWhere ID     This is your Care EveryWhere ID. This could be used by other organizations to access your [facility] medical records  [id number]        Your Vitals Were     Pulse Temperature BMI (Body Mass Index)             77 97  F (36.1  C) (Oral) 20.51 kg/m2          Blood Pressure from Last 3 Encounters:   18 146/85   02/15/18 125/87   18 (!) 136/94    Weight from Last 3 Encounters:   18 66.7 kg (147 lb)   02/15/18 67.5 kg (148 lb 12.8 oz)   18 67.1 kg (148 lb)              We Performed the Following     CBC with platelets differential     INR        Primary Care Provider Office Phone # Fax #    Kailash Mason [phone] [address] [facility] [address]        Equal Access to Services     ROD PERRY AH: Hadii ronan Johnson, " harley camposaacristiano ah. So [facility] [phone].    ATENCIÓN: Si wesley stern, tiene a morocho disposición servicios gratuitos de asistencia lingüística. Roz al [phone].    We comply with applicable federal civil rights laws and [place] laws. We do not discriminate on the basis of race, color, national origin, age, disability, sex, sexual orientation, or gender identity.            Thank you!     Thank you for choosing [facility]  for your care. Our goal is always to provide you with excellent care. Hearing back from our patients is one way we can continue to improve our services. Please take a few minutes to complete the written survey that you may receive in the mail after your visit with us. Thank you!             Your Updated Medication List - Protect others around you: Learn how to safely use, store and throw away your medicines at www.disposemymeds.org.          This list is accurate as of 2/22/18 10:59 AM.  Always use your most recent med list.                   Brand Name Dispense Instructions for use Diagnosis    albuterol 108 (90 BASE) MCG/ACT Inhaler    PROAIR HFA    1 Inhaler    Inhale 2 puffs into the lungs every 4 hours as needed for shortness of breath / dyspnea or wheezing    Chronic obstructive pulmonary disease, unspecified COPD type (H)       ALIGN Chew      Take 1 chew tab by mouth 3 times daily as needed        amiodarone 200 MG tablet    PACERONE/CODARONE    45 tablet    Take 2 tablets twice daily x 1 week (1/12 - 1/19) and then start 1 tablet daily 1/20/18    Paroxysmal atrial fibrillation (H)       lidocaine-prilocaine cream    EMLA    30 g    Place a quarter sized amount of cream onto port area 30-60 minutes prior to port use. Do not rub in. Cover with tegaderm or saran wrap.    Non-small cell carcinoma of lung (H)       LORazepam 0.5 MG tablet    ATIVAN    30 tablet    Take 1 tablet (0.5 mg) by mouth every 4 hours as needed  (Anxiety, Nausea/Vomiting or Sleep)    Non-small cell carcinoma of lung (H)       megestrol 40 MG/ML suspension    MEGACE ORAL    600 mL    Take 5 mLs (200 mg) by mouth daily    Non-small cell carcinoma of lung (H)       morphine 15 MG IR tablet    MSIR    30 tablet    1/2 to 1 pill every 4 hours as needed for air hunger.    Non-small cell carcinoma of lung (H)       * nicotine 21 MG/24HR 24 hr patch    NICODERM CQ          * nicotine 14 MG/24HR 24 hr patch    NICODERM CQ    14 patch    Place 1 patch onto the skin every 24 hours    Encounter for tobacco use cessation counseling, Tobacco abuse       * nicotine 7 MG/24HR 24 hr patch    NICODERM CQ    30 patch    Place 1 patch onto the skin every 24 hours    Tobacco abuse       omeprazole 40 MG capsule    priLOSEC    30 capsule    Take 1 capsule (40 mg) by mouth daily Take 30-60 minutes before a meal.    Gastroesophageal reflux disease without esophagitis       prochlorperazine 10 MG tablet    COMPAZINE    30 tablet    Take 1 tablet (10 mg) by mouth every 6 hours as needed (Nausea/Vomiting)    Non-small cell carcinoma of lung (H)       * warfarin 5 MG tablet    COUMADIN    30 tablet    Take 1 tablet (5 mg) by mouth daily    Atrial flutter, unspecified type (H)       * warfarin 2.5 MG tablet    COUMADIN    75 tablet    Take 1.25 mg M&F, 2.5 mg rest of the week or as directed by the Anticoagulation Clinic (establishing maintenance dose).    Long-term (current) use of anticoagulants, Atrial flutter, unspecified type (H)       * Notice:  This list has 5 medication(s) that are the same as other medications prescribed for you. Read the directions carefully, and ask your doctor or other care provider to review them with you.

## 2018-03-02 NOTE — PROGRESS NOTES
ANTICOAGULATION FOLLOW-UP CLINIC VISIT    Patient Name:  Zechariah Ashby  Date:  3/2/2018  Contact Type:  Telephone/ writer spoke with Zechariah on phone    SUBJECTIVE:     Patient Findings     Positives Unexplained INR or factor level change    Comments Patient has not had any changes in diet, medications, activity or health. No side effects of chemo since last [facility] encounter. He is feeling fine and took dosing as instructed. Will decrease maintenance dose by 8% and recheck on 3-8-18 with next labs.           OBJECTIVE    INR   Date Value Ref Range Status   03/02/2018 3.57 (H) 0.86 - 1.14 Final       ASSESSMENT / PLAN  INR assessment SUPRA    Recheck INR In: 6 DAYS    INR Location Clinic lab     Anticoagulation Summary as of 3/2/2018     INR goal 2.0-3.0   Today's INR 3.57!   Maintenance plan 1.25 mg (2.5 mg x 0.5) on Mon, Wed, Fri; 2.5 mg (2.5 mg x 1) all other days   Full instructions 1.25 mg on Mon, Wed, Fri; 2.5 mg all other days   Weekly total 13.75 mg   Plan last modified Yaritza Diaz, RN (3/2/2018)   Next INR check 3/8/2018   Priority INR   Target end date     Indications   Atrial flutter  unspecified type (H) [I48.92]  Long-term (current) use of anticoagulants [Z79.01] [Z79.01]         Anticoagulation Episode Summary     INR check location     Preferred lab     Send INR reminders to [place] PHONE [facility]TICO[place] POOL    Comments * have INR drawn with other labs in infusion on 2/15/18 (order placed). Pt is on AMIODARONE. On palliative chemo (CARBOplatin / Gemcitabine) call home number which is his cell      Anticoagulation Care Providers     Provider Role Specialty Phone number    Kailash Mason MD Responsible Family Practice [phone]            See the Encounter Report to view Anticoagulation Flowsheet and Dosing Calendar (Go to Encounters tab in chart review, and find the Anticoagulation Therapy Visit)        Yaritza Diaz RN

## 2018-03-02 NOTE — MR AVS SNAPSHOT
Zechariah Ashby   3/2/2018   Anticoagulation Therapy Visit    Description:  57 year old male   Provider:  Yaritza Diaz, RN   Department:  [facility]           INR as of 3/2/2018     Today's INR 3.57!      Anticoagulation Summary as of 3/2/2018     INR goal 2.0-3.0   Today's INR 3.57!   Full instructions 1.25 mg on Mon, Wed, Fri; 2.5 mg all other days   Next INR check 3/8/2018    Indications   Atrial flutter  unspecified type (H) [I48.92]  Long-term (current) use of anticoagulants [Z79.01] [Z79.01]         March 2018 Details    Sun Mon Tue Wed Thu Fri Sat         1               2      1.25 mg   See details      3      2.5 mg           4      2.5 mg         5      1.25 mg         6      2.5 mg         7      1.25 mg         8            9               10                 11               12               13               14               15               16               17                 18               19               20               21               22               23               24                 25               26               27               28               29               30               31                Date Details   03/02 This INR check       Date of next INR:  3/8/2018         How to take your warfarin dose     To take:  1.25 mg Take 0.5 of a 2.5 mg tablet.    To take:  2.5 mg Take 1 of the 2.5 mg tablets.

## 2018-03-08 NOTE — MR AVS SNAPSHOT
After Visit Summary   3/8/2018    Zechariah Ashby    MRN: [id number]           Patient Information     Date Of Birth          1960        Visit Information        Provider Department      3/8/2018 9:00 AM Joycelyn May MD [facility]      Today's Diagnoses     Non-small cell carcinoma of lung (H)          Care Instructions    We would like to see you back in clinic with Dr. Cote 3 weeks with chemotherapy to be scheduled per treatment plan.      Your prescription ( [place] IR) has been: given to you to hand carry to the pharmacy of your choice. When you are in need of a refill, please call your pharmacy and they will send us a request.      Copy of appointments, and after visit summary (AVS) given to patient.  If you have any questions during business hours (M-F 8 AM- 4PM), please call Camille Lara RN, BSN, OCN Oncology Hematology /Breast Cancer Navigator at [facility] [phone].   For questions after business hours, or on holidays/weekends, please call our after hours Nurse Triage line [phone]. Thank you.            Follow-ups after your visit        Follow-up notes from your care team     Return in about 3 weeks (around 3/29/2018) for Schedule for chemotherapy as per treatment plan.      Your next 10 appointments already scheduled     Mar 09, 2018  1:30 PM CST   Level 3 with ROOM 3 [facility] ([facility])    [facility]  [address]  [facility] [address]   [phone]            Mar 15, 2018 10:00 AM CDT   Level 1 with ROOM 3 [facility] ([facility])    [facility]  [address]  [facility] [address]   [phone]            Mar 29, 2018  8:00 AM CDT   Level 3 with ROOM 8 [facility] ([facility])    [facility]  [address]  [facility] [address]   [phone]            Mar 29, 2018  9:00 AM CDT   Return Visit with Joycelyn May MD   [facility] ([facility])    [facility]  [address] [facility]  [facility] [address]   [phone]            Apr 05, 2018 10:30 AM CDT   Level 1 with ROOM 5 [facility] ([facility])    [facility]  [address]  [facility] [address]   [phone]            Apr 10, 2018 10:45 AM CDT   [place] with [place] [facility]   [facility] ([facility])    [address] [facility] [address]   [phone]            Apr 10, 2018 11:00 AM CDT   Return Visit with Daksha Montemayor PA-C   [facility] ([facility])    [address] [facility] [address]   [phone]              Who to contact     If you have questions or need follow up information about today's clinic visit or your schedule please contact [facility] directly at [phone].  Normal or non-critical lab and imaging results will be communicated to you by MyChart, letter or phone within 4 business days after the clinic has received the results. If you do not hear from us within 7 days, please contact the clinic through MyChart or phone. If you have a critical or abnormal lab result, we will notify you by phone as soon as possible.  Submit refill requests through [employer] or call your pharmacy and they will forward the refill request to us. Please allow 3 business days for your refill to be completed.          Additional Information About Your Visit        [employer] Information     [employer] lets you send messages to your doctor, view your test results, renew your prescriptions, schedule appointments and more. To sign up, go to [email or web address] . Click on \"Log in\" on the left side of the screen, which will take you to " "the Welcome page. Then click on \"Sign up Now\" on the right side of the page.     You will be asked to enter the access code listed below, as well as some personal information. Please follow the directions to create your username and password.     Your access code is: [email or web address]  Expires: 2018  9:55 AM     Your access code will  in 90 days. If you need help or a new code, please call your [facility] clinic or [phone].        Care EveryWhere ID     This is your Care EveryWhere ID. This could be used by other organizations to access your [facility] medical records  [id number]        Your Vitals Were     Pulse Temperature Respirations Height Pulse Oximetry BMI (Body Mass Index)    79 98.1  F (36.7  C) (Tympanic) 24 1.803 m (5' 10.98\") 98% 20.82 kg/m2       Blood Pressure from Last 3 Encounters:   18 105/71   18 146/85   02/15/18 125/87    Weight from Last 3 Encounters:   18 67.7 kg (149 lb 3.2 oz)   18 66.7 kg (147 lb)   02/15/18 67.5 kg (148 lb 12.8 oz)              Today, you had the following     No orders found for display         Where to get your medicines      Some of these will need a paper prescription and others can be bought over the counter.  Ask your nurse if you have questions.     Bring a paper prescription for each of these medications     morphine 15 MG IR tablet          Primary Care Provider Office Phone # Fax #    Kailash Mason [phone] [address] [place], [place] [address]        Equal Access to Services     [facility] : ronan Bedoya, harley campos. So [facility] [phone].    ATENCIÓN: Si habla español, tiene a morocho disposición servicios gratuitos de asistencia lingüística. Llame al [phone].    We comply with applicable federal civil rights laws and [place] laws. We do not discriminate on the basis of race, color, national origin, age, " disability, sex, sexual orientation, or gender identity.            Thank you!     Thank you for choosing [facility]  for your care. Our goal is always to provide you with excellent care. Hearing back from our patients is one way we can continue to improve our services. Please take a few minutes to complete the written survey that you may receive in the mail after your visit with us. Thank you!             Your Updated Medication List - Protect others around you: Learn how to safely use, store and throw away your medicines at www.disposemymeds.org.          This list is accurate as of 3/8/18 11:59 PM.  Always use your most recent med list.                   Brand Name Dispense Instructions for use Diagnosis    albuterol 108 (90 BASE) MCG/ACT Inhaler    PROAIR HFA    1 Inhaler    Inhale 2 puffs into the lungs every 4 hours as needed for shortness of breath / dyspnea or wheezing    Chronic obstructive pulmonary disease, unspecified COPD type (H)       amiodarone 200 MG tablet    PACERONE/CODARONE    45 tablet    Take 2 tablets twice daily x 1 week (1/12 - 1/19) and then start 1 tablet daily 1/20/18    Paroxysmal atrial fibrillation (H)       lidocaine-prilocaine cream    EMLA    30 g    Place a quarter sized amount of cream onto port area 30-60 minutes prior to port use. Do not rub in. Cover with tegaderm or saran wrap.    Non-small cell carcinoma of lung (H)       LORazepam 0.5 MG tablet    ATIVAN    30 tablet    Take 1 tablet (0.5 mg) by mouth every 4 hours as needed (Anxiety, Nausea/Vomiting or Sleep)    Non-small cell carcinoma of lung (H)       megestrol 40 MG/ML suspension    MEGACE ORAL    600 mL    Take 5 mLs (200 mg) by mouth daily    Non-small cell carcinoma of lung (H)       morphine 15 MG IR tablet    MSIR    30 tablet    1/2 to 1 pill every 4 hours as needed for air hunger.    Non-small cell carcinoma of lung (H)       * nicotine 21 MG/24HR 24 hr patch    NICODERM CQ          * nicotine 14  MG/24HR 24 hr patch    NICODERM CQ    14 patch    Place 1 patch onto the skin every 24 hours    Encounter for tobacco use cessation counseling, Tobacco abuse       * nicotine 7 MG/24HR 24 hr patch    NICODERM CQ    30 patch    Place 1 patch onto the skin every 24 hours    Tobacco abuse       omeprazole 40 MG capsule    priLOSEC    30 capsule    Take 1 capsule (40 mg) by mouth daily Take 30-60 minutes before a meal.    Gastroesophageal reflux disease without esophagitis       prochlorperazine 10 MG tablet    COMPAZINE    30 tablet    Take 1 tablet (10 mg) by mouth every 6 hours as needed (Nausea/Vomiting)    Non-small cell carcinoma of lung (H)       * warfarin 5 MG tablet    COUMADIN    30 tablet    Take 1.25 mg MWF; 2.5 mg rest of the week as directed by the Anticoagulation Clinic (establishing maintenance dose).    Atrial flutter, unspecified type (H)       * warfarin 2.5 MG tablet    COUMADIN    75 tablet    Take 1.25 mg MWF, 2.5 mg rest of the week or as directed by the Anticoagulation Clinic (establishing maintenance dose).    Long-term (current) use of anticoagulants, Atrial flutter, unspecified type (H)       * Notice:  This list has 5 medication(s) that are the same as other medications prescribed for you. Read the directions carefully, and ask your doctor or other care provider to review them with you.

## 2018-03-08 NOTE — PROGRESS NOTES
Infusion Nursing Note:  Zechariah Ashby presents today for HELD Eva Espinoza    Patient seen by provider today: Yes Dr. May   (EXPLAIN)/NO:[id number]}   present during visit today: Not Applicable.    Note: N/A.    Intravenous Access:  Implanted Port.    Treatment Conditions:  Results reviewed, labs MET treatment parameters, ok to proceed with treatment.  Pt not treated today due to insurance issues.      Post Infusion Assessment:  NA    Discharge Plan:   Patient discharged in stable condition accompanied by: wife.    Alex Lange RN

## 2018-03-08 NOTE — PROGRESS NOTES
ANTICOAGULATION FOLLOW-UP CLINIC VISIT    Patient Name:  Zechariah Ashby  Date:  3/8/2018  Contact Type:  Telephone/ writer left message for patient to call [facility] back    SUBJECTIVE:     Patient Findings     Comments Non-detailed message left for patient to call [facility] back. If no changes or concerns, he can continue recently decreased maintenance dose. We can recheck next week with labs.            OBJECTIVE    INR   Date Value Ref Range Status   03/08/2018 2.98 (H) 0.86 - 1.14 Final       ASSESSMENT / PLAN  INR assessment THER    Recheck INR In: 1 WEEK    INR Location Clinic lab     Anticoagulation Summary as of 3/8/2018     INR goal 2.0-3.0   Today's INR 2.98   Maintenance plan 1.25 mg (2.5 mg x 0.5) on Mon, Wed, Fri; 2.5 mg (2.5 mg x 1) all other days   Full instructions 1.25 mg on Mon, Wed, Fri; 2.5 mg all other days   Weekly total 13.75 mg   No change documented Yaritza Diaz RN   Plan last modified Yaritza Diaz RN (3/2/2018)   Next INR check 3/15/2018   Priority INR   Target end date     Indications   Atrial flutter  unspecified type (H) [I48.92]  Long-term (current) use of anticoagulants [Z79.01] [Z79.01]         Anticoagulation Episode Summary     INR check location     Preferred lab     Send INR reminders to [place] PH[place] POOL    Comments * have INR drawn with other labs in infusion on 2/15/18 (order placed). Pt is on AMIODARONE. On palliative chemo (CARBOplatin / Gemcitabine) call home number which is his cell      Anticoagulation Care Providers     Provider Role Specialty Phone number    Kailash Mason MD [facility] [phone]            See the Encounter Report to view Anticoagulation Flowsheet and Dosing Calendar (Go to Encounters tab in chart review, and find the Anticoagulation Therapy Visit)        Yaritza Diaz RN

## 2018-03-08 NOTE — PATIENT INSTRUCTIONS
We would like to see you back in clinic with Dr. Cote 3 weeks with chemotherapy to be scheduled per treatment plan.      Your prescription ( [facility] IR) has been: given to you to hand carry to the pharmacy of your choice. When you are in need of a refill, please call your pharmacy and they will send us a request.      Copy of appointments, and after visit summary (AVS) given to patient.  If you have any questions during business hours (M-F 8 AM- 4PM), please call Camille Lara RN, BSN, OCN Oncology Hematology /Breast Cancer Navigator at [facility] [phone].   For questions after business hours, or on holidays/weekends, please call our after hours Nurse Triage line [phone]. Thank you.

## 2018-03-08 NOTE — MR AVS SNAPSHOT
After Visit Summary   3/8/2018    Zechariah Ashby    MRN: [id number]           Patient Information     Date Of Birth          1960        Visit Information        Provider Department      3/8/2018 8:00 AM ROOM 4 [facility]        Today's Diagnoses     Non-small cell carcinoma of lung (H)    -  1    Atrial flutter, unspecified type (H)        Long-term (current) use of anticoagulants           Follow-ups after your visit        Your next 10 appointments already scheduled     Mar 15, 2018 10:00 AM CDT   Level 1 with ROOM 3 [facility] ([facility])    [facility]  [address]  [facility] [address]   [phone]            Apr 10, 2018 10:45 AM CDT   ecg with [place] [facility]   [facility] ([facility])    [address] [address]   [phone]            Apr 10, 2018 11:00 AM CDT   Return Visit with Daksha Montemayor PA-C   [facility] ([facility])    [address] [address]   [phone]              Who to contact     If you have questions or need follow up information about today's clinic visit or your schedule please contact [facility] directly at [phone].  Normal or non-critical lab and imaging results will be communicated to you by MyChart, letter or phone within 4 business days after the clinic has received the results. If you do not hear from us within 7 days, please contact the clinic through MyChart or phone. If you have a critical or abnormal lab result, we will notify you by phone as soon as possible.  Submit refill requests through [employer] or call your pharmacy and they will forward the refill request to us. Please allow 3 business days for your refill to be completed.          Additional Information About Your Visit        MyChart Information     [employer] lets you  "send messages to your doctor, view your test results, renew your prescriptions, schedule appointments and more. To sign up, go to [email or web address] . Click on \"Log in\" on the left side of the screen, which will take you to the Welcome page. Then click on \"Sign up Now\" on the right side of the page.     You will be asked to enter the access code listed below, as well as some personal information. Please follow the directions to create your username and password.     Your access code is: [email or web address]  Expires: 2018  9:55 AM     Your access code will  in 90 days. If you need help or a new code, please call your [facility] clinic or [phone].        Care EveryWhere ID     This is your Care EveryWhere ID. This could be used by other organizations to access your [facility] medical records  [id number]         Blood Pressure from Last 3 Encounters:   18 105/71   18 146/85   02/15/18 125/87    Weight from Last 3 Encounters:   18 67.7 kg (149 lb 3.2 oz)   18 66.7 kg (147 lb)   02/15/18 67.5 kg (148 lb 12.8 oz)              We Performed the Following     CBC with platelets differential     Creatinine     INR          Where to get your medicines      Some of these will need a paper prescription and others can be bought over the counter.  Ask your nurse if you have questions.     Bring a paper prescription for each of these medications     morphine 15 MG IR tablet          Primary Care Provider Office Phone # Fax #    Kailash Mason [phone] [address] [place] [id number]        Equal Access to Services     [facility] HospitalDAHLIA : Diego Zhang, ronan pendleton, harley campos. So [facility] [phone].    ATENCIÓN: Si habla español, tiene a morocho disposición servicios gratuitos de asistencia lingüística. Llame al [phone].    We comply with applicable federal civil rights laws and [place] " laws. We do not discriminate on the basis of race, color, national origin, age, disability, sex, sexual orientation, or gender identity.            Thank you!     Thank you for choosing [facility]  for your care. Our goal is always to provide you with excellent care. Hearing back from our patients is one way we can continue to improve our services. Please take a few minutes to complete the written survey that you may receive in the mail after your visit with us. Thank you!             Your Updated Medication List - Protect others around you: Learn how to safely use, store and throw away your medicines at www.disposemymeds.org.          This list is accurate as of 3/8/18 11:32 AM.  Always use your most recent med list.                   Brand Name Dispense Instructions for use Diagnosis    albuterol 108 (90 BASE) MCG/ACT Inhaler    PROAIR HFA    1 Inhaler    Inhale 2 puffs into the lungs every 4 hours as needed for shortness of breath / dyspnea or wheezing    Chronic obstructive pulmonary disease, unspecified COPD type (H)       amiodarone 200 MG tablet    PACERONE/CODARONE    45 tablet    Take 2 tablets twice daily x 1 week (1/12 - 1/19) and then start 1 tablet daily 1/20/18    Paroxysmal atrial fibrillation (H)       lidocaine-prilocaine cream    EMLA    30 g    Place a quarter sized amount of cream onto port area 30-60 minutes prior to port use. Do not rub in. Cover with tegaderm or saran wrap.    Non-small cell carcinoma of lung (H)       LORazepam 0.5 MG tablet    ATIVAN    30 tablet    Take 1 tablet (0.5 mg) by mouth every 4 hours as needed (Anxiety, Nausea/Vomiting or Sleep)    Non-small cell carcinoma of lung (H)       megestrol 40 MG/ML suspension    MEGACE ORAL    600 mL    Take 5 mLs (200 mg) by mouth daily    Non-small cell carcinoma of lung (H)       morphine 15 MG IR tablet    MSIR    30 tablet    1/2 to 1 pill every 4 hours as needed for air hunger.    Non-small cell carcinoma of lung (H)        * nicotine 21 MG/24HR 24 hr patch    NICODERM CQ          * nicotine 14 MG/24HR 24 hr patch    NICODERM CQ    14 patch    Place 1 patch onto the skin every 24 hours    Encounter for tobacco use cessation counseling, Tobacco abuse       * nicotine 7 MG/24HR 24 hr patch    NICODERM CQ    30 patch    Place 1 patch onto the skin every 24 hours    Tobacco abuse       omeprazole 40 MG capsule    priLOSEC    30 capsule    Take 1 capsule (40 mg) by mouth daily Take 30-60 minutes before a meal.    Gastroesophageal reflux disease without esophagitis       prochlorperazine 10 MG tablet    COMPAZINE    30 tablet    Take 1 tablet (10 mg) by mouth every 6 hours as needed (Nausea/Vomiting)    Non-small cell carcinoma of lung (H)       * warfarin 5 MG tablet    COUMADIN    30 tablet    Take 1.25 mg MWF; 2.5 mg rest of the week as directed by the Anticoagulation Clinic (establishing maintenance dose).    Atrial flutter, unspecified type (H)       * warfarin 2.5 MG tablet    COUMADIN    75 tablet    Take 1.25 mg MWF, 2.5 mg rest of the week or as directed by the Anticoagulation Clinic (establishing maintenance dose).    Long-term (current) use of anticoagulants, Atrial flutter, unspecified type (H)       * Notice:  This list has 5 medication(s) that are the same as other medications prescribed for you. Read the directions carefully, and ask your doctor or other care provider to review them with you.

## 2018-03-08 NOTE — LETTER
3/8/2018         RE: Zechariah Ashby  [address] [address] [address]        Dear Colleague,    Thank you for referring your patient, Zechariah Ashby, to the [facility]. Please see a copy of my visit note below.    Hematology/ Oncology Follow-up Visit:  Mar 8, 2018    Reason for Visit:   Chief Complaint   Patient presents with     Oncology Clinic Visit     3 week recheck Left Lung, Labs & Chemo today        Oncologic History:  Non-small cell carcinoma of lung (H)  Zechariah Ashby is a 57 year old male who was recently diagnosed with atrial fib/flutter in early November 2017. During workup just x-ray showed elevation of the left hemidiaphragm with a focal tenting. The CT scan was done for further evaluation showing left upper lobe atelectasis and a moderate-sized left pleural effusion. Patient also had mediastinal adenopathy. Patient had left thoracocentesis. Cytology came back as transudate and cytology came back negative. Patient has a long history of smoking. PET scan showed a large FDG avid mediastinal mass extending to the left hilum with obliteration of the left upper lobe bronchus and atelectasis of the upper lobe.  There are also multiple left cervical left cervical and mediastinal adenopathy.  There is increased left pleural effusion.  There is hypermetabolic left adrenal lesion.  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 He was started on carboplatin and gemcitabine.      Interval History:  Patient is here today for follow-up.  He has been tolerating chemotherapy well.  He denies any nausea or vomiting or diarrhea.  Denies any fever or chills.  He denies any cough or wheezing.  Denies any fever or chills.  He has been tolerating chemotherapy without significant side effects.    Review Of Systems:  Constitutional: Negative for fever, chills, and night sweats.  Skin: negative.  Eyes:  negative.  Ears/Nose/Throat: negative.  Respiratory: No shortness of breath, dyspnea on exertion, cough, or hemoptysis.  Cardiovascular: negative.  Gastrointestinal: negative.  Genitourinary: negative.  Musculoskeletal: negative.  Neurologic: negative.  Psychiatric: negative.  Hematologic/Lymphatic/Immunologic: negative.  Endocrine: negative.    All other ROS negative unless mentioned in interval history.    Past medical, social, surgical, and family histories reviewed.    Allergies:  Allergies as of 03/08/2018 - Osmel as Reviewed 03/08/2018   Allergen Reaction Noted     Nka [no known allergies]  01/03/2018       Current Medications:  Current Outpatient Prescriptions   Medication Sig Dispense Refill     morphine (MSIR) 15 MG IR tablet 1/2 to 1 pill every 4 hours as needed for air hunger. 30 tablet 0     warfarin (COUMADIN) 5 MG tablet Take 1.25 mg MWF; 2.5 mg rest of the week as directed by the Anticoagulation Clinic (establishing maintenance dose). 30 tablet 1     warfarin (COUMADIN) 2.5 MG tablet Take 1.25 mg MWF, 2.5 mg rest of the week or as directed by the Anticoagulation Clinic (establishing maintenance dose). 75 tablet 0     albuterol (PROAIR HFA) 108 (90 BASE) MCG/ACT Inhaler Inhale 2 puffs into the lungs every 4 hours as needed for shortness of breath / dyspnea or wheezing 1 Inhaler 11     omeprazole (PRILOSEC) 40 MG capsule Take 1 capsule (40 mg) by mouth daily Take 30-60 minutes before a meal. 30 capsule 11     nicotine (NICODERM CQ) 14 MG/24HR 24 hr patch Place 1 patch onto the skin every 24 hours 14 patch 5     lidocaine-prilocaine (EMLA) cream Place a quarter sized amount of cream onto port area 30-60 minutes prior to port use. Do not rub in. Cover with tegaderm or saran wrap. 30 g 1     megestrol (MEGACE ORAL) 40 MG/ML suspension Take 5 mLs (200 mg) by mouth daily 600 mL 2     amiodarone (PACERONE/CODARONE) 200 MG tablet 1 tablet daily 90 tablet 0     nicotine (NICODERM CQ) 21 MG/24HR 24 hr patch     "    nicotine (NICODERM CQ) 7 MG/24HR 24 hr patch Place 1 patch onto the skin every 24 hours (Patient not taking: Reported on 2/15/2018) 30 patch 5     LORazepam (ATIVAN) 0.5 MG tablet Take 1 tablet (0.5 mg) by mouth every 4 hours as needed (Anxiety, Nausea/Vomiting or Sleep) (Patient not taking: Reported on 3/8/2018) 30 tablet 5     prochlorperazine (COMPAZINE) 10 MG tablet Take 1 tablet (10 mg) by mouth every 6 hours as needed (Nausea/Vomiting) 30 tablet 5        Physical Exam:  /71 (BP Location: Right arm, Patient Position: Sitting, Cuff Size: Adult Regular)  Pulse 79  Temp 98.1  F (36.7  C) (Tympanic)  Resp 24  Ht 1.803 m (5' 10.98\")  Wt 67.7 kg (149 lb 3.2 oz)  SpO2 98%  BMI 20.82 kg/m2  Wt Readings from Last 12 Encounters:   03/08/18 67.7 kg (149 lb 3.2 oz)   02/22/18 66.7 kg (147 lb)   02/15/18 67.5 kg (148 lb 12.8 oz)   02/13/18 67.1 kg (148 lb)   02/05/18 65.8 kg (145 lb)   02/01/18 64.4 kg (142 lb)   01/25/18 71.2 kg (157 lb)   01/20/18 68.9 kg (151 lb 14.4 oz)   01/17/18 69.6 kg (153 lb 6.4 oz)   01/15/18 69.2 kg (152 lb 9.6 oz)   01/12/18 67.7 kg (149 lb 4.8 oz)   01/12/18 67.2 kg (148 lb 1.6 oz)     ECOG performance status: 1  GENERAL APPEARANCE: Healthy, alert and in no acute distress.  HEENT: Sclerae anicteric. PERRLA. Oropharynx without ulcers, lesions, or thrush.  NECK: Supple. No asymmetry or masses.  LYMPHATICS: No palpable cervical, supraclavicular, axillary, or inguinal lymphadenopathy.  RESP: Lungs clear to auscultation bilaterally without rales, rhonchi or wheezes.  CARDIOVASCULAR: Regular rate and rhythm. Normal S1, S2; no S3 or S4. No murmur, gallop, or rub.  ABDOMEN: Soft, nontender. Bowel sounds normal. No palpable organomegaly or masses.  MUSCULOSKELETAL: Extremities without gross deformities noted. No edema of bilateral lower extremities.  SKIN: No suspicious lesions or rashes.  NEURO: Alert and oriented x 3. Cranial nerves II-XII grossly intact.  PSYCHIATRIC: Mentation and " affect appear normal.    Laboratory/Imaging Studies:  Infusion Therapy Visit on 03/08/2018   Component Date Value Ref Range Status     WBC 03/08/2018 4.0  4.0 - 11.0 10e9/L Final     RBC Count 03/08/2018 3.06* 4.4 - 5.9 10e12/L Final     Hemoglobin 03/08/2018 8.9* 13.3 - 17.7 g/dL Final     Hematocrit 03/08/2018 27.9* 40.0 - 53.0 % Final     MCV 03/08/2018 91  78 - 100 fl Final     MCH 03/08/2018 29.1  26.5 - 33.0 pg Final     MCHC 03/08/2018 31.9  31.5 - 36.5 g/dL Final     RDW 03/08/2018 16.3* 10.0 - 15.0 % Final     Platelet Count 03/08/2018 300  150 - 450 10e9/L Final     Diff Method 03/08/2018 Automated Method   Final     % Neutrophils 03/08/2018 58.4  % Final     % Lymphocytes 03/08/2018 22.5  % Final     % Monocytes 03/08/2018 17.7  % Final     % Eosinophils 03/08/2018 0.8  % Final     % Basophils 03/08/2018 0.3  % Final     % Immature Granulocytes 03/08/2018 0.3  % Final     Absolute Neutrophil 03/08/2018 2.3  1.6 - 8.3 10e9/L Final     Absolute Lymphocytes 03/08/2018 0.9  0.8 - 5.3 10e9/L Final     Absolute Monocytes 03/08/2018 0.7  0.0 - 1.3 10e9/L Final     Absolute Eosinophils 03/08/2018 0.0  0.0 - 0.7 10e9/L Final     Absolute Basophils 03/08/2018 0.0  0.0 - 0.2 10e9/L Final     Abs Immature Granulocytes 03/08/2018 0.0  0 - 0.4 10e9/L Final     Creatinine 03/08/2018 0.93  0.66 - 1.25 mg/dL Final     GFR Estimate 03/08/2018 83  >60 mL/min/1.7m2 Final    Non  GFR Calc     GFR Estimate If Black 03/08/2018 >90  >60 mL/min/1.7m2 Final    African American GFR Calc     INR 03/08/2018 2.98* 0.86 - 1.14 Final   Orders Only on 03/02/2018   Component Date Value Ref Range Status     INR 03/02/2018 3.57* 0.86 - 1.14 Final        Recent Results (from the past 744 hour(s))   CT Chest/Abdomen/Pelvis w Contrast    Narrative    CT CHEST/ABDOMEN/PELVIS W CONTRAST 2/13/2018 10:48 AM    TECHNIQUE: Volumetric acquisition of CT images from the lung apices  through the pelvis.    Radiation dose for this scan  was reduced using automated exposure  control, adjustment of the mA and/or KV according to patient size, or  iterative reconstruction technique.     COMPARISON: 12/9/2017, 12/15/2017, 1/9/2018..    HISTORY: follow up; Non-small cell carcinoma of lung (H)     FINDINGS:   Chest: Anterior mediastinal mass has decreased in size considerably  measuring 2.9 x 1.4 cm today compared to 7.9 x 5.9 cm previously.  Subcarinal adenopathy is smaller measuring 1.5 cm today compared to  2.9 cm previously. Right hilar lymphadenopathy is slightly smaller as  well. There are few scattered pulmonary nodules in the right lung  which are minimally smaller compared to the previous exam. There is  some new infiltrate in the right middle lobe most likely related to  radiation change. There is a nodule in this area measuring 1.2 cm  which may be a part of the infiltrate, however residual or recurrent  neoplasm at this site cannot be excluded. This entire area was  occupied by tumor and atelectatic lung previously. Tiny left pleural  effusion which is improved. Cervical lymphadenopathy is partially  visualized without significant change. CT of the neck may be helpful  if clinically indicated.    Abdomen/pelvis: There are couple tiny low-density lesions in the left  lobe of the liver possibly small cysts without change compared to  12/9/2017. Liver is otherwise unremarkable. Mild nodular generalized  thickening of the left adrenal gland is unchanged. Negative pancreas,  spleen and kidneys. Negative right adrenal. Negative retroperitoneum.  Negative GI tract and pelvic structures. Again seen are pars defects  at the L5 level.      Impression    IMPRESSION:     1. Anterior mediastinal mass is decreased in size considerably  measuring 2.9 x 1.4 cm today compared to previous exams.  2. There are few scattered pulmonary nodules in the right lobe which  may be minimally smaller.  3. Mediastinal and hilar lymphadenopathy which is slightly  improved  compared to previous exams.  4. Lymphadenopathy in the cervical region incompletely visualized  without significant change.  5. 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  6. Nodular thickening of the left adrenal gland which is unchanged.  7. Tiny left pleural effusion improved compared to the previous exam.  8. New infiltrate in the left middle lobe medially most likely related  to radiation change. There is a 1.2 cm nodule in this area which may  be related to the treatment, however residual or recurrent neoplasm at  this site cannot be excluded.    SANJU GARDUNO MD       Assessment and plan:  (C34.90) Non-small cell carcinoma of lung (H)  I reviewed with the patient today most recent imaging studies.  He continues to respond well to chemotherapy.  We will continue his chemotherapy according to the treatment plan.  I will see the patient again in 3 weeks or sooner if there are new developments or concerns.    The patient is ready to learn, no apparent learning barriers were identified.  Diagnosis and treatment plans were explained to the patient. The patient expressed understanding of the content. The patient asked appropriate questions. The patient questions were answered to his satisfaction.    Time spent 25 minutes with 50% of the time in counseling coordination of care including discussion of side effects of chemotherapy, potential toxicities and management plan.    Chart documentation with Dragon Voice recognition Software. Although reviewed after completion, some words and grammatical errors may remain.    Again, thank you for allowing me to participate in the care of your patient.        Sincerely,        Joycelyn May MD

## 2018-03-08 NOTE — MR AVS SNAPSHOT
Zechariah Ashby   3/8/2018   Anticoagulation Therapy Visit    Description:  57 year old male   Provider:  Yaritza Diaz, RN   Department:  [facility]           INR as of 3/8/2018     Today's INR 2.98      Anticoagulation Summary as of 3/8/2018     INR goal 2.0-3.0   Today's INR 2.98   Full instructions 1.25 mg on Mon, Wed, Fri; 2.5 mg all other days   Next INR check 3/15/2018    Indications   Atrial flutter  unspecified type (H) [I48.92]  Long-term (current) use of anticoagulants [Z79.01] [Z79.01]         March 2018 Details    Sun Mon Tue Wed Thu Fri Sat         1               2               3                 4               5               6               7               8      2.5 mg   See details      9      1.25 mg         10      2.5 mg           11      2.5 mg         12      1.25 mg         13      2.5 mg         14      1.25 mg         15            16               17                 18               19               20               21               22               23               24                 25               26               27               28               29               30               31                Date Details   03/08 This INR check       Date of next INR:  3/15/2018         How to take your warfarin dose     To take:  1.25 mg Take 0.5 of a 2.5 mg tablet.    To take:  2.5 mg Take 1 of the 2.5 mg tablets.

## 2018-03-08 NOTE — TELEPHONE ENCOUNTER
Estefani is asking if the Amiodarone Rx can be changed to 90 day supply. He is now taking one a day. This would save them trips to the pharmacy.    Requested Prescriptions   Pending Prescriptions Disp Refills     amiodarone (PACERONE/CODARONE) 200 MG tablet 45 tablet 1     Sig: Take 2 tablets twice daily x 1 week (1/12 - 1/19) and then start 1 tablet daily 1/20/18    There is no refill protocol information for this order        Last Written Prescription Date:  1/11/18  Last Fill Quantity: 45,  # refills: 1   Last office visit: 2/5/2018 with prescribing provider:  Leaf   Future Office Visit:   Next 5 appointments (look out 90 days)     Apr 10, 2018 11:00 AM CDT   Return Visit with Daksha Montemayor PA-C   [facility] ([facility])    [address] [place] [address]   [phone]

## 2018-03-08 NOTE — NURSING NOTE
"Oncology Rooming Note    March 8, 2018 9:06 AM   Zechariah Ashby is a 57 year old male who presents for:    Chief Complaint   Patient presents with     Oncology Clinic Visit     3 week recheck Left Lung, Labs & Chemo today      Initial Vitals: /71 (BP Location: Right arm, Patient Position: Sitting, Cuff Size: Adult Regular)  Pulse 79  Temp 98.1  F (36.7  C) (Tympanic)  Resp 24  Ht 1.803 m (5' 10.98\")  Wt 67.7 kg (149 lb 3.2 oz)  SpO2 98%  BMI 20.82 kg/m2 Estimated body mass index is 20.82 kg/(m^2) as calculated from the following:    Height as of this encounter: 1.803 m (5' 10.98\").    Weight as of this encounter: 67.7 kg (149 lb 3.2 oz). Body surface area is 1.84 meters squared.  No Pain (0) Comment: Data Unavailable   No LMP for male patient.  Allergies reviewed: Yes  Medications reviewed: Yes    Medications: MEDICATION REFILLS NEEDED TODAY. Provider was notified.  Pharmacy name entered into J.G. ink:      [id number] - [facility] [address]    Clinical concerns: 3 week recheck Left Lung, Labs & Chemo today     7 minutes for nursing intake (face to face time)     Carmen Barreto CMA              "

## 2018-03-09 NOTE — MR AVS SNAPSHOT
After Visit Summary   3/9/2018    Zechariah Ashby    MRN: [id number]           Patient Information     Date Of Birth          1960        Visit Information        Provider Department      3/9/2018 1:30 PM ROOM 3 [facility]        Today's Diagnoses     Non-small cell carcinoma of lung (H)    -  1       Follow-ups after your visit        Your next 10 appointments already scheduled     Mar 15, 2018 10:00 AM CDT   Level 1 with ROOM 3 [facility] ([facility])    [facility]  [address]  [place] [address]   [phone]            Mar 29, 2018  8:00 AM CDT   Level 3 with ROOM 8 [facility] ([facility])    [facility]  [address]  [facility] [address]   [phone]            Mar 29, 2018  9:00 AM CDT   Return Visit with Joycelyn May MD   [facility] ([facility])    [facility]  [address]  [facility] [address]   [phone]            Apr 05, 2018 10:30 AM CDT   Level 1 with ROOM 5 [facility] ([facility])    [facility]  [address]  [facility] [address]   [phone]            Apr 10, 2018 10:45 AM CDT   ecg with [place] [facility]   [facility] ([facility])    [address]  [facility] [address]   [phone]            Apr 10, 2018 11:00 AM CDT   Return Visit with Daksha Montemayor PA-C   [facility] ([facility])    [address] [facility] [address]   [phone]              Who to contact     If you have questions or need follow up information about today's clinic visit or your schedule please contact [facility] directly at [phone].  Normal or non-critical lab and imaging results will be  "communicated to you by ClrTouchhart, letter or phone within 4 business days after the clinic has received the results. If you do not hear from us within 7 days, please contact the clinic through Scali or phone. If you have a critical or abnormal lab result, we will notify you by phone as soon as possible.  Submit refill requests through Scali or call your pharmacy and they will forward the refill request to us. Please allow 3 business days for your refill to be completed.          Additional Information About Your Visit        Scali Information     Scali lets you send messages to your doctor, view your test results, renew your prescriptions, schedule appointments and more. To sign up, go to [email or web address] . Click on \"Log in\" on the left side of the screen, which will take you to the Welcome page. Then click on \"Sign up Now\" on the right side of the page.     You will be asked to enter the access code listed below, as well as some personal information. Please follow the directions to create your username and password.     Your access code is: [email or web address]  Expires: 2018  9:55 AM     Your access code will  in 90 days. If you need help or a new code, please call your [place] clinic or [phone].        Care EveryWhere ID     This is your Care EveryWhere ID. This could be used by other organizations to access your [place] medical records  [id number]        Your Vitals Were     Pulse Temperature Respirations Pulse Oximetry          86 96.7  F (35.9  C) 20 99%         Blood Pressure from Last 3 Encounters:   18 116/72   18 105/71   18 146/85    Weight from Last 3 Encounters:   18 67.7 kg (149 lb 3.2 oz)   18 66.7 kg (147 lb)   02/15/18 67.5 kg (148 lb 12.8 oz)              Today, you had the following     No orders found for display       Primary Care Provider Office Phone # Fax #    Kailash Mason [phone] [address] [place] [address] " [place] [address]        Equal Access to Services     [facility] Hospital[facility]HLIA : Hadii arnol lai phoenix Zhang, wabusterda luqadaha, qaybta kasarai richdanyelleturner, harley ramila goldsmithoralalyssa boykin. So [facility] [phone].    ATENCIÓN: Si habla espbonita, tiene a morocho disposición servicios gratuitos de asistencia lingüística. Lauriame al [phone].    We comply with applicable federal civil rights laws and [place] laws. We do not discriminate on the basis of race, color, national origin, age, disability, sex, sexual orientation, or gender identity.            Thank you!     Thank you for choosing [facility]  for your care. Our goal is always to provide you with excellent care. Hearing back from our patients is one way we can continue to improve our services. Please take a few minutes to complete the written survey that you may receive in the mail after your visit with us. Thank you!             Your Updated Medication List - Protect others around you: Learn how to safely use, store and throw away your medicines at www.disposemymeds.org.          This list is accurate as of 3/9/18  4:14 PM.  Always use your most recent med list.                   Brand Name Dispense Instructions for use Diagnosis    albuterol 108 (90 BASE) MCG/ACT Inhaler    PROAIR HFA    1 Inhaler    Inhale 2 puffs into the lungs every 4 hours as needed for shortness of breath / dyspnea or wheezing    Chronic obstructive pulmonary disease, unspecified COPD type (H)       amiodarone 200 MG tablet    PACERONE/CODARONE    90 tablet    1 tablet daily    Paroxysmal atrial fibrillation (H)       lidocaine-prilocaine cream    EMLA    30 g    Place a quarter sized amount of cream onto port area 30-60 minutes prior to port use. Do not rub in. Cover with tegaderm or saran wrap.    Non-small cell carcinoma of lung (H)       LORazepam 0.5 MG tablet    ATIVAN    30 tablet    Take 1 tablet (0.5 mg) by mouth every 4 hours as needed (Anxiety, Nausea/Vomiting or Sleep)     Non-small cell carcinoma of lung (H)       megestrol 40 MG/ML suspension    MEGACE ORAL    600 mL    Take 5 mLs (200 mg) by mouth daily    Non-small cell carcinoma of lung (H)       morphine 15 MG IR tablet    MSIR    30 tablet    1/2 to 1 pill every 4 hours as needed for air hunger.    Non-small cell carcinoma of lung (H)       * nicotine 21 MG/24HR 24 hr patch    NICODERM CQ          * nicotine 14 MG/24HR 24 hr patch    NICODERM CQ    14 patch    Place 1 patch onto the skin every 24 hours    Encounter for tobacco use cessation counseling, Tobacco abuse       * nicotine 7 MG/24HR 24 hr patch    NICODERM CQ    30 patch    Place 1 patch onto the skin every 24 hours    Tobacco abuse       omeprazole 40 MG capsule    priLOSEC    30 capsule    Take 1 capsule (40 mg) by mouth daily Take 30-60 minutes before a meal.    Gastroesophageal reflux disease without esophagitis       prochlorperazine 10 MG tablet    COMPAZINE    30 tablet    Take 1 tablet (10 mg) by mouth every 6 hours as needed (Nausea/Vomiting)    Non-small cell carcinoma of lung (H)       * warfarin 5 MG tablet    COUMADIN    30 tablet    Take 1.25 mg MWF; 2.5 mg rest of the week as directed by the Anticoagulation Clinic (establishing maintenance dose).    Atrial flutter, unspecified type (H)       * warfarin 2.5 MG tablet    COUMADIN    75 tablet    Take 1.25 mg MWF, 2.5 mg rest of the week or as directed by the Anticoagulation Clinic (establishing maintenance dose).    Long-term (current) use of anticoagulants, Atrial flutter, unspecified type (H)       * Notice:  This list has 5 medication(s) that are the same as other medications prescribed for you. Read the directions carefully, and ask your doctor or other care provider to review them with you.

## 2018-03-09 NOTE — TELEPHONE ENCOUNTER
Significance: Major     Warning: amiodarone may inhibit hepatic metabolism and increase the anticoagulant effect of warfarin. Bleeding may occur.    Additional Information: [place] management code override to professional intervention required (1/2013)    Onset: Delayed    Document Level: Established    Interacting Medications/Orders:  Anticoagulants  Oral or Non-Oral, Systemic Amiodarone  Oral or Non-Oral, Systemic   1. warfarin    Order ([id number]): warfarin (COUMADIN) 2.5 MG tablet Route: none  Start: 03/02/2018 End: none Frequency:  2. warfarin    Order ([id number]): warfarin (COUMADIN) 5 MG tablet Route: none  Start: 03/02/2018 End: none Frequency: 1. amiodarone    Order: amiodarone (PACERONE/CODARONE) 200 MG tablet Route: none  Start: 03/09/2018 End: none Frequency:     Management Code: Professional intervention required    Effects: Amiodarone may increase the hypoprothrombinemic effect of warfarin. The severity of this interaction may depend on the dosage of amiodarone. Bleeding may occur.

## 2018-03-09 NOTE — PROGRESS NOTES
Infusion Nursing Note:  Zechariah Ashby presents today for Gemzar/Carbo   Patient seen by provider today: No   present during visit today: Not Applicable.    Note: Pt was seen by MD yesterday. Due to insurance  problem, chemo was not able to be given.  Chemo given today, insurance resolved.    Intravenous Access:  Implanted Port.    Treatment Conditions:  Results reviewed, labs MET treatment parameters, ok to proceed with treatment.      Post Infusion Assessment:  Patient tolerated infusion without incident.  Blood return noted pre and post infusion.  Site patent and intact, free from redness, edema or discomfort.  No evidence of extravasations.  Access discontinued per protocol.    Discharge Plan:   Patient discharged in stable condition accompanied by: self.  Departure: ambulatory    Mavis Bal RN

## 2018-03-12 NOTE — PROGRESS NOTES
Hematology/ Oncology Follow-up Visit:  Mar 8, 2018    Reason for Visit:   Chief Complaint   Patient presents with     Oncology Clinic Visit     3 week recheck Left Lung, Labs & Chemo today        Oncologic History:  Non-small cell carcinoma of lung (H)  Zechariah Ashby is a 57 year old male who was recently diagnosed with atrial fib/flutter in early November 2017. During workup just x-ray showed elevation of the left hemidiaphragm with a focal tenting. The CT scan was done for further evaluation showing left upper lobe atelectasis and a moderate-sized left pleural effusion. Patient also had mediastinal adenopathy. Patient had left thoracocentesis. Cytology came back as transudate and cytology came back negative. Patient has a long history of smoking. PET scan showed a large FDG avid mediastinal mass extending to the left hilum with obliteration of the left upper lobe bronchus and atelectasis of the upper lobe.  There are also multiple left cervical left cervical and mediastinal adenopathy.  There is increased left pleural effusion.  There is hypermetabolic left adrenal lesion.  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 He was started on carboplatin and gemcitabine.      Interval History:  Patient is here today for follow-up.  He has been tolerating chemotherapy well.  He denies any nausea or vomiting or diarrhea.  Denies any fever or chills.  He denies any cough or wheezing.  Denies any fever or chills.  He has been tolerating chemotherapy without significant side effects.    Review Of Systems:  Constitutional: Negative for fever, chills, and night sweats.  Skin: negative.  Eyes: negative.  Ears/Nose/Throat: negative.  Respiratory: No shortness of breath, dyspnea on exertion, cough, or hemoptysis.  Cardiovascular: negative.  Gastrointestinal: negative.  Genitourinary: negative.  Musculoskeletal: negative.  Neurologic:  negative.  Psychiatric: negative.  Hematologic/Lymphatic/Immunologic: negative.  Endocrine: negative.    All other ROS negative unless mentioned in interval history.    Past medical, social, surgical, and family histories reviewed.    Allergies:  Allergies as of 03/08/2018 - Osmel as Reviewed 03/08/2018   Allergen Reaction Noted     Nka [no known allergies]  01/03/2018       Current Medications:  Current Outpatient Prescriptions   Medication Sig Dispense Refill     morphine (MSIR) 15 MG IR tablet 1/2 to 1 pill every 4 hours as needed for air hunger. 30 tablet 0     warfarin (COUMADIN) 5 MG tablet Take 1.25 mg MWF; 2.5 mg rest of the week as directed by the Anticoagulation Clinic (establishing maintenance dose). 30 tablet 1     warfarin (COUMADIN) 2.5 MG tablet Take 1.25 mg MWF, 2.5 mg rest of the week or as directed by the Anticoagulation Clinic (establishing maintenance dose). 75 tablet 0     albuterol (PROAIR HFA) 108 (90 BASE) MCG/ACT Inhaler Inhale 2 puffs into the lungs every 4 hours as needed for shortness of breath / dyspnea or wheezing 1 Inhaler 11     omeprazole (PRILOSEC) 40 MG capsule Take 1 capsule (40 mg) by mouth daily Take 30-60 minutes before a meal. 30 capsule 11     nicotine (NICODERM CQ) 14 MG/24HR 24 hr patch Place 1 patch onto the skin every 24 hours 14 patch 5     lidocaine-prilocaine (EMLA) cream Place a quarter sized amount of cream onto port area 30-60 minutes prior to port use. Do not rub in. Cover with tegaderm or saran wrap. 30 g 1     megestrol (MEGACE ORAL) 40 MG/ML suspension Take 5 mLs (200 mg) by mouth daily 600 mL 2     amiodarone (PACERONE/CODARONE) 200 MG tablet 1 tablet daily 90 tablet 0     nicotine (NICODERM CQ) 21 MG/24HR 24 hr patch        nicotine (NICODERM CQ) 7 MG/24HR 24 hr patch Place 1 patch onto the skin every 24 hours (Patient not taking: Reported on 2/15/2018) 30 patch 5     LORazepam (ATIVAN) 0.5 MG tablet Take 1 tablet (0.5 mg) by mouth every 4 hours as needed  "(Anxiety, Nausea/Vomiting or Sleep) (Patient not taking: Reported on 3/8/2018) 30 tablet 5     prochlorperazine (COMPAZINE) 10 MG tablet Take 1 tablet (10 mg) by mouth every 6 hours as needed (Nausea/Vomiting) 30 tablet 5        Physical Exam:  /71 (BP Location: Right arm, Patient Position: Sitting, Cuff Size: Adult Regular)  Pulse 79  Temp 98.1  F (36.7  C) (Tympanic)  Resp 24  Ht 1.803 m (5' 10.98\")  Wt 67.7 kg (149 lb 3.2 oz)  SpO2 98%  BMI 20.82 kg/m2  Wt Readings from Last 12 Encounters:   03/08/18 67.7 kg (149 lb 3.2 oz)   02/22/18 66.7 kg (147 lb)   02/15/18 67.5 kg (148 lb 12.8 oz)   02/13/18 67.1 kg (148 lb)   02/05/18 65.8 kg (145 lb)   02/01/18 64.4 kg (142 lb)   01/25/18 71.2 kg (157 lb)   01/20/18 68.9 kg (151 lb 14.4 oz)   01/17/18 69.6 kg (153 lb 6.4 oz)   01/15/18 69.2 kg (152 lb 9.6 oz)   01/12/18 67.7 kg (149 lb 4.8 oz)   01/12/18 67.2 kg (148 lb 1.6 oz)     ECOG performance status: 1  GENERAL APPEARANCE: Healthy, alert and in no acute distress.  HEENT: Sclerae anicteric. PERRLA. Oropharynx without ulcers, lesions, or thrush.  NECK: Supple. No asymmetry or masses.  LYMPHATICS: No palpable cervical, supraclavicular, axillary, or inguinal lymphadenopathy.  RESP: Lungs clear to auscultation bilaterally without rales, rhonchi or wheezes.  CARDIOVASCULAR: Regular rate and rhythm. Normal S1, S2; no S3 or S4. No murmur, gallop, or rub.  ABDOMEN: Soft, nontender. Bowel sounds normal. No palpable organomegaly or masses.  MUSCULOSKELETAL: Extremities without gross deformities noted. No edema of bilateral lower extremities.  SKIN: No suspicious lesions or rashes.  NEURO: Alert and oriented x 3. Cranial nerves II-XII grossly intact.  PSYCHIATRIC: Mentation and affect appear normal.    Laboratory/Imaging Studies:  Infusion Therapy Visit on 03/08/2018   Component Date Value Ref Range Status     WBC 03/08/2018 4.0  4.0 - 11.0 10e9/L Final     RBC Count 03/08/2018 3.06* 4.4 - 5.9 10e12/L Final     " Hemoglobin 03/08/2018 8.9* 13.3 - 17.7 g/dL Final     Hematocrit 03/08/2018 27.9* 40.0 - 53.0 % Final     MCV 03/08/2018 91  78 - 100 fl Final     MCH 03/08/2018 29.1  26.5 - 33.0 pg Final     MCHC 03/08/2018 31.9  31.5 - 36.5 g/dL Final     RDW 03/08/2018 16.3* 10.0 - 15.0 % Final     Platelet Count 03/08/2018 300  150 - 450 10e9/L Final     Diff Method 03/08/2018 Automated Method   Final     % Neutrophils 03/08/2018 58.4  % Final     % Lymphocytes 03/08/2018 22.5  % Final     % Monocytes 03/08/2018 17.7  % Final     % Eosinophils 03/08/2018 0.8  % Final     % Basophils 03/08/2018 0.3  % Final     % Immature Granulocytes 03/08/2018 0.3  % Final     Absolute Neutrophil 03/08/2018 2.3  1.6 - 8.3 10e9/L Final     Absolute Lymphocytes 03/08/2018 0.9  0.8 - 5.3 10e9/L Final     Absolute Monocytes 03/08/2018 0.7  0.0 - 1.3 10e9/L Final     Absolute Eosinophils 03/08/2018 0.0  0.0 - 0.7 10e9/L Final     Absolute Basophils 03/08/2018 0.0  0.0 - 0.2 10e9/L Final     Abs Immature Granulocytes 03/08/2018 0.0  0 - 0.4 10e9/L Final     Creatinine 03/08/2018 0.93  0.66 - 1.25 mg/dL Final     GFR Estimate 03/08/2018 83  >60 mL/min/1.7m2 Final    Non  GFR Calc     GFR Estimate If Black 03/08/2018 >90  >60 mL/min/1.7m2 Final    African American GFR Calc     INR 03/08/2018 2.98* 0.86 - 1.14 Final   Orders Only on 03/02/2018   Component Date Value Ref Range Status     INR 03/02/2018 3.57* 0.86 - 1.14 Final        Recent Results (from the past 744 hour(s))   CT Chest/Abdomen/Pelvis w Contrast    Narrative    CT CHEST/ABDOMEN/PELVIS W CONTRAST 2/13/2018 10:48 AM    TECHNIQUE: Volumetric acquisition of CT images from the lung apices  through the pelvis.    Radiation dose for this scan was reduced using automated exposure  control, adjustment of the mA and/or KV according to patient size, or  iterative reconstruction technique.     COMPARISON: 12/9/2017, 12/15/2017, 1/9/2018..    HISTORY: follow up; Non-small cell  carcinoma of lung (H)     FINDINGS:   Chest: Anterior mediastinal mass has decreased in size considerably  measuring 2.9 x 1.4 cm today compared to 7.9 x 5.9 cm previously.  Subcarinal adenopathy is smaller measuring 1.5 cm today compared to  2.9 cm previously. Right hilar lymphadenopathy is slightly smaller as  well. There are few scattered pulmonary nodules in the right lung  which are minimally smaller compared to the previous exam. There is  some new infiltrate in the right middle lobe most likely related to  radiation change. There is a nodule in this area measuring 1.2 cm  which may be a part of the infiltrate, however residual or recurrent  neoplasm at this site cannot be excluded. This entire area was  occupied by tumor and atelectatic lung previously. Tiny left pleural  effusion which is improved. Cervical lymphadenopathy is partially  visualized without significant change. CT of the neck may be helpful  if clinically indicated.    Abdomen/pelvis: There are couple tiny low-density lesions in the left  lobe of the liver possibly small cysts without change compared to  12/9/2017. Liver is otherwise unremarkable. Mild nodular generalized  thickening of the left adrenal gland is unchanged. Negative pancreas,  spleen and kidneys. Negative right adrenal. Negative retroperitoneum.  Negative GI tract and pelvic structures. Again seen are pars defects  at the L5 level.      Impression    IMPRESSION:     1. Anterior mediastinal mass is decreased in size considerably  measuring 2.9 x 1.4 cm today compared to previous exams.  2. There are few scattered pulmonary nodules in the right lobe which  may be minimally smaller.  3. Mediastinal and hilar lymphadenopathy which is slightly improved  compared to previous exams.  4. Lymphadenopathy in the cervical region incompletely visualized  without significant change.  5. 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  6.  Nodular thickening of the left adrenal gland which is unchanged.  7. Tiny left pleural effusion improved compared to the previous exam.  8. New infiltrate in the left middle lobe medially most likely related  to radiation change. There is a 1.2 cm nodule in this area which may  be related to the treatment, however residual or recurrent neoplasm at  this site cannot be excluded.    SANJU GARDUNO MD       Assessment and plan:  (C34.90) Non-small cell carcinoma of lung (H)  I reviewed with the patient today most recent imaging studies.  He continues to respond well to chemotherapy.  We will continue his chemotherapy according to the treatment plan.  I will see the patient again in 3 weeks or sooner if there are new developments or concerns.    The patient is ready to learn, no apparent learning barriers were identified.  Diagnosis and treatment plans were explained to the patient. The patient expressed understanding of the content. The patient asked appropriate questions. The patient questions were answered to his satisfaction.    Time spent 25 minutes with 50% of the time in counseling coordination of care including discussion of side effects of chemotherapy, potential toxicities and management plan.    Chart documentation with Dragon Voice recognition Software. Although reviewed after completion, some words and grammatical errors may remain.

## 2018-03-12 NOTE — TELEPHONE ENCOUNTER
Call received from daughter in law (DIL) Rowan Ashby in regards to patient.  Patient is a 57 year old male on active chemotherapy of C4D1 carboplatin, gemzar 03.09.18 for NSCLC.  DIL is reporting worsening fatigue and decline in oral intake; both foodstuff and liquids, as well as an increase in air hunger.  Pt has MS IR 15 mg order for 1/2 - 1 tablet every 4 hours for air hunger.  DIL reports pt is using the full tablet every 3-4 hours and is very sleepy and doesn't want to get out of bed. Denies fever nor chills, pain nor discomfort.   Patient is scheduled to be seen by Dr. May Thursday with labs and chemotherapy infusion to follow.    Advised patient to come to clinic for lab draw and evaluation to determine cause for increased air hunger and fatigue.  Patient does not want to come to clinic today, stating he will have his labs drawn tomorrow at [facility] lab, but he does not want to come to the clinic until Thursday as scheduled.    Advised patient and or DIL to call an ambulance if air hunger increases, worsening or new symptoms develop. Provided after hours Nurse Triage line as well.

## 2018-03-13 NOTE — TELEPHONE ENCOUNTER
"Patient had lab drawn toady at 1415.  Results appear stable with hgb 9.4.    Spoke with JESSY Donahue today.  She reports patient's breathing is much better, not as tired, \"is up and about\". Patient continues to decline coming to clinic for an evaluation, he will have the infusion nurse \"check me out when I'm there Thursday\".    Again advised patient or DIL to call an ambulance if air hunger increases, worsening or new symptoms develop.  Provided my direct line, and after hours Nurse Triage line as well.  "

## 2018-03-15 NOTE — PROGRESS NOTES
Infusion Nursing Note:  Zechariah Ashby presents today for Gemzar.    Patient seen by provider today: No   present during visit today: Not Applicable.    Note: Pre-meds as ordered.    Intravenous Access:  Implanted Port.    Treatment Conditions:  Lab Results   Component Value Date    HGB 8.9 03/15/2018     Lab Results   Component Value Date    WBC 2.7 03/15/2018      Lab Results   Component Value Date    ANEU 1.6 03/15/2018     Lab Results   Component Value Date     03/15/2018      Results reviewed, labs MET treatment parameters, ok to proceed with treatment.      Post Infusion Assessment:  Patient tolerated infusion without incident.  Blood return noted pre and post infusion.  Site patent and intact, free from redness, edema or discomfort.  No evidence of extravasations.  Access discontinued per protocol.    Discharge Plan:   Patient discharged in stable condition accompanied by: ex-wife.  Departure Mode: Ambulatory.    Lauren Roth RN

## 2018-03-15 NOTE — PROGRESS NOTES
ADDENDUM:  Patient contacted and updated on INR. Patient will continue weekly maintenance dose. INR is therapeutic. Recheck with next infusion.      ANTICOAGULATION FOLLOW-UP CLINIC VISIT    Patient Name:  Zechariah Ashby  Date:  3/15/2018  Contact Type:  Telephone/ Voice mail full.    SUBJECTIVE:     Patient Findings     Positives No Problem Findings    Comments Attempted to call patient but his voicemail is full. No dosing instructions were given.           OBJECTIVE    INR   Date Value Ref Range Status   03/15/2018 2.51 (H) 0.86 - 1.14 Final       ASSESSMENT / PLAN  INR assessment THER    Recheck INR In: 2 WEEKS    INR Location Clinic      Anticoagulation Summary as of 3/15/2018     INR goal 2.0-3.0   Today's INR 2.51   Maintenance plan 1.25 mg (2.5 mg x 0.5) on Mon, Wed, Fri; 2.5 mg (2.5 mg x 1) all other days   Full instructions 1.25 mg on Mon, Wed, Fri; 2.5 mg all other days   Weekly total 13.75 mg   No change documented Mahendra David RN   Plan last modified Yaritza Diaz RN (3/2/2018)   Next INR check 3/29/2018   Priority INR   Target end date     Indications   Atrial flutter  unspecified type (H) [I48.92]  Long-term (current) use of anticoagulants [Z79.01] [Z79.01]         Anticoagulation Episode Summary     INR check location     Preferred lab     Send INR reminders to [place] PHONE [facility] POOL    Comments * have INR drawn with other labs in infusion on 2/15/18 (order placed). Pt is on AMIODARONE. On palliative chemo (CARBOplatin / Gemcitabine) call home number which is his cell      Anticoagulation Care Providers     Provider Role Specialty Phone number    Kailash Mason MD [facility] [phone]            See the Encounter Report to view Anticoagulation Flowsheet and Dosing Calendar (Go to Encounters tab in chart review, and find the Anticoagulation Therapy Visit)        Mahendra David, RN

## 2018-03-15 NOTE — MR AVS SNAPSHOT
After Visit Summary   3/15/2018    Zechariah Ashby    MRN: [id number]           Patient Information     Date Of Birth          1960        Visit Information        Provider Department      3/15/2018 10:00 AM ROOM 3 [facility] Cancer Infusion        Today's Diagnoses     Non-small cell carcinoma of lung (H)    -  1    Atrial flutter, unspecified type (H)        Long-term (current) use of anticoagulants           Follow-ups after your visit        Your next 10 appointments already scheduled     Mar 29, 2018  8:00 AM CDT   Level 3 with ROOM 8 [facility] Cancer Infusion ([facility])    [facility]  [address]  [facility] [address]   [phone]            Mar 29, 2018  9:00 AM CDT   Return Visit with Joycelyn May MD   [facility] ([facility])    [facility]  [address]  [facility] [address]   [phone]            Apr 05, 2018 10:30 AM CDT   Level 1 with ROOM 5 [facility] Cancer Infusion ([facility])    [facility]  [address]  [facility] [address]   [phone]            Apr 10, 2018 10:45 AM CDT   [place] with [place] [facility]   [facility] Cardiac Services ([facility])    [address] [facility] [address]   [phone]            Apr 10, 2018 11:00 AM CDT   Return Visit with Daksha Montemayor PA-C   [facility] ([facility])    [address] [facility] [address]   [phone]              Who to contact     If you have questions or need follow up information about today's clinic visit or your schedule please contact [facility] directly at [phone].  Normal or non-critical lab and imaging results will be communicated to you by MyChart, letter or phone within 4 business days after the clinic has received the results. If you do  "not hear from us within 7 days, please contact the clinic through [employer] or phone. If you have a critical or abnormal lab result, we will notify you by phone as soon as possible.  Submit refill requests through [employer] or call your pharmacy and they will forward the refill request to us. Please allow 3 business days for your refill to be completed.          Additional Information About Your Visit        [employer]rivida Information     [employer] lets you send messages to your doctor, view your test results, renew your prescriptions, schedule appointments and more. To sign up, go to [email or web address] . Click on \"Log in\" on the left side of the screen, which will take you to the Welcome page. Then click on \"Sign up Now\" on the right side of the page.     You will be asked to enter the access code listed below, as well as some personal information. Please follow the directions to create your username and password.     Your access code is: [email or web address]  Expires: 2018 10:55 AM     Your access code will  in 90 days. If you need help or a new code, please call your [place] clinic or [phone].        Care EveryWhere ID     This is your Care EveryWhere ID. This could be used by other organizations to access your [place] medical records  [id number]        Your Vitals Were     Pulse Temperature Respirations BMI (Body Mass Index)          70 96.1  F (35.6  C) (Tympanic) 16 20.04 kg/m2         Blood Pressure from Last 3 Encounters:   03/15/18 123/74   18 116/72   18 105/71    Weight from Last 3 Encounters:   03/15/18 65.1 kg (143 lb 9.6 oz)   18 67.7 kg (149 lb 3.2 oz)   18 66.7 kg (147 lb)              We Performed the Following     CBC with platelets differential     INR        Primary Care Provider Office Phone # Fax #    Kailash Mason [phone] [address] [place] [address]        Equal Access to Services     ROD PERRY AH: Diego garibay " Sandyasher, wabusterda luqadaha, qaybta kasarai so, harley whitmorecristiano lucretia. So [facility] [phone].    ATENCIÓN: Si wesley stern, tiene a morocho disposición servicios gratuitos de asistencia lingüística. Roz al [phone].    We comply with applicable federal civil rights laws and [place] laws. We do not discriminate on the basis of race, color, national origin, age, disability, sex, sexual orientation, or gender identity.            Thank you!     Thank you for choosing [facility]  for your care. Our goal is always to provide you with excellent care. Hearing back from our patients is one way we can continue to improve our services. Please take a few minutes to complete the written survey that you may receive in the mail after your visit with us. Thank you!             Your Updated Medication List - Protect others around you: Learn how to safely use, store and throw away your medicines at www.disposemymeds.org.          This list is accurate as of 3/15/18 12:07 PM.  Always use your most recent med list.                   Brand Name Dispense Instructions for use Diagnosis    albuterol 108 (90 BASE) MCG/ACT Inhaler    PROAIR HFA    1 Inhaler    Inhale 2 puffs into the lungs every 4 hours as needed for shortness of breath / dyspnea or wheezing    Chronic obstructive pulmonary disease, unspecified COPD type (H)       amiodarone 200 MG tablet    PACERONE/CODARONE    90 tablet    1 tablet daily    Paroxysmal atrial fibrillation (H)       lidocaine-prilocaine cream    EMLA    30 g    Place a quarter sized amount of cream onto port area 30-60 minutes prior to port use. Do not rub in. Cover with tegaderm or saran wrap.    Non-small cell carcinoma of lung (H)       LORazepam 0.5 MG tablet    ATIVAN    30 tablet    Take 1 tablet (0.5 mg) by mouth every 4 hours as needed (Anxiety, Nausea/Vomiting or Sleep)    Non-small cell carcinoma of lung (H)       megestrol 40 MG/ML suspension    MEGACE ORAL     600 mL    Take 5 mLs (200 mg) by mouth daily    Non-small cell carcinoma of lung (H)       morphine 15 MG IR tablet    MSIR    30 tablet    1/2 to 1 pill every 4 hours as needed for air hunger.    Non-small cell carcinoma of lung (H)       * nicotine 21 MG/24HR 24 hr patch    NICODERM CQ          * nicotine 14 MG/24HR 24 hr patch    NICODERM CQ    14 patch    Place 1 patch onto the skin every 24 hours    Encounter for tobacco use cessation counseling, Tobacco abuse       * nicotine 7 MG/24HR 24 hr patch    NICODERM CQ    30 patch    Place 1 patch onto the skin every 24 hours    Tobacco abuse       omeprazole 40 MG capsule    priLOSEC    30 capsule    Take 1 capsule (40 mg) by mouth daily Take 30-60 minutes before a meal.    Gastroesophageal reflux disease without esophagitis       prochlorperazine 10 MG tablet    COMPAZINE    30 tablet    Take 1 tablet (10 mg) by mouth every 6 hours as needed (Nausea/Vomiting)    Non-small cell carcinoma of lung (H)       * warfarin 5 MG tablet    COUMADIN    30 tablet    Take 1.25 mg MWF; 2.5 mg rest of the week as directed by the Anticoagulation Clinic (establishing maintenance dose).    Atrial flutter, unspecified type (H)       * warfarin 2.5 MG tablet    COUMADIN    75 tablet    Take 1.25 mg MWF, 2.5 mg rest of the week or as directed by the Anticoagulation Clinic (establishing maintenance dose).    Long-term (current) use of anticoagulants, Atrial flutter, unspecified type (H)       * Notice:  This list has 5 medication(s) that are the same as other medications prescribed for you. Read the directions carefully, and ask your doctor or other care provider to review them with you.

## 2018-03-20 NOTE — PROGRESS NOTES
Patient and ex-wife (Estefani) would like  to call them in regards to questions they have about insurance and other services they may have available to them.    In basket message and voicemail message left on Gunjan's cell phone with this information.

## 2018-03-21 NOTE — TELEPHONE ENCOUNTER
Voicemail left for ex-spouse, Estefani offering SW assistance to problem solve insurance and additional services questions. Referral had been made by Dr. [facility] RN. Left MSW name and contact information for return call. She returned the call within 5 minutes. They are interested in applying for Social Security disability. MSW educated about the online application as well as option of calling Social Security and making an appointment. MSW alerted to prognosis question, and if Stage 4, an alert will pop up regarding calling the local Social Security office for further assistance due to terminal nature of the disease. Per Estefani, Zechariah's son, Lupillo, is computer savvy and will be assisting with making the online application and Estefani stated that Lupillo will call MSW with any needed questions. She appreciated the call and assistance.

## 2018-03-29 NOTE — MR AVS SNAPSHOT
After Visit Summary   3/29/2018    Zechariah Ashby    MRN: [id number]           Patient Information     Date Of Birth          1960        Visit Information        Provider Department      3/29/2018 9:00 AM Joycelyn May MD [facility] ONCOLOGY      Today's Diagnoses     Non-small cell carcinoma of lung (H)    -  1    Chemotherapy induced nausea and vomiting        Pleural effusion          Care Instructions    We would like to see you back in clinic with Dr. May in 3 weeks with chemotherapy to be scheduled per treatment plan.      Your prescription (MSIR) has been: given to you to hand carry to the pharmacy of your choice.     Your prescriptions (nicotine patch, compazine) has been sent to:  [id number] [address] [facility]  [address] [facility] [address]  Phone: [phone] Fax: [phone]  When you are in need of a refill, please call your pharmacy and they will send us a request.      Copy of appointments, and after visit summary (AVS) given to patient.      If you have any questions during business hours (M-F 8 AM- 4PM), please call Camille Lara RN, BSN, OCN Oncology Hematology /Breast Cancer Navigator at [facility] [phone].       For questions after business hours, or on holidays/weekends, please call our after hours Nurse Triage line [phone]. Thank you.              Follow-ups after your visit        Follow-up notes from your care team     Return in about 3 weeks (around 4/19/2018) for Schedule for chemotherapy as per treatment plan.      Your next 10 appointments already scheduled     Apr 05, 2018 10:30 AM CDT   Level 1 with ROOM 5 [facility] Cancer Infusion ([facility])    [facility]  [address]  [place] [address]   [phone]            Apr 10, 2018 10:45 AM CDT   [place]g with [place] [facility] [facility]   [facility] Cardiac  Services ([facility])    [address] [facility] [address]   [phone]            Apr 10, 2018 11:00 AM CDT   Return Visit with Daksha Montemayor PA-C   [facility] ([facility])    [address] [facility] [address]   [phone]            Apr 19, 2018  8:30 AM CDT   Level 3 with ROOM 6 [facility] Cancer Infusion ([facility])    [facility]  [address]  [facility] [address]   [phone]            Apr 19, 2018  9:15 AM CDT   Return Visit with Joycelyn May MD   [facility] ([facility])    [facility]  [address]  [facility] [address]   [phone]            Apr 26, 2018 10:00 AM CDT   Level 1 with ROOM 7 [facility] Cancer Infusion ([facility])    [facility]  [address] [place], [place] [address] [address]  [facility] [address]   [phone]              Who to contact     If you have questions or need follow up information about today's clinic visit or your schedule please contact [facility] CANCER [facility] directly at [phone].  Normal or non-critical lab and imaging results will be communicated to you by MyChart, letter or phone within 4 business days after the clinic has received the results. If you do not hear from us within 7 days, please contact the clinic through MyChart or phone. If you have a critical or abnormal lab result, we will notify you by phone as soon as possible.  Submit refill requests through [employer] or call your pharmacy and they will forward the refill request to us. Please allow 3 business days for your refill to be completed.          Additional Information About Your Visit        [employer] Information     [employer] lets you send messages to your doctor, view your test results, renew your prescriptions, schedule appointments and more. To sign up, go to  [email or web address] . Click on \"Log in\" on the left side of the screen, which will take you to the Welcome page. Then click on \"Sign up Now\" on the right side of the page.     You will be asked to enter the access code listed below, as well as some personal information. Please follow the directions to create your username and password.     Your access code is: [email or web address]  Expires: 2018 10:55 AM     Your access code will  in 90 days. If you need help or a new code, please call your [facility] clinic or [phone].        Care EveryWhere ID     This is your Care EveryWhere ID. This could be used by other organizations to access your [facility] medical records  [id number]        Your Vitals Were     Pulse Temperature Respirations Height Pulse Oximetry BMI (Body Mass Index)    69 97.8  F (36.6  C) (Tympanic) 16 1.803 m (5' 10.98\") 100% 20.92 kg/m2       Blood Pressure from Last 3 Encounters:   18 117/75   03/15/18 123/74   18 116/72    Weight from Last 3 Encounters:   18 68 kg (149 lb 14.4 oz)   03/15/18 65.1 kg (143 lb 9.6 oz)   18 67.7 kg (149 lb 3.2 oz)              Today, you had the following     No orders found for display         Today's Medication Changes          These changes are accurate as of 3/29/18 10:32 AM.  If you have any questions, ask your nurse or doctor.               These medicines have changed or have updated prescriptions.        Dose/Directions    * nicotine 14 MG/24HR 24 hr patch   Commonly known as:  NICODERM CQ   This may have changed:  Another medication with the same name was changed. Make sure you understand how and when to take each.   Used for:  Encounter for tobacco use cessation counseling, Tobacco abuse   Changed by:  Joycelyn May MD        Dose:  1 patch   Place 1 patch onto the skin every 24 hours   Quantity:  14 patch   Refills:  5       * nicotine 7 MG/24HR 24 hr patch   Commonly known as:  NICODERM CQ   This may have changed:  " Another medication with the same name was changed. Make sure you understand how and when to take each.   Used for:  Tobacco abuse   Changed by:  Joycelyn May MD        Dose:  1 patch   Place 1 patch onto the skin every 24 hours   Quantity:  30 patch   Refills:  5       * nicotine 21 MG/24HR 24 hr patch   Commonly known as:  NICODERM CQ   This may have changed:    - how much to take  - how to take this  - when to take this   Used for:  Non-small cell carcinoma of lung (H)   Changed by:  Joycelyn May MD        Dose:  1 patch   Place 1 patch onto the skin every 24 hours   Quantity:  30 patch   Refills:  0       * Notice:  This list has 3 medication(s) that are the same as other medications prescribed for you. Read the directions carefully, and ask your doctor or other care provider to review them with you.         Where to get your medicines      These medications were sent to [id number] [place], [place] - [address] [facility]  [address] [facility] [address]    Hours:  Closed 10-16-08 business to [facility] Phone:  [phone]     nicotine 21 MG/24HR 24 hr patch    prochlorperazine 10 MG tablet         Some of these will need a paper prescription and others can be bought over the counter.  Ask your nurse if you have questions.     Bring a paper prescription for each of these medications     morphine 15 MG IR tablet                Primary Care Provider Office Phone # Fax #    Kailash Mason [phone] [address] [facility] [address]        Equal Access to Services     [facility] VICKY AH: Hadii arnol lai hadasho Soomaali, waaxda luqadaha, qaybta kaalmada adeegyada, harley boykin. So [facility] [phone].    ATENCIÓN: Si habla español, tiene a morocho disposición servicios gratuitos de asistencia lingüística. Llame al [phone].    We comply with applicable federal civil rights laws and [place] laws. We do not discriminate on the basis  of race, color, national origin, age, disability, sex, sexual orientation, or gender identity.            Thank you!     Thank you for choosing [facility] CANCER [facility]  for your care. Our goal is always to provide you with excellent care. Hearing back from our patients is one way we can continue to improve our services. Please take a few minutes to complete the written survey that you may receive in the mail after your visit with us. Thank you!             Your Updated Medication List - Protect others around you: Learn how to safely use, store and throw away your medicines at www.disposemymeds.org.          This list is accurate as of 3/29/18 10:32 AM.  Always use your most recent med list.                   Brand Name Dispense Instructions for use Diagnosis    albuterol 108 (90 BASE) MCG/ACT Inhaler    PROAIR HFA    1 Inhaler    Inhale 2 puffs into the lungs every 4 hours as needed for shortness of breath / dyspnea or wheezing    Chronic obstructive pulmonary disease, unspecified COPD type (H)       amiodarone 200 MG tablet    PACERONE/CODARONE    90 tablet    1 tablet daily    Paroxysmal atrial fibrillation (H)       LORazepam 0.5 MG tablet    ATIVAN    30 tablet    Take 1 tablet (0.5 mg) by mouth every 4 hours as needed (Anxiety, Nausea/Vomiting or Sleep)    Non-small cell carcinoma of lung (H)       morphine 15 MG IR tablet    MSIR    30 tablet    1/2 to 1 pill every 4 hours as needed for air hunger.    Non-small cell carcinoma of lung (H)       * nicotine 14 MG/24HR 24 hr patch    NICODERM CQ    14 patch    Place 1 patch onto the skin every 24 hours    Encounter for tobacco use cessation counseling, Tobacco abuse       * nicotine 7 MG/24HR 24 hr patch    NICODERM CQ    30 patch    Place 1 patch onto the skin every 24 hours    Tobacco abuse       * nicotine 21 MG/24HR 24 hr patch    NICODERM CQ    30 patch    Place 1 patch onto the skin every 24 hours    Non-small cell carcinoma of lung (H)       omeprazole 40 MG  capsule    priLOSEC    30 capsule    Take 1 capsule (40 mg) by mouth daily Take 30-60 minutes before a meal.    Gastroesophageal reflux disease without esophagitis       prochlorperazine 10 MG tablet    COMPAZINE    30 tablet    Take 1 tablet (10 mg) by mouth every 6 hours as needed (Nausea/Vomiting)    Non-small cell carcinoma of lung (H)       * warfarin 5 MG tablet    COUMADIN    30 tablet    Take 1.25 mg MWF; 2.5 mg rest of the week as directed by the Anticoagulation Clinic (establishing maintenance dose).    Atrial flutter, unspecified type (H)       * warfarin 2.5 MG tablet    COUMADIN    75 tablet    Take 1.25 mg MWF, 2.5 mg rest of the week or as directed by the Anticoagulation Clinic (establishing maintenance dose).    Long-term (current) use of anticoagulants, Atrial flutter, unspecified type (H)       * Notice:  This list has 5 medication(s) that are the same as other medications prescribed for you. Read the directions carefully, and ask your doctor or other care provider to review them with you.

## 2018-03-29 NOTE — MR AVS SNAPSHOT
After Visit Summary   3/29/2018    Zechariah Ashby    MRN: [id number]           Patient Information     Date Of Birth          1960        Visit Information        Provider Department      3/29/2018 8:00 AM ROOM 8 [facility]        Today's Diagnoses     Atrial flutter, unspecified type (H)    -  1    Long-term (current) use of anticoagulants        Non-small cell carcinoma of lung (H)           Follow-ups after your visit        Your next 10 appointments already scheduled     Apr 05, 2018 10:30 AM CDT   Level 1 with ROOM 5 [facility] ([facility])    [facility]  [address]  [facility] [address]   [phone]            Apr 10, 2018 10:45 AM CDT   ecg with [facility]   [facility] ([facility])    [address] [facility] [address]   [phone]            Apr 10, 2018 11:00 AM CDT   Return Visit with Daksha Montemayor PA-C   [facility] ([facility])    [address] [facility] [address] [address]   [phone]            Apr 19, 2018  8:30 AM CDT   Level 3 with ROOM 6 [facility] ([facility])    [facility]  [address]  [facility] [address]   [phone]            Apr 19, 2018  9:15 AM CDT   Return Visit with Joycelyn May MD   [facility] ([facility])    [facility]  [address]  [facility] [address]   [phone]            Apr 26, 2018 10:00 AM CDT   Level 1 with ROOM 7 [facility] ([facility])    [facility]  [address]  [facility] [address]   [phone]              Who to contact     If you have questions or need follow up information about today's clinic visit or your schedule please contact [facility]  "INFUSION directly at [phone].  Normal or non-critical lab and imaging results will be communicated to you by [employer], letter or phone within 4 business days after the clinic has received the results. If you do not hear from us within 7 days, please contact the clinic through [employer] or phone. If you have a critical or abnormal lab result, we will notify you by phone as soon as possible.  Submit refill requests through [employer] or call your pharmacy and they will forward the refill request to us. Please allow 3 business days for your refill to be completed.          Additional Information About Your Visit        [employer] Information     [employer] lets you send messages to your doctor, view your test results, renew your prescriptions, schedule appointments and more. To sign up, go to [email or web address] . Click on \"Log in\" on the left side of the screen, which will take you to the Welcome page. Then click on \"Sign up Now\" on the right side of the page.     You will be asked to enter the access code listed below, as well as some personal information. Please follow the directions to create your username and password.     Your access code is: [email or web address]  Expires: 2018 10:55 AM     Your access code will  in 90 days. If you need help or a new code, please call your [place] clinic or [phone].        Care EveryWhere ID     This is your Care EveryWhere ID. This could be used by other organizations to access your [place] medical records  [id number]        Your Vitals Were     Pulse                   71            Blood Pressure from Last 3 Encounters:   18 117/75   18 106/60   03/15/18 123/74    Weight from Last 3 Encounters:   18 68 kg (149 lb 14.4 oz)   03/15/18 65.1 kg (143 lb 9.6 oz)   18 67.7 kg (149 lb 3.2 oz)              We Performed the Following     CBC with platelets differential     Creatinine     INR          Today's Medication Changes          These changes are " accurate as of 3/29/18  1:02 PM.  If you have any questions, ask your nurse or doctor.               These medicines have changed or have updated prescriptions.        Dose/Directions    * nicotine 14 MG/24HR 24 hr patch   Commonly known as:  NICODERM CQ   This may have changed:  Another medication with the same name was changed. Make sure you understand how and when to take each.   Used for:  Encounter for tobacco use cessation counseling, Tobacco abuse   Changed by:  Joycelyn May MD        Dose:  1 patch   Place 1 patch onto the skin every 24 hours   Quantity:  14 patch   Refills:  5       * nicotine 7 MG/24HR 24 hr patch   Commonly known as:  NICODERM CQ   This may have changed:  Another medication with the same name was changed. Make sure you understand how and when to take each.   Used for:  Tobacco abuse   Changed by:  Joycelyn May MD        Dose:  1 patch   Place 1 patch onto the skin every 24 hours   Quantity:  30 patch   Refills:  5       * nicotine 21 MG/24HR 24 hr patch   Commonly known as:  NICODERM CQ   This may have changed:    - how much to take  - how to take this  - when to take this   Used for:  Non-small cell carcinoma of lung (H)   Changed by:  Joycelyn May MD        Dose:  1 patch   Place 1 patch onto the skin every 24 hours   Quantity:  30 patch   Refills:  0       * Notice:  This list has 3 medication(s) that are the same as other medications prescribed for you. Read the directions carefully, and ask your doctor or other care provider to review them with you.         Where to get your medicines      These medications were sent to [id number] - [facility] [address]  [address] [facility] [address]    Hours:  Closed 10-16-08 business to [facility] Phone:  [phone]     nicotine 21 MG/24HR 24 hr patch    prochlorperazine 10 MG tablet         Some of these will need a paper prescription and others can be bought over the counter.  Ask  your nurse if you have questions.     Bring a paper prescription for each of these medications     morphine 15 MG IR tablet                Primary Care Provider Office Phone # Fax #    Kailash Mason [phone] [address] [place] [address]        Equal Access to Services     ROD PERRY : Hadii arnol lai hadperfectoo Soomaali, waaxda luqadaha, qaybta kaalmada adeegyada, waxedin mcgrath tiencristiano goldsmithoralalyssa boykin. So [facility] [phone].    ATENCIÓN: Si habla español, tiene a morocho disposición servicios gratuitos de asistencia lingüística. Llame al [phone].    We comply with applicable federal civil rights laws and [place] laws. We do not discriminate on the basis of race, color, national origin, age, disability, sex, sexual orientation, or gender identity.            Thank you!     Thank you for choosing [facility]  for your care. Our goal is always to provide you with excellent care. Hearing back from our patients is one way we can continue to improve our services. Please take a few minutes to complete the written survey that you may receive in the mail after your visit with us. Thank you!             Your Updated Medication List - Protect others around you: Learn how to safely use, store and throw away your medicines at www.disposemymeds.org.          This list is accurate as of 3/29/18  1:02 PM.  Always use your most recent med list.                   Brand Name Dispense Instructions for use Diagnosis    albuterol 108 (90 BASE) MCG/ACT Inhaler    PROAIR HFA    1 Inhaler    Inhale 2 puffs into the lungs every 4 hours as needed for shortness of breath / dyspnea or wheezing    Chronic obstructive pulmonary disease, unspecified COPD type (H)       amiodarone 200 MG tablet    PACERONE/CODARONE    90 tablet    1 tablet daily    Paroxysmal atrial fibrillation (H)       LORazepam 0.5 MG tablet    ATIVAN    30 tablet    Take 1 tablet (0.5 mg) by mouth every 4 hours as needed  (Anxiety, Nausea/Vomiting or Sleep)    Non-small cell carcinoma of lung (H)       morphine 15 MG IR tablet    MSIR    30 tablet    1/2 to 1 pill every 4 hours as needed for air hunger.    Non-small cell carcinoma of lung (H)       * nicotine 14 MG/24HR 24 hr patch    NICODERM CQ    14 patch    Place 1 patch onto the skin every 24 hours    Encounter for tobacco use cessation counseling, Tobacco abuse       * nicotine 7 MG/24HR 24 hr patch    NICODERM CQ    30 patch    Place 1 patch onto the skin every 24 hours    Tobacco abuse       * nicotine 21 MG/24HR 24 hr patch    NICODERM CQ    30 patch    Place 1 patch onto the skin every 24 hours    Non-small cell carcinoma of lung (H)       omeprazole 40 MG capsule    priLOSEC    30 capsule    Take 1 capsule (40 mg) by mouth daily Take 30-60 minutes before a meal.    Gastroesophageal reflux disease without esophagitis       prochlorperazine 10 MG tablet    COMPAZINE    30 tablet    Take 1 tablet (10 mg) by mouth every 6 hours as needed (Nausea/Vomiting)    Non-small cell carcinoma of lung (H)       * warfarin 5 MG tablet    COUMADIN    30 tablet    Take 1.25 mg MWF; 2.5 mg rest of the week as directed by the Anticoagulation Clinic (establishing maintenance dose).    Atrial flutter, unspecified type (H)       * warfarin 2.5 MG tablet    COUMADIN    75 tablet    Take 1.25 mg MWF, 2.5 mg rest of the week or as directed by the Anticoagulation Clinic (establishing maintenance dose).    Long-term (current) use of anticoagulants, Atrial flutter, unspecified type (H)       * Notice:  This list has 5 medication(s) that are the same as other medications prescribed for you. Read the directions carefully, and ask your doctor or other care provider to review them with you.

## 2018-03-29 NOTE — PROGRESS NOTES
ANTICOAGULATION FOLLOW-UP CLINIC VISIT    Patient Name:  Zechariah Ashby  Date:  3/29/2018  Contact Type:  Telephone    SUBJECTIVE:     Patient Findings     Positives No Problem Findings    Comments Patient denies any changes. Patient will continue weekly maintenance dose. INR is therapeutic.              OBJECTIVE    INR   Date Value Ref Range Status   03/29/2018 2.35 (H) 0.86 - 1.14 Final       ASSESSMENT / PLAN  INR assessment THER    Recheck INR In: 2 WEEKS    INR Location Outside lab      Anticoagulation Summary as of 3/29/2018     INR goal 2.0-3.0   Today's INR 2.35   Maintenance plan 1.25 mg (2.5 mg x 0.5) on Mon, Wed, Fri; 2.5 mg (2.5 mg x 1) all other days   Full instructions 1.25 mg on Mon, Wed, Fri; 2.5 mg all other days   Weekly total 13.75 mg   No change documented Mahendra David RN   Plan last modified Yaritza Diaz RN (3/2/2018)   Next INR check 4/5/2018   Priority INR   Target end date     Indications   Atrial flutter  unspecified type (H) [I48.92]  Long-term (current) use of anticoagulants [Z79.01] [Z79.01]         Anticoagulation Episode Summary     INR check location     Preferred lab     Send INR reminders to [facility] [facility]    Comments * have INR drawn with other labs in infusion on 2/15/18 (order placed). Pt is on AMIODARONE. On palliative chemo (CARBOplatin / Gemcitabine) call home number which is his cell      Anticoagulation Care Providers     Provider Role Specialty Phone number    Kailash Mason MD [facility] [phone]            See the Encounter Report to view Anticoagulation Flowsheet and Dosing Calendar (Go to Encounters tab in chart review, and find the Anticoagulation Therapy Visit)        Mahendra David, RN

## 2018-03-29 NOTE — PATIENT INSTRUCTIONS
We would like to see you back in clinic with Dr. May in 3 weeks with chemotherapy to be scheduled per treatment plan.      Your prescription (MSIR) has been: given to you to hand carry to the pharmacy of your choice.     Your prescriptions (nicotine patch, compazine) has been sent to:  [id number] - [facility] [address]  [address] [facility] [address]  Phone: [phone] Fax: [phone]  When you are in need of a refill, please call your pharmacy and they will send us a request.      Copy of appointments, and after visit summary (AVS) given to patient.      If you have any questions during business hours (M-F 8 AM- 4PM), please call Camille Lara RN, BSN, OCN Oncology Hematology /Breast Cancer Navigator at [facility] [phone].       For questions after business hours, or on holidays/weekends, please call our after hours Nurse Triage line [phone]. Thank you.

## 2018-03-29 NOTE — LETTER
3/29/2018         RE: Zechariah Ashby  [address] [address]        Dear Colleague,    Thank you for referring your patient, Zechariah Ashby, to the [facility]. Please see a copy of my visit note below.    Hematology/ Oncology Follow-up Visit:  Mar 29, 2018    Reason for Visit:   Chief Complaint   Patient presents with     Oncology Clinic Visit     3 week recheck Lung CA, Labs & Chemo today       Oncologic History:  Non-small cell carcinoma of lung (H)  Zechariah Ashby is a 57 year old male who was recently diagnosed with atrial fib/flutter in early November 2017. During workup just x-ray showed elevation of the left hemidiaphragm with a focal tenting. The CT scan was done for further evaluation showing left upper lobe atelectasis and a moderate-sized left pleural effusion. Patient also had mediastinal adenopathy. Patient had left thoracocentesis. Cytology came back as transudate and cytology came back negative. Patient has a long history of smoking. PET scan showed a large FDG avid mediastinal mass extending to the left hilum with obliteration of the left upper lobe bronchus and atelectasis of the upper lobe.  There are also multiple left cervical left cervical and mediastinal adenopathy.  There is increased left pleural effusion.  There is hypermetabolic left adrenal lesion.  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 He was started on carboplatin and gemcitabine.    Interval History:  Patient is returning today for follow-up.  He has been tolerating chemotherapy with carboplatin and gemcitabine.  He has been doing well without any nausea or vomiting or diarrhea.  He denies any shortness of breath or chest pain.  He denies any cough or wheezing.    Review Of Systems:  Constitutional: Negative for fever, chills, and night sweats.  Skin: negative.  Eyes: negative.  Ears/Nose/Throat:  negative.  Respiratory: No shortness of breath, dyspnea on exertion, cough, or hemoptysis.  Cardiovascular: negative.  Gastrointestinal: negative.  Genitourinary: negative.  Musculoskeletal: negative.  Neurologic: negative.  Psychiatric: negative.  Hematologic/Lymphatic/Immunologic: negative.  Endocrine: negative.    All other ROS negative unless mentioned in interval history.    Past medical, social, surgical, and family histories reviewed.    Allergies:  Allergies as of 03/29/2018 - Osmel as Reviewed 03/29/2018   Allergen Reaction Noted     Nka [no known allergies]  01/03/2018       Current Medications:  Current Outpatient Prescriptions   Medication Sig Dispense Refill     nicotine (NICODERM CQ) 21 MG/24HR 24 hr patch Place 1 patch onto the skin every 24 hours 30 patch 0     morphine (MSIR) 15 MG IR tablet 1/2 to 1 pill every 4 hours as needed for air hunger. 30 tablet 0     prochlorperazine (COMPAZINE) 10 MG tablet Take 1 tablet (10 mg) by mouth every 6 hours as needed (Nausea/Vomiting) 30 tablet 5     amiodarone (PACERONE/CODARONE) 200 MG tablet 1 tablet daily 90 tablet 0     warfarin (COUMADIN) 5 MG tablet Take 1.25 mg MWF; 2.5 mg rest of the week as directed by the Anticoagulation Clinic (establishing maintenance dose). 30 tablet 1     warfarin (COUMADIN) 2.5 MG tablet Take 1.25 mg MWF, 2.5 mg rest of the week or as directed by the Anticoagulation Clinic (establishing maintenance dose). 75 tablet 0     albuterol (PROAIR HFA) 108 (90 BASE) MCG/ACT Inhaler Inhale 2 puffs into the lungs every 4 hours as needed for shortness of breath / dyspnea or wheezing 1 Inhaler 11     omeprazole (PRILOSEC) 40 MG capsule Take 1 capsule (40 mg) by mouth daily Take 30-60 minutes before a meal. 30 capsule 11     nicotine (NICODERM CQ) 14 MG/24HR 24 hr patch Place 1 patch onto the skin every 24 hours 14 patch 5     LORazepam (ATIVAN) 0.5 MG tablet Take 1 tablet (0.5 mg) by mouth every 4 hours as needed (Anxiety, Nausea/Vomiting or  "Sleep) 30 tablet 5     nicotine (NICODERM CQ) 7 MG/24HR 24 hr patch Place 1 patch onto the skin every 24 hours (Patient not taking: Reported on 2/15/2018) 30 patch 5        Physical Exam:  /75 (BP Location: Right arm, Patient Position: Sitting, Cuff Size: Adult Regular)  Pulse 69  Temp 97.8  F (36.6  C) (Tympanic)  Resp 16  Ht 1.803 m (5' 10.98\")  Wt 68 kg (149 lb 14.4 oz)  SpO2 100%  BMI 20.92 kg/m2  Wt Readings from Last 12 Encounters:   03/29/18 68 kg (149 lb 14.4 oz)   03/15/18 65.1 kg (143 lb 9.6 oz)   03/08/18 67.7 kg (149 lb 3.2 oz)   02/22/18 66.7 kg (147 lb)   02/15/18 67.5 kg (148 lb 12.8 oz)   02/13/18 67.1 kg (148 lb)   02/05/18 65.8 kg (145 lb)   02/01/18 64.4 kg (142 lb)   01/25/18 71.2 kg (157 lb)   01/20/18 68.9 kg (151 lb 14.4 oz)   01/17/18 69.6 kg (153 lb 6.4 oz)   01/15/18 69.2 kg (152 lb 9.6 oz)     ECOG performance status: 1  GENERAL APPEARANCE: Healthy, alert and in no acute distress.  HEENT: Sclerae anicteric. PERRLA. Oropharynx without ulcers, lesions, or thrush.  NECK: Supple. No asymmetry or masses.  LYMPHATICS: No palpable cervical, supraclavicular, axillary, or inguinal lymphadenopathy.  RESP: Lungs clear to auscultation bilaterally without rales, rhonchi or wheezes.  CARDIOVASCULAR: Regular rate and rhythm. Normal S1, S2; no S3 or S4. No murmur, gallop, or rub.  ABDOMEN: Soft, nontender. Bowel sounds normal. No palpable organomegaly or masses.  MUSCULOSKELETAL: Extremities without gross deformities noted. No edema of bilateral lower extremities.  SKIN: No suspicious lesions or rashes.  NEURO: Alert and oriented x 3. Cranial nerves II-XII grossly intact.  PSYCHIATRIC: Mentation and affect appear normal.    Laboratory/Imaging Studies:  Infusion Therapy Visit on 03/29/2018   Component Date Value Ref Range Status     INR 03/29/2018 2.35* 0.86 - 1.14 Final     WBC 03/29/2018 3.5* 4.0 - 11.0 10e9/L Final     RBC Count 03/29/2018 2.63* 4.4 - 5.9 10e12/L Final     Hemoglobin " 03/29/2018 8.1* 13.3 - 17.7 g/dL Final     Hematocrit 03/29/2018 25.1* 40.0 - 53.0 % Final     MCV 03/29/2018 95  78 - 100 fl Final     MCH 03/29/2018 30.8  26.5 - 33.0 pg Final     MCHC 03/29/2018 32.3  31.5 - 36.5 g/dL Final     RDW 03/29/2018 19.2* 10.0 - 15.0 % Final     Platelet Count 03/29/2018 276  150 - 450 10e9/L Final     Diff Method 03/29/2018 Automated Method   Final     % Neutrophils 03/29/2018 47.2  % Final     % Lymphocytes 03/29/2018 27.1  % Final     % Monocytes 03/29/2018 23.1  % Final     % Eosinophils 03/29/2018 2.3  % Final     % Basophils 03/29/2018 0.3  % Final     % Immature Granulocytes 03/29/2018 0.0  % Final     Absolute Neutrophil 03/29/2018 1.7  1.6 - 8.3 10e9/L Final     Absolute Lymphocytes 03/29/2018 1.0  0.8 - 5.3 10e9/L Final     Absolute Monocytes 03/29/2018 0.8  0.0 - 1.3 10e9/L Final     Absolute Eosinophils 03/29/2018 0.1  0.0 - 0.7 10e9/L Final     Absolute Basophils 03/29/2018 0.0  0.0 - 0.2 10e9/L Final     Abs Immature Granulocytes 03/29/2018 0.0  0 - 0.4 10e9/L Final     Anisocytosis 03/29/2018 Moderate   Final     Platelet Estimate 03/29/2018 Automated count confirmed.  Platelet morphology is normal.   Final     Creatinine 03/29/2018 1.00  0.66 - 1.25 mg/dL Final     GFR Estimate 03/29/2018 77  >60 mL/min/1.7m2 Final    Non  GFR Calc     GFR Estimate If Black 03/29/2018 >90  >60 mL/min/1.7m2 Final    African American GFR Calc        No results found for this or any previous visit (from the past 744 hour(s)).    Assessment and plan:  (C34.90) Non-small cell carcinoma of lung (H)  (primary encounter diagnosis)  We will continue with cycle #5 of chemotherapy with carboplatin and gemcitabine.  I reviewed with the patient again the treatment plan.  We will consider switching to single agent gemcitabine following 6 cycles of chemotherapy.  I will see the patient again in 3 weeks or sooner if there are new developments or concerns.    (R11.2,  T45.1X5A)  Chemotherapy induced nausea and vomiting  I reviewed with the patient antinausea medication in details.    (J90) Pleural effusion  This has been improving.    The patient is ready to learn, no apparent learning barriers were identified.  Diagnosis and treatment plans were explained to the patient. The patient expressed understanding of the content. The patient asked appropriate questions. The patient questions were answered to his satisfaction.    Chart documentation with Dragon Voice recognition Software. Although reviewed after completion, some words and grammatical errors may remain.    Again, thank you for allowing me to participate in the care of your patient.        Sincerely,        Joycelyn May MD

## 2018-03-29 NOTE — MR AVS SNAPSHOT
Zechariah Ashby   3/29/2018   Anticoagulation Therapy Visit    Description:  57 year old male   Provider:  Mahendra David, RN   Department:  [facility]           INR as of 3/29/2018     Today's INR 2.35      Anticoagulation Summary as of 3/29/2018     INR goal 2.0-3.0   Today's INR 2.35   Full instructions 1.25 mg on Mon, Wed, Fri; 2.5 mg all other days   Next INR check 4/5/2018    Indications   Atrial flutter  unspecified type (H) [I48.92]  Long-term (current) use of anticoagulants [Z79.01] [Z79.01]         March 2018 Details    Sun Mon Tue Wed Thu Fri Sat         1               2               3                 4               5               6               7               8               9               10                 11               12               13               14               15               16               17                 18               19               20               21               22               23               24                 25               26               27               28               29      2.5 mg   See details      30      1.25 mg         31      2.5 mg          Date Details   03/29 This INR check               How to take your warfarin dose     To take:  1.25 mg Take 0.5 of a 2.5 mg tablet.    To take:  2.5 mg Take 1 of the 2.5 mg tablets.           April 2018 Details    Sun Mon Tue Wed Thu Fri Sat     1      2.5 mg         2      1.25 mg         3      2.5 mg         4      1.25 mg         5            6               7                 8               9               10               11               12               13               14                 15               16               17               18               19               20               21                 22               23               24               25               26               27               28                 29               30                     Date Details   No  additional details    Date of next INR:  4/5/2018         How to take your warfarin dose     To take:  1.25 mg Take 0.5 of a 2.5 mg tablet.    To take:  2.5 mg Take 1 of the 2.5 mg tablets.

## 2018-03-29 NOTE — PROGRESS NOTES
Infusion Nursing Note:  Zechariah OWODALL Isma presents today for C5D1 Carbo/Gemzar.    Patient seen by provider today: Yes: Dr. May   present during visit today: Not Applicable.    Note: N/A.    Intravenous Access:  Implanted Port.    Treatment Conditions:  Results reviewed, labs MET treatment parameters, ok to proceed with treatment.      Post Infusion Assessment:  Blood return noted pre and post infusion.  Site patent and intact, free from redness, edema or discomfort.  No evidence of extravasations.  Access discontinued per protocol.    Discharge Plan:   Patient discharged in stable condition accompanied by: family members.  Departure Mode: Ambulatory.  Pt given copy of new schedule.    Ayala Carlisle RN

## 2018-03-29 NOTE — PROGRESS NOTES
Hematology/ Oncology Follow-up Visit:  Mar 29, 2018    Reason for Visit:   Chief Complaint   Patient presents with     Oncology Clinic Visit     3 week recheck Lung CA, Labs & Chemo today       Oncologic History:  Non-small cell carcinoma of lung (H)  Zechariah Ashby is a 57 year old male who was recently diagnosed with atrial fib/flutter in early November 2017. During workup just x-ray showed elevation of the left hemidiaphragm with a focal tenting. The CT scan was done for further evaluation showing left upper lobe atelectasis and a moderate-sized left pleural effusion. Patient also had mediastinal adenopathy. Patient had left thoracocentesis. Cytology came back as transudate and cytology came back negative. Patient has a long history of smoking. PET scan showed a large FDG avid mediastinal mass extending to the left hilum with obliteration of the left upper lobe bronchus and atelectasis of the upper lobe.  There are also multiple left cervical left cervical and mediastinal adenopathy.  There is increased left pleural effusion.  There is hypermetabolic left adrenal lesion.  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 He was started on carboplatin and gemcitabine.    Interval History:  Patient is returning today for follow-up.  He has been tolerating chemotherapy with carboplatin and gemcitabine.  He has been doing well without any nausea or vomiting or diarrhea.  He denies any shortness of breath or chest pain.  He denies any cough or wheezing.    Review Of Systems:  Constitutional: Negative for fever, chills, and night sweats.  Skin: negative.  Eyes: negative.  Ears/Nose/Throat: negative.  Respiratory: No shortness of breath, dyspnea on exertion, cough, or hemoptysis.  Cardiovascular: negative.  Gastrointestinal: negative.  Genitourinary: negative.  Musculoskeletal: negative.  Neurologic: negative.  Psychiatric:  negative.  Hematologic/Lymphatic/Immunologic: negative.  Endocrine: negative.    All other ROS negative unless mentioned in interval history.    Past medical, social, surgical, and family histories reviewed.    Allergies:  Allergies as of 03/29/2018 - Osmel as Reviewed 03/29/2018   Allergen Reaction Noted     Nka [no known allergies]  01/03/2018       Current Medications:  Current Outpatient Prescriptions   Medication Sig Dispense Refill     nicotine (NICODERM CQ) 21 MG/24HR 24 hr patch Place 1 patch onto the skin every 24 hours 30 patch 0     morphine (MSIR) 15 MG IR tablet 1/2 to 1 pill every 4 hours as needed for air hunger. 30 tablet 0     prochlorperazine (COMPAZINE) 10 MG tablet Take 1 tablet (10 mg) by mouth every 6 hours as needed (Nausea/Vomiting) 30 tablet 5     amiodarone (PACERONE/CODARONE) 200 MG tablet 1 tablet daily 90 tablet 0     warfarin (COUMADIN) 5 MG tablet Take 1.25 mg MWF; 2.5 mg rest of the week as directed by the Anticoagulation Clinic (establishing maintenance dose). 30 tablet 1     warfarin (COUMADIN) 2.5 MG tablet Take 1.25 mg MWF, 2.5 mg rest of the week or as directed by the Anticoagulation Clinic (establishing maintenance dose). 75 tablet 0     albuterol (PROAIR HFA) 108 (90 BASE) MCG/ACT Inhaler Inhale 2 puffs into the lungs every 4 hours as needed for shortness of breath / dyspnea or wheezing 1 Inhaler 11     omeprazole (PRILOSEC) 40 MG capsule Take 1 capsule (40 mg) by mouth daily Take 30-60 minutes before a meal. 30 capsule 11     nicotine (NICODERM CQ) 14 MG/24HR 24 hr patch Place 1 patch onto the skin every 24 hours 14 patch 5     LORazepam (ATIVAN) 0.5 MG tablet Take 1 tablet (0.5 mg) by mouth every 4 hours as needed (Anxiety, Nausea/Vomiting or Sleep) 30 tablet 5     nicotine (NICODERM CQ) 7 MG/24HR 24 hr patch Place 1 patch onto the skin every 24 hours (Patient not taking: Reported on 2/15/2018) 30 patch 5        Physical Exam:  /75 (BP Location: Right arm, Patient  "Position: Sitting, Cuff Size: Adult Regular)  Pulse 69  Temp 97.8  F (36.6  C) (Tympanic)  Resp 16  Ht 1.803 m (5' 10.98\")  Wt 68 kg (149 lb 14.4 oz)  SpO2 100%  BMI 20.92 kg/m2  Wt Readings from Last 12 Encounters:   03/29/18 68 kg (149 lb 14.4 oz)   03/15/18 65.1 kg (143 lb 9.6 oz)   03/08/18 67.7 kg (149 lb 3.2 oz)   02/22/18 66.7 kg (147 lb)   02/15/18 67.5 kg (148 lb 12.8 oz)   02/13/18 67.1 kg (148 lb)   02/05/18 65.8 kg (145 lb)   02/01/18 64.4 kg (142 lb)   01/25/18 71.2 kg (157 lb)   01/20/18 68.9 kg (151 lb 14.4 oz)   01/17/18 69.6 kg (153 lb 6.4 oz)   01/15/18 69.2 kg (152 lb 9.6 oz)     ECOG performance status: 1  GENERAL APPEARANCE: Healthy, alert and in no acute distress.  HEENT: Sclerae anicteric. PERRLA. Oropharynx without ulcers, lesions, or thrush.  NECK: Supple. No asymmetry or masses.  LYMPHATICS: No palpable cervical, supraclavicular, axillary, or inguinal lymphadenopathy.  RESP: Lungs clear to auscultation bilaterally without rales, rhonchi or wheezes.  CARDIOVASCULAR: Regular rate and rhythm. Normal S1, S2; no S3 or S4. No murmur, gallop, or rub.  ABDOMEN: Soft, nontender. Bowel sounds normal. No palpable organomegaly or masses.  MUSCULOSKELETAL: Extremities without gross deformities noted. No edema of bilateral lower extremities.  SKIN: No suspicious lesions or rashes.  NEURO: Alert and oriented x 3. Cranial nerves II-XII grossly intact.  PSYCHIATRIC: Mentation and affect appear normal.    Laboratory/Imaging Studies:  Infusion Therapy Visit on 03/29/2018   Component Date Value Ref Range Status     INR 03/29/2018 2.35* 0.86 - 1.14 Final     WBC 03/29/2018 3.5* 4.0 - 11.0 10e9/L Final     RBC Count 03/29/2018 2.63* 4.4 - 5.9 10e12/L Final     Hemoglobin 03/29/2018 8.1* 13.3 - 17.7 g/dL Final     Hematocrit 03/29/2018 25.1* 40.0 - 53.0 % Final     MCV 03/29/2018 95  78 - 100 fl Final     MCH 03/29/2018 30.8  26.5 - 33.0 pg Final     MCHC 03/29/2018 32.3  31.5 - 36.5 g/dL Final     RDW " 03/29/2018 19.2* 10.0 - 15.0 % Final     Platelet Count 03/29/2018 276  150 - 450 10e9/L Final     Diff Method 03/29/2018 Automated Method   Final     % Neutrophils 03/29/2018 47.2  % Final     % Lymphocytes 03/29/2018 27.1  % Final     % Monocytes 03/29/2018 23.1  % Final     % Eosinophils 03/29/2018 2.3  % Final     % Basophils 03/29/2018 0.3  % Final     % Immature Granulocytes 03/29/2018 0.0  % Final     Absolute Neutrophil 03/29/2018 1.7  1.6 - 8.3 10e9/L Final     Absolute Lymphocytes 03/29/2018 1.0  0.8 - 5.3 10e9/L Final     Absolute Monocytes 03/29/2018 0.8  0.0 - 1.3 10e9/L Final     Absolute Eosinophils 03/29/2018 0.1  0.0 - 0.7 10e9/L Final     Absolute Basophils 03/29/2018 0.0  0.0 - 0.2 10e9/L Final     Abs Immature Granulocytes 03/29/2018 0.0  0 - 0.4 10e9/L Final     Anisocytosis 03/29/2018 Moderate   Final     Platelet Estimate 03/29/2018 Automated count confirmed.  Platelet morphology is normal.   Final     Creatinine 03/29/2018 1.00  0.66 - 1.25 mg/dL Final     GFR Estimate 03/29/2018 77  >60 mL/min/1.7m2 Final    Non  GFR Calc     GFR Estimate If Black 03/29/2018 >90  >60 mL/min/1.7m2 Final    African American GFR Calc        No results found for this or any previous visit (from the past 744 hour(s)).    Assessment and plan:  (C34.90) Non-small cell carcinoma of lung (H)  (primary encounter diagnosis)  We will continue with cycle #5 of chemotherapy with carboplatin and gemcitabine.  I reviewed with the patient again the treatment plan.  We will consider switching to single agent gemcitabine following 6 cycles of chemotherapy.  I will see the patient again in 3 weeks or sooner if there are new developments or concerns.    (R11.2,  T45.1X5A) Chemotherapy induced nausea and vomiting  I reviewed with the patient antinausea medication in details.    (J90) Pleural effusion  This has been improving.    The patient is ready to learn, no apparent learning barriers were identified.   Diagnosis and treatment plans were explained to the patient. The patient expressed understanding of the content. The patient asked appropriate questions. The patient questions were answered to his satisfaction.    Chart documentation with Dragon Voice recognition Software. Although reviewed after completion, some words and grammatical errors may remain.

## 2018-03-29 NOTE — NURSING NOTE
"Oncology Rooming Note    March 29, 2018 9:02 AM   Zechariah Ashby is a 57 year old male who presents for:    Chief Complaint   Patient presents with     Oncology Clinic Visit     3 week recheck Lung CA, Labs & Chemo today     Initial Vitals: /75 (BP Location: Right arm, Patient Position: Sitting, Cuff Size: Adult Regular)  Pulse 69  Temp 97.8  F (36.6  C) (Tympanic)  Resp 16  Ht 1.803 m (5' 10.98\")  Wt 68 kg (149 lb 14.4 oz)  SpO2 100%  BMI 20.92 kg/m2 Estimated body mass index is 20.92 kg/(m^2) as calculated from the following:    Height as of this encounter: 1.803 m (5' 10.98\").    Weight as of this encounter: 68 kg (149 lb 14.4 oz). Body surface area is 1.85 meters squared.  No Pain (0) Comment: Data Unavailable   No LMP for male patient.  Allergies reviewed: Yes  Medications reviewed: Yes    Medications: MEDICATION REFILLS NEEDED TODAY. Provider was notified.  Pharmacy name entered into [employer]:      [id number] [address] [facility]    Clinical concerns: 3 week recheck Lung CA, Labs & Chemo today.    1. BM once a week since starting Chemo.   2. Now drinking 2 Ensure per day. Drinking 4 -16 of water per day.         10 minutes for nursing intake (face to face time)     Carmen Barreto CMA              "

## 2018-04-05 NOTE — TELEPHONE ENCOUNTER
Please call.   It would be best to see him and discuss.  If he would rather just start medication I can.  Should have PHQ9 and GADS at minimum.    Many anti-depressant meds can interact with amiodarone interfering with heart electrical activity.    One that does not is wellbutrin.   It has side effects of decrease in appetite, might interfere with sleep.  Rare chance seizure.    It often takes 2-3 weeks for any anti-depressant  to help.   We could try this 12 hour tabs 150mg once daily for three days, then twice daily.   I would like to see him within the next month.   ROSA JASSO MD

## 2018-04-05 NOTE — PROGRESS NOTES
Infusion Nursing Note:  Zechariah Ashby presents today for Gemzar.    Patient seen by provider today: No   present during visit today: Not Applicable.    Note: Pt. Reports being very nauseated, also requesting an antidepressant. Dr. May updated, will add Zofran to home meds. Add'l IVFs today & IV Zofran. Pt. To consult PMD regarding antidepressant due to drug interactions with his cardiac medication. Printed info. on neutropenia reviewed with pt. & significant other.    Intravenous Access:  Implanted Port.    Treatment Conditions:  Lab Results   Component Value Date    HGB 8.4 04/05/2018     Lab Results   Component Value Date    WBC 2.0 04/05/2018      Lab Results   Component Value Date    ANEU 0.9 04/05/2018     Lab Results   Component Value Date     04/05/2018      Results reviewed, labs did NOT meet treatment parameters: ANC low- will skip day 8 and resume Cycle 6 in 2 weeks as planned.      Post Infusion Assessment:    During discharge, GURMEET Messina noted patient to be hypotensive. Dr. May notified, additional saline bolus (at 700mL/hr) administered.   Patient tolerated infusion without incident, B/P improved.  Blood return noted pre and post infusion.  Site patent and intact, free from redness, edema or discomfort.  No evidence of extravasations.  Access discontinued per protocol.    Discharge Plan:   Advised pt. To seek medical attention for fever, shortness of breath, chest pain or any worsening symptoms.  Patient discharged in stable condition accompanied by: ex-wife (Estefani).  Departure Mode: Wheelchair.    Lauren Roth RN

## 2018-04-05 NOTE — PROGRESS NOTES
Before discharge, when VS were checked, pt found to have low BP.  Additional 1 liter NS given per new order from Dr. May.  BMP checked also because of weakness.  Mary Bhakta RN

## 2018-04-05 NOTE — TELEPHONE ENCOUNTER
Reason for call:  Patient reporting a symptom    Symptom or request: Estefani says Zechariah went to have Chemo today but they are unable to do this because his immune system is too low. He is wanting to go on depression medication but Dr May said he should go through Dr Mason for this because so many of the antidepressants can interfere with his heart meds. Estefani is asking if Dr Mason can prescribe something or if he needs to see him.     Additional comments: [facility]    Phone Number patient can be reached at:  Estefani  [phone]    Best Time:  anytime    Can we leave a detailed message on this number:  YES    Call taken on 4/5/2018 at 1:32 PM by Pamela Anguiano

## 2018-04-05 NOTE — PROGRESS NOTES
ANTICOAGULATION FOLLOW-UP CLINIC VISIT    Patient Name:  Zechariah Ashby  Date:  4/5/2018  Contact Type:  Telephone/ spoke with Estefani    SUBJECTIVE:     Patient Findings     Comments Per notes from infusion visit and Estefani, ex-wife, pt unable to get infusion today due to neutropenia. Estefani states pt is going to start anti-depressant, but Dr May instructed him to do so through PCP. Estefani has called and is waiting for call back. She denies other changes in meds, diet or activity.    Writer instructed her to let pt know that INR is in goal range and he can continue on maintenance dose and recheck INR in 2 weeks.           OBJECTIVE    INR   Date Value Ref Range Status   04/05/2018 2.60 (H) 0.86 - 1.14 Final       ASSESSMENT / PLAN  No question data found.  Anticoagulation Summary as of 4/5/2018     INR goal 2.0-3.0   Today's INR 2.60   Maintenance plan 1.25 mg (2.5 mg x 0.5) on Mon, Wed, Fri; 2.5 mg (2.5 mg x 1) all other days   Full instructions 1.25 mg on Mon, Wed, Fri; 2.5 mg all other days   Weekly total 13.75 mg   Plan last modified Yaritza Diaz RN (3/2/2018)   Next INR check 4/19/2018   Priority INR   Target end date     Indications   Atrial flutter  unspecified type (H) [I48.92]  Long-term (current) use of anticoagulants [Z79.01] [Z79.01]         Anticoagulation Episode Summary     INR check location     Preferred lab     Send INR reminders to [facility] PHONE ANTICOAG POOL    Comments * have INR drawn with other labs in infusion on 2/15/18 (order placed). Pt is on AMIODARONE. On palliative chemo (CARBOplatin / Gemcitabine) call home number which is his cell      Anticoagulation Care Providers     Provider Role Specialty Phone number    Kailash Mason MD [facility] [phone]            See the Encounter Report to view Anticoagulation Flowsheet and Dosing Calendar (Go to Encounters tab in chart review, and find the Anticoagulation Therapy Visit)        Mya Frederick RN

## 2018-04-05 NOTE — TELEPHONE ENCOUNTER
S-(situation):  Estefani says Zechariah went to have Chemo today but they are unable to do this because his immune system is too low. He is wanting to go on depression medication but Dr May said he should go through Dr Mason for this because so many of the antidepressants can interfere with his heart meds. Estefani is asking if Dr Mason can prescribe something or if he needs to see him.     A-(assessment): med request    R-(recommendations): I told Estefani I would ask Dr Mason.    Emani Lemus RN

## 2018-04-05 NOTE — MR AVS SNAPSHOT
Zechariah Ashby   4/5/2018   Anticoagulation Therapy Visit    Description:  58 year old male   Provider:  Mya Frederick RN   Department:  [facility]           INR as of 4/5/2018     Today's INR 2.60      Anticoagulation Summary as of 4/5/2018     INR goal 2.0-3.0   Today's INR 2.60   Full instructions 1.25 mg on Mon, Wed, Fri; 2.5 mg all other days   Next INR check 4/19/2018    Indications   Atrial flutter  unspecified type (H) [I48.92]  Long-term (current) use of anticoagulants [Z79.01] [Z79.01]         April 2018 Details    Sun Mon Tue Wed Thu Fri Sat     1               2               3               4               5      2.5 mg   See details      6      1.25 mg         7      2.5 mg           8      2.5 mg         9      1.25 mg         10      2.5 mg         11      1.25 mg         12      2.5 mg         13      1.25 mg         14      2.5 mg           15      2.5 mg         16      1.25 mg         17      2.5 mg         18      1.25 mg         19            20               21                 22               23               24               25               26               27               28                 29               30                     Date Details   04/05 This INR check       Date of next INR:  4/19/2018         How to take your warfarin dose     To take:  1.25 mg Take 0.5 of a 2.5 mg tablet.    To take:  2.5 mg Take 1 of the 2.5 mg tablets.

## 2018-04-05 NOTE — TELEPHONE ENCOUNTER
I told Estefani that I need to talk with Zechariah and do the PHQ 9 ans KELECHI 7 with him, not her.  She will get a hold of him and have him call me  Emani Lemus RN

## 2018-04-10 NOTE — PROGRESS NOTES
HPI and Plan:   See dictation #[id number]    Orders Placed This Encounter   Procedures     Comprehensive metabolic panel     TSH with free T4 reflex     T4 free     Follow-Up with Cardiologist       No orders of the defined types were placed in this encounter.      There are no discontinued medications.      Encounter Diagnoses   Name Primary?     Persistent atrial fibrillation (H)      On amiodarone therapy Yes     Abnormal cardiovascular stress test        CURRENT MEDICATIONS:  Current Outpatient Prescriptions   Medication Sig Dispense Refill     ondansetron (ZOFRAN) 4 MG tablet Take 1 tablet (4 mg) by mouth every 8 hours as needed for nausea 30 tablet 1     nicotine (NICODERM CQ) 21 MG/24HR 24 hr patch Place 1 patch onto the skin every 24 hours 30 patch 0     morphine (MSIR) 15 MG IR tablet 1/2 to 1 pill every 4 hours as needed for air hunger. 30 tablet 0     prochlorperazine (COMPAZINE) 10 MG tablet Take 1 tablet (10 mg) by mouth every 6 hours as needed (Nausea/Vomiting) 30 tablet 5     amiodarone (PACERONE/CODARONE) 200 MG tablet 1 tablet daily 90 tablet 0     warfarin (COUMADIN) 5 MG tablet Take 1.25 mg MWF; 2.5 mg rest of the week as directed by the Anticoagulation Clinic (establishing maintenance dose). 30 tablet 1     warfarin (COUMADIN) 2.5 MG tablet Take 1.25 mg MWF, 2.5 mg rest of the week or as directed by the Anticoagulation Clinic (establishing maintenance dose). 75 tablet 0     albuterol (PROAIR HFA) 108 (90 BASE) MCG/ACT Inhaler Inhale 2 puffs into the lungs every 4 hours as needed for shortness of breath / dyspnea or wheezing 1 Inhaler 11     omeprazole (PRILOSEC) 40 MG capsule Take 1 capsule (40 mg) by mouth daily Take 30-60 minutes before a meal. 30 capsule 11     nicotine (NICODERM CQ) 14 MG/24HR 24 hr patch Place 1 patch onto the skin every 24 hours 14 patch 5     nicotine (NICODERM CQ) 7 MG/24HR 24 hr patch Place 1 patch onto the skin every 24 hours 30 patch 5     LORazepam (ATIVAN) 0.5 MG tablet  Take 1 tablet (0.5 mg) by mouth every 4 hours as needed (Anxiety, Nausea/Vomiting or Sleep) 30 tablet 5       ALLERGIES     Allergies   Allergen Reactions     Nka [No Known Allergies]        PAST MEDICAL HISTORY:  Past Medical History:   Diagnosis Date     Atrial flutter (H) 2017     Cancer (H)     Lung Ca       PAST SURGICAL HISTORY:  Past Surgical History:   Procedure Laterality Date     ANESTHESIA CARDIOVERSION N/A 2018    Procedure: ANESTHESIA CARDIOVERSION;  CARDIOVERSION (FOLLOWING KIMBERLY AT 0830);  Surgeon: GENERIC ANESTHESIA PROVIDER;  Location: [facility] OR     APPENDECTOMY      age 30s     INSERT PORT VASCULAR ACCESS N/A 1/3/2018    Procedure: INSERT PORT VASCULAR ACCESS;  Port-a-Cath Placement;  Surgeon: Tomas Crews MD;  Location: [facility]     SURGICAL HISTORY OF -   2004    Gastroscopy with biopsy for gastric ulcer       FAMILY HISTORY:  Family History   Problem Relation Age of Onset     CANCER Mother      pancreatic cancer,  at 38 years     Alzheimer Disease Father       at 84 years.       SOCIAL HISTORY:  Social History     Social History     Marital status:      Spouse name: Leonor Shultz     Number of children: 2     Years of education: N/A     Occupational History           Social History Main Topics     Smoking status: Former Smoker     Packs/day: 0.25     Years: 50.00     Start date: 2017     Smokeless tobacco: Never Used      Comment: quit smoking over the last few days.      Alcohol use No     Drug use: No     Sexual activity: Yes     Partners: Female     Other Topics Concern     Parent/Sibling W/ Cabg, Mi Or Angioplasty Before 65f 55m? No     Social History Narrative       Review of Systems:  Skin:  Negative       Eyes:  Positive for glasses    ENT:  Positive for nasal congestion    Respiratory:  Positive for dyspnea on exertion;cough;wheezing     Cardiovascular:  Negative for;edema;syncope or near-syncope;palpitations;chest pain Positive  for;fatigue;lightheadedness;dizziness;exercise intolerance    Gastroenterology: Negative for melena;hematochezia    Genitourinary:  Negative      Musculoskeletal:  Negative      Neurologic:  Negative      Psychiatric:  Negative      Heme/Lymph/Imm:  Positive for   non-small cell lung CA  Endocrine:  Negative        Physical Exam:  Vitals: /83  Pulse 67  Wt 68.5 kg (151 lb)  SpO2 100%  BMI 21.07 kg/m2    Constitutional:  cooperative;well developed cachectic      Skin:  warm and dry to the touch, no apparent skin lesions or masses noted     L upper chest Port-A-Cath with well approximated edges; surrounding ecchymosis    Head:  normocephalic, no masses or lesions        Eyes:  pupils equal and round;conjunctivae and lids unremarkable;sclera white;no xanthalasma        Lymph:      ENT:  not assessed this visit        Neck:  JVP normal        Respiratory:    diminished breath sounds bilaterally       Cardiac: regular rhythm;no murmurs, gallops or rubs detected                not assessed this visit                                        GI:  abdomen soft        Extremities and Muscular Skeletal:  no edema;no deformities, clubbing, cyanosis, erythema observed              Neurological:  no gross motor deficits   Fatigued    Psych:  Alert and Oriented x 3        CC  Daksha Montemayor PA-C  [address]  [place] [id number]

## 2018-04-10 NOTE — LETTER
4/10/2018    Kailash Mason MD  [address] [place] [id number]    RE: Zechariah Ashby       Dear Colleague,    I had the pleasure of seeing Zechariah Ashby in the [facility].    HPI and Plan:   See dictation #[id number]    Orders Placed This Encounter   Procedures     Comprehensive metabolic panel     TSH with free T4 reflex     T4 free     Follow-Up with Cardiologist       No orders of the defined types were placed in this encounter.      There are no discontinued medications.      Encounter Diagnoses   Name Primary?     Persistent atrial fibrillation (H)      On amiodarone therapy Yes     Abnormal cardiovascular stress test        CURRENT MEDICATIONS:  Current Outpatient Prescriptions   Medication Sig Dispense Refill     ondansetron (ZOFRAN) 4 MG tablet Take 1 tablet (4 mg) by mouth every 8 hours as needed for nausea 30 tablet 1     nicotine (NICODERM CQ) 21 MG/24HR 24 hr patch Place 1 patch onto the skin every 24 hours 30 patch 0     morphine (MSIR) 15 MG IR tablet 1/2 to 1 pill every 4 hours as needed for air hunger. 30 tablet 0     prochlorperazine (COMPAZINE) 10 MG tablet Take 1 tablet (10 mg) by mouth every 6 hours as needed (Nausea/Vomiting) 30 tablet 5     amiodarone (PACERONE/CODARONE) 200 MG tablet 1 tablet daily 90 tablet 0     warfarin (COUMADIN) 5 MG tablet Take 1.25 mg MWF; 2.5 mg rest of the week as directed by the Anticoagulation Clinic (establishing maintenance dose). 30 tablet 1     warfarin (COUMADIN) 2.5 MG tablet Take 1.25 mg MWF, 2.5 mg rest of the week or as directed by the Anticoagulation Clinic (establishing maintenance dose). 75 tablet 0     albuterol (PROAIR HFA) 108 (90 BASE) MCG/ACT Inhaler Inhale 2 puffs into the lungs every 4 hours as needed for shortness of breath / dyspnea or wheezing 1 Inhaler 11     omeprazole (PRILOSEC) 40 MG capsule Take 1 capsule (40 mg) by mouth daily Take 30-60 minutes before a meal. 30 capsule 11     nicotine (NICODERM CQ)  14 MG/24HR 24 hr patch Place 1 patch onto the skin every 24 hours 14 patch 5     nicotine (NICODERM CQ) 7 MG/24HR 24 hr patch Place 1 patch onto the skin every 24 hours 30 patch 5     LORazepam (ATIVAN) 0.5 MG tablet Take 1 tablet (0.5 mg) by mouth every 4 hours as needed (Anxiety, Nausea/Vomiting or Sleep) 30 tablet 5       ALLERGIES     Allergies   Allergen Reactions     Nka [No Known Allergies]        PAST MEDICAL HISTORY:  Past Medical History:   Diagnosis Date     Atrial flutter (H) 2017     Cancer (H)     Lung Ca       PAST SURGICAL HISTORY:  Past Surgical History:   Procedure Laterality Date     ANESTHESIA CARDIOVERSION N/A 2018    Procedure: ANESTHESIA CARDIOVERSION;  CARDIOVERSION (FOLLOWING KIMBERLY AT 0830);  Surgeon: GENERIC ANESTHESIA PROVIDER;  Location: [facility]     APPENDECTOMY      age 28s     INSERT PORT VASCULAR ACCESS N/A 1/3/2018    Procedure: INSERT PORT VASCULAR ACCESS;  Port-a-Cath Placement;  Surgeon: Tomas Crews MD;  Location: [facility]     SURGICAL HISTORY OF -   2004    Gastroscopy with biopsy for gastric ulcer       FAMILY HISTORY:  Family History   Problem Relation Age of Onset     CANCER Mother      pancreatic cancer,  at 38 years     Alzheimer Disease Father       at 84 years.       SOCIAL HISTORY:  Social History     Social History     Marital status:      Spouse name: Leonor Shultz     Number of children: 2     Years of education: N/A     Occupational History           Social History Main Topics     Smoking status: Former Smoker     Packs/day: 0.25     Years: 50.00     Start date: 2017     Smokeless tobacco: Never Used      Comment: quit smoking over the last few days.      Alcohol use No     Drug use: No     Sexual activity: Yes     Partners: Female     Other Topics Concern     Parent/Sibling W/ Cabg, Mi Or Angioplasty Before 65f 55m? No     Social History Narrative       Review of Systems:  Skin:  Negative       Eyes:  Positive for  glasses    ENT:  Positive for nasal congestion    Respiratory:  Positive for dyspnea on exertion;cough;wheezing     Cardiovascular:  Negative for;edema;syncope or near-syncope;palpitations;chest pain Positive for;fatigue;lightheadedness;dizziness;exercise intolerance    Gastroenterology: Negative for melena;hematochezia    Genitourinary:  Negative      Musculoskeletal:  Negative      Neurologic:  Negative      Psychiatric:  Negative      Heme/Lymph/Imm:  Positive for   non-small cell lung CA  Endocrine:  Negative        Physical Exam:  Vitals: /83  Pulse 67  Wt 68.5 kg (151 lb)  SpO2 100%  BMI 21.07 kg/m2    Constitutional:  cooperative;well developed cachectic      Skin:  warm and dry to the touch, no apparent skin lesions or masses noted     L upper chest Port-A-Cath with well approximated edges; surrounding ecchymosis    Head:  normocephalic, no masses or lesions        Eyes:  pupils equal and round;conjunctivae and lids unremarkable;sclera white;no xanthalasma        Lymph:      ENT:  not assessed this visit        Neck:  JVP normal        Respiratory:    diminished breath sounds bilaterally       Cardiac: regular rhythm;no murmurs, gallops or rubs detected                not assessed this visit                                        GI:  abdomen soft        Extremities and Muscular Skeletal:  no edema;no deformities, clubbing, cyanosis, erythema observed              Neurological:  no gross motor deficits   Fatigued    Psych:  Alert and Oriented x 3        CC  Daksha Montemayor PA-C  [address]  [place] [address]                Thank you for allowing me to participate in the care of your patient.      Sincerely,     Daksha Montemayor PA-C     [facility]    cc:   Daksha Montemayor PA-C  [address]  [place] [address]

## 2018-04-10 NOTE — PROGRESS NOTES
"Service Date: 04/10/2018      HISTORY OF PRESENT ILLNESS:  I had the pleasure of seeing Zechariah today when he came accompanied by Anita and Rowan, his daughter-in-law, on the phone.  Zechariah is a very pleasant 58-year-old whose history is well documented in my note from 01/11/2018.  At that time he was actually in atrial fibrillation and we set him up for a cardioversion; however, he spontaneously converted.  We have kept him on amiodarone therapy, and when I last saw him 02/13, we discussed his ongoing issues with stage IV nonsmall cell lung cancer for which he sees Dr. May here at [facility].  He is currently undergoing palliative chemotherapy and when I saw him back in February, we opted to continue the amiodarone (despite potential pulmonary toxicities) as it was doing an excellent job keeping him out of atrial fibrillation, which he did not tolerate.  At that time we also discussed repeating a stress test which was done while he was in atrial flutter and was quite abnormal.      He is back today telling me that overall he thinks he is doing well.  Certainly he looks quite a bit better on exam today.  He has put on a bit of weight and overall seems a bit \"brighter.\"  He has not had any problems with palpitations, lightheadedness or any syncopal episodes.  He does not think he has had any atrial fibrillation.  EKG today confirms sinus rhythm with a heart rate of 65 beats per minute.  He did have some nonspecific T-wave changes.  QTc on amiodarone 200 mg daily was roughly 440 milliseconds.      He denies any problems with fluid overload.  His echocardiogram 01/2018 showed an improvement in his ejection fraction from 30%-35% (while in atrial flutter 11/2017), up to 50%-55%.      He denies any lower extremity edema and denies orthopnea or PND.      He and his daughter-in-law note that his tumors have responded really well to his current chemotherapies (carboplatin and gemcitabine) and after his 6th round (2 more " sessions) there is consideration of switching him just to gemcitabine.     Today we had an excellent talk about the potential risks of long-term amiodarone use.  His daughter-in-law and Zechariah both indicated that Dr. May was thinking that life expectancy is greater than 1 year as his tumors have responded so favorably to the chemotherapy.        ASSESSMENT AND PLAN:     1.  Atrial arrhythmias.  As above, he underwent cavotricuspid isthmus ablation in 12/2017 for atypical atrial flutter.  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  He subsequently developed atrial fibrillation and was started on amiodarone.  The following day, he was actually back in sinus rhythm and did not require DC cardioversion.     Today we did discuss the potential long-term side effects of use of amiodarone therapy.  He really did feel very poorly in atrial fibrillation and at this time he states he is willing to put up with the risk of potential toxicities due to the fact that he is feeling so much better and is not looking at significant longevity.     I think that seems very reasonable and at this time. We agreed that we get some hepatic function testing as well as a thyroid test just to see where his levels are as those are easily trackable.  I do not think a PFT examination would be beneficial given his underlying small cell lung cancer and ongoing chemotherapy.  He and his daughter-in-law agreed with this and opted not to proceed with that.     At this time, we will plan to get those blood tests and I can certainly let him know about the results.  We can check those blood tests every 6 months or so, but as long as the amiodarone therapy is working, they feel like they would like to not rock the boat.  I did roughly 5% chance of long-term toxicities from amiodarone therapy.  I also note that he gets routine CT scans so if significant pulmonary fibrosis was seen, we would likely be able to  see this on CT.     He does remain on warfarin therapy.  His CHADS2-VASc score is relatively low given the improvement in his cardiomyopathy but with his prothrombotic state with ongoing cancer we felt it prudent to continue anticoagulation.       2.  Cardiomyopathy.  As above, his ejection fraction improved to 50%-55% on echocardiogram done 01/2018 after resumption of sinus rhythm.  At this time, he will continue his beta blocker.  In the past, he has been unable to tolerate beta blocker and/or lisinopril secondary to significant hypotension.  As he is just now starting to feel better and tolerating his medications, I do not hesitate to add any new medications for him, but certainly if blood pressure remains robust and he sees Dr. Mason, Dr. May or when he sees us again, this could certainly be started at a low dose.     3.  Abnormal stress test.  His stress test done in 11/2017 due to concerns regarding chest discomfort and shortness of breath was grossly abnormal.  Dr. Damon reviewed and thought that much of these abnormalities could be related to his rapid atrial flutter.  He was noted to have a small area of possible nontransmural infarction in the distal anterior wall and antral apex with significant residual ischemia in the remainder of the anterior wall with an ejection fraction of roughly 43%.  There was also ventricular dilatation on stress, raising concern that there could be significant myocardial ischemia.     His ejection fraction has improved as above in his chest discomfort and large has largely disappeared.      We talked about repeating a stress test, but also the concerns that we have regarding his candidacy for coronary angiography if the stress test was abnormal.  We also noted that if he were noted to have significant disease, we would likely proceed with medical management given his longevity noted by Dr. May as well as the fact that he would likely be a poor candidate for dual  antiplatelet therapy if stents were warranted.  I did explain that this could lead to significant myocardial infarction/ischemic cardiomyopathy and he voiced understanding.  At this time, he agrees that he does not want to proceed with any stress testing but would like to consider that down the road, depending on how he feels.  I think this is reasonable.      4. Concern for depression.  He and his family are going to meet with Dr. Mason to determine if he could proceed with any sort of antidepressant.  I explained that though many of the SSRIs have potential interaction with amiodarone with QT prolongation, there were some that did not seem to have as much of an interaction.  Certainly if it was felt he needed to be on one that could potentiate the QT, we would likely recommend an EKG at 1 week after starting the medication and again after it reaches a steady state.  We would be happy to review those with our EP doctors if Dr. Mason requests       He is going to see Dr. Damon, his primary cardiologist, this summer, but I have encouraged him to contact us in the interim can we be of other assistance.         OSCAR LONDONO PA-C             D: 04/10/2018   T: 04/10/2018   MT: ELLI      Name:     ROQUE SAXNEA   MRN:      [id number]        Account:      [id number]   :      1960           Service Date: 04/10/2018      Document: [id number]

## 2018-04-10 NOTE — LETTER
"4/10/2018      Kailash Mason MD  [address] [place] [id number]      RE: Zechariah Ashby       Dear Colleague,    I had the pleasure of seeing Zechariah Ashby in the [facility].    Service Date: 04/10/2018      HISTORY OF PRESENT ILLNESS:  I had the pleasure of seeing Zechariah today when he came accompanied by Anita and Rowan, his daughter-in-law, on the phone.  Zechariah is a very pleasant 58-year-old whose history is well documented in my note from 01/11/2018.  At that time he was actually in atrial fibrillation and we set him up for a cardioversion; however, he spontaneously converted.  We have kept him on amiodarone therapy, and when I last saw him 02/13, we discussed his ongoing issues with stage IV nonsmall cell lung cancer for which he sees Dr. May here at [facility].  He is currently undergoing palliative chemotherapy and when I saw him back in February, we opted to continue the amiodarone (despite potential pulmonary toxicities) as it was doing an excellent job keeping him out of atrial fibrillation, which he did not tolerate.  At that time we also discussed repeating a stress test which was done while he was in atrial flutter and was quite abnormal.      He is back today telling me that overall he thinks he is doing well.  Certainly he looks quite a bit better on exam today.  He has put on a bit of weight and overall seems a bit \"brighter.\"  He has not had any problems with palpitations, lightheadedness or any syncopal episodes.  He does not think he has had any atrial fibrillation.  EKG today confirms sinus rhythm with a heart rate of 65 beats per minute.  He did have some nonspecific T-wave changes.  QTc on amiodarone 200 mg daily was roughly 440 milliseconds.      He denies any problems with fluid overload.  His echocardiogram 01/2018 showed an improvement in his ejection fraction from 30%-35% (while in atrial flutter 11/2017), up to 50%-55%.      He denies any " lower extremity edema and denies orthopnea or PND.      He and his daughter-in-law note that his tumors have responded really well to his current chemotherapies (carboplatin and gemcitabine) and after his 6th round (2 more sessions) there is consideration of switching him just to gemcitabine.     Today we had an excellent talk about the potential risks of long-term amiodarone use.  His daughter-in-law and Zechariah both indicated that Dr. May was thinking that life expectancy is greater than 1 year as his tumors have responded so favorably to the chemotherapy.        ASSESSMENT AND PLAN:     1.  Atrial arrhythmias.  As above, he underwent cavotricuspid isthmus ablation in 12/2017 for atypical atrial flutter.  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  He subsequently developed atrial fibrillation and was started on amiodarone.  The following day, he was actually back in sinus rhythm and did not require DC cardioversion.     Today we did discuss the potential long-term side effects of use of amiodarone therapy.  He really did feel very poorly in atrial fibrillation and at this time he states he is willing to put up with the risk of potential toxicities due to the fact that he is feeling so much better and is not looking at significant longevity.     I think that seems very reasonable and at this time. We agreed that we get some hepatic function testing as well as a thyroid test just to see where his levels are as those are easily trackable.  I do not think a PFT examination would be beneficial given his underlying small cell lung cancer and ongoing chemotherapy.  He and his daughter-in-law agreed with this and opted not to proceed with that.     At this time, we will plan to get those blood tests and I can certainly let him know about the results.  We can check those blood tests every 6 months or so, but as long as the amiodarone therapy is working, they feel like they  would like to not rock the boat.  I did roughly 5% chance of long-term toxicities from amiodarone therapy.  I also note that he gets routine CT scans so if significant pulmonary fibrosis was seen, we would likely be able to see this on CT.     He does remain on warfarin therapy.  His CHADS2-VASc score is relatively low given the improvement in his cardiomyopathy but with his prothrombotic state with ongoing cancer we felt it prudent to continue anticoagulation.       2.  Cardiomyopathy.  As above, his ejection fraction improved to 50%-55% on echocardiogram done 01/2018 after resumption of sinus rhythm.  At this time, he will continue his beta blocker.  In the past, he has been unable to tolerate beta blocker and/or lisinopril secondary to significant hypotension.  As he is just now starting to feel better and tolerating his medications, I do not hesitate to add any new medications for him, but certainly if blood pressure remains robust and he sees Dr. Mason, Dr. May or when he sees us again, this could certainly be started at a low dose.     3.  Abnormal stress test.  His stress test done in 11/2017 due to concerns regarding chest discomfort and shortness of breath was grossly abnormal.  Dr. Damon reviewed and thought that much of these abnormalities could be related to his rapid atrial flutter.  He was noted to have a small area of possible nontransmural infarction in the distal anterior wall and antral apex with significant residual ischemia in the remainder of the anterior wall with an ejection fraction of roughly 43%.  There was also ventricular dilatation on stress, raising concern that there could be significant myocardial ischemia.     His ejection fraction has improved as above in his chest discomfort and large has largely disappeared.      We talked about repeating a stress test, but also the concerns that we have regarding his candidacy for coronary angiography if the stress test was abnormal.  We also  noted that if he were noted to have significant disease, we would likely proceed with medical management given his longevity noted by Dr. May as well as the fact that he would likely be a poor candidate for dual antiplatelet therapy if stents were warranted.  I did explain that this could lead to significant myocardial infarction/ischemic cardiomyopathy and he voiced understanding.  At this time, he agrees that he does not want to proceed with any stress testing but would like to consider that down the road, depending on how he feels.  I think this is reasonable.      4. Concern for depression.  He and his family are going to meet with Dr. Mason to determine if he could proceed with any sort of antidepressant.  I explained that though many of the SSRIs have potential interaction with amiodarone with QT prolongation, there were some that did not seem to have as much of an interaction.  Certainly if it was felt he needed to be on one that could potentiate the QT, we would likely recommend an EKG at 1 week after starting the medication and again after it reaches a steady state.  We would be happy to review those with our EP doctors if Dr. Mason requests       He is going to see Dr. Damon, his primary cardiologist, this summer, but I have encouraged him to contact us in the interim can we be of other assistance.         OSCAR LONDONO PA-C             D: 04/10/2018   T: 04/10/2018   MT: ELLI      Name:     ROQUE SAXENA   MRN:      [id number]        Account:      [id number]   :      1960           Service Date: 04/10/2018      Document: [id number]           Outpatient Encounter Prescriptions as of 4/10/2018   Medication Sig Dispense Refill     ondansetron (ZOFRAN) 4 MG tablet Take 1 tablet (4 mg) by mouth every 8 hours as needed for nausea 30 tablet 1     nicotine (NICODERM CQ) 21 MG/24HR 24 hr patch Place 1 patch onto the skin every 24 hours 30 patch 0     morphine (MSIR) 15 MG IR tablet 1/2 to 1 pill  every 4 hours as needed for air hunger. 30 tablet 0     prochlorperazine (COMPAZINE) 10 MG tablet Take 1 tablet (10 mg) by mouth every 6 hours as needed (Nausea/Vomiting) 30 tablet 5     amiodarone (PACERONE/CODARONE) 200 MG tablet 1 tablet daily 90 tablet 0     warfarin (COUMADIN) 5 MG tablet Take 1.25 mg MWF; 2.5 mg rest of the week as directed by the Anticoagulation Clinic (establishing maintenance dose). 30 tablet 1     warfarin (COUMADIN) 2.5 MG tablet Take 1.25 mg MWF, 2.5 mg rest of the week or as directed by the Anticoagulation Clinic (establishing maintenance dose). 75 tablet 0     albuterol (PROAIR HFA) 108 (90 BASE) MCG/ACT Inhaler Inhale 2 puffs into the lungs every 4 hours as needed for shortness of breath / dyspnea or wheezing 1 Inhaler 11     omeprazole (PRILOSEC) 40 MG capsule Take 1 capsule (40 mg) by mouth daily Take 30-60 minutes before a meal. 30 capsule 11     nicotine (NICODERM CQ) 14 MG/24HR 24 hr patch Place 1 patch onto the skin every 24 hours 14 patch 5     nicotine (NICODERM CQ) 7 MG/24HR 24 hr patch Place 1 patch onto the skin every 24 hours 30 patch 5     LORazepam (ATIVAN) 0.5 MG tablet Take 1 tablet (0.5 mg) by mouth every 4 hours as needed (Anxiety, Nausea/Vomiting or Sleep) 30 tablet 5     No facility-administered encounter medications on file as of 4/10/2018.        Again, thank you for allowing me to participate in the care of your patient.      Sincerely,    Daksha Montemayor PA-C     [facility]

## 2018-04-10 NOTE — PATIENT INSTRUCTIONS
1. As we discussed, we will continue Amiodarone 200 mg daily to help keep you in normal rhythm.   We will get blood work to check thyroid and liver   As we discussed, will not get any lung tests    2. I'll send note to Dr. Mason about antidepressants - would just recheck EKGs    3. We discussed stress testing now that you're back in normal rhythm. As you are not having significant chest discomfort, I think we can continue to treat this medically.  If you start getting more chest discomfort, CALL!!  [phone]    4. See Dr. Damon in ~ 3 months but CALL if any issues prior!      To schedule a future appointment, we kindly ask that you call cardiology scheduling at [phone] three months prior to requested revisit date.               Reminder:  For your safety, we ask that you bring in your current medication(s) or an updated list of your medications with you to EACH office visit. Include the medication name, dose of pill on bottle and how you are taking it. Include over-the-counter medications or supplements. Your provider will review this at each visit and plan your care based on your current information.

## 2018-04-10 NOTE — MR AVS SNAPSHOT
After Visit Summary   4/10/2018    Zechariah Ashby    MRN: [id number]           Patient Information     Date Of Birth          1960        Visit Information        Provider Department      4/10/2018 11:00 AM Daksha Montemayor PA-C [facility]        Today's Diagnoses     On amiodarone therapy    -  1    Persistent atrial fibrillation (H)        Abnormal cardiovascular stress test          Care Instructions    1. As we discussed, we will continue Amiodarone 200 mg daily to help keep you in normal rhythm.   We will get blood work to check thyroid and liver   As we discussed, will not get any lung tests    2. I'll send note to Dr. Mason about antidepressants - would just recheck EKGs    3. We discussed stress testing now that you're back in normal rhythm. As you are not having significant chest discomfort, I think we can continue to treat this medically.  If you start getting more chest discomfort, CALL!!  [phone]    4. See Dr. Damon in ~ 3 months but CALL if any issues prior!      To schedule a future appointment, we kindly ask that you call cardiology scheduling at [phone] three months prior to requested revisit date.               Reminder:  For your safety, we ask that you bring in your current medication(s) or an updated list of your medications with you to EACH office visit. Include the medication name, dose of pill on bottle and how you are taking it. Include over-the-counter medications or supplements. Your provider will review this at each visit and plan your care based on your current information.               Follow-ups after your visit        Additional Services     Follow-Up with Cardiologist                 Your next 10 appointments already scheduled     Apr 19, 2018  8:30 AM CDT   Level 3 with ROOM 6 [facility] ([facility])    n Medical [facility]  [address]  [facility]  "[id number]   [phone]            Apr 19, 2018  9:15 AM CDT   Return Visit with Joycelyn May MD   [facility] ([facility])    [facility]  [address]  [facility] [address]   [phone]            Apr 26, 2018 10:00 AM CDT   Level 1 with ROOM 7 [facility] ([facility])    [facility]  [address]  [facility] [address]   [phone]              Future tests that were ordered for you today     Open Future Orders        Priority Expected Expires Ordered    Follow-Up with Cardiologist Routine 7/9/2018 4/10/2019 4/10/2018    TSH with free T4 reflex Routine 4/10/2018 4/10/2019 4/10/2018    Comprehensive metabolic panel Routine 4/10/2018 4/10/2019 4/10/2018            Who to contact     If you have questions or need follow up information about today's clinic visit or your schedule please contact [facility] directly at [phone].  Normal or non-critical lab and imaging results will be communicated to you by MyChart, letter or phone within 4 business days after the clinic has received the results. If you do not hear from us within 7 days, please contact the clinic through [employer] or phone. If you have a critical or abnormal lab result, we will notify you by phone as soon as possible.  Submit refill requests through [employer] or call your pharmacy and they will forward the refill request to us. Please allow 3 business days for your refill to be completed.          Additional Information About Your Visit        MyChart Information     [employer] lets you send messages to your doctor, view your test results, renew your prescriptions, schedule appointments and more. To sign up, go to [email or web address] . Click on \"Log in\" on the left side of the screen, which will take you to the Welcome page. Then click on \"Sign up Now\" on the right side of the " page.     You will be asked to enter the access code listed below, as well as some personal information. Please follow the directions to create your username and password.     Your access code is: [email or web address]  Expires: 2018 10:55 AM     Your access code will  in 90 days. If you need help or a new code, please call your [facility] clinic or [phone].        Care EveryWhere ID     This is your Care EveryWhere ID. This could be used by other organizations to access your [facility] medical records  [id number]        Your Vitals Were     Pulse Pulse Oximetry BMI (Body Mass Index)             67 100% 21.07 kg/m2          Blood Pressure from Last 3 Encounters:   04/10/18 139/83   18 92/56   18 117/75    Weight from Last 3 Encounters:   04/10/18 68.5 kg (151 lb)   18 68 kg (149 lb 14.4 oz)   03/15/18 65.1 kg (143 lb 9.6 oz)              We Performed the Following     Follow-Up with Cardiac Advanced Practice Provider        Primary Care Provider Office Phone # Fax #    Kailash Mason [phone] [address] [place] [address]        Equal Access to Services     ROD PERRY : Hadii arnol ku hadasho Soomaali, waaxda luqadaha, qaybta kaalmada adeegyada, harley boykin. So [facility] [phone].    ATENCIÓN: Si habla español, tiene a morocho disposición servicios gratuitos de asistencia lingüística. Llame al [phone].    We comply with applicable federal civil rights laws and [place] laws. We do not discriminate on the basis of race, color, national origin, age, disability, sex, sexual orientation, or gender identity.            Thank you!     Thank you for choosing [facility]  for your care. Our goal is always to provide you with excellent care. Hearing back from our patients is one way we can continue to improve our services. Please take a few minutes to complete the written survey that you may receive  in the mail after your visit with us. Thank you!             Your Updated Medication List - Protect others around you: Learn how to safely use, store and throw away your medicines at www.disposemymeds.org.          This list is accurate as of 4/10/18 11:41 AM.  Always use your most recent med list.                   Brand Name Dispense Instructions for use Diagnosis    albuterol 108 (90 BASE) MCG/ACT Inhaler    PROAIR HFA    1 Inhaler    Inhale 2 puffs into the lungs every 4 hours as needed for shortness of breath / dyspnea or wheezing    Chronic obstructive pulmonary disease, unspecified COPD type (H)       amiodarone 200 MG tablet    PACERONE/CODARONE    90 tablet    1 tablet daily    Paroxysmal atrial fibrillation (H)       LORazepam 0.5 MG tablet    ATIVAN    30 tablet    Take 1 tablet (0.5 mg) by mouth every 4 hours as needed (Anxiety, Nausea/Vomiting or Sleep)    Non-small cell carcinoma of lung (H)       morphine 15 MG IR tablet    MSIR    30 tablet    1/2 to 1 pill every 4 hours as needed for air hunger.    Non-small cell carcinoma of lung (H)       * nicotine 14 MG/24HR 24 hr patch    NICODERM CQ    14 patch    Place 1 patch onto the skin every 24 hours    Encounter for tobacco use cessation counseling, Tobacco abuse       * nicotine 7 MG/24HR 24 hr patch    NICODERM CQ    30 patch    Place 1 patch onto the skin every 24 hours    Tobacco abuse       * nicotine 21 MG/24HR 24 hr patch    NICODERM CQ    30 patch    Place 1 patch onto the skin every 24 hours    Non-small cell carcinoma of lung (H)       omeprazole 40 MG capsule    priLOSEC    30 capsule    Take 1 capsule (40 mg) by mouth daily Take 30-60 minutes before a meal.    Gastroesophageal reflux disease without esophagitis       ondansetron 4 MG tablet    ZOFRAN    30 tablet    Take 1 tablet (4 mg) by mouth every 8 hours as needed for nausea    Non-small cell carcinoma of lung (H)       prochlorperazine 10 MG tablet    COMPAZINE    30 tablet    Take 1  tablet (10 mg) by mouth every 6 hours as needed (Nausea/Vomiting)    Non-small cell carcinoma of lung (H)       * warfarin 5 MG tablet    COUMADIN    30 tablet    Take 1.25 mg MWF; 2.5 mg rest of the week as directed by the Anticoagulation Clinic (establishing maintenance dose).    Atrial flutter, unspecified type (H)       * warfarin 2.5 MG tablet    COUMADIN    75 tablet    Take 1.25 mg MWF, 2.5 mg rest of the week or as directed by the Anticoagulation Clinic (establishing maintenance dose).    Long-term (current) use of anticoagulants, Atrial flutter, unspecified type (H)       * Notice:  This list has 5 medication(s) that are the same as other medications prescribed for you. Read the directions carefully, and ask your doctor or other care provider to review them with you.

## 2018-04-14 NOTE — PROGRESS NOTES
Dr. Nelson MAJANO on your pt I've been seeing at [facility] before you see him this summer:    Justine has done a good job keeping him in SR. Had AFlutter ablation 12/2017 but then had rapid AFib following (spontaneous conversion).  We've discussed use of Amiodarone and potential risks, and as has Stage 4 lung CA and on palliative chemo, we've continued this after good and thoughtful discussion with Zechariah and family members.    You discussed getting repeat stress test following resumption of SR. Pt/family/I have talked about this as well and as poor candidate for cath/DAPT given cancer and lack of discomfort, have agreed to not get this.    Happily, the palliative chemo has been shrinking his tumors, he's feeling better and he's reportedly been given a ~1 year longevity.    Just wanted to keep you updated.    Christine

## 2018-04-19 NOTE — LETTER
4/19/2018         RE: Zechariah Ashby  [address] [address]        Dear Colleague,    Thank you for referring your patient, Zechariah Ashby, to the [facility]. Please see a copy of my visit note below.    Hematology/ Oncology Follow-up Visit:  Apr 19, 2018    Reason for Visit:   Chief Complaint   Patient presents with     Oncology Clinic Visit     3 week recheck NSCLC, Labs & Chemo today       Oncologic History:  Non-small cell carcinoma of lung (H)  Zechariah Ashby is a 57 year old male who was recently diagnosed with atrial fib/flutter in early November 2017. During workup just x-ray showed elevation of the left hemidiaphragm with a focal tenting. The CT scan was done for further evaluation showing left upper lobe atelectasis and a moderate-sized left pleural effusion. Patient also had mediastinal adenopathy. Patient had left thoracocentesis. Cytology came back as transudate and cytology came back negative. Patient has a long history of smoking. PET scan showed a large FDG avid mediastinal mass extending to the left hilum with obliteration of the left upper lobe bronchus and atelectasis of the upper lobe.  There are also multiple left cervical left cervical and mediastinal adenopathy.  There is increased left pleural effusion.  There is hypermetabolic left adrenal lesion.  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 He was started on carboplatin and gemcitabine.      Interval History:  Patient returning today for follow-up.  He has been on chemotherapy with carboplatin and Taxol.  He has been tolerating treatment very well.  His nausea is well controlled.  He denies any pain.  There is no shortness of breath or cough or wheezing.    Review Of Systems:  Constitutional: Negative for fever, chills, and night sweats.  Skin: negative.  Eyes: negative.  Ears/Nose/Throat: negative.  Respiratory: No  shortness of breath, dyspnea on exertion, cough, or hemoptysis.  Cardiovascular: negative.  Gastrointestinal: negative.  Genitourinary: negative.  Musculoskeletal: negative.  Neurologic: negative.  Psychiatric: negative.  Hematologic/Lymphatic/Immunologic: negative.  Endocrine: negative.    All other ROS negative unless mentioned in interval history.    Past medical, social, surgical, and family histories reviewed.    Allergies:  Allergies as of 04/19/2018 - Osmel as Reviewed 04/19/2018   Allergen Reaction Noted     Nka [no known allergies]  01/03/2018       Current Medications:  Current Outpatient Prescriptions   Medication Sig Dispense Refill     albuterol (PROAIR HFA) 108 (90 BASE) MCG/ACT Inhaler Inhale 2 puffs into the lungs every 4 hours as needed for shortness of breath / dyspnea or wheezing 1 Inhaler 11     amiodarone (PACERONE/CODARONE) 200 MG tablet 1 tablet daily 90 tablet 0     LORazepam (ATIVAN) 0.5 MG tablet Take 1 tablet (0.5 mg) by mouth every 4 hours as needed (Anxiety, Nausea/Vomiting or Sleep) 30 tablet 5     morphine (MSIR) 15 MG IR tablet 1/2 to 1 pill every 4 hours as needed for air hunger. 30 tablet 0     nicotine (NICODERM CQ) 21 MG/24HR 24 hr patch Place 1 patch onto the skin every 24 hours 30 patch 0     omeprazole (PRILOSEC) 40 MG capsule Take 1 capsule (40 mg) by mouth daily Take 30-60 minutes before a meal. 30 capsule 11     ondansetron (ZOFRAN) 4 MG tablet Take 1 tablet (4 mg) by mouth every 8 hours as needed for nausea 30 tablet 1     prochlorperazine (COMPAZINE) 10 MG tablet Take 1 tablet (10 mg) by mouth every 6 hours as needed (Nausea/Vomiting) 30 tablet 5     warfarin (COUMADIN) 2.5 MG tablet Take 1.25 mg MWF, 2.5 mg rest of the week or as directed by the Anticoagulation Clinic (establishing maintenance dose). 75 tablet 0     warfarin (COUMADIN) 5 MG tablet Take 1.25 mg MWF; 2.5 mg rest of the week as directed by the Anticoagulation Clinic (establishing maintenance dose). 30 tablet  "1     nicotine (NICODERM CQ) 14 MG/24HR 24 hr patch Place 1 patch onto the skin every 24 hours (Patient not taking: Reported on 4/19/2018) 14 patch 5     nicotine (NICODERM CQ) 7 MG/24HR 24 hr patch Place 1 patch onto the skin every 24 hours (Patient not taking: Reported on 4/19/2018) 30 patch 5        Physical Exam:  /82 (BP Location: Right arm, Patient Position: Sitting, Cuff Size: Adult Regular)  Pulse 61  Temp 96.4  F (35.8  C) (Tympanic)  Resp 16  Ht 1.803 m (5' 10.98\")  Wt 67.9 kg (149 lb 9.6 oz)  SpO2 100%  BMI 20.87 kg/m2  Wt Readings from Last 12 Encounters:   04/19/18 67.9 kg (149 lb 9.6 oz)   04/10/18 68.5 kg (151 lb)   03/29/18 68 kg (149 lb 14.4 oz)   03/15/18 65.1 kg (143 lb 9.6 oz)   03/08/18 67.7 kg (149 lb 3.2 oz)   02/22/18 66.7 kg (147 lb)   02/15/18 67.5 kg (148 lb 12.8 oz)   02/13/18 67.1 kg (148 lb)   02/05/18 65.8 kg (145 lb)   02/01/18 64.4 kg (142 lb)   01/25/18 71.2 kg (157 lb)   01/20/18 68.9 kg (151 lb 14.4 oz)     ECOG performance status1  GENERAL APPEARANCE: Healthy, alert and in no acute distress.  HEENT: Sclerae anicteric. PERRLA. Oropharynx without ulcers, lesions, or thrush.  NECK: Supple. No asymmetry or masses.  LYMPHATICS: No palpable cervical, supraclavicular, axillary, or inguinal lymphadenopathy.  RESP: Lungs clear to auscultation bilaterally without rales, rhonchi or wheezes.  CARDIOVASCULAR: Regular rate and rhythm. Normal S1, S2; no S3 or S4. No murmur, gallop, or rub.  ABDOMEN: Soft, nontender. Bowel sounds normal. No palpable organomegaly or masses.  MUSCULOSKELETAL: Extremities without gross deformities noted. No edema of bilateral lower extremities.  SKIN: No suspicious lesions or rashes.  NEURO: Alert and oriented x 3. Cranial nerves II-XII grossly intact.  PSYCHIATRIC: Mentation and affect appear normal.    Laboratory/Imaging Studies:  Infusion Therapy Visit on 04/19/2018   Component Date Value Ref Range Status     INR 04/19/2018 1.60* 0.86 - 1.14 Final "     WBC 04/19/2018 4.2  4.0 - 11.0 10e9/L Final     RBC Count 04/19/2018 2.87* 4.4 - 5.9 10e12/L Final     Hemoglobin 04/19/2018 9.3* 13.3 - 17.7 g/dL Final     Hematocrit 04/19/2018 29.3* 40.0 - 53.0 % Final     MCV 04/19/2018 102* 78 - 100 fl Final     MCH 04/19/2018 32.4  26.5 - 33.0 pg Final     MCHC 04/19/2018 31.7  31.5 - 36.5 g/dL Final     RDW 04/19/2018 18.7* 10.0 - 15.0 % Final     Platelet Count 04/19/2018 235  150 - 450 10e9/L Final     Diff Method 04/19/2018 Automated Method   Final     % Neutrophils 04/19/2018 67.5  % Final     % Lymphocytes 04/19/2018 16.9  % Final     % Monocytes 04/19/2018 14.6  % Final     % Eosinophils 04/19/2018 0.5  % Final     % Basophils 04/19/2018 0.5  % Final     % Immature Granulocytes 04/19/2018 0.0  % Final     Absolute Neutrophil 04/19/2018 2.8  1.6 - 8.3 10e9/L Final     Absolute Lymphocytes 04/19/2018 0.7* 0.8 - 5.3 10e9/L Final     Absolute Monocytes 04/19/2018 0.6  0.0 - 1.3 10e9/L Final     Absolute Eosinophils 04/19/2018 0.0  0.0 - 0.7 10e9/L Final     Absolute Basophils 04/19/2018 0.0  0.0 - 0.2 10e9/L Final     Abs Immature Granulocytes 04/19/2018 0.0  0 - 0.4 10e9/L Final   Office Visit on 04/10/2018   Component Date Value Ref Range Status     Sodium 04/10/2018 138  133 - 144 mmol/L Final     Potassium 04/10/2018 4.0  3.4 - 5.3 mmol/L Final     Chloride 04/10/2018 104  94 - 109 mmol/L Final     Carbon Dioxide 04/10/2018 28  20 - 32 mmol/L Final     Anion Gap 04/10/2018 6  3 - 14 mmol/L Final     Glucose 04/10/2018 92  70 - 99 mg/dL Final     Urea Nitrogen 04/10/2018 16  7 - 30 mg/dL Final     Creatinine 04/10/2018 0.99  0.66 - 1.25 mg/dL Final     GFR Estimate 04/10/2018 77  >60 mL/min/1.7m2 Final    Non  GFR Calc     GFR Estimate If Black 04/10/2018 >90  >60 mL/min/1.7m2 Final    African American GFR Calc     Calcium 04/10/2018 8.1* 8.5 - 10.1 mg/dL Final     Bilirubin Total 04/10/2018 0.3  0.2 - 1.3 mg/dL Final     Albumin 04/10/2018 3.5  3.4 -  5.0 g/dL Final     Protein Total 04/10/2018 6.4* 6.8 - 8.8 g/dL Final     Alkaline Phosphatase 04/10/2018 97  40 - 150 U/L Final     ALT 04/10/2018 23  0 - 70 U/L Final     AST 04/10/2018 19  0 - 45 U/L Final     TSH 04/10/2018 5.63* 0.40 - 4.00 mU/L Final     T4 Free 04/10/2018 1.27  0.76 - 1.46 ng/dL Final            Assessment and plan:    (C34.90) Non-small cell carcinoma of lung (H)  (primary encounter diagnosis)  Patient continues to respond well to treatment.  He has been tolerating chemotherapy rather well.  We will continue with cycle #6.  We will arrange for imaging studies to assess response.  We reviewed with the patient today maintenance chemotherapy with gemcitabine.  I will see the patient again in 4 months or sooner if there are new developments or concerns.    (R11.2,  T45.1X5A) Chemotherapy induced nausea and vomiting  Nausea is currently well-controlled with the current regimen.    (J90) Pleural effusion  Pleural effusion has improved.    (I42.9) Cardiomyopathy, unspecified type (H)  (I48.92) Atrial flutter, unspecified type (H)  Patient continues on amiodarone.    The patient is ready to learn, no apparent learning barriers were identified.  Diagnosis and treatment plans were explained to the patient. The patient expressed understanding of the content. The patient asked appropriate questions. The patient questions were answered to his satisfaction.    Chart documentation with Dragon Voice recognition Software. Although reviewed after completion, some words and grammatical errors may remain.    Again, thank you for allowing me to participate in the care of your patient.        Sincerely,        Joycelyn May MD

## 2018-04-19 NOTE — MR AVS SNAPSHOT
After Visit Summary   4/19/2018    Zechariah Ashby    MRN: [id number]           Patient Information     Date Of Birth          1960        Visit Information        Provider Department      4/19/2018 8:30 AM ROOM 6 [facility]        Today's Diagnoses     Atrial flutter, unspecified type (H)    -  1    Long-term (current) use of anticoagulants        Non-small cell carcinoma of lung (H)           Follow-ups after your visit        Your next 10 appointments already scheduled     Apr 20, 2018  9:20 AM CDT   SHORT with Kailash Mason MD   [facility] ([facility]    [address] [place] [id number]   [phone]            Apr 26, 2018 10:00 AM CDT   Level 1 with ROOM 7 [facility] ([facility])    [facility]  [address]  [facility] [address]   [phone]            May 16, 2018 10:00 AM CDT   (Arrive by 9:45 AM)   PE NPET ONCOLOGY (EYES TO THIGHS) with [id number]   [facility] ([facility])    [address]  [facility] [address]   [phone]           Tell your doctor:   If there is any chance you may be pregnant or if you are breastfeeding.   If you have problems lying in small spaces (claustrophobia). If you do, your doctor may give you medicine to help you relax. If you have diabetes:   Have your exam early in the morning. Your blood glucose will go up as the day goes by.   Your glucose level must be 180 or less at the start of the exam. Please take any medicines you need to ensure this blood glucose level. 24 hours before your scan: Don t do any heavy exercise. (No jogging, aerobics or other workouts.) Exercise will make your pictures less accurate. 6 hours before your scan:   Stop all food and liquids (except water).   Do not chew gum or suck on mints.   If you need to take medicine with food, you may take it with a few  "crackers.  Please call your Imaging Department at your exam site with any questions.              Future tests that were ordered for you today     Open Future Orders        Priority Expected Expires Ordered    PET Oncology (Eyes to Thighs) Routine  2019            Who to contact     If you have questions or need follow up information about today's clinic visit or your schedule please contact [facility] directly at [phone].  Normal or non-critical lab and imaging results will be communicated to you by P[employer], letter or phone within 4 business days after the clinic has received the results. If you do not hear from us within 7 days, please contact the clinic through P[employer]ics[employer] or phone. If you have a critical or abnormal lab result, we will notify you by phone as soon as possible.  Submit refill requests through [employer] or call your pharmacy and they will forward the refill request to us. Please allow 3 business days for your refill to be completed.          Additional Information About Your Visit        PinticsharClinicient Information     [employer] lets you send messages to your doctor, view your test results, renew your prescriptions, schedule appointments and more. To sign up, go to [email or web address] . Click on \"Log in\" on the left side of the screen, which will take you to the Welcome page. Then click on \"Sign up Now\" on the right side of the page.     You will be asked to enter the access code listed below, as well as some personal information. Please follow the directions to create your username and password.     Your access code is: [email or web address]  Expires: 2018 10:55 AM     Your access code will  in 90 days. If you need help or a new code, please call your [facility] clinic or [phone].        Care EveryWhere ID     This is your Care EveryWhere ID. This could be used by other organizations to access your [facility] medical records  [id number]        Your Vitals Were     Pulse    "                79            Blood Pressure from Last 3 Encounters:   04/19/18 134/82   04/19/18 113/73   04/10/18 139/83    Weight from Last 3 Encounters:   04/19/18 67.9 kg (149 lb 9.6 oz)   04/10/18 68.5 kg (151 lb)   03/29/18 68 kg (149 lb 14.4 oz)              We Performed the Following     Basic metabolic panel     CBC with platelets differential     INR        Primary Care Provider Office Phone # Fax #    Kailash Mason [phone] [address] [place] [id number]        Equal Access to Services     [facility]: Hadii arnol lai hadashhitesh Soasher, waaxda shaliniadaha, qaybta kaalmaturner so, harley carrasquillo . So [facility] [phone].    ATENCIÓN: Si habla español, tiene a morocho disposición servicios gratuitos de asistencia lingüística. Llame al [phone].    We comply with applicable federal civil rights laws and [place] laws. We do not discriminate on the basis of race, color, national origin, age, disability, sex, sexual orientation, or gender identity.            Thank you!     Thank you for choosing [facility]  for your care. Our goal is always to provide you with excellent care. Hearing back from our patients is one way we can continue to improve our services. Please take a few minutes to complete the written survey that you may receive in the mail after your visit with us. Thank you!             Your Updated Medication List - Protect others around you: Learn how to safely use, store and throw away your medicines at www.disposemymeds.org.          This list is accurate as of 4/19/18  3:50 PM.  Always use your most recent med list.                   Brand Name Dispense Instructions for use Diagnosis    albuterol 108 (90 Base) MCG/ACT Inhaler    PROAIR HFA    1 Inhaler    Inhale 2 puffs into the lungs every 4 hours as needed for shortness of breath / dyspnea or wheezing    Chronic obstructive pulmonary disease, unspecified COPD type (H)        amiodarone 200 MG tablet    PACERONE/CODARONE    90 tablet    1 tablet daily    Paroxysmal atrial fibrillation (H)       LORazepam 0.5 MG tablet    ATIVAN    30 tablet    Take 1 tablet (0.5 mg) by mouth every 4 hours as needed (Anxiety, Nausea/Vomiting or Sleep)    Non-small cell carcinoma of lung (H)       morphine 15 MG IR tablet    MSIR    30 tablet    1/2 to 1 pill every 4 hours as needed for air hunger.    Non-small cell carcinoma of lung (H)       * nicotine 14 MG/24HR 24 hr patch    NICODERM CQ    14 patch    Place 1 patch onto the skin every 24 hours    Encounter for tobacco use cessation counseling, Tobacco abuse       * nicotine 7 MG/24HR 24 hr patch    NICODERM CQ    30 patch    Place 1 patch onto the skin every 24 hours    Tobacco abuse       * nicotine 21 MG/24HR 24 hr patch    NICODERM CQ    30 patch    Place 1 patch onto the skin every 24 hours    Non-small cell carcinoma of lung (H)       omeprazole 40 MG capsule    priLOSEC    30 capsule    Take 1 capsule (40 mg) by mouth daily Take 30-60 minutes before a meal.    Gastroesophageal reflux disease without esophagitis       ondansetron 4 MG tablet    ZOFRAN    30 tablet    Take 1 tablet (4 mg) by mouth every 8 hours as needed for nausea    Non-small cell carcinoma of lung (H)       prochlorperazine 10 MG tablet    COMPAZINE    30 tablet    Take 1 tablet (10 mg) by mouth every 6 hours as needed (Nausea/Vomiting)    Non-small cell carcinoma of lung (H)       * warfarin 5 MG tablet    COUMADIN    30 tablet    Take 1.25 mg MWF; 2.5 mg rest of the week as directed by the Anticoagulation Clinic    Atrial flutter, unspecified type (H)       * warfarin 2.5 MG tablet    COUMADIN    75 tablet    Take 1.25 mg MWF, 2.5 mg rest of the week or as directed by the Anticoagulation Clinic    Long-term (current) use of anticoagulants, Atrial flutter, unspecified type (H)       * Notice:  This list has 5 medication(s) that are the same as other medications prescribed for  you. Read the directions carefully, and ask your doctor or other care provider to review them with you.

## 2018-04-19 NOTE — MR AVS SNAPSHOT
Zechariah Ashby   4/19/2018   Anticoagulation Therapy Visit    Description:  58 year old male   Provider:  Alice Lea RN   Department:  [facility]           INR as of 4/19/2018     Today's INR 1.60!      Anticoagulation Summary as of 4/19/2018     INR goal 2.0-3.0   Today's INR 1.60!   Full instructions 1.25 mg on Mon, Wed, Fri; 2.5 mg all other days   Next INR check 4/26/2018    Indications   Atrial flutter  unspecified type (H) [I48.92]  Long-term (current) use of anticoagulants [Z79.01] [Z79.01]         April 2018 Details    Sun Mon Tue Wed Thu Fri Sat     1               2               3               4               5               6               7                 8               9               10               11               12               13               14                 15               16               17               18               19      2.5 mg   See details      20      1.25 mg         21      2.5 mg           22      2.5 mg         23      1.25 mg         24      2.5 mg         25      1.25 mg         26            27               28                 29               30                     Date Details   04/19 This INR check       Date of next INR:  4/26/2018         How to take your warfarin dose     To take:  1.25 mg Take 0.5 of a 2.5 mg tablet.    To take:  2.5 mg Take 1 of the 2.5 mg tablets.

## 2018-04-19 NOTE — PATIENT INSTRUCTIONS
We would like to see you back in clinic with Dr. May in 1 month with PET scan prior and chemotherapy to be scheduled per treatment plan.      When you are in need of a refill, please call your pharmacy and they will send us a request.      Copy of appointments, and after visit summary (AVS) given to patient.      If you have any questions during business hours (M-F 8 AM- 4PM), please call Camille Lara RN, BSN, OCN Oncology Hematology /Breast Cancer Navigator at [facility] [phone].       For questions after business hours, or on holidays/weekends, please call our after hours Nurse Triage line [phone]. Thank you.

## 2018-04-19 NOTE — NURSING NOTE
"Oncology Rooming Note    April 19, 2018 9:01 AM   Zechariah Ashby is a 58 year old male who presents for:    Chief Complaint   Patient presents with     Oncology Clinic Visit     3 week recheck NSCLC, Labs & Chemo today     Initial Vitals: /82 (BP Location: Right arm, Patient Position: Sitting, Cuff Size: Adult Regular)  Pulse 61  Temp 96.4  F (35.8  C) (Tympanic)  Resp 16  Ht 1.803 m (5' 10.98\")  Wt 67.9 kg (149 lb 9.6 oz)  SpO2 100%  BMI 20.87 kg/m2 Estimated body mass index is 20.87 kg/(m^2) as calculated from the following:    Height as of this encounter: 1.803 m (5' 10.98\").    Weight as of this encounter: 67.9 kg (149 lb 9.6 oz). Body surface area is 1.84 meters squared.  Moderate Pain (4) Comment: IN BETWEEN SHOULDER BLADE    No LMP for male patient.  Allergies reviewed: Yes  Medications reviewed: Yes    Medications: Medication refills not needed today.  Pharmacy name entered into [employer]:      [id number] [address] [facility]    Clinical concerns:  3 week recheck NSCLC, Labs & Chemo today    7  minutes for nursing intake (face to face time)     Carmen Barreto CMA              "

## 2018-04-19 NOTE — PROGRESS NOTES
Hematology/ Oncology Follow-up Visit:  Apr 19, 2018    Reason for Visit:   Chief Complaint   Patient presents with     Oncology Clinic Visit     3 week recheck NSCLC, Labs & Chemo today       Oncologic History:  Non-small cell carcinoma of lung (H)  Zechariah Ashby is a 57 year old male who was recently diagnosed with atrial fib/flutter in early November 2017. During workup just x-ray showed elevation of the left hemidiaphragm with a focal tenting. The CT scan was done for further evaluation showing left upper lobe atelectasis and a moderate-sized left pleural effusion. Patient also had mediastinal adenopathy. Patient had left thoracocentesis. Cytology came back as transudate and cytology came back negative. Patient has a long history of smoking. PET scan showed a large FDG avid mediastinal mass extending to the left hilum with obliteration of the left upper lobe bronchus and atelectasis of the upper lobe.  There are also multiple left cervical left cervical and mediastinal adenopathy.  There is increased left pleural effusion.  There is hypermetabolic left adrenal lesion.  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 He was started on carboplatin and gemcitabine.      Interval History:  Patient returning today for follow-up.  He has been on chemotherapy with carboplatin and Taxol.  He has been tolerating treatment very well.  His nausea is well controlled.  He denies any pain.  There is no shortness of breath or cough or wheezing.    Review Of Systems:  Constitutional: Negative for fever, chills, and night sweats.  Skin: negative.  Eyes: negative.  Ears/Nose/Throat: negative.  Respiratory: No shortness of breath, dyspnea on exertion, cough, or hemoptysis.  Cardiovascular: negative.  Gastrointestinal: negative.  Genitourinary: negative.  Musculoskeletal: negative.  Neurologic: negative.  Psychiatric:  negative.  Hematologic/Lymphatic/Immunologic: negative.  Endocrine: negative.    All other ROS negative unless mentioned in interval history.    Past medical, social, surgical, and family histories reviewed.    Allergies:  Allergies as of 04/19/2018 - Osmel as Reviewed 04/19/2018   Allergen Reaction Noted     Nka [no known allergies]  01/03/2018       Current Medications:  Current Outpatient Prescriptions   Medication Sig Dispense Refill     albuterol (PROAIR HFA) 108 (90 BASE) MCG/ACT Inhaler Inhale 2 puffs into the lungs every 4 hours as needed for shortness of breath / dyspnea or wheezing 1 Inhaler 11     amiodarone (PACERONE/CODARONE) 200 MG tablet 1 tablet daily 90 tablet 0     LORazepam (ATIVAN) 0.5 MG tablet Take 1 tablet (0.5 mg) by mouth every 4 hours as needed (Anxiety, Nausea/Vomiting or Sleep) 30 tablet 5     morphine (MSIR) 15 MG IR tablet 1/2 to 1 pill every 4 hours as needed for air hunger. 30 tablet 0     nicotine (NICODERM CQ) 21 MG/24HR 24 hr patch Place 1 patch onto the skin every 24 hours 30 patch 0     omeprazole (PRILOSEC) 40 MG capsule Take 1 capsule (40 mg) by mouth daily Take 30-60 minutes before a meal. 30 capsule 11     ondansetron (ZOFRAN) 4 MG tablet Take 1 tablet (4 mg) by mouth every 8 hours as needed for nausea 30 tablet 1     prochlorperazine (COMPAZINE) 10 MG tablet Take 1 tablet (10 mg) by mouth every 6 hours as needed (Nausea/Vomiting) 30 tablet 5     warfarin (COUMADIN) 2.5 MG tablet Take 1.25 mg MWF, 2.5 mg rest of the week or as directed by the Anticoagulation Clinic (establishing maintenance dose). 75 tablet 0     warfarin (COUMADIN) 5 MG tablet Take 1.25 mg MWF; 2.5 mg rest of the week as directed by the Anticoagulation Clinic (establishing maintenance dose). 30 tablet 1     nicotine (NICODERM CQ) 14 MG/24HR 24 hr patch Place 1 patch onto the skin every 24 hours (Patient not taking: Reported on 4/19/2018) 14 patch 5     nicotine (NICODERM CQ) 7 MG/24HR 24 hr patch Place 1  "patch onto the skin every 24 hours (Patient not taking: Reported on 4/19/2018) 30 patch 5        Physical Exam:  /82 (BP Location: Right arm, Patient Position: Sitting, Cuff Size: Adult Regular)  Pulse 61  Temp 96.4  F (35.8  C) (Tympanic)  Resp 16  Ht 1.803 m (5' 10.98\")  Wt 67.9 kg (149 lb 9.6 oz)  SpO2 100%  BMI 20.87 kg/m2  Wt Readings from Last 12 Encounters:   04/19/18 67.9 kg (149 lb 9.6 oz)   04/10/18 68.5 kg (151 lb)   03/29/18 68 kg (149 lb 14.4 oz)   03/15/18 65.1 kg (143 lb 9.6 oz)   03/08/18 67.7 kg (149 lb 3.2 oz)   02/22/18 66.7 kg (147 lb)   02/15/18 67.5 kg (148 lb 12.8 oz)   02/13/18 67.1 kg (148 lb)   02/05/18 65.8 kg (145 lb)   02/01/18 64.4 kg (142 lb)   01/25/18 71.2 kg (157 lb)   01/20/18 68.9 kg (151 lb 14.4 oz)     ECOG performance status1  GENERAL APPEARANCE: Healthy, alert and in no acute distress.  HEENT: Sclerae anicteric. PERRLA. Oropharynx without ulcers, lesions, or thrush.  NECK: Supple. No asymmetry or masses.  LYMPHATICS: No palpable cervical, supraclavicular, axillary, or inguinal lymphadenopathy.  RESP: Lungs clear to auscultation bilaterally without rales, rhonchi or wheezes.  CARDIOVASCULAR: Regular rate and rhythm. Normal S1, S2; no S3 or S4. No murmur, gallop, or rub.  ABDOMEN: Soft, nontender. Bowel sounds normal. No palpable organomegaly or masses.  MUSCULOSKELETAL: Extremities without gross deformities noted. No edema of bilateral lower extremities.  SKIN: No suspicious lesions or rashes.  NEURO: Alert and oriented x 3. Cranial nerves II-XII grossly intact.  PSYCHIATRIC: Mentation and affect appear normal.    Laboratory/Imaging Studies:  Infusion Therapy Visit on 04/19/2018   Component Date Value Ref Range Status     INR 04/19/2018 1.60* 0.86 - 1.14 Final     WBC 04/19/2018 4.2  4.0 - 11.0 10e9/L Final     RBC Count 04/19/2018 2.87* 4.4 - 5.9 10e12/L Final     Hemoglobin 04/19/2018 9.3* 13.3 - 17.7 g/dL Final     Hematocrit 04/19/2018 29.3* 40.0 - 53.0 % Final "     MCV 04/19/2018 102* 78 - 100 fl Final     MCH 04/19/2018 32.4  26.5 - 33.0 pg Final     MCHC 04/19/2018 31.7  31.5 - 36.5 g/dL Final     RDW 04/19/2018 18.7* 10.0 - 15.0 % Final     Platelet Count 04/19/2018 235  150 - 450 10e9/L Final     Diff Method 04/19/2018 Automated Method   Final     % Neutrophils 04/19/2018 67.5  % Final     % Lymphocytes 04/19/2018 16.9  % Final     % Monocytes 04/19/2018 14.6  % Final     % Eosinophils 04/19/2018 0.5  % Final     % Basophils 04/19/2018 0.5  % Final     % Immature Granulocytes 04/19/2018 0.0  % Final     Absolute Neutrophil 04/19/2018 2.8  1.6 - 8.3 10e9/L Final     Absolute Lymphocytes 04/19/2018 0.7* 0.8 - 5.3 10e9/L Final     Absolute Monocytes 04/19/2018 0.6  0.0 - 1.3 10e9/L Final     Absolute Eosinophils 04/19/2018 0.0  0.0 - 0.7 10e9/L Final     Absolute Basophils 04/19/2018 0.0  0.0 - 0.2 10e9/L Final     Abs Immature Granulocytes 04/19/2018 0.0  0 - 0.4 10e9/L Final   Office Visit on 04/10/2018   Component Date Value Ref Range Status     Sodium 04/10/2018 138  133 - 144 mmol/L Final     Potassium 04/10/2018 4.0  3.4 - 5.3 mmol/L Final     Chloride 04/10/2018 104  94 - 109 mmol/L Final     Carbon Dioxide 04/10/2018 28  20 - 32 mmol/L Final     Anion Gap 04/10/2018 6  3 - 14 mmol/L Final     Glucose 04/10/2018 92  70 - 99 mg/dL Final     Urea Nitrogen 04/10/2018 16  7 - 30 mg/dL Final     Creatinine 04/10/2018 0.99  0.66 - 1.25 mg/dL Final     GFR Estimate 04/10/2018 77  >60 mL/min/1.7m2 Final    Non  GFR Calc     GFR Estimate If Black 04/10/2018 >90  >60 mL/min/1.7m2 Final    African American GFR Calc     Calcium 04/10/2018 8.1* 8.5 - 10.1 mg/dL Final     Bilirubin Total 04/10/2018 0.3  0.2 - 1.3 mg/dL Final     Albumin 04/10/2018 3.5  3.4 - 5.0 g/dL Final     Protein Total 04/10/2018 6.4* 6.8 - 8.8 g/dL Final     Alkaline Phosphatase 04/10/2018 97  40 - 150 U/L Final     ALT 04/10/2018 23  0 - 70 U/L Final     AST 04/10/2018 19  0 - 45 U/L Final      TSH 04/10/2018 5.63* 0.40 - 4.00 mU/L Final     T4 Free 04/10/2018 1.27  0.76 - 1.46 ng/dL Final            Assessment and plan:    (C34.90) Non-small cell carcinoma of lung (H)  (primary encounter diagnosis)  Patient continues to respond well to treatment.  He has been tolerating chemotherapy rather well.  We will continue with cycle #6.  We will arrange for imaging studies to assess response.  We reviewed with the patient today maintenance chemotherapy with gemcitabine.  I will see the patient again in 4 months or sooner if there are new developments or concerns.    (R11.2,  T45.1X5A) Chemotherapy induced nausea and vomiting  Nausea is currently well-controlled with the current regimen.    (J90) Pleural effusion  Pleural effusion has improved.    (I42.9) Cardiomyopathy, unspecified type (H)  (I48.92) Atrial flutter, unspecified type (H)  Patient continues on amiodarone.    The patient is ready to learn, no apparent learning barriers were identified.  Diagnosis and treatment plans were explained to the patient. The patient expressed understanding of the content. The patient asked appropriate questions. The patient questions were answered to his satisfaction.    Chart documentation with Dragon Voice recognition Software. Although reviewed after completion, some words and grammatical errors may remain.

## 2018-04-19 NOTE — MR AVS SNAPSHOT
After Visit Summary   4/19/2018    Zechariah Ashby    MRN: [id number]           Patient Information     Date Of Birth          1960        Visit Information        Provider Department      4/19/2018 9:15 AM Joycelyn May MD [facility] ONCOLOGY      Today's Diagnoses     Non-small cell carcinoma of lung (H)    -  1    Chemotherapy induced nausea and vomiting        Pleural effusion        Cardiomyopathy, unspecified type (H)        Atrial flutter, unspecified type (H)          Care Instructions    We would like to see you back in clinic with Dr. May in 1 month with PET scan prior and chemotherapy to be scheduled per treatment plan.      When you are in need of a refill, please call your pharmacy and they will send us a request.      Copy of appointments, and after visit summary (AVS) given to patient.      If you have any questions during business hours (M-F 8 AM- 4PM), please call Camille Lara RN, BSN, OCN Oncology Hematology /Breast Cancer Navigator at [facility] [phone].       For questions after business hours, or on holidays/weekends, please call our after hours Nurse Triage line [phone]. Thank you.            Follow-ups after your visit        Your next 10 appointments already scheduled     Apr 20, 2018  9:20 AM CDT   SHORT with Kailash Mason MD   [facility] ([facility])    [address] [place] [address]   [phone]            Apr 26, 2018 10:00 AM CDT   Level 1 with ROOM 7 [facility] ([facility])    [facility]  [address]  [facility] [address]   [phone]            May 16, 2018  9:00 AM CDT   Level O with ROOM 2 [facility] ([facility])    [facility]  [address]  [facility] [address]   [phone]            May 16,  2018 10:00 AM CDT   (Arrive by 9:45 AM)   PE NPET ONCOLOGY (EYES TO THIGHS) with [id number]   [facility] ([facility])    [address]  [facility] [address]   [phone]           Tell your doctor:   If there is any chance you may be pregnant or if you are breastfeeding.   If you have problems lying in small spaces (claustrophobia). If you do, your doctor may give you medicine to help you relax. If you have diabetes:   Have your exam early in the morning. Your blood glucose will go up as the day goes by.   Your glucose level must be 180 or less at the start of the exam. Please take any medicines you need to ensure this blood glucose level. 24 hours before your scan: Don t do any heavy exercise. (No jogging, aerobics or other workouts.) Exercise will make your pictures less accurate. 6 hours before your scan:   Stop all food and liquids (except water).   Do not chew gum or suck on mints.   If you need to take medicine with food, you may take it with a few crackers.  Please call your Imaging Department at your exam site with any questions.            May 17, 2018 11:15 AM CDT   Return Visit with Joycelyn May MD   [facility] ([facility])    [facility]  [address]  [facility] [address]   [phone]              Future tests that were ordered for you today     Open Future Orders        Priority Expected Expires Ordered    PET Oncology (Eyes to Thighs) Routine  4/20/2019 4/19/2018            Who to contact     If you have questions or need follow up information about today's clinic visit or your schedule please contact [facility] directly at [phone].  Normal or non-critical lab and imaging results will be communicated to you by MyChart, letter or phone within 4 business days after the clinic has received the results. If you do not hear from us within 7 days, please contact the clinic through MyChart or  "phone. If you have a critical or abnormal lab result, we will notify you by phone as soon as possible.  Submit refill requests through "[employer] or call your pharmacy and they will forward the refill request to us. Please allow 3 business days for your refill to be completed.          Additional Information About Your Visit        [employer] Information     "[employer] lets you send messages to your doctor, view your test results, renew your prescriptions, schedule appointments and more. To sign up, go to [email or web address] . Click on \"Log in\" on the left side of the screen, which will take you to the Welcome page. Then click on \"Sign up Now\" on the right side of the page.     You will be asked to enter the access code listed below, as well as some personal information. Please follow the directions to create your username and password.     Your access code is: [email or web address]  Expires: 2018 10:55 AM     Your access code will  in 90 days. If you need help or a new code, please call your [facility] clinic or [phone].        Care EveryWhere ID     This is your Care EveryWhere ID. This could be used by other organizations to access your [facility] medical records  [id number]        Your Vitals Were     Pulse Temperature Respirations Height Pulse Oximetry BMI (Body Mass Index)    61 96.4  F (35.8  C) (Tympanic) 16 1.803 m (5' 10.98\") 100% 20.87 kg/m2       Blood Pressure from Last 3 Encounters:   18 134/82   18 113/73   04/10/18 139/83    Weight from Last 3 Encounters:   18 67.9 kg (149 lb 9.6 oz)   04/10/18 68.5 kg (151 lb)   18 68 kg (149 lb 14.4 oz)               Primary Care Provider Office Phone # Fax #    Kailash Mason [phone] [address] [place] [address]        Equal Access to Services     ROD PERRY : Diego Zhang, ronan pendleton, qaybta kaalmada harley so. So [facility] " [phone].    ATENCIÓN: Si wesley stren, tiene a morocho disposición servicios gratuitos de asistencia lingüística. Roz robbins [phone].    We comply with applicable federal civil rights laws and [place] laws. We do not discriminate on the basis of race, color, national origin, age, disability, sex, sexual orientation, or gender identity.            Thank you!     Thank you for choosing [facility]  for your care. Our goal is always to provide you with excellent care. Hearing back from our patients is one way we can continue to improve our services. Please take a few minutes to complete the written survey that you may receive in the mail after your visit with us. Thank you!             Your Updated Medication List - Protect others around you: Learn how to safely use, store and throw away your medicines at www.disposemymeds.org.          This list is accurate as of 4/19/18  4:02 PM.  Always use your most recent med list.                   Brand Name Dispense Instructions for use Diagnosis    albuterol 108 (90 Base) MCG/ACT Inhaler    PROAIR HFA    1 Inhaler    Inhale 2 puffs into the lungs every 4 hours as needed for shortness of breath / dyspnea or wheezing    Chronic obstructive pulmonary disease, unspecified COPD type (H)       amiodarone 200 MG tablet    PACERONE/CODARONE    90 tablet    1 tablet daily    Paroxysmal atrial fibrillation (H)       LORazepam 0.5 MG tablet    ATIVAN    30 tablet    Take 1 tablet (0.5 mg) by mouth every 4 hours as needed (Anxiety, Nausea/Vomiting or Sleep)    Non-small cell carcinoma of lung (H)       morphine 15 MG IR tablet    MSIR    30 tablet    1/2 to 1 pill every 4 hours as needed for air hunger.    Non-small cell carcinoma of lung (H)       * nicotine 14 MG/24HR 24 hr patch    NICODERM CQ    14 patch    Place 1 patch onto the skin every 24 hours    Encounter for tobacco use cessation counseling, Tobacco abuse       * nicotine 7 MG/24HR 24 hr patch    NICODERM CQ     30 patch    Place 1 patch onto the skin every 24 hours    Tobacco abuse       * nicotine 21 MG/24HR 24 hr patch    NICODERM CQ    30 patch    Place 1 patch onto the skin every 24 hours    Non-small cell carcinoma of lung (H)       omeprazole 40 MG capsule    priLOSEC    30 capsule    Take 1 capsule (40 mg) by mouth daily Take 30-60 minutes before a meal.    Gastroesophageal reflux disease without esophagitis       ondansetron 4 MG tablet    ZOFRAN    30 tablet    Take 1 tablet (4 mg) by mouth every 8 hours as needed for nausea    Non-small cell carcinoma of lung (H)       prochlorperazine 10 MG tablet    COMPAZINE    30 tablet    Take 1 tablet (10 mg) by mouth every 6 hours as needed (Nausea/Vomiting)    Non-small cell carcinoma of lung (H)       * warfarin 5 MG tablet    COUMADIN    30 tablet    Take 1.25 mg MWF; 2.5 mg rest of the week as directed by the Anticoagulation Clinic    Atrial flutter, unspecified type (H)       * warfarin 2.5 MG tablet    COUMADIN    75 tablet    Take 1.25 mg MWF, 2.5 mg rest of the week or as directed by the Anticoagulation Clinic    Long-term (current) use of anticoagulants, Atrial flutter, unspecified type (H)       * Notice:  This list has 5 medication(s) that are the same as other medications prescribed for you. Read the directions carefully, and ask your doctor or other care provider to review them with you.

## 2018-04-19 NOTE — PROGRESS NOTES
Infusion Nursing Note:  Zechariah Ashby presents today for Carbo/Gemzar.    Patient seen by provider today: Yes: Dr. May   present during visit today: Not Applicable.    Note: N/A.    Intravenous Access:  Labs drawn without difficulty.  Implanted Port.    Treatment Conditions:  Lab Results   Component Value Date    HGB 9.3 04/19/2018     Lab Results   Component Value Date    WBC 4.2 04/19/2018      Lab Results   Component Value Date    ANEU 2.8 04/19/2018     Lab Results   Component Value Date     04/19/2018      Lab Results   Component Value Date     04/19/2018                   Lab Results   Component Value Date    POTASSIUM 3.8 04/19/2018           Lab Results   Component Value Date    MAG 2.0 01/12/2018            Lab Results   Component Value Date    CR 0.89 04/19/2018                   Lab Results   Component Value Date    VIJAY 8.1 04/19/2018                Lab Results   Component Value Date    BILITOTAL 0.3 04/10/2018           Lab Results   Component Value Date    ALBUMIN 3.5 04/10/2018                    Lab Results   Component Value Date    ALT 23 04/10/2018           Lab Results   Component Value Date    AST 19 04/10/2018       Results reviewed, labs MET treatment parameters, ok to proceed with treatment.      Post Infusion Assessment:  Patient tolerated infusion without incident.  Good blood return from port before and after chemo administrations.    Discharge Plan:   Patient discharged in stable condition accompanied by: friend.  Departure Mode: Ambulatory.  Return for next treatment as scheduled on 4/26.    Mary Bhakta RN

## 2018-04-19 NOTE — PROGRESS NOTES
ANTICOAGULATION FOLLOW-UP CLINIC VISIT    Patient Name:  Zechariah Ashby  Date:  4/19/2018  Contact Type:  Telephone    SUBJECTIVE:     Patient Findings     Positives Med error (Patient accidentally took 1.25mg daily for the past couple weeks)    Comments Patient was seen in infusion today for CARBOplatin / Gemcitabine .     Writer is not sure why patient accidentally started taking the wrong dose. The amount he reports taking the past couple weeks is much less than his usual warfarin dose of 1.25mg MWF, 2.5mg all other days. Patient had been taking that same warfarin dose for ~7weeks.  Will plan to resume his usual warfarin dose and recheck next Thursday when he is back in the clinic again.            OBJECTIVE    INR   Date Value Ref Range Status   04/19/2018 1.60 (H) 0.86 - 1.14 Final       ASSESSMENT / PLAN  No question data found.  Anticoagulation Summary as of 4/19/2018     INR goal 2.0-3.0   Today's INR 1.60!   Maintenance plan 1.25 mg (2.5 mg x 0.5) on Mon, Wed, Fri; 2.5 mg (2.5 mg x 1) all other days   Full instructions 1.25 mg on Mon, Wed, Fri; 2.5 mg all other days   Weekly total 13.75 mg   No change documented Alice Lea RN   Plan last modified Yaritza Diaz RN (3/2/2018)   Next INR check 4/26/2018   Priority INR   Target end date     Indications   Atrial flutter  unspecified type (H) [I48.92]  Long-term (current) use of anticoagulants [Z79.01] [Z79.01]         Anticoagulation Episode Summary     INR check location     Preferred lab     Send INR reminders to [place] PHONE ANTICOAG POOL    Comments *Pt is on AMIODARONE. On palliative chemo (CARBOplatin / Gemcitabine) pt cell: [phone]      Anticoagulation Care Providers     Provider Role Specialty Phone number    Kailash Mason MD [facility] [phone]            See the Encounter Report to view Anticoagulation Flowsheet and Dosing Calendar (Go to Encounters tab in chart review, and find the Anticoagulation Therapy  Visit)        Alice Lea RN, CACP

## 2018-04-20 NOTE — NURSING NOTE
"Chief Complaint   Patient presents with     Depression     Increase sx.       Initial /60 (BP Location: Right arm, Patient Position: Chair, Cuff Size: Adult Regular)  Pulse 78  Temp 97.9  F (36.6  C) (Tympanic)  Resp 21  Wt 152 lb (68.9 kg)  SpO2 97%  BMI 21.21 kg/m2 Estimated body mass index is 21.21 kg/(m^2) as calculated from the following:    Height as of 4/19/18: 5' 10.98\" (1.803 m).    Weight as of this encounter: 152 lb (68.9 kg).      Health Maintenance that is potentially due pending provider review:  Colonoscopy/FIT and multiple HM but patient states he has terminal cancer    Pt declines to have .    Is there anyone who you would like to be able to receive your results? Yes Estefani Parrish- ex wife  If yes have patient fill out ARIK  Emani Blanco CMA    "

## 2018-04-20 NOTE — PATIENT INSTRUCTIONS
PLAN:   Polyethylene glycol (Miralax) 17 grams once daily for constipation.    Take the senna pills twice daily for constipation.   Wellbutrin for depression-start with 1 daily for 3 days, then twice daily.    Recheck in a month.  Let me know earlier if side effects.   Keep weight off your tailbone area.

## 2018-04-20 NOTE — PROGRESS NOTES
"  SUBJECTIVE:   Zechariah Ashby is a 58 year old male who presents to clinic today for the following health issues:    Abnormal Mood Symptoms    Duration: 2 months    Description:  Depression: YES  Anxiety: no   Panic attacks: no      Accompanying signs and symptoms: see PHQ-9 and KELECHI scores. Diagnosed with terminal lung cancer 12/2017    History (similar episodes/previous evaluation): past hx of depression    Precipitating or alleviating factors: None    Therapies tried and outcome: none- On Ativan for nausea and air hunger as needed    Sees Dr. May, oncology for management of non-small call lung cancer. Received 6th cycle chemotherapy yesterday. Plan to start maintenance chemotherapy in near future.  Has worsening depressed mood and worry since cancer diagnosis this winter. Is worried about his prognosis. No thoughts of harming himself or others. Has had variable depressed mood symptoms prior to cancer diagnosis as well. Was taking on prozac in past, but \"didn't feel well on it.\" Takes lorazepam seldomly for air hunger at night.     PHQ-9 SCORE 4/20/2018   Total Score 22     KELECHI-7 SCORE 4/20/2018   Total Score 12     Continues to smoke 4-5 cigarettes daily. Has decreased cravings with nicotine patch.  Nausea has been improved with zofran and compazine.    Sees Dr. Montemayor, cardiology for cardiomyopathy and atrial arrhythmias. He is currently doing well on amiodarone, but is concerned about the possible side effects.    Constipation  Has constipation with bowel movements and hard formed stools every 1-1.5 weeks. Associated with lower abdominal pain and straining on toilet. Has taken dulcolax in past with relief of symptoms.    Problem list and histories reviewed & adjusted, as indicated.  Additional history: as documented    BP Readings from Last 3 Encounters:   04/20/18 110/60   04/19/18 134/82   04/19/18 113/73    Wt Readings from Last 3 Encounters:   04/20/18 152 lb (68.9 kg)   04/19/18 149 lb 9.6 oz (67.9 kg) "   04/10/18 151 lb (68.5 kg)        Labs reviewed in EPIC    Reviewed and updated as needed this visit by clinical staff  Tobacco  Allergies  Meds  Med Hx  Surg Hx  Fam Hx  Soc Hx      Reviewed and updated as needed this visit by Provider       ROS:  Constitutional: No fever, chills  ENT: No sore throat, drainage  Respiratory: Occasional shortness of breath. No wheezing, cough, sputum, hemoptysis  Cardiovascular: No chest pain, palpitations  Gastrointestinal: Constipation, lower abdominal pain. No nausea, vomiting, diarrhea  Genitourinary: No urinary urgency or dysuria  Skin: No rashes, sores  Musculoskeletal: No arthralgias, myalgias  Allergic: No known allergies  Neurologic: No headache, dizziness  Psychiatric: Increased depression and anxiety. No suicidal thoughts    OBJECTIVE:     /60 (BP Location: Right arm, Patient Position: Chair, Cuff Size: Adult Regular)  Pulse 78  Temp 97.9  F (36.6  C) (Tympanic)  Resp 21  Wt 152 lb (68.9 kg)  SpO2 97%  BMI 21.21 kg/m2  Body mass index is 21.21 kg/(m^2).     Constitutional: appears frail, in no acute distress  Eyes: PERRL bilaterally, anicteric sclera, no conjunctival injection or pallor  ENT: moist mucous membranes, non-erythematous oropharynx, no lymphadenopathy  Respiratory: no respiratory distress, lung sounds clear, no wheezes, crackles, or rhonchi  Cardiovascular: regular rate and rhythm, normal S1 and S2, no murmurs, peripheral pulses strong and equal bilaterally, no peripheral edema, extremities well-perfused  GI: soft, non-distended abdomen, non-tender to palpation, hypoactive bowel sounds  Skin: warm and dry  Neuro: alert and oriented to person, time, and place  Psych: appropriate mood and affect, cooperative with exam    Diagnostic Test Results: none     ASSESSMENT/PLAN:   1. Moderate single current episode of major depressive disorder (H)  Worsening depression related to cancer diagnosis. Will start buproprion as this has low risk of QT  prolongation while on amiodorone and may help reduce tobacco cravings.  - buPROPion (WELLBUTRIN SR) 150 MG 12 hr tablet; Take 1 tablet once daily and increase to 1 tablet twice daily after 3 days  Dispense: 60 tablet; Refill: 1    2. Anxiety  Increased anxiety and worry related to cancer diagnosis. OK to take lorazepam as needed for severe anxiety 2-3 times per week.    3. Decubitus ulcer of sacral region, stage 1  Mild skin breakdown in sacrum, possibly related to weight loss. Cushion ordered to reduce pressure on the area.  - order for DME; Equipment being ordered: donut cushion for sacral ulcer  Dispense: 1 Device; Refill: 0    4. Constipation, unspecified constipation type  No bowel movement for the past week.  - polyethylene glycol (MIRALAX) powder; Take 17 g (1 capful) by mouth daily  Dispense: 510 g; Refill: 1  - senna-docusate (SENOKOT-S;PERICOLACE) 8.6-50 MG per tablet; Take 1 tablet by mouth 2 times daily  Dispense: 60 tablet; Refill: 11    FUTURE APPOINTMENTS:       - Follow-up visit in 1 month    PLAN:   Polyethylene glycol (Miralax) 17 grams once daily for constipation.    Take the senna pills twice daily for constipation.   Wellbutrin for depression-start with 1 daily for 3 days, then twice daily.    Recheck in a month.  Let me know earlier if side effects.   Keep weight off your tailbone area.     Johnny Roman, MS3    I did see and examine patient with Johnny Roman today.   ROSA JASSO MD   [facility]

## 2018-04-20 NOTE — MR AVS SNAPSHOT
After Visit Summary   4/20/2018    Zechariah Ashby    MRN: [id number]           Patient Information     Date Of Birth          1960        Visit Information        Provider Department      4/20/2018 9:20 AM Kailash Mason MD [facility]        Today's Diagnoses     Moderate single current episode of major depressive disorder (H)    -  1    Anxiety        Decubitus ulcer of sacral region, stage 1        Constipation, unspecified constipation type          Care Instructions    PLAN:   Polyethylene glycol (Miralax) 17 grams once daily for constipation.    Take the senna pills twice daily for constipation.   Wellbutrin for depression-start with 1 daily for 3 days, then twice daily.    Recheck in a month.  Let me know earlier if side effects.   Keep weight off your tailbone area.           Follow-ups after your visit        Your next 10 appointments already scheduled     Apr 26, 2018 10:00 AM CDT   Level 1 with ROOM 7 [facility] ([facility])    [facility]  [address]  [facility] [address]   [phone]            May 16, 2018  9:00 AM CDT   Level O with ROOM 2 [facility] ([facility])    [facility]  [address]  [facility] [address]   [phone]            May 16, 2018 10:00 AM CDT   (Arrive by 9:45 AM)   PE NPET ONCOLOGY (EYES TO THIGHS) with [id number]   [facility] ([facility])    [address] [facility] [address]   [phone]           Tell your doctor:   If there is any chance you may be pregnant or if you are breastfeeding.   If you have problems lying in small spaces (claustrophobia). If you do, your doctor may give you medicine to help you relax. If you have diabetes:   Have your exam early in the morning. Your blood glucose will go up as the day goes by.   Your glucose level must be 180 or less at  "the start of the exam. Please take any medicines you need to ensure this blood glucose level. 24 hours before your scan: Don t do any heavy exercise. (No jogging, aerobics or other workouts.) Exercise will make your pictures less accurate. 6 hours before your scan:   Stop all food and liquids (except water).   Do not chew gum or suck on mints.   If you need to take medicine with food, you may take it with a few crackers.  Please call your Imaging Department at your exam site with any questions.            May 17, 2018 11:15 AM CDT   Return Visit with Joycelyn May MD   [facility] ([facility])    [facility]  [address]  [facility] [address] [phone]              Future tests that were ordered for you today     Open Future Orders        Priority Expected Expires Ordered    PET Oncology (Eyes to Thighs) Routine  4/20/2019 4/19/2018            Who to contact     If you have questions or need follow up information about today's clinic visit or your schedule please contact [facility] directly at [phone].  Normal or non-critical lab and imaging results will be communicated to you by MyChart, letter or phone within 4 business days after the clinic has received the results. If you do not hear from us within 7 days, please contact the clinic through [employer] or phone. If you have a critical or abnormal lab result, we will notify you by phone as soon as possible.  Submit refill requests through [employer] or call your pharmacy and they will forward the refill request to us. Please allow 3 business days for your refill to be completed.          Additional Information About Your Visit        [employer] Information     [employer] lets you send messages to your doctor, view your test results, renew your prescriptions, schedule appointments and more. To sign up, go to [email or web address] . Click on \"Log in\" on the left side of the screen, " "which will take you to the Welcome page. Then click on \"Sign up Now\" on the right side of the page.     You will be asked to enter the access code listed below, as well as some personal information. Please follow the directions to create your username and password.     Your access code is: [email or web address]  Expires: 2018 10:55 AM     Your access code will  in 90 days. If you need help or a new code, please call your [facility] or [phone].        Care EveryWhere ID     This is your Care EveryWhere ID. This could be used by other organizations to access your [place] medical records  [id number]        Your Vitals Were     Pulse Temperature Respirations Pulse Oximetry BMI (Body Mass Index)       78 97.9  F (36.6  C) (Tympanic) 21 97% 21.21 kg/m2        Blood Pressure from Last 3 Encounters:   18 110/60   18 134/82   18 113/73    Weight from Last 3 Encounters:   18 152 lb (68.9 kg)   18 149 lb 9.6 oz (67.9 kg)   04/10/18 151 lb (68.5 kg)              Today, you had the following     No orders found for display         Today's Medication Changes          These changes are accurate as of 18 10:45 AM.  If you have any questions, ask your nurse or doctor.               Start taking these medicines.        Dose/Directions    buPROPion 150 MG 12 hr tablet   Commonly known as:  WELLBUTRIN SR   Used for:  Moderate single current episode of major depressive disorder (H)   Started by:  Kailash Mason MD        Take 1 tablet once daily and increase to 1 tablet twice daily after 3 days   Quantity:  60 tablet   Refills:  1       order for DME   Used for:  Decubitus ulcer of sacral region, stage 1   Started by:  Kailash Mason MD        Equipment being ordered: donut cushion for sacral ulcer   Quantity:  1 Device   Refills:  0       polyethylene glycol powder   Commonly known as:  MIRALAX   Used for:  Constipation, unspecified constipation type   Started by:  Kailash Mason" MD Missael        Dose:  1 capful   Take 17 g (1 capful) by mouth daily   Quantity:  510 g   Refills:  1       senna-docusate 8.6-50 MG per tablet   Commonly known as:  SENOKOT-S;PERICOLACE   Used for:  Constipation, unspecified constipation type   Started by:  Kailash Mason MD        Dose:  1 tablet   Take 1 tablet by mouth 2 times daily   Quantity:  60 tablet   Refills:  11         These medicines have changed or have updated prescriptions.        Dose/Directions    nicotine 21 MG/24HR 24 hr patch   Commonly known as:  NICODERM CQ   This may have changed:  Another medication with the same name was removed. Continue taking this medication, and follow the directions you see here.   Used for:  Non-small cell carcinoma of lung (H)   Changed by:  Kailash Mason MD        Dose:  1 patch   Place 1 patch onto the skin every 24 hours   Quantity:  30 patch   Refills:  0            Where to get your medicines      These medications were sent to [facility] #[facility] [address] [address]    Hours:  Closed 10-16-08 business to [facility] Phone:  [phone]     buPROPion 150 MG 12 hr tablet    polyethylene glycol powder    senna-docusate 8.6-50 MG per tablet         Some of these will need a paper prescription and others can be bought over the counter.  Ask your nurse if you have questions.     Bring a paper prescription for each of these medications     order for DME                Primary Care Provider Office Phone # Fax #    Kailash Mason [phone] [address] [address]        Equal Access to Services     [facility] Center[facility]HLIA AH: Hadii arnol bateso Soaustinali, waaxda luqadaha, qaybta kaalmada adeegyada, harley boykin. So [facility] [phone].    ATENCIÓN: Si habla español, tiene a morocho disposición servicios gratuitos de asistencia lingüística. Llame al [phone].    We comply with applicable  federal civil rights laws and [place] laws. We do not discriminate on the basis of race, color, national origin, age, disability, sex, sexual orientation, or gender identity.            Thank you!     Thank you for choosing [facility]  for your care. Our goal is always to provide you with excellent care. Hearing back from our patients is one way we can continue to improve our services. Please take a few minutes to complete the written survey that you may receive in the mail after your visit with us. Thank you!             Your Updated Medication List - Protect others around you: Learn how to safely use, store and throw away your medicines at www.disposemymeds.org.          This list is accurate as of 4/20/18 10:45 AM.  Always use your most recent med list.                   Brand Name Dispense Instructions for use Diagnosis    albuterol 108 (90 Base) MCG/ACT Inhaler    PROAIR HFA    1 Inhaler    Inhale 2 puffs into the lungs every 4 hours as needed for shortness of breath / dyspnea or wheezing    Chronic obstructive pulmonary disease, unspecified COPD type (H)       amiodarone 200 MG tablet    PACERONE/CODARONE    90 tablet    1 tablet daily    Paroxysmal atrial fibrillation (H)       buPROPion 150 MG 12 hr tablet    WELLBUTRIN SR    60 tablet    Take 1 tablet once daily and increase to 1 tablet twice daily after 3 days    Moderate single current episode of major depressive disorder (H)       LORazepam 0.5 MG tablet    ATIVAN    30 tablet    Take 1 tablet (0.5 mg) by mouth every 4 hours as needed (Anxiety, Nausea/Vomiting or Sleep)    Non-small cell carcinoma of lung (H)       morphine 15 MG IR tablet    MSIR    30 tablet    1/2 to 1 pill every 4 hours as needed for air hunger.    Non-small cell carcinoma of lung (H)       nicotine 21 MG/24HR 24 hr patch    NICODERM CQ    30 patch    Place 1 patch onto the skin every 24 hours    Non-small cell carcinoma of lung (H)       omeprazole 40 MG capsule     priLOSEC    30 capsule    Take 1 capsule (40 mg) by mouth daily Take 30-60 minutes before a meal.    Gastroesophageal reflux disease without esophagitis       ondansetron 4 MG tablet    ZOFRAN    30 tablet    Take 1 tablet (4 mg) by mouth every 8 hours as needed for nausea    Non-small cell carcinoma of lung (H)       order for DME     1 Device    Equipment being ordered: donut cushion for sacral ulcer    Decubitus ulcer of sacral region, stage 1       polyethylene glycol powder    MIRALAX    510 g    Take 17 g (1 capful) by mouth daily    Constipation, unspecified constipation type       prochlorperazine 10 MG tablet    COMPAZINE    30 tablet    Take 1 tablet (10 mg) by mouth every 6 hours as needed (Nausea/Vomiting)    Non-small cell carcinoma of lung (H)       senna-docusate 8.6-50 MG per tablet    SENOKOT-S;PERICOLACE    60 tablet    Take 1 tablet by mouth 2 times daily    Constipation, unspecified constipation type       * warfarin 5 MG tablet    COUMADIN    30 tablet    Take 1.25 mg MWF; 2.5 mg rest of the week as directed by the Anticoagulation Clinic    Atrial flutter, unspecified type (H)       * warfarin 2.5 MG tablet    COUMADIN    75 tablet    Take 1.25 mg MWF, 2.5 mg rest of the week or as directed by the Anticoagulation Clinic    Long-term (current) use of anticoagulants, Atrial flutter, unspecified type (H)       * Notice:  This list has 2 medication(s) that are the same as other medications prescribed for you. Read the directions carefully, and ask your doctor or other care provider to review them with you.

## 2018-04-24 NOTE — TELEPHONE ENCOUNTER
"Patient's ex wife called to report that patient started a new antidepressant medication and \"now all he does is sleep.  For like 20 hours a day\".  Estefani is very worried about patient's lack of nutrition and hydration stating, \"he doesn't eat or drink more than a sip because he's always sleeping\". She reports patient drank 1 high protein drink mixed with ice cream and sips of coffee in 2 days time.    Inquired if patient was not hungry, nauseous or had mouth sores Estefani states patient has a bit of nausea controlled with compazine and ativan but she feels it is more because patient is so fatigued.     Also, Estefani would like to know patient's prognosis stating, \"we know he's going to die from this cancer, but we need a timeline so we can prepare.  My friend is a hospice nurse and she said it looks like he only has weeks to live\". Estefani would like Dr. May to have a sharla discussion with them this week on prognosis and expectations.    Offered for patient to come to clinic today for evaluation and IVF. Estefani declined at this time, stating, \"he doesn't want to come in before Thursday when he has his chemo\". Will add pt to Dr. May's schedule this Thursday prior to chemotherapy.     Did advise Estefani to bring patient to UC if symptoms worsen or new ones develop.  Estefani states understanding and is in agreement with this plan.  "

## 2018-04-26 NOTE — MR AVS SNAPSHOT
After Visit Summary   4/26/2018    Zechariah Ashby    MRN: [id number]           Patient Information     Date Of Birth          1960        Visit Information        Provider Department      4/26/2018 10:00 AM ROOM 7 [facility]        Today's Diagnoses     Non-small cell carcinoma of lung (H)    -  1    Atrial flutter, unspecified type (H)        Long-term (current) use of anticoagulants        Chemotherapy induced nausea and vomiting        Dehydration           Follow-ups after your visit        Your next 10 appointments already scheduled     Apr 27, 2018  5:15 PM CDT   (Arrive by 5:00 PM)   MR BRAIN W/O & W CONTRAST with [address] MRI ([facility])    [address] [facility] [address] [phone]           Take your medicines as usual, unless your doctor tells you not to. Bring a list of your current medicines to your exam (including vitamins, minerals and over-the-counter drugs).  You may or may not receive intravenous (IV) contrast for this exam pending the discretion of the Radiologist.  You do not need to do anything special to prepare.  The MRI machine uses a strong magnet. Please wear clothes without metal (snaps, zippers). A sweatsuit works well, or we may give you a hospital gown.  Please remove any body piercings and hair extensions before you arrive. You will also remove watches, jewelry, hairpins, wallets, dentures, partial dental plates and hearing aids. You may wear contact lenses, and you may be able to wear your rings. We have a safe place to keep your personal items, but it is safer to leave them at home.  **IMPORTANT** THE INSTRUCTIONS BELOW ARE ONLY FOR THOSE PATIENTS WHO HAVE BEEN PRESCRIBED SEDATION OR GENERAL ANESTHESIA DURING THEIR MRI PROCEDURE:  IF YOUR DOCTOR PRESCRIBED ORAL SEDATION (take medicine to help you relax during your exam):   You must get the medicine from your doctor (oral medication) before you arrive.  Bring the medicine to the exam. Do not take it at home. You ll be told when to take it upon arriving for your exam.   Arrive one hour early. Bring someone who can take you home after the test. Your medicine will make you sleepy. After the exam, you may not drive, take a bus or take a taxi by yourself.  IF YOUR DOCTOR PRESCRIBED IV SEDATION:   Arrive one hour early. Bring someone who can take you home after the test. Your medicine will make you sleepy. After the exam, you may not drive, take a bus or take a taxi by yourself.   No eating 6 hours before your exam. You may have clear liquids up until 4 hours before your exam. (Clear liquids include water, clear tea, black coffee and fruit juice without pulp.)  IF YOUR DOCTOR PRESCRIBED ANESTHESIA (be asleep for your exam):   Arrive 1 1/2 hours early. Bring someone who can take you home after the test. You may not drive, take a bus or take a taxi by yourself.   No eating 8 hours before your exam. You may have clear liquids up until 4 hours before your exam. (Clear liquids include water, clear tea, black coffee and fruit juice without pulp.)   You will spend four to five hours in the recovery room.  Please call the Imaging Department at your exam site with any questions.            May 04, 2018  1:00 PM CDT   Return Visit with Jonn Ybarra MD   [facility] ([facility])    [address] [facility] [address]   [phone]            May 09, 2018 10:00 AM CDT   Return Visit with Chace Mitchell MD   [facility] ([facility])    [facility]  [address]  [facility] [address]   [phone]            May 16, 2018  9:00 AM CDT   Level O with ROOM 2 [facility] ([facility])    [facility]  [address]  [facility] [address]   [phone]            May 16, 2018 10:00 AM CDT   (Arrive by  9:45 AM)   PE NPET ONCOLOGY (EYES TO THIGHS) with [id number]   [facility] ([facility])    [address] [facility] [address] [phone]           Tell your doctor:   If there is any chance you may be pregnant or if you are breastfeeding.   If you have problems lying in small spaces (claustrophobia). If you do, your doctor may give you medicine to help you relax. If you have diabetes:   Have your exam early in the morning. Your blood glucose will go up as the day goes by.   Your glucose level must be 180 or less at the start of the exam. Please take any medicines you need to ensure this blood glucose level. 24 hours before your scan: Don t do any heavy exercise. (No jogging, aerobics or other workouts.) Exercise will make your pictures less accurate. 6 hours before your scan:   Stop all food and liquids (except water).   Do not chew gum or suck on mints.   If you need to take medicine with food, you may take it with a few crackers.  Please call your Imaging Department at your exam site with any questions.            May 16, 2018  1:40 PM CDT   SHORT with Kailash Mason MD   [facility] ([facility])    [address] [place] [id number] [phone]            May 17, 2018 11:15 AM CDT   Return Visit with Joycelyn May MD   [facility] ([facility])    n [facility]  [address] [facility] [address]  [facility] [address] [phone]              Future tests that were ordered for you today     Open Future Orders        Priority Expected Expires Ordered    MR Brain w/o & w Contrast STAT 4/26/2018 4/26/2019 4/26/2018            Who to contact     If you have questions or need follow up information about today's clinic visit or your schedule please contact [facility] directly at [phone].  Normal or non-critical lab and imaging results will be communicated to you by  "MyChart, letter or phone within 4 business days after the clinic has received the results. If you do not hear from us within 7 days, please contact the clinic through [employer] or phone. If you have a critical or abnormal lab result, we will notify you by phone as soon as possible.  Submit refill requests through [employer] or call your pharmacy and they will forward the refill request to us. Please allow 3 business days for your refill to be completed.          Additional Information About Your Visit        [employer] Information     [employer] lets you send messages to your doctor, view your test results, renew your prescriptions, schedule appointments and more. To sign up, go to [email or web address] . Click on \"Log in\" on the left side of the screen, which will take you to the Welcome page. Then click on \"Sign up Now\" on the right side of the page.     You will be asked to enter the access code listed below, as well as some personal information. Please follow the directions to create your username and password.     Your access code is: [email or web address]  Expires: 2018 10:55 AM     Your access code will  in 90 days. If you need help or a new code, please call your [place] clinic or [phone].        Care EveryWhere ID     This is your Care EveryWhere ID. This could be used by other organizations to access your [place] medical records  [id number]        Your Vitals Were     Pulse                   59            Blood Pressure from Last 3 Encounters:   18 131/79   18 130/90   18 110/60    Weight from Last 3 Encounters:   18 64.9 kg (143 lb)   18 68.9 kg (152 lb)   18 67.9 kg (149 lb 9.6 oz)              We Performed the Following     CBC with platelets differential     Comprehensive metabolic panel     INR     Magnesium        Primary Care Provider Office Phone # Fax #    Kailash Mason [phone] [address] [place] [address]        Equal " Access to Services     [facility]: Hadii aad ku hadperfectohitesh Sandyasher, wabusterda luqteressa, qakingta kajimyharley ulloa. So [facility] [phone].    ATENCIÓN: Si habla jarred, tiene a morocho disposición servicios gratuitos de asistencia lingüística. Llame al [phone].    We comply with applicable federal civil rights laws and [place] laws. We do not discriminate on the basis of race, color, national origin, age, disability, sex, sexual orientation, or gender identity.            Thank you!     Thank you for choosing [facility]  for your care. Our goal is always to provide you with excellent care. Hearing back from our patients is one way we can continue to improve our services. Please take a few minutes to complete the written survey that you may receive in the mail after your visit with us. Thank you!             Your Updated Medication List - Protect others around you: Learn how to safely use, store and throw away your medicines at www.disposemymeds.org.          This list is accurate as of 4/26/18 12:53 PM.  Always use your most recent med list.                   Brand Name Dispense Instructions for use Diagnosis    albuterol 108 (90 Base) MCG/ACT Inhaler    PROAIR HFA    1 Inhaler    Inhale 2 puffs into the lungs every 4 hours as needed for shortness of breath / dyspnea or wheezing    Chronic obstructive pulmonary disease, unspecified COPD type (H)       amiodarone 200 MG tablet    PACERONE/CODARONE    90 tablet    1 tablet daily    Paroxysmal atrial fibrillation (H)       buPROPion 150 MG 12 hr tablet    WELLBUTRIN SR    60 tablet    Take 1 tablet once daily and increase to 1 tablet twice daily after 3 days    Moderate single current episode of major depressive disorder (H)       LORazepam 0.5 MG tablet    ATIVAN    30 tablet    Take 1 tablet (0.5 mg) by mouth every 4 hours as needed (Anxiety, Nausea/Vomiting or Sleep)    Non-small cell carcinoma of lung (H)        morphine 15 MG IR tablet    MSIR    30 tablet    1/2 to 1 pill every 4 hours as needed for air hunger.    Non-small cell carcinoma of lung (H)       nicotine 21 MG/24HR 24 hr patch    NICODERM CQ    30 patch    Place 1 patch onto the skin every 24 hours    Non-small cell carcinoma of lung (H)       omeprazole 40 MG capsule    priLOSEC    30 capsule    Take 1 capsule (40 mg) by mouth daily Take 30-60 minutes before a meal.    Gastroesophageal reflux disease without esophagitis       ondansetron 4 MG tablet    ZOFRAN    30 tablet    Take 1 tablet (4 mg) by mouth every 8 hours as needed for nausea    Non-small cell carcinoma of lung (H)       order for DME     1 Device    Equipment being ordered: donut cushion for sacral ulcer    Decubitus ulcer of sacral region, stage 1       polyethylene glycol powder    MIRALAX    510 g    Take 17 g (1 capful) by mouth daily    Constipation, unspecified constipation type       prochlorperazine 10 MG tablet    COMPAZINE    30 tablet    Take 1 tablet (10 mg) by mouth every 6 hours as needed (Nausea/Vomiting)    Non-small cell carcinoma of lung (H)       senna-docusate 8.6-50 MG per tablet    SENOKOT-S;PERICOLACE    60 tablet    Take 1 tablet by mouth 2 times daily    Constipation, unspecified constipation type       * warfarin 5 MG tablet    COUMADIN    30 tablet    Take 1.25 mg MWF; 2.5 mg rest of the week as directed by the Anticoagulation Clinic    Atrial flutter, unspecified type (H)       * warfarin 2.5 MG tablet    COUMADIN    75 tablet    Take 1.25 mg MWF, 2.5 mg rest of the week or as directed by the Anticoagulation Clinic    Long-term (current) use of anticoagulants, Atrial flutter, unspecified type (H)       * Notice:  This list has 2 medication(s) that are the same as other medications prescribed for you. Read the directions carefully, and ask your doctor or other care provider to review them with you.

## 2018-04-26 NOTE — PROGRESS NOTES
Infusion Nursing Note:  Zechariah Ashby presents today for Gemzar.    Patient seen by provider today: No   present during visit today: Not Applicable.    Note: N/A.    Intravenous Access:  Implanted Port.  Labs drawn without difficulty.    Treatment Conditions:  Lab Results   Component Value Date    HGB 9.7 04/26/2018     Lab Results   Component Value Date    WBC 2.4 04/26/2018      Lab Results   Component Value Date    ANEU 1.5 04/26/2018     Lab Results   Component Value Date     04/26/2018      Lab Results   Component Value Date     04/26/2018                   Lab Results   Component Value Date    POTASSIUM 3.9 04/26/2018           Lab Results   Component Value Date    MAG 1.5 04/26/2018            Lab Results   Component Value Date    CR 0.98 04/26/2018                   Lab Results   Component Value Date    VIJAY 8.3 04/26/2018                Lab Results   Component Value Date    BILITOTAL 0.5 04/26/2018           Lab Results   Component Value Date    ALBUMIN 3.9 04/26/2018                    Lab Results   Component Value Date    ALT 24 04/26/2018           Lab Results   Component Value Date    AST 19 04/26/2018     See MD note. Pt had multiple complaints so  MD decided to hold chemo today and send patient for imaging.    IV fluids and antiemetics administered.    Post Infusion Assessment:  Patient tolerated infusion without incident.  Access discontinued per protocol.    Discharge Plan:   Patient discharged in stable condition accompanied by: daughter.  Departure Mode: Ambulatory.  Pt was provided with a copy of his upcoming appt schedule.    Mary Bhakta RN

## 2018-04-26 NOTE — PATIENT INSTRUCTIONS
Dr. May is holding your chemotherapy at this time.  You will need to have 1 liter NS with antiemetics today, and a brain MRI as soon as possible. We would like to see you back in clinic with Dr. May with the PET scan results.      When you are in need of a refill, please call your pharmacy and they will send us a request.      Copy of appointments, and after visit summary (AVS) given to patient.      If you have any questions during business hours (M-F 8 AM- 4PM), please call Camille Lara RN, BSN, OCN Oncology Hematology /Breast Cancer Navigator at [facility] [phone].       For questions after business hours, or on holidays/weekends, please call our after hours Nurse Triage line [phone]. Thank you.

## 2018-04-26 NOTE — NURSING NOTE
"Oncology Rooming Note    April 26, 2018 9:55 AM   Zechariah Ashby is a 58 year old male who presents for:    Chief Complaint   Patient presents with     Oncology Clinic Visit     Recheck Non-small cell carcinoma of lung, discuss decline and treatment options     Initial Vitals: /90 (BP Location: Right arm, Patient Position: Sitting, Cuff Size: Adult Regular)  Pulse 73  Temp 96.4  F (35.8  C) (Tympanic)  Resp 18  Ht 1.803 m (5' 10.98\")  Wt 64.9 kg (143 lb)  SpO2 100%  BMI 19.96 kg/m2 Estimated body mass index is 19.96 kg/(m^2) as calculated from the following:    Height as of this encounter: 1.803 m (5' 10.98\").    Weight as of this encounter: 64.9 kg (143 lb). Body surface area is 1.8 meters squared.  Mild Pain (2) Comment: between shoulder blades.    No LMP for male patient.  Allergies reviewed: Yes  Medications reviewed: Yes    Medications: Medication refills not needed today.  Pharmacy name entered into [employer]:    [id number] - [facility] [address] [facility]    Clinical concerns: Recheck Non-small cell carcinoma of lung, discuss decline and treatment options. C/o 2/10 shoulder blade back discomfort. Extreme nausea, awoke Monday night and fainted twice awaking up from the floor. He is sleeping a lot.     8 minutes for nursing intake (face to face time)     Courtney Lopez, SANDI            "

## 2018-04-26 NOTE — MR AVS SNAPSHOT
Zechariah WOODALL Isma   4/26/2018   Anticoagulation Therapy Visit    Description:  58 year old male   Provider:  Alice eLa RN   Department:  [facility]           INR as of 4/26/2018     Today's INR 1.08!      Anticoagulation Summary as of 4/26/2018     INR goal 2.0-3.0   Today's INR 1.08!   Full instructions 4/26: 5 mg; 4/27: 2.5 mg; Otherwise 1.25 mg on Mon, Wed, Fri; 2.5 mg all other days   Next INR check 4/30/2018    Indications   Atrial flutter  unspecified type (H) [I48.92]  Long-term (current) use of anticoagulants [Z79.01] [Z79.01]         April 2018 Details    Sun Mon Tue Wed Thu Fri Sat     1               2               3               4               5               6               7                 8               9               10               11               12               13               14                 15               16               17               18               19               20               21                 22               23               24               25               26      5 mg   See details      27      2.5 mg         28      2.5 mg           29      2.5 mg         30                  Date Details   04/26 This INR check       Date of next INR:  4/30/2018         How to take your warfarin dose     To take:  1.25 mg Take 0.5 of a 2.5 mg tablet.    To take:  2.5 mg Take 1 of the 2.5 mg tablets.    To take:  5 mg Take 2 of the 2.5 mg tablets.

## 2018-04-26 NOTE — PROGRESS NOTES
Hematology/ Oncology Follow-up Visit:  Apr 26, 2018    Reason for Visit:   Chief Complaint   Patient presents with     Oncology Clinic Visit     Recheck Non-small cell carcinoma of lung, discuss decline and treatment options       Oncologic History:  Non-small cell carcinoma of lung (H)  Zechariah Ashby is a 57 year old male who was recently diagnosed with atrial fib/flutter in early November 2017. During workup just x-ray showed elevation of the left hemidiaphragm with a focal tenting. The CT scan was done for further evaluation showing left upper lobe atelectasis and a moderate-sized left pleural effusion. Patient also had mediastinal adenopathy. Patient had left thoracocentesis. Cytology came back as transudate and cytology came back negative. Patient has a long history of smoking. PET scan showed a large FDG avid mediastinal mass extending to the left hilum with obliteration of the left upper lobe bronchus and atelectasis of the upper lobe.  There are also multiple left cervical left cervical and mediastinal adenopathy.  There is increased left pleural effusion.  There is hypermetabolic left adrenal lesion.  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 He was started on carboplatin and gemcitabine.      Interval History:  Patient returning today for follow-up.  He is due today for day #8 of cycle #6 of carboplatin and gemcitabine.  He has been tolerating chemotherapy poorly.  His been having nausea.  He has been having increasing fatigue lately.  He denies any shortness of breath or cough or wheezing.  His pain is currently well controlled.  He has poor appetite although his weight has been stable.  He also reported dizzy spells and fainting episode.    Review Of Systems:  Constitutional: Negative for fever, chills, and night sweats.  Skin: negative.  Eyes: negative.  Ears/Nose/Throat: negative.  Respiratory: No shortness of breath,  dyspnea on exertion, cough, or hemoptysis.  Cardiovascular: negative.  Gastrointestinal: negative.  Genitourinary: negative.  Musculoskeletal: negative.  Neurologic: negative.  Psychiatric: negative.  Hematologic/Lymphatic/Immunologic: negative.  Endocrine: negative.    All other ROS negative unless mentioned in interval history.    Past medical, social, surgical, and family histories reviewed.    Allergies:  Allergies as of 04/26/2018 - Osmel as Reviewed 04/26/2018   Allergen Reaction Noted     Nka [no known allergies]  01/03/2018       Current Medications:  Current Outpatient Prescriptions   Medication Sig Dispense Refill     albuterol (PROAIR HFA) 108 (90 BASE) MCG/ACT Inhaler Inhale 2 puffs into the lungs every 4 hours as needed for shortness of breath / dyspnea or wheezing 1 Inhaler 11     amiodarone (PACERONE/CODARONE) 200 MG tablet 1 tablet daily 90 tablet 0     buPROPion (WELLBUTRIN SR) 150 MG 12 hr tablet Take 1 tablet once daily and increase to 1 tablet twice daily after 3 days 60 tablet 1     morphine (MSIR) 15 MG IR tablet 1/2 to 1 pill every 4 hours as needed for air hunger. 30 tablet 0     nicotine (NICODERM CQ) 21 MG/24HR 24 hr patch Place 1 patch onto the skin every 24 hours 30 patch 0     omeprazole (PRILOSEC) 40 MG capsule Take 1 capsule (40 mg) by mouth daily Take 30-60 minutes before a meal. 30 capsule 11     ondansetron (ZOFRAN) 4 MG tablet Take 1 tablet (4 mg) by mouth every 8 hours as needed for nausea 30 tablet 1     order for DME Equipment being ordered: donut cushion for sacral ulcer 1 Device 0     polyethylene glycol (MIRALAX) powder Take 17 g (1 capful) by mouth daily 510 g 1     prochlorperazine (COMPAZINE) 10 MG tablet Take 1 tablet (10 mg) by mouth every 6 hours as needed (Nausea/Vomiting) 30 tablet 5     senna-docusate (SENOKOT-S;PERICOLACE) 8.6-50 MG per tablet Take 1 tablet by mouth 2 times daily 60 tablet 11     warfarin (COUMADIN) 2.5 MG tablet Take 1.25 mg MWF, 2.5 mg rest of the  "week or as directed by the Anticoagulation Clinic 75 tablet 0     warfarin (COUMADIN) 5 MG tablet Take 1.25 mg MWF; 2.5 mg rest of the week as directed by the Anticoagulation Clinic 30 tablet 1     LORazepam (ATIVAN) 0.5 MG tablet Take 1 tablet (0.5 mg) by mouth every 4 hours as needed (Anxiety, Nausea/Vomiting or Sleep) (Patient not taking: Reported on 4/26/2018) 30 tablet 5        Physical Exam:  /90 (BP Location: Right arm, Patient Position: Sitting, Cuff Size: Adult Regular)  Pulse 73  Temp 96.4  F (35.8  C) (Tympanic)  Resp 18  Ht 1.803 m (5' 10.98\")  Wt 64.9 kg (143 lb)  SpO2 100%  BMI 19.96 kg/m2  Wt Readings from Last 12 Encounters:   04/26/18 64.9 kg (143 lb)   04/20/18 68.9 kg (152 lb)   04/19/18 67.9 kg (149 lb 9.6 oz)   04/10/18 68.5 kg (151 lb)   03/29/18 68 kg (149 lb 14.4 oz)   03/15/18 65.1 kg (143 lb 9.6 oz)   03/08/18 67.7 kg (149 lb 3.2 oz)   02/22/18 66.7 kg (147 lb)   02/15/18 67.5 kg (148 lb 12.8 oz)   02/13/18 67.1 kg (148 lb)   02/05/18 65.8 kg (145 lb)   02/01/18 64.4 kg (142 lb)     ECOG performance status: 1  GENERAL APPEARANCE: Healthy, alert and in no acute distress.  HEENT: Sclerae anicteric. PERRLA. Oropharynx without ulcers, lesions, or thrush.  NECK: Supple. No asymmetry or masses.  LYMPHATICS: No palpable cervical, supraclavicular, axillary, or inguinal lymphadenopathy.  RESP: Lungs clear to auscultation bilaterally without rales, rhonchi or wheezes.  CARDIOVASCULAR: Regular rate and rhythm. Normal S1, S2; no S3 or S4. No murmur, gallop, or rub.  ABDOMEN: Soft, nontender. Bowel sounds normal. No palpable organomegaly or masses.  MUSCULOSKELETAL: Extremities without gross deformities noted. No edema of bilateral lower extremities.  SKIN: No suspicious lesions or rashes.  NEURO: Alert and oriented x 3. Cranial nerves II-XII grossly intact.  PSYCHIATRIC: Mentation and affect appear normal.    Laboratory/Imaging Studies:  Infusion Therapy Visit on 04/19/2018   Component " Date Value Ref Range Status     INR 04/19/2018 1.60* 0.86 - 1.14 Final     WBC 04/19/2018 4.2  4.0 - 11.0 10e9/L Final     RBC Count 04/19/2018 2.87* 4.4 - 5.9 10e12/L Final     Hemoglobin 04/19/2018 9.3* 13.3 - 17.7 g/dL Final     Hematocrit 04/19/2018 29.3* 40.0 - 53.0 % Final     MCV 04/19/2018 102* 78 - 100 fl Final     MCH 04/19/2018 32.4  26.5 - 33.0 pg Final     MCHC 04/19/2018 31.7  31.5 - 36.5 g/dL Final     RDW 04/19/2018 18.7* 10.0 - 15.0 % Final     Platelet Count 04/19/2018 235  150 - 450 10e9/L Final     Diff Method 04/19/2018 Automated Method   Final     % Neutrophils 04/19/2018 67.5  % Final     % Lymphocytes 04/19/2018 16.9  % Final     % Monocytes 04/19/2018 14.6  % Final     % Eosinophils 04/19/2018 0.5  % Final     % Basophils 04/19/2018 0.5  % Final     % Immature Granulocytes 04/19/2018 0.0  % Final     Absolute Neutrophil 04/19/2018 2.8  1.6 - 8.3 10e9/L Final     Absolute Lymphocytes 04/19/2018 0.7* 0.8 - 5.3 10e9/L Final     Absolute Monocytes 04/19/2018 0.6  0.0 - 1.3 10e9/L Final     Absolute Eosinophils 04/19/2018 0.0  0.0 - 0.7 10e9/L Final     Absolute Basophils 04/19/2018 0.0  0.0 - 0.2 10e9/L Final     Abs Immature Granulocytes 04/19/2018 0.0  0 - 0.4 10e9/L Final     Sodium 04/19/2018 139  133 - 144 mmol/L Final     Potassium 04/19/2018 3.8  3.4 - 5.3 mmol/L Final     Chloride 04/19/2018 107  94 - 109 mmol/L Final     Carbon Dioxide 04/19/2018 28  20 - 32 mmol/L Final     Anion Gap 04/19/2018 4  3 - 14 mmol/L Final     Glucose 04/19/2018 95  70 - 99 mg/dL Final     Urea Nitrogen 04/19/2018 21  7 - 30 mg/dL Final     Creatinine 04/19/2018 0.89  0.66 - 1.25 mg/dL Final     GFR Estimate 04/19/2018 87  >60 mL/min/1.7m2 Final    Non  GFR Calc     GFR Estimate If Black 04/19/2018 >90  >60 mL/min/1.7m2 Final    African American GFR Calc     Calcium 04/19/2018 8.1* 8.5 - 10.1 mg/dL Final        No results found for this or any previous visit (from the past 744  hour(s)).    Assessment and plan:  (C34.90) Non-small cell carcinoma of lung (H)  (primary encounter diagnosis)    I reviewed with the patient his family today  the management plan in details.  Because of his new side effects of nausea we will hold chemotherapy today.  I am going to arrange for imaging studies with a PET scan in 2 weeks time we will reassess response to treatment.  We talked about different options including immunotherapy versus maintenance therapy with gemcitabine.  Because of the patient with dizzy spells, I will arrange for an MRI of the brain.  I will see the patient again in 2 weeks time or sooner if there are new developments or concerns.   discussion of management of squamous cell lung cancer  (J44.1) COPD exacerbation (H)  Patient symptoms has been stable.  Is currently on albuterol inhaler    (I50.23) Acute on chronic systolic congestive heart failure (H)  Patient continue to follow with his cardiology.  He is currently on amiodarone    (R11.2,  T45.1X5A) Chemotherapy induced nausea and vomiting  (E86.0) Dehydration  Today the patient will be receiving 1 L of normal saline, Zofran IV, dexamethasone IV and Pepcid IV for nausea and dehydration.      The patient is ready to learn, no apparent learning barriers were identified.  Diagnosis and treatment plans were explained to the patient. The patient expressed understanding of the content. The patient asked appropriate questions. The patient questions were answered to his satisfaction.    Time spent 40 minutes more than 50% of the time in counseling coronation of care including different therapeutic options including immunotherapy, follow-up and prognosis    Chart documentation with Dragon Voice recognition Software. Although reviewed after completion, some words and grammatical errors may remain.

## 2018-04-26 NOTE — MR AVS SNAPSHOT
After Visit Summary   4/26/2018    Zechariah Ashby    MRN: [id number]           Patient Information     Date Of Birth          1960        Visit Information        Provider Department      4/26/2018 9:45 AM Joycelyn May MD [facility] ONCOLOGY      Today's Diagnoses     Non-small cell carcinoma of lung (H)    -  1    COPD exacerbation (H)        Acute on chronic systolic congestive heart failure (H)        Mediastinal lymphadenopathy        Chemotherapy induced nausea and vomiting          Care Instructions    Dr. May is holding your chemotherapy at this time.  You will need to have 1 liter NS with antiemetics today, and a brain MRI as soon as possible. We would like to see you back in clinic with Dr. May with the PET scan results.      When you are in need of a refill, please call your pharmacy and they will send us a request.      Copy of appointments, and after visit summary (AVS) given to patient.      If you have any questions during business hours (M-F 8 AM- 4PM), please call Camille Lara RN, BSN, OCN Oncology Hematology /Breast Cancer Navigator at [facility] [place][note 2] [phone].       For questions after business hours, or on holidays/weekends, please call our after hours Nurse Triage line [phone]. Thank you.            Follow-ups after your visit        Your next 10 appointments already scheduled     Apr 27, 2018  5:15 PM CDT   (Arrive by 5:00 PM)   MR BRAIN W/O & W CONTRAST with [address] MRI ([facility])    [address] [facility] [address]   [phone]           Take your medicines as usual, unless your doctor tells you not to. Bring a list of your current medicines to your exam (including vitamins, minerals and over-the-counter drugs).  You may or may not receive intravenous (IV) contrast for this exam pending the discretion of the Radiologist.  You do not need to do anything  special to prepare.  The MRI machine uses a strong magnet. Please wear clothes without metal (snaps, zippers). A sweatsuit works well, or we may give you a hospital gown.  Please remove any body piercings and hair extensions before you arrive. You will also remove watches, jewelry, hairpins, wallets, dentures, partial dental plates and hearing aids. You may wear contact lenses, and you may be able to wear your rings. We have a safe place to keep your personal items, but it is safer to leave them at home.  **IMPORTANT** THE INSTRUCTIONS BELOW ARE ONLY FOR THOSE PATIENTS WHO HAVE BEEN PRESCRIBED SEDATION OR GENERAL ANESTHESIA DURING THEIR MRI PROCEDURE:  IF YOUR DOCTOR PRESCRIBED ORAL SEDATION (take medicine to help you relax during your exam):   You must get the medicine from your doctor (oral medication) before you arrive. Bring the medicine to the exam. Do not take it at home. You ll be told when to take it upon arriving for your exam.   Arrive one hour early. Bring someone who can take you home after the test. Your medicine will make you sleepy. After the exam, you may not drive, take a bus or take a taxi by yourself.  IF YOUR DOCTOR PRESCRIBED IV SEDATION:   Arrive one hour early. Bring someone who can take you home after the test. Your medicine will make you sleepy. After the exam, you may not drive, take a bus or take a taxi by yourself.   No eating 6 hours before your exam. You may have clear liquids up until 4 hours before your exam. (Clear liquids include water, clear tea, black coffee and fruit juice without pulp.)  IF YOUR DOCTOR PRESCRIBED ANESTHESIA (be asleep for your exam):   Arrive 1 1/2 hours early. Bring someone who can take you home after the test. You may not drive, take a bus or take a taxi by yourself.   No eating 8 hours before your exam. You may have clear liquids up until 4 hours before your exam. (Clear liquids include water, clear tea, black coffee and fruit juice without pulp.)   You will  spend four to five hours in the recovery room.  Please call the Imaging Department at your exam site with any questions.            May 09, 2018 10:00 AM CDT   Return Visit with Chace Mitchell MD   [facility] ([facility])    [facility]  [address]  [facility] [address]   [phone]            May 16, 2018  9:00 AM CDT   Level O with ROOM 2 [facility] ([facility])    [facility]  [address]  [facility] [address]   [phone]            May 16, 2018 10:00 AM CDT   (Arrive by 9:45 AM)   PE NPET ONCOLOGY (EYES TO THIGHS) with [id number]   [facility] Pet CT ([facility])    [address] [facility] [address]   [phone]           Tell your doctor:   If there is any chance you may be pregnant or if you are breastfeeding.   If you have problems lying in small spaces (claustrophobia). If you do, your doctor may give you medicine to help you relax. If you have diabetes:   Have your exam early in the morning. Your blood glucose will go up as the day goes by.   Your glucose level must be 180 or less at the start of the exam. Please take any medicines you need to ensure this blood glucose level. 24 hours before your scan: Don t do any heavy exercise. (No jogging, aerobics or other workouts.) Exercise will make your pictures less accurate. 6 hours before your scan:   Stop all food and liquids (except water).   Do not chew gum or suck on mints.   If you need to take medicine with food, you may take it with a few crackers.  Please call your Imaging Department at your exam site with any questions.            May 16, 2018  1:40 PM CDT   SHORT with Kailash Mason MD   [facility] ([facility])    [address] [place] [address]   [phone]            May 17, 2018 11:15 AM CDT   Return Visit with Joycelyn May MD  "  [facility] ([facility])    [facility]  [address] [facility] [address]  [facility] [address]   [phone]              Future tests that were ordered for you today     Open Future Orders        Priority Expected Expires Ordered    MR Brain w/o & w Contrast STAT 2018            Who to contact     If you have questions or need follow up information about today's clinic visit or your schedule please contact [facility] directly at [phone].  Normal or non-critical lab and imaging results will be communicated to you by [employer], letter or phone within 4 business days after the clinic has received the results. If you do not hear from us within 7 days, please contact the clinic through [employer] or phone. If you have a critical or abnormal lab result, we will notify you by phone as soon as possible.  Submit refill requests through [employer] or call your pharmacy and they will forward the refill request to us. Please allow 3 business days for your refill to be completed.          Additional Information About Your Visit        [employer] Information     [employer] lets you send messages to your doctor, view your test results, renew your prescriptions, schedule appointments and more. To sign up, go to [email or web address] . Click on \"Log in\" on the left side of the screen, which will take you to the Welcome page. Then click on \"Sign up Now\" on the right side of the page.     You will be asked to enter the access code listed below, as well as some personal information. Please follow the directions to create your username and password.     Your access code is: [email or web address]  Expires: 2018 10:55 AM     Your access code will  in 90 days. If you need help or a new code, please call your [facility] or [phone].        Care EveryWhere ID     This is your Care EveryWhere ID. This could be used by other organizations to access your [facility] " "medical records  [id number]        Your Vitals Were     Pulse Temperature Respirations Height Pulse Oximetry BMI (Body Mass Index)    73 96.4  F (35.8  C) (Tympanic) 18 1.803 m (5' 10.98\") 100% 19.96 kg/m2       Blood Pressure from Last 3 Encounters:   04/26/18 130/90   04/20/18 110/60   04/19/18 134/82    Weight from Last 3 Encounters:   04/26/18 64.9 kg (143 lb)   04/20/18 68.9 kg (152 lb)   04/19/18 67.9 kg (149 lb 9.6 oz)               Primary Care Provider Office Phone # Fax #    Kailash aMson [phone] [address] [place] [address]        Equal Access to Services     SEBASTIÁNProvidence Mission HospitalDAHLIA : Diego Zhang, waaxturner luqadaha, qaybchiara kaaldwayne so, harley boykin. So [facility] [phone].    ATENCIÓN: Si habla español, tiene a morocho disposición servicios gratuitos de asistencia lingüística. [facility] [phone].    We comply with applicable federal civil rights laws and [place] laws. We do not discriminate on the basis of race, color, national origin, age, disability, sex, sexual orientation, or gender identity.            Thank you!     Thank you for choosing [facility]  for your care. Our goal is always to provide you with excellent care. Hearing back from our patients is one way we can continue to improve our services. Please take a few minutes to complete the written survey that you may receive in the mail after your visit with us. Thank you!             Your Updated Medication List - Protect others around you: Learn how to safely use, store and throw away your medicines at www.disposemymeds.org.          This list is accurate as of 4/26/18 10:48 AM.  Always use your most recent med list.                   Brand Name Dispense Instructions for use Diagnosis    albuterol 108 (90 Base) MCG/ACT Inhaler    PROAIR HFA    1 Inhaler    Inhale 2 puffs into the lungs every 4 hours as needed for shortness of breath / dyspnea or wheezing    " Chronic obstructive pulmonary disease, unspecified COPD type (H)       amiodarone 200 MG tablet    PACERONE/CODARONE    90 tablet    1 tablet daily    Paroxysmal atrial fibrillation (H)       buPROPion 150 MG 12 hr tablet    WELLBUTRIN SR    60 tablet    Take 1 tablet once daily and increase to 1 tablet twice daily after 3 days    Moderate single current episode of major depressive disorder (H)       LORazepam 0.5 MG tablet    ATIVAN    30 tablet    Take 1 tablet (0.5 mg) by mouth every 4 hours as needed (Anxiety, Nausea/Vomiting or Sleep)    Non-small cell carcinoma of lung (H)       morphine 15 MG IR tablet    MSIR    30 tablet    1/2 to 1 pill every 4 hours as needed for air hunger.    Non-small cell carcinoma of lung (H)       nicotine 21 MG/24HR 24 hr patch    NICODERM CQ    30 patch    Place 1 patch onto the skin every 24 hours    Non-small cell carcinoma of lung (H)       omeprazole 40 MG capsule    priLOSEC    30 capsule    Take 1 capsule (40 mg) by mouth daily Take 30-60 minutes before a meal.    Gastroesophageal reflux disease without esophagitis       ondansetron 4 MG tablet    ZOFRAN    30 tablet    Take 1 tablet (4 mg) by mouth every 8 hours as needed for nausea    Non-small cell carcinoma of lung (H)       order for DME     1 Device    Equipment being ordered: donut cushion for sacral ulcer    Decubitus ulcer of sacral region, stage 1       polyethylene glycol powder    MIRALAX    510 g    Take 17 g (1 capful) by mouth daily    Constipation, unspecified constipation type       prochlorperazine 10 MG tablet    COMPAZINE    30 tablet    Take 1 tablet (10 mg) by mouth every 6 hours as needed (Nausea/Vomiting)    Non-small cell carcinoma of lung (H)       senna-docusate 8.6-50 MG per tablet    SENOKOT-S;PERICOLACE    60 tablet    Take 1 tablet by mouth 2 times daily    Constipation, unspecified constipation type       * warfarin 5 MG tablet    COUMADIN    30 tablet    Take 1.25 mg MWF; 2.5 mg rest of the  week as directed by the Anticoagulation Clinic    Atrial flutter, unspecified type (H)       * warfarin 2.5 MG tablet    COUMADIN    75 tablet    Take 1.25 mg MWF, 2.5 mg rest of the week or as directed by the Anticoagulation Clinic    Long-term (current) use of anticoagulants, Atrial flutter, unspecified type (H)       * Notice:  This list has 2 medication(s) that are the same as other medications prescribed for you. Read the directions carefully, and ask your doctor or other care provider to review them with you.

## 2018-04-26 NOTE — PROGRESS NOTES
"  ANTICOAGULATION FOLLOW-UP CLINIC VISIT    Patient Name:  Zechariah Ashby  Date:  4/26/2018  Contact Type:  Telephone/ Garland, patient's daughter -- [phone]    SUBJECTIVE:     Patient Findings     Positives Intentional hold of therapy, Unexplained INR or factor level change    Comments Patient's ex-wife had reported to Dr. May's care team he was sleeping ~20 hours per day. She was worried about his nutrition and hydration because he was not eating/drinking much due to excess sleeping. Per notes today future plan is to, \"F/u with Dr. Mitchell, Brain MRI w and w/o contrast ASAP, PET scan as scheduled, No chemo today~ Do not reschedule at this time,1 liter NS today with dex 10 and zofran 8 mg IV, F/u in clinic with DR. May with pet scan results\"    Writer is questioning if he actually missed his warfarin doses this past week. He states he took 1.25mg daily as this is what was instructed. Writer spoke with patient's daughter Garland who was asking the patient these questions as we were on the phone. She is going to help set up his medications today. Patient will have an INR in the lab on Monday as the schedule in [place] is full.            OBJECTIVE    INR   Date Value Ref Range Status   04/26/2018 1.08 0.86 - 1.14 Final       ASSESSMENT / PLAN  No question data found.  Anticoagulation Summary as of 4/26/2018     INR goal 2.0-3.0   Today's INR 1.08!   Maintenance plan 1.25 mg (2.5 mg x 0.5) on Mon, Wed, Fri; 2.5 mg (2.5 mg x 1) all other days   Full instructions 4/26: 5 mg; 4/27: 2.5 mg; Otherwise 1.25 mg on Mon, Wed, Fri; 2.5 mg all other days   Weekly total 13.75 mg   Plan last modified Yaritza Diaz RN (3/2/2018)   Next INR check 4/30/2018   Priority INR   Target end date     Indications   Atrial flutter  unspecified type (H) [I48.92]  Long-term (current) use of anticoagulants [Z79.01] [Z79.01]         Anticoagulation Episode Summary     INR check location     Preferred lab     Send INR reminders to [facility] [facility] " ANTICOAG POOL    Comments *Pt is on AMIODARONE. On palliative chemo (CARBOplatin / Gemcitabine) pt cell: [phone]      Anticoagulation Care Providers     Provider Role Specialty Phone number    Kailash Mason MD [facility], [phone]            See the Encounter Report to view Anticoagulation Flowsheet and Dosing Calendar (Go to Encounters tab in chart review, and find the Anticoagulation Therapy Visit)        Alice Lea RN, CACP

## 2018-04-26 NOTE — PROGRESS NOTES
Brain MRI is tomorrow at 5 pm, PET scan is 05.16.18, appt with Dr. Mitchell is 05.09.18.    Reviewed this timeline with Dr. May.  OK for testing/appts as scheduled. No need to move them up.

## 2018-04-26 NOTE — LETTER
4/26/2018         RE: Zechariah Ashby  [address] [facility] [id number]        Dear Colleague,    Thank you for referring your patient, Zechariah Ashby, to the [facility]. Please see a copy of my visit note below.    Brain MRI is tomorrow at 5 pm, PET scan is 05.16.18, appt with Dr. Mitchell is 05.09.18.    Reviewed this timeline with Dr. May.  OK for testing/appts as scheduled. No need to move them up.     Hematology/ Oncology Follow-up Visit:  Apr 26, 2018    Reason for Visit:   Chief Complaint   Patient presents with     Oncology Clinic Visit     Recheck Non-small cell carcinoma of lung, discuss decline and treatment options       Oncologic History:  Non-small cell carcinoma of lung (H)  Zechariah Ashby is a 57 year old male who was recently diagnosed with atrial fib/flutter in early November 2017. During workup just x-ray showed elevation of the left hemidiaphragm with a focal tenting. The CT scan was done for further evaluation showing left upper lobe atelectasis and a moderate-sized left pleural effusion. Patient also had mediastinal adenopathy. Patient had left thoracocentesis. Cytology came back as transudate and cytology came back negative. Patient has a long history of smoking. PET scan showed a large FDG avid mediastinal mass extending to the left hilum with obliteration of the left upper lobe bronchus and atelectasis of the upper lobe.  There are also multiple left cervical left cervical and mediastinal adenopathy.  There is increased left pleural effusion.  There is hypermetabolic left adrenal lesion.  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 He was started on carboplatin and gemcitabine.      Interval History:  Patient returning today for follow-up.  He is due today for day #8 of cycle #6 of carboplatin and gemcitabine.  He has been tolerating chemotherapy poorly.  His been having nausea.  He has  been having increasing fatigue lately.  He denies any shortness of breath or cough or wheezing.  His pain is currently well controlled.  He has poor appetite although his weight has been stable.  He also reported dizzy spells and fainting episode.    Review Of Systems:  Constitutional: Negative for fever, chills, and night sweats.  Skin: negative.  Eyes: negative.  Ears/Nose/Throat: negative.  Respiratory: No shortness of breath, dyspnea on exertion, cough, or hemoptysis.  Cardiovascular: negative.  Gastrointestinal: negative.  Genitourinary: negative.  Musculoskeletal: negative.  Neurologic: negative.  Psychiatric: negative.  Hematologic/Lymphatic/Immunologic: negative.  Endocrine: negative.    All other ROS negative unless mentioned in interval history.    Past medical, social, surgical, and family histories reviewed.    Allergies:  Allergies as of 04/26/2018 - Osmel as Reviewed 04/26/2018   Allergen Reaction Noted     Nka [no known allergies]  01/03/2018       Current Medications:  Current Outpatient Prescriptions   Medication Sig Dispense Refill     albuterol (PROAIR HFA) 108 (90 BASE) MCG/ACT Inhaler Inhale 2 puffs into the lungs every 4 hours as needed for shortness of breath / dyspnea or wheezing 1 Inhaler 11     amiodarone (PACERONE/CODARONE) 200 MG tablet 1 tablet daily 90 tablet 0     buPROPion (WELLBUTRIN SR) 150 MG 12 hr tablet Take 1 tablet once daily and increase to 1 tablet twice daily after 3 days 60 tablet 1     morphine (MSIR) 15 MG IR tablet 1/2 to 1 pill every 4 hours as needed for air hunger. 30 tablet 0     nicotine (NICODERM CQ) 21 MG/24HR 24 hr patch Place 1 patch onto the skin every 24 hours 30 patch 0     omeprazole (PRILOSEC) 40 MG capsule Take 1 capsule (40 mg) by mouth daily Take 30-60 minutes before a meal. 30 capsule 11     ondansetron (ZOFRAN) 4 MG tablet Take 1 tablet (4 mg) by mouth every 8 hours as needed for nausea 30 tablet 1     order for DME Equipment being ordered: donut  "cushion for sacral ulcer 1 Device 0     polyethylene glycol (MIRALAX) powder Take 17 g (1 capful) by mouth daily 510 g 1     prochlorperazine (COMPAZINE) 10 MG tablet Take 1 tablet (10 mg) by mouth every 6 hours as needed (Nausea/Vomiting) 30 tablet 5     senna-docusate (SENOKOT-S;PERICOLACE) 8.6-50 MG per tablet Take 1 tablet by mouth 2 times daily 60 tablet 11     warfarin (COUMADIN) 2.5 MG tablet Take 1.25 mg MWF, 2.5 mg rest of the week or as directed by the Anticoagulation Clinic 75 tablet 0     warfarin (COUMADIN) 5 MG tablet Take 1.25 mg MWF; 2.5 mg rest of the week as directed by the Anticoagulation Clinic 30 tablet 1     LORazepam (ATIVAN) 0.5 MG tablet Take 1 tablet (0.5 mg) by mouth every 4 hours as needed (Anxiety, Nausea/Vomiting or Sleep) (Patient not taking: Reported on 4/26/2018) 30 tablet 5        Physical Exam:  /90 (BP Location: Right arm, Patient Position: Sitting, Cuff Size: Adult Regular)  Pulse 73  Temp 96.4  F (35.8  C) (Tympanic)  Resp 18  Ht 1.803 m (5' 10.98\")  Wt 64.9 kg (143 lb)  SpO2 100%  BMI 19.96 kg/m2  Wt Readings from Last 12 Encounters:   04/26/18 64.9 kg (143 lb)   04/20/18 68.9 kg (152 lb)   04/19/18 67.9 kg (149 lb 9.6 oz)   04/10/18 68.5 kg (151 lb)   03/29/18 68 kg (149 lb 14.4 oz)   03/15/18 65.1 kg (143 lb 9.6 oz)   03/08/18 67.7 kg (149 lb 3.2 oz)   02/22/18 66.7 kg (147 lb)   02/15/18 67.5 kg (148 lb 12.8 oz)   02/13/18 67.1 kg (148 lb)   02/05/18 65.8 kg (145 lb)   02/01/18 64.4 kg (142 lb)     ECOG performance status: 1  GENERAL APPEARANCE: Healthy, alert and in no acute distress.  HEENT: Sclerae anicteric. PERRLA. Oropharynx without ulcers, lesions, or thrush.  NECK: Supple. No asymmetry or masses.  LYMPHATICS: No palpable cervical, supraclavicular, axillary, or inguinal lymphadenopathy.  RESP: Lungs clear to auscultation bilaterally without rales, rhonchi or wheezes.  CARDIOVASCULAR: Regular rate and rhythm. Normal S1, S2; no S3 or S4. No murmur, gallop, " or rub.  ABDOMEN: Soft, nontender. Bowel sounds normal. No palpable organomegaly or masses.  MUSCULOSKELETAL: Extremities without gross deformities noted. No edema of bilateral lower extremities.  SKIN: No suspicious lesions or rashes.  NEURO: Alert and oriented x 3. Cranial nerves II-XII grossly intact.  PSYCHIATRIC: Mentation and affect appear normal.    Laboratory/Imaging Studies:  Infusion Therapy Visit on 04/19/2018   Component Date Value Ref Range Status     INR 04/19/2018 1.60* 0.86 - 1.14 Final     WBC 04/19/2018 4.2  4.0 - 11.0 10e9/L Final     RBC Count 04/19/2018 2.87* 4.4 - 5.9 10e12/L Final     Hemoglobin 04/19/2018 9.3* 13.3 - 17.7 g/dL Final     Hematocrit 04/19/2018 29.3* 40.0 - 53.0 % Final     MCV 04/19/2018 102* 78 - 100 fl Final     MCH 04/19/2018 32.4  26.5 - 33.0 pg Final     MCHC 04/19/2018 31.7  31.5 - 36.5 g/dL Final     RDW 04/19/2018 18.7* 10.0 - 15.0 % Final     Platelet Count 04/19/2018 235  150 - 450 10e9/L Final     Diff Method 04/19/2018 Automated Method   Final     % Neutrophils 04/19/2018 67.5  % Final     % Lymphocytes 04/19/2018 16.9  % Final     % Monocytes 04/19/2018 14.6  % Final     % Eosinophils 04/19/2018 0.5  % Final     % Basophils 04/19/2018 0.5  % Final     % Immature Granulocytes 04/19/2018 0.0  % Final     Absolute Neutrophil 04/19/2018 2.8  1.6 - 8.3 10e9/L Final     Absolute Lymphocytes 04/19/2018 0.7* 0.8 - 5.3 10e9/L Final     Absolute Monocytes 04/19/2018 0.6  0.0 - 1.3 10e9/L Final     Absolute Eosinophils 04/19/2018 0.0  0.0 - 0.7 10e9/L Final     Absolute Basophils 04/19/2018 0.0  0.0 - 0.2 10e9/L Final     Abs Immature Granulocytes 04/19/2018 0.0  0 - 0.4 10e9/L Final     Sodium 04/19/2018 139  133 - 144 mmol/L Final     Potassium 04/19/2018 3.8  3.4 - 5.3 mmol/L Final     Chloride 04/19/2018 107  94 - 109 mmol/L Final     Carbon Dioxide 04/19/2018 28  20 - 32 mmol/L Final     Anion Gap 04/19/2018 4  3 - 14 mmol/L Final     Glucose 04/19/2018 95  70 - 99 mg/dL  Final     Urea Nitrogen 04/19/2018 21  7 - 30 mg/dL Final     Creatinine 04/19/2018 0.89  0.66 - 1.25 mg/dL Final     GFR Estimate 04/19/2018 87  >60 mL/min/1.7m2 Final    Non  GFR Calc     GFR Estimate If Black 04/19/2018 >90  >60 mL/min/1.7m2 Final    African American GFR Calc     Calcium 04/19/2018 8.1* 8.5 - 10.1 mg/dL Final        No results found for this or any previous visit (from the past 744 hour(s)).    Assessment and plan:  (C34.90) Non-small cell carcinoma of lung (H)  (primary encounter diagnosis)    I reviewed with the patient his family today  the management plan in details.  Because of his new side effects of nausea we will hold chemotherapy today.  I am going to arrange for imaging studies with a PET scan in 2 weeks time we will reassess response to treatment.  We talked about different options including immunotherapy versus maintenance therapy with gemcitabine.  Because of the patient with dizzy spells, I will arrange for an MRI of the brain.  I will see the patient again in 2 weeks time or sooner if there are new developments or concerns.   discussion of management of squamous cell lung cancer  (J44.1) COPD exacerbation (H)  Patient symptoms has been stable.  Is currently on albuterol inhaler    (I50.23) Acute on chronic systolic congestive heart failure (H)  Patient continue to follow with his cardiology.  He is currently on amiodarone    (R11.2,  T45.1X5A) Chemotherapy induced nausea and vomiting  (E86.0) Dehydration  Today the patient will be receiving 1 L of normal saline, Zofran IV, dexamethasone IV and Pepcid IV for nausea and dehydration.      The patient is ready to learn, no apparent learning barriers were identified.  Diagnosis and treatment plans were explained to the patient. The patient expressed understanding of the content. The patient asked appropriate questions. The patient questions were answered to his satisfaction.    Time spent 40 minutes more than 50% of the  time in counseling coronation of care including different therapeutic options including immunotherapy, follow-up and prognosis    Chart documentation with Dragon Voice recognition Software. Although reviewed after completion, some words and grammatical errors may remain.    Again, thank you for allowing me to participate in the care of your patient.        Sincerely,        Joycelyn May MD

## 2018-04-30 NOTE — PROGRESS NOTES
Called to inform pt Dr. May would like to see him this week. Appt has been made for 5/3 at 9:30. Pt verbalized understanding.

## 2018-05-02 NOTE — MR AVS SNAPSHOT
Zechariah Ashby   5/2/2018   Anticoagulation Therapy Visit    Description:  58 year old male   Provider:  Betsey Maldonado, RN   Department:  [facility]           INR as of 5/2/2018     Today's INR 1.17! (5/1/2018)      Anticoagulation Summary as of 5/2/2018     INR goal 2.0-3.0   Today's INR 1.17! (5/1/2018)   Full instructions 5/2: 5 mg; Otherwise 1.25 mg on Mon, Wed, Fri; 2.5 mg all other days   Next INR check 5/4/2018    Indications   Atrial flutter  unspecified type (H) [I48.92]  Long-term (current) use of anticoagulants [Z79.01] [Z79.01]         May 2018 Details    Sun Mon Tue Wed Thu Fri Sat       1               2      5 mg   See details      3      2.5 mg         4            5                 6               7               8               9               10               11               12                 13               14               15               16               17               18               19                 20               21               22               23               24               25               26                 27               28               29               30               31                  Date Details   05/02 This INR check       Date of next INR:  5/4/2018         How to take your warfarin dose     To take:  1.25 mg Take 0.5 of a 2.5 mg tablet.    To take:  2.5 mg Take 1 of the 2.5 mg tablets.    To take:  5 mg Take 1 of the 5 mg tablets.

## 2018-05-02 NOTE — PROGRESS NOTES
ANTICOAGULATION FOLLOW-UP CLINIC VISIT    Patient Name:  Zechariah Ashby  Date:  5/2/2018  Contact Type:  Telephone/ Zechariah    SUBJECTIVE:     Patient Findings     Positives Med error    Comments Patient did not take warfarin as instructed. He is taking a break from Chemo currently and exactly how long that will be will be determined by Dr. May after pt's PET scan. He sees cardiology Friday and writer believes he will benefit from a face to face visit with printed warfarin directions on an AVS. Writer instructed him to take warfarin 5mg today and 2.5mg Thursday and recheck INR Friday. Patient verbalizes understanding, repeated plan back and agrees to it. No further questions or concerns.             OBJECTIVE    INR   Date Value Ref Range Status   05/01/2018 1.17 (H) 0.86 - 1.14 Final       ASSESSMENT / PLAN  No question data found.  Anticoagulation Summary as of 5/2/2018     INR goal 2.0-3.0   Today's INR 1.17! (5/1/2018)   Maintenance plan 1.25 mg (2.5 mg x 0.5) on Mon, Wed, Fri; 2.5 mg (2.5 mg x 1) all other days   Full instructions 5/2: 5 mg; Otherwise 1.25 mg on Mon, Wed, Fri; 2.5 mg all other days   Weekly total 13.75 mg   Plan last modified Yaritza Diaz RN (3/2/2018)   Next INR check 5/4/2018   Priority INR   Target end date     Indications   Atrial flutter  unspecified type (H) [I48.92]  Long-term (current) use of anticoagulants [Z79.01] [Z79.01]         Anticoagulation Episode Summary     INR check location     Preferred lab     Send INR reminders to [facility] [facility] ANTICOAG POOL    Comments *Pt is on AMIODARONE. On palliative chemo (CARBOplatin / Gemcitabine) pt cell: [phone]      Anticoagulation Care Providers     Provider Role Specialty Phone number    Kailash Mason MD [facility] [phone]            See the Encounter Report to view Anticoagulation Flowsheet and Dosing Calendar (Go to Encounters tab in chart review, and find the Anticoagulation Therapy  Visit)        Betsey Maldonado RN

## 2018-05-03 NOTE — NURSING NOTE
"Oncology Rooming Note    May 3, 2018 9:35 AM   Zechariah Ashby is a 58 year old male who presents for:    Chief Complaint   Patient presents with     Oncology Clinic Visit     Follow up left lung CA. Review MRI results.      Initial Vitals: BP (!) 153/94 (BP Location: Right arm, Patient Position: Sitting, Cuff Size: Adult Regular)  Pulse 60  Temp 97  F (36.1  C) (Tympanic)  Resp 18  Ht 1.803 m (5' 10.98\")  Wt 68.1 kg (150 lb 1.6 oz)  SpO2 100%  BMI 20.95 kg/m2 Estimated body mass index is 20.95 kg/(m^2) as calculated from the following:    Height as of this encounter: 1.803 m (5' 10.98\").    Weight as of this encounter: 68.1 kg (150 lb 1.6 oz). Body surface area is 1.85 meters squared.  No Pain (0) Comment: Data Unavailable   No LMP for male patient.  Allergies reviewed: Yes  Medications reviewed: Yes    Medications: Medication refills not needed today.  Pharmacy name entered into [facility]:    [id number] [address]    Clinical concerns: Follow up left lung CA. Review MRI results. C/o very nauseated in the mornings.      8 minutes for nursing intake (face to face time)     Courtney Lopez, [facility]            "

## 2018-05-03 NOTE — MR AVS SNAPSHOT
After Visit Summary   5/3/2018    Zechariah Ashby    MRN: [id number]           Patient Information     Date Of Birth          1960        Visit Information        Provider Department      5/3/2018 9:30 AM Joycelyn May MD [facility] ONCOLOGY      Today's Diagnoses     Non-small cell carcinoma of lung (H)    -  1    Chemotherapy induced nausea and vomiting        Cardiomyopathy, unspecified type (H)        Brain lesion          Care Instructions    Dr. May is referring you to Dr. Diaz, Neurosurgery. We would like to see you back in clinic with Dr. May as scheduled.  Keep PET scan as scheduled.  Your chemotherapy treatments are hold at this time until after you come back to see Dr. May.      Copy of appointments, and after visit summary (AVS) given to patient.      If you have any questions during business hours (M-F 8 AM- 4PM), please call Camille Lara RN, BSN, OCN Oncology Hematology /Breast Cancer Navigator at [facility] [phone].       For questions after business hours, or on holidays/weekends, please call our after hours Nurse Triage line [phone]. Thank you.            Follow-ups after your visit        Additional Services     NEUROSURGERY REFERRAL       Your provider has referred you to: Dr. Diaz    Please be aware that coverage of these services is subject to the terms and limitations of your health insurance plan.  Call member services at your health plan with any benefit or coverage questions.      Please bring the following with you to your appointment:    (1) Any X-Rays, CTs or MRIs which have been performed.  Contact the facility where they were done to arrange for  prior to your scheduled appointment.   (2) List of current medications  (3) This referral request   (4) Any documents/labs given to you for this referral                  Your next 10 appointments already scheduled     May 04, 2018   1:00 PM CDT   Return Visit with Jonn Ybarra MD   [facility] ([facility])    [address] [address]   [phone]            May 04, 2018  3:00 PM CDT   Anticoagulation Visit with [place] ANTI COAG   [facility]    [address] [address]   [phone]            May 09, 2018 10:00 AM CDT   Return Visit with Chace Mitchell MD   [facility] ([facility])    [facility]  [address]  [facility] [address]   [phone]            May 16, 2018  9:00 AM CDT   Level O with ROOM 2 [facility] ([facility])    [facility]  [address]  [facility] [address]   [phone]            May 16, 2018 10:00 AM CDT   PE NPET ONCOLOGY (EYES TO THIGHS) with [id number]   [facility] Pet CT ([facility])    [address] [address]   [phone]           Tell your doctor:   If there is any chance you may be pregnant or if you are breastfeeding.   If you have problems lying in small spaces (claustrophobia). If you do, your doctor may give you medicine to help you relax. If you have diabetes:   Have your exam early in the morning. Your blood glucose will go up as the day goes by.   Your glucose level must be 180 or less at the start of the exam. Please take any medicines you need to ensure this blood glucose level. 24 hours before your scan: Don t do any heavy exercise. (No jogging, aerobics or other workouts.) Exercise will make your pictures less accurate. 6 hours before your scan:   Stop all food and liquids (except water).   Do not chew gum or suck on mints.   If you need to take medicine with food, you may take it with a few crackers.  Please call your Imaging Department at your exam site with any questions.            May 16, 2018   "1:40 PM CDT   SHORT with Kailash Mason MD   [facility]    [address] [place] [address] [phone]            May 17, 2018 11:15 AM CDT   Return Visit with Joycelyn May MD   [facility] ([facility])    [facility]  [address]  [facility] [address] [phone]              Who to contact     If you have questions or need follow up information about today's clinic visit or your schedule please contact [facility] directly at [phone].  Normal or non-critical lab and imaging results will be communicated to you by MyChart, letter or phone within 4 business days after the clinic has received the results. If you do not hear from us within 7 days, please contact the clinic through MyChart or phone. If you have a critical or abnormal lab result, we will notify you by phone as soon as possible.  Submit refill requests through [employer] or call your pharmacy and they will forward the refill request to us. Please allow 3 business days for your refill to be completed.          Additional Information About Your Visit        [employer] Information     [employer] lets you send messages to your doctor, view your test results, renew your prescriptions, schedule appointments and more. To sign up, go to [email or web address] . Click on \"Log in\" on the left side of the screen, which will take you to the Welcome page. Then click on \"Sign up Now\" on the right side of the page.     You will be asked to enter the access code listed below, as well as some personal information. Please follow the directions to create your username and password.     Your access code is: [email or web address]  Expires: 2018 10:55 AM     Your access code will  in 90 days. If you need help or a new code, please call your [place] clinic or [phone].        Care EveryWhere ID     This is your Care EveryWhere ID. " "This could be used by other organizations to access your [place] medical records  [id number]        Your Vitals Were     Pulse Temperature Respirations Height Pulse Oximetry BMI (Body Mass Index)    60 97  F (36.1  C) (Tympanic) 18 1.803 m (5' 10.98\") 100% 20.95 kg/m2       Blood Pressure from Last 3 Encounters:   05/03/18 (!) 153/94   04/26/18 131/79   04/26/18 130/90    Weight from Last 3 Encounters:   05/03/18 68.1 kg (150 lb 1.6 oz)   04/26/18 64.9 kg (143 lb)   04/20/18 68.9 kg (152 lb)              We Performed the Following     NEUROSURGERY REFERRAL        Primary Care Provider Office Phone # Fax #    Kailash Mason [phone]        Equal Access to Services     [facility] Hospital[facility]HL[facility] : Hadii arnol bateso Soasher, waaxda luqadaha, qaybta kaalmada adedickyada, harley carrasquillo . So [facility] [phone].    ATENCIÓN: Si habla español, tiene a morocho disposición servicios gratuitos de asistencia lingüística. Roz al [phone].    We comply with applicable federal civil rights laws and [place] laws. We do not discriminate on the basis of race, color, national origin, age, disability, sex, sexual orientation, or gender identity.            Thank you!     Thank you for choosing [facility]  for your care. Our goal is always to provide you with excellent care. Hearing back from our patients is one way we can continue to improve our services. Please take a few minutes to complete the written survey that you may receive in the mail after your visit with us. Thank you!             Your Updated Medication List - Protect others around you: Learn how to safely use, store and throw away your medicines at www.disposemymeds.org.          This list is accurate as of 5/3/18 10:19 AM.  Always use your most recent med list.                   Brand Name Dispense Instructions for use Diagnosis    albuterol 108 (90 Base) MCG/ACT Inhaler    " PROAIR HFA    1 Inhaler    Inhale 2 puffs into the lungs every 4 hours as needed for shortness of breath / dyspnea or wheezing    Chronic obstructive pulmonary disease, unspecified COPD type (H)       amiodarone 200 MG tablet    PACERONE/CODARONE    90 tablet    1 tablet daily    Paroxysmal atrial fibrillation (H)       buPROPion 150 MG 12 hr tablet    WELLBUTRIN SR    60 tablet    Take 1 tablet once daily and increase to 1 tablet twice daily after 3 days    Moderate single current episode of major depressive disorder (H)       LORazepam 0.5 MG tablet    ATIVAN    30 tablet    Take 1 tablet (0.5 mg) by mouth every 4 hours as needed (Anxiety, Nausea/Vomiting or Sleep)    Non-small cell carcinoma of lung (H)       morphine 15 MG IR tablet    MSIR    30 tablet    1/2 to 1 pill every 4 hours as needed for air hunger.    Non-small cell carcinoma of lung (H)       nicotine 21 MG/24HR 24 hr patch    NICODERM CQ    30 patch    Place 1 patch onto the skin every 24 hours    Non-small cell carcinoma of lung (H)       omeprazole 40 MG capsule    priLOSEC    30 capsule    Take 1 capsule (40 mg) by mouth daily Take 30-60 minutes before a meal.    Gastroesophageal reflux disease without esophagitis       ondansetron 4 MG tablet    ZOFRAN    30 tablet    Take 1 tablet (4 mg) by mouth every 8 hours as needed for nausea    Non-small cell carcinoma of lung (H)       order for DME     1 Device    Equipment being ordered: donut cushion for sacral ulcer    Decubitus ulcer of sacral region, stage 1       polyethylene glycol powder    MIRALAX    510 g    Take 17 g (1 capful) by mouth daily    Constipation, unspecified constipation type       prochlorperazine 10 MG tablet    COMPAZINE    30 tablet    Take 1 tablet (10 mg) by mouth every 6 hours as needed (Nausea/Vomiting)    Non-small cell carcinoma of lung (H)       senna-docusate 8.6-50 MG per tablet    SENOKOT-S;PERICOLACE    60 tablet    Take 1 tablet by mouth 2 times daily     Constipation, unspecified constipation type       * warfarin 5 MG tablet    COUMADIN    30 tablet    Take 1.25 mg MWF; 2.5 mg rest of the week as directed by the Anticoagulation Clinic    Atrial flutter, unspecified type (H)       * warfarin 2.5 MG tablet    COUMADIN    75 tablet    Take 1.25 mg MWF, 2.5 mg rest of the week or as directed by the Anticoagulation Clinic    Long-term (current) use of anticoagulants, Atrial flutter, unspecified type (H)       * Notice:  This list has 2 medication(s) that are the same as other medications prescribed for you. Read the directions carefully, and ask your doctor or other care provider to review them with you.

## 2018-05-03 NOTE — PATIENT INSTRUCTIONS
Dr. May is referring you to Dr. Diaz, Neurosurgery. We would like to see you back in clinic with Dr. May as scheduled.  Keep PET scan as scheduled.  Your chemotherapy treatments are hold at this time until after you come back to see Dr. May.      Copy of appointments, and after visit summary (AVS) given to patient.      If you have any questions during business hours (M-F 8 AM- 4PM), please call Camille Lara RN, BSN, OCN Oncology Hematology /Breast Cancer Navigator at [facility] [phone].       For questions after business hours, or on holidays/weekends, please call our after hours Nurse Triage line [phone]. Thank you.

## 2018-05-03 NOTE — LETTER
5/3/2018         RE: Zechariah Ashby  [address] [facility] [address]        Dear Colleague,    Thank you for referring your patient, Zechariah Ashby, to the [facility] CANCER CLINIC. Please see a copy of my visit note below.    DATE OF VISIT: May 3, 2018    Zechariah Ashby is a 58 year old male is seen today for   Chief Complaint   Patient presents with     Oncology Clinic Visit     Follow up left lung CA. Review MRI results.    .       (C34.90) Non-small cell carcinoma of lung (H)  (primary encounter diagnosis)  (C79.31) Brain metastasis (H)  I reviewed with the patient and his wife the results of most recent MRI.  It showed 0.6 cm enhancing lesion within the inferomedial right temporal lobe with surrounding T2 hyperintense edema.  These findings suggestive of single brain metastases.  Patient is currently asymptomatic.  I would refer the patient for neurosurgery.  PET scan also was ordered to assess response to treatment.  I will see the patient again in 2 weeks time with the results of the PET scan.  I did discuss with the patient today the new diagnosis management plan.    The patient is ready to learn, no apparent learning barriers were identified.  Diagnosis and treatment plans were explained to the patient. The patient expressed understanding of the content. The patient asked appropriate questions. The patient questions were answered to his satisfaction.  Time spent 25 minutes more than 50% of the time in counseling coronation of care including discussion of imaging studies, management of non-small cell lung cancer, follow-up and prognosis  Chart documentation with Dragon Voice recognition Software. Although reviewed after completion, some words and grammatical errors may remain.    Again, thank you for allowing me to participate in the care of your patient.        Sincerely,        Joycelyn May MD

## 2018-05-03 NOTE — PROGRESS NOTES
DATE OF VISIT: May 3, 2018    Zechariah Ashby is a 58 year old male is seen today for   Chief Complaint   Patient presents with     Oncology Clinic Visit     Follow up left lung CA. Review MRI results.    .       (C34.90) Non-small cell carcinoma of lung (H)  (primary encounter diagnosis)  (C79.31) Brain metastasis (H)  I reviewed with the patient and his wife the results of most recent MRI.  It showed 0.6 cm enhancing lesion within the inferomedial right temporal lobe with surrounding T2 hyperintense edema.  These findings suggestive of single brain metastases.  Patient is currently asymptomatic.  I would refer the patient for neurosurgery.  PET scan also was ordered to assess response to treatment.  I will see the patient again in 2 weeks time with the results of the PET scan.  I did discuss with the patient today the new diagnosis management plan.    The patient is ready to learn, no apparent learning barriers were identified.  Diagnosis and treatment plans were explained to the patient. The patient expressed understanding of the content. The patient asked appropriate questions. The patient questions were answered to his satisfaction.  Time spent 25 minutes more than 50% of the time in counseling coronation of care including discussion of imaging studies, management of non-small cell lung cancer, follow-up and prognosis  Chart documentation with Dragon Voice recognition Software. Although reviewed after completion, some words and grammatical errors may remain.

## 2018-05-04 NOTE — LETTER
5/4/2018       RE: Zechariah Ashby  [address] [address]     Dear Colleague,    Thank you for referring your patient, Zechariah Ashby, to the [facility] CANCER [facility]INIC. Please see a copy of my visit note below.    Neurosurgery clinic note    Evaluation for a right sided 0.5 cm lesion in the right temporal region    58 M with stage IV lung cancer and active systemic disease wh o underwent a staging brain MRI on 4/27/2018. 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 The patient presents for surgical considerations.    Review of systems revealed hoarseness of voice, but denied neurological complaints.    Past Medical History:   Diagnosis Date     Atrial flutter (H) 11/2017     Cancer (H)     Lung Ca     Current Outpatient Prescriptions   Medication     albuterol (PROAIR HFA) 108 (90 BASE) MCG/ACT Inhaler     amiodarone (PACERONE/CODARONE) 200 MG tablet     buPROPion (WELLBUTRIN SR) 150 MG 12 hr tablet     LORazepam (ATIVAN) 0.5 MG tablet     morphine (MSIR) 15 MG IR tablet     nicotine (NICODERM CQ) 21 MG/24HR 24 hr patch     omeprazole (PRILOSEC) 40 MG capsule     ondansetron (ZOFRAN) 4 MG tablet     order for DME     polyethylene glycol (MIRALAX) powder     prochlorperazine (COMPAZINE) 10 MG tablet     senna-docusate (SENOKOT-S;PERICOLACE) 8.6-50 MG per tablet     warfarin (COUMADIN) 2.5 MG tablet     warfarin (COUMADIN) 5 MG tablet     No current facility-administered medications for this visit.      Allergies   Allergen Reactions     Nka [No Known Allergies]      BP 97/65 (BP Location: Right arm, Patient Position: Right side, Cuff Size: Adult Regular)  Pulse 83  SpO2 97%    Examination notable for hoarse voice without dysarthria facial asymmetry. Non-focal neurologic examination otherwise.    MRI from 4/27/2018 is as above reviewed    AP: 58 M who would benefit from SRS as treatment for the solitary right temporal lesion. I conveyed my recommendation  to the patient, his daughter, and his son (through phone). All questions are answered. Because of the distance that the patient need to travel to the [place], the patient has requested to meet the radiation oncologist on the morning of the SRS. I will schedule to accommodate this request.    >50% of the time was spent on counseling. The visit lasted approximate 65 minutes.    Again, thank you for allowing me to participate in the care of your patient.      Sincerely,    Lobo Godinez MD

## 2018-05-04 NOTE — MR AVS SNAPSHOT
After Visit Summary   5/4/2018    Zechariah Ashby    MRN: [id number]           Patient Information     Date Of Birth          1960        Visit Information        Provider Department      5/4/2018 3:15 PM Lobo Godinez MD [facility]        Today's Diagnoses     History of malignant neoplasm metastatic to brain    -  1       Follow-ups after your visit        Your next 10 appointments already scheduled     May 09, 2018 10:00 AM CDT   Return Visit with Chace Mitchell MD   [facility] ([facility])    [facility]  [address]  [facility] [address]   [phone]            May 11, 2018  1:30 PM CDT   Return Visit with Jonn Ybarra MD   [facility] ([facility])    [address] [address]   [phone]            May 16, 2018  9:00 AM CDT   Level O with ROOM 2 [facility] ([facility])    [facility]  [address]  [facility] [address]   [phone]            May 16, 2018 10:00 AM CDT   PE NPET ONCOLOGY (EYES TO THIGHS) with [id number]   [facility] ([facility])    [address] [address]   [phone]           Tell your doctor:   If there is any chance you may be pregnant or if you are breastfeeding.   If you have problems lying in small spaces (claustrophobia). If you do, your doctor may give you medicine to help you relax. If you have diabetes:   Have your exam early in the morning. Your blood glucose will go up as the day goes by.   Your glucose level must be 180 or less at the start of the exam. Please take any medicines you need to ensure this blood glucose level. 24 hours before your scan: Don t do any heavy exercise. (No jogging, aerobics or other workouts.) Exercise will make your pictures less accurate. 6 hours before  "your scan:   Stop all food and liquids (except water).   Do not chew gum or suck on mints.   If you need to take medicine with food, you may take it with a few crackers.  Please call your Imaging Department at your exam site with any questions.            May 16, 2018  1:40 PM CDT   SHORT with Kailash Mason MD   [facility] ([facility]    [address] [place] [address]   [phone]            May 17, 2018 11:15 AM CDT   Return Visit with Joycelyn May MD   [facility] ([facility])    [facility]  [address]  [facility] [address] [phone]              Who to contact     If you have questions or need follow up information about today's clinic visit or your schedule please contact [facility] [place] [facility] directly at [phone].  Normal or non-critical lab and imaging results will be communicated to you by Victivhart, letter or phone within 4 business days after the clinic has received the results. If you do not hear from us within 7 days, please contact the clinic through Victivhart or phone. If you have a critical or abnormal lab result, we will notify you by phone as soon as possible.  Submit refill requests through setObject or call your pharmacy and they will forward the refill request to us. Please allow 3 business days for your refill to be completed.          Additional Information About Your Visit        setObject Information     setObject lets you send messages to your doctor, view your test results, renew your prescriptions, schedule appointments and more. To sign up, go to [email or web address] . Click on \"Log in\" on the left side of the screen, which will take you to the Welcome page. Then click on \"Sign up Now\" on the right side of the page.     You will be asked to enter the access code listed below, as well as some personal information. Please follow the directions to create " your username and password.     Your access code is: [email or web address]  Expires: 2018 10:55 AM     Your access code will  in 90 days. If you need help or a new code, please call your [facility] or [phone].        Care EveryWhere ID     This is your Care EveryWhere ID. This could be used by other organizations to access your [facility] medical records  [id number]        Your Vitals Were     Pulse Pulse Oximetry                83 97%           Blood Pressure from Last 3 Encounters:   18 97/65   18 (!) 153/94   18 131/79    Weight from Last 3 Encounters:   18 68.1 kg (150 lb 1.6 oz)   18 64.9 kg (143 lb)   18 68.9 kg (152 lb)              Today, you had the following     No orders found for display       Primary Care Provider Office Phone # Fax #    Kailash Mason [phone] [address] [place] [address]        Equal Access to Services     : Hadii aad ku hadasho Soomaali, waaxda luqadaha, qaybta kaalmada judah, harley carrasquillo . So [facility] [phone].    ATENCIÓN: Si habla español, tiene a morocho disposición servicios gratuitos de asistencia lingüística. LlSt. [facility] [phone].    We comply with applicable federal civil rights laws and [place] laws. We do not discriminate on the basis of race, color, national origin, age, disability, sex, sexual orientation, or gender identity.            Thank you!     Thank you for choosing [facility]  for your care. Our goal is always to provide you with excellent care. Hearing back from our patients is one way we can continue to improve our services. Please take a few minutes to complete the written survey that you may receive in the mail after your visit with us. Thank you!             Your Updated Medication List - Protect others around you: Learn how to safely use, store and throw away your medicines at www.disposemymeds.org.          This  list is accurate as of 5/4/18  5:51 PM.  Always use your most recent med list.                   Brand Name Dispense Instructions for use Diagnosis    albuterol 108 (90 Base) MCG/ACT Inhaler    PROAIR HFA    1 Inhaler    Inhale 2 puffs into the lungs every 4 hours as needed for shortness of breath / dyspnea or wheezing    Chronic obstructive pulmonary disease, unspecified COPD type (H)       amiodarone 200 MG tablet    PACERONE/CODARONE    90 tablet    1 tablet daily    Paroxysmal atrial fibrillation (H)       buPROPion 150 MG 12 hr tablet    WELLBUTRIN SR    60 tablet    Take 1 tablet once daily and increase to 1 tablet twice daily after 3 days    Moderate single current episode of major depressive disorder (H)       LORazepam 0.5 MG tablet    ATIVAN    30 tablet    Take 1 tablet (0.5 mg) by mouth every 4 hours as needed (Anxiety, Nausea/Vomiting or Sleep)    Non-small cell carcinoma of lung (H)       morphine 15 MG IR tablet    MSIR    30 tablet    1/2 to 1 pill every 4 hours as needed for air hunger.    Non-small cell carcinoma of lung (H)       nicotine 21 MG/24HR 24 hr patch    NICODERM CQ    30 patch    Place 1 patch onto the skin every 24 hours    Non-small cell carcinoma of lung (H)       omeprazole 40 MG capsule    priLOSEC    30 capsule    Take 1 capsule (40 mg) by mouth daily Take 30-60 minutes before a meal.    Gastroesophageal reflux disease without esophagitis       ondansetron 4 MG tablet    ZOFRAN    30 tablet    Take 1 tablet (4 mg) by mouth every 8 hours as needed for nausea    Non-small cell carcinoma of lung (H)       order for DME     1 Device    Equipment being ordered: donut cushion for sacral ulcer    Decubitus ulcer of sacral region, stage 1       polyethylene glycol powder    MIRALAX    510 g    Take 17 g (1 capful) by mouth daily    Constipation, unspecified constipation type       prochlorperazine 10 MG tablet    COMPAZINE    30 tablet    Take 1 tablet (10 mg) by mouth every 6 hours as  needed (Nausea/Vomiting)    Non-small cell carcinoma of lung (H)       senna-docusate 8.6-50 MG per tablet    SENOKOT-S;PERICOLACE    60 tablet    Take 1 tablet by mouth 2 times daily    Constipation, unspecified constipation type       * warfarin 5 MG tablet    COUMADIN    30 tablet    Take 1.25 mg MWF; 2.5 mg rest of the week as directed by the Anticoagulation Clinic    Atrial flutter, unspecified type (H)       * warfarin 2.5 MG tablet    COUMADIN    75 tablet    Take 1.25 mg MWF, 2.5 mg rest of the week or as directed by the Anticoagulation Clinic    Long-term (current) use of anticoagulants, Atrial flutter, unspecified type (H)       * Notice:  This list has 2 medication(s) that are the same as other medications prescribed for you. Read the directions carefully, and ask your doctor or other care provider to review them with you.

## 2018-05-04 NOTE — PROGRESS NOTES
Neurosurgery clinic note    Evaluation for a right sided 0.5 cm lesion in the right temporal region    58 M with stage IV lung cancer and active systemic disease wh o underwent a staging brain MRI on 4/27/2018. 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 The patient presents for surgical considerations.    Review of systems revealed hoarseness of voice, but denied neurological complaints.    Past Medical History:   Diagnosis Date     Atrial flutter (H) 11/2017     Cancer (H)     Lung Ca     Current Outpatient Prescriptions   Medication     albuterol (PROAIR HFA) 108 (90 BASE) MCG/ACT Inhaler     amiodarone (PACERONE/CODARONE) 200 MG tablet     buPROPion (WELLBUTRIN SR) 150 MG 12 hr tablet     LORazepam (ATIVAN) 0.5 MG tablet     morphine (MSIR) 15 MG IR tablet     nicotine (NICODERM CQ) 21 MG/24HR 24 hr patch     omeprazole (PRILOSEC) 40 MG capsule     ondansetron (ZOFRAN) 4 MG tablet     order for DME     polyethylene glycol (MIRALAX) powder     prochlorperazine (COMPAZINE) 10 MG tablet     senna-docusate (SENOKOT-S;PERICOLACE) 8.6-50 MG per tablet     warfarin (COUMADIN) 2.5 MG tablet     warfarin (COUMADIN) 5 MG tablet     No current facility-administered medications for this visit.      Allergies   Allergen Reactions     Nka [No Known Allergies]      BP 97/65 (BP Location: Right arm, Patient Position: Right side, Cuff Size: Adult Regular)  Pulse 83  SpO2 97%    Examination notable for hoarse voice without dysarthria facial asymmetry. Non-focal neurologic examination otherwise.    MRI from 4/27/2018 is as above reviewed    AP: 58 M who would benefit from SRS as treatment for the solitary right temporal lesion. I conveyed my recommendation to the patient, his daughter, and his son (through phone). All questions are answered. Because of the distance that the patient need to travel to the [place], the patient has requested to meet the radiation oncologist on the morning of the  SRS. I will schedule to accommodate this request.    >50% of the time was spent on counseling. The visit lasted approximate 65 minutes.

## 2018-05-04 NOTE — NURSING NOTE
"Oncology Rooming Note    May 4, 2018 3:09 PM   Zechariah Ashby is a 58 year old male who presents for:    Chief Complaint   Patient presents with     Oncology Clinic Visit     Pt is here for Brain Metastasis     Initial Vitals: BP 97/65 (BP Location: Right arm, Patient Position: Right side, Cuff Size: Adult Regular)  Pulse 83  SpO2 97% Estimated body mass index is 20.95 kg/(m^2) as calculated from the following:    Height as of 5/3/18: 1.803 m (5' 10.98\").    Weight as of 5/3/18: 68.1 kg (150 lb 1.6 oz). There is no height or weight on file to calculate BSA.  Data Unavailable Comment: Data Unavailable   No LMP for male patient.  Allergies reviewed: Yes  Medications reviewed: Yes    Medications: Medication refills not needed today.  Pharmacy name entered into [employer]:    [id number] - [place], [place] - [address]    Clinical concerns: none     6 minutes for nursing intake (face to face time)     Christine Andrew MA              "

## 2018-05-07 NOTE — PROGRESS NOTES
ANTICOAGULATION FOLLOW-UP CLINIC VISIT    Patient Name:  Zechariah Ashby  Date:  5/7/2018  Contact Type:  Face to Face accompanied w/ woman    SUBJECTIVE:     Patient Findings     Positives Other complaints (After the visit, pt did mention that he has been SOB more today and that he has felt more weak today. Pt agreed to be seen downstairs today and to be seen by a nurse)    Comments No changes in medications, activity, or diet noted. No bleeding or increased bruising noted. Took warfarin as prescribed.  Pt denies skipping doses. Woman accompanied pt and did state during visit that pt's pill box has been empty.  Pt did not report any skipped doses.  Pt advised to increase coumadin (see calendar) and to check INR Friday.  Pt agreed to go downstairs and to see a nurse - pt advised to be seen in Urgent Care if he has had SOB/worsening breathing.  Reviewed S&S of clots/strokes with pt due to low INR.  Patient verbalizes understanding and agrees to plan. No further questions or concerns.           OBJECTIVE    INR Protime   Date Value Ref Range Status   05/07/2018 1.2 (A) 0.86 - 1.14 Final       ASSESSMENT / PLAN  INR assessment SUB    Recheck INR In: 4 DAYS    INR Location Clinic      Anticoagulation Summary as of 5/7/2018     INR goal 2.0-3.0   Today's INR 1.2!   Maintenance plan 1.25 mg (2.5 mg x 0.5) on Mon, Wed, Fri; 2.5 mg (2.5 mg x 1) all other days   Full instructions 5/7: 5 mg; 5/9: 2.5 mg; Otherwise 1.25 mg on Mon, Wed, Fri; 2.5 mg all other days   Weekly total 13.75 mg   Plan last modified Yaritza Diaz, RN (3/2/2018)   Next INR check 5/11/2018   Priority INR   Target end date     Indications   Atrial flutter  unspecified type (H) [I48.92]  Long-term (current) use of anticoagulants [Z79.01] [Z79.01]         Anticoagulation Episode Summary     INR check location     Preferred lab     Send INR reminders to [place] PHONE ANTICOAG POOL    Comments *Pt is on AMIODARONE. On palliative chemo (CARBOplatin /  Gemcitabine) pt cell: [phone]      Anticoagulation Care Providers     Provider Role Specialty Phone number    Kailash Mason MD [facility] [phone]            See the Encounter Report to view Anticoagulation Flowsheet and Dosing Calendar (Go to Encounters tab in chart review, and find the Anticoagulation Therapy Visit)        Pat Bloom RN

## 2018-05-07 NOTE — PROGRESS NOTES
Called pt and informed him that he is overdue for an INR check.  Pt was advised to come to [facility] last week.  Pt is on the way to [place] and agreed to stop by clinic.  Pt placed on [facility] schedule.  Patient verbalizes understanding and agrees to plan. No further questions or concerns.  Pat Bloom RN on 5/7/2018 at 2:31 PM

## 2018-05-07 NOTE — MR AVS SNAPSHOT
Zechariah Ashby   5/7/2018   Anticoagulation Therapy Visit    Description:  58 year old male   Provider:  Pat Bloom, RN   Department:  [place]           INR as of 5/7/2018         The patient was not given dosing instructions in this encounter.      Anticoagulation Summary as of 5/7/2018         The patient was not given dosing instructions in this encounter.      May 2018 Details    Sun Mon Tue Wed Thu Fri Sat       1               2      5 mg   See details      3      2.5 mg         4            5                 6               7               8               9               10               11               12                 13               14               15               16               17               18               19                 20               21               22               23               24               25               26                 27               28               29               30               31                  Date Details   05/02 This INR check       Date of next INR:  5/4/2018         How to take your warfarin dose     To take:  1.25 mg Take 0.5 of a 2.5 mg tablet.    To take:  2.5 mg Take 1 of the 2.5 mg tablets.    To take:  5 mg Take 1 of the 5 mg tablets.

## 2018-05-07 NOTE — ED NOTES
Pt here following an appointment in cardiology today. The POCT INR was low, and has been low for about a week. The patient also complains of feeling weak, nauseated and worse than usual.

## 2018-05-07 NOTE — ED PROVIDER NOTES
History     Chief Complaint   Patient presents with     Generalized Weakness     sent to ED from card [facility], INR low, pt pale, weak, having abd pain, stage 4 lung CA     Abdominal Pain     HPI  Zechariah Ashby is a 58 year old male with significant past medical history for non-small cell lung cancer, recent confirmation of brain metastasis, atrial flutter, long-term use of anticoagulation with warfarin, cardiomyopathy, chronic systolic congestive heart failure, COPD-presents with generalized weakness.  Patient has had very poor nutritional intake with 30-35 pound weight loss since December.  Living on his own.  Trying to prepare his own meals which happens infrequently.  Has been instructed to drink Ensure.  Stopped doing so.  Family is concerned about poor nutritional intake, dehydration.  Patient also has had confusion with his medications and that is why he has been low/subtherapeutic on his INR.  No recent nausea or vomiting.  No chest discomfort.  Denies abdominal pain.  No hemoptysis.  Denies headache, visual complaints, seizure.  His primary complaint of generalized weakness.  Family also notes intermittent confusion.      Problem List:    Patient Active Problem List    Diagnosis Date Noted     Brain metastasis (H) 05/03/2018     Priority: Medium     Atrial flutter, unspecified type (H) 02/05/2018     Priority: Medium     Long-term (current) use of anticoagulants [Z79.01] 02/05/2018     Priority: Medium     Chemotherapy induced nausea and vomiting 01/26/2018     Priority: Medium     Dehydration 01/19/2018     Priority: Medium     Cardiomyopathy (H) 01/10/2018     Priority: Medium     Acute on chronic systolic congestive heart failure (H) 01/10/2018     Priority: Medium     Weakness 01/08/2018     Priority: Medium     COPD exacerbation (H) 01/07/2018     Priority: Medium     Non-small cell carcinoma of lung (H) 12/28/2017     Priority: Medium     Initial CXR and CT chest abnormal on 12/7/2017 12/27/2017:  oncology notes:PET scan showing a large FDG avid mediastinal mass extending to the left hilum with obliteration of the left upper lobe bronchus and atelectasis of the upper lobe.  There are also multiple left cervical left cervical and mediastinal adenopathy.  There is increased left pleural effusion.  There is hypermetabolic left adrenal lesion.  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  I reviewed with the patient today management of stage IV squamous cell lung cancer.  4/27/2018 Brain Met- MRI:IMPRESSION:   1. New enhancing 0.6 cm lesion in the inferior right temporal lobe concerning for metastatic disease given the patient's history. Mild surrounding edema around the lesion without significant mass effect or herniation.       Pleural effusion 12/13/2017     Priority: Medium     Mediastinal lymphadenopathy 12/13/2017     Priority: Medium     Atrial flutter (H) 11/16/2017     Priority: Medium     Onset 11/3/2017.  Seen in clinic and ED.  He had atrial flutter in November, 2017.  EF decreased to 30-35% when in the flutter. Treated initially with metoprolol.   Underwent ablation after KIMBERLY on 12/8/2017.  EF improved to 50-55% after the ablation.  Had atrial fibrillation on 1/7 spontaneously converted to sinus.  Back in atrial fibrillation on 1/11/2018.  BP low on metoprolol.  Started amiodarone.  Again converted to sinus when scheduled for cardioversion on 1/12/2018.        Perforated ulcer (HCC) 04/24/2015     Priority: Medium     Free intraperitoneal air 04/24/2015     Priority: Medium     CARDIOVASCULAR SCREENING; LDL GOAL LESS THAN 130 10/31/2010     Priority: Medium        Past Medical History:    Past Medical History:   Diagnosis Date     Atrial flutter (H) 11/2017     Cancer (H)        Past Surgical History:    Past Surgical History:   Procedure Laterality Date     ANESTHESIA CARDIOVERSION N/A 1/12/2018    Procedure: ANESTHESIA  CARDIOVERSION;  CARDIOVERSION (FOLLOWING KIMBERLY AT 0830);  Surgeon: GENERIC ANESTHESIA PROVIDER;  Location: [facility]     APPENDECTOMY      age 28s     INSERT PORT VASCULAR ACCESS N/A 1/3/2018    Procedure: INSERT PORT VASCULAR ACCESS;  Port-a-Cath Placement;  Surgeon: Tomas Crews MD;  Location: [facility]     SURGICAL HISTORY OF -   2004    Gastroscopy with biopsy for gastric ulcer       Family History:    Family History   Problem Relation Age of Onset     CANCER Mother      pancreatic cancer,  at 38 years     Alzheimer Disease Father       at 84 years.       Social History:  Marital Status:   [2]  Social History   Substance Use Topics     Smoking status: Current Some Day Smoker     Packs/day: 0.25     Years: 50.00     Start date: 2017     Smokeless tobacco: Never Used      Comment: 10 cigarettes daily.      Alcohol use No        Medications:      albuterol (PROAIR HFA) 108 (90 BASE) MCG/ACT Inhaler   amiodarone (PACERONE/CODARONE) 200 MG tablet   buPROPion (WELLBUTRIN SR) 150 MG 12 hr tablet   LORazepam (ATIVAN) 0.5 MG tablet   morphine (MSIR) 15 MG IR tablet   nicotine (NICODERM CQ) 21 MG/24HR 24 hr patch   omeprazole (PRILOSEC) 40 MG capsule   ondansetron (ZOFRAN) 4 MG tablet   order for DME   polyethylene glycol (MIRALAX) powder   prochlorperazine (COMPAZINE) 10 MG tablet   senna-docusate (SENOKOT-S;PERICOLACE) 8.6-50 MG per tablet   warfarin (COUMADIN) 2.5 MG tablet   warfarin (COUMADIN) 5 MG tablet         Review of Systems   Constitutional: Positive for activity change, appetite change and fatigue.   HENT: Negative.    Eyes: Negative.    Respiratory: Negative.    Cardiovascular: Negative.    Gastrointestinal: Positive for nausea.   Genitourinary: Negative.    Musculoskeletal: Negative.    Neurological: Positive for weakness. Negative for dizziness, tremors, speech difficulty and headaches.   Psychiatric/Behavioral: Positive for confusion.   All other systems reviewed and are  negative.      Physical Exam   BP: 112/73  Pulse: 73  Temp: 98.4  F (36.9  C)  Resp: 20  Weight: 67.1 kg (148 lb)  SpO2: 100 %      Physical Exam   Generalized weakness  Thin and pale  Total signs reviewed  Head:  Normocephalic.    Eyes:  PERRLA, full EOM.  External exams normal.    Ears:  Normal pinnae, canals, and TM's.    Nose:  Patent, without deformity.    Throat:  Dry mucous membranes without lesions, erythema, or exudate.    Neck:  Supple, without masses, lymphadenopathy or tenderness.    Respiratory:  Normal respiratory effort.  No cough or congestion noted.  Heart:  RR without murmurs, rubs, or gallops.  Abdomen: Flat.  Nontender.  No hepatomegaly.  Bowel sounds present.  Skin: Dry.  No rash.  Neuro:  CN 2-12 intact.  Strength and sensation intact in all extremities.  Motor function intact.  ED Course     ED Course     Procedures                   Results for orders placed or performed in visit on 05/07/18 (from the past 24 hour(s))   INR point of care   Result Value Ref Range    INR Protime 1.2 (A) 0.86 - 1.14       Medications   0.9% sodium chloride BOLUS (not administered)   warfarin (COUMADIN) tablet 5 mg (not administered)     Results for orders placed or performed during the hospital encounter of 05/07/18   CBC with platelets differential   Result Value Ref Range    WBC 2.2 (L) 4.0 - 11.0 10e9/L    RBC Count 2.76 (L) 4.4 - 5.9 10e12/L    Hemoglobin 9.1 (L) 13.3 - 17.7 g/dL    Hematocrit 28.1 (L) 40.0 - 53.0 %     (H) 78 - 100 fl    MCH 33.0 26.5 - 33.0 pg    MCHC 32.4 31.5 - 36.5 g/dL    RDW 15.2 (H) 10.0 - 15.0 %    Platelet Count 145 (L) 150 - 450 10e9/L    Diff Method Automated Method     % Neutrophils 55.5 %    % Lymphocytes 24.9 %    % Monocytes 18.6 %    % Eosinophils 0.5 %    % Basophils 0.5 %    % Immature Granulocytes 0.0 %    Absolute Neutrophil 1.2 (L) 1.6 - 8.3 10e9/L    Absolute Lymphocytes 0.6 (L) 0.8 - 5.3 10e9/L    Absolute Monocytes 0.4 0.0 - 1.3 10e9/L    Absolute Eosinophils  0.0 0.0 - 0.7 10e9/L    Absolute Basophils 0.0 0.0 - 0.2 10e9/L    Abs Immature Granulocytes 0.0 0 - 0.4 10e9/L   Comprehensive metabolic panel   Result Value Ref Range    Sodium 139 133 - 144 mmol/L    Potassium 4.0 3.4 - 5.3 mmol/L    Chloride 106 94 - 109 mmol/L    Carbon Dioxide 29 20 - 32 mmol/L    Anion Gap 4 3 - 14 mmol/L    Glucose 107 (H) 70 - 99 mg/dL    Urea Nitrogen 15 7 - 30 mg/dL    Creatinine 0.86 0.66 - 1.25 mg/dL    GFR Estimate >90 >60 mL/min/1.7m2    GFR Estimate If Black >90 >60 mL/min/1.7m2    Calcium 8.5 8.5 - 10.1 mg/dL    Bilirubin Total 0.4 0.2 - 1.3 mg/dL    Albumin 3.3 (L) 3.4 - 5.0 g/dL    Protein Total 6.2 (L) 6.8 - 8.8 g/dL    Alkaline Phosphatase 89 40 - 150 U/L    ALT 17 0 - 70 U/L    AST 14 0 - 45 U/L         Assessments & Plan (with Medical Decision Making) 4-year-old male with stage IV non-small cell lung cancer with recent brain metastasis diagnosis resents with steady performance status decline.  Generalized weakness.  Risk for falls.  Poor nutritional intake.  Poor oral fluid intake.  Living by self.  Family is tried to make meals but then throwing them away because he does not eat them.   Spent 30 minutes with patient one-on-one discussing importance of improving his nutritional/caloric/protein intake and improving hydration.     I have reviewed the nursing notes.    I have reviewed the findings, diagnosis, plan and need for follow up with the patient.      New Prescriptions    No medications on file       Final diagnoses:   Dehydration   Loss of weight   Poor nutrition   Non-small cell carcinoma of lung (H)- with Mets to Brain       5/7/2018   [facility] EMERGENCY DEPARTMENT     Yg Lara DO  05/07/18 [id number]

## 2018-05-07 NOTE — MR AVS SNAPSHOT
Zechariah Ashby   5/7/2018 2:15 PM   Anticoagulation Therapy Visit    Description:  58 year old male   Provider:  [facility]   Department:  [facility]           INR as of 5/7/2018     Today's INR 1.2!      Anticoagulation Summary as of 5/7/2018     INR goal 2.0-3.0   Today's INR 1.2!   Full instructions 5/7: 5 mg; 5/9: 2.5 mg; Otherwise 1.25 mg on Mon, Wed, Fri; 2.5 mg all other days   Next INR check 5/11/2018    Indications   Atrial flutter  unspecified type (H) [I48.92]  Long-term (current) use of anticoagulants [Z79.01] [Z79.01]         Your next Anticoagulation Clinic appointment(s)     May 11, 2018  3:00 PM CDT   [facility]   [facility]    [address] [id number] [phone]              Contact Numbers     Please call [phone] with any problems or questions regarding your therapy.    If you need to cancel and/or reschedule your appointment please call one of the following numbers:  [facility] - [phone]  [place] - [phone]  [place] - [phone]  \[facility] [place] [facility]\"" [phone]  [place] - [phone]            May 2018 Details    Sun Mon Tue Wed Thu Fri Sat       1               2               3               4               5                 6               7      5 mg   See details      8      2.5 mg         9      2.5 mg         10      2.5 mg         11            12                 13               14               15               16               17               18               19                 20               21               22               23               24               25               26                 27               28               29               30               31                  Date Details   05/07 This INR check       Date of next INR:  5/11/2018         How to take your warfarin dose     To take:  1.25 mg Take 0.5 of a 2.5 mg tablet.    To take:  2.5 mg Take 1  of the 2.5 mg tablets.    To take:  5 mg Take 1 of the 5 mg tablets.

## 2018-05-07 NOTE — ED AVS SNAPSHOT
[facility] Emergency Department    [address] [address]    Phone:  [phone]    Fax:  [phone]                                       Zechariah Ashby   MRN: [id number]    Department:  [facility] Emergency Department   Date of Visit:  5/7/2018           Patient Information     Date Of Birth          1960        Your diagnoses for this visit were:     Dehydration     Loss of weight     Poor nutrition     Non-small cell carcinoma of lung (H)- with Mets to Brain        You were seen by Yg Lara DO.        Discharge Instructions       Focus on improved fluid intake: water, juice, Gatorade.  Need to increase total calorie intake and increase protein intake.  Start by consuming 2 cans of Ensure or Ensure Plus daily  3 small meals daily  Continue with current medication dosing including warfarin  Recheck warfarin level(INR level)-1 week    Your next 10 appointments already scheduled     May 09, 2018 10:00 AM CDT   Return Visit with Chace Mitchell MD   [facility] ([facility])    [facility]  [address]  [facility] [address]   [phone]            May 11, 2018  1:30 PM CDT   [facility]   [facility]    [address] [address]   [phone]            May 11, 2018  3:00 PM CDT   Anticoagulation Visit with [facility]   [facility]    [address] [address]   [phone]            May 16, 2018  9:00 AM CDT   Level O with ROOM 2 [facility] ([facility])    [facility]  [address]  [facility] [address]   [phone]            May 16, 2018 10:00 AM CDT   PE NPET ONCOLOGY (EYES TO THIGHS) with [id number]   [facility] ([facility])    [address] [address]   [phone]           Tell your doctor:   If there is any chance you may be pregnant or if you are breastfeeding.   If you have problems lying in small spaces (claustrophobia). If you do, your doctor may give you medicine to help you relax. If you have diabetes:   Have your exam early in the morning. Your blood glucose will go up as the day goes by.   Your glucose level must be 180 or less at the start of the exam. Please take any medicines you need to ensure this blood glucose level. 24 hours before your scan: Don t do any heavy exercise. (No jogging, aerobics or other workouts.) Exercise will make your pictures less accurate. 6 hours before your scan:   Stop all food and liquids (except water).   Do not chew gum or suck on mints.   If you need to take medicine with food, you may take it with a few crackers.  Please call your Imaging Department at your exam site with any questions.            May 16, 2018  1:40 PM CDT   SHORT with Kailash Mason MD   [facility]    [address] [place] [address]   [phone]            May 17, 2018 11:15 AM CDT   Return Visit with Joycelyn May MD   [facility] ([facility])    [facility]  [address]  [facility] [address]   [phone]              24 Hour Appointment Hotline       To make an appointment at any [facility], call [phone] ([phone]). If you don't have a family doctor or clinic, we will help you find one. [facility] are conveniently located to serve the needs of you and your family.             Review of your medicines      Our records show that you are taking the medicines listed below. If these are incorrect, please call your family doctor or clinic.        Dose / Directions Last dose taken    albuterol 108 (90 Base) MCG/ACT Inhaler   Commonly known as:  PROAIR HFA    Dose:  2 puff   Quantity:  1 Inhaler        Inhale 2 puffs into the lungs every 4 hours as needed for shortness of breath / dyspnea or wheezing   Refills:  11        amiodarone 200 MG tablet   Commonly known as:  PACERONE/CODARONE   Quantity:  90 tablet        1 tablet daily   Refills:  0        buPROPion 150 MG 12 hr tablet   Commonly known as:  WELLBUTRIN SR   Quantity:  60 tablet        Take 1 tablet once daily and increase to 1 tablet twice daily after 3 days   Refills:  1        LORazepam 0.5 MG tablet   Commonly known as:  ATIVAN   Dose:  0.5 mg   Quantity:  30 tablet        Take 1 tablet (0.5 mg) by mouth every 4 hours as needed (Anxiety, Nausea/Vomiting or Sleep)   Refills:  5        morphine 15 MG IR tablet   Commonly known as:  MSIR   Quantity:  30 tablet        1/2 to 1 pill every 4 hours as needed for air hunger.   Refills:  0        nicotine 21 MG/24HR 24 hr patch   Commonly known as:  NICODERM CQ   Dose:  1 patch   Quantity:  30 patch        Place 1 patch onto the skin every 24 hours   Refills:  0        omeprazole 40 MG capsule   Commonly known as:  priLOSEC   Dose:  40 mg   Quantity:  30 capsule        Take 1 capsule (40 mg) by mouth daily Take 30-60 minutes before a meal.   Refills:  11        ondansetron 4 MG tablet   Commonly known as:  ZOFRAN   Dose:  4 mg   Quantity:  30 tablet        Take 1 tablet (4 mg) by mouth every 8 hours as needed for nausea   Refills:  1        order for DME   Quantity:  1 Device        Equipment being ordered: donut cushion for sacral ulcer   Refills:  0        polyethylene glycol powder   Commonly known as:  MIRALAX   Dose:  1 capful   Quantity:  510 g        Take 17 g (1 capful) by mouth daily   Refills:  1        prochlorperazine 10 MG tablet   Commonly known as:  COMPAZINE   Dose:  10 mg   Quantity:  30 tablet        Take 1 tablet (10 mg) by mouth every 6 hours as needed (Nausea/Vomiting)   Refills:  5        senna-docusate 8.6-50 MG per tablet   Commonly known  as:  SENOKOT-S;PERICOLACE   Dose:  1 tablet   Quantity:  60 tablet        Take 1 tablet by mouth 2 times daily   Refills:  11        * warfarin 5 MG tablet   Commonly known as:  COUMADIN   Quantity:  30 tablet        Take 1.25 mg MWF; 2.5 mg rest of the week as directed by the Anticoagulation Clinic   Refills:  1        * warfarin 2.5 MG tablet   Commonly known as:  COUMADIN   Quantity:  75 tablet        Take 1.25 mg MWF, 2.5 mg rest of the week or as directed by the Anticoagulation Clinic   Refills:  0        * Notice:  This list has 2 medication(s) that are the same as other medications prescribed for you. Read the directions carefully, and ask your doctor or other care provider to review them with you.            Procedures and tests performed during your visit     CBC with platelets differential    Comprehensive metabolic panel      Orders Needing Specimen Collection     None      Pending Results     No orders found from 5/5/2018 to 5/8/2018.            Pending Culture Results     No orders found from 5/5/2018 to 5/8/2018.            Pending Results Instructions     If you had any lab results that were not finalized at the time of your Discharge, you can call the ED Lab Result RN at [phone]. You will be contacted by this team for any positive Lab results or changes in treatment. The nurses are available 7 days a week from 10A to 6:30P.  You can leave a message 24 hours per day and they will return your call.        Test Results From Your Hospital Stay        5/7/2018  5:56 PM      Component Results     Component Value Ref Range & Units Status    WBC 2.2 (L) 4.0 - 11.0 10e9/L Final    RBC Count 2.76 (L) 4.4 - 5.9 10e12/L Final    Hemoglobin 9.1 (L) 13.3 - 17.7 g/dL Final    Hematocrit 28.1 (L) 40.0 - 53.0 % Final     (H) 78 - 100 fl Final    MCH 33.0 26.5 - 33.0 pg Final    MCHC 32.4 31.5 - 36.5 g/dL Final    RDW 15.2 (H) 10.0 - 15.0 % Final    Platelet Count 145 (L) 150 - 450 10e9/L Final    Diff  "Method Automated Method  Final    % Neutrophils 55.5 % Final    % Lymphocytes 24.9 % Final    % Monocytes 18.6 % Final    % Eosinophils 0.5 % Final    % Basophils 0.5 % Final    % Immature Granulocytes 0.0 % Final    Absolute Neutrophil 1.2 (L) 1.6 - 8.3 10e9/L Final    Absolute Lymphocytes 0.6 (L) 0.8 - 5.3 10e9/L Final    Absolute Monocytes 0.4 0.0 - 1.3 10e9/L Final    Absolute Eosinophils 0.0 0.0 - 0.7 10e9/L Final    Absolute Basophils 0.0 0.0 - 0.2 10e9/L Final    Abs Immature Granulocytes 0.0 0 - 0.4 10e9/L Final         5/7/2018  5:58 PM      Component Results     Component Value Ref Range & Units Status    Sodium 139 133 - 144 mmol/L Final    Potassium 4.0 3.4 - 5.3 mmol/L Final    Chloride 106 94 - 109 mmol/L Final    Carbon Dioxide 29 20 - 32 mmol/L Final    Anion Gap 4 3 - 14 mmol/L Final    Glucose 107 (H) 70 - 99 mg/dL Final    Urea Nitrogen 15 7 - 30 mg/dL Final    Creatinine 0.86 0.66 - 1.25 mg/dL Final    GFR Estimate >90 >60 mL/min/1.7m2 Final    Non  GFR Calc    GFR Estimate If Black >90 >60 mL/min/1.7m2 Final    African American GFR Calc    Calcium 8.5 8.5 - 10.1 mg/dL Final    Bilirubin Total 0.4 0.2 - 1.3 mg/dL Final    Albumin 3.3 (L) 3.4 - 5.0 g/dL Final    Protein Total 6.2 (L) 6.8 - 8.8 g/dL Final    Alkaline Phosphatase 89 40 - 150 U/L Final    ALT 17 0 - 70 U/L Final    AST 14 0 - 45 U/L Final                Thank you for choosing [facility]       Thank you for choosing [facility] for your care. Our goal is always to provide you with excellent care. Hearing back from our patients is one way we can continue to improve our services. Please take a few minutes to complete the written survey that you may receive in the mail after you visit with us. Thank you!        [employer] Information     [employer] lets you send messages to your doctor, view your test results, renew your prescriptions, schedule appointments and more. To sign up, go to [email or web address] . Click on \"Log in\" on " "the left side of the screen, which will take you to the Welcome page. Then click on \"Sign up Now\" on the right side of the page.     You will be asked to enter the access code listed below, as well as some personal information. Please follow the directions to create your username and password.     Your access code is: [email or web address]  Expires: 2018  7:09 PM     Your access code will  in 90 days. If you need help or a new code, please call your [facility] clinic or [phone].        Care EveryWhere ID     This is your Care EveryWhere ID. This could be used by other organizations to access your [facility] medical records  [id number]        Equal Access to Services     ROD PERRY : Diego Zhang, ronan pendleton, diane so, harley boykin. So [facility] [phone].    ATENCIÓN: Si habla español, tiene a morocho disposición servicios gratuitos de asistencia lingüística. Llame al [phone].    We comply with applicable federal civil rights laws and [place] laws. We do not discriminate on the basis of race, color, national origin, age, disability, sex, sexual orientation, or gender identity.            After Visit Summary       This is your record. Keep this with you and show to your community pharmacist(s) and doctor(s) at your next visit.                  "

## 2018-05-07 NOTE — ED AVS SNAPSHOT
[facility] Emergency Department    [address] [facility] [address]    Phone:  [phone]    Fax:  [phone]                                       Zechariah Ashby   MRN: [id number]    Department:  [facility] Emergency Department   Date of Visit:  5/7/2018           After Visit Summary Signature Page     I have received my discharge instructions, and my questions have been answered. I have discussed any challenges I see with this plan with the nurse or doctor.    ..........................................................................................................................................  Patient/Patient Representative Signature      ..........................................................................................................................................  Patient Representative Print Name and Relationship to Patient    ..................................................               ................................................  Date                                            Time    ..........................................................................................................................................  Reviewed by Signature/Title    ...................................................              ..............................................  Date                                                            Time

## 2018-05-08 NOTE — DISCHARGE INSTRUCTIONS
Focus on improved fluid intake: water, juice, Gatorade.  Need to increase total calorie intake and increase protein intake.  Start by consuming 2 cans of Ensure or Ensure Plus daily  3 small meals daily  Continue with current medication dosing including warfarin  Recheck warfarin level(INR level)-1 week

## 2018-05-09 NOTE — PROGRESS NOTES
Palliative Care Outpatient Clinic Progress Note    Patient Name:  Zechariah Ashby  Primary Provider:  Kailash Mason    Chief Complaint: Weakness    Interim History:  Zechariah Ashby 58 year old male returns to be seen by palliative care today.  His symptoms have advanced over the last few months.  He has not had any chemotherapy for the last 3 weeks and he is scheduled for PET scan in the next couple weeks following which it will be determined whether he will undergo any further therapy.  He is here today with his daughter, his ex-wife, and another relative on the telephone.  They are all describing steady decline.  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  He is having some confusion and apparently has not been always remembering to take his medications.  He is weak and he sleeps a lot.  Apparently he is sleeping more in his recliner 20 hours per day.    Coping:  He is handling the disease fairly well as far as acceptance though family members seem to be struggling somewhat and feel they are not certain what his status is as far as prognosis.    Social History:  Pertinent changes to social history/social situation since last visit: No change though he is having trouble getting along on his own at home  Key support resources: Family though they are an hour away  Advance Directive Status:    Social History   Substance Use Topics     Smoking status: Current Some Day Smoker     Packs/day: 0.25     Years: 50.00     Start date: 12/12/2017     Smokeless tobacco: Never Used      Comment: 10 cigarettes daily.      Alcohol use No         Allergies   Allergen Reactions     Nka [No Known Allergies]      Current Outpatient Prescriptions   Medication Sig Dispense Refill     albuterol (PROAIR HFA) 108 (90 BASE) MCG/ACT Inhaler Inhale 2 puffs into the lungs every 4 hours as needed for shortness of breath / dyspnea or wheezing 1 Inhaler 11     amiodarone  (PACERONE/CODARONE) 200 MG tablet 1 tablet daily (Patient taking differently: Take 200 mg by mouth daily 1 tablet daily) 90 tablet 0     buPROPion (WELLBUTRIN SR) 150 MG 12 hr tablet Take 1 tablet once daily and increase to 1 tablet twice daily after 3 days (Patient taking differently: Take 150 mg by mouth daily Take 1 tablet once daily and increase to 1 tablet twice daily after 3 days) 60 tablet 1     LORazepam (ATIVAN) 0.5 MG tablet Take 1 tablet (0.5 mg) by mouth every 4 hours as needed (Anxiety, Nausea/Vomiting or Sleep) 30 tablet 5     morphine (MSIR) 15 MG IR tablet Take 1 tablet (15 mg) by mouth every 6 hours as needed for moderate to severe pain 60 tablet 0     nicotine (NICODERM CQ) 21 MG/24HR 24 hr patch Place 1 patch onto the skin every 24 hours 30 patch 0     omeprazole (PRILOSEC) 40 MG capsule Take 1 capsule (40 mg) by mouth daily Take 30-60 minutes before a meal. 30 capsule 11     ondansetron (ZOFRAN) 4 MG tablet Take 1 tablet (4 mg) by mouth every 8 hours as needed for nausea 30 tablet 1     polyethylene glycol (MIRALAX) powder Take 17 g (1 capful) by mouth daily 510 g 1     prochlorperazine (COMPAZINE) 10 MG tablet Take 1 tablet (10 mg) by mouth every 6 hours as needed (Nausea/Vomiting) 30 tablet 5     senna-docusate (SENOKOT-S;PERICOLACE) 8.6-50 MG per tablet Take 1 tablet by mouth 2 times daily 60 tablet 11     warfarin (COUMADIN) 2.5 MG tablet Take 1.25 mg MWF, 2.5 mg rest of the week or as directed by the Anticoagulation Clinic 75 tablet 0     warfarin (COUMADIN) 5 MG tablet Take 1.25 mg MWF; 2.5 mg rest of the week as directed by the Anticoagulation Clinic 30 tablet 1     order for DME Equipment being ordered: donut cushion for sacral ulcer 1 Device 0     Past Medical History:   Diagnosis Date     Atrial flutter (H) 11/2017     Cancer (H)     Lung Ca     Past Surgical History:   Procedure Laterality Date     ANESTHESIA CARDIOVERSION N/A 1/12/2018    Procedure: ANESTHESIA CARDIOVERSION;   "CARDIOVERSION (FOLLOWING KIMBERLY AT 0830);  Surgeon: GENERIC ANESTHESIA PROVIDER;  Location: [facility] OR     APPENDECTOMY      age 28s     INSERT PORT VASCULAR ACCESS N/A 1/3/2018    Procedure: INSERT PORT VASCULAR ACCESS;  Port-a-Cath Placement;  Surgeon: Tomas Crews MD;  Location: [facility] OR     SURGICAL HISTORY OF -   08/24/2004    Gastroscopy with biopsy for gastric ulcer       Review of Systems:   ROS: 10 point ROS neg other than the symptoms noted above in the HPI and pertinents here:  Palliative Symptom Review (0=no symptom/no concern, 1=mild, 2=moderate, 3=severe):      Pain: 1      Fatigue: 3      Nausea: 0      Constipation: 0      Diarrhea: 0      Depressive Symptoms: 0      Anxiety: 0      Drowsiness: 2      Poor Appetite: 1      Shortness of Breath: 0      Insomnia: 0      Other: 0      Overall (0 good/no concerns, 3 very poor):  2      /77 (BP Location: Right arm, Patient Position: Sitting, Cuff Size: Adult Regular)  Pulse 75  Temp 97.4  F (36.3  C) (Tympanic)  Resp 16  Ht 1.803 m (5' 10.98\")  Wt 68.4 kg (150 lb 11.2 oz)  SpO2 100%  BMI 21.03 kg/m2  HEAD: Atraumatic, normocephalic  EYES: PERRLA, EOMI, Sclerae clear, Fundi normal with sharp discs  EARS: TMs clear, canals normal  NOSE & THROAT: clear  NECK: Supple without adenopathy or thyromegaly  LUNGS: clear to auscultation, normal breath sounds  CV: RRR without murmur, no carotid bruits  ABD: BS+, soft, nontender, no masses, no hepatosplenomegaly  SKIN: No rashes or abnormalities  NEURO: Alert and oriented X 3, non focal exam, DTRs normal, motor and sensation normal, coordination and gait without abnormality.        Impression & Recommendations & Counseling:  Non-small cell carcinoma of lung (H)    We had a very long discussion about prognosis and his current condition.  There seems to be evidence that his cancer is progressive though further studies are scheduled.  We discussed his treatment options from a palliative care standpoint based on the " possibility that his cancer is advancing and that there may not be further chemotherapy offered and then also the possibility that there will be further chemotherapy.  He would consider hospice if there is no treatment offered or if he determines to not go ahead with any further treatment.    1.  Refill Vicodin which is holding his pain levels well  2.  Continue Compazine which he is using for nausea with good relief  3.  I have given him a home care referral for outpatient palliative care so that he can get some services in the home because he is definitely homebound and he needs services, particularly med management and evaluation.  I explained to the family that the situation may warrant more in-home care that may need to be hired and/or changing residence if the home nurses deem that there is significant health concern at home  4.  Follow-up in palliative care clinic in 4-6 weeks.    Orders Placed This Encounter     HOME CARE NURSING REFERRAL     morphine (MSIR) 15 MG IR tablet       50 minutes face to face patient time, 40 minutes counseling regarding the above problems

## 2018-05-09 NOTE — MR AVS SNAPSHOT
After Visit Summary   5/9/2018    Zechariah Ashby    MRN: [id number]           Patient Information     Date Of Birth          1960        Visit Information        Provider Department      5/9/2018 10:00 AM Chace Mitchell MD [facility]        Today's Diagnoses     Non-small cell carcinoma of lung (H)          Care Instructions    Phone number for home care palliative nursing [phone]          Follow-ups after your visit        Additional Services     HOME CARE NURSING REFERRAL       **Order classes of: [place] [facility], [facility] and [facility] will route to the Home Care and Hospice Referral Pool.  Home Care or Hospice will then contact the patient to schedule their appointment.**    If you do not hear from Home Care and Hospice, or you would like to call to schedule, please call the referring place of service that your provider has listed below.  ______________________________________________________________________    Your provider has referred you to: [facility]: [facility] [phone]   [email or web address]    Extended Emergency Contact Information  Primary Emergency Contact: Estefani Argueta (ex-wife)   [facility]  Home Phone: [phone]  Mobile Phone: [phone]  Relation: Other  Secondary Emergency Contact: Zechariah Ashby Vero   [facility]  Home Phone: [phone]  Mobile Phone: [phone]  Relation: Son    Patient Anticipated Discharge Date: May 9, 2018     RN, PT, HHA to begin 24 - 48 hours after discharge.  PLEASE EVALUATE AND TREAT (Evaluation timeline is 24 - 48 hrs. Please call if there is need for a variance to this timeline).    REASON FOR REFERRAL: Lung Cancer, palliative care    ADDITIONAL SERVICES NEEDED:     OTHER PERTINENT INFORMATION: Patient was last seen by provider on May 9, 2018 for lung cancer.    Current Outpatient Prescriptions:  albuterol (PROAIR HFA) 108 (90 BASE)  MCG/ACT Inhaler, Inhale 2 puffs into the lungs every 4 hours as needed for shortness of breath / dyspnea or wheezing, Disp: 1 Inhaler, Rfl: 11  amiodarone (PACERONE/CODARONE) 200 MG tablet, 1 tablet daily (Patient taking differently: Take 200 mg by mouth daily 1 tablet daily), Disp: 90 tablet, Rfl: 0  buPROPion (WELLBUTRIN SR) 150 MG 12 hr tablet, Take 1 tablet once daily and increase to 1 tablet twice daily after 3 days (Patient taking differently: Take 150 mg by mouth daily Take 1 tablet once daily and increase to 1 tablet twice daily after 3 days), Disp: 60 tablet, Rfl: 1  LORazepam (ATIVAN) 0.5 MG tablet, Take 1 tablet (0.5 mg) by mouth every 4 hours as needed (Anxiety, Nausea/Vomiting or Sleep), Disp: 30 tablet, Rfl: 5  morphine (MSIR) 15 MG IR tablet, 1/2 to 1 pill every 4 hours as needed for air hunger., Disp: 30 tablet, Rfl: 0  nicotine (NICODERM CQ) 21 MG/24HR 24 hr patch, Place 1 patch onto the skin every 24 hours, Disp: 30 patch, Rfl: 0  omeprazole (PRILOSEC) 40 MG capsule, Take 1 capsule (40 mg) by mouth daily Take 30-60 minutes before a meal., Disp: 30 capsule, Rfl: 11  ondansetron (ZOFRAN) 4 MG tablet, Take 1 tablet (4 mg) by mouth every 8 hours as needed for nausea, Disp: 30 tablet, Rfl: 1  polyethylene glycol (MIRALAX) powder, Take 17 g (1 capful) by mouth daily, Disp: 510 g, Rfl: 1  prochlorperazine (COMPAZINE) 10 MG tablet, Take 1 tablet (10 mg) by mouth every 6 hours as needed (Nausea/Vomiting), Disp: 30 tablet, Rfl: 5  senna-docusate (SENOKOT-S;PERICOLACE) 8.6-50 MG per tablet, Take 1 tablet by mouth 2 times daily, Disp: 60 tablet, Rfl: 11  warfarin (COUMADIN) 2.5 MG tablet, Take 1.25 mg MWF, 2.5 mg rest of the week or as directed by the Anticoagulation Clinic, Disp: 75 tablet, Rfl: 0  warfarin (COUMADIN) 5 MG tablet, Take 1.25 mg MWF; 2.5 mg rest of the week as directed by the Anticoagulation Clinic, Disp: 30 tablet, Rfl: 1  order for DME, Equipment being ordered: donut cushion for sacral ulcer,  Disp: 1 Device, Rfl: 0      Patient Active Problem List:     CARDIOVASCULAR SCREENING; LDL GOAL LESS THAN 130     Perforated ulcer (HCC)     Free intraperitoneal air     Atrial flutter (H)     Pleural effusion     Mediastinal lymphadenopathy     Non-small cell carcinoma of lung (H)     COPD exacerbation (H)     Weakness     Cardiomyopathy (H)     Acute on chronic systolic congestive heart failure (H)     Dehydration     Chemotherapy induced nausea and vomiting     Atrial flutter, unspecified type (H)     Long-term (current) use of anticoagulants [Z79.01]     Brain metastasis (H)      Documentation of Face to Face and Certification for Home Health Services    I certify that patient, Zechariah Ashby is under my care and that I, or a Nurse Practitioner or Physician's Assistant working with me, had a face-to-face encounter that meets the physician face-to-face encounter requirements with this patient on: 5/9/2018.    This encounter with the patient was in whole, or in part, for the following medical condition, which is the primary reason for Home Health Care: lung cancer, advanced, malnourished, confused, home bound.    I certify that, based on my findings, the following services are medically necessary Home Health Services: Nursing    My clinical findings support the need for the above services because: Nurse is needed: To assess condition after changes in medications or other medical regimen..    Further, I certify that my clinical findings support that this patient is homebound (i.e. absences from home require considerable and taxing effort and are for medical reasons or An[employer] services or infrequently or of short duration when for other reasons) because: Leaving home is medically contraindicated for the following reason(s): Dyspnea on exertion that makes it so they cannot leave their home for needed services without clinical deterioration..    Based on the above findings, I certify that this patient is confined  to the home and needs intermittent skilled nursing care, physical therapy and/or speech therapy.  The patient is under my care, and I have initiated the establishment of the plan of care.  This patient will be followed by a physician who will periodically review the plan of care.    Physician/Provider to provide follow up care: Kailash MasonOS certified Physician at time of discharge: Chace Mitchell MD      Please be aware that coverage of these services is subject to the terms and limitations of your health insurance plan.  Call member services at your health plan with any benefit or coverage questions.                  Your next 10 appointments already scheduled     May 10, 2018 10:00 AM CDT   CONSULT with Benoit Woodson MD   [facility] ([facility])    [address], [address]  [facility] [address]   [phone]            May 11, 2018  1:30 PM CDT   Return Visit with Jonn Ybarra MD   [facility] ([facility])    [address] [address]   [phone]            May 11, 2018  3:00 PM CDT   Anticoagulation Visit with [place] ANTI COAG   [facility]    [address] [address]   [phone]            May 16, 2018  9:00 AM CDT   Level O with ROOM 2 [facility] ([facility])    [facility]  [address]  [facility] [address]   [phone]            May 16, 2018 10:00 AM CDT   PE NPET ONCOLOGY (EYES TO THIGHS) with [id number]   [facility] ([facility])    [address] [address]   [phone]           Tell your doctor:   If there is any chance you may be pregnant or if you are breastfeeding.   If you have problems lying in small spaces (claustrophobia). If you do, your doctor may give you medicine to help you relax.  If you have diabetes:   Have your exam early in the morning. Your blood glucose will go up as the day goes by.   Your glucose level must be 180 or less at the start of the exam. Please take any medicines you need to ensure this blood glucose level. 24 hours before your scan: Don t do any heavy exercise. (No jogging, aerobics or other workouts.) Exercise will make your pictures less accurate. 6 hours before your scan:   Stop all food and liquids (except water).   Do not chew gum or suck on mints.   If you need to take medicine with food, you may take it with a few crackers.  Please call your Imaging Department at your exam site with any questions.            May 16, 2018  1:40 PM CDT   SHORT with [facility]ailash Mason MD   [facility]    [address] [place] [address]   [phone]            May 17, 2018  5:30 AM CDT   GAMMA TREATMENT with Gaby Edmondson MD   [facility] ([facility])    [address] [place] [address]   [phone]            May 17, 2018  7:00 AM CDT   GAMMA TREATMENT with Lobo Godinez MD   [facility] ([facility])    [address] [place] [address]   [phone]            May 17, 2018  7:30 AM CDT   MR BRAIN W CONTRAST with UUMR1   [facility] MRI ([facility])    [address] [address]   [phone]           Take your medicines as usual, unless your doctor tells you not to. Bring a list of your current medicines to your exam (including vitamins, minerals and over-the-counter drugs).  You may or may not receive intravenous (IV) contrast for this exam pending the discretion of the Radiologist.  You do not need to do anything special to prepare.  The MRI machine uses a strong magnet. Please wear clothes without metal (snaps, zippers). A sweatsuit works well, or we may give you a  [facility] gown.  Please remove any body piercings and hair extensions before you arrive. You will also remove watches, jewelry, hairpins, wallets, dentures, partial dental plates and hearing aids. You may wear contact lenses, and you may be able to wear your rings. We have a safe place to keep your personal items, but it is safer to leave them at home.  **IMPORTANT** THE INSTRUCTIONS BELOW ARE ONLY FOR THOSE PATIENTS WHO HAVE BEEN PRESCRIBED SEDATION OR GENERAL ANESTHESIA DURING THEIR MRI PROCEDURE:  IF YOUR DOCTOR PRESCRIBED ORAL SEDATION (take medicine to help you relax during your exam):   You must get the medicine from your doctor (oral medication) before you arrive. Bring the medicine to the exam. Do not take it at home. You ll be told when to take it upon arriving for your exam.   Arrive one hour early. Bring someone who can take you home after the test. Your medicine will make you sleepy. After the exam, you may not drive, take a bus or take a taxi by yourself.  IF YOUR DOCTOR PRESCRIBED IV SEDATION:   Arrive one hour early. Bring someone who can take you home after the test. Your medicine will make you sleepy. After the exam, you may not drive, take a bus or take a taxi by yourself.   No eating 6 hours before your exam. You may have clear liquids up until 4 hours before your exam. (Clear liquids include water, clear tea, black coffee and fruit juice without pulp.)  IF YOUR DOCTOR PRESCRIBED ANESTHESIA (be asleep for your exam):   Arrive 1 1/2 hours early. Bring someone who can take you home after the test. You may not drive, take a bus or take a taxi by yourself.   No eating 8 hours before your exam. You may have clear liquids up until 4 hours before your exam. (Clear liquids include water, clear tea, black coffee and fruit juice without pulp.)   You will spend four to five hours in the recovery room.  Please call the Imaging Department at your exam site with any questions.            May 17, 2018 11:15 AM  "CDT   Return Visit with Joycelyn May MD   [facility] ([facility])    [facility]  [address]  [facility] [address] [phone]              Future tests that were ordered for you today     Open Future Orders        Priority Expected Expires Ordered    MRI Brain w contrast Routine 5/17/2018 5/9/2019 5/9/2018            Who to contact     If you have questions or need follow up information about today's clinic visit or your schedule please contact [facility] directly at [phone].  Normal or non-critical lab and imaging results will be communicated to you by MyChart, letter or phone within 4 business days after the clinic has received the results. If you do not hear from us within 7 days, please contact the clinic through [employer]Deck[employer] or phone. If you have a critical or abnormal lab result, we will notify you by phone as soon as possible.  Submit refill requests through [employer] or call your pharmacy and they will forward the refill request to us. Please allow 3 business days for your refill to be completed.          Additional Information About Your Visit        MyChart Information     [employer]ia gives you secure access to your electronic health record. If you see a primary care provider, you can also send messages to your care team and make appointments. If you have questions, please call your primary care clinic.  If you do not have a primary care provider, please call [phone] and they will assist you.        Care EveryWhere ID     This is your Care EveryWhere ID. This could be used by other organizations to access your [place] medical records  [id number]        Your Vitals Were     Pulse Temperature Respirations Height Pulse Oximetry BMI (Body Mass Index)    75 97.4  F (36.3  C) (Tympanic) 16 1.803 m (5' 10.98\") 100% 21.03 kg/m2       Blood Pressure from Last 3 Encounters:   05/09/18 110/77   05/07/18 145/89   05/04/18 97/65    Weight " from Last 3 Encounters:   05/09/18 68.4 kg (150 lb 11.2 oz)   05/07/18 67.1 kg (148 lb)   05/03/18 68.1 kg (150 lb 1.6 oz)              We Performed the Following     HOME CARE NURSING REFERRAL          Today's Medication Changes          These changes are accurate as of 5/9/18 11:00 AM.  If you have any questions, ask your nurse or doctor.               These medicines have changed or have updated prescriptions.        Dose/Directions    amiodarone 200 MG tablet   Commonly known as:  PACERONE/CODARONE   This may have changed:    - how much to take  - how to take this  - when to take this  - additional instructions   Used for:  Paroxysmal atrial fibrillation (H)        1 tablet daily   Quantity:  90 tablet   Refills:  0       buPROPion 150 MG 12 hr tablet   Commonly known as:  WELLBUTRIN SR   This may have changed:    - how much to take  - how to take this  - when to take this  - additional instructions   Used for:  Moderate single current episode of major depressive disorder (H)        Take 1 tablet once daily and increase to 1 tablet twice daily after 3 days   Quantity:  60 tablet   Refills:  1       morphine 15 MG IR tablet   Commonly known as:  MSIR   This may have changed:    - how much to take  - how to take this  - when to take this  - reasons to take this  - additional instructions   Used for:  Non-small cell carcinoma of lung (H)   Changed by:  Chace Mitchell MD        Dose:  15 mg   Take 1 tablet (15 mg) by mouth every 6 hours as needed for moderate to severe pain   Quantity:  60 tablet   Refills:  0            Where to get your medicines      Some of these will need a paper prescription and others can be bought over the counter.  Ask your nurse if you have questions.     Bring a paper prescription for each of these medications     morphine 15 MG IR tablet               Information about OPIOIDS     PRESCRIPTION OPIOIDS: WHAT YOU NEED TO KNOW   You have a prescription for an opioid (narcotic) pain  medicine. Opioids can cause addiction. If you have a history of chemical dependency of any type, you are at a higher risk of becoming addicted to opioids. Only take this medicine after all other options have been tried. Take it for as short a time and as few doses as possible.     Do not:    Drive. If you drive while taking these medicines, you could be arrested for driving under the influence (DUI).    Operate heavy machinery    Do any other dangerous activities while taking these medicines.     Drink any alcohol while taking these medicines.      Take with any other medicines that contain acetaminophen. Read all labels carefully. Look for the word  acetaminophen  or  Tylenol.  Ask your pharmacist if you have questions or are unsure.    Store your pills in a secure place, locked if possible. We will not replace any lost or stolen medicine. If you don t finish your medicine, please throw away (dispose) as directed by your pharmacist. The [place] [employer]ution [employer] has more information about safe disposal: https://www.pca.[facility].us/living-green/managing-unwanted-medications    All opioids tend to cause constipation. Drink plenty of water and eat foods that have a lot of fiber, such as fruits, vegetables, prune juice, apple juice and high-fiber cereal. Take a laxative (Miralax, milk of magnesia, Colace, Senna) if you don t move your bowels at least every other day.          Primary Care Provider Office Phone # Fax #    Kailash Mason [phone] [address] [place] [address]        Equal Access to Services     [facility] Foundation HospitalDAHLIA : Hadii arnol Zhang, waaxda helder, qaybta kaalmaturner so, harley boykin. So [facility] [phone].    ATENCIÓN: Si habla español, tiene a morocho disposición servicios gratuitos de asistencia lingüística. Llame al [phone].    We comply with applicable federal civil rights laws and [place] laws. We do not  discriminate on the basis of race, color, national origin, age, disability, sex, sexual orientation, or gender identity.            Thank you!     Thank you for choosing [facility]  for your care. Our goal is always to provide you with excellent care. Hearing back from our patients is one way we can continue to improve our services. Please take a few minutes to complete the written survey that you may receive in the mail after your visit with us. Thank you!             Your Updated Medication List - Protect others around you: Learn how to safely use, store and throw away your medicines at www.disposemymeds.org.          This list is accurate as of 5/9/18 11:00 AM.  Always use your most recent med list.                   Brand Name Dispense Instructions for use Diagnosis    albuterol 108 (90 Base) MCG/ACT Inhaler    PROAIR HFA    1 Inhaler    Inhale 2 puffs into the lungs every 4 hours as needed for shortness of breath / dyspnea or wheezing    Chronic obstructive pulmonary disease, unspecified COPD type (H)       amiodarone 200 MG tablet    PACERONE/CODARONE    90 tablet    1 tablet daily    Paroxysmal atrial fibrillation (H)       buPROPion 150 MG 12 hr tablet    WELLBUTRIN SR    60 tablet    Take 1 tablet once daily and increase to 1 tablet twice daily after 3 days    Moderate single current episode of major depressive disorder (H)       LORazepam 0.5 MG tablet    ATIVAN    30 tablet    Take 1 tablet (0.5 mg) by mouth every 4 hours as needed (Anxiety, Nausea/Vomiting or Sleep)    Non-small cell carcinoma of lung (H)       morphine 15 MG IR tablet    MSIR    60 tablet    Take 1 tablet (15 mg) by mouth every 6 hours as needed for moderate to severe pain    Non-small cell carcinoma of lung (H)       nicotine 21 MG/24HR 24 hr patch    NICODERM CQ    30 patch    Place 1 patch onto the skin every 24 hours    Non-small cell carcinoma of lung (H)       omeprazole 40 MG capsule    priLOSEC    30 capsule    Take 1  capsule (40 mg) by mouth daily Take 30-60 minutes before a meal.    Gastroesophageal reflux disease without esophagitis       ondansetron 4 MG tablet    ZOFRAN    30 tablet    Take 1 tablet (4 mg) by mouth every 8 hours as needed for nausea    Non-small cell carcinoma of lung (H)       order for DME     1 Device    Equipment being ordered: donut cushion for sacral ulcer    Decubitus ulcer of sacral region, stage 1       polyethylene glycol powder    MIRALAX    510 g    Take 17 g (1 capful) by mouth daily    Constipation, unspecified constipation type       prochlorperazine 10 MG tablet    COMPAZINE    30 tablet    Take 1 tablet (10 mg) by mouth every 6 hours as needed (Nausea/Vomiting)    Non-small cell carcinoma of lung (H)       senna-docusate 8.6-50 MG per tablet    SENOKOT-S;PERICOLACE    60 tablet    Take 1 tablet by mouth 2 times daily    Constipation, unspecified constipation type       * warfarin 5 MG tablet    COUMADIN    30 tablet    Take 1.25 mg MWF; 2.5 mg rest of the week as directed by the Anticoagulation Clinic    Atrial flutter, unspecified type (H)       * warfarin 2.5 MG tablet    COUMADIN    75 tablet    Take 1.25 mg MWF, 2.5 mg rest of the week or as directed by the Anticoagulation Clinic    Long-term (current) use of anticoagulants, Atrial flutter, unspecified type (H)       * Notice:  This list has 2 medication(s) that are the same as other medications prescribed for you. Read the directions carefully, and ask your doctor or other care provider to review them with you.

## 2018-05-09 NOTE — TELEPHONE ENCOUNTER
S: Referral for Palliative care  B: Patient with lung ca/brain mets  A: Will be seen, Requires orders for Skilled Nursing AND Medical Social Worker  R: Please update orders.  Thanks so much!

## 2018-05-09 NOTE — LETTER
5/9/2018         RE: Zechariah Ashby  [address] [facility] [address]        Dear Colleague,    Thank you for referring your patient, Zechariah Ashby, to the [facility]. Please see a copy of my visit note below.    Palliative Care Outpatient Clinic Progress Note    Patient Name:  Zechariah Ashby  Primary Provider:  Kailash Mason    Chief Complaint: Weakness    Interim History:  Zechariah Ashby 58 year old male returns to be seen by palliative care today.  His symptoms have advanced over the last few months.  He has not had any chemotherapy for the last 3 weeks and he is scheduled for PET scan in the next couple weeks following which it will be determined whether he will undergo any further therapy.  He is here today with his daughter, his ex-wife, and another relative on the telephone.  They are all describing steady decline.  They also are reporting that there is evidence of advancement of the cancer his father has metastases to the brain.  He is having some confusion and apparently has not been always remembering to take his medications.  He is weak and he sleeps a lot.  Apparently he is sleeping more in his recliner 20 hours per day.    Coping:  He is handling the disease fairly well as far as acceptance though family members seem to be struggling somewhat and feel they are not certain what his status is as far as prognosis.    Social History:  Pertinent changes to social history/social situation since last visit: No change though he is having trouble getting along on his own at home  Key support resources: Family though they are an hour away  Advance Directive Status:    Social History   Substance Use Topics     Smoking status: Current Some Day Smoker     Packs/day: 0.25     Years: 50.00     Start date: 12/12/2017     Smokeless tobacco: Never Used      Comment: 10 cigarettes daily.      Alcohol use No         Allergies   Allergen Reactions     Nka [No Known Allergies]       Current Outpatient Prescriptions   Medication Sig Dispense Refill     albuterol (PROAIR HFA) 108 (90 BASE) MCG/ACT Inhaler Inhale 2 puffs into the lungs every 4 hours as needed for shortness of breath / dyspnea or wheezing 1 Inhaler 11     amiodarone (PACERONE/CODARONE) 200 MG tablet 1 tablet daily (Patient taking differently: Take 200 mg by mouth daily 1 tablet daily) 90 tablet 0     buPROPion (WELLBUTRIN SR) 150 MG 12 hr tablet Take 1 tablet once daily and increase to 1 tablet twice daily after 3 days (Patient taking differently: Take 150 mg by mouth daily Take 1 tablet once daily and increase to 1 tablet twice daily after 3 days) 60 tablet 1     LORazepam (ATIVAN) 0.5 MG tablet Take 1 tablet (0.5 mg) by mouth every 4 hours as needed (Anxiety, Nausea/Vomiting or Sleep) 30 tablet 5     morphine (MSIR) 15 MG IR tablet Take 1 tablet (15 mg) by mouth every 6 hours as needed for moderate to severe pain 60 tablet 0     nicotine (NICODERM CQ) 21 MG/24HR 24 hr patch Place 1 patch onto the skin every 24 hours 30 patch 0     omeprazole (PRILOSEC) 40 MG capsule Take 1 capsule (40 mg) by mouth daily Take 30-60 minutes before a meal. 30 capsule 11     ondansetron (ZOFRAN) 4 MG tablet Take 1 tablet (4 mg) by mouth every 8 hours as needed for nausea 30 tablet 1     polyethylene glycol (MIRALAX) powder Take 17 g (1 capful) by mouth daily 510 g 1     prochlorperazine (COMPAZINE) 10 MG tablet Take 1 tablet (10 mg) by mouth every 6 hours as needed (Nausea/Vomiting) 30 tablet 5     senna-docusate (SENOKOT-S;PERICOLACE) 8.6-50 MG per tablet Take 1 tablet by mouth 2 times daily 60 tablet 11     warfarin (COUMADIN) 2.5 MG tablet Take 1.25 mg MWF, 2.5 mg rest of the week or as directed by the Anticoagulation Clinic 75 tablet 0     warfarin (COUMADIN) 5 MG tablet Take 1.25 mg MWF; 2.5 mg rest of the week as directed by the Anticoagulation Clinic 30 tablet 1     order for DME Equipment being ordered: donut cushion for sacral ulcer 1  "Device 0     Past Medical History:   Diagnosis Date     Atrial flutter (H) 11/2017     Cancer (H)     Lung Ca     Past Surgical History:   Procedure Laterality Date     ANESTHESIA CARDIOVERSION N/A 1/12/2018    Procedure: ANESTHESIA CARDIOVERSION;  CARDIOVERSION (FOLLOWING KIMBERLY AT 0830);  Surgeon: GENERIC ANESTHESIA PROVIDER;  Location: [facility] OR     APPENDECTOMY      age 30s     INSERT PORT VASCULAR ACCESS N/A 1/3/2018    Procedure: INSERT PORT VASCULAR ACCESS;  Port-a-Cath Placement;  Surgeon: Tomas Crews MD;  Location: [facility]     SURGICAL HISTORY OF -   08/24/2004    Gastroscopy with biopsy for gastric ulcer       Review of Systems:   ROS: 10 point ROS neg other than the symptoms noted above in the HPI and pertinents here:  Palliative Symptom Review (0=no symptom/no concern, 1=mild, 2=moderate, 3=severe):      Pain: 1      Fatigue: 3      Nausea: 0      Constipation: 0      Diarrhea: 0      Depressive Symptoms: 0      Anxiety: 0      Drowsiness: 2      Poor Appetite: 1      Shortness of Breath: 0      Insomnia: 0      Other: 0      Overall (0 good/no concerns, 3 very poor):  2      /77 (BP Location: Right arm, Patient Position: Sitting, Cuff Size: Adult Regular)  Pulse 75  Temp 97.4  F (36.3  C) (Tympanic)  Resp 16  Ht 1.803 m (5' 10.98\")  Wt 68.4 kg (150 lb 11.2 oz)  SpO2 100%  BMI 21.03 kg/m2  HEAD: Atraumatic, normocephalic  EYES: PERRLA, EOMI, Sclerae clear, Fundi normal with sharp discs  EARS: TMs clear, canals normal  NOSE & THROAT: clear  NECK: Supple without adenopathy or thyromegaly  LUNGS: clear to auscultation, normal breath sounds  CV: RRR without murmur, no carotid bruits  ABD: BS+, soft, nontender, no masses, no hepatosplenomegaly  SKIN: No rashes or abnormalities  NEURO: Alert and oriented X 3, non focal exam, DTRs normal, motor and sensation normal, coordination and gait without abnormality.        Impression & Recommendations & Counseling:  Non-small cell carcinoma of lung (H)    We had " a very long discussion about prognosis and his current condition.  There seems to be evidence that his cancer is progressive though further studies as scheduled.  We discussed his treatment options from a palliative care standpoint based on the possibility that his cancer is advancing and that there may not be further chemotherapy offered and then also the possibility that there will be further chemotherapy.  He would consider hospice if there is no treatment offered or if he determines to not go ahead with any further treatment.    1.  Refill Vicodin which is holding his pain levels well  2.  Continue Compazine which he is using for nausea with good relief  3.  I have given him a home care referral for outpatient palliative care so that he can get some services in the home because he is definitely homebound and he needs services, particularly med management and evaluation.  I explained to the family that the situation may warrant more in-home care that may need to be hired and/or changing residence if the home nurses deem that there is significant health concern at home  4.  Follow-up in palliative care clinic in 4-6 weeks.    Orders Placed This Encounter     HOME CARE NURSING REFERRAL     morphine (MSIR) 15 MG IR tablet       50 minutes face to face patient time, 40 minutes counseling regarding the above problems            Again, thank you for allowing me to participate in the care of your patient.        Sincerely,        Chace Mitchell MD

## 2018-05-09 NOTE — NURSING NOTE
"Oncology Rooming Note    May 9, 2018 10:14 AM   Zechariah Ashby is a 58 year old male who presents for:    Chief Complaint   Patient presents with     Palliative     Recheck on medication & Pain conrtol     Initial Vitals: /77 (BP Location: Right arm, Patient Position: Sitting, Cuff Size: Adult Regular)  Pulse 75  Temp 97.4  F (36.3  C) (Tympanic)  Resp 16  Ht 1.803 m (5' 10.98\")  Wt 68.4 kg (150 lb 11.2 oz)  SpO2 100%  BMI 21.03 kg/m2 Estimated body mass index is 21.03 kg/(m^2) as calculated from the following:    Height as of this encounter: 1.803 m (5' 10.98\").    Weight as of this encounter: 68.4 kg (150 lb 11.2 oz). Body surface area is 1.85 meters squared.  No Pain (0) Comment: Data Unavailable   No LMP for male patient.  Allergies reviewed: Yes  Medications reviewed: Yes    Medications: MEDICATION REFILLS NEEDED TODAY. Provider was notified.  Pharmacy name entered into [employer]:      [id number] - [facility] [address] [facility] [address] [facility]    Clinical concerns: Recheck on medication & Pain control.     7 minutes for nursing intake (face to face time)     Carmen Barreto CMA              "

## 2018-05-10 NOTE — PROGRESS NOTES
Department of Radiation Oncology  [address] [address]  [phone]       Radiation Oncology Consultation Note    Name: Zechariah Ashby MRN: [id number]   : 1960   Date of Service: 5/10/2018  Referring: Dr. Joycelyn May     Reason for consultation: consideration for gamma knife for metastatic NSCLC of the lung to the right inferomedial temporal lobe of the brain    History of Present Illness   Mr. 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 His cancer was first diagnosed in 2017 as an incidental finding on CT chest while he was admitted for KIMBERLY and cardioversion for atrial fibrillation/flutter with RVR. CT chest 17 showed complete left upper lobe atelectasis due to left upper bronchus obstruction, left pleural effusion and mediastinal lymphadenopathy, concerning for metastatic lung cancer. He had a thoracentesis which was negative for malignant cells ([id number]). A CT C/A/P 17 showed a large necrotic lymph node in the anterior mediastinum with bilateral hilar and subcarinal lymphadenopathy, and several small indeterminate pulmonary nodules on the right. There was a small hypodense nodule in the right liver.     He saw Dr. May of hematology and oncology for initial consultation 17. PET/CT 12/15/17 showed several FDG avid left neck nodes, in particular a 1.8 cm supraclavicular node (max SUV 13) and 1.1 cm level 5 lymph node (max SUV 10), a 8x6 cm FDG avid mass centered in the anterior mediastinum (max SUV 13).  There were multiple FDG avid mediastinal and axillary lymph nodes, in particular a 2.1 x 2.8 cm subcarinal node (max SUV 19) 2 x 1.3 cm subcarinal node (max SUV 18), a 1.4 cm aorticopulmonary lymph node (max SUV 12), a 1.4 cm axillary node (max SUV 5.3) and a 1.0 cm axillary node (max SUV 5.6).  "There was a 4.3 cm thick left pleural effusion, obliteration of the left upper lobe bronchus and complete atelectasis of the left upper lobe with associated FDG uptake (max SUV 3.2). There was slight thickening and FDG uptake in the left adrenal gland (max SUV 4.6), suspicious for metastasis.    He had a CT guided core needle biopsy of the anterior mediastinal adenopathy 12/20/17. Pathology returned squamous cell carcinoma poorly differentiated with extensive tumor necrosis. PDL1 expression was low (1-5%) ([id number]). Next generation sequencing was negative for EGFR mutations ([id number]). 1/4/18 ultrasound guided lymph node sampling from the left level 5 neck returned ([id number]) squamous cell carcinoma, poorly differentiated with tumor necrosis and desmoplastic stromal reaction without residual lymph node tissue, consistent with metastatic disease.     He was then started on palliative chemotherapy 1/25/18 with 6 cycles of carboplatin and gemcitabine. Interval imaging with CT C/A/P 2/13/18 showed decrease in the anterior mediastinal mass, improvement in the mediastinal and hilar lymphadenopathy, no significant change in the cervical lymphadenopathy or left adrenal gland thickening. Unfortunately towards the end of chemotherapy he began having dizzy spells, had a fainting spell, and underwent MRI brain for evaluation 4/27/18. It showed a new 0.6 cm enhancing lesion in the right inferomedial temporal lobe with surrounding T2 hyperintense edema.     He presents with his ex-wife Estefani to discuss gamma knife radiosurgery for this new right temporal lobe lesion. He has noticed voice hoarseness for quite some time now. They have noticed that he sometimes \"zones out\" when speaking with others and has more difficulty focusing on a conversation. Additionally he is physically weaker and his hands and legs have started shaking with movement slightly. They deny any memory loss at present or loss in sensation. He is having pain " primarily in his shoulders and back. He takes MS contin IR for it. He continues to have baseline shortness of breath and cough. Otherwise he denies any vision loss, hearing loss, seizures, headaches or other concerns or complaints.    Chemotherapy History: 6 cycles carboplatin and gemzar (18, 18, 2/15/18, 18, 3/9/18, 3/15/18, 3/29/18, 18)    Radiation History: none    Implanted Cardiac Devices: No    Past Medical History:     COPD    CHF    Lung cancer per HPI    CAD    Past Surgical History:     Appendectomy age 30s    Medications:    Megace    Albuterol    Amiodarone    Wellbutrin    Morphine    Nicotine    Prilosec    Zofran     Miralax    Compazine    Senokot    Warfarin     Allergies: NKDA    Social History:  Tobacco: extensive smoking history - 2 packs per day for 48 years  Alcohol: rarely/social  Employment: previously a     with 2 children    Family History:    Pancreatic cancer in mother ( at age 38)    No other history of cancer in the family    Review of Systems   A 10-point review of systems was performed. Pertinent findings are noted in the HPI.    Physical Exam   ECOG Status: 2    Vitals:  /69  Pulse 75  Resp 18  Wt 151 lb 9.6 oz  SpO2 99%  BMI 21.15 kg/m2    Gen: Appears well, alert, in NAD  Head: NC/AT  Eyes: mild anisocoria (R pupil > L pupil), both reactive to light, EOMI, sclera anicteric  Oral cavity/oropharynx: MMM, no uvular or tongue deviation  Neck: Full ROM, supple, no palpable adenopathy  Pulm: No wheezing, stridor or respiratory distress  CV: Extremities are warm and well-perfused, no cyanosis, no pedal edema  Abdominal: soft, nontender, no masses   Musculoskeletal: Normal bulk and tone, 5/5 strength on biceps flexion and extension, 5/5 strength on knee extension and hip flexion  Skin: Normal color and turgor  Neuro: A/Ox3, CN II-XII intact  Psych: Appropriate mood and affect    Labs     Lab Results   Component Value  Date    WBC 3.4 (L) 05/10/2018    HGB 9.9 (L) 05/10/2018    HCT 30.7 (L) 05/10/2018     (H) 05/10/2018     05/10/2018     Lab Results   Component Value Date    CR 0.86 05/07/2018     Lab Results   Component Value Date     05/07/2018    POTASSIUM 4.0 05/07/2018    CHLORIDE 106 05/07/2018    CO2 29 05/07/2018     (H) 05/07/2018     IMAGING:         Assessment    Mr. 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     Plan   We discussed management options for brain metastases including surgical resection, whole brain radiation therapy and GK radiosurgery.   We would recommend radiosurgical treatment to this brain metastasis given that it is the only visible lesion on MRI and he has a reasonable performance status to tolerate treatment.      We explained our rationale, as well as the logistics, risks, benefits, and alternatives to gamma knife, such as observation or whole brain radiotherapy. We discussed side effects, including but not limited to fatigue, headache, memory loss, hearing loss and radiation necrosis. We encouraged Mr. Ashby and his ex-wife to ask questions, which we answered to the best of our ability and to their satisfaction. Informed consent was obtained. Our gamma knife nurse and coordinator, Pam Caflisch will reach out to him for further arrangements; he is scheduled for treatment at the [facility] next Thursday 5/17/18. There is a possibility with our planning MRI next week that other lesions may be detected and we would treat them accordingly.      The patient was seen and discussed with staff, Dr. Woodson. Thank you for involving us in the care of this patient.  Please feel free to contact us with questions or concerns at any time.    Mya Zazueta MD    PGY-2 Radiation Oncology Resident, [facility]  Phone: [phone]    I saw and examined the patient with the resident.  I have reviewed and agree with the resident's note and plan of care.      Benoit Woodson MD

## 2018-05-10 NOTE — NURSING NOTE
REASON FOR APPOINTMENT   Type of Cancer: St IV NSCLC with progression with new brain lesion  Location: solitary brain met  Date of Symptom Onset: repeat MRI on 4/27/18 due to reported dizzy spells    TREATMENT TO-DATE FOR THIS CANCER  Surgery ? no   Chemotherapy ? Yes, Dr. May, was on Carbo and Gemcitabine. Last dose on 4/5/18 due to SE.   Other Treatments for this Cancer ? Here to discuss gamma knife    PERSONAL HISTORY OF CANCER   Previous Cancer ? no   Prior Radiation ? no   Prior Chemotherapy ? no   Prior Hormonal Therapy ? no     RECENT IMAGING STUDIES  MRI (date: 4/27/18, 12/29/17, location: within [facility] - see EPIC)  PET scan (date: 12/15/17 and upcoming on 5/16/18, location: within [facility] - see [facility])    REFERRALS NEEDED  None at this time. Pt will meet with Palliative Home Care today at 12:30 per referral from Dr. Chace Mitchell    VITALS  /69  Pulse 75  Resp 18  Wt 68.8 kg (151 lb 9.6 oz)  SpO2 99%  BMI 21.15 kg/m2    PACEMAKER/IMPLANTED CARDIAC DEVICE no    PAIN  Current history of pain associated with this visit:   Intensity: Patient is unable to quantify.  Current: aching and constant  Location: back - between shoulder blades  Treatment: MSIR 15 mg - can take 1 tab every 6 hrs prn. Pt reports taking 1 tab every am and that is it. Reviewed with him how/when to take more if needed.    PSYCHOSOCIAL  Marital Status:  and ex-wife Estefani is supportive and here today at visit  Patient lives in [place] with self. Has family involved in care but they are not currently able to stay with him. - see note from Dr. 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  Do you feel safe in your home? Yes    REVIEW OF SYSTEMS  Skin: pale  Eyes: glasses, denies any new vision changes  Ears/Nose/Throat: negative  Respiratory: Shortness of breath, Dyspnea on exertion- limited activity and Cough- still smoking  Cardiovascular: atrial flutter,  following with cardiology and coumadin clinic, orthostatic hypotension  Gastrointestinal: nausea and abdominal pain  Genitourinary: negative  Musculoskeletal: back pain,between shoulder blades  Neurologic: negative for, headaches, seizures and local weakness  Psychiatric: negative  Hematologic/Lymphatic/Immunologic: weight loss  Endocrine: negative      Radiation Oncology Patient Teaching    Current Concern: what is the effectiveness of gamma knife? Will this cause memory changes?    Person involved with teaching: Patient and family - Estefani  Patient asked Questions: Yes  Patient was cooperative: Yes  Patient was receptive (willing to accept information given): Yes    Education Assessment  Comprehension ability: Medium  Knowledge level: Medium  Factors affecting teaching: fatigue    Education Materials Given  Radiation Therapy and You  Gamma Knife Education booklets    Educational Topics Discussed  Side effects, Medications and Wound care, activity, when to call MD/RN    Response To Teaching  More review necessary    GYN Only  Vaginal Dilator-given and educated: N/A    Do you have an advanced directive or living will? yes  Are you DNR/DNI? No    ** PT send for lab draw - CBC with Diff and INR. Consent faxed to Gamma Knife.

## 2018-05-10 NOTE — MR AVS SNAPSHOT
After Visit Summary   5/10/2018    Zechariah Ashby    MRN: [id number]           Patient Information     Date Of Birth          1960        Visit Information        Provider Department      5/10/2018 10:00 AM Benoit Woodson [facility]        Today's Diagnoses     Prostate cancer (H)    -  1    Brain metastasis (H)           Follow-ups after your visit        Your next 10 appointments already scheduled     May 11, 2018  1:30 PM CDT   Return Visit with Jonn Ybarra MD   [facility] ([facility])    [address] [address]   [phone]            May 11, 2018  2:00 PM CDT   Anticoagulation Visit with [place] ANTI COAG   [facility] ([facility])    [address] [address]   [phone]            May 16, 2018  9:00 AM CDT   Level O with ROOM 2 [facility] ([facility])    [facility]  [address]  [facility] [address]   [phone]            May 16, 2018 10:00 AM CDT   PE NPET ONCOLOGY (EYES TO THIGHS) with [id number]   [facility] ([facility])    [address] [address]   [phone]           Tell your doctor:   If there is any chance you may be pregnant or if you are breastfeeding.   If you have problems lying in small spaces (claustrophobia). If you do, your doctor may give you medicine to help you relax. If you have diabetes:   Have your exam early in the morning. Your blood glucose will go up as the day goes by.   Your glucose level must be 180 or less at the start of the exam. Please take any medicines you need to ensure this blood glucose level. 24 hours before your scan: Don t do any heavy exercise. (No jogging, aerobics or other workouts.) Exercise will make your pictures less accurate. 6 hours before your scan:   Stop all food and liquids  (except water).   Do not chew gum or suck on mints.   If you need to take medicine with food, you may take it with a few crackers.  Please call your Imaging Department at your exam site with any questions.            May 16, 2018  1:40 PM CDT   SHORT with Kailash Mason MD   [facility] ([facility])    [address] [place] [address]   [phone]            May 17, 2018  5:30 AM CDT   GAMMA TREATMENT with Gaby Edmondson MD   [facility] ([facility])    [address] [place] [address]   [phone]            May 17, 2018  7:00 AM CDT   GAMMA TREATMENT with Lobo Godinez MD   [facility] ([facility])    [address] [place] [address]   [phone]            May 17, 2018  7:30 AM CDT   MR BRAIN W CONTRAST with UUMR1   [facility] ([facility])    [address] [facility] [address]   [phone]           Take your medicines as usual, unless your doctor tells you not to. Bring a list of your current medicines to your exam (including vitamins, minerals and over-the-counter drugs).  You may or may not receive intravenous (IV) contrast for this exam pending the discretion of the Radiologist.  You do not need to do anything special to prepare.  The MRI machine uses a strong magnet. Please wear clothes without metal (snaps, zippers). A sweatsuit works well, or we may give you a hospital gown.  Please remove any body piercings and hair extensions before you arrive. You will also remove watches, jewelry, hairpins, wallets, dentures, partial dental plates and hearing aids. You may wear contact lenses, and you may be able to wear your rings. We have a safe place to keep your personal items, but it is safer to leave them at home.  **IMPORTANT** THE INSTRUCTIONS BELOW ARE ONLY FOR THOSE PATIENTS WHO HAVE BEEN PRESCRIBED  SEDATION OR GENERAL ANESTHESIA DURING THEIR MRI PROCEDURE:  IF YOUR DOCTOR PRESCRIBED ORAL SEDATION (take medicine to help you relax during your exam):   You must get the medicine from your doctor (oral medication) before you arrive. Bring the medicine to the exam. Do not take it at home. You ll be told when to take it upon arriving for your exam.   Arrive one hour early. Bring someone who can take you home after the test. Your medicine will make you sleepy. After the exam, you may not drive, take a bus or take a taxi by yourself.  IF YOUR DOCTOR PRESCRIBED IV SEDATION:   Arrive one hour early. Bring someone who can take you home after the test. Your medicine will make you sleepy. After the exam, you may not drive, take a bus or take a taxi by yourself.   No eating 6 hours before your exam. You may have clear liquids up until 4 hours before your exam. (Clear liquids include water, clear tea, black coffee and fruit juice without pulp.)  IF YOUR DOCTOR PRESCRIBED ANESTHESIA (be asleep for your exam):   Arrive 1 1/2 hours early. Bring someone who can take you home after the test. You may not drive, take a bus or take a taxi by yourself.   No eating 8 hours before your exam. You may have clear liquids up until 4 hours before your exam. (Clear liquids include water, clear tea, black coffee and fruit juice without pulp.)   You will spend four to five hours in the recovery room.  Please call the Imaging Department at your exam site with any questions.            May 17, 2018  8:00 AM CDT   Procedure 2 hour with U2A ROOM 8   [address] [facility] ([facility])    [address] [facility] [address]               May 17, 2018  2:45 PM CDT   Return Visit with Joycelyn May MD   [facility] ([facility])    [facility]  [address]  [facility] [address] [phone]              Future tests that were ordered for you  today     Open Future Orders        Priority Expected Expires Ordered    MRI Brain w contrast Routine 5/17/2018 5/9/2019 5/9/2018            Who to contact     Please call your clinic at [phone] to:    Ask questions about your health    Make or cancel appointments    Discuss your medicines    Learn about your test results    Speak to your doctor            Additional Information About Your Visit        [employer] Information     [employer] gives you secure access to your electronic health record. If you see a primary care provider, you can also send messages to your care team and make appointments. If you have questions, please call your primary care clinic.  If you do not have a primary care provider, please call [phone] and they will assist you.      [employer] is an electronic gateway that provides easy, online access to your medical records. With [employer], you can request a clinic appointment, read your test results, renew a prescription or communicate with your care team.     To access your existing account, please contact your [facility] or call [phone] for assistance.        Care EveryWhere ID     This is your Care EveryWhere ID. This could be used by other organizations to access your [facility] medical records  [id number]        Your Vitals Were     Pulse Respirations Pulse Oximetry BMI (Body Mass Index)          75 18 99% 21.15 kg/m2         Blood Pressure from Last 3 Encounters:   05/10/18 112/69   05/09/18 110/77   05/07/18 145/89    Weight from Last 3 Encounters:   05/10/18 68.8 kg (151 lb 9.6 oz)   05/09/18 68.4 kg (150 lb 11.2 oz)   05/07/18 67.1 kg (148 lb)                 Today's Medication Changes          These changes are accurate as of 5/10/18  2:21 PM.  If you have any questions, ask your nurse or doctor.               These medicines have changed or have updated prescriptions.        Dose/Directions    amiodarone 200 MG tablet   Commonly known as:   PACERONE/CODARONE   This may have changed:    - how much to take  - how to take this  - when to take this  - additional instructions   Used for:  Paroxysmal atrial fibrillation (H)        1 tablet daily   Quantity:  90 tablet   Refills:  0       buPROPion 150 MG 12 hr tablet   Commonly known as:  WELLBUTRIN SR   This may have changed:    - how much to take  - how to take this  - when to take this  - additional instructions   Used for:  Moderate single current episode of major depressive disorder (H)        Take 1 tablet once daily and increase to 1 tablet twice daily after 3 days   Quantity:  60 tablet   Refills:  1                Primary Care Provider Office Phone # Fax #    Kailash Mason [phone] [address] [place], [place] [id number]        Equal Access to Services     ROD PERRY : Diego bateso Vicente, waaxda luqadaha, qaybta kaalmada adedickyaturner, harley carrasquillo . So [facility] [phone].    ATENCIÓN: Si habla español, tiene a morocho disposición servicios gratuitos de asistencia lingüística. [facility] [phone].    We comply with applicable federal civil rights laws and [place] laws. We do not discriminate on the basis of race, color, national origin, age, disability, sex, sexual orientation, or gender identity.            Thank you!     Thank you for choosing [facility]  for your care. Our goal is always to provide you with excellent care. Hearing back from our patients is one way we can continue to improve our services. Please take a few minutes to complete the written survey that you may receive in the mail after your visit with us. Thank you!             Your Updated Medication List - Protect others around you: Learn how to safely use, store and throw away your medicines at www.disposemymeds.org.          This list is accurate as of 5/10/18  2:21 PM.  Always use your most recent med list.                   Brand Name Dispense  Instructions for use Diagnosis    albuterol 108 (90 Base) MCG/ACT Inhaler    PROAIR HFA    1 Inhaler    Inhale 2 puffs into the lungs every 4 hours as needed for shortness of breath / dyspnea or wheezing    Chronic obstructive pulmonary disease, unspecified COPD type (H)       amiodarone 200 MG tablet    PACERONE/CODARONE    90 tablet    1 tablet daily    Paroxysmal atrial fibrillation (H)       buPROPion 150 MG 12 hr tablet    WELLBUTRIN SR    60 tablet    Take 1 tablet once daily and increase to 1 tablet twice daily after 3 days    Moderate single current episode of major depressive disorder (H)       LORazepam 0.5 MG tablet    ATIVAN    30 tablet    Take 1 tablet (0.5 mg) by mouth every 4 hours as needed (Anxiety, Nausea/Vomiting or Sleep)    Non-small cell carcinoma of lung (H)       MEGACE ORAL PO      Take 200 mg by mouth daily        morphine 15 MG IR tablet    MSIR    60 tablet    Take 1 tablet (15 mg) by mouth every 6 hours as needed for moderate to severe pain    Non-small cell carcinoma of lung (H)       nicotine 21 MG/24HR 24 hr patch    NICODERM CQ    30 patch    Place 1 patch onto the skin every 24 hours    Non-small cell carcinoma of lung (H)       omeprazole 40 MG capsule    priLOSEC    30 capsule    Take 1 capsule (40 mg) by mouth daily Take 30-60 minutes before a meal.    Gastroesophageal reflux disease without esophagitis       ondansetron 4 MG tablet    ZOFRAN    30 tablet    Take 1 tablet (4 mg) by mouth every 8 hours as needed for nausea    Non-small cell carcinoma of lung (H)       order for DME     1 Device    Equipment being ordered: donut cushion for sacral ulcer    Decubitus ulcer of sacral region, stage 1       polyethylene glycol powder    MIRALAX    510 g    Take 17 g (1 capful) by mouth daily    Constipation, unspecified constipation type       prochlorperazine 10 MG tablet    COMPAZINE    30 tablet    Take 1 tablet (10 mg) by mouth every 6 hours as needed (Nausea/Vomiting)     Non-small cell carcinoma of lung (H)       senna-docusate 8.6-50 MG per tablet    SENOKOT-S;PERICOLACE    60 tablet    Take 1 tablet by mouth 2 times daily    Constipation, unspecified constipation type       * warfarin 5 MG tablet    COUMADIN    30 tablet    Take 1.25 mg MWF; 2.5 mg rest of the week as directed by the Anticoagulation Clinic    Atrial flutter, unspecified type (H)       * warfarin 2.5 MG tablet    COUMADIN    75 tablet    Take 1.25 mg MWF, 2.5 mg rest of the week or as directed by the Anticoagulation Clinic    Long-term (current) use of anticoagulants, Atrial flutter, unspecified type (H)       * Notice:  This list has 2 medication(s) that are the same as other medications prescribed for you. Read the directions carefully, and ask your doctor or other care provider to review them with you.

## 2018-05-10 NOTE — MR AVS SNAPSHOT
After Visit Summary   5/10/2018    Zechariah Ashby    MRN: [id number]           Patient Information     Date Of Birth          1960        Visit Information        Provider Department      5/10/2018 12:30 PM ROOM 8 [facility]        Today's Diagnoses     Brain metastasis (H)           Follow-ups after your visit        Your next 10 appointments already scheduled     May 11, 2018  1:30 PM CDT   Return Visit with Jonn Ybarra MD   [facility] ([facility])    [address] [address]   [phone]            May 11, 2018  2:00 PM CDT   [facility] with [place] ANTI COAG   [facility]    [address] [address]   [phone]            May 16, 2018  9:00 AM CDT   Level O with ROOM 2 [facility] ([facility])    n [facility]  [address]  [facility] [address]   [phone]            May 16, 2018 10:00 AM CDT   PE NPET ONCOLOGY (EYES TO THIGHS) with WYPETCT1   [facility] ([facility])    [address] [address]   [phone]           Tell your doctor:   If there is any chance you may be pregnant or if you are breastfeeding.   If you have problems lying in small spaces (claustrophobia). If you do, your doctor may give you medicine to help you relax. If you have diabetes:   Have your exam early in the morning. Your blood glucose will go up as the day goes by.   Your glucose level must be 180 or less at the start of the exam. Please take any medicines you need to ensure this blood glucose level. 24 hours before your scan: Don t do any heavy exercise. (No jogging, aerobics or other workouts.) Exercise will make your pictures less accurate. 6 hours before your scan:   Stop all food and liquids (except water).   Do not chew gum or suck on  mints.   If you need to take medicine with food, you may take it with a few crackers.  Please call your Imaging Department at your exam site with any questions.            May 16, 2018  1:40 PM CDT   SHORT with Kailash Mason MD   [facility]    [address] [place] [address]   [phone]            May 17, 2018  5:30 AM CDT   GAMMA TREATMENT with Gaby Edmondson MD   [facility] ([facility])    [address] [place] [address]   [phone]            May 17, 2018  7:00 AM CDT   GAMMA TREATMENT with Lobo Godinez MD   [facility] ([facility])    [address] [place] [address]   [phone]            May 17, 2018  7:30 AM CDT   MR BRAIN W CONTRAST with UUMR1   [facility] MRI ([facility])    [address] [facility] [address] [facility] [address]   [phone]           Take your medicines as usual, unless your doctor tells you not to. Bring a list of your current medicines to your exam (including vitamins, minerals and over-the-counter drugs).  You may or may not receive intravenous (IV) contrast for this exam pending the discretion of the Radiologist.  You do not need to do anything special to prepare.  The MRI machine uses a strong magnet. Please wear clothes without metal (snaps, zippers). A sweatsuit works well, or we may give you a hospital gown.  Please remove any body piercings and hair extensions before you arrive. You will also remove watches, jewelry, hairpins, wallets, dentures, partial dental plates and hearing aids. You may wear contact lenses, and you may be able to wear your rings. We have a safe place to keep your personal items, but it is safer to leave them at home.  **IMPORTANT** THE INSTRUCTIONS BELOW ARE ONLY FOR THOSE PATIENTS WHO HAVE BEEN PRESCRIBED SEDATION OR GENERAL ANESTHESIA DURING THEIR MRI  PROCEDURE:  IF YOUR DOCTOR PRESCRIBED ORAL SEDATION (take medicine to help you relax during your exam):   You must get the medicine from your doctor (oral medication) before you arrive. Bring the medicine to the exam. Do not take it at home. You ll be told when to take it upon arriving for your exam.   Arrive one hour early. Bring someone who can take you home after the test. Your medicine will make you sleepy. After the exam, you may not drive, take a bus or take a taxi by yourself.  IF YOUR DOCTOR PRESCRIBED IV SEDATION:   Arrive one hour early. Bring someone who can take you home after the test. Your medicine will make you sleepy. After the exam, you may not drive, take a bus or take a taxi by yourself.   No eating 6 hours before your exam. You may have clear liquids up until 4 hours before your exam. (Clear liquids include water, clear tea, black coffee and fruit juice without pulp.)  IF YOUR DOCTOR PRESCRIBED ANESTHESIA (be asleep for your exam):   Arrive 1 1/2 hours early. Bring someone who can take you home after the test. You may not drive, take a bus or take a taxi by yourself.   No eating 8 hours before your exam. You may have clear liquids up until 4 hours before your exam. (Clear liquids include water, clear tea, black coffee and fruit juice without pulp.)   You will spend four to five hours in the recovery room.  Please call the Imaging Department at your exam site with any questions.            May 17, 2018  8:00 AM CDT   Procedure 2 hour with U2A ROOM 8   [address] [facility] ([facility])    [address] [facility] [address]               May 17, 2018  2:45 PM CDT   Return Visit with Joycelyn May MD   [facility] ([facility])    [facility]  [address]  [facility] [address] [phone]              Future tests that were ordered for you today     Open Future Orders        Priority  Expected Expires Ordered    MRI Brain w contrast Routine 5/17/2018 5/9/2019 5/9/2018            Who to contact     If you have questions or need follow up information about today's clinic visit or your schedule please contact [facility] directly at [phone].  Normal or non-critical lab and imaging results will be communicated to you by [facility], letter or phone within 4 business days after the clinic has received the results. If you do not hear from us within 7 days, please contact the clinic through [employer] or phone. If you have a critical or abnormal lab result, we will notify you by phone as soon as possible.  Submit refill requests through [employer] or call your pharmacy and they will forward the refill request to us. Please allow 3 business days for your refill to be completed.          Additional Information About Your Visit        [facility] Information     [employer] gives you secure access to your electronic health record. If you see a primary care provider, you can also send messages to your care team and make appointments. If you have questions, please call your primary care clinic.  If you do not have a primary care provider, please call [phone] and they will assist you.        Care EveryWhere ID     This is your Care EveryWhere ID. This could be used by other organizations to access your [place] medical records  [id number]         Blood Pressure from Last 3 Encounters:   05/10/18 112/69   05/09/18 110/77   05/07/18 145/89    Weight from Last 3 Encounters:   05/10/18 68.8 kg (151 lb 9.6 oz)   05/09/18 68.4 kg (150 lb 11.2 oz)   05/07/18 67.1 kg (148 lb)              We Performed the Following     CBC with platelets differential     INR          Today's Medication Changes          These changes are accurate as of 5/10/18 12:35 PM.  If you have any questions, ask your nurse or doctor.               These medicines have changed or have updated prescriptions.        Dose/Directions     amiodarone 200 MG tablet   Commonly known as:  PACERONE/CODARONE   This may have changed:    - how much to take  - how to take this  - when to take this  - additional instructions   Used for:  Paroxysmal atrial fibrillation (H)        1 tablet daily   Quantity:  90 tablet   Refills:  0       buPROPion 150 MG 12 hr tablet   Commonly known as:  WELLBUTRIN SR   This may have changed:    - how much to take  - how to take this  - when to take this  - additional instructions   Used for:  Moderate single current episode of major depressive disorder (H)        Take 1 tablet once daily and increase to 1 tablet twice daily after 3 days   Quantity:  60 tablet   Refills:  1                Primary Care Provider Office Phone # Fax #    Kailash Mason [phone] [address] [place], [place] [address]        Equal Access to Services     ROD PERRY : Diego garibay Soasher, waaxturner luqadaha, qaybta kaalmada judah, harley boykin. So [facility] [phone].    ATENCIÓN: Si habla español, tiene a morocho disposición servicios gratuitos de asistencia lingüística. [facility] [phone].    We comply with applicable federal civil rights laws and [place] laws. We do not discriminate on the basis of race, color, national origin, age, disability, sex, sexual orientation, or gender identity.            Thank you!     Thank you for choosing [facility]  for your care. Our goal is always to provide you with excellent care. Hearing back from our patients is one way we can continue to improve our services. Please take a few minutes to complete the written survey that you may receive in the mail after your visit with us. Thank you!             Your Updated Medication List - Protect others around you: Learn how to safely use, store and throw away your medicines at www.disposemymeds.org.          This list is accurate as of 5/10/18 12:35 PM.  Always use your most recent med list.                    Brand Name Dispense Instructions for use Diagnosis    albuterol 108 (90 Base) MCG/ACT Inhaler    PROAIR HFA    1 Inhaler    Inhale 2 puffs into the lungs every 4 hours as needed for shortness of breath / dyspnea or wheezing    Chronic obstructive pulmonary disease, unspecified COPD type (H)       amiodarone 200 MG tablet    PACERONE/CODARONE    90 tablet    1 tablet daily    Paroxysmal atrial fibrillation (H)       buPROPion 150 MG 12 hr tablet    WELLBUTRIN SR    60 tablet    Take 1 tablet once daily and increase to 1 tablet twice daily after 3 days    Moderate single current episode of major depressive disorder (H)       LORazepam 0.5 MG tablet    ATIVAN    30 tablet    Take 1 tablet (0.5 mg) by mouth every 4 hours as needed (Anxiety, Nausea/Vomiting or Sleep)    Non-small cell carcinoma of lung (H)       MEGACE ORAL PO      Take 200 mg by mouth daily        morphine 15 MG IR tablet    MSIR    60 tablet    Take 1 tablet (15 mg) by mouth every 6 hours as needed for moderate to severe pain    Non-small cell carcinoma of lung (H)       nicotine 21 MG/24HR 24 hr patch    NICODERM CQ    30 patch    Place 1 patch onto the skin every 24 hours    Non-small cell carcinoma of lung (H)       omeprazole 40 MG capsule    priLOSEC    30 capsule    Take 1 capsule (40 mg) by mouth daily Take 30-60 minutes before a meal.    Gastroesophageal reflux disease without esophagitis       ondansetron 4 MG tablet    ZOFRAN    30 tablet    Take 1 tablet (4 mg) by mouth every 8 hours as needed for nausea    Non-small cell carcinoma of lung (H)       order for DME     1 Device    Equipment being ordered: donut cushion for sacral ulcer    Decubitus ulcer of sacral region, stage 1       polyethylene glycol powder    MIRALAX    510 g    Take 17 g (1 capful) by mouth daily    Constipation, unspecified constipation type       prochlorperazine 10 MG tablet    COMPAZINE    30 tablet    Take 1 tablet (10 mg) by mouth every 6 hours as  needed (Nausea/Vomiting)    Non-small cell carcinoma of lung (H)       senna-docusate 8.6-50 MG per tablet    SENOKOT-S;PERICOLACE    60 tablet    Take 1 tablet by mouth 2 times daily    Constipation, unspecified constipation type       * warfarin 5 MG tablet    COUMADIN    30 tablet    Take 1.25 mg MWF; 2.5 mg rest of the week as directed by the Anticoagulation Clinic    Atrial flutter, unspecified type (H)       * warfarin 2.5 MG tablet    COUMADIN    75 tablet    Take 1.25 mg MWF, 2.5 mg rest of the week or as directed by the Anticoagulation Clinic    Long-term (current) use of anticoagulants, Atrial flutter, unspecified type (H)       * Notice:  This list has 2 medication(s) that are the same as other medications prescribed for you. Read the directions carefully, and ask your doctor or other care provider to review them with you.

## 2018-05-10 NOTE — LETTER
5/10/2018      RE: Zechariah Ashby  [address] [address]          Department of Radiation Oncology  [address] [address]  [phone]       Radiation Oncology Consultation Note    Name: Zechariah Ashby MRN: [id number]   : 1960   Date of Service: 5/10/2018  Referring: Dr. Joycelyn May     Reason for consultation: consideration for gamma knife for metastatic NSCLC of the lung to the right inferomedial temporal lobe of the brain    History of Present Illness   Mr. 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 His cancer was first diagnosed in 2017 as an incidental finding on CT chest while he was admitted for KIMBERLY and cardioversion for atrial fibrillation/flutter with RVR. CT chest 17 showed complete left upper lobe atelectasis due to left upper bronchus obstruction, left pleural effusion and mediastinal lymphadenopathy, concerning for metastatic lung cancer. He had a thoracentesis which was negative for malignant cells ([id number]). A CT C/A/P 17 showed a large necrotic lymph node in the anterior mediastinum with bilateral hilar and subcarinal lymphadenopathy, and several small indeterminate pulmonary nodules on the right. There was a small hypodense nodule in the right liver.     He saw Dr. May of hematology and oncology for initial consultation 17. PET/CT 12/15/17 showed several FDG avid left neck nodes, in particular a 1.8 cm supraclavicular node (max SUV 13) and 1.1 cm level 5 lymph node (max SUV 10), a 8x6 cm FDG avid mass centered in the anterior mediastinum (max SUV 13).  There were multiple FDG avid mediastinal and axillary lymph nodes, in particular a 2.1 x 2.8 cm subcarinal node (max SUV 19) 2 x 1.3 cm subcarinal node (max SUV 18), a 1.4 cm aorticopulmonary lymph node (max  "SUV 12), a 1.4 cm axillary node (max SUV 5.3) and a 1.0 cm axillary node (max SUV 5.6). There was a 4.3 cm thick left pleural effusion, obliteration of the left upper lobe bronchus and complete atelectasis of the left upper lobe with associated FDG uptake (max SUV 3.2). There was slight thickening and FDG uptake in the left adrenal gland (max SUV 4.6), suspicious for metastasis.    He had a CT guided core needle biopsy of the anterior mediastinal adenopathy 12/20/17. Pathology returned squamous cell carcinoma poorly differentiated with extensive tumor necrosis. PDL1 expression was low (1-5%) ([id number]). Next generation sequencing was negative for EGFR mutations ([id number]). 1/4/18 ultrasound guided lymph node sampling from the left level 5 neck returned ([id number]) squamous cell carcinoma, poorly differentiated with tumor necrosis and desmoplastic stromal reaction without residual lymph node tissue, consistent with metastatic disease.     He was then started on palliative chemotherapy 1/25/18 with 6 cycles of carboplatin and gemcitabine. Interval imaging with CT C/A/P 2/13/18 showed decrease in the anterior mediastinal mass, improvement in the mediastinal and hilar lymphadenopathy, no significant change in the cervical lymphadenopathy or left adrenal gland thickening. Unfortunately towards the end of chemotherapy he began having dizzy spells, had a fainting spell, and underwent MRI brain for evaluation 4/27/18. It showed a new 0.6 cm enhancing lesion in the right inferomedial temporal lobe with surrounding T2 hyperintense edema.     He presents with his ex-wife Estefani to discuss gamma knife radiosurgery for this new right temporal lobe lesion. He has noticed voice hoarseness for quite some time now. They have noticed that he sometimes \"zones out\" when speaking with others and has more difficulty focusing on a conversation. Additionally he is physically weaker and his hands and legs have started shaking with movement " slightly. They deny any memory loss at present or loss in sensation. He is having pain primarily in his shoulders and back. He takes MS contin IR for it. He continues to have baseline shortness of breath and cough. Otherwise he denies any vision loss, hearing loss, seizures, headaches or other concerns or complaints.    Chemotherapy History: 6 cycles carboplatin and gemzar (18, 18, 2/15/18, 18, 3/9/18, 3/15/18, 3/29/18, 18)    Radiation History: none    Implanted Cardiac Devices: No    Past Medical History:     COPD    CHF    Lung cancer per HPI    CAD    Past Surgical History:     Appendectomy age 30s    Medications:    Megace    Albuterol    Amiodarone    Wellbutrin    Morphine    Nicotine    Prilosec    Zofran     Miralax    Compazine    Senokot    Warfarin     Allergies: NKDA    Social History:  Tobacco: extensive smoking history - 2 packs per day for 48 years  Alcohol: rarely/social  Employment: previously a     with 2 children    Family History:    Pancreatic cancer in mother ( at age 38)    No other history of cancer in the family    Review of Systems   A 10-point review of systems was performed. Pertinent findings are noted in the HPI.    Physical Exam   ECOG Status: 2    Vitals:  /69  Pulse 75  Resp 18  Wt 151 lb 9.6 oz  SpO2 99%  BMI 21.15 kg/m2    Gen: Appears well, alert, in NAD  Head: NC/AT  Eyes: mild anisocoria (R pupil > L pupil), both reactive to light, EOMI, sclera anicteric  Oral cavity/oropharynx: MMM, no uvular or tongue deviation  Neck: Full ROM, supple, no palpable adenopathy  Pulm: No wheezing, stridor or respiratory distress  CV: Extremities are warm and well-perfused, no cyanosis, no pedal edema  Abdominal: soft, nontender, no masses   Musculoskeletal: Normal bulk and tone, 5/5 strength on biceps flexion and extension, 5/5 strength on knee extension and hip flexion  Skin: Normal color and turgor  Neuro: A/Ox3, CN II-XII  intact  Psych: Appropriate mood and affect    Labs     Lab Results   Component Value Date    WBC 3.4 (L) 05/10/2018    HGB 9.9 (L) 05/10/2018    HCT 30.7 (L) 05/10/2018     (H) 05/10/2018     05/10/2018     Lab Results   Component Value Date    CR 0.86 05/07/2018     Lab Results   Component Value Date     05/07/2018    POTASSIUM 4.0 05/07/2018    CHLORIDE 106 05/07/2018    CO2 29 05/07/2018     (H) 05/07/2018     IMAGING:         Assessment    Mr. 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     Plan   We discussed management options for brain metastases including surgical resection, whole brain radiation therapy and GK radiosurgery.   We would recommend radiosurgical treatment to this brain metastasis given that it is the only visible lesion on MRI and he has a reasonable performance status to tolerate treatment.      We explained our rationale, as well as the logistics, risks, benefits, and alternatives to gamma knife, such as observation or whole brain radiotherapy. We discussed side effects, including but not limited to fatigue, headache, memory loss, hearing loss and radiation necrosis. We encouraged Mr. Ashby and his ex-wife to ask questions, which we answered to the best of our ability and to their satisfaction. Informed consent was obtained. Our gamma knife nurse and coordinator, Pam Caflisch will reach out to him for further arrangements; he is scheduled for treatment at the [facility] next Thursday 5/17/18. There is a possibility with our planning MRI next week that other lesions may be detected and we would treat them accordingly.      The patient was seen and discussed with staff, Dr. Woodson. Thank you for involving us in the care of this patient.   Please feel free to contact us with questions or concerns at any time.    Mya Zazueta MD   PGY-2 Radiation Oncology Resident, [facility]  Phone: [phone]    I saw and examined the patient with the resident.  I have reviewed and agree with the resident's note and plan of care.      Benoit Woodson MD

## 2018-05-11 NOTE — MR AVS SNAPSHOT
After Visit Summary   5/11/2018    Zechariah Ashby    MRN: [id number]           Patient Information     Date Of Birth          1960        Visit Information        Provider Department      5/11/2018 1:30 PM Jonn Ybarra MD [facility]        Today's Diagnoses     Palpitations    -  1    On amiodarone therapy        Abnormal cardiovascular stress test          Care Instructions    [facility]~[address]~[place], [place]~[address]  Thank you for your  Heart Care visit today. If you have questions regarding your visit, please contact your cardiology RN's, Mayuri Ocasio or Ángela Goodson, at [phone]. Your provider has recommended the following:  Medication Changes:  None today. Continue taking your medications as you have been.  Recommendations:  EKG today  3 day Zio patch next available  Follow-up:  See Christine Montemayor  for cardiology follow up in 1 month  To schedule a future appointment, we kindly ask that you call cardiology scheduling at [phone] three months prior to requested revisit date.               Reminder:  For your safety, we ask that you bring in your current medication(s) or an updated list of your medications with you to EACH office visit. Include the medication name, dose of pill on bottle and how you are taking it. Include over-the-counter medications or supplements. Your provider will review this at each visit and plan your care based on your current information.               Follow-ups after your visit        Additional Services     Follow-Up with Cardiac Advanced Practice Provider                 Your next 10 appointments already scheduled     May 11, 2018  2:30 PM CDT   ecg with [place] [facility]   [facility] ([facility])    [address] [address]   [phone]            May 16, 2018  9:00 AM CDT   Level O  with ROOM 2 [facility] ([facility])    [facility]  [address]  [facility] [address]   [phone]            May 16, 2018 10:00 AM CDT   PE NPET ONCOLOGY (EYES TO THIGHS) with WYPETCT1   [facility] ([facility])    [address]  [facility] [address]   [phone]           Tell your doctor:   If there is any chance you may be pregnant or if you are breastfeeding.   If you have problems lying in small spaces (claustrophobia). If you do, your doctor may give you medicine to help you relax. If you have diabetes:   Have your exam early in the morning. Your blood glucose will go up as the day goes by.   Your glucose level must be 180 or less at the start of the exam. Please take any medicines you need to ensure this blood glucose level. 24 hours before your scan: Don t do any heavy exercise. (No jogging, aerobics or other workouts.) Exercise will make your pictures less accurate. 6 hours before your scan:   Stop all food and liquids (except water).   Do not chew gum or suck on mints.   If you need to take medicine with food, you may take it with a few crackers.  Please call your Imaging Department at your exam site with any questions.            May 16, 2018  1:40 PM CDT   SHORT with Kailash Mason MD   [facility] ([facility])    [address] [place] [address]   [phone]            May 17, 2018  5:30 AM CDT   GAMMA TREATMENT with Gaby Edmondson MD   [facility], Radiation Oncology ([facility])    [address] [address]   [phone]            May 17, 2018  7:00 AM CDT   GAMMA TREATMENT with Lobo Godinez MD   [facility], Radiation Oncology ([facility])    [address] [address]   [phone]            May 17, 2018  7:30 AM CDT   MR BRAIN W CONTRAST with UUMR1   [facility], MRI ([facility]  [facility] [place] [place])    [address] [id number] [phone]           Take your medicines as usual, unless your doctor tells you not to. Bring a list of your current medicines to your exam (including vitamins, minerals and over-the-counter drugs).  You may or may not receive intravenous (IV) contrast for this exam pending the discretion of the Radiologist.  You do not need to do anything special to prepare.  The MRI machine uses a strong magnet. Please wear clothes without metal (snaps, zippers). A sweatsuit works well, or we may give you a hospital gown.  Please remove any body piercings and hair extensions before you arrive. You will also remove watches, jewelry, hairpins, wallets, dentures, partial dental plates and hearing aids. You may wear contact lenses, and you may be able to wear your rings. We have a safe place to keep your personal items, but it is safer to leave them at home.  **IMPORTANT** THE INSTRUCTIONS BELOW ARE ONLY FOR THOSE PATIENTS WHO HAVE BEEN PRESCRIBED SEDATION OR GENERAL ANESTHESIA DURING THEIR MRI PROCEDURE:  IF YOUR DOCTOR PRESCRIBED ORAL SEDATION (take medicine to help you relax during your exam):   You must get the medicine from your doctor (oral medication) before you arrive. Bring the medicine to the exam. Do not take it at home. You ll be told when to take it upon arriving for your exam.   Arrive one hour early. Bring someone who can take you home after the test. Your medicine will make you sleepy. After the exam, you may not drive, take a bus or take a taxi by yourself.  IF YOUR DOCTOR PRESCRIBED IV SEDATION:   Arrive one hour early. Bring someone who can take you home after the test. Your medicine will make you sleepy. After the exam, you may not drive, take a bus or take a taxi by yourself.   No eating 6 hours before your exam. You may have clear liquids up until 4 hours before your exam. (Clear liquids include water, clear  tea, black coffee and fruit juice without pulp.)  IF YOUR DOCTOR PRESCRIBED ANESTHESIA (be asleep for your exam):   Arrive 1 1/2 hours early. Bring someone who can take you home after the test. You may not drive, take a bus or take a taxi by yourself.   No eating 8 hours before your exam. You may have clear liquids up until 4 hours before your exam. (Clear liquids include water, clear tea, black coffee and fruit juice without pulp.)   You will spend four to five hours in the recovery room.  Please call the Imaging Department at your exam site with any questions.            May 17, 2018  8:00 AM CDT   Procedure 2 hour with U2A ROOM 8   Unit [address] [facility] ([facility])    [address]  [place] [address]               May 17, 2018  2:45 PM CDT   Return Visit with Joycelyn May MD   [facility] ([facility])    [facility]  [address]  [place] [address] [phone]              Future tests that were ordered for you today     Open Future Orders        Priority Expected Expires Ordered    Zio Patch Holter Routine 5/11/2018 6/25/2018 5/11/2018    EKG 12-LEAD CLINIC READ ([facility])- to be scheduled Routine 5/11/2018 5/11/2019 5/11/2018    Follow-Up with Cardiac Advanced Practice Provider Routine 6/11/2018 12/10/2019 5/11/2018            Who to contact     If you have questions or need follow up information about today's clinic visit or your schedule please contact [facility] directly at [phone].  Normal or non-critical lab and imaging results will be communicated to you by MyChart, letter or phone within 4 business days after the clinic has received the results. If you do not hear from us within 7 days, please contact the clinic through MyChart or phone. If you have a critical or abnormal lab result, we will notify you by phone as soon as  possible.  Submit refill requests through [employer] or call your pharmacy and they will forward the refill request to us. Please allow 3 business days for your refill to be completed.          Additional Information About Your Visit        [employer] Information     [employer] gives you secure access to your electronic health record. If you see a primary care provider, you can also send messages to your care team and make appointments. If you have questions, please call your primary care clinic.  If you do not have a primary care provider, please call [phone] and they will assist you.        Care EveryWhere ID     This is your Care EveryWhere ID. This could be used by other organizations to access your [facility] medical records  [id number]        Your Vitals Were     Pulse Pulse Oximetry BMI (Body Mass Index)             83 100% 20.79 kg/m2          Blood Pressure from Last 3 Encounters:   05/11/18 110/74   05/10/18 112/69   05/09/18 110/77    Weight from Last 3 Encounters:   05/11/18 67.6 kg (149 lb)   05/10/18 68.8 kg (151 lb 9.6 oz)   05/09/18 68.4 kg (150 lb 11.2 oz)              We Performed the Following     Follow-Up with Cardiologist          Today's Medication Changes          These changes are accurate as of 5/11/18  1:58 PM.  If you have any questions, ask your nurse or doctor.               These medicines have changed or have updated prescriptions.        Dose/Directions    amiodarone 200 MG tablet   Commonly known as:  PACERONE/CODARONE   This may have changed:    - how much to take  - how to take this  - when to take this  - additional instructions   Used for:  Paroxysmal atrial fibrillation (H)        1 tablet daily   Quantity:  90 tablet   Refills:  0       buPROPion 150 MG 12 hr tablet   Commonly known as:  WELLBUTRIN SR   This may have changed:    - how much to take  - how to take this  - when to take this  - additional instructions   Used for:  Moderate single current episode of major depressive  disorder (H)        Take 1 tablet once daily and increase to 1 tablet twice daily after 3 days   Quantity:  60 tablet   Refills:  1                Primary Care Provider Office Phone # Fax #    Kailash Mason [phone] [address] [place] [address]        Equal Access to Services     ROD PERRY : Hadii aad ku hadasho Soomaali, waaxda luqadaha, qaybta kaalmada adeegyada, waxay hirain hayaan adedick godlsmithoralalyssa boykin. So [facility] [phone].    ATENCIÓN: Si habla español, tiene a morocho disposición servicios gratuitos de asistencia lingüística. Llame al [phone].    We comply with applicable federal civil rights laws and [place] laws. We do not discriminate on the basis of race, color, national origin, age, disability, sex, sexual orientation, or gender identity.            Thank you!     Thank you for choosing [facility]  for your care. Our goal is always to provide you with excellent care. Hearing back from our patients is one way we can continue to improve our services. Please take a few minutes to complete the written survey that you may receive in the mail after your visit with us. Thank you!             Your Updated Medication List - Protect others around you: Learn how to safely use, store and throw away your medicines at www.disposemymeds.org.          This list is accurate as of 5/11/18  1:58 PM.  Always use your most recent med list.                   Brand Name Dispense Instructions for use Diagnosis    albuterol 108 (90 Base) MCG/ACT Inhaler    PROAIR HFA    1 Inhaler    Inhale 2 puffs into the lungs every 4 hours as needed for shortness of breath / dyspnea or wheezing    Chronic obstructive pulmonary disease, unspecified COPD type (H)       amiodarone 200 MG tablet    PACERONE/CODARONE    90 tablet    1 tablet daily    Paroxysmal atrial fibrillation (H)       buPROPion 150 MG 12 hr tablet    WELLBUTRIN SR    60 tablet    Take 1  tablet once daily and increase to 1 tablet twice daily after 3 days    Moderate single current episode of major depressive disorder (H)       LORazepam 0.5 MG tablet    ATIVAN    30 tablet    Take 1 tablet (0.5 mg) by mouth every 4 hours as needed (Anxiety, Nausea/Vomiting or Sleep)    Non-small cell carcinoma of lung (H)       MEGACE ORAL PO      Take 200 mg by mouth daily        morphine 15 MG IR tablet    MSIR    60 tablet    Take 1 tablet (15 mg) by mouth every 6 hours as needed for moderate to severe pain    Non-small cell carcinoma of lung (H)       nicotine 21 MG/24HR 24 hr patch    NICODERM CQ    30 patch    Place 1 patch onto the skin every 24 hours    Non-small cell carcinoma of lung (H)       omeprazole 40 MG capsule    priLOSEC    30 capsule    Take 1 capsule (40 mg) by mouth daily Take 30-60 minutes before a meal.    Gastroesophageal reflux disease without esophagitis       ondansetron 4 MG tablet    ZOFRAN    30 tablet    Take 1 tablet (4 mg) by mouth every 8 hours as needed for nausea    Non-small cell carcinoma of lung (H)       order for DME     1 Device    Equipment being ordered: donut cushion for sacral ulcer    Decubitus ulcer of sacral region, stage 1       polyethylene glycol powder    MIRALAX    510 g    Take 17 g (1 capful) by mouth daily    Constipation, unspecified constipation type       prochlorperazine 10 MG tablet    COMPAZINE    30 tablet    Take 1 tablet (10 mg) by mouth every 6 hours as needed (Nausea/Vomiting)    Non-small cell carcinoma of lung (H)       senna-docusate 8.6-50 MG per tablet    SENOKOT-S;PERICOLACE    60 tablet    Take 1 tablet by mouth 2 times daily    Constipation, unspecified constipation type       * warfarin 5 MG tablet    COUMADIN    30 tablet    Take 1.25 mg MWF; 2.5 mg rest of the week as directed by the Anticoagulation Clinic    Atrial flutter, unspecified type (H)       * warfarin 2.5 MG tablet    COUMADIN    75 tablet    Take 1.25 mg MWF, 2.5 mg rest of  the week or as directed by the Anticoagulation Clinic    Long-term (current) use of anticoagulants, Atrial flutter, unspecified type (H)       * Notice:  This list has 2 medication(s) that are the same as other medications prescribed for you. Read the directions carefully, and ask your doctor or other care provider to review them with you.

## 2018-05-11 NOTE — PROGRESS NOTES
Opened in Error. Dual charting.    Mahendra David, RN, BSN, PHN  Anticoagulation Clinic   [phone]

## 2018-05-11 NOTE — PROGRESS NOTES
Addendum:  Patient did come into [facility] for his appointment although I had called him. I reviewed the dosing instructions and set up an appointment with [facility] next Wednesday.    Mahendra David RN, BSN, PHN  Anticoagulation Clinic   [phone]          ANTICOAGULATION FOLLOW-UP CLINIC VISIT    Patient Name:  Zechariah Ashby  Date:  5/11/2018  Contact Type:  Telephone    SUBJECTIVE:     Patient Findings     Positives No Problem Findings    Comments Patient states he is done with chemotherapy. His INR has been fluctuating sub-therapeutic. It looks as he has had some med errors in taking his Warfarin. Currently he is therapeutic. I instructed him to take 2.5. Mg daily and recheck in next Wednesday.            OBJECTIVE    INR   Date Value Ref Range Status   05/10/2018 2.19 (H) 0.86 - 1.14 Final       ASSESSMENT / PLAN  INR assessment THER    Recheck INR In: 1 WEEK    INR Location Outside lab      Anticoagulation Summary as of 5/11/2018     INR goal 2.0-3.0   Today's INR 2.19 (5/10/2018)   Maintenance plan 1.25 mg (2.5 mg x 0.5) on Mon, Wed, Fri; 2.5 mg (2.5 mg x 1) all other days   Full instructions 5/11: 2.5 mg; 5/14: 2.5 mg; 5/16: 2.5 mg; Otherwise 1.25 mg on Mon, Wed, Fri; 2.5 mg all other days   Weekly total 13.75 mg   Plan last modified Yaritza Diaz RN (3/2/2018)   Next INR check 5/16/2018   Priority INR   Target end date     Indications   Atrial flutter  unspecified type (H) [I48.92]  Long-term (current) use of anticoagulants [Z79.01] [Z79.01]         Anticoagulation Episode Summary     INR check location     Preferred lab     Send INR reminders to [place] PHONE DAIANA POOL    Comments *Pt is on AMIODARONE. On palliative chemo (CARBOplatin / Gemcitabine) pt cell: [phone]      Anticoagulation Care Providers     Provider Role Specialty Phone number    Kailash Mason MD [facility] [phone]            See the Encounter Report to view Anticoagulation Flowsheet and Dosing Calendar (Go to  Encounters tab in chart review, and find the Anticoagulation Therapy Visit)        Mahendra David RN

## 2018-05-11 NOTE — MR AVS SNAPSHOT
Zechariah Ashby   5/11/2018   Anticoagulation Therapy Visit    Description:  58 year old male   Provider:  Mahendra David, RN   Department:  [place]           INR as of 5/11/2018     Today's INR 2.19 (5/10/2018)      Anticoagulation Summary as of 5/11/2018     INR goal 2.0-3.0   Today's INR 2.19 (5/10/2018)   Full instructions 5/11: 2.5 mg; 5/14: 2.5 mg; 5/16: 2.5 mg; Otherwise 1.25 mg on Mon, Wed, Fri; 2.5 mg all other days   Next INR check 5/16/2018    Indications   Atrial flutter  unspecified type (H) [I48.92]  Long-term (current) use of anticoagulants [Z79.01] [Z79.01]         Your next Anticoagulation Clinic appointment(s)     May 16, 2018  9:30 AM CDT   Anticoagulation Visit with [place] [facility]   [facility] ([facility])    [address] [address] [phone]              Contact Numbers     Please call [phone] with any problems or questions regarding your therapy.    If you need to cancel and/or reschedule your appointment please call one of the following numbers:  [facility] - [phone]  [place] - [phone]  [place] - [phone]  [facility] [phone]  [place] - [phone]            May 2018 Details    Sun Mon Tue Wed Thu Fri Sat       1               2               3               4               5                 6               7               8               9               10               11      2.5 mg   See details      12      2.5 mg           13      2.5 mg         14      2.5 mg         15      2.5 mg         16            17               18               19                 20               21               22               23               24               25               26                 27               28               29               30               31                  Date Details   05/11 This INR check       Date of next INR:  5/16/2018         How to take your warfarin dose     To take:  2.5 mg  Take 1 of the 2.5 mg tablets.

## 2018-05-11 NOTE — PROGRESS NOTES
Cardiology Consultation       Assessment & Plan     1.  History of flutter status post ablation  2.  Subsequent atrial fibrillation with conversion to sinus rhythm spontaneously.  On amiodarone  3.  Cardiomyopathy felt to be tachycardiac mediated currently EF is normal  4.  Lung cancer with metastatic disease to brain.  Patient in need of radiation therapy for brain lesion  5.  Syncopal episode probable vasovagal?  6.  Status post chemotherapy      Recommendations    1.  Regarding preoperative clearance patient is cleared to proceed with whatever therapy that is deemed appropriate by his oncologist and patient from a cardiovascular perspective  2.  We will get a Zio patch monitor for his syncope  3.  Will have him follow-up with my colleague Christine Montemayor who he knows well.  4.  Advised appropriate hydration  5.  EKG today demonstrates normal sinus rhythm.      Jonn Ybarra MD    Patient is a very pleasant 58-year-old male who is followed by my colleague bradly and the electrophysiology team.  He has a notable cardiac history for apparent atrial flutter for which he underwent ablation.  Subsequently developed atrial fibrillation and was in the process of being set up for a cardioversion.  He spontaneously cardioverted on amiodarone therapy and has remained in normal sinus since that timeframe.  His ejection fraction was reduced and normalized when he was back in normal rhythm.  He also had some findings on his stress test which were attributable to his cardiomyopathy.  Unfortunately has been dealing with issues related to his non-small cell lung cancer.  He has undergone chemotherapy for this and unfortunately has metastatic disease to the brain.  He tells me that he is scheduled to have radiation therapy next week.  He comes to clinic reporting of 2 or 3 episodes where he had some form of syncope out of the blue.  He states that he was sitting on the couch for a period of time he got up felt his  vision tunnel and found himself laying on the floor.  He was immediately aware of his surroundings and states that it might of been a few seconds to a minute of time elapsed from his event.  Patient unfortunately has had a progressive decline per review of notes.  He is gait has been unsteady and he has lost weight as a result of his illness.  He is also been placed on anti-depression to help cope with his illness.  Due to the the episodes he presents to cardiology clinic for clearance as well as evaluation.        Jonn Ybarra MD      Primary Care Physician   Kailash Mason      Patient Active Problem List   Diagnosis     CARDIOVASCULAR SCREENING; LDL GOAL LESS THAN 130     Perforated ulcer (HCC)     Free intraperitoneal air     Atrial flutter (H)     Pleural effusion     Mediastinal lymphadenopathy     Non-small cell carcinoma of lung (H)     COPD exacerbation (H)     Weakness     Cardiomyopathy (H)     Acute on chronic systolic congestive heart failure (H)     Dehydration     Chemotherapy induced nausea and vomiting     Atrial flutter, unspecified type (H)     Long-term (current) use of anticoagulants [Z79.01]     Brain metastasis (H)       Past Medical History   I have reviewed this patient's medical history and updated it with pertinent information if needed.   Past Medical History:   Diagnosis Date     Atrial flutter (H) 11/2017     Cancer (H)     Lung Ca       Past Surgical History   I have reviewed this patient's surgical history and updated it with pertinent information if needed.  Past Surgical History:   Procedure Laterality Date     ANESTHESIA CARDIOVERSION N/A 1/12/2018    Procedure: ANESTHESIA CARDIOVERSION;  CARDIOVERSION (FOLLOWING KIMBERLY AT 0830);  Surgeon: GENERIC ANESTHESIA PROVIDER;  Location: [facility]     APPENDECTOMY      age 28s     INSERT PORT VASCULAR ACCESS N/A 1/3/2018    Procedure: INSERT PORT VASCULAR ACCESS;  Port-a-Cath Placement;  Surgeon: Tomas Crews MD;  [facility]      SURGICAL HISTORY OF -   2004    Gastroscopy with biopsy for gastric ulcer       Prior to Admission Medications   Cannot display prior to admission medications because the patient has not been admitted in this contact.     [unfilled]  [unfilled]  Allergies   Allergies   Allergen Reactions     Nka [No Known Allergies]        Social History    reports that he has been smoking.  He started smoking about 4 months ago. He has a 12.50 pack-year smoking history. He has never used smokeless tobacco. He reports that he does not drink alcohol or use illicit drugs.    Family History   Family History   Problem Relation Age of Onset     CANCER Mother      pancreatic cancer,  at 38 years     Alzheimer Disease Father       at 84 years.       Review of Systems   The comprehensive 10 point Review of Systems is negative other than noted in the HPI or here.     Physical Exam   Vital Signs with Ranges  Pulse:  [83] 83  BP: (110)/(74) 110/74  SpO2:  [100 %] 100 %  Wt Readings from Last 4 Encounters:   18 67.6 kg (149 lb)   05/10/18 68.8 kg (151 lb 9.6 oz)   18 68.4 kg (150 lb 11.2 oz)   18 67.1 kg (148 lb)     [unfilled]      Vitals: /74  Pulse 83  Wt 67.6 kg (149 lb)  SpO2 100%  BMI 20.79 kg/m2  HEAD: Atraumatic, normocephalic  EYES: PERRLA, EOMI, Sclerae clear, Fundi normal with sharp discs  EARS: TMs clear, canals normal  NOSE & THROAT: clear  NECK: Supple without adenopathy or thyromegaly  LUNGS: clear to auscultation, normal breath sounds  CV: RRR without murmur, no carotid bruits  ABD: BS+, soft, nontender, no masses, no hepatosplenomegaly  SKIN: No rashes or abnormalities  NEURO: Alert and oriented X 3, non focal exam, DTRs normal, motor and sensation normal, coordination and gait without  abnormality.    @LABRCNTIPR(tropi:5,troponinies:5)@    @LABRCNTIPR(wbc:3,hgb:3,mcv:3,plt:3,inr:3,na:3,potassium:3,chloride:3,co2:3,bun:3,cr:3,gfrestimated:3,gfrestblack:3,aniongap:3,apurva:3,glc:3,albumin:2,prottotal:2,bilitotal:2,alkphos:2,alt:2,ast:2,lipase:2,tropi:3)@  Recent Labs   Lab Test  11   0909   CHOL  179   HDL  53   LDL  86   TRIG  202*   CHOLHDLRATIO  3.0     @LABRCNTIP(wbc:3,hgb:3,hct:3,mcv:3,plt:3,iron:3,ironsat:3,reticabsct:3,retp:3,feb:3,taya:3,b12:3,folic:3,epoe:3,morph:3)@  @LABRCNTIP(PH:3,PHV:3,PO2:3,PO2V:3,sat:3,PCO2:3,PCO2V:3,HCO3:3,HCO3V:3)@  @LABRCNTIP(NTBNPI:3,NTBNP:3)@  @LABRCNTIP(DD:1)@  @LABRCNTIP(sed:3,crp:3)@  @LABRCNTIP(PLT:3)@  @LABRCNTIP(TSH:3)@  @LABRCNTIP(color:1,appearance:1,urineg,urinebili:1,urineketone:1,s,ubld:1,urineph:1,protein:1,urobilinogen:1,nitrite:1,leukest:1,rbcu:1,wbcu:1)@    Imaging:  No results found for this or any previous visit (from the past 48 hour(s)).    Echo:  Recent Results (from the past 4320 hour(s))   Echocardiogram Limited    Narrative    [id number]  [facility]  [id number]  [id number]^AKINTOLA^OLUTOYIN^ENITAN           [facility]  Echocardiography Laboratory  [address] [place] [address]        Name: ROQUE SAXENA  MRN: [id number]  : 1960  Study Date: 2018 07:48 AM  Age: 57 yrs  Gender: Male  Patient Location: [facility]  Reason For Study: CHF, S/P Ablation  Ordering Physician: ANDREAS TURK  Referring Physician: ROSA JASSO  Performed By: Sonia Adams RDCS     BSA: 1.9 m2  Height: 71 in  Weight: 150 lb  HR: 66  BP: 116/70 mmHg  _____________________________________________________________________________  __        Procedure  Limited Portable Echo Adult. Complete Portable Bubble Echo Adult.  _____________________________________________________________________________  __        Interpretation Summary     The left ventricle is normal in size.  The visual ejection fraction is estimated at 50-55%.  Left  ventricular systolic function is borderline reduced.  No regional wall motion abnormalities noted.  Small pericardial effusion  There are no echocardiographic indications of cardiac tamponade.  Normal IVC (1.5-2.5cm) with >50% respiratory collapse; right atrial pressure  is estimated at 5-10mmHg.     Compared to 12/8/17 KIMBERLY, the LV function has improved. The pericardial  effusion was noted on KIMBERLY prior to ablation and is probably unchanged on  today's study. No sign of volume overload by IVC evaluation.  _____________________________________________________________________________  __        Left Ventricle  The left ventricle is normal in size. There is normal left ventricular wall  thickness. The visual ejection fraction is estimated at 50-55%. Left  ventricular systolic function is borderline reduced. No regional wall motion  abnormalities noted.     Right Ventricle  The right ventricle is normal size. Thickened right ventricle free wall, mild.  The right ventricular systolic function is normal.     Atria  Normal left atrial size. Right atrial size is normal. Intact atrial septum.     Mitral Valve  The mitral valve is normal in structure and function. There is trace mitral  regurgitation.        Tricuspid Valve  The tricuspid valve is normal in structure and function. There is trace  tricuspid regurgitation. Right ventricular systolic pressure could not be  approximated due to inadequate tricuspid regurgitation. Normal IVC (1.5-2.5cm)  with >50% respiratory collapse; right atrial pressure is estimated at 5-  10mmHg.     Aortic Valve  The aortic valve is normal in structure and function. No aortic regurgitation  is present. No aortic stenosis is present.     Pulmonic Valve  The pulmonic valve is not well seen, but is grossly normal. There is trace  pulmonic valvular regurgitation.     Vessels  The aortic root is normal size. The IVC is normal in size and reactivity with  respiration, suggesting normal central  venous pressure.     Pericardium  Small pericardial effusion. There are no echocardiographic indications of  cardiac tamponade.        Rhythm  Sinus rhythm was noted.  _____________________________________________________________________________  __  MMode/2D Measurements & Calculations  IVSd: 1.0 cm     LVIDd: 4.5 cm  LVIDs: 3.2 cm  LVPWd: 1.0 cm  FS: 29.6 %  EDV(Teich): 91.6 ml  ESV(Teich): 39.7 ml  LV mass(C)d: 160.5 grams  LV mass(C)dI: 86.0 grams/m2  RWT: 0.46        Doppler Measurements & Calculations  MV E max katie: 57.4 cm/sec  MV A max katie: 38.6 cm/sec  MV E/A: 1.5  MV dec time: 0.27 sec  Medial E/e': 7.1           _____________________________________________________________________________  __           Report approved by: Millicent Reid 2018 09:38 AM      Echocardiogram Complete    Narrative    [id number]  ECH19  [id number]  [id number]^ROMERO^ROSA^IRISH           [facility]  Echocardiography Laboratory  [address] [place] [address]        Name: ROQUE SAXENA  MRN: [id number]  : 1960  Study Date: 2017 10:04 AM  Age: 57 yrs  Gender: Male  Patient Location: [facility]  Reason For Study: Irregular heart beat, Atrial flutter  Ordering Physician: ROSA JASSO  Referring Physician: ROSA JASSO  Performed By: Kayla Mata RDCS     BSA: 1.9 m2  Height: 70 in  Weight: 166 lb  HR: 98  BP: 152/96 mmHg  _____________________________________________________________________________  __        Procedure  Complete Echo Adult.  _____________________________________________________________________________  __        Interpretation Summary     The visual ejection fraction is estimated at 30-35%.  Left ventricular systolic function is moderately reduced.  The rhythm was atrial flutter.  Trivial pericardial effusion  _____________________________________________________________________________  __        Left Ventricle  The left ventricle is normal in size. There is mild  concentric left  ventricular hypertrophy. The visual ejection fraction is estimated at 30-35%.  Left ventricular systolic function is moderately reduced. E by E prime ratio  is between 8 and 15, which is indeterminate for assessment of left ventricular  filling pressures.     Right Ventricle  The right ventricle is normal in size and function.     Atria  The left atrium is moderately dilated. The right atrium is moderately dilated.  There is no color Doppler evidence of an atrial shunt.     Mitral Valve  There is mild (1+) mitral regurgitation.        Tricuspid Valve  There is trace tricuspid regurgitation. Right ventricular systolic pressure  could not be approximated due to inadequate tricuspid regurgitation.     Aortic Valve  The aortic valve is trileaflet. There is trivial trileaflet aortic sclerosis.  There is trace aortic regurgitation. No hemodynamically significant valvular  aortic stenosis.     Pulmonic Valve  There is trace pulmonic valvular regurgitation.     Vessels  Normal size ascending aorta. Dilated inferior vena cava.     Pericardium  Trivial pericardial effusion.        Rhythm  The rhythm was atrial flutter.  _____________________________________________________________________________  __  MMode/2D Measurements & Calculations  IVSd: 1.2 cm     LVIDd: 4.2 cm  LVIDs: 2.8 cm  LVPWd: 1.2 cm  FS: 33.3 %  EDV(Teich): 76.7 ml  ESV(Teich): 28.8 ml  LV mass(C)d: 182.1 grams  LV mass(C)dI: 94.4 grams/m2  Ao root diam: 3.6 cm  LA dimension: 3.5 cm  asc Aorta Diam: 3.3 cm  LA/Ao: 0.97  LA Volume (BP): 61.0 ml  LA Volume Index (BP): 31.6 ml/m2  RWT: 0.59           Doppler Measurements & Calculations  MV E max katie: 88.7 cm/sec  MV dec time: 0.15 sec  E/E' av.9  Lateral E/e': 9.1  Medial E/e': 10.8           _____________________________________________________________________________  __           Report approved by: Millicent Whittington 2017 01:14 PM

## 2018-05-11 NOTE — MR AVS SNAPSHOT
Zechariah Ashby   5/11/2018 2:45 PM   Anticoagulation Therapy Visit    Description:  58 year old male   Provider:  [facility]   Department:  [facility]           INR as of 5/11/2018     Today's INR No new INR was available at the time of this encounter.      Anticoagulation Summary as of 5/11/2018     INR goal 2.0-3.0   Today's INR No new INR was available at the time of this encounter.   Full instructions 5/11: 2.5 mg; 5/14: 2.5 mg; 5/16: 2.5 mg; Otherwise 1.25 mg on Mon, Wed, Fri; 2.5 mg all other days   Next INR check 5/16/2018    Indications   Atrial flutter  unspecified type (H) [I48.92]  Long-term (current) use of anticoagulants [Z79.01] [Z79.01]         Your next Anticoagulation Clinic appointment(s)     May 11, 2018  2:45 PM CDT   [facility] with [facility]   [facility]    [address] [address]   [phone]              May 2018 Details    Sun Mon Tue Wed Thu Fri Sat       1               2               3               4               5                 6               7               8               9               10               11      2.5 mg   See details      12      2.5 mg           13      2.5 mg         14      2.5 mg         15      2.5 mg         16            17               18               19                 20               21               22               23               24               25               26                 27               28               29               30               31                  Date Details   05/11 This INR check       Date of next INR:  5/16/2018         How to take your warfarin dose     To take:  2.5 mg Take 1 of the 2.5 mg tablets.

## 2018-05-11 NOTE — PATIENT INSTRUCTIONS
[facility]  [facility]~[address]~[place], [place]~[address]  Thank you for your [facility] visit today. If you have questions regarding your visit, please contact your cardiology RN's, Mayuri Ocasio or Ángela Goodson, at [phone]. Your provider has recommended the following:  Medication Changes:  None today. Continue taking your medications as you have been.  Recommendations:  EKG today  3 day Zio patch next available  Follow-up:  See Christine Montemayor  for cardiology follow up in 1 month  To schedule a future appointment, we kindly ask that you call cardiology scheduling at [phone] three months prior to requested revisit date.               Reminder:  For your safety, we ask that you bring in your current medication(s) or an updated list of your medications with you to EACH office visit. Include the medication name, dose of pill on bottle and how you are taking it. Include over-the-counter medications or supplements. Your provider will review this at each visit and plan your care based on your current information.

## 2018-05-11 NOTE — LETTER
5/11/2018    Kailash Mason MD  [address] [place] [id number]    RE: Zechariah WOODALL Isma       Dear Colleague,    I had the pleasure of seeing Zechariah WOODALL Isma in the [facility].        Cardiology Consultation       Assessment & Plan     1.  History of flutter status post ablation  2.  Subsequent atrial fibrillation with conversion to sinus rhythm spontaneously.  On amiodarone  3.  Cardiomyopathy felt to be tachycardiac mediated currently EF is normal  4.  Lung cancer with metastatic disease to brain.  Patient in need of radiation therapy for brain lesion  5.  Syncopal episode probable vasovagal?  6.  Status post chemotherapy      Recommendations    1.  Regarding preoperative clearance patient is cleared to proceed with whatever therapy that is deemed appropriate by his oncologist and patient from a cardiovascular perspective  2.  We will get a Zio patch monitor for his syncope  3.  Will have him follow-up with my colleague Christine Montemayor who he knows well.  4.  Advised appropriate hydration  5.  EKG today demonstrates normal sinus rhythm.      Jonn Ybarra MD    Patient is a very pleasant 58-year-old male who is followed by my colleague bradly and the electrophysiology team.  He has a notable cardiac history for apparent atrial flutter for which he underwent ablation.  Subsequently developed atrial fibrillation and was in the process of being set up for a cardioversion.  He spontaneously cardioverted on amiodarone therapy and has remained in normal sinus since that timeframe.  His ejection fraction was reduced and normalized when he was back in normal rhythm.  He also had some findings on his stress test which were attributable to his cardiomyopathy.  Unfortunately has been dealing with issues related to his non-small cell lung cancer.  He has undergone chemotherapy for this and unfortunately has metastatic disease to the brain.  He tells me that he is scheduled to have  radiation therapy next week.  He comes to clinic reporting of 2 or 3 episodes where he had some form of syncope out of the blue.  He states that he was sitting on the couch for a period of time he got up felt his vision tunnel and found himself laying on the floor.  He was immediately aware of his surroundings and states that it might of been a few seconds to a minute of time elapsed from his event.  Patient unfortunately has had a progressive decline per review of notes.  He is gait has been unsteady and he has lost weight as a result of his illness.  He is also been placed on anti-depression to help cope with his illness.  Due to the the episodes he presents to cardiology clinic for clearance as well as evaluation.        Jonn Ybarra MD      Primary Care Physician   Kailash Mason      Patient Active Problem List   Diagnosis     CARDIOVASCULAR SCREENING; LDL GOAL LESS THAN 130     Perforated ulcer (HCC)     Free intraperitoneal air     Atrial flutter (H)     Pleural effusion     Mediastinal lymphadenopathy     Non-small cell carcinoma of lung (H)     COPD exacerbation (H)     Weakness     Cardiomyopathy (H)     Acute on chronic systolic congestive heart failure (H)     Dehydration     Chemotherapy induced nausea and vomiting     Atrial flutter, unspecified type (H)     Long-term (current) use of anticoagulants [Z79.01]     Brain metastasis (H)       Past Medical History   I have reviewed this patient's medical history and updated it with pertinent information if needed.   Past Medical History:   Diagnosis Date     Atrial flutter (H) 11/2017     Cancer (H)     Lung Ca       Past Surgical History   I have reviewed this patient's surgical history and updated it with pertinent information if needed.  Past Surgical History:   Procedure Laterality Date     ANESTHESIA CARDIOVERSION N/A 1/12/2018    Procedure: ANESTHESIA CARDIOVERSION;  CARDIOVERSION (FOLLOWING KIMBERLY AT 0830);  Surgeon: GENERIC ANESTHESIA  PROVIDER;  Location: [facility] OR     APPENDECTOMY      age 30s     INSERT PORT VASCULAR ACCESS N/A 1/3/2018    Procedure: INSERT PORT VASCULAR ACCESS;  Port-a-Cath Placement;  Surgeon: Tomas Crews MD;  Location: [facility]     SURGICAL HISTORY OF -   2004    Gastroscopy with biopsy for gastric ulcer       Prior to Admission Medications   Cannot display prior to admission medications because the patient has not been admitted in this contact.     [unfilled]  [unfilled]  Allergies   Allergies   Allergen Reactions     Nka [No Known Allergies]        Social History    reports that he has been smoking.  He started smoking about 4 months ago. He has a 12.50 pack-year smoking history. He has never used smokeless tobacco. He reports that he does not drink alcohol or use illicit drugs.    Family History   Family History   Problem Relation Age of Onset     CANCER Mother      pancreatic cancer,  at 38 years     Alzheimer Disease Father       at 84 years.       Review of Systems   The comprehensive 10 point Review of Systems is negative other than noted in the HPI or here.     Physical Exam   Vital Signs with Ranges  Pulse:  [83] 83  BP: (110)/(74) 110/74  SpO2:  [100 %] 100 %  Wt Readings from Last 4 Encounters:   18 67.6 kg (149 lb)   05/10/18 68.8 kg (151 lb 9.6 oz)   18 68.4 kg (150 lb 11.2 oz)   18 67.1 kg (148 lb)     [unfilled]      Vitals: /74  Pulse 83  Wt 67.6 kg (149 lb)  SpO2 100%  BMI 20.79 kg/m2  HEAD: Atraumatic, normocephalic  EYES: PERRLA, EOMI, Sclerae clear, Fundi normal with sharp discs  EARS: TMs clear, canals normal  NOSE & THROAT: clear  NECK: Supple without adenopathy or thyromegaly  LUNGS: clear to auscultation, normal breath sounds  CV: RRR without murmur, no carotid bruits  ABD: BS+, soft, nontender, no masses, no hepatosplenomegaly  SKIN: No rashes or abnormalities  NEURO: Alert and oriented X 3, non focal exam, DTRs normal, motor and sensation normal,  coordination and gait without abnormality.    @LABRCNTIPR(tropi:5,troponinies:5)@    @LABRCNTIPR(wbc:3,hgb:3,mcv:3,plt:3,inr:3,na:3,potassium:3,chloride:3,co2:3,bun:3,cr:3,gfrestimated:3,gfrestblack:3,aniongap:3,apurva:3,glc:3,albumin:2,prottotal:2,bilitotal:2,alkphos:2,alt:2,ast:2,lipase:2,tropi:3)@  Recent Labs   Lab Test  11   0909   CHOL  179   HDL  53   LDL  86   TRIG  202*   CHOLHDLRATIO  3.0     @LABRCNTIP(wbc:3,hgb:3,hct:3,mcv:3,plt:3,iron:3,ironsat:3,reticabsct:3,retp:3,feb:3,taya:3,b12:3,folic:3,epoe:3,morph:3)@  @LABRCNTIP(PH:3,PHV:3,PO2:3,PO2V:3,sat:3,PCO2:3,PCO2V:3,HCO3:3,HCO3V:3)@  @LABRCNTIP(NTBNPI:3,NTBNP:3)@  @LABRCNTIP(DD:1)@  @LABRCNTIP(sed:3,crp:3)@  @LABRCNTIP(PLT:3)@  @LABRCNTIP(TSH:3)@  @LABRCNTIP(color:1,appearance:1,urineg,urinebili:1,urineketone:1,s,ubld:1,urineph:1,protein:1,urobilinogen:1,nitrite:1,leukest:1,rbcu:1,wbcu:1)@    Imaging:  No results found for this or any previous visit (from the past 48 hour(s)).    Echo:  Recent Results (from the past 4320 hour(s))   Echocardiogram Limited    Narrative    [id number]  [facility]  [id number]  [id number]^AKINTOLA^OLUTOYIN^[facility]  Echocardiography Laboratory  [address]  [facility] [place] [address]        Name: ROQUE SAXENA  MRN: [id number]  : 1960  Study Date: 2018 07:48 AM  Age: 57 yrs  Gender: Male  Patient Location: [facility]  Reason For Study: CHF, S/P Ablation  Ordering Physician: ANDREAS TURK  Referring Physician: ROSA JASSO  Performed By: Sonia Adams RDCS     BSA: 1.9 m2  Height: 71 in  Weight: 150 lb  HR: 66  BP: 116/70 mmHg  _____________________________________________________________________________  __        Procedure  Limited Portable Echo Adult. Complete Portable Bubble Echo Adult.  _____________________________________________________________________________  __        Interpretation Summary     The left ventricle is normal in size.  The visual ejection fraction  is estimated at 50-55%.  Left ventricular systolic function is borderline reduced.  No regional wall motion abnormalities noted.  Small pericardial effusion  There are no echocardiographic indications of cardiac tamponade.  Normal IVC (1.5-2.5cm) with >50% respiratory collapse; right atrial pressure  is estimated at 5-10mmHg.     Compared to 12/8/17 KIMBERLY, the LV function has improved. The pericardial  effusion was noted on KIMBERLY prior to ablation and is probably unchanged on  today's study. No sign of volume overload by IVC evaluation.  _____________________________________________________________________________  __        Left Ventricle  The left ventricle is normal in size. There is normal left ventricular wall  thickness. The visual ejection fraction is estimated at 50-55%. Left  ventricular systolic function is borderline reduced. No regional wall motion  abnormalities noted.     Right Ventricle  The right ventricle is normal size. Thickened right ventricle free wall, mild.  The right ventricular systolic function is normal.     Atria  Normal left atrial size. Right atrial size is normal. Intact atrial septum.     Mitral Valve  The mitral valve is normal in structure and function. There is trace mitral  regurgitation.        Tricuspid Valve  The tricuspid valve is normal in structure and function. There is trace  tricuspid regurgitation. Right ventricular systolic pressure could not be  approximated due to inadequate tricuspid regurgitation. Normal IVC (1.5-2.5cm)  with >50% respiratory collapse; right atrial pressure is estimated at 5-  10mmHg.     Aortic Valve  The aortic valve is normal in structure and function. No aortic regurgitation  is present. No aortic stenosis is present.     Pulmonic Valve  The pulmonic valve is not well seen, but is grossly normal. There is trace  pulmonic valvular regurgitation.     Vessels  The aortic root is normal size. The IVC is normal in size and reactivity with  respiration,  suggesting normal central venous pressure.     Pericardium  Small pericardial effusion. There are no echocardiographic indications of  cardiac tamponade.        Rhythm  Sinus rhythm was noted.  _____________________________________________________________________________  __  MMode/2D Measurements & Calculations  IVSd: 1.0 cm     LVIDd: 4.5 cm  LVIDs: 3.2 cm  LVPWd: 1.0 cm  FS: 29.6 %  EDV(Teich): 91.6 ml  ESV(Teich): 39.7 ml  LV mass(C)d: 160.5 grams  LV mass(C)dI: 86.0 grams/m2  RWT: 0.46        Doppler Measurements & Calculations  MV E max katie: 57.4 cm/sec  MV A max katie: 38.6 cm/sec  MV E/A: 1.5  MV dec time: 0.27 sec  Medial E/e': 7.1           _____________________________________________________________________________  __           Report approved by: Millicent Reid 2018 09:38 AM      Echocardiogram Complete    Narrative    [id number]  ECH19  [id number]  [id number]^ROMERO^ROSA^IRISH           [facility]  Echocardiography Laboratory  [address] [place] [address]        Name: ROQUE SAXENA  MRN: [id number]  : 1960  Study Date: 2017 10:04 AM  Age: 57 yrs  Gender: Male  Patient Location: [facility]  Reason For Study: Irregular heart beat, Atrial flutter  Ordering Physician: ROSA JASSO  Referring Physician: ROSA JASSO  Performed By: Kyala Mata RDCS     BSA: 1.9 m2  Height: 70 in  Weight: 166 lb  HR: 98  BP: 152/96 mmHg  _____________________________________________________________________________  __        Procedure  Complete Echo Adult.  _____________________________________________________________________________  __        Interpretation Summary     The visual ejection fraction is estimated at 30-35%.  Left ventricular systolic function is moderately reduced.  The rhythm was atrial flutter.  Trivial pericardial effusion  _____________________________________________________________________________  __        Left Ventricle  The left ventricle is normal in  size. There is mild concentric left  ventricular hypertrophy. The visual ejection fraction is estimated at 30-35%.  Left ventricular systolic function is moderately reduced. E by E prime ratio  is between 8 and 15, which is indeterminate for assessment of left ventricular  filling pressures.     Right Ventricle  The right ventricle is normal in size and function.     Atria  The left atrium is moderately dilated. The right atrium is moderately dilated.  There is no color Doppler evidence of an atrial shunt.     Mitral Valve  There is mild (1+) mitral regurgitation.        Tricuspid Valve  There is trace tricuspid regurgitation. Right ventricular systolic pressure  could not be approximated due to inadequate tricuspid regurgitation.     Aortic Valve  The aortic valve is trileaflet. There is trivial trileaflet aortic sclerosis.  There is trace aortic regurgitation. No hemodynamically significant valvular  aortic stenosis.     Pulmonic Valve  There is trace pulmonic valvular regurgitation.     Vessels  Normal size ascending aorta. Dilated inferior vena cava.     Pericardium  Trivial pericardial effusion.        Rhythm  The rhythm was atrial flutter.  _____________________________________________________________________________  __  MMode/2D Measurements & Calculations  IVSd: 1.2 cm     LVIDd: 4.2 cm  LVIDs: 2.8 cm  LVPWd: 1.2 cm  FS: 33.3 %  EDV(Teich): 76.7 ml  ESV(Teich): 28.8 ml  LV mass(C)d: 182.1 grams  LV mass(C)dI: 94.4 grams/m2  Ao root diam: 3.6 cm  LA dimension: 3.5 cm  asc Aorta Diam: 3.3 cm  LA/Ao: 0.97  LA Volume (BP): 61.0 ml  LA Volume Index (BP): 31.6 ml/m2  RWT: 0.59           Doppler Measurements & Calculations  MV E max katie: 88.7 cm/sec  MV dec time: 0.15 sec  E/E' av.9  Lateral E/e': 9.1  Medial E/e': 10.8           _____________________________________________________________________________  __           Report approved by: Millicent Whittington 2017 01:14 PM               Thank you for  allowing me to participate in the care of your patient.    Sincerely,     Jonn Ybarra MD     [facility]

## 2018-05-14 NOTE — MR AVS SNAPSHOT
Zechariah Ashby   5/14/2018   Anticoagulation Therapy Visit    Description:  58 year old male   Provider:  Alice Lea, RN   Department:  [facility]           INR as of 5/14/2018     Today's INR 2.1      Anticoagulation Summary as of 5/14/2018     INR goal 2.0-3.0   Today's INR 2.1   Full instructions 5/14: 3.75 mg; 5/15: 2.5 mg; 5/16: 2.5 mg; 5/17: 2.5 mg   Next INR check 5/18/2018    Indications   Atrial flutter  unspecified type (H) [I48.92]  Long-term (current) use of anticoagulants [Z79.01] [Z79.01]         Your next Anticoagulation Clinic appointment(s)     May 16, 2018  9:30 AM CDT   [facility] with [facility]    [address] [address]   [phone]              May 2018 Details    Sun Mon Tue Wed Thu Fri Sat       1               2               3               4               5                 6               7               8               9               10               11               12                 13               14      3.75 mg   See details      15      2.5 mg         16      2.5 mg         17      2.5 mg         18            19                 20               21               22               23               24               25               26                 27               28               29               30               31                  Date Details   05/14 This INR check       Date of next INR:  5/18/2018         How to take your warfarin dose     To take:  2.5 mg Take 1 of the 2.5 mg tablets.    To take:  3.75 mg Take 1.5 of the 2.5 mg tablets.

## 2018-05-14 NOTE — PROGRESS NOTES
ANTICOAGULATION FOLLOW-UP CLINIC VISIT    Patient Name:  Zechariah Ashby  Date:  5/14/2018  Contact Type:  Telephone/ Radha Barger [facility]    SUBJECTIVE:     Patient Findings     Positives No Problem Findings    Comments Patient has not had chemo in about 3 weeks. Patient has a PET scan on Wednesday to determine future plans. Patient will also be seeing Dr. Mason on Wednesday.     No changes noted per homecare report. He is currently being seen weekly, however due to drastic changes in warfarin dose, writer requested a PRN visit be used. 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           OBJECTIVE    INR   Date Value Ref Range Status   05/14/2018 2.1  Final       ASSESSMENT / PLAN  No question data found.  Anticoagulation Summary as of 5/14/2018     INR goal 2.0-3.0   Today's INR 2.1   Maintenance plan No maintenance plan   Full instructions 5/14: 3.75 mg; 5/15: 2.5 mg; 5/16: 2.5 mg; 5/17: 2.5 mg   Weekly total 13.75 mg   Plan last modified Alice Lea RN (5/14/2018)   Next INR check 5/18/2018   Priority INR   Target end date     Indications   Atrial flutter  unspecified type (H) [I48.92]  Long-term (current) use of anticoagulants [Z79.01] [Z79.01]         Anticoagulation Episode Summary     INR check location     Preferred lab     Send INR reminders to [facility] PHONE ANTICOAG POOL    Comments *Pt is on AMIODARONE. On palliative chemo (CARBOplatin / Gemcitabine) 1/4/2018 to 4/26/2018 -- chemo currently on hold (4/26) [facility] (5/14)      Anticoagulation Care Providers     Provider Role Specialty Phone number    Kailash Mason MD [facility] [phone]            See the Encounter Report to view Anticoagulation Flowsheet and Dosing Calendar (Go to Encounters tab in chart review, and find the Anticoagulation Therapy Visit)        Alice Lea RN, CACP

## 2018-05-14 NOTE — TELEPHONE ENCOUNTER
Reason for Call:  Other     Detailed comments: Patient cannot remember the last bowl movement and is not eating very much - Patient is taking 2 stool softners twice a day for a couple of week.  Patient has been taking miralax once a day and still nothing so she told him to increase to twice a day starting today - Brooke - [facility] - [phone] - please call if you want  Him to do something different.    Phone Number Patient can be reached at:     Best Time:     Can we leave a detailed message on this number? yes    Call taken on 5/14/2018 at 3:42 PM by Deepika Lebron

## 2018-05-14 NOTE — TELEPHONE ENCOUNTER
I talked with Brooke and told her plan was good.  He will start the Miralax.  Advised to take a suppository.  His wife is in the hospital, she had a siezure.  I will call him tomorrow and see how he is doing  Emani Lemus RN

## 2018-05-14 NOTE — PROGRESS NOTES
Denita from [facility] called [facility], she will be seeing the patient today. He was newly admitted to homecare on Thursday. She will check the INR later today when she sees patient in the home.     Alice Lea RN, CACP 5/14/2018 at 8:43 AM

## 2018-05-16 NOTE — PATIENT INSTRUCTIONS
ASSESSMENT:   (F32.1) Moderate single current episode of major depressive disorder (H)  (primary encounter diagnosis)  Comment: improved but still with depression symptoms.   Plan: buPROPion (WELLBUTRIN SR) 200 MG 12 hr tablet        INcrease the wellbutrin to 200mg twice daily.   Recheck in another month.     (K59.00) Constipation, unspecified constipation type  Comment: from morphine.   Plan: senna-docusate (SENOKOT-S;PERICOLACE) 8.6-50 MG        per tablet          OK to take the polyethylene glycol (Miralax) 17 grams .biil  Increase the Senna to 2 pills twice daily.   If needed, add Dulcolax 10mg by either pill or suppository if no bowel movement in 3 days or so.   You could also add Metamucil.  Metamucil is a good source of fiber.  The sugar free Metamucil is a good choice as it has a higher amount of fiber.  Start with a heaping teaspoon once daily.  This is mixed with a glass of water.  The dose could be increased to one heaping teaspoon twice daily if needed.      (R53.1) Weakness  Comment:   Plan: order for DME          (C34.90) Non-small cell carcinoma of lung (H)  Comment: PET scan today.  Results not yet available.   Plan: follow-up with Dr. May as planned to review results.     (C79.31) Brain metastasis (H)  Comment:   Plan: Treatment tomorrow.     (K59.03) Drug-induced constipation  Comment: see above

## 2018-05-16 NOTE — PROGRESS NOTES
SUBJECTIVE:   Zechariah Ashby is a 58 year old male who presents to clinic today for the following health issues:  No chief complaint on file.     Last clinic visit: 4/20/2018.  He had depression and constipation.   Started on wellbutrin (Not SSRI due to amiodarone).  He has been on prn lorazepam.   Was recommended polyethylene glycol (Miralax) and Senna.      He had PET scan today.    Starts gamma knife treatment for brain met.     Depression Followup    Status since last visit: Improved states mood is better    See PHQ-9 for current symptoms.  Other associated symptoms: None    Complicating factors:   Significant life event:  Yes-  Getting  and terminal lung cancer   Current substance abuse:  None  Anxiety or Panic symptoms:  No    He feels mood is a little better.  Feels more relaxed.  Spouse feels he has been doing better after talking with palliative care physician.  She notes he and they have been more realistic about outcome.   Taking wellbutrin twice daily.  NO side effects noted.   PHQ9 score today= 19, was 22 last time.  generalized anxiety disorder scale score today= 8.  Last time was 12    PHQ-9 4/20/2018   Total Score 22   Q9: Suicide Ideation Not at all       PHQ-9  English  PHQ-9   Any Language  Suicide Assessment Five-step Evaluation and Treatment (SAFE-T)    Amount of exercise or physical activity: unable do to weakness    Problems taking medications regularly: No    Medication side effects: none    Diet: eating as able      Abdominal Pain      Duration: 6 months- constipation getting worse.  Caregiver states he only has about 2 bm/month. On opiods    Description (location/character/radiation): low abdomen       Associated flank pain: None    Intensity:  moderate, 5/10    Accompanying signs and symptoms:        Fever/Chills: no        Gas/Bloating: YES- some days       Nausea/vomitting: YES- occ'l nausea       Diarrhea: no        Dysuria or Hematuria: possibly one episode of blood in urine  3 months ago    History (previous similar pain/trauma/previous testing): no    Precipitating or alleviating factors:       Pain worse with eating/BM/urination: none       Pain relieved by BM: YES    Therapies tried and outcome: Senna  and Miralax was added 5/14/2018    Large hard bowel movements.  Taking Miralax daily.  He has just increased to 17 grams twice daily.  He has been taking Senna twice daily.   Some pain lower abdomen. Pain most of the time. Worse when no bowel movement.  Seems like gas pain.     Voice hoarse.  Speaks in whispers.  Onset of symptoms: months ago.  Course of symptoms over time: seems worse over time.   No difficulty swallowing.   Painful if talking loud.   No heartburn.     Taking MS twice daily 15 mg immediate release.  Takes more for air hunger.  Not taking this for pain.   No pain in chest or other areas.   Not taking lorazepam.  Makes him feel loopy.     Smoking 1/3-1/2 ppd.  Using nicotine patches daily.     Taking ondansetron twice daily for nausea.     Legs get shaky at times. Duration: a few seconds.     Cannot lift heavier wheelchairs.   Uses wheelchair whenever out and about.   Can walk 20' due to breathing and weakness.     Patient Active Problem List   Diagnosis     CARDIOVASCULAR SCREENING; LDL GOAL LESS THAN 130     Perforated ulcer (HCC)     Free intraperitoneal air     Atrial flutter (H)     Pleural effusion     Mediastinal lymphadenopathy     Non-small cell carcinoma of lung (H)     COPD exacerbation (H)     Weakness     Cardiomyopathy (H)     Acute on chronic systolic congestive heart failure (H)     Dehydration     Chemotherapy induced nausea and vomiting     Atrial flutter, unspecified type (H)     Long-term (current) use of anticoagulants [Z79.01]     Brain metastasis (H)      ROS:    OBJECTIVE:Blood pressure 90/54, pulse 83, temperature 99.1  F (37.3  C), temperature source Tympanic, resp. rate 24, weight 146 lb 6.4 oz (66.4 kg), SpO2 97 %. BMI=Body mass index is  20.43 kg/(m^2).  GENERAL APPEARANCE ADULT: Alert, no acute distress, hoarse voice, speaking more in a forced whisper.  Seated in a wheelchair. Can get up on exam table.   NECK: No adenopathy,masses or thyromegaly  RESP: lungs clear to auscultation   CV: normal rate, regular rhythm, no murmur or gallop  ABDOMEN: soft, no organomegaly or masses , bowel sounds normal, some tenderness across lower abdomen.   MS: extremities normal, no peripheral edema     ASSESSMENT:   (F32.1) Moderate single current episode of major depressive disorder (H)  (primary encounter diagnosis)  Comment: improved but still with depression symptoms.   Plan: buPROPion (WELLBUTRIN SR) 200 MG 12 hr tablet        INcrease the wellbutrin to 200mg twice daily.   Recheck in another month.     (K59.00) Constipation, unspecified constipation type  Comment: from morphine.   Plan: senna-docusate (SENOKOT-S;PERICOLACE) 8.6-50 MG        per tablet          OK to take the polyethylene glycol (Miralax) 17 grams .biil  Increase the Senna to 2 pills twice daily.   If needed, add Dulcolax 10mg by either pill or suppository if no bowel movement in 3 days or so.   You could also add Metamucil.  Metamucil is a good source of fiber.  The sugar free Metamucil is a good choice as it has a higher amount of fiber.  Start with a heaping teaspoon once daily.  This is mixed with a glass of water.  The dose could be increased to one heaping teaspoon twice daily if needed.      (R53.1) Weakness  Comment:   Plan: order for DME          (C34.90) Non-small cell carcinoma of lung (H)  Comment: PET scan today.  Results not yet available.   Plan: follow-up with Dr. May as planned to review results.     (C79.31) Brain metastasis (H)  Comment:   Plan: Treatment tomorrow.     (K59.03) Drug-induced constipation  Comment: see above     (R49.0) Hoarseness  Comment: uncertain cause.    Plan: Consider ENT appointment for vocal cord visualization.  He has been on PPI, omeprazole.  No  GERD symptoms currently.     We discussed Health Care Directive and POLST today.  Forms give to discuss with his son who he elects as health care POA.   My recommendation would be for no CPR or intubation with his Stage IV lung cancer diagnosis.

## 2018-05-16 NOTE — NURSING NOTE
"No chief complaint on file.      Initial BP 90/54 (BP Location: Right arm, Patient Position: Chair, Cuff Size: Adult Regular)  Pulse 83  Temp 99.1  F (37.3  C) (Tympanic)  Resp 24  Wt 146 lb 6.4 oz (66.4 kg)  SpO2 97%  BMI 20.43 kg/m2 Estimated body mass index is 20.43 kg/(m^2) as calculated from the following:    Height as of 5/9/18: 5' 10.98\" (1.803 m).    Weight as of this encounter: 146 lb 6.4 oz (66.4 kg).      Health Maintenance that is potentially due pending provider review:  Not addressing health main due to terminal cancer    n/a    Is there anyone who you would like to be able to receive your results? Yes  If yes have patient fill out ARIK  Emani Blanco CMA      "

## 2018-05-16 NOTE — MR AVS SNAPSHOT
After Visit Summary   5/16/2018    Zechariah Ashby    MRN: [id number]           Patient Information     Date Of Birth          1960        Visit Information        Provider Department      5/16/2018 2:40 PM Kailash Mason MD [facility]        Today's Diagnoses     Moderate single current episode of major depressive disorder (H)    -  1    Constipation, unspecified constipation type        Weakness        Non-small cell carcinoma of lung (H)        Brain metastasis (H)        Drug-induced constipation          Care Instructions    ASSESSMENT:   (F32.1) Moderate single current episode of major depressive disorder (H)  (primary encounter diagnosis)  Comment: improved but still with depression symptoms.   Plan: buPROPion (WELLBUTRIN SR) 200 MG 12 hr tablet        INcrease the wellbutrin to 200mg twice daily.   Recheck in another month.     (K59.00) Constipation, unspecified constipation type  Comment: from morphine.   Plan: senna-docusate (SENOKOT-S;PERICOLACE) 8.6-50 MG        per tablet          OK to take the polyethylene glycol (Miralax) 17 grams .biil  Increase the Senna to 2 pills twice daily.   If needed, add Dulcolax 10mg by either pill or suppository if no bowel movement in 3 days or so.   You could also add Metamucil.  Metamucil is a good source of fiber.  The sugar free Metamucil is a good choice as it has a higher amount of fiber.  Start with a heaping teaspoon once daily.  This is mixed with a glass of water.  The dose could be increased to one heaping teaspoon twice daily if needed.      (R53.1) Weakness  Comment:   Plan: order for DME          (C34.90) Non-small cell carcinoma of lung (H)  Comment: PET scan today.  Results not yet available.   Plan: follow-up with Dr. May as planned to review results.     (C79.31) Brain metastasis (H)  Comment:   Plan: Treatment tomorrow.     (K59.03) Drug-induced constipation  Comment: see above           Follow-ups after your  visit        Your next 10 appointments already scheduled     May 17, 2018  5:30 AM CDT   GAMMA TREATMENT with Gaby Edmondson MD   [facility], Radiation Oncology ([facility])    [address] [address]   [phone]            May 17, 2018  7:00 AM CDT   GAMMA TREATMENT with Lobo Godinez MD   [facility], Radiation Oncology ([facility])    [address] [address]   [phone]            May 17, 2018  7:30 AM CDT   MR BRAIN W CONTRAST with UUMR1   [facility], MRI ([facility])    [address] [facility] [address] [facility] [address]   [phone]           Take your medicines as usual, unless your doctor tells you not to. Bring a list of your current medicines to your exam (including vitamins, minerals and over-the-counter drugs).  You may or may not receive intravenous (IV) contrast for this exam pending the discretion of the Radiologist.  You do not need to do anything special to prepare.  The MRI machine uses a strong magnet. Please wear clothes without metal (snaps, zippers). A sweatsuit works well, or we may give you a hospital gown.  Please remove any body piercings and hair extensions before you arrive. You will also remove watches, jewelry, hairpins, wallets, dentures, partial dental plates and hearing aids. You may wear contact lenses, and you may be able to wear your rings. We have a safe place to keep your personal items, but it is safer to leave them at home.  **IMPORTANT** THE INSTRUCTIONS BELOW ARE ONLY FOR THOSE PATIENTS WHO HAVE BEEN PRESCRIBED SEDATION OR GENERAL ANESTHESIA DURING THEIR MRI PROCEDURE:  IF YOUR DOCTOR PRESCRIBED ORAL SEDATION (take medicine to help you relax during your exam):   You must get the medicine from your doctor (oral medication) before you arrive. Bring the medicine to the exam. Do not take it at home. You ll be told when to take it upon arriving for your exam.   Arrive  one hour early. Bring someone who can take you home after the test. Your medicine will make you sleepy. After the exam, you may not drive, take a bus or take a taxi by yourself.  IF YOUR DOCTOR PRESCRIBED IV SEDATION:   Arrive one hour early. Bring someone who can take you home after the test. Your medicine will make you sleepy. After the exam, you may not drive, take a bus or take a taxi by yourself.   No eating 6 hours before your exam. You may have clear liquids up until 4 hours before your exam. (Clear liquids include water, clear tea, black coffee and fruit juice without pulp.)  IF YOUR DOCTOR PRESCRIBED ANESTHESIA (be asleep for your exam):   Arrive 1 1/2 hours early. Bring someone who can take you home after the test. You may not drive, take a bus or take a taxi by yourself.   No eating 8 hours before your exam. You may have clear liquids up until 4 hours before your exam. (Clear liquids include water, clear tea, black coffee and fruit juice without pulp.)   You will spend four to five hours in the recovery room.  Please call the Imaging Department at your exam site with any questions.            May 17, 2018  8:00 AM CDT   Procedure 2 hour with U2A ROOM 8   [address] [facility] ([facility])    [address] [address]               May 17, 2018  2:45 PM CDT   Return Visit with Joycelyn May MD   [facility] ([facility])    [facility]  [address]  [facility] [address]   [phone]            Jun 26, 2018  1:00 PM CDT   Return Visit with Daksha Montemayor PA-C   [facility] ([facility])    [address]  [facility] [address]   [phone]              Who to contact     If you have questions or need follow up information about today's clinic visit or your schedule please contact [facility] directly at  [phone].  Normal or non-critical lab and imaging results will be communicated to you by [employer], letter or phone within 4 business days after the clinic has received the results. If you do not hear from us within 7 days, please contact the clinic through [employer] or phone. If you have a critical or abnormal lab result, we will notify you by phone as soon as possible.  Submit refill requests through [employer] or call your pharmacy and they will forward the refill request to us. Please allow 3 business days for your refill to be completed.          Additional Information About Your Visit        [employer] Information     [facility] [facility] [employer] gives you secure access to your electronic health record. If you see a primary care provider, you can also send messages to your care team and make appointments. If you have questions, please call your primary care clinic.  If you do not have a primary care provider, please call [phone] and they will assist you.        Care EveryWhere ID     This is your Care EveryWhere ID. This could be used by other organizations to access your [facility] medical records  [id number]        Your Vitals Were     Pulse Temperature Respirations Pulse Oximetry BMI (Body Mass Index)       83 99.1  F (37.3  C) (Tympanic) 24 97% 20.43 kg/m2        Blood Pressure from Last 3 Encounters:   05/16/18 90/54   05/11/18 110/74   05/10/18 112/69    Weight from Last 3 Encounters:   05/16/18 146 lb 6.4 oz (66.4 kg)   05/11/18 149 lb (67.6 kg)   05/10/18 151 lb 9.6 oz (68.8 kg)              Today, you had the following     No orders found for display         Today's Medication Changes          These changes are accurate as of 5/16/18  3:32 PM.  If you have any questions, ask your nurse or doctor.               Start taking these medicines.        Dose/Directions    order for DME   Used for:  Weakness   Started by:  Kailash Mason MD        Equipment being ordered: lightweight wheelchair.   Cannot lift standard  wheelchair due to weakness and cancer.   Quantity:  1 Device   Refills:  0         These medicines have changed or have updated prescriptions.        Dose/Directions    amiodarone 200 MG tablet   Commonly known as:  PACERONE/CODARONE   This may have changed:    - how much to take  - how to take this  - when to take this  - additional instructions   Used for:  Paroxysmal atrial fibrillation (H)        1 tablet daily   Quantity:  90 tablet   Refills:  0       buPROPion 200 MG 12 hr tablet   Commonly known as:  WELLBUTRIN SR   This may have changed:    - medication strength  - how much to take  - how to take this  - when to take this  - additional instructions   Used for:  Moderate single current episode of major depressive disorder (H)   Changed by:  Kailash Mason MD        Dose:  200 mg   Take 1 tablet (200 mg) by mouth 2 times daily   Quantity:  60 tablet   Refills:  5       senna-docusate 8.6-50 MG per tablet   Commonly known as:  SENOKOT-S;PERICOLACE   This may have changed:  how much to take   Used for:  Constipation, unspecified constipation type   Changed by:  Kailash Mason MD        Dose:  2 tablet   Take 2 tablets by mouth 2 times daily   Quantity:  120 tablet   Refills:  11            Where to get your medicines      These medications were sent to [facility] #[facility] [address] [address]    Hours:  Closed 10-16-08 business to [facility] Phone:  [phone]     buPROPion 200 MG 12 hr tablet    senna-docusate 8.6-50 MG per tablet         Some of these will need a paper prescription and others can be bought over the counter.  Ask your nurse if you have questions.     Bring a paper prescription for each of these medications     order for DME                Primary Care Provider Office Phone # Fax #    Kailash Mason [phone] [address] [place] [address]        Equal Access to Services     ROD PERRY  AH: Hadii arnol kimperfectohitesh Kennyali, waaxda luqadaha, qaybta kasraai so, harley hirain hayaacristiano goldsmithoralalyssa boykin. So [facility] [phone].    ATENCIÓN: Si habla jarred, tiene a morocho disposición servicios gratuitos de asistencia lingüística. Llame al [phone].    We comply with applicable federal civil rights laws and [place] laws. We do not discriminate on the basis of race, color, national origin, age, disability, sex, sexual orientation, or gender identity.            Thank you!     Thank you for choosing [facility]  for your care. Our goal is always to provide you with excellent care. Hearing back from our patients is one way we can continue to improve our services. Please take a few minutes to complete the written survey that you may receive in the mail after your visit with us. Thank you!             Your Updated Medication List - Protect others around you: Learn how to safely use, store and throw away your medicines at www.disposemymeds.org.          This list is accurate as of 5/16/18  3:32 PM.  Always use your most recent med list.                   Brand Name Dispense Instructions for use Diagnosis    albuterol 108 (90 Base) MCG/ACT Inhaler    PROAIR HFA    1 Inhaler    Inhale 2 puffs into the lungs every 4 hours as needed for shortness of breath / dyspnea or wheezing    Chronic obstructive pulmonary disease, unspecified COPD type (H)       amiodarone 200 MG tablet    PACERONE/CODARONE    90 tablet    1 tablet daily    Paroxysmal atrial fibrillation (H)       buPROPion 200 MG 12 hr tablet    WELLBUTRIN SR    60 tablet    Take 1 tablet (200 mg) by mouth 2 times daily    Moderate single current episode of major depressive disorder (H)       LORazepam 0.5 MG tablet    ATIVAN    30 tablet    Take 1 tablet (0.5 mg) by mouth every 4 hours as needed (Anxiety, Nausea/Vomiting or Sleep)    Non-small cell carcinoma of lung (H)       MEGACE ORAL PO      Take 200 mg by mouth daily         morphine 15 MG IR tablet    MSIR    60 tablet    Take 1 tablet (15 mg) by mouth every 6 hours as needed for moderate to severe pain    Non-small cell carcinoma of lung (H)       nicotine 21 MG/24HR 24 hr patch    NICODERM CQ    30 patch    Place 1 patch onto the skin every 24 hours    Non-small cell carcinoma of lung (H)       omeprazole 40 MG capsule    priLOSEC    30 capsule    Take 1 capsule (40 mg) by mouth daily Take 30-60 minutes before a meal.    Gastroesophageal reflux disease without esophagitis       ondansetron 4 MG tablet    ZOFRAN    30 tablet    Take 1 tablet (4 mg) by mouth every 8 hours as needed for nausea    Non-small cell carcinoma of lung (H)       order for DME     1 Device    Equipment being ordered: donut cushion for sacral ulcer    Decubitus ulcer of sacral region, stage 1       order for DME     1 Device    Equipment being ordered: lightweight wheelchair.   Cannot lift standard wheelchair due to weakness and cancer.    Weakness       polyethylene glycol powder    MIRALAX    510 g    Take 17 g (1 capful) by mouth daily    Constipation, unspecified constipation type       prochlorperazine 10 MG tablet    COMPAZINE    30 tablet    Take 1 tablet (10 mg) by mouth every 6 hours as needed (Nausea/Vomiting)    Non-small cell carcinoma of lung (H)       senna-docusate 8.6-50 MG per tablet    SENOKOT-S;PERICOLACE    120 tablet    Take 2 tablets by mouth 2 times daily    Constipation, unspecified constipation type       * warfarin 5 MG tablet    COUMADIN    30 tablet    Take 5/14: 3.75 mg; 5/15: 2.5 mg; 5/16: 2.5 mg; 5/17: 2.5 mg as directed by the Anticoagulation Clinic    Atrial flutter, unspecified type (H)       * warfarin 2.5 MG tablet    COUMADIN    75 tablet    Take 5/14: 3.75 mg; 5/15: 2.5 mg; 5/16: 2.5 mg; 5/17: 2.5 mg as directed by the Anticoagulation Clinic    Long-term (current) use of anticoagulants, Atrial flutter, unspecified type (H)       * Notice:  This list has 2 medication(s) that  are the same as other medications prescribed for you. Read the directions carefully, and ask your doctor or other care provider to review them with you.

## 2018-05-16 NOTE — TELEPHONE ENCOUNTER
Reason for Call:  Other     Detailed comments: Patients ex-wife is calling asking to get a walker with a seat on it.  Please call when order is ready    Phone Number Patient can be reached at:    Estefani Argueta (ex-wife) () [phone] (H)         Best Time:     Can we leave a detailed message on this number? yes    Call taken on 5/16/2018 at 4:35 PM by Deepika Lebron

## 2018-05-17 PROBLEM — K59.01 SLOW TRANSIT CONSTIPATION: Status: ACTIVE | Noted: 2018-01-01

## 2018-05-17 PROBLEM — R11.0 NAUSEA: Status: ACTIVE | Noted: 2018-01-01

## 2018-05-17 NOTE — PROGRESS NOTES
"Immunotherapy Education    Patient is a 58 year old male here today for chemotherapy education, accompanied by family members.  Pt has a cancer diagnosis of   Encounter Diagnoses   Name Primary?     Non-small cell carcinoma of lung (H) Yes     COPD exacerbation (H)      Brain metastasis (H)      Atrial flutter, unspecified type (H)      Cardiomyopathy, unspecified type (H)      Chemotherapy induced nausea and vomiting      Pleural effusion      Nausea     and their main concern is \"hope this stuff works\".  Their Oncologist is Dr. LASHON May, and PCP is Kailash Mason.  Reviewed the following with the patient and their support person:  Treatment goal: palliative  Treatment regimen & duration: Keytruda every 3 weeks until progression; and rationale for strict adherence to this schedule, including specific medication names including pre-treatment medications and at home scheduled or as needed medications, delivery methods,.  Potential side effects: Side effects and management; including skin changes/hand-foot syndrome, anemia, neutropenia, thrombocytopenia, diarrhea/constipation, hair loss syndrome, memory changes/ \"chemobrain\", mouth sores, taste changes, neuropathy, fatigue, myelosuppression, sexuality/infertility, and risk of extravasation or infiltration.  Infection prevention, and monitoring of lab values, what lab tests and what changes of these values meant, along with the possibility of hydration or blood product transfusion, or the need to defer or hold treatment.    Immunotherapy information, including ways it is excreted from the body and cleaning and containment of vomitus or other bodily fluid, use of the bathroom, sexual health and intimacy, what to do if needing to miss a treatment, when to call a provider and the need for staff to wear protective equipment.  Importance of Central line care (port) or IV site care.  Written information:  Specific drug information guides printed from [employer].  " Also, information on when to contact the provider, various programs offered at [facility], and our business card with contact information given; Oncology Clinic, RN Case Manager, and the after hours Nurse Advise Line.  General orientation to the Medical Oncology department, Infusion Services department, Huc/scheduling, bathrooms and usual flow of the treatment day provided as well as introduction to the Infusion nurses.  No barriers to learning identified. Patient and family verbalized understanding of all written and verbal information. All questions answered to patients satisfaction.   Other concerns: none  Pt instructed to call with further questions or concerns.  Patient states understanding and is in agreement with this plan.  Copy of appointments, and after visit summary (AVS) given to patient. Patient discharged via wheelchair.    Face to Face time with patient: 15 min    Camille Lara RN, BSN, OCN  Oncology Hematology   Breast Cancer Navigator  [facility]  [email or web address]  Phone [phone]

## 2018-05-17 NOTE — TELEPHONE ENCOUNTER
"Please see mychart.  You saw patient 5-9-18 \"oncology\" visit.    DME wheelchair ready - needs dx.    Routing to provider.  Staci FU RN      "

## 2018-05-17 NOTE — MR AVS SNAPSHOT
After Visit Summary   5/17/2018    Zechariah Ashby    MRN: [id number]           Patient Information     Date Of Birth          1960        Visit Information        Provider Department      5/17/2018 5:30 AM Gaby Edmondson MD [facility] [place], Radiation Oncology        Today's Diagnoses     Metastasis to brain (H)    -  1       Follow-ups after your visit        Your next 10 appointments already scheduled     Jun 26, 2018  1:00 PM CDT   Return Visit with Daksha Montemayor PA-C   [facility] ([facility])    [address] [address]   [phone]            Aug 16, 2018 10:15 AM CDT   MR BRAIN W/O & W CONTRAST with [address] MRI ([facility])    [address] [address]   [phone]           Take your medicines as usual, unless your doctor tells you not to. Bring a list of your current medicines to your exam (including vitamins, minerals and over-the-counter drugs).  You may or may not receive intravenous (IV) contrast for this exam pending the discretion of the Radiologist.  You do not need to do anything special to prepare.  The MRI machine uses a strong magnet. Please wear clothes without metal (snaps, zippers). A sweatsuit works well, or we may give you a hospital gown.  Please remove any body piercings and hair extensions before you arrive. You will also remove watches, jewelry, hairpins, wallets, dentures, partial dental plates and hearing aids. You may wear contact lenses, and you may be able to wear your rings. We have a safe place to keep your personal items, but it is safer to leave them at home.  **IMPORTANT** THE INSTRUCTIONS BELOW ARE ONLY FOR THOSE PATIENTS WHO HAVE BEEN PRESCRIBED SEDATION OR GENERAL ANESTHESIA DURING THEIR MRI PROCEDURE:  IF YOUR DOCTOR PRESCRIBED ORAL SEDATION (take medicine to help you relax during your exam):   You must get the medicine  from your doctor (oral medication) before you arrive. Bring the medicine to the exam. Do not take it at home. You ll be told when to take it upon arriving for your exam.   Arrive one hour early. Bring someone who can take you home after the test. Your medicine will make you sleepy. After the exam, you may not drive, take a bus or take a taxi by yourself.  IF YOUR DOCTOR PRESCRIBED IV SEDATION:   Arrive one hour early. Bring someone who can take you home after the test. Your medicine will make you sleepy. After the exam, you may not drive, take a bus or take a taxi by yourself.   No eating 6 hours before your exam. You may have clear liquids up until 4 hours before your exam. (Clear liquids include water, clear tea, black coffee and fruit juice without pulp.)  IF YOUR DOCTOR PRESCRIBED ANESTHESIA (be asleep for your exam):   Arrive 1 1/2 hours early. Bring someone who can take you home after the test. You may not drive, take a bus or take a taxi by yourself.   No eating 8 hours before your exam. You may have clear liquids up until 4 hours before your exam. (Clear liquids include water, clear tea, black coffee and fruit juice without pulp.)   You will spend four to five hours in the recovery room.  Please call the Imaging Department at your exam site with any questions.            Aug 16, 2018 11:15 AM CDT   Return Visit with Benoit Woodson MD   Radiation Therapy Center ([facility])    [address] [place] [address], [address]  [facility] [id number] [phone]              Future tests that were ordered for you today     Open Standing Orders        Priority Remaining Interval Expires Ordered    If Patient Diabetic: Glucose monitor nursing POCT Routine 86424/[id number] PRN 5/18/2018 5/17/2018    Oxygen: Nasal cannula Routine [id number] PRN  5/17/2018          Open Future Orders        Priority Expected Expires Ordered    MR Brain w/o & w Contrast Routine 8/18/2018 5/18/2019 5/17/2018            Who to contact      Please call your clinic at [phone] to:    Ask questions about your health    Make or cancel appointments    Discuss your medicines    Learn about your test results    Speak to your doctor            Additional Information About Your Visit        NoteSickhar[employer]erra Information     [employer] gives you secure access to your electronic health record. If you see a primary care provider, you can also send messages to your care team and make appointments. If you have questions, please call your primary care clinic.  If you do not have a primary care provider, please call [phone] and they will assist you.      [employer] is an electronic gateway that provides easy, online access to your medical records. With [employer], you can request a clinic appointment, read your test results, renew a prescription or communicate with your care team.     To access your existing account, please contact your [facility] or call [phone] for assistance.        Care EveryWhere ID     This is your Care EveryWhere ID. This could be used by other organizations to access your [facility] medical records  [id number]        Your Vitals Were     Pulse Respirations Pulse Oximetry             77 18 99%          Blood Pressure from Last 3 Encounters:   05/17/18 115/73   05/17/18 128/78   05/17/18 114/77    Weight from Last 3 Encounters:   05/17/18 66.7 kg (147 lb)   05/16/18 66.4 kg (146 lb 6.4 oz)   05/11/18 67.6 kg (149 lb)              Today, you had the following     No orders found for display         Today's Medication Changes          These changes are accurate as of 5/17/18 11:56 AM.  If you have any questions, ask your nurse or doctor.               These medicines have changed or have updated prescriptions.        Dose/Directions    amiodarone 200 MG tablet   Commonly known as:  PACERONE/CODARONE   This may have changed:    - how much to take  - how to take this  - when to take this  - additional instructions   Used  for:  Paroxysmal atrial fibrillation (H)        1 tablet daily   Quantity:  90 tablet   Refills:  0                Primary Care Provider Office Phone # Fax #    Kailash Mason [phone] [address] [place] [address]        Equal Access to Services     SEBASTIÁNMIRIAM PERRY AH: Hadii aad ku hadperfectoo Soomaali, waaxda luqadaha, qaybta kaalmada adeegyada, waxedin mcgrath hayelishan adedick frost lalilianacristiano lucretia. So [facility] [phone].    ATENCIÓN: Si habla español, tiene a morocho disposición servicios gratuitos de asistencia lingüística. Llame al [phone].    We comply with applicable federal civil rights laws and [place] laws. We do not discriminate on the basis of race, color, national origin, age, disability, sex, sexual orientation, or gender identity.            Thank you!     Thank you for choosing [facility] [place], RADIATION ONCOLOGY  for your care. Our goal is always to provide you with excellent care. Hearing back from our patients is one way we can continue to improve our services. Please take a few minutes to complete the written survey that you may receive in the mail after your visit with us. Thank you!             Your Updated Medication List - Protect others around you: Learn how to safely use, store and throw away your medicines at www.disposemymeds.org.          This list is accurate as of 5/17/18 11:56 AM.  Always use your most recent med list.                   Brand Name Dispense Instructions for use Diagnosis    albuterol 108 (90 Base) MCG/ACT Inhaler    PROAIR HFA    1 Inhaler    Inhale 2 puffs into the lungs every 4 hours as needed for shortness of breath / dyspnea or wheezing    Chronic obstructive pulmonary disease, unspecified COPD type (H)       amiodarone 200 MG tablet    PACERONE/CODARONE    90 tablet    1 tablet daily    Paroxysmal atrial fibrillation (H)       buPROPion 200 MG 12 hr tablet    WELLBUTRIN SR    60 tablet    Take 1 tablet (200 mg) by mouth 2 times daily    Moderate  single current episode of major depressive disorder (H)       LORazepam 0.5 MG tablet    ATIVAN    30 tablet    Take 1 tablet (0.5 mg) by mouth every 4 hours as needed (Anxiety, Nausea/Vomiting or Sleep)    Non-small cell carcinoma of lung (H)       MEGACE ORAL PO      Take 200 mg by mouth daily        morphine 15 MG IR tablet    MSIR    60 tablet    Take 1 tablet (15 mg) by mouth every 6 hours as needed for moderate to severe pain    Non-small cell carcinoma of lung (H)       nicotine 21 MG/24HR 24 hr patch    NICODERM CQ    30 patch    Place 1 patch onto the skin every 24 hours    Non-small cell carcinoma of lung (H)       omeprazole 40 MG capsule    priLOSEC    30 capsule    Take 1 capsule (40 mg) by mouth daily Take 30-60 minutes before a meal.    Gastroesophageal reflux disease without esophagitis       ondansetron 4 MG tablet    ZOFRAN    30 tablet    Take 1 tablet (4 mg) by mouth every 8 hours as needed for nausea    Non-small cell carcinoma of lung (H)       order for DME     1 Device    Equipment being ordered: donut cushion for sacral ulcer    Decubitus ulcer of sacral region, stage 1       order for DME     1 Device    Equipment being ordered: lightweight wheelchair.   Cannot lift standard wheelchair due to weakness and cancer.    Weakness       polyethylene glycol powder    MIRALAX    510 g    Take 17 g (1 capful) by mouth daily    Constipation, unspecified constipation type       prochlorperazine 10 MG tablet    COMPAZINE    30 tablet    Take 1 tablet (10 mg) by mouth every 6 hours as needed (Nausea/Vomiting)    Non-small cell carcinoma of lung (H)       senna-docusate 8.6-50 MG per tablet    SENOKOT-S;PERICOLACE    120 tablet    Take 2 tablets by mouth 2 times daily    Constipation, unspecified constipation type       * warfarin 5 MG tablet    COUMADIN    30 tablet    Take 5/14: 3.75 mg; 5/15: 2.5 mg; 5/16: 2.5 mg; 5/17: 2.5 mg as directed by the Anticoagulation Clinic    Atrial flutter, unspecified  type (H)       * warfarin 2.5 MG tablet    COUMADIN    75 tablet    Take 5/14: 3.75 mg; 5/15: 2.5 mg; 5/16: 2.5 mg; 5/17: 2.5 mg as directed by the Anticoagulation Clinic    Long-term (current) use of anticoagulants, Atrial flutter, unspecified type (H)       * Notice:  This list has 2 medication(s) that are the same as other medications prescribed for you. Read the directions carefully, and ask your doctor or other care provider to review them with you.

## 2018-05-17 NOTE — PROGRESS NOTES
Gamma Knife Operative Note    MRN: [id number]  Name: Zechariah Ashby  : 1960    Date of procedure: May 17, 2018    Surgeon:  Lobo Godinez MD PhD    Pre-operative Diagnosis:   Right temporal metastases  Post-operative Diagnosis:   Right temporal metastases    Procedure: Gamma Knife Radiosurgery    Indication: This is a 57 y.o. M with stage IV NSCLC and a  metastasis to the right temporal region. After case review in the brain tumor conference, the joint recommendation was to treat the patient with radiosurgery. Standard measurements were obtained for coordinate calculation to facilitate SRS delivery.    On the day of the procedure, informed consent was obtained. The previous MRI was reviewed. The [place] stereotactic frame was attached to the patient's cranium using standard technique. With the frame placed, the patient underwent an MRI of the head with contrast. No new additional lesions were identified on this MRI.    The treatment is planned on the Gamma plan system based on the MRI taken on the day of the procedure.  Treatment parameters were verified by myself, the treating radiation oncologist, and the physicist.     The lesion was treated with 20 Gy delivered in a single fraction.  The dose was delivered in a highly conformal fashion. The treatment was performed at the [facility] gamma knife center.     I was present and performed the key portions of this procedure.    Lobo Godinez MD PhD

## 2018-05-17 NOTE — PROGRESS NOTES
Hematology/ Oncology Follow-up Visit:  May 17, 2018    Reason for Visit:   Chief Complaint   Patient presents with     Oncology Clinic Visit     1 month recheck Non-small cell carcinoma of lung, review labs, PET & MRI        Oncologic History:  Non-small cell carcinoma of lung (H)  Zechariah Ashby is a 57 year old male who was recently diagnosed with atrial fib/flutter in early November 2017. During workup just x-ray showed elevation of the left hemidiaphragm with a focal tenting. The CT scan was done for further evaluation showing left upper lobe atelectasis and a moderate-sized left pleural effusion. Patient also had mediastinal adenopathy. Patient had left thoracocentesis. Cytology came back as transudate and cytology came back negative. Patient has a long history of smoking. PET scan showed a large FDG avid mediastinal mass extending to the left hilum with obliteration of the left upper lobe bronchus and atelectasis of the upper lobe.  There are also multiple left cervical left cervical and mediastinal adenopathy.  There is increased left pleural effusion.  There is hypermetabolic left adrenal lesion.  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 He was started on carboplatin and gemcitabine.    Interval History:  Patient is here today to discuss management plan.  He concluded treatment was carboplatin and gemcitabine.  Patient has been having increasing fatigue, exertional dyspnea, poor appetite, and weight loss.  Pain is well controlled on morphine as daily.  Denies to have constipation by 2 years of laxative MiraLAX powder and Senokot    Review Of Systems:  Constitutional: Negative for fever, chills, and night sweats.  Fatigue, weight loss  Skin: negative.  Eyes: negative.  Ears/Nose/Throat: negative.  Respiratory: No shortness of breath, dyspnea on exertion, cough, or hemoptysis.  Cardiovascular: negative.  Gastrointestinal:  negative.  Genitourinary: negative.  Musculoskeletal: negative.  Neurologic: negative.  Psychiatric: negative.  Hematologic/Lymphatic/Immunologic: negative.  Endocrine: negative.    All other ROS negative unless mentioned in interval history.    Past medical, social, surgical, and family histories reviewed.    Allergies:  Allergies as of 05/17/2018 - Osmel as Reviewed 05/17/2018   Allergen Reaction Noted     Nka [no known allergies]  01/03/2018       Current Medications:  Current Outpatient Prescriptions   Medication Sig Dispense Refill     albuterol (PROAIR HFA) 108 (90 BASE) MCG/ACT Inhaler Inhale 2 puffs into the lungs every 4 hours as needed for shortness of breath / dyspnea or wheezing 1 Inhaler 11     amiodarone (PACERONE/CODARONE) 200 MG tablet 1 tablet daily (Patient taking differently: Take 200 mg by mouth daily 1 tablet daily) 90 tablet 0     buPROPion (WELLBUTRIN SR) 200 MG 12 hr tablet Take 1 tablet (200 mg) by mouth 2 times daily 60 tablet 5     LORazepam (ATIVAN) 0.5 MG tablet Take 1 tablet (0.5 mg) by mouth every 4 hours as needed (Anxiety, Nausea/Vomiting or Sleep) 30 tablet 5     Megestrol Acetate (MEGACE ORAL PO) Take 200 mg by mouth daily       morphine (MSIR) 15 MG IR tablet Take 1 tablet (15 mg) by mouth every 6 hours as needed for moderate to severe pain 60 tablet 0     nicotine (NICODERM CQ) 21 MG/24HR 24 hr patch Place 1 patch onto the skin every 24 hours 30 patch 0     omeprazole (PRILOSEC) 40 MG capsule Take 1 capsule (40 mg) by mouth daily Take 30-60 minutes before a meal. 30 capsule 11     ondansetron (ZOFRAN) 4 MG tablet Take 1 tablet (4 mg) by mouth every 8 hours as needed for nausea 30 tablet 1     order for DME Equipment being ordered: donut cushion for sacral ulcer 1 Device 0     order for DME Equipment being ordered: lightweight wheelchair.    Cannot lift standard wheelchair due to weakness and cancer. 1 Device 0     polyethylene glycol (MIRALAX) powder Take 17 g (1 capful) by mouth  "daily 510 g 1     prochlorperazine (COMPAZINE) 10 MG tablet Take 1 tablet (10 mg) by mouth every 6 hours as needed (Nausea/Vomiting) 30 tablet 5     senna-docusate (SENOKOT-S;PERICOLACE) 8.6-50 MG per tablet Take 2 tablets by mouth 2 times daily 120 tablet 11     warfarin (COUMADIN) 2.5 MG tablet Take 5/14: 3.75 mg; 5/15: 2.5 mg; 5/16: 2.5 mg; 5/17: 2.5 mg as directed by the Anticoagulation Clinic 75 tablet 0     warfarin (COUMADIN) 5 MG tablet Take 5/14: 3.75 mg; 5/15: 2.5 mg; 5/16: 2.5 mg; 5/17: 2.5 mg as directed by the Anticoagulation Clinic 30 tablet 1        Physical Exam:  /73 (BP Location: Right arm, Patient Position: Sitting, Cuff Size: Adult Regular)  Pulse 83  Temp 98.6  F (37  C) (Tympanic)  Resp 20  Ht 1.803 m (5' 10.98\")  Wt 66.7 kg (147 lb)  SpO2 94%  BMI 20.51 kg/m2  Wt Readings from Last 12 Encounters:   05/17/18 66.7 kg (147 lb)   05/16/18 66.4 kg (146 lb 6.4 oz)   05/11/18 67.6 kg (149 lb)   05/10/18 68.8 kg (151 lb 9.6 oz)   05/09/18 68.4 kg (150 lb 11.2 oz)   05/07/18 67.1 kg (148 lb)   05/03/18 68.1 kg (150 lb 1.6 oz)   04/26/18 64.9 kg (143 lb)   04/20/18 68.9 kg (152 lb)   04/19/18 67.9 kg (149 lb 9.6 oz)   04/10/18 68.5 kg (151 lb)   03/29/18 68 kg (149 lb 14.4 oz)     ECOG performance status: 1  GENERAL APPEARANCE: Healthy, alert and in no acute distress.  Muscle wasting  HEENT: Sclerae anicteric. PERRLA. Oropharynx without ulcers, lesions, or thrush.  NECK: Supple. No asymmetry or masses.  LYMPHATICS: No palpable cervical, supraclavicular, axillary, or inguinal lymphadenopathy.  RESP: Lungs clear to auscultation bilaterally without rales, rhonchi or wheezes.  CARDIOVASCULAR: Regular rate and rhythm. Normal S1, S2; no S3 or S4. No murmur, gallop, or rub.  ABDOMEN: Soft, nontender. Bowel sounds normal. No palpable organomegaly or masses.  MUSCULOSKELETAL: Extremities without gross deformities noted. No edema of bilateral lower extremities.  SKIN: No suspicious lesions or " rashes.  NEURO: Alert and oriented x 3. Cranial nerves II-XII grossly intact.  PSYCHIATRIC: Mentation and affect appear normal.    Laboratory/Imaging Studies:  Anticoagulation Therapy Visit on 05/14/2018   Component Date Value Ref Range Status     INR 05/14/2018 2.1   Final   Infusion Therapy Visit on 05/10/2018   Component Date Value Ref Range Status     WBC 05/10/2018 3.4* 4.0 - 11.0 10e9/L Final     RBC Count 05/10/2018 2.97* 4.4 - 5.9 10e12/L Final     Hemoglobin 05/10/2018 9.9* 13.3 - 17.7 g/dL Final     Hematocrit 05/10/2018 30.7* 40.0 - 53.0 % Final     MCV 05/10/2018 103* 78 - 100 fl Final     MCH 05/10/2018 33.3* 26.5 - 33.0 pg Final     MCHC 05/10/2018 32.2  31.5 - 36.5 g/dL Final     RDW 05/10/2018 15.6* 10.0 - 15.0 % Final     Platelet Count 05/10/2018 198  150 - 450 10e9/L Final     Diff Method 05/10/2018 Automated Method   Final     % Neutrophils 05/10/2018 63.4  % Final     % Lymphocytes 05/10/2018 22.4  % Final     % Monocytes 05/10/2018 13.3  % Final     % Eosinophils 05/10/2018 0.6  % Final     % Basophils 05/10/2018 0.3  % Final     % Immature Granulocytes 05/10/2018 0.0  % Final     Absolute Neutrophil 05/10/2018 2.2  1.6 - 8.3 10e9/L Final     Absolute Lymphocytes 05/10/2018 0.8  0.8 - 5.3 10e9/L Final     Absolute Monocytes 05/10/2018 0.5  0.0 - 1.3 10e9/L Final     Absolute Eosinophils 05/10/2018 0.0  0.0 - 0.7 10e9/L Final     Absolute Basophils 05/10/2018 0.0  0.0 - 0.2 10e9/L Final     Abs Immature Granulocytes 05/10/2018 0.0  0 - 0.4 10e9/L Final     INR 05/10/2018 2.19* 0.86 - 1.14 Final   Anticoagulation Therapy Visit on 05/07/2018   Component Date Value Ref Range Status     INR Protime 05/07/2018 1.2* 0.86 - 1.14 Final        Recent Results (from the past 744 hour(s))   MR Brain w/o & w Contrast    Narrative    MRI BRAIN WITHOUT AND WITH CONTRAST  4/27/2018 5:44 PM    HISTORY: Non-small cell carcinoma of lung.    TECHNIQUE: Multiplanar, multisequence MRI of the brain without and  with 6  mL Gadavist.    COMPARISON: Brain MR 12/29/2017.    FINDINGS: There is a new 0.6 cm enhancing lesion within the  inferomedial right temporal lobe with surrounding T2 hyperintense  edema. This is concerning for metastases given history of lung cancer.  No other abnormal enhancing intracranial lesions are identified.    No restricted diffusion to suggest infarct. No evidence of acute  intracranial hemorrhage. Ventricular size within normal limits without  hydrocephalus. Mild burden of patchy deep and subcortical white matter  T2 hyperintensities that appear similar to prior and are likely due to  chronic microvascular ischemic disease.    The facial structures appear normal. The arteries at the base of the  brain and the dural venous sinuses appear patent.      Impression    IMPRESSION:     1. New enhancing 0.6 cm lesion in the inferior right temporal lobe  concerning for metastatic disease given the patient's history. Mild  surrounding edema around the lesion without significant mass effect or  herniation.  2. Mild nonspecific white matter changes likely due to chronic  microvascular ischemic disease. These appear similar to prior.    WAQAS ROSARIO MD   PET Oncology (Eyes to Thighs)    Narrative    PET ONCOLOGY (EYES TO THIGHS)    5/16/2018 1:49 PM     HISTORY: Non-small cell lung cancer.    COMPARISON EXAMS:  SUBURBAN IMAGING: None.  [place]: PET/CT performed 12/15/2017.  OTHER: None.    TECHNIQUE: The patient was injected intravenously with 14.3 mCi F-18  FDG, followed by delayed PET imaging. Noncontrast CT from the skull  base through the upper thighs was performed for attenuation correction  purposes. Blood glucose: 72 mg/dL. The CT, PET, and fusion images were  then evaluated on a [employer] workstation. Radiation dose for this scan  was reduced using automated exposure control, adjustment of the mA  and/or kV according to patient size, or iterative reconstruction  technique.    FINDINGS: Normal physiologic  uptake is identified within the salivary  glands, myocardium, kidneys, ureters and bladder. Scattered areas of  physiologic bowel uptake are also present.    NECK:  Lymph nodes: No pathologic activity.    Additional findings: None.      CHEST:  Lungs: Previously noted hypermetabolic mass in the left upper lobe  anteriorly and medially involving the anterior mediastinum has  decreased significantly in size, but increased slightly in degree of  hypermetabolic activity, today measuring 2.5 x 2 cm, SUV max 16.6  (previously 8 x 6 cm, SUV max 15). Two hypermetabolic nodular  opacities in the left lower lobe posteriorly were not seen on the  previous exam, and are highly suspicious for metastatic disease, with  the largest (series 3 image 204) measuring 2 x 1.4 cm, SUV max 26.3. A  few tiny indeterminate pulmonary nodules in both lungs were also not  seen on the previous exam, and are too small for accurate PET  characterization. Emphysematous changes are again noted in both lungs.    Lymph nodes: Scattered small hypermetabolic mediastinal lymph nodes  have decreased in size compared to 12/15/2017. For example, a small  hypermetabolic subcarinal lymph node measures 2.5 x 1.1 cm, SUV max  9.6 (previously 2.8 x 2.1 cm, SUV max 18.8). Mild hypermetabolic  bilateral hilar adenopathy are not well defined on the noncontrast CT  images, but is new since the previous exam. For example, a  hypermetabolic right hilar lymph node measures 2 cm, SUV max 7.1.    Additional findings: Left Port-A-Cath with tip in the right atrium.      ABDOMEN/PELVIS:  Hepatobiliary: No pathologic activity.     Spleen: No pathologic activity.     Pancreas: No pathologic activity.     Kidneys: No pathologic activity.     Adrenals: No pathologic activity.     Reproductive: No pathologic activity.     Gastrointestinal: No pathologic activity. Moderate amount of stool  throughout the colon.    Lymph nodes: No pathologic activity.     Additional findings:  Vascular calcifications.      SKELETON:   No pathologic activity. Bilateral L5 spondylolysis has a chronic  appearance. Degenerative changes are noted throughout the spine and  within the right hip.       Impression    IMPRESSION:   1. Previously noted left upper anterior and medial lobe mass involving  the anterior mediastinum has decreased in size, but increased slightly  in degree of hypermetabolic activity.  2. Multiple hypermetabolic mediastinal lymph nodes have decreased in  size and hypermetabolic activity.  3. Two new hypermetabolic left lower lobe pulmonary nodules are  suspicious for progression of metastatic disease.  4. Scattered smaller indeterminate pulmonary nodules in both lungs are  new since the previous exam, but are too small for accurate PET  characterization.  5. Mild bilateral hilar adenopathy with associated hypermetabolic  activity is new since the previous exam, and is also suspicious for  metastatic disease.  6. Emphysema.    GIA JAIN MD   MRI Brain w contrast    Narrative    Brain MRI with contrast primarily for the purposes of stereotactic  evaluation 5/17/2018    History: Metastasis to brain (H)  ICD-10: Metastasis to brain (H)    Comparison: Brain MRI 4/27/2018    Technique: MR imaging performed with 3-dimensonally acquired axial  T1-weighted sequences performed with intravenous contrast.    Contrast: 7.5cc of Gadavist injected.     Findings: Slight increase in size of the enhancing lesion in the  anteromedial right temporal lobe measuring 8 x 9 x 9 mm (previously 5  x 6 x 7 mm). No new enhancing lesion. Ventricles are proportionate to  the cerebral sulci. Mild generalized parenchymal volume loss, normal  for age. No mass effect or midline shift.      Impression    Impression: Slight increase in size of the enhancing right temporal  lobe metastasis measuring up to 9 mm. Limited imaging primarily for  the purposes of stereotactic localization.    CATHRYN ROSALES MD        Assessment and plan:    (C34.90) Non-small cell carcinoma of lung (H)  (primary encounter diagnosis)  I reviewed with the patient and his family today the results from the PET scan.  There is left upper anterior and medial lobe mass involving the anterior mediastinum arising decreased in size but increase slightly in intensity.  His multiple hypermetabolic mediastinal lymph nodes that has decreased in size and hypermetabolic activity.  There is 2 new hypermetabolic left lower lobe pulmonary nodules that are suspicious for disease progression.  There is a scattered smaller indeterminate pulmonary nodules in both lungs.  There is bilateral hilar adenopathy associated with hypermetabolic activity that is new from previous imaging studies.  These results showed next response to treatment.  Because of the patient current performance status and it is inability to handle chemotherapy at this point I would recommend patient to proceed with immunotherapy with Keytruda.  I gave the patient an overview about Keytruda.  We talked about potential benefits and side effects in details.  Patient will be starting his first cycle next week or so.    (J44.1) COPD exacerbation (H)  Having shortness of breath on exertion.  He continues to smoke about half a pack of cigarettes a day.  I strongly emphasized the importance of quitting smoking.    (C79.31) Brain metastasis (H)  Patient had stereotactic radiation therapy done today.  He will continue to follow with radiation oncology.    (I48.92) Atrial flutter, unspecified type (H)  (I42.9) Cardiomyopathy, unspecified type (H)  Patient under care of cardiology.    (J90) Pleural effusion  Proved on most recent imaging studies.    (R11.0) Nausea  Patient currently using Compazine, Ativan, and Zofran.  The patient will be seen by palliative care physician for further evaluation and management of his symptoms.    The patient is ready to learn, no apparent learning barriers were  identified.  Diagnosis and treatment plans were explained to the patient. The patient expressed understanding of the content. The patient asked appropriate questions. The patient questions were answered to his satisfaction.    Time spent 40 minutes more than 50% of the time in counseling coronation of care including discussion of management of metastatic squamous cell lung cancer, side effects of immune therapy, follow-up and prognosis    Chart documentation with Dragon Voice recognition Software. Although reviewed after completion, some words and grammatical errors may remain.

## 2018-05-17 NOTE — LETTER
2018       RE: Zechariah Ashby  [id number] [address] [facility] [id number]     Dear Colleague,    Thank you for referring your patient, Zechariah Ashby, to the [facility], RADIATION ONCOLOGY. Please see a copy of my visit note below.      Name: Zechariah Ashby  : 1960 Medical Record #: [id number]  Diagnosis:    Date of Treatment: May 17, 2018  Referring Physicians: Kailash Mason, Tumor Registry    GAMMA KNIFE PROCEDURE NOTE and TREATMENT SUMMARY    1 A right deep temporal   1 shot of 8 mm  20 Gy to 60 % isodose line    DESCRIPTION OF PROCEDURE:  On 2018the patient was brought to the Gamma Knife suite at [facility].  After sedation and topical anesthetic, the head frame was put on by   Dr Godinez.     The patient was then taken to the Department of Radiology where a stereotactic brain MRI was performed.  The patient was admitted to the   care area  while treatment planning was completed.      These Images were then sent to the [employer] Gamma Knife computer system where a three dimensional stereotactic plan was generated.  Measurements were again taken to confirm accuracy of head ring placement.  The patient was then treated on the [employer] Gamma Knife.  Treatment involved  the  single  target mentioned above      The [employer] Gamma Plan software was used to create a highly conformal dose distribution using the number and size collimators detailed above.     TREATMENT:    The patient was brought to the Gamma Knife suite. Patient was identified by 2 methods.   A timeout was performed to confirm the correct patient and correct procedure.   The site was identified by MRI imaging and treatment planning software, which defined the treatment area.     The frame measurements were re-taken and compared to the original measurements.      The treatment was delivered using the Model C [employer] Gamma Knife without complication.      The head frame was removed and the  patient was discharged home in stable condition.  The patient tolerated the treatment well and had no complications.      SPECIAL TREATMENT PROCEDURE;  Time consuming treatment planning was performed from the MRI images including 3d reconstruction.  The placement of the [employer] head frame, evaluation of the  stereotactic brain MRI images, electronic image  transfer to [email or web address] Gamma Knife treatment planning computer were accomplished.   The [email or web address] Gamma Knife treatment planning software was used to design the optimal treatment plan.   procedures were  done prior to treatment.  Because of the extra time and effort involved in Gamma Knife treatment planning, this represents a  special treatment procedure.     FOLLOW-UP PLANS:  The Gamma Knife Nurse Coordinator will call the patient tomorrow for short-term follow up.      Written discharge instructions were given to the patient.   The patient will have a follow up brain MRI in 3 months    The patient will also follow-up with Dr. Woodson at [facility]  [phone] pager      CC  Patient Care Team:  Kailash Mason MD as PCP - General (Family Practice)  Joycelyn May MD as MD (Hematology & Oncology)  Camille Lara RN as  (Hematology & Oncology)  Benoit Woodson MD as MD (Radiation Oncology)  [facility] ([facility]  KAILASH MASON

## 2018-05-17 NOTE — PROGRESS NOTES
Pt on 2A s/p HALO placement in MRI.  VSS.  Pt alert and oriented x4.  Pt denies any pain. Pin sites D/I.  Pt's family at the bedside.

## 2018-05-17 NOTE — MR AVS SNAPSHOT
After Visit Summary   5/17/2018    Zechariah Ashby    MRN: [id number]           Patient Information     Date Of Birth          1960        Visit Information        Provider Department      5/17/2018 7:00 AM Lobo Godinez MD [facility], [place], Radiation Oncology        Today's Diagnoses     Malignant neoplasm of temporal lobe (H)    -  1       Follow-ups after your visit        Your next 10 appointments already scheduled     May 23, 2018  1:00 PM CDT   Level 2 with ROOM 3 [facility] ([facility])    [facility]  [address]  [facility] [address]   [phone]            May 23, 2018  2:00 PM CDT   Return Visit with Chace Mitchell MD   [facility] ([facility])    [facility]  [address] [place] [address]  [facility] [place] [address]   [phone]            May 30, 2018 10:45 AM CDT   Return Visit with Joycelyn May MD   [facility] ([facility])    [facility]  [address]  [facility] [place] [address]   [phone]            Jun 13, 2018  9:30 AM CDT   Level 2 with ROOM 8 [facility] ([facility])    [facility]  [address]  [facility] [place] [address]   [phone]            Jun 26, 2018  1:00 PM CDT   Return Visit with Daksha Montemayor PA-C   [facility] ([facility])    [address] [facility] [address]   [phone]            Aug 16, 2018 10:15 AM CDT   MR BRAIN W/O & W CONTRAST with [address] MRI ([facility])    [address] [facility] [address]   [phone]           Take your medicines as usual, unless your doctor tells you not to. Bring a list of your current medicines to your exam (including vitamins, minerals and over-the-counter drugs).  You may or may not  receive intravenous (IV) contrast for this exam pending the discretion of the Radiologist.  You do not need to do anything special to prepare.  The MRI machine uses a strong magnet. Please wear clothes without metal (snaps, zippers). A sweatsuit works well, or we may give you a hospital gown.  Please remove any body piercings and hair extensions before you arrive. You will also remove watches, jewelry, hairpins, wallets, dentures, partial dental plates and hearing aids. You may wear contact lenses, and you may be able to wear your rings. We have a safe place to keep your personal items, but it is safer to leave them at home.  **IMPORTANT** THE INSTRUCTIONS BELOW ARE ONLY FOR THOSE PATIENTS WHO HAVE BEEN PRESCRIBED SEDATION OR GENERAL ANESTHESIA DURING THEIR MRI PROCEDURE:  IF YOUR DOCTOR PRESCRIBED ORAL SEDATION (take medicine to help you relax during your exam):   You must get the medicine from your doctor (oral medication) before you arrive. Bring the medicine to the exam. Do not take it at home. You ll be told when to take it upon arriving for your exam.   Arrive one hour early. Bring someone who can take you home after the test. Your medicine will make you sleepy. After the exam, you may not drive, take a bus or take a taxi by yourself.  IF YOUR DOCTOR PRESCRIBED IV SEDATION:   Arrive one hour early. Bring someone who can take you home after the test. Your medicine will make you sleepy. After the exam, you may not drive, take a bus or take a taxi by yourself.   No eating 6 hours before your exam. You may have clear liquids up until 4 hours before your exam. (Clear liquids include water, clear tea, black coffee and fruit juice without pulp.)  IF YOUR DOCTOR PRESCRIBED ANESTHESIA (be asleep for your exam):   Arrive 1 1/2 hours early. Bring someone who can take you home after the test. You may not drive, take a bus or take a taxi by yourself.   No eating 8 hours before your exam. You may have clear liquids up until  4 hours before your exam. (Clear liquids include water, clear tea, black coffee and fruit juice without pulp.)   You will spend four to five hours in the recovery room.  Please call the Imaging Department at your exam site with any questions.            Aug 16, 2018 11:15 AM CDT   Return Visit with Benoit Woodson MD   [facility] ([facility])    [address] [facility] [address], [address]  [facility] [address]   [phone]              Future tests that were ordered for you today     Open Standing Orders        Priority Remaining Interval Expires Ordered    If Patient Diabetic: Glucose monitor nursing POCT Routine [id number] PRN 5/18/2018 5/17/2018    Oxygen: Nasal cannula Routine [id number] PRN  5/17/2018          Open Future Orders        Priority Expected Expires Ordered    MR Brain w/o & w Contrast Routine 8/18/2018 5/18/2019 5/17/2018            Who to contact     Please call your clinic at [phone] to:    Ask questions about your health    Make or cancel appointments    Discuss your medicines    Learn about your test results    Speak to your doctor            Additional Information About Your Visit        [employer] Information     [employer] gives you secure access to your electronic health record. If you see a primary care provider, you can also send messages to your care team and make appointments. If you have questions, please call your primary care clinic.  If you do not have a primary care provider, please call [phone] and they will assist you.      [employer] is an electronic gateway that provides easy, online access to your medical records. With [employer], you can request a clinic appointment, read your test results, renew a prescription or communicate with your care team.     To access your existing account, please contact your [facility] or call [phone] for assistance.        Care EveryWhere ID     This is your Care EveryWhere ID. This could be used  by other organizations to access your [place] medical records  [id number]         Blood Pressure from Last 3 Encounters:   05/17/18 115/73   05/17/18 128/78   05/17/18 114/77    Weight from Last 3 Encounters:   05/17/18 66.7 kg (147 lb)   05/16/18 66.4 kg (146 lb 6.4 oz)   05/11/18 67.6 kg (149 lb)              Today, you had the following     No orders found for display         Today's Medication Changes          These changes are accurate as of 5/17/18  1:15 PM.  If you have any questions, ask your nurse or doctor.               These medicines have changed or have updated prescriptions.        Dose/Directions    amiodarone 200 MG tablet   Commonly known as:  PACERONE/CODARONE   This may have changed:    - how much to take  - how to take this  - when to take this  - additional instructions   Used for:  Paroxysmal atrial fibrillation (H)        1 tablet daily   Quantity:  90 tablet   Refills:  0                Primary Care Provider Office Phone # Fax #    Kailash Mason [phone] [address] [place] [address]        Equal Access to Services     [facility]IA : Hadii arnol Zhang, wabusterda helder, qaybta daylin so, harley boykin. So [facility] [phone].    ATENCIÓN: Si habla español, tiene a morocho disposición servicios gratuitos de asistencia lingüística. [facility] [phone].    We comply with applicable federal civil rights laws and [place] laws. We do not discriminate on the basis of race, color, national origin, age, disability, sex, sexual orientation, or gender identity.            Thank you!     Thank you for choosing [facility], RADIATION ONCOLOGY  for your care. Our goal is always to provide you with excellent care. Hearing back from our patients is one way we can continue to improve our services. Please take a few minutes to complete the written survey that you may receive in the mail after your visit with us. Thank  you!             Your Updated Medication List - Protect others around you: Learn how to safely use, store and throw away your medicines at www.disposemymeds.org.          This list is accurate as of 5/17/18  1:15 PM.  Always use your most recent med list.                   Brand Name Dispense Instructions for use Diagnosis    albuterol 108 (90 Base) MCG/ACT Inhaler    PROAIR HFA    1 Inhaler    Inhale 2 puffs into the lungs every 4 hours as needed for shortness of breath / dyspnea or wheezing    Chronic obstructive pulmonary disease, unspecified COPD type (H)       amiodarone 200 MG tablet    PACERONE/CODARONE    90 tablet    1 tablet daily    Paroxysmal atrial fibrillation (H)       buPROPion 200 MG 12 hr tablet    WELLBUTRIN SR    60 tablet    Take 1 tablet (200 mg) by mouth 2 times daily    Moderate single current episode of major depressive disorder (H)       LORazepam 0.5 MG tablet    ATIVAN    30 tablet    Take 1 tablet (0.5 mg) by mouth every 4 hours as needed (Anxiety, Nausea/Vomiting or Sleep)    Non-small cell carcinoma of lung (H)       MEGACE ORAL PO      Take 200 mg by mouth daily        morphine 15 MG IR tablet    MSIR    60 tablet    Take 1 tablet (15 mg) by mouth every 6 hours as needed for moderate to severe pain    Non-small cell carcinoma of lung (H)       nicotine 21 MG/24HR 24 hr patch    NICODERM CQ    30 patch    Place 1 patch onto the skin every 24 hours    Non-small cell carcinoma of lung (H)       omeprazole 40 MG capsule    priLOSEC    30 capsule    Take 1 capsule (40 mg) by mouth daily Take 30-60 minutes before a meal.    Gastroesophageal reflux disease without esophagitis       ondansetron 4 MG tablet    ZOFRAN    30 tablet    Take 1 tablet (4 mg) by mouth every 8 hours as needed for nausea    Non-small cell carcinoma of lung (H)       order for DME     1 Device    Equipment being ordered: donut cushion for sacral ulcer    Decubitus ulcer of sacral region, stage 1       order for DME      1 Device    Equipment being ordered: lightweight wheelchair.   Cannot lift standard wheelchair due to weakness and cancer.    Weakness       polyethylene glycol powder    MIRALAX    510 g    Take 17 g (1 capful) by mouth daily    Constipation, unspecified constipation type       prochlorperazine 10 MG tablet    COMPAZINE    30 tablet    Take 1 tablet (10 mg) by mouth every 6 hours as needed (Nausea/Vomiting)    Non-small cell carcinoma of lung (H)       senna-docusate 8.6-50 MG per tablet    SENOKOT-S;PERICOLACE    120 tablet    Take 2 tablets by mouth 2 times daily    Constipation, unspecified constipation type       * warfarin 5 MG tablet    COUMADIN    30 tablet    Take 5/14: 3.75 mg; 5/15: 2.5 mg; 5/16: 2.5 mg; 5/17: 2.5 mg as directed by the Anticoagulation Clinic    Atrial flutter, unspecified type (H)       * warfarin 2.5 MG tablet    COUMADIN    75 tablet    Take 5/14: 3.75 mg; 5/15: 2.5 mg; 5/16: 2.5 mg; 5/17: 2.5 mg as directed by the Anticoagulation Clinic    Long-term (current) use of anticoagulants, Atrial flutter, unspecified type (H)       * Notice:  This list has 2 medication(s) that are the same as other medications prescribed for you. Read the directions carefully, and ask your doctor or other care provider to review them with you.

## 2018-05-17 NOTE — NURSING NOTE
"Oncology Rooming Note    May 17, 2018 11:47 AM   Zechariah Ashby is a 58 year old male who presents for:    Chief Complaint   Patient presents with     Oncology Clinic Visit     1 month recheck Non-small cell carcinoma of lung, review labs, PET & MRI      Initial Vitals: /73 (BP Location: Right arm, Patient Position: Sitting, Cuff Size: Adult Regular)  Pulse 83  Temp 98.6  F (37  C) (Tympanic)  Resp 20  Ht 1.803 m (5' 10.98\")  Wt 66.7 kg (147 lb)  SpO2 94%  BMI 20.51 kg/m2 Estimated body mass index is 20.51 kg/(m^2) as calculated from the following:    Height as of this encounter: 1.803 m (5' 10.98\").    Weight as of this encounter: 66.7 kg (147 lb). Body surface area is 1.83 meters squared.  No Pain (0) Comment: Data Unavailable   No LMP for male patient.  Allergies reviewed: Yes  Medications reviewed: Yes    Medications: Medication refills not needed today.  Pharmacy name entered into [email or web address]:      [id number] [address] [facility]    Clinical concerns:1 month recheck Non-small cell carcinoma of lung, review labs, PET & MRI     7 minutes for nursing intake (face to face time)     Carmen Barreto CMA              "

## 2018-05-17 NOTE — LETTER
2018       RE: Zechariah Ashby  [address] [address]     Dear Colleague,    Thank you for referring your patient, Zechariah Ashby, to the [facility], [place], RADIATION ONCOLOGY. Please see a copy of my visit note below.    Gamma Knife Operative Note    MRN: [id number]  Name: Zechariah Ashby  : 1960    Date of procedure: May 17, 2018    Surgeon:  Lobo Godinez MD PhD    Pre-operative Diagnosis:   Right temporal metastases  Post-operative Diagnosis:   Right temporal metastases    Procedure: Gamma Knife Radiosurgery    Indication: This is a 57 y.o. M with stage IV NSCLC and a  metastasis to the right temporal region. After case review in the brain tumor conference, the joint recommendation was to treat the patient with radiosurgery. Standard measurements were obtained for coordinate calculation to facilitate SRS delivery.    On the day of the procedure, informed consent was obtained. The previous MRI was reviewed. The [place] stereotactic frame was attached to the patient's cranium using standard technique. With the frame placed, the patient underwent an MRI of the head with contrast. No new additional lesions were identified on this MRI.    The treatment is planned on the Gamma plan system based on the MRI taken on the day of the procedure.  Treatment parameters were verified by myself, the treating radiation oncologist, and the physicist.     The lesion was treated with 20 Gy delivered in a single fraction.  The dose was delivered in a highly conformal fashion. The treatment was performed at the [facility] gamma knife center.     I was present and performed the key portions of this procedure.    Lobo Godinez MD PhD

## 2018-05-17 NOTE — MR AVS SNAPSHOT
After Visit Summary   5/17/2018    Zechariah Ashby    MRN: [id number]           Patient Information     Date Of Birth          1960        Visit Information        Provider Department      5/17/2018 11:45 AM Joycelyn May MD [facility] ONCOLOGY      Today's Diagnoses     Non-small cell carcinoma of lung (H)    -  1    COPD exacerbation (H)        Brain metastasis (H)        Atrial flutter, unspecified type (H)        Cardiomyopathy, unspecified type (H)        Chemotherapy induced nausea and vomiting        Pleural effusion          Care Instructions    Dr. May recommends you start Keytruda soon.  You will receive information on this today. You will be scheduled with Dr. Mitchell next week to discuss nausea, vomiting, and constipation. We would like to see you back in clinic with Dr. May in 1 week.      Copy of appointments, and after visit summary (AVS) given to patient.      If you have any questions during business hours (M-F 8 AM- 4PM), please call Camille Lara RN, BSN, OCN Oncology Hematology /Breast Cancer Navigator at [facility] [phone].       For questions after business hours, or on holidays/weekends, please call our after hours Nurse Triage line [phone]. Thank you.            Follow-ups after your visit        Your next 10 appointments already scheduled     May 23, 2018  1:00 PM CDT   Level 2 with ROOM 3 [facility] ([facility])    [facility]  [address]  [facility] [address]   [phone]            May 23, 2018  2:00 PM CDT   Return Visit with Chace Mitchell MD   [facility] ([facility])    [facility]  [address]  [facility] [address]   [phone]            May 30, 2018 10:45 AM CDT   Return Visit with Joycelyn May MD   [facility] ([facility])    [facility]  [address]  [facility] [address]   [phone]            Jun 13, 2018  9:30 AM CDT   Level 2 with ROOM 8 [facility] ([facility])    [facility]  [address]  [facility] [address]   [phone]            Jun 26, 2018  1:00 PM CDT   Return Visit with Daksha Montemayor PA-C   [facility] ([facility])    [address] [facility] [address]   [phone]            Aug 16, 2018 10:15 AM CDT   MR BRAIN W/O & W CONTRAST with [address] MRI ([facility])    [address] [facility] [address]   [phone]           Take your medicines as usual, unless your doctor tells you not to. Bring a list of your current medicines to your exam (including vitamins, minerals and over-the-counter drugs).  You may or may not receive intravenous (IV) contrast for this exam pending the discretion of the Radiologist.  You do not need to do anything special to prepare.  The MRI machine uses a strong magnet. Please wear clothes without metal (snaps, zippers). A sweatsuit works well, or we may give you a hospital gown.  Please remove any body piercings and hair extensions before you arrive. You will also remove watches, jewelry, hairpins, wallets, dentures, partial dental plates and hearing aids. You may wear contact lenses, and you may be able to wear your rings. We have a safe place to keep your personal items, but it is safer to leave them at home.  **IMPORTANT** THE INSTRUCTIONS BELOW ARE ONLY FOR THOSE PATIENTS WHO HAVE BEEN PRESCRIBED SEDATION OR GENERAL ANESTHESIA DURING THEIR MRI PROCEDURE:  IF YOUR DOCTOR PRESCRIBED ORAL SEDATION (take medicine to help you relax during your exam):   You must get the medicine from your doctor (oral medication) before you arrive. Bring the medicine to the exam. Do not take it at home.  You ll be told when to take it upon arriving for your exam.   Arrive one hour early. Bring someone who can take you home after the test. Your medicine will make you sleepy. After the exam, you may not drive, take a bus or take a taxi by yourself.  IF YOUR DOCTOR PRESCRIBED IV SEDATION:   Arrive one hour early. Bring someone who can take you home after the test. Your medicine will make you sleepy. After the exam, you may not drive, take a bus or take a taxi by yourself.   No eating 6 hours before your exam. You may have clear liquids up until 4 hours before your exam. (Clear liquids include water, clear tea, black coffee and fruit juice without pulp.)  IF YOUR DOCTOR PRESCRIBED ANESTHESIA (be asleep for your exam):   Arrive 1 1/2 hours early. Bring someone who can take you home after the test. You may not drive, take a bus or take a taxi by yourself.   No eating 8 hours before your exam. You may have clear liquids up until 4 hours before your exam. (Clear liquids include water, clear tea, black coffee and fruit juice without pulp.)   You will spend four to five hours in the recovery room.  Please call the Imaging Department at your exam site with any questions.            Aug 16, 2018 11:15 AM CDT   Return Visit with Benoit Woodson MD   [facility] ([facility])    [address] [place] [address], [facility] [address]  [facility] [address] [phone]              Future tests that were ordered for you today     Open Standing Orders        Priority Remaining Interval Expires Ordered    If Patient Diabetic: Glucose monitor nursing POCT Routine [id number] PRN 5/18/2018 5/17/2018    Oxygen: Nasal cannula Routine [id number] PRN  5/17/2018          Open Future Orders        Priority Expected Expires Ordered    MR Brain w/o & w Contrast Routine 8/18/2018 5/18/2019 5/17/2018            Who to contact     If you have questions or need follow up information about today's clinic visit or your schedule please contact  "[facility] directly at [phone].  Normal or non-critical lab and imaging results will be communicated to you by MyChart, letter or phone within 4 business days after the clinic has received the results. If you do not hear from us within 7 days, please contact the clinic through [employer] or phone. If you have a critical or abnormal lab result, we will notify you by phone as soon as possible.  Submit refill requests through [employer] or call your pharmacy and they will forward the refill request to us. Please allow 3 business days for your refill to be completed.          Additional Information About Your Visit        [employer] Information     [employer] gives you secure access to your electronic health record. If you see a primary care provider, you can also send messages to your care team and make appointments. If you have questions, please call your primary care clinic.  If you do not have a primary care provider, please call [phone] and they will assist you.        Care EveryWhere ID     This is your Care EveryWhere ID. This could be used by other organizations to access your [place] medical records  [id number]        Your Vitals Were     Pulse Temperature Respirations Height Pulse Oximetry BMI (Body Mass Index)    83 98.6  F (37  C) (Tympanic) 20 1.803 m (5' 10.98\") 94% 20.51 kg/m2       Blood Pressure from Last 3 Encounters:   05/17/18 115/73   05/17/18 128/78   05/17/18 114/77    Weight from Last 3 Encounters:   05/17/18 66.7 kg (147 lb)   05/16/18 66.4 kg (146 lb 6.4 oz)   05/11/18 67.6 kg (149 lb)              Today, you had the following     No orders found for display         Today's Medication Changes          These changes are accurate as of 5/17/18 12:48 PM.  If you have any questions, ask your nurse or doctor.               These medicines have changed or have updated prescriptions.        Dose/Directions    amiodarone 200 MG tablet   Commonly known as:  PACERONE/CODARONE   This may have " changed:    - how much to take  - how to take this  - when to take this  - additional instructions   Used for:  Paroxysmal atrial fibrillation (H)        1 tablet daily   Quantity:  90 tablet   Refills:  0                Primary Care Provider Office Phone # Fax #    Kailash Mason [phone] [id number] [facility] [address]        Equal Access to Services     [facility]: Hadii aad ku hadasho Soomaali, waaxda luqadaha, qaybta kaalmada adeegyada, waxay idiin hayaan adeeg margot lalilianacristiano . So [facility] [phone].    ATENCIÓN: Si habla español, tiene a morocho disposición servicios gratuitos de asistencia lingüística. Lauriame al [phone].    We comply with applicable federal civil rights laws and [place] laws. We do not discriminate on the basis of race, color, national origin, age, disability, sex, sexual orientation, or gender identity.            Thank you!     Thank you for choosing [facility] [place]  for your care. Our goal is always to provide you with excellent care. Hearing back from our patients is one way we can continue to improve our services. Please take a few minutes to complete the written survey that you may receive in the mail after your visit with us. Thank you!             Your Updated Medication List - Protect others around you: Learn how to safely use, store and throw away your medicines at www.disposemymeds.org.          This list is accurate as of 5/17/18 12:48 PM.  Always use your most recent med list.                   Brand Name Dispense Instructions for use Diagnosis    albuterol 108 (90 Base) MCG/ACT Inhaler    PROAIR HFA    1 Inhaler    Inhale 2 puffs into the lungs every 4 hours as needed for shortness of breath / dyspnea or wheezing    Chronic obstructive pulmonary disease, unspecified COPD type (H)       amiodarone 200 MG tablet    PACERONE/CODARONE    90 tablet    1 tablet daily    Paroxysmal atrial fibrillation (H)       buPROPion 200 MG 12 hr tablet     WELLBUTRIN SR    60 tablet    Take 1 tablet (200 mg) by mouth 2 times daily    Moderate single current episode of major depressive disorder (H)       LORazepam 0.5 MG tablet    ATIVAN    30 tablet    Take 1 tablet (0.5 mg) by mouth every 4 hours as needed (Anxiety, Nausea/Vomiting or Sleep)    Non-small cell carcinoma of lung (H)       MEGACE ORAL PO      Take 200 mg by mouth daily        morphine 15 MG IR tablet    MSIR    60 tablet    Take 1 tablet (15 mg) by mouth every 6 hours as needed for moderate to severe pain    Non-small cell carcinoma of lung (H)       nicotine 21 MG/24HR 24 hr patch    NICODERM CQ    30 patch    Place 1 patch onto the skin every 24 hours    Non-small cell carcinoma of lung (H)       omeprazole 40 MG capsule    priLOSEC    30 capsule    Take 1 capsule (40 mg) by mouth daily Take 30-60 minutes before a meal.    Gastroesophageal reflux disease without esophagitis       ondansetron 4 MG tablet    ZOFRAN    30 tablet    Take 1 tablet (4 mg) by mouth every 8 hours as needed for nausea    Non-small cell carcinoma of lung (H)       order for DME     1 Device    Equipment being ordered: donut cushion for sacral ulcer    Decubitus ulcer of sacral region, stage 1       order for DME     1 Device    Equipment being ordered: lightweight wheelchair.   Cannot lift standard wheelchair due to weakness and cancer.    Weakness       polyethylene glycol powder    MIRALAX    510 g    Take 17 g (1 capful) by mouth daily    Constipation, unspecified constipation type       prochlorperazine 10 MG tablet    COMPAZINE    30 tablet    Take 1 tablet (10 mg) by mouth every 6 hours as needed (Nausea/Vomiting)    Non-small cell carcinoma of lung (H)       senna-docusate 8.6-50 MG per tablet    SENOKOT-S;PERICOLACE    120 tablet    Take 2 tablets by mouth 2 times daily    Constipation, unspecified constipation type       * warfarin 5 MG tablet    COUMADIN    30 tablet    Take 5/14: 3.75 mg; 5/15: 2.5 mg; 5/16: 2.5 mg;  5/17: 2.5 mg as directed by the Anticoagulation Clinic    Atrial flutter, unspecified type (H)       * warfarin 2.5 MG tablet    COUMADIN    75 tablet    Take 5/14: 3.75 mg; 5/15: 2.5 mg; 5/16: 2.5 mg; 5/17: 2.5 mg as directed by the Anticoagulation Clinic    Long-term (current) use of anticoagulants, Atrial flutter, unspecified type (H)       * Notice:  This list has 2 medication(s) that are the same as other medications prescribed for you. Read the directions carefully, and ask your doctor or other care provider to review them with you.

## 2018-05-17 NOTE — PROGRESS NOTES
Name: Zechariah Ashby  : 1960 Medical Record #: [id number]  Diagnosis:    Date of Treatment: May 17, 2018  Referring Physicians: Kailash Mason, Tumor Registry    GAMMA KNIFE PROCEDURE NOTE and TREATMENT SUMMARY    1 A right deep temporal   1 shot of 8 mm  20 Gy to 60 % isodose line    DESCRIPTION OF PROCEDURE:  On 2018the patient was brought to the Gamma Knife suite at [facility].  After sedation and topical anesthetic, the head frame was put on by   Dr Godinez.     The patient was then taken to the Department of Radiology where a stereotactic brain MRI was performed.  The patient was admitted to the  [address] [place] [address] area  while treatment planning was completed.      These Images were then sent to the [employer] Gamma Knife computer system where a three dimensional stereotactic plan was generated.  Measurements were again taken to confirm accuracy of head ring placement.  The patient was then treated on the [employer] Gamma Knife.  Treatment involved  the  single  target mentioned above      The [employer] Gamma Plan software was used to create a highly conformal dose distribution using the number and size collimators detailed above.     TREATMENT:    The patient was brought to the Gamma Knife suite. Patient was identified by 2 methods.   A timeout was performed to confirm the correct patient and correct procedure.   The site was identified by MRI imaging and treatment planning software, which defined the treatment area.     The frame measurements were re-taken and compared to the original measurements.      The treatment was delivered using the Model C [employer] Gamma Knife without complication.      The head frame was removed and the patient was discharged home in stable condition.  The patient tolerated the treatment well and had no complications.      SPECIAL TREATMENT PROCEDURE;  Time consuming treatment planning was performed from the MRI images including 3d reconstruction.   The placement of the [employer] head frame, evaluation of the  stereotactic brain MRI images, electronic image  transfer to [employer] Gamma Knife treatment planning computer were accomplished.   The [employer] Gamma Knife treatment planning software was used to design the optimal treatment plan.   procedures were  done prior to treatment.  Because of the extra time and effort involved in Gamma Knife treatment planning, this represents a  special treatment procedure.     FOLLOW-UP PLANS:  The Gamma Knife Nurse Coordinator will call the patient tomorrow for short-term follow up.      Written discharge instructions were given to the patient.   The patient will have a follow up brain MRI in 3 months    The patient will also follow-up with Dr. Woodson at [facility]  [phone] pager      CC  Patient Care Team:  Kailash Mason MD as PCP - General (Family Practice)  Joycelyn May MD as MD (Hematology & Oncology)  Camille Lara, RN as  (Hematology & Oncology)  Benoit Woodson MD as MD (Radiation Oncology)  [facility] ([facility] ([facility] ([place]  KAILASH MASON

## 2018-05-18 NOTE — PROGRESS NOTES
ANTICOAGULATION FOLLOW-UP CLINIC VISIT    Patient Name:  Zechariah Ashby  Date:  5/18/2018  Contact Type:  Telephone/ spoke with Brooke at [facility]    SUBJECTIVE:     Patient Findings     Positives Change in medications (wellbutrin and senna increased on 5-16-18. starting keytruda infusions on 5-23-18), Change in diet/appetite (eating better due to starting meals on wheels)    Comments Patient was having constipation but had a BM yesterday. Patient will also be starting immunotherapy with keytruda on Wed next week. This medication does not affect INR but side effects from treatment (bowel movements, diet, general malaise and activity level) can so [facility] will monitor this. Pt has had 18.75 mg/week and INR in range. Will continue this dosing for now. Recheck 2 days after Keytruda infusion.           OBJECTIVE    INR   Date Value Ref Range Status   05/18/2018 2.1  Final       ASSESSMENT / PLAN  INR assessment THER    Recheck INR In: 1 WEEK    INR Location [facility] INR      Anticoagulation Summary as of 5/18/2018     INR goal 2.0-3.0   Today's INR 2.1   Maintenance plan 3.75 mg (2.5 mg x 1.5) on Mon; 2.5 mg (2.5 mg x 1) all other days   Full instructions 3.75 mg on Mon; 2.5 mg all other days   Weekly total 18.75 mg   Plan last modified Yaritza Diaz RN (5/18/2018)   Next INR check 5/25/2018   Priority INR   Target end date     Indications   Atrial flutter  unspecified type (H) [I48.92]  Long-term (current) use of anticoagulants [Z79.01] [Z79.01]         Anticoagulation Episode Summary     INR check location     Preferred lab     Send INR reminders to [place] PHONE ANTICOAG POOL    Comments *Pt is on AMIODARONE. On palliative Keytruda as of 5-23-18.  Homecare (5/14)      Anticoagulation Care Providers     Provider Role Specialty Phone number    Kailash Mason MD [facility] [phone]            See the Encounter Report to view Anticoagulation Flowsheet and Dosing Calendar (Go to Encounters tab in chart  review, and find the Anticoagulation Therapy Visit)        Yaritza Diaz RN

## 2018-05-18 NOTE — TELEPHONE ENCOUNTER
Waiting to get a My Chart message back to let us know where he'd like the order for the wheelchair faxed to - Order on CSS desk

## 2018-05-21 PROBLEM — F32.1 MODERATE SINGLE CURRENT EPISODE OF MAJOR DEPRESSIVE DISORDER (H): Status: ACTIVE | Noted: 2018-01-01

## 2018-05-21 NOTE — TELEPHONE ENCOUNTER
A call was placed to Zechariah in follow up to his Gamma Knife treatment on 5/17/18.  I spoke to Estefani, Zechariah's ex-wife, who monitors his medical calls and she said Zechariah has had no complaints at all since the treatment, it appears that all went well

## 2018-05-23 NOTE — MR AVS SNAPSHOT
After Visit Summary   5/23/2018    Zechariah Ashby    MRN: [id number]           Patient Information     Date Of Birth          1960        Visit Information        Provider Department      5/23/2018 1:00 PM ROOM 3 [facility]        Today's Diagnoses     Non-small cell carcinoma of lung (H)    -  1       Follow-ups after your visit        Your next 10 appointments already scheduled     May 30, 2018 10:45 AM CDT   Return Visit with Joycelyn May MD   [facility] ([facility])    [facility]  [address]  [facility] [place], [place] [address]   [phone]            Jun 13, 2018  9:30 AM CDT   Level 2 with ROOM 8 [facility] ([facility])    [facility]  [address]  [facility] [place] [place] [address]   [phone]            Jun 26, 2018  1:00 PM CDT   Return Visit with Daksha Montemayor PA-C   [facility] ([facility])    [address] [facility] [address]   [phone]            Aug 16, 2018 10:15 AM CDT   MR BRAIN W/O & W CONTRAST with [address] MRI ([facility])    [address] [place] [address]   [phone]           Take your medicines as usual, unless your doctor tells you not to. Bring a list of your current medicines to your exam (including vitamins, minerals and over-the-counter drugs).  You may or may not receive intravenous (IV) contrast for this exam pending the discretion of the Radiologist.  You do not need to do anything special to prepare.  The MRI machine uses a strong magnet. Please wear clothes without metal (snaps, zippers). A sweatsuit works well, or we may give you a hospital gown.  Please remove any body piercings and hair extensions before you arrive. You will also remove watches, jewelry, hairpins, wallets, dentures, partial dental plates and hearing  aids. You may wear contact lenses, and you may be able to wear your rings. We have a safe place to keep your personal items, but it is safer to leave them at home.  **IMPORTANT** THE INSTRUCTIONS BELOW ARE ONLY FOR THOSE PATIENTS WHO HAVE BEEN PRESCRIBED SEDATION OR GENERAL ANESTHESIA DURING THEIR MRI PROCEDURE:  IF YOUR DOCTOR PRESCRIBED ORAL SEDATION (take medicine to help you relax during your exam):   You must get the medicine from your doctor (oral medication) before you arrive. Bring the medicine to the exam. Do not take it at home. You ll be told when to take it upon arriving for your exam.   Arrive one hour early. Bring someone who can take you home after the test. Your medicine will make you sleepy. After the exam, you may not drive, take a bus or take a taxi by yourself.  IF YOUR DOCTOR PRESCRIBED IV SEDATION:   Arrive one hour early. Bring someone who can take you home after the test. Your medicine will make you sleepy. After the exam, you may not drive, take a bus or take a taxi by yourself.   No eating 6 hours before your exam. You may have clear liquids up until 4 hours before your exam. (Clear liquids include water, clear tea, black coffee and fruit juice without pulp.)  IF YOUR DOCTOR PRESCRIBED ANESTHESIA (be asleep for your exam):   Arrive 1 1/2 hours early. Bring someone who can take you home after the test. You may not drive, take a bus or take a taxi by yourself.   No eating 8 hours before your exam. You may have clear liquids up until 4 hours before your exam. (Clear liquids include water, clear tea, black coffee and fruit juice without pulp.)   You will spend four to five hours in the recovery room.  Please call the Imaging Department at your exam site with any questions.            Aug 16, 2018 11:15 AM CDT   Return Visit with Benoit Woodson MD   [facility] ([facility])    [address] [place] [address], [address]  [facility] [address] [phone]              Who to contact      If you have questions or need follow up information about today's clinic visit or your schedule please contact [facility] directly at [phone].  Normal or non-critical lab and imaging results will be communicated to you by [employer], letter or phone within 4 business days after the clinic has received the results. If you do not hear from us within 7 days, please contact the clinic through [employer] or phone. If you have a critical or abnormal lab result, we will notify you by phone as soon as possible.  Submit refill requests through [employer] or call your pharmacy and they will forward the refill request to us. Please allow 3 business days for your refill to be completed.          Additional Information About Your Visit        [employer] Information     [employer] gives you secure access to your electronic health record. If you see a primary care provider, you can also send messages to your care team and make appointments. If you have questions, please call your primary care clinic.  If you do not have a primary care provider, please call [phone] and they will assist you.        Care EveryWhere ID     This is your Care EveryWhere ID. This could be used by other organizations to access your [facility] medical records  [id number]        Your Vitals Were     Pulse BMI (Body Mass Index)                68 20.09 kg/m2           Blood Pressure from Last 3 Encounters:   05/23/18 119/72   05/17/18 115/73   05/17/18 128/78    Weight from Last 3 Encounters:   05/23/18 65.3 kg (144 lb)   05/17/18 66.7 kg (147 lb)   05/16/18 66.4 kg (146 lb 6.4 oz)              We Performed the Following     Comprehensive metabolic panel     TSH with free T4 reflex          Today's Medication Changes          These changes are accurate as of 5/23/18  4:07 PM.  If you have any questions, ask your nurse or doctor.               These medicines have changed or have updated prescriptions.        Dose/Directions    * albuterol 108 (90  Base) MCG/ACT Inhaler   Commonly known as:  PROAIR HFA   This may have changed:  Another medication with the same name was added. Make sure you understand how and when to take each.   Used for:  Chronic obstructive pulmonary disease, unspecified COPD type (H)   Changed by:  Chace Mitchell MD        Dose:  2 puff   Inhale 2 puffs into the lungs every 4 hours as needed for shortness of breath / dyspnea or wheezing   Quantity:  1 Inhaler   Refills:  11       * albuterol (2.5 MG/3ML) 0.083% neb solution   This may have changed:  You were already taking a medication with the same name, and this prescription was added. Make sure you understand how and when to take each.   Used for:  COPD exacerbation (H)   Changed by:  Chace Mitchell MD        Dose:  1 vial   Take 1 vial (2.5 mg) by nebulization every 6 hours as needed for shortness of breath / dyspnea or wheezing   Quantity:  25 vial   Refills:  3       amiodarone 200 MG tablet   Commonly known as:  PACERONE/CODARONE   This may have changed:    - how much to take  - how to take this  - when to take this  - additional instructions   Used for:  Paroxysmal atrial fibrillation (H)        1 tablet daily   Quantity:  90 tablet   Refills:  0       * Notice:  This list has 2 medication(s) that are the same as other medications prescribed for you. Read the directions carefully, and ask your doctor or other care provider to review them with you.      Stop taking these medicines if you haven't already. Please contact your care team if you have questions.     MEGACE ORAL PO   Stopped by:  Chace Mitchell MD           prochlorperazine 10 MG tablet   Commonly known as:  COMPAZINE   Stopped by:  Chace Mitchell MD                Where to get your medicines      These medications were sent to [id number] [address] [facility]  [address] [facility] [address]    Hours:  Closed 10-16-08 business to [facility] Phone:  [phone]     albuterol (2.5  MG/3ML) 0.083% neb solution         Some of these will need a paper prescription and others can be bought over the counter.  Ask your nurse if you have questions.     Bring a paper prescription for each of these medications     morphine 15 MG IR tablet                Primary Care Provider Office Phone # Fax #    Kailash Mason [phone] [address] [place] [address]        Equal Access to Services     ROD PERRY : Hadii aad ku hadasho Soomaali, waaxda luqadaha, qaybta kaalmada adeegyada, waxay hirain tienn alex margot la'elishacristiano . So [facility] [phone].    ATENCIÓN: Si wesley stern, tiene a morocho disposición servicios gratuitos de asistencia lingüística. Llame al [phone].    We comply with applicable federal civil rights laws and [place] laws. We do not discriminate on the basis of race, color, national origin, age, disability, sex, sexual orientation, or gender identity.            Thank you!     Thank you for choosing [facility]  for your care. Our goal is always to provide you with excellent care. Hearing back from our patients is one way we can continue to improve our services. Please take a few minutes to complete the written survey that you may receive in the mail after your visit with us. Thank you!             Your Updated Medication List - Protect others around you: Learn how to safely use, store and throw away your medicines at www.disposemymeds.org.          This list is accurate as of 5/23/18  4:07 PM.  Always use your most recent med list.                   Brand Name Dispense Instructions for use Diagnosis    * albuterol 108 (90 Base) MCG/ACT Inhaler    PROAIR HFA    1 Inhaler    Inhale 2 puffs into the lungs every 4 hours as needed for shortness of breath / dyspnea or wheezing    Chronic obstructive pulmonary disease, unspecified COPD type (H)       * albuterol (2.5 MG/3ML) 0.083% neb solution     25 vial    Take 1 vial (2.5 mg) by nebulization every  6 hours as needed for shortness of breath / dyspnea or wheezing    COPD exacerbation (H)       amiodarone 200 MG tablet    PACERONE/CODARONE    90 tablet    1 tablet daily    Paroxysmal atrial fibrillation (H)       buPROPion 200 MG 12 hr tablet    WELLBUTRIN SR    60 tablet    Take 1 tablet (200 mg) by mouth 2 times daily    Moderate single current episode of major depressive disorder (H)       morphine 15 MG IR tablet    MSIR    60 tablet    Take 1 tablet (15 mg) by mouth every 6 hours as needed for moderate to severe pain    Non-small cell carcinoma of lung (H)       nicotine 21 MG/24HR 24 hr patch    NICODERM CQ    30 patch    Place 1 patch onto the skin every 24 hours    Non-small cell carcinoma of lung (H)       omeprazole 40 MG capsule    priLOSEC    30 capsule    Take 1 capsule (40 mg) by mouth daily Take 30-60 minutes before a meal.    Gastroesophageal reflux disease without esophagitis       ondansetron 4 MG tablet    ZOFRAN    30 tablet    Take 1 tablet (4 mg) by mouth every 8 hours as needed for nausea    Non-small cell carcinoma of lung (H)       order for DME     1 Device    Equipment being ordered: donut cushion for sacral ulcer    Decubitus ulcer of sacral region, stage 1       order for DME     1 Device    Equipment being ordered: lightweight wheelchair.   Cannot lift standard wheelchair due to weakness and cancer.    Weakness       order for DME     1 Device    Equipment being ordered: Walker - 4 legged walker with wheels and a seat    Mediastinal lymphadenopathy       order for DME     1 each    Equipment being ordered: Wheelchair    Non-small cell carcinoma of lung (H)       polyethylene glycol powder    MIRALAX    510 g    Take 17 g (1 capful) by mouth daily    Constipation, unspecified constipation type       senna-docusate 8.6-50 MG per tablet    SENOKOT-S;PERICOLACE    120 tablet    Take 2 tablets by mouth 2 times daily    Constipation, unspecified constipation type       * warfarin 5 MG  tablet    COUMADIN    30 tablet    3.75 mg Mondays; 2.5 mg all other days as directed by the Anticoagulation Clinic    Atrial flutter, unspecified type (H)       * warfarin 2.5 MG tablet    COUMADIN    75 tablet    3.75 mg Mondays; 2.5 mg all other days as directed by the Anticoagulation Clinic    Long-term (current) use of anticoagulants, Atrial flutter, unspecified type (H)       * Notice:  This list has 4 medication(s) that are the same as other medications prescribed for you. Read the directions carefully, and ask your doctor or other care provider to review them with you.

## 2018-05-23 NOTE — PROGRESS NOTES
Infusion Nursing Note:  Zechariah WOODALL Isma presents today for Keytruda infusion.    Patient seen by provider today: Yes, Dr. May.   present during visit today: Not Applicable.    Note: Reviewed teaching on possible side effects of Keytruda.  Pt verbalized understanding.    Intravenous Access:  Labs drawn without difficulty.  Implanted Port.    Treatment Conditions:  Lab Results   Component Value Date     05/23/2018                   Lab Results   Component Value Date    POTASSIUM 4.2 05/23/2018           Lab Results   Component Value Date    MAG 1.5 04/26/2018            Lab Results   Component Value Date    CR 1.10 05/23/2018                   Lab Results   Component Value Date    VIJAY 7.9 05/23/2018                Lab Results   Component Value Date    BILITOTAL 0.2 05/23/2018           Lab Results   Component Value Date    ALBUMIN 3.5 05/23/2018                    Lab Results   Component Value Date    ALT 16 05/23/2018           Lab Results   Component Value Date    AST 16 05/23/2018       Results reviewed, labs MET treatment parameters, ok to proceed with treatment.      Post Infusion Assessment:  Patient tolerated infusion without incident.  Access discontinued per protocol.    Discharge Plan:   Patient discharged in stable condition accompanied by: friend.  Departure Mode: Ambulatory.  Pt is scheduled to return for next dose on 6/13.    Mary Bhakta RN

## 2018-05-23 NOTE — LETTER
5/23/2018         RE: Zechariah Ashby  [address] [address]        Dear Colleague,    Thank you for referring your patient, Zechariah Ashby, to the [facility]. Please see a copy of my visit note below.    Palliative Care Outpatient Clinic Progress Note    Patient Name:  Zechariah Ashby  Primary Provider:  Kailash Mason    Chief Complaint: none    Interim History:  Zechariah Ashby 58 year old male returns to be seen by palliative care today.  He is just starting Keytruda infusions.  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  He feels that he is slowly gaining strength back but he still has some fatigue.  His pain is well-controlled with his current medications.  His appetite has been quite good and he is no longer needing medication for nausea.    Coping:  Well    Social History:  Pertinent changes to social history/social situation since last visit: None  Key support resources: Family  Advance Directive Status: No change  Social History   Substance Use Topics     Smoking status: Current Some Day Smoker     Packs/day: 0.25     Years: 50.00     Start date: 12/12/2017     Smokeless tobacco: Never Used      Comment: 10 cigarettes daily.      Alcohol use No         Allergies   Allergen Reactions     Nka [No Known Allergies]      Current Outpatient Prescriptions   Medication Sig Dispense Refill     albuterol (PROAIR HFA) 108 (90 BASE) MCG/ACT Inhaler Inhale 2 puffs into the lungs every 4 hours as needed for shortness of breath / dyspnea or wheezing 1 Inhaler 11     amiodarone (PACERONE/CODARONE) 200 MG tablet 1 tablet daily (Patient taking differently: Take 200 mg by mouth daily 1 tablet daily) 90 tablet 0     buPROPion (WELLBUTRIN SR) 200 MG 12 hr tablet Take 1 tablet (200 mg) by mouth 2 times daily 60 tablet 5     Megestrol Acetate (MEGACE ORAL PO) Take 200 mg by mouth daily       morphine (MSIR)  15 MG IR tablet Take 1 tablet (15 mg) by mouth every 6 hours as needed for moderate to severe pain 60 tablet 0     nicotine (NICODERM CQ) 21 MG/24HR 24 hr patch Place 1 patch onto the skin every 24 hours 30 patch 0     omeprazole (PRILOSEC) 40 MG capsule Take 1 capsule (40 mg) by mouth daily Take 30-60 minutes before a meal. 30 capsule 11     ondansetron (ZOFRAN) 4 MG tablet Take 1 tablet (4 mg) by mouth every 8 hours as needed for nausea 30 tablet 1     order for DME Equipment being ordered: Wheelchair 1 each 0     order for DME Equipment being ordered: Walker - 4 legged walker with wheels and a seat 1 Device 0     order for DME Equipment being ordered: lightweight wheelchair.    Cannot lift standard wheelchair due to weakness and cancer. 1 Device 0     order for DME Equipment being ordered: donut cushion for sacral ulcer 1 Device 0     polyethylene glycol (MIRALAX) powder Take 17 g (1 capful) by mouth daily 510 g 1     prochlorperazine (COMPAZINE) 10 MG tablet Take 1 tablet (10 mg) by mouth every 6 hours as needed (Nausea/Vomiting) 30 tablet 5     senna-docusate (SENOKOT-S;PERICOLACE) 8.6-50 MG per tablet Take 2 tablets by mouth 2 times daily 120 tablet 11     warfarin (COUMADIN) 2.5 MG tablet 3.75 mg Mondays; 2.5 mg all other days as directed by the Anticoagulation Clinic 75 tablet 0     warfarin (COUMADIN) 5 MG tablet 3.75 mg Mondays; 2.5 mg all other days as directed by the Anticoagulation Clinic 30 tablet 1     Past Medical History:   Diagnosis Date     Atrial flutter (H) 11/2017     Cancer (H)     Lung Ca     Past Surgical History:   Procedure Laterality Date     ANESTHESIA CARDIOVERSION N/A 1/12/2018    Procedure: ANESTHESIA CARDIOVERSION;  CARDIOVERSION (FOLLOWING KIMBERLY AT 0830);  Surgeon: GENERIC ANESTHESIA PROVIDER;  Location: [facility] OR     APPENDECTOMY      age 28s     INSERT PORT VASCULAR ACCESS N/A 1/3/2018    Procedure: INSERT PORT VASCULAR ACCESS;  Port-a-Cath Placement;  Surgeon: Tomas Crews MD;   Location: [facility] OR     SURGICAL HISTORY OF -   08/24/2004    Gastroscopy with biopsy for gastric ulcer       Review of Systems:   ROS: 10 point ROS neg other than the symptoms noted above in the HPI and pertinents here:  Palliative Symptom Review (0=no symptom/no concern, 1=mild, 2=moderate, 3=severe):      Pain: 1      Fatigue: 1      Nausea: 0      Constipation: 0      Diarrhea: 0      Depressive Symptoms: 0      Anxiety: 0      Drowsiness: 0      Poor Appetite: 0      Shortness of Breath: 0      Insomnia: 0      Other: 0      Overall (0 good/no concerns, 3 very poor):  0      There were no vitals taken for this visit.  LUNGS: clear to auscultation, normal breath sounds  CV: RRR without murmur  ABD: BS+, soft, nontender, no masses, no hepatosplenomegaly  EXTREMITIES: without joint tenderness, swelling or erythema.  No muscle tenderness or abnormality.  SKIN: No rashes or abnormalities  NEURO:non focal exam        Impression & Recommendations & Counseling:       Non-small cell carcinoma of lung (H)  COPD exacerbation (H)     PLAN:  Orders Placed This Encounter     morphine (MSIR) 15 MG IR tablet     albuterol (2.5 MG/3ML) 0.083% neb solution     Patient Instructions   Medications you can stop now if you haven't already:   Megace (megesterol)   Compazine (prochlorperazine)    New prescription for albuterol nebulizer vials sent to [facility] pharmacy    [facility] from homecare will arrange for neb machine    Refill Rx for morphine    Recheck in palliative clinic in 4-6 weeks              Again, thank you for allowing me to participate in the care of your patient.        Sincerely,        Chace Mitchell MD

## 2018-05-23 NOTE — MR AVS SNAPSHOT
After Visit Summary   5/23/2018    Zechariah Ashby    MRN: [id number]           Patient Information     Date Of Birth          1960        Visit Information        Provider Department      5/23/2018 2:00 PM Chace Mitchell MD [facility]        Today's Diagnoses     COPD exacerbation (H)    -  1    Non-small cell carcinoma of lung (H)          Care Instructions    Medications you can stop now if you haven't already:   Megace (megesterol)   Compazine (prochlorperazine)    New prescription for albuterol nebulizer vials sent to [facility] pharmacy    Brooke from [employer] will arrange for neb machine    Refill Rx for morphine    Recheck in palliative clinic in 4-6 weeks          Follow-ups after your visit        Your next 10 appointments already scheduled     May 30, 2018 10:45 AM CDT   Return Visit with Joycelyn May MD   [facility] ([facility])    [facility]  [address]  [facility] [address]   [phone]            Jun 13, 2018  9:30 AM CDT   Level 2 with ROOM 8 [facility] ([facility])    [facility]  [address]  [facility] [address]   [phone]            Jun 26, 2018  1:00 PM CDT   Return Visit with Daksha Montemayor PA-C   [facility] ([facility])    [address] [facility] [address]   [phone]            Aug 16, 2018 10:15 AM CDT   MR BRAIN W/O & W CONTRAST with [address] MRI ([facility])    [address] [facility] [address]   [phone]           Take your medicines as usual, unless your doctor tells you not to. Bring a list of your current medicines to your exam (including vitamins, minerals and over-the-counter drugs).  You may or may not receive intravenous (IV) contrast for this exam pending the discretion of the Radiologist.  You  do not need to do anything special to prepare.  The MRI machine uses a strong magnet. Please wear clothes without metal (snaps, zippers). A sweatsuit works well, or we may give you a hospital gown.  Please remove any body piercings and hair extensions before you arrive. You will also remove watches, jewelry, hairpins, wallets, dentures, partial dental plates and hearing aids. You may wear contact lenses, and you may be able to wear your rings. We have a safe place to keep your personal items, but it is safer to leave them at home.  **IMPORTANT** THE INSTRUCTIONS BELOW ARE ONLY FOR THOSE PATIENTS WHO HAVE BEEN PRESCRIBED SEDATION OR GENERAL ANESTHESIA DURING THEIR MRI PROCEDURE:  IF YOUR DOCTOR PRESCRIBED ORAL SEDATION (take medicine to help you relax during your exam):   You must get the medicine from your doctor (oral medication) before you arrive. Bring the medicine to the exam. Do not take it at home. You ll be told when to take it upon arriving for your exam.   Arrive one hour early. Bring someone who can take you home after the test. Your medicine will make you sleepy. After the exam, you may not drive, take a bus or take a taxi by yourself.  IF YOUR DOCTOR PRESCRIBED IV SEDATION:   Arrive one hour early. Bring someone who can take you home after the test. Your medicine will make you sleepy. After the exam, you may not drive, take a bus or take a taxi by yourself.   No eating 6 hours before your exam. You may have clear liquids up until 4 hours before your exam. (Clear liquids include water, clear tea, black coffee and fruit juice without pulp.)  IF YOUR DOCTOR PRESCRIBED ANESTHESIA (be asleep for your exam):   Arrive 1 1/2 hours early. Bring someone who can take you home after the test. You may not drive, take a bus or take a taxi by yourself.   No eating 8 hours before your exam. You may have clear liquids up until 4 hours before your exam. (Clear liquids include water, clear tea, black coffee and fruit juice  without pulp.)   You will spend four to five hours in the recovery room.  Please call the Imaging Department at your exam site with any questions.            Aug 16, 2018 11:15 AM CDT   Return Visit with Benoit Woodson MD   [facility] ([facility])    [address], [address]  [facility] [address]   [phone]              Who to contact     If you have questions or need follow up information about today's clinic visit or your schedule please contact [facility] directly at [phone].  Normal or non-critical lab and imaging results will be communicated to you by [employer], letter or phone within 4 business days after the clinic has received the results. If you do not hear from us within 7 days, please contact the clinic through [employer] or phone. If you have a critical or abnormal lab result, we will notify you by phone as soon as possible.  Submit refill requests through [employer] or call your pharmacy and they will forward the refill request to us. Please allow 3 business days for your refill to be completed.          Additional Information About Your Visit        [employer] Information     [employer] gives you secure access to your electronic health record. If you see a primary care provider, you can also send messages to your care team and make appointments. If you have questions, please call your primary care clinic.  If you do not have a primary care provider, please call [phone] and they will assist you.        Care EveryWhere ID     This is your Care EveryWhere ID. This could be used by other organizations to access your [place] medical records  [id number]         Blood Pressure from Last 3 Encounters:   05/17/18 115/73   05/17/18 128/78   05/17/18 114/77    Weight from Last 3 Encounters:   05/23/18 65.3 kg (144 lb)   05/17/18 66.7 kg (147 lb)   05/16/18 66.4 kg (146 lb 6.4 oz)              Today, you had the following     No orders found for display         Today's  Medication Changes          These changes are accurate as of 5/23/18  2:41 PM.  If you have any questions, ask your nurse or doctor.               These medicines have changed or have updated prescriptions.        Dose/Directions    * albuterol 108 (90 Base) MCG/ACT Inhaler   Commonly known as:  PROAIR HFA   This may have changed:  Another medication with the same name was added. Make sure you understand how and when to take each.   Used for:  Chronic obstructive pulmonary disease, unspecified COPD type (H)   Changed by:  Chace Mitchell MD        Dose:  2 puff   Inhale 2 puffs into the lungs every 4 hours as needed for shortness of breath / dyspnea or wheezing   Quantity:  1 Inhaler   Refills:  11       * albuterol (2.5 MG/3ML) 0.083% neb solution   This may have changed:  You were already taking a medication with the same name, and this prescription was added. Make sure you understand how and when to take each.   Used for:  COPD exacerbation (H)   Changed by:  Chace Mitchell MD        Dose:  1 vial   Take 1 vial (2.5 mg) by nebulization every 6 hours as needed for shortness of breath / dyspnea or wheezing   Quantity:  25 vial   Refills:  3       amiodarone 200 MG tablet   Commonly known as:  PACERONE/CODARONE   This may have changed:    - how much to take  - how to take this  - when to take this  - additional instructions   Used for:  Paroxysmal atrial fibrillation (H)        1 tablet daily   Quantity:  90 tablet   Refills:  0       * Notice:  This list has 2 medication(s) that are the same as other medications prescribed for you. Read the directions carefully, and ask your doctor or other care provider to review them with you.      Stop taking these medicines if you haven't already. Please contact your care team if you have questions.     MEGACE ORAL PO   Stopped by:  Chace Mitchell MD           prochlorperazine 10 MG tablet   Commonly known as:  COMPAZINE   Stopped by:  Chace Mitchell MD                Where  to get your medicines      These medications were sent to [id number] - [place], [place] - [address]  [address] [facility] [address]    Hours:  Closed 10-16-08 business to [facility] Phone:  [phone]     albuterol (2.5 MG/3ML) 0.083% neb solution         Some of these will need a paper prescription and others can be bought over the counter.  Ask your nurse if you have questions.     Bring a paper prescription for each of these medications     morphine 15 MG IR tablet               Information about OPIOIDS     PRESCRIPTION OPIOIDS: WHAT YOU NEED TO KNOW   You have a prescription for an opioid (narcotic) pain medicine. Opioids can cause addiction. If you have a history of chemical dependency of any type, you are at a higher risk of becoming addicted to opioids. Only take this medicine after all other options have been tried. Take it for as short a time and as few doses as possible.     Do not:    Drive. If you drive while taking these medicines, you could be arrested for driving under the influence (DUI).    Operate heavy machinery    Do any other dangerous activities while taking these medicines.     Drink any alcohol while taking these medicines.      Take with any other medicines that contain acetaminophen. Read all labels carefully. Look for the word  acetaminophen  or  Tylenol.  Ask your pharmacist if you have questions or are unsure.    Store your pills in a secure place, locked if possible. We will not replace any lost or stolen medicine. If you don t finish your medicine, please throw away (dispose) as directed by your pharmacist. The [place] Pollution Control Agency has more information about safe disposal: https://www.pca.state.[place]/living-green/managing-unwanted-medications    All opioids tend to cause constipation. Drink plenty of water and eat foods that have a lot of fiber, such as fruits, vegetables, prune juice, apple juice and high-fiber cereal. Take a laxative  (Miralax, milk of magnesia, Colace, Senna) if you don t move your bowels at least every other day.          Primary Care Provider Office Phone # Fax #    Kailash Mason [phone] [address] [facility] [address]        Equal Access to Services     ROD PERRY : Hadii aad ku hadasho Soomaali, waaxda luqadaha, qaybta kaalmada adeegyada, waxay idiin hayaan adedick elizabethalyssa carrasquillo . So [facility] [phone].    ATENCIÓN: Si habla español, tiene a morocho disposición servicios gratuitos de asistencia lingüística. Llame al [phone].    We comply with applicable federal civil rights laws and [place] laws. We do not discriminate on the basis of race, color, national origin, age, disability, sex, sexual orientation, or gender identity.            Thank you!     Thank you for choosing [facility] ([facility]  for your care. Our goal is always to provide you with excellent care. Hearing back from our patients is one way we can continue to improve our services. Please take a few minutes to complete the written survey that you may receive in the mail after your visit with us. Thank you!             Your Updated Medication List - Protect others around you: Learn how to safely use, store and throw away your medicines at www.disposemymeds.org.          This list is accurate as of 5/23/18  2:41 PM.  Always use your most recent med list.                   Brand Name Dispense Instructions for use Diagnosis    * albuterol 108 (90 Base) MCG/ACT Inhaler    PROAIR HFA    1 Inhaler    Inhale 2 puffs into the lungs every 4 hours as needed for shortness of breath / dyspnea or wheezing    Chronic obstructive pulmonary disease, unspecified COPD type (H)       * albuterol (2.5 MG/3ML) 0.083% neb solution     25 vial    Take 1 vial (2.5 mg) by nebulization every 6 hours as needed for shortness of breath / dyspnea or wheezing    COPD exacerbation (H)       amiodarone 200 MG tablet    PACERONE/CODARONE    90 tablet    1  tablet daily    Paroxysmal atrial fibrillation (H)       buPROPion 200 MG 12 hr tablet    WELLBUTRIN SR    60 tablet    Take 1 tablet (200 mg) by mouth 2 times daily    Moderate single current episode of major depressive disorder (H)       morphine 15 MG IR tablet    MSIR    60 tablet    Take 1 tablet (15 mg) by mouth every 6 hours as needed for moderate to severe pain    Non-small cell carcinoma of lung (H)       nicotine 21 MG/24HR 24 hr patch    NICODERM CQ    30 patch    Place 1 patch onto the skin every 24 hours    Non-small cell carcinoma of lung (H)       omeprazole 40 MG capsule    priLOSEC    30 capsule    Take 1 capsule (40 mg) by mouth daily Take 30-60 minutes before a meal.    Gastroesophageal reflux disease without esophagitis       ondansetron 4 MG tablet    ZOFRAN    30 tablet    Take 1 tablet (4 mg) by mouth every 8 hours as needed for nausea    Non-small cell carcinoma of lung (H)       order for DME     1 Device    Equipment being ordered: donut cushion for sacral ulcer    Decubitus ulcer of sacral region, stage 1       order for DME     1 Device    Equipment being ordered: lightweight wheelchair.   Cannot lift standard wheelchair due to weakness and cancer.    Weakness       order for DME     1 Device    Equipment being ordered: Walker - 4 legged walker with wheels and a seat    Mediastinal lymphadenopathy       order for DME     1 each    Equipment being ordered: Wheelchair    Non-small cell carcinoma of lung (H)       polyethylene glycol powder    MIRALAX    510 g    Take 17 g (1 capful) by mouth daily    Constipation, unspecified constipation type       senna-docusate 8.6-50 MG per tablet    SENOKOT-S;PERICOLACE    120 tablet    Take 2 tablets by mouth 2 times daily    Constipation, unspecified constipation type       * warfarin 5 MG tablet    COUMADIN    30 tablet    3.75 mg Mondays; 2.5 mg all other days as directed by the Anticoagulation Clinic    Atrial flutter, unspecified type (H)       *  warfarin 2.5 MG tablet    COUMADIN    75 tablet    3.75 mg Mondays; 2.5 mg all other days as directed by the Anticoagulation Clinic    Long-term (current) use of anticoagulants, Atrial flutter, unspecified type (H)       * Notice:  This list has 4 medication(s) that are the same as other medications prescribed for you. Read the directions carefully, and ask your doctor or other care provider to review them with you.

## 2018-05-23 NOTE — PROGRESS NOTES
Palliative Care Outpatient Clinic Progress Note    Patient Name:  Zechariah Ashby  Primary Provider:  Kailash Mason    Chief Complaint: none    Interim History:  Zechariah Ashby 58 year old male returns to be seen by palliative care today.  He is just starting Keytruda infusions.  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  He feels that he is slowly gaining strength back but he still has some fatigue.  His pain is well-controlled with his current medications.  His appetite has been quite good and he is no longer needing medication for nausea.    Coping:  Well    Social History:  Pertinent changes to social history/social situation since last visit: None  Key support resources: Family  Advance Directive Status: No change  Social History   Substance Use Topics     Smoking status: Current Some Day Smoker     Packs/day: 0.25     Years: 50.00     Start date: 12/12/2017     Smokeless tobacco: Never Used      Comment: 10 cigarettes daily.      Alcohol use No         Allergies   Allergen Reactions     Nka [No Known Allergies]      Current Outpatient Prescriptions   Medication Sig Dispense Refill     albuterol (PROAIR HFA) 108 (90 BASE) MCG/ACT Inhaler Inhale 2 puffs into the lungs every 4 hours as needed for shortness of breath / dyspnea or wheezing 1 Inhaler 11     amiodarone (PACERONE/CODARONE) 200 MG tablet 1 tablet daily (Patient taking differently: Take 200 mg by mouth daily 1 tablet daily) 90 tablet 0     buPROPion (WELLBUTRIN SR) 200 MG 12 hr tablet Take 1 tablet (200 mg) by mouth 2 times daily 60 tablet 5     Megestrol Acetate (MEGACE ORAL PO) Take 200 mg by mouth daily       morphine (MSIR) 15 MG IR tablet Take 1 tablet (15 mg) by mouth every 6 hours as needed for moderate to severe pain 60 tablet 0     nicotine (NICODERM CQ) 21 MG/24HR 24 hr patch Place 1 patch onto the skin every 24 hours 30 patch 0     omeprazole (PRILOSEC) 40 MG  capsule Take 1 capsule (40 mg) by mouth daily Take 30-60 minutes before a meal. 30 capsule 11     ondansetron (ZOFRAN) 4 MG tablet Take 1 tablet (4 mg) by mouth every 8 hours as needed for nausea 30 tablet 1     order for DME Equipment being ordered: Wheelchair 1 each 0     order for DME Equipment being ordered: Walker - 4 legged walker with wheels and a seat 1 Device 0     order for DME Equipment being ordered: lightweight wheelchair.    Cannot lift standard wheelchair due to weakness and cancer. 1 Device 0     order for DME Equipment being ordered: donut cushion for sacral ulcer 1 Device 0     polyethylene glycol (MIRALAX) powder Take 17 g (1 capful) by mouth daily 510 g 1     prochlorperazine (COMPAZINE) 10 MG tablet Take 1 tablet (10 mg) by mouth every 6 hours as needed (Nausea/Vomiting) 30 tablet 5     senna-docusate (SENOKOT-S;PERICOLACE) 8.6-50 MG per tablet Take 2 tablets by mouth 2 times daily 120 tablet 11     warfarin (COUMADIN) 2.5 MG tablet 3.75 mg Mondays; 2.5 mg all other days as directed by the Anticoagulation Clinic 75 tablet 0     warfarin (COUMADIN) 5 MG tablet 3.75 mg Mondays; 2.5 mg all other days as directed by the Anticoagulation Clinic 30 tablet 1     Past Medical History:   Diagnosis Date     Atrial flutter (H) 11/2017     Cancer (H)     Lung Ca     Past Surgical History:   Procedure Laterality Date     ANESTHESIA CARDIOVERSION N/A 1/12/2018    Procedure: ANESTHESIA CARDIOVERSION;  CARDIOVERSION (FOLLOWING KIMBERLY AT 0830);  Surgeon: GENERIC ANESTHESIA PROVIDER;  Location: [facility] OR     APPENDECTOMY      age 28s     INSERT PORT VASCULAR ACCESS N/A 1/3/2018    Procedure: INSERT PORT VASCULAR ACCESS;  Port-a-Cath Placement;  Surgeon: Tomas Crews MD;  Location: [facility]     SURGICAL HISTORY OF -   08/24/2004    Gastroscopy with biopsy for gastric ulcer       Review of Systems:   ROS: 10 point ROS neg other than the symptoms noted above in the HPI and pertinents here:  Palliative Symptom Review (0=no  symptom/no concern, 1=mild, 2=moderate, 3=severe):      Pain: 1      Fatigue: 1      Nausea: 0      Constipation: 0      Diarrhea: 0      Depressive Symptoms: 0      Anxiety: 0      Drowsiness: 0      Poor Appetite: 0      Shortness of Breath: 0      Insomnia: 0      Other: 0      Overall (0 good/no concerns, 3 very poor):  0      There were no vitals taken for this visit.  LUNGS: clear to auscultation, normal breath sounds  CV: RRR without murmur  ABD: BS+, soft, nontender, no masses, no hepatosplenomegaly  EXTREMITIES: without joint tenderness, swelling or erythema.  No muscle tenderness or abnormality.  SKIN: No rashes or abnormalities  NEURO:non focal exam        Impression & Recommendations & Counseling:       Non-small cell carcinoma of lung (H)  COPD exacerbation (H)     PLAN:  Orders Placed This Encounter     morphine (MSIR) 15 MG IR tablet     albuterol (2.5 MG/3ML) 0.083% neb solution     Patient Instructions   Medications you can stop now if you haven't already:   Megace (megesterol)   Compazine (prochlorperazine)    New prescription for albuterol nebulizer vials sent to [facility] [place] pharmacy    Brooke from homecare will arrange for neb machine    Refill Rx for morphine    Recheck in palliative clinic in 4-6 weeks

## 2018-05-23 NOTE — PATIENT INSTRUCTIONS
Medications you can stop now if you haven't already:   Megace (megesterol)   Compazine (prochlorperazine)    New prescription for albuterol nebulizer vials sent to [facility] pharmacy    Brooke from [facility] will arrange for neb machine    Refill Rx for morphine    Recheck in palliative clinic in 4-6 weeks

## 2018-05-23 NOTE — TELEPHONE ENCOUNTER
Dr Mitchell, Home care is calling stating neb machine order was never sent to [facility] today, can you send order for Neb machine with tubing? Please call Sunshine with Home care [phone]

## 2018-05-24 NOTE — TELEPHONE ENCOUNTER
Form signed faxed and sent to scanning.  Ángela [facility]  Clinic [facility] [place]

## 2018-05-24 NOTE — TELEPHONE ENCOUNTER
Informed Home Care nurse, Sunhsine, that Nebulizer/tubing prescription would be sent to [facility] [place].    Radha Rosas RN on 5/24/2018 at 10:03 AM

## 2018-05-24 NOTE — TELEPHONE ENCOUNTER
Discussed with Dr. May who is in agreement. Ordered placed.     Radha Rosas RN on 5/24/2018 at 9:43 AM

## 2018-05-25 NOTE — PROGRESS NOTES
ANTICOAGULATION FOLLOW-UP CLINIC VISIT    Patient Name:  Zechariah Ashby  Date:  5/25/2018  Contact Type:  Telephone/Courtney LEVI [facility]    SUBJECTIVE:     Patient Findings     Positives Activity level change    Comments Patient denies missing any doses. He does take his medication in the morning and already took 1.25mg today. He usually has his medication box filled, so I am questioning if there was a mistake in what Warfarin dosage was filled. He only took 1.25 mg today which is not a usual dose that [facility] has ordered in the last 2 weeks. Patient also states he is more active. I instructed RN to have patient take an additional 5 mg today to make 6.25 mg total then to resume the maintenance dose. Recheck in 4 days.            OBJECTIVE    INR   Date Value Ref Range Status   05/25/2018 1.5  Final       ASSESSMENT / PLAN  INR assessment SUB    Recheck INR In: 4 DAYS    INR Location [facility] INR      Anticoagulation Summary as of 5/25/2018     INR goal 2.0-3.0   Today's INR 1.5!   Warfarin maintenance plan 3.75 mg (2.5 mg x 1.5) on Mon; 2.5 mg (2.5 mg x 1) all other days   Full warfarin instructions 5/25: 6.25 mg; Otherwise 3.75 mg on Mon; 2.5 mg all other days   Weekly warfarin total 18.75 mg   Plan last modified Yaritza Diaz, RN (5/18/2018)   Next INR check 5/29/2018   Priority INR   Target end date     Indications   Atrial flutter  unspecified type (H) [I48.92]  Long-term (current) use of anticoagulants [Z79.01] [Z79.01]         Anticoagulation Episode Summary     INR check location     Preferred lab     Send INR reminders to [facility] PHONE ANTICOAG POOL    Comments *Pt is on AMIODARONE. On palliative Keytruda as of 5-23-18. [facility] (5/14)      Anticoagulation Care Providers     Provider Role Specialty Phone number    Kailash Mason MD [facility] [phone]            See the Encounter Report to view Anticoagulation Flowsheet and Dosing Calendar (Go to Encounters tab in chart review, and find  the Anticoagulation Therapy Visit)        Mahendra David RN

## 2018-05-25 NOTE — MR AVS SNAPSHOT
Zechariah Ashby   5/25/2018   Anticoagulation Therapy Visit    Description:  58 year old male   Provider:  Mahendra David, RN   [facility]           INR as of 5/25/2018     Today's INR 1.5!      Anticoagulation Summary as of 5/25/2018     INR goal 2.0-3.0   Today's INR 1.5!   Full warfarin instructions 5/25: 6.25 mg; Otherwise 3.75 mg on Mon; 2.5 mg all other days   Next INR check 5/29/2018    Indications   Atrial flutter  unspecified type (H) [I48.92]  Long-term (current) use of anticoagulants [Z79.01] [Z79.01]         May 2018 Details    Sun Mon Tue Wed Thu Fri Sat       1               2               3               4               5                 6               7               8               9               10               11               12                 13               14               15               16               17               18               19                 20               21               22               23               24               25      6.25 mg   See details      26      2.5 mg           27      2.5 mg         28      3.75 mg         29            30               31                  Date Details   05/25 This INR check       Date of next INR:  5/29/2018         How to take your warfarin dose     To take:  2.5 mg Take 1 of the 2.5 mg tablets.    To take:  3.75 mg Take 1.5 of the 2.5 mg tablets.    To take:  6.25 mg Take 1 of the 5 mg tablets and 0.5 of a 2.5 mg tablet.

## 2018-05-29 NOTE — MR AVS SNAPSHOT
Zechariah Ashby   5/29/2018   Anticoagulation Therapy Visit    Description:  58 year old male   Provider:  Mahendra David, RN   [facility]           INR as of 5/29/2018     Today's INR 1.6!      Anticoagulation Summary as of 5/29/2018     INR goal 2.0-3.0   Today's INR 1.6!   Full warfarin instructions 5/29: 5 mg; 5/30: 5 mg; Otherwise 3.75 mg on Mon; 2.5 mg all other days   Next INR check 6/1/2018    Indications   Atrial flutter  unspecified type (H) [I48.92]  Long-term (current) use of anticoagulants [Z79.01] [Z79.01]         May 2018 Details    Sun Mon Tue Wed Thu Fri Sat       1               2               3               4               5                 6               7               8               9               10               11               12                 13               14               15               16               17               18               19                 20               21               22               23               24               25               26                 27               28               29      5 mg   See details      30      5 mg         31      2.5 mg            Date Details   05/29 This INR check               How to take your warfarin dose     To take:  2.5 mg Take 1 of the 2.5 mg tablets.    To take:  5 mg Take 1 of the 5 mg tablets.           June 2018 Details    Sun Mon Tue Wed Thu Fri Sat          1            2                 3               4               5               6               7               8               9                 10               11               12               13               14               15               16                 17               18               19               20               21               22               23                 24               25               26               27               28               29               30                Date Details   No additional  details    Date of next INR:  6/1/2018         How to take your warfarin dose     To take:  2.5 mg Take 1 of the 2.5 mg tablets.

## 2018-05-29 NOTE — TELEPHONE ENCOUNTER
"Status Check:    Pt is a 58  Year old male, recently in 05.23.18 for C1D1 Keytruda.  Call placed for status check.    Primary care provider is: Kailash Mason    Diagnosis:   Encounter Diagnosis   Name Primary?     Non-small cell carcinoma of lung (H) Yes     Oncology provider is:  Dr. May    How are you doing/feeling?: Estefani reports the pt has been doing pretty well, \"no worse, no better\". She report his constipation is better and no c/o nausea.   Any further issues?: Taking a trip for 3 weeks or so, will need to adjust Keytruda schedule. Will address with Dr. May at appt tomorrow 5/30.  Next Follow up/Recommendations: F/U 5/30 with Dr. May.     Patient instructed to call with any questions or concerns.  Patient states understanding and is in agreement with this plan.    Approximately 5 minutes spent on telephone with patient reviewing assessment and symptom(s) and providing symptom management.    Becca Diamond RN, BSN, OCN  Oncology   [facility]  [phone]      "

## 2018-05-29 NOTE — PROGRESS NOTES
ANTICOAGULATION FOLLOW-UP CLINIC VISIT    Patient Name:  Zechariah Ashby  Date:  5/29/2018  Contact Type:  Telephone/ Brooke LEVI [facility]    SUBJECTIVE:     Patient Findings     Positives Unexplained INR or factor level change    Comments Per Brooke LEVI patient is receiving the dosage as scheduled. Patient denies eating a lot of greens or drinking any protein shakes. Patient states he is less active then usual and has had some diarrhea. I instructed RN to have patient take 5 mg today and tomorrow then 2.5 mg on Thursday. Recheck on Friday. Patient is going to [place] for 3 weeks starting this weekend. I advised RN that the patient will more then likely need to have an INR check at a lab while in [place]. If patient remains low, maintenance dose may need to be increased and possibly Lovenox restarted until he is therapeutic as patient will be traveling.           OBJECTIVE    INR   Date Value Ref Range Status   05/29/2018 1.6  Final       ASSESSMENT / PLAN  INR assessment SUB    Recheck INR In: 3 DAYS    INR Location [facility]      Anticoagulation Summary as of 5/29/2018     INR goal 2.0-3.0   Today's INR 1.6!   Warfarin maintenance plan 3.75 mg (2.5 mg x 1.5) on Mon; 2.5 mg (2.5 mg x 1) all other days   Full warfarin instructions 5/29: 5 mg; 5/30: 5 mg; Otherwise 3.75 mg on Mon; 2.5 mg all other days   Weekly warfarin total 18.75 mg   Plan last modified Yaritza Diaz RN (5/18/2018)   Next INR check 6/1/2018   Priority INR   Target end date     Indications   Atrial flutter  unspecified type (H) [I48.92]  Long-term (current) use of anticoagulants [Z79.01] [Z79.01]         Anticoagulation Episode Summary     INR check location     Preferred lab     Send INR reminders to [place] PHONE [facility] POOL    Comments *Pt is on AMIODARONE. On palliative Keytruda as of 5-23-18. [facility] (5/14)      Anticoagulation Care Providers     Provider Role Specialty Phone number    Kailash Mason MD Responsible Family Practice  [phone]            See the Encounter Report to view Anticoagulation Flowsheet and Dosing Calendar (Go to Encounters tab in chart review, and find the Anticoagulation Therapy Visit)        Mahendra David RN

## 2018-05-30 NOTE — MR AVS SNAPSHOT
After Visit Summary   5/30/2018    Zechariah Ashby    MRN: [id number]           Patient Information     Date Of Birth          1960        Visit Information        Provider Department      5/30/2018 10:45 AM Joycelyn May MD [facility] ONCOLOGY      Today's Diagnoses     Non-small cell carcinoma of lung (H)    -  1    Brain metastasis (H)        COPD exacerbation (H)        Chemotherapy induced nausea and vomiting        Moderate single current episode of major depressive disorder (H)        Slow transit constipation        Cardiomyopathy, unspecified type (H)           Follow-ups after your visit        Follow-up notes from your care team     Return in about 3 weeks (around 6/20/2018) for Schedule for chemotherapy as per treatment plan.      Your next 10 appointments already scheduled     Jun 26, 2018  1:00 PM CDT   Return Visit with Daksha Montemayor PA-C   [facility]    [address] [facility] [address]   [phone]            Jun 27, 2018  9:30 AM CDT   Level 2 with ROOM 3 [facility] ([facility])    [facility]  [address]  [facility] [address]   [phone]            Jun 27, 2018 10:00 AM CDT   Return Visit with Emily Kyle PA-C   [facility] ([facility])    [facility]  [address]  [facility] [address]   [phone]            Aug 16, 2018 10:15 AM CDT   MR BRAIN W/O & W CONTRAST with [address] MRI ([facility])    [address] [address]   [phone]           Take your medicines as usual, unless your doctor tells you not to. Bring a list of your current medicines to your exam (including vitamins, minerals and over-the-counter drugs).  You may or may not receive intravenous (IV) contrast for this exam pending  the discretion of the Radiologist.  You do not need to do anything special to prepare.  The MRI machine uses a strong magnet. Please wear clothes without metal (snaps, zippers). A sweatsuit works well, or we may give you a hospital gown.  Please remove any body piercings and hair extensions before you arrive. You will also remove watches, jewelry, hairpins, wallets, dentures, partial dental plates and hearing aids. You may wear contact lenses, and you may be able to wear your rings. We have a safe place to keep your personal items, but it is safer to leave them at home.  **IMPORTANT** THE INSTRUCTIONS BELOW ARE ONLY FOR THOSE PATIENTS WHO HAVE BEEN PRESCRIBED SEDATION OR GENERAL ANESTHESIA DURING THEIR MRI PROCEDURE:  IF YOUR DOCTOR PRESCRIBED ORAL SEDATION (take medicine to help you relax during your exam):   You must get the medicine from your doctor (oral medication) before you arrive. Bring the medicine to the exam. Do not take it at home. You ll be told when to take it upon arriving for your exam.   Arrive one hour early. Bring someone who can take you home after the test. Your medicine will make you sleepy. After the exam, you may not drive, take a bus or take a taxi by yourself.  IF YOUR DOCTOR PRESCRIBED IV SEDATION:   Arrive one hour early. Bring someone who can take you home after the test. Your medicine will make you sleepy. After the exam, you may not drive, take a bus or take a taxi by yourself.   No eating 6 hours before your exam. You may have clear liquids up until 4 hours before your exam. (Clear liquids include water, clear tea, black coffee and fruit juice without pulp.)  IF YOUR DOCTOR PRESCRIBED ANESTHESIA (be asleep for your exam):   Arrive 1 1/2 hours early. Bring someone who can take you home after the test. You may not drive, take a bus or take a taxi by yourself.   No eating 8 hours before your exam. You may have clear liquids up until 4 hours before your exam. (Clear liquids include water,  "clear tea, black coffee and fruit juice without pulp.)   You will spend four to five hours in the recovery room.  Please call the Imaging Department at your exam site with any questions.            Aug 16, 2018 11:15 AM CDT   Return Visit with Benoit Woodson MD   [facility])    [address], [address]  [facility] [address]   [phone]              Who to contact     If you have questions or need follow up information about today's clinic visit or your schedule please contact [facility] directly at [phone].  Normal or non-critical lab and imaging results will be communicated to you by MyChart, letter or phone within 4 business days after the clinic has received the results. If you do not hear from us within 7 days, please contact the clinic through tydyhart or phone. If you have a critical or abnormal lab result, we will notify you by phone as soon as possible.  Submit refill requests through [employer] or call your pharmacy and they will forward the refill request to us. Please allow 3 business days for your refill to be completed.          Additional Information About Your Visit        MyChart Information     [employer] gives you secure access to your electronic health record. If you see a primary care provider, you can also send messages to your care team and make appointments. If you have questions, please call your primary care clinic.  If you do not have a primary care provider, please call [phone] and they will assist you.        Care EveryWhere ID     This is your Care EveryWhere ID. This could be used by other organizations to access your [facility] medical records  [id number]        Your Vitals Were     Pulse Temperature Respirations Height Pulse Oximetry BMI (Body Mass Index)    89 98.5  F (36.9  C) (Tympanic) 40 1.803 m (5' 10.98\") 99% 19.81 kg/m2       Blood Pressure from Last 3 Encounters:   05/30/18 (!) 128/98   05/23/18 119/72   05/17/18 " 115/73    Weight from Last 3 Encounters:   05/30/18 64.4 kg (142 lb)   05/23/18 65.3 kg (144 lb)   05/17/18 66.7 kg (147 lb)              Today, you had the following     No orders found for display       Primary Care Provider Office Phone # Fax #    Kailash Mason [phone] [address] [place] [id number]        Equal Access to Services     [facility] HospitalDAHLIA : Hadii aad ku hadasho Soomaali, waaxda luqadaha, qaybta kaalmada adeegyada, waxay idiin hayaan adeeg rupaloralalyssa carrasquillo . So [facility] [phone].    ATENCIÓN: Si wesley stern, tiene a morocho disposición servicios gratuitos de asistencia lingüística. Llame al [phone].    We comply with applicable federal civil rights laws and [place] laws. We do not discriminate on the basis of race, color, national origin, age, disability, sex, sexual orientation, or gender identity.            Thank you!     Thank you for choosing [facility]  for your care. Our goal is always to provide you with excellent care. Hearing back from our patients is one way we can continue to improve our services. Please take a few minutes to complete the written survey that you may receive in the mail after your visit with us. Thank you!             Your Updated Medication List - Protect others around you: Learn how to safely use, store and throw away your medicines at www.disposemymeds.org.          This list is accurate as of 5/30/18 11:15 AM.  Always use your most recent med list.                   Brand Name Dispense Instructions for use Diagnosis    * albuterol 108 (90 Base) MCG/ACT Inhaler    PROAIR HFA    1 Inhaler    Inhale 2 puffs into the lungs every 4 hours as needed for shortness of breath / dyspnea or wheezing    Chronic obstructive pulmonary disease, unspecified COPD type (H)       * albuterol (2.5 MG/3ML) 0.083% neb solution     25 vial    Take 1 vial (2.5 mg) by nebulization every 6 hours as needed for shortness of breath / dyspnea or wheezing     COPD exacerbation (H)       amiodarone 200 MG tablet    PACERONE/CODARONE    30 tablet    TAKE ONE TABLET BY MOUTH ONE TIME DAILY    Paroxysmal atrial fibrillation (H)       buPROPion 200 MG 12 hr tablet    WELLBUTRIN SR    60 tablet    Take 1 tablet (200 mg) by mouth 2 times daily    Moderate single current episode of major depressive disorder (H)       morphine 15 MG IR tablet    MSIR    60 tablet    Take 1 tablet (15 mg) by mouth every 6 hours as needed for moderate to severe pain    Non-small cell carcinoma of lung (H)       nicotine 21 MG/24HR 24 hr patch    NICODERM CQ    30 patch    Place 1 patch onto the skin every 24 hours    Non-small cell carcinoma of lung (H)       omeprazole 40 MG capsule    priLOSEC    30 capsule    Take 1 capsule (40 mg) by mouth daily Take 30-60 minutes before a meal.    Gastroesophageal reflux disease without esophagitis       ondansetron 4 MG tablet    ZOFRAN    30 tablet    Take 1 tablet (4 mg) by mouth every 8 hours as needed for nausea    Non-small cell carcinoma of lung (H)       order for DME     1 Device    Equipment being ordered: donut cushion for sacral ulcer    Decubitus ulcer of sacral region, stage 1       order for DME     1 Device    Equipment being ordered: lightweight wheelchair.   Cannot lift standard wheelchair due to weakness and cancer.    Weakness       order for DME     1 Device    Equipment being ordered: Walker - 4 legged walker with wheels and a seat    Mediastinal lymphadenopathy       order for DME     1 each    Equipment being ordered: Wheelchair    Non-small cell carcinoma of lung (H)       order for DME     1 each    Equipment being ordered: Nebulizer with tubing.    Non-small cell carcinoma of lung (H)       polyethylene glycol powder    MIRALAX    510 g    Take 17 g (1 capful) by mouth daily    Constipation, unspecified constipation type       prochlorperazine 10 MG tablet    COMPAZINE          senna-docusate 8.6-50 MG per tablet     SENOKOT-S;PERICOLACE    120 tablet    Take 2 tablets by mouth 2 times daily    Constipation, unspecified constipation type       * warfarin 5 MG tablet    COUMADIN    30 tablet    3.75 mg Mondays; 2.5 mg all other days as directed by the Anticoagulation Clinic    Atrial flutter, unspecified type (H)       * warfarin 2.5 MG tablet    COUMADIN    75 tablet    3.75 mg Mondays; 2.5 mg all other days as directed by the Anticoagulation Clinic    Long-term (current) use of anticoagulants, Atrial flutter, unspecified type (H)       * Notice:  This list has 4 medication(s) that are the same as other medications prescribed for you. Read the directions carefully, and ask your doctor or other care provider to review them with you.

## 2018-05-30 NOTE — NURSING NOTE
"Oncology Rooming Note    May 30, 2018 10:47 AM   Zechariah Ashby is a 58 year old male who presents for:    Chief Complaint   Patient presents with     Oncology Clinic Visit     1 week recheck Non-small cell carcinoma of lung after starting Keytruda     Initial Vitals: BP (!) 128/98 (BP Location: Right arm, Patient Position: Sitting, Cuff Size: Adult Regular)  Pulse 89  Temp 98.5  F (36.9  C) (Tympanic)  Resp (!) 40  Ht 1.803 m (5' 10.98\")  Wt 64.4 kg (142 lb)  SpO2 99%  BMI 19.81 kg/m2 Estimated body mass index is 19.81 kg/(m^2) as calculated from the following:    Height as of this encounter: 1.803 m (5' 10.98\").    Weight as of this encounter: 64.4 kg (142 lb). Body surface area is 1.8 meters squared.  Mild Pain (2) Comment: Right side & center of chest   No LMP for male patient.  Allergies reviewed: Yes  Medications reviewed: Yes    Medications: Medication refills not needed today.  Pharmacy name entered into Sean[facility]:      [id number] - [facility] [address]    Clinical concerns: 1 week recheck Non-small cell carcinoma of lung after starting Keytruda.     Patients Sister reports Zechariah has had chest pain x 3-4 days.     7 minutes for nursing intake (face to face time)     Carmen Barreto CMA              "

## 2018-05-30 NOTE — LETTER
5/30/2018         RE: Zechariah Ashby  [id number] [facility] [id number]        Dear Colleague,    Thank you for referring your patient, Zechariah Ashby, to the [facility]. Please see a copy of my visit note below.    Hematology/ Oncology Follow-up Visit:  May 30, 2018    Reason for Visit:   Chief Complaint   Patient presents with     Oncology Clinic Visit     1 week recheck Non-small cell carcinoma of lung after starting Keytruda       Oncologic History:    Non-small cell carcinoma of lung (H)  Zechariah Ashby is a 57 year old male who was recently diagnosed with atrial fib/flutter in early November 2017. During workup just x-ray showed elevation of the left hemidiaphragm with a focal tenting. The CT scan was done for further evaluation showing left upper lobe atelectasis and a moderate-sized left pleural effusion. Patient also had mediastinal adenopathy. Patient had left thoracocentesis. Cytology came back as transudate and cytology came back negative. Patient has a long history of smoking. PET scan showed a large FDG avid mediastinal mass extending to the left hilum with obliteration of the left upper lobe bronchus and atelectasis of the upper lobe.  There are also multiple left cervical left cervical and mediastinal adenopathy.  There is increased left pleural effusion.  There is hypermetabolic left adrenal lesion.  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 He was started on carboplatin and gemcitabine.    Interval History:  Returning today for follow-up.  He had his first cycle of immunotherapy with Keytruda last week.  He has been tolerating the drug so far.  He continues to have intermittent nausea.  His appetite is gradually improving.  He denies any pain.  He denies any shortness of breath or cough or wheezing.    Review Of Systems:  Constitutional: Negative for fever, chills, and night sweats.  Skin:  negative.  Eyes: negative.  Ears/Nose/Throat: negative.  Respiratory: No shortness of breath, dyspnea on exertion, cough, or hemoptysis.  Cardiovascular: negative.  Gastrointestinal: negative.  Genitourinary: negative.  Musculoskeletal: negative.  Neurologic: negative.  Psychiatric: negative.  Hematologic/Lymphatic/Immunologic: negative.  Endocrine: negative.    All other ROS negative unless mentioned in interval history.    Past medical, social, surgical, and family histories reviewed.    Allergies:  Allergies as of 05/30/2018 - Osmel as Reviewed 05/30/2018   Allergen Reaction Noted     Nka [no known allergies]  01/03/2018       Current Medications:  Current Outpatient Prescriptions   Medication Sig Dispense Refill     albuterol (2.5 MG/3ML) 0.083% neb solution Take 1 vial (2.5 mg) by nebulization every 6 hours as needed for shortness of breath / dyspnea or wheezing 25 vial 3     albuterol (PROAIR HFA) 108 (90 BASE) MCG/ACT Inhaler Inhale 2 puffs into the lungs every 4 hours as needed for shortness of breath / dyspnea or wheezing 1 Inhaler 11     amiodarone (PACERONE/CODARONE) 200 MG tablet TAKE ONE TABLET BY MOUTH ONE TIME DAILY 30 tablet 0     buPROPion (WELLBUTRIN SR) 200 MG 12 hr tablet Take 1 tablet (200 mg) by mouth 2 times daily 60 tablet 5     morphine (MSIR) 15 MG IR tablet Take 1 tablet (15 mg) by mouth every 6 hours as needed for moderate to severe pain 60 tablet 0     nicotine (NICODERM CQ) 21 MG/24HR 24 hr patch Place 1 patch onto the skin every 24 hours 30 patch 0     omeprazole (PRILOSEC) 40 MG capsule Take 1 capsule (40 mg) by mouth daily Take 30-60 minutes before a meal. 30 capsule 11     ondansetron (ZOFRAN) 4 MG tablet Take 1 tablet (4 mg) by mouth every 8 hours as needed for nausea 30 tablet 1     polyethylene glycol (MIRALAX) powder Take 17 g (1 capful) by mouth daily 510 g 1     senna-docusate (SENOKOT-S;PERICOLACE) 8.6-50 MG per tablet Take 2 tablets by mouth 2 times daily 120 tablet 11      "warfarin (COUMADIN) 2.5 MG tablet 3.75 mg Mondays; 2.5 mg all other days as directed by the Anticoagulation Clinic 75 tablet 0     warfarin (COUMADIN) 5 MG tablet 3.75 mg Mondays; 2.5 mg all other days as directed by the Anticoagulation Clinic 30 tablet 1     order for DME Equipment being ordered: Nebulizer with tubing. 1 each 0     order for DME Equipment being ordered: Wheelchair 1 each 0     order for DME Equipment being ordered: Walker - 4 legged walker with wheels and a seat 1 Device 0     order for DME Equipment being ordered: lightweight wheelchair.    Cannot lift standard wheelchair due to weakness and cancer. 1 Device 0     order for DME Equipment being ordered: donut cushion for sacral ulcer 1 Device 0     prochlorperazine (COMPAZINE) 10 MG tablet           Physical Exam:  BP (!) 128/98 (BP Location: Right arm, Patient Position: Sitting, Cuff Size: Adult Regular)  Pulse 89  Temp 98.5  F (36.9  C) (Tympanic)  Resp (!) 40  Ht 1.803 m (5' 10.98\")  Wt 64.4 kg (142 lb)  SpO2 99%  BMI 19.81 kg/m2  Wt Readings from Last 12 Encounters:   05/30/18 64.4 kg (142 lb)   05/23/18 65.3 kg (144 lb)   05/17/18 66.7 kg (147 lb)   05/16/18 66.4 kg (146 lb 6.4 oz)   05/11/18 67.6 kg (149 lb)   05/10/18 68.8 kg (151 lb 9.6 oz)   05/09/18 68.4 kg (150 lb 11.2 oz)   05/07/18 67.1 kg (148 lb)   05/03/18 68.1 kg (150 lb 1.6 oz)   04/26/18 64.9 kg (143 lb)   04/20/18 68.9 kg (152 lb)   04/19/18 67.9 kg (149 lb 9.6 oz)     ECOG performance status: 1  GENERAL APPEARANCE: Healthy, alert and in no acute distress.  HEENT: Sclerae anicteric. PERRLA. Oropharynx without ulcers, lesions, or thrush.  NECK: Supple. No asymmetry or masses.  LYMPHATICS: No palpable cervical, supraclavicular, axillary, or inguinal lymphadenopathy.  RESP: Lungs clear to auscultation bilaterally without rales, rhonchi or wheezes.  CARDIOVASCULAR: Regular rate and rhythm. Normal S1, S2; no S3 or S4. No murmur, gallop, or rub.  ABDOMEN: Soft, nontender. Bowel " sounds normal. No palpable organomegaly or masses.  MUSCULOSKELETAL: Extremities without gross deformities noted. No edema of bilateral lower extremities.  SKIN: No suspicious lesions or rashes.  NEURO: Alert and oriented x 3. Cranial nerves II-XII grossly intact.  PSYCHIATRIC: Mentation and affect appear normal.    Laboratory/Imaging Studies:  Anticoagulation Therapy Visit on 05/29/2018   Component Date Value Ref Range Status     INR 05/29/2018 1.6   Final   Anticoagulation Therapy Visit on 05/25/2018   Component Date Value Ref Range Status     INR 05/25/2018 1.5   Final   Infusion Therapy Visit on 05/23/2018   Component Date Value Ref Range Status     Sodium 05/23/2018 139  133 - 144 mmol/L Final     Potassium 05/23/2018 4.2  3.4 - 5.3 mmol/L Final     Chloride 05/23/2018 109  94 - 109 mmol/L Final     Carbon Dioxide 05/23/2018 24  20 - 32 mmol/L Final     Anion Gap 05/23/2018 6  3 - 14 mmol/L Final     Glucose 05/23/2018 125* 70 - 99 mg/dL Final     Urea Nitrogen 05/23/2018 23  7 - 30 mg/dL Final     Creatinine 05/23/2018 1.10  0.66 - 1.25 mg/dL Final     GFR Estimate 05/23/2018 68  >60 mL/min/1.7m2 Final    Non  GFR Calc     GFR Estimate If Black 05/23/2018 83  >60 mL/min/1.7m2 Final    African American GFR Calc     Calcium 05/23/2018 7.9* 8.5 - 10.1 mg/dL Final     Bilirubin Total 05/23/2018 0.2  0.2 - 1.3 mg/dL Final     Albumin 05/23/2018 3.5  3.4 - 5.0 g/dL Final     Protein Total 05/23/2018 6.4* 6.8 - 8.8 g/dL Final     Alkaline Phosphatase 05/23/2018 78  40 - 150 U/L Final     ALT 05/23/2018 16  0 - 70 U/L Final     AST 05/23/2018 16  0 - 45 U/L Final     TSH 05/23/2018 3.89  0.40 - 4.00 mU/L Final   Anticoagulation Therapy Visit on 05/18/2018   Component Date Value Ref Range Status     INR 05/18/2018 2.1   Final        Recent Results (from the past 744 hour(s))   PET Oncology (Eyes to Thighs)    Narrative    PET ONCOLOGY (EYES TO THIGHS)    5/16/2018 1:49 PM     HISTORY: Non-small cell  lung cancer.    COMPARISON EXAMS:  SUBURBAN IMAGING: None.  FAIRVIEW: PET/CT performed 12/15/2017.  OTHER: None.    TECHNIQUE: The patient was injected intravenously with 14.3 mCi F-18  FDG, followed by delayed PET imaging. Noncontrast CT from the skull  base through the upper thighs was performed for attenuation correction  purposes. Blood glucose: 72 mg/dL. The CT, PET, and fusion images were  then evaluated on a [employer] workstation. Radiation dose for this scan  was reduced using automated exposure control, adjustment of the mA  and/or kV according to patient size, or iterative reconstruction  technique.    FINDINGS: Normal physiologic uptake is identified within the salivary  glands, myocardium, kidneys, ureters and bladder. Scattered areas of  physiologic bowel uptake are also present.    NECK:  Lymph nodes: No pathologic activity.    Additional findings: None.      CHEST:  Lungs: Previously noted hypermetabolic mass in the left upper lobe  anteriorly and medially involving the anterior mediastinum has  decreased significantly in size, but increased slightly in degree of  hypermetabolic activity, today measuring 2.5 x 2 cm, SUV max 16.6  (previously 8 x 6 cm, SUV max 15). Two hypermetabolic nodular  opacities in the left lower lobe posteriorly were not seen on the  previous exam, and are highly suspicious for metastatic disease, with  the largest (series 3 image 204) measuring 2 x 1.4 cm, SUV max 26.3. A  few tiny indeterminate pulmonary nodules in both lungs were also not  seen on the previous exam, and are too small for accurate PET  characterization. Emphysematous changes are again noted in both lungs.    Lymph nodes: Scattered small hypermetabolic mediastinal lymph nodes  have decreased in size compared to 12/15/2017. For example, a small  hypermetabolic subcarinal lymph node measures 2.5 x 1.1 cm, SUV max  9.6 (previously 2.8 x 2.1 cm, SUV max 18.8). Mild hypermetabolic  bilateral hilar adenopathy are not  well defined on the noncontrast CT  images, but is new since the previous exam. For example, a  hypermetabolic right hilar lymph node measures 2 cm, SUV max 7.1.    Additional findings: Left Port-A-Cath with tip in the right atrium.      ABDOMEN/PELVIS:  Hepatobiliary: No pathologic activity.     Spleen: No pathologic activity.     Pancreas: No pathologic activity.     Kidneys: No pathologic activity.     Adrenals: No pathologic activity.     Reproductive: No pathologic activity.     Gastrointestinal: No pathologic activity. Moderate amount of stool  throughout the colon.    Lymph nodes: No pathologic activity.     Additional findings: Vascular calcifications.      SKELETON:   No pathologic activity. Bilateral L5 spondylolysis has a chronic  appearance. Degenerative changes are noted throughout the spine and  within the right hip.       Impression    IMPRESSION:   1. Previously noted left upper anterior and medial lobe mass involving  the anterior mediastinum has decreased in size, but increased slightly  in degree of hypermetabolic activity.  2. Multiple hypermetabolic mediastinal lymph nodes have decreased in  size and hypermetabolic activity.  3. Two new hypermetabolic left lower lobe pulmonary nodules are  suspicious for progression of metastatic disease.  4. Scattered smaller indeterminate pulmonary nodules in both lungs are  new since the previous exam, but are too small for accurate PET  characterization.  5. Mild bilateral hilar adenopathy with associated hypermetabolic  activity is new since the previous exam, and is also suspicious for  metastatic disease.  6. Emphysema.    GIA JAIN MD   MRI Brain w contrast    Narrative    Brain MRI with contrast primarily for the purposes of stereotactic  evaluation 5/17/2018    History: Metastasis to brain (H)  ICD-10: Metastasis to brain (H)    Comparison: Brain MRI 4/27/2018    Technique: MR imaging performed with 3-dimensonally acquired axial  T1-weighted  sequences performed with intravenous contrast.    Contrast: 7.5cc of Gadavist injected.     Findings: Slight increase in size of the enhancing lesion in the  anteromedial right temporal lobe measuring 8 x 9 x 9 mm (previously 5  x 6 x 7 mm). No new enhancing lesion. Ventricles are proportionate to  the cerebral sulci. Mild generalized parenchymal volume loss, normal  for age. No mass effect or midline shift.      Impression    Impression: Slight increase in size of the enhancing right temporal  lobe metastasis measuring up to 9 mm. Limited imaging primarily for  the purposes of stereotactic localization.    CATHRYN ROSALES MD       Assessment and plan:  (C34.90) Non-small cell carcinoma of lung (H)  (primary encounter diagnosis)  She tolerated immunotherapy was Keytruda.  We will proceed with cycle #2 in 2 weeks time.  I will see the patient again before his next cycle of chemotherapy.    (C79.31) Brain metastasis (H)  Patient currently not symptomatic.    (J44.1) COPD exacerbation (H)  No shortness of breath or wheezing.  Patient using nebulizer treatment with albuterol if needed.    (R11.2,  T45.1X5A) Chemotherapy induced nausea and vomiting  Nausea continues to be a challenge.  He has been followed by palliative care.  Is taking Zofran and Compazine.    (F32.1) Moderate single current episode of major depressive disorder (H)  Patient currently on Wellbutrin  mg every 12 hours.    (K59.01) Slow transit constipation  Patient has been improving.  Currently on MiraLAX and Senokot .    (I42.9) Cardiomyopathy, unspecified type (H)  Patient is following with cardiology.  Is currently on amiodarone 200 mg orally daily.      The patient is ready to learn, no apparent learning barriers were identified.  Diagnosis and treatment plans were explained to the patient. The patient expressed understanding of the content. The patient asked appropriate questions. The patient questions were answered to his  satisfaction.    Chart documentation with Dragon Voice recognition Software. Although reviewed after completion, some words and grammatical errors may remain.    Again, thank you for allowing me to participate in the care of your patient.        Sincerely,        Joycelyn May MD

## 2018-05-30 NOTE — TELEPHONE ENCOUNTER
Brooke RN with  [facility] says Zechariah is leaving town on Saturday, June 2 to go spend time with his son in [place] for 3 weeks. This was the goal to have him spend more time with his family. Brooke is setting up his pills for the next 3 weeks so he has them to bring with and the INR clinic is coordinating for him to have his blood drawn when he is there. They will discharge [facility] from [facility] as of Friday.

## 2018-05-30 NOTE — PROGRESS NOTES
Hematology/ Oncology Follow-up Visit:  May 30, 2018    Reason for Visit:   Chief Complaint   Patient presents with     Oncology Clinic Visit     1 week recheck Non-small cell carcinoma of lung after starting Keytruda       Oncologic History:    Non-small cell carcinoma of lung (H)  Zechariah Ashby is a 57 year old male who was recently diagnosed with atrial fib/flutter in early November 2017. During workup just x-ray showed elevation of the left hemidiaphragm with a focal tenting. The CT scan was done for further evaluation showing left upper lobe atelectasis and a moderate-sized left pleural effusion. Patient also had mediastinal adenopathy. Patient had left thoracocentesis. Cytology came back as transudate and cytology came back negative. Patient has a long history of smoking. PET scan showed a large FDG avid mediastinal mass extending to the left hilum with obliteration of the left upper lobe bronchus and atelectasis of the upper lobe.  There are also multiple left cervical left cervical and mediastinal adenopathy.  There is increased left pleural effusion.  There is hypermetabolic left adrenal lesion.  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 He was started on carboplatin and gemcitabine.    Interval History:  Returning today for follow-up.  He had his first cycle of immunotherapy with Keytruda last week.  He has been tolerating the drug so far.  He continues to have intermittent nausea.  His appetite is gradually improving.  He denies any pain.  He denies any shortness of breath or cough or wheezing.    Review Of Systems:  Constitutional: Negative for fever, chills, and night sweats.  Skin: negative.  Eyes: negative.  Ears/Nose/Throat: negative.  Respiratory: No shortness of breath, dyspnea on exertion, cough, or hemoptysis.  Cardiovascular: negative.  Gastrointestinal: negative.  Genitourinary: negative.  Musculoskeletal:  negative.  Neurologic: negative.  Psychiatric: negative.  Hematologic/Lymphatic/Immunologic: negative.  Endocrine: negative.    All other ROS negative unless mentioned in interval history.    Past medical, social, surgical, and family histories reviewed.    Allergies:  Allergies as of 05/30/2018 - Osmel as Reviewed 05/30/2018   Allergen Reaction Noted     Nka [no known allergies]  01/03/2018       Current Medications:  Current Outpatient Prescriptions   Medication Sig Dispense Refill     albuterol (2.5 MG/3ML) 0.083% neb solution Take 1 vial (2.5 mg) by nebulization every 6 hours as needed for shortness of breath / dyspnea or wheezing 25 vial 3     albuterol (PROAIR HFA) 108 (90 BASE) MCG/ACT Inhaler Inhale 2 puffs into the lungs every 4 hours as needed for shortness of breath / dyspnea or wheezing 1 Inhaler 11     amiodarone (PACERONE/CODARONE) 200 MG tablet TAKE ONE TABLET BY MOUTH ONE TIME DAILY 30 tablet 0     buPROPion (WELLBUTRIN SR) 200 MG 12 hr tablet Take 1 tablet (200 mg) by mouth 2 times daily 60 tablet 5     morphine (MSIR) 15 MG IR tablet Take 1 tablet (15 mg) by mouth every 6 hours as needed for moderate to severe pain 60 tablet 0     nicotine (NICODERM CQ) 21 MG/24HR 24 hr patch Place 1 patch onto the skin every 24 hours 30 patch 0     omeprazole (PRILOSEC) 40 MG capsule Take 1 capsule (40 mg) by mouth daily Take 30-60 minutes before a meal. 30 capsule 11     ondansetron (ZOFRAN) 4 MG tablet Take 1 tablet (4 mg) by mouth every 8 hours as needed for nausea 30 tablet 1     polyethylene glycol (MIRALAX) powder Take 17 g (1 capful) by mouth daily 510 g 1     senna-docusate (SENOKOT-S;PERICOLACE) 8.6-50 MG per tablet Take 2 tablets by mouth 2 times daily 120 tablet 11     warfarin (COUMADIN) 2.5 MG tablet 3.75 mg Mondays; 2.5 mg all other days as directed by the Anticoagulation Clinic 75 tablet 0     warfarin (COUMADIN) 5 MG tablet 3.75 mg Mondays; 2.5 mg all other days as directed by the Anticoagulation  "Clinic 30 tablet 1     order for DME Equipment being ordered: Nebulizer with tubing. 1 each 0     order for DME Equipment being ordered: Wheelchair 1 each 0     order for DME Equipment being ordered: Walker - 4 legged walker with wheels and a seat 1 Device 0     order for DME Equipment being ordered: lightweight wheelchair.    Cannot lift standard wheelchair due to weakness and cancer. 1 Device 0     order for DME Equipment being ordered: donut cushion for sacral ulcer 1 Device 0     prochlorperazine (COMPAZINE) 10 MG tablet           Physical Exam:  BP (!) 128/98 (BP Location: Right arm, Patient Position: Sitting, Cuff Size: Adult Regular)  Pulse 89  Temp 98.5  F (36.9  C) (Tympanic)  Resp (!) 40  Ht 1.803 m (5' 10.98\")  Wt 64.4 kg (142 lb)  SpO2 99%  BMI 19.81 kg/m2  Wt Readings from Last 12 Encounters:   05/30/18 64.4 kg (142 lb)   05/23/18 65.3 kg (144 lb)   05/17/18 66.7 kg (147 lb)   05/16/18 66.4 kg (146 lb 6.4 oz)   05/11/18 67.6 kg (149 lb)   05/10/18 68.8 kg (151 lb 9.6 oz)   05/09/18 68.4 kg (150 lb 11.2 oz)   05/07/18 67.1 kg (148 lb)   05/03/18 68.1 kg (150 lb 1.6 oz)   04/26/18 64.9 kg (143 lb)   04/20/18 68.9 kg (152 lb)   04/19/18 67.9 kg (149 lb 9.6 oz)     ECOG performance status: 1  GENERAL APPEARANCE: Healthy, alert and in no acute distress.  HEENT: Sclerae anicteric. PERRLA. Oropharynx without ulcers, lesions, or thrush.  NECK: Supple. No asymmetry or masses.  LYMPHATICS: No palpable cervical, supraclavicular, axillary, or inguinal lymphadenopathy.  RESP: Lungs clear to auscultation bilaterally without rales, rhonchi or wheezes.  CARDIOVASCULAR: Regular rate and rhythm. Normal S1, S2; no S3 or S4. No murmur, gallop, or rub.  ABDOMEN: Soft, nontender. Bowel sounds normal. No palpable organomegaly or masses.  MUSCULOSKELETAL: Extremities without gross deformities noted. No edema of bilateral lower extremities.  SKIN: No suspicious lesions or rashes.  NEURO: Alert and oriented x 3. Cranial " nerves II-XII grossly intact.  PSYCHIATRIC: Mentation and affect appear normal.    Laboratory/Imaging Studies:  Anticoagulation Therapy Visit on 05/29/2018   Component Date Value Ref Range Status     INR 05/29/2018 1.6   Final   Anticoagulation Therapy Visit on 05/25/2018   Component Date Value Ref Range Status     INR 05/25/2018 1.5   Final   Infusion Therapy Visit on 05/23/2018   Component Date Value Ref Range Status     Sodium 05/23/2018 139  133 - 144 mmol/L Final     Potassium 05/23/2018 4.2  3.4 - 5.3 mmol/L Final     Chloride 05/23/2018 109  94 - 109 mmol/L Final     Carbon Dioxide 05/23/2018 24  20 - 32 mmol/L Final     Anion Gap 05/23/2018 6  3 - 14 mmol/L Final     Glucose 05/23/2018 125* 70 - 99 mg/dL Final     Urea Nitrogen 05/23/2018 23  7 - 30 mg/dL Final     Creatinine 05/23/2018 1.10  0.66 - 1.25 mg/dL Final     GFR Estimate 05/23/2018 68  >60 mL/min/1.7m2 Final    Non  GFR Calc     GFR Estimate If Black 05/23/2018 83  >60 mL/min/1.7m2 Final    African American GFR Calc     Calcium 05/23/2018 7.9* 8.5 - 10.1 mg/dL Final     Bilirubin Total 05/23/2018 0.2  0.2 - 1.3 mg/dL Final     Albumin 05/23/2018 3.5  3.4 - 5.0 g/dL Final     Protein Total 05/23/2018 6.4* 6.8 - 8.8 g/dL Final     Alkaline Phosphatase 05/23/2018 78  40 - 150 U/L Final     ALT 05/23/2018 16  0 - 70 U/L Final     AST 05/23/2018 16  0 - 45 U/L Final     TSH 05/23/2018 3.89  0.40 - 4.00 mU/L Final   Anticoagulation Therapy Visit on 05/18/2018   Component Date Value Ref Range Status     INR 05/18/2018 2.1   Final        Recent Results (from the past 744 hour(s))   PET Oncology (Eyes to Thighs)    Narrative    PET ONCOLOGY (EYES TO THIGHS)    5/16/2018 1:49 PM     HISTORY: Non-small cell lung cancer.    COMPARISON EXAMS:  SUBURBAN IMAGING: None.  FA[facility]: PET/CT performed 12/15/2017.  OTHER: None.    TECHNIQUE: The patient was injected intravenously with 14.3 mCi F-18  FDG, followed by delayed PET imaging. Noncontrast CT  from the skull  base through the upper thighs was performed for attenuation correction  purposes. Blood glucose: 72 mg/dL. The CT, PET, and fusion images were  then evaluated on a [employer] workstation. Radiation dose for this scan  was reduced using automated exposure control, adjustment of the mA  and/or kV according to patient size, or iterative reconstruction  technique.    FINDINGS: Normal physiologic uptake is identified within the salivary  glands, myocardium, kidneys, ureters and bladder. Scattered areas of  physiologic bowel uptake are also present.    NECK:  Lymph nodes: No pathologic activity.    Additional findings: None.      CHEST:  Lungs: Previously noted hypermetabolic mass in the left upper lobe  anteriorly and medially involving the anterior mediastinum has  decreased significantly in size, but increased slightly in degree of  hypermetabolic activity, today measuring 2.5 x 2 cm, SUV max 16.6  (previously 8 x 6 cm, SUV max 15). Two hypermetabolic nodular  opacities in the left lower lobe posteriorly were not seen on the  previous exam, and are highly suspicious for metastatic disease, with  the largest (series 3 image 204) measuring 2 x 1.4 cm, SUV max 26.3. A  few tiny indeterminate pulmonary nodules in both lungs were also not  seen on the previous exam, and are too small for accurate PET  characterization. Emphysematous changes are again noted in both lungs.    Lymph nodes: Scattered small hypermetabolic mediastinal lymph nodes  have decreased in size compared to 12/15/2017. For example, a small  hypermetabolic subcarinal lymph node measures 2.5 x 1.1 cm, SUV max  9.6 (previously 2.8 x 2.1 cm, SUV max 18.8). Mild hypermetabolic  bilateral hilar adenopathy are not well defined on the noncontrast CT  images, but is new since the previous exam. For example, a  hypermetabolic right hilar lymph node measures 2 cm, SUV max 7.1.    Additional findings: Left Port-A-Cath with tip in the right atrium.       ABDOMEN/PELVIS:  Hepatobiliary: No pathologic activity.     Spleen: No pathologic activity.     Pancreas: No pathologic activity.     Kidneys: No pathologic activity.     Adrenals: No pathologic activity.     Reproductive: No pathologic activity.     Gastrointestinal: No pathologic activity. Moderate amount of stool  throughout the colon.    Lymph nodes: No pathologic activity.     Additional findings: Vascular calcifications.      SKELETON:   No pathologic activity. Bilateral L5 spondylolysis has a chronic  appearance. Degenerative changes are noted throughout the spine and  within the right hip.       Impression    IMPRESSION:   1. Previously noted left upper anterior and medial lobe mass involving  the anterior mediastinum has decreased in size, but increased slightly  in degree of hypermetabolic activity.  2. Multiple hypermetabolic mediastinal lymph nodes have decreased in  size and hypermetabolic activity.  3. Two new hypermetabolic left lower lobe pulmonary nodules are  suspicious for progression of metastatic disease.  4. Scattered smaller indeterminate pulmonary nodules in both lungs are  new since the previous exam, but are too small for accurate PET  characterization.  5. Mild bilateral hilar adenopathy with associated hypermetabolic  activity is new since the previous exam, and is also suspicious for  metastatic disease.  6. Emphysema.    GIA JAIN MD   MRI Brain w contrast    Narrative    Brain MRI with contrast primarily for the purposes of stereotactic  evaluation 5/17/2018    History: Metastasis to brain (H)  ICD-10: Metastasis to brain (H)    Comparison: Brain MRI 4/27/2018    Technique: MR imaging performed with 3-dimensonally acquired axial  T1-weighted sequences performed with intravenous contrast.    Contrast: 7.5cc of Gadavist injected.     Findings: Slight increase in size of the enhancing lesion in the  anteromedial right temporal lobe measuring 8 x 9 x 9 mm (previously 5  x 6 x 7  mm). No new enhancing lesion. Ventricles are proportionate to  the cerebral sulci. Mild generalized parenchymal volume loss, normal  for age. No mass effect or midline shift.      Impression    Impression: Slight increase in size of the enhancing right temporal  lobe metastasis measuring up to 9 mm. Limited imaging primarily for  the purposes of stereotactic localization.    CATHRYN ROSALES MD       Assessment and plan:  (C34.90) Non-small cell carcinoma of lung (H)  (primary encounter diagnosis)  She tolerated immunotherapy was Keytruda.  We will proceed with cycle #2 in 2 weeks time.  I will see the patient again before his next cycle of chemotherapy.    (C79.31) Brain metastasis (H)  Patient currently not symptomatic.    (J44.1) COPD exacerbation (H)  No shortness of breath or wheezing.  Patient using nebulizer treatment with albuterol if needed.    (R11.2,  T45.1X5A) Chemotherapy induced nausea and vomiting  Nausea continues to be a challenge.  He has been followed by palliative care.  Is taking Zofran and Compazine.    (F32.1) Moderate single current episode of major depressive disorder (H)  Patient currently on Wellbutrin  mg every 12 hours.    (K59.01) Slow transit constipation  Patient has been improving.  Currently on MiraLAX and Senokot .    (I42.9) Cardiomyopathy, unspecified type (H)  Patient is following with cardiology.  Is currently on amiodarone 200 mg orally daily.      The patient is ready to learn, no apparent learning barriers were identified.  Diagnosis and treatment plans were explained to the patient. The patient expressed understanding of the content. The patient asked appropriate questions. The patient questions were answered to his satisfaction.    Chart documentation with Dragon Voice recognition Software. Although reviewed after completion, some words and grammatical errors may remain.

## 2018-05-30 NOTE — PATIENT INSTRUCTIONS
We would like to see you back in 3 weeks and continue Keytruda as scheduled.       When you are in need of a refill, please call your pharmacy and they will send us a request.      Copy of appointments, and after visit summary (AVS) given to patient.      If you have any questions during business hours (M-F 8 AM- 4PM), please call Camille Lara RN, BSN, OCN Oncology Hematology /Breast Cancer Navigator at [facility] [phone].       For questions after business hours, or on holidays/weekends, please call our after hours Nurse Triage line [phone]. Thank you.

## 2018-05-31 NOTE — TELEPHONE ENCOUNTER
I told Dipti this:       Positives Unexplained INR or factor level change     Comments Per Brooke LEVI patient is receiving the dosage as scheduled. Patient denies eating a lot of greens or drinking any protein shakes. Patient states he is less active then usual and has had some diarrhea. I instructed RN to have patient take 5 mg today and tomorrow then 2.5 mg on Thursday. Recheck on Friday. Patient is going to [place] for 3 weeks starting this weekend. I advised RN that the patient will more then likely need to have an INR check at a lab while in [place]. If patient remains low, maintenance dose may need to be increased and possibly Lovenox restarted until he is therapeutic as patient will be traveling.     Emani Lemus RN

## 2018-05-31 NOTE — TELEPHONE ENCOUNTER
Zechariah is going to [place] and is leaving this Saturday.  He will be there for three weeks.  Is it OK for him to be gone so long with having INR checked?  Should he find a place in [place] to get INR checked?  Emani Lemus RN

## 2018-06-01 NOTE — MR AVS SNAPSHOT
Zechariah WOODALL Isma   6/1/2018   Anticoagulation Therapy Visit    Description:  58 year old male   Provider:  Yaritza Diaz, RN   Department:  [facility]           INR as of 6/1/2018     Today's INR 3.0      Anticoagulation Summary as of 6/1/2018     INR goal 2.0-3.0   Today's INR 3.0   Full warfarin instructions 6/1: 1.25 mg; 6/3: 3.75 mg; Otherwise 3.75 mg on Mon; 2.5 mg all other days   Next INR check 6/5/2018    Indications   Atrial flutter  unspecified type (H) [I48.92]  Long-term (current) use of anticoagulants [Z79.01] [Z79.01]         Description     Please check INR at a local lab in the morning on 6-5 or 6-4. If you do not hear from us by 3:30 pm the same day, please call us at [phone].      Contact Numbers     Please call [phone] with any problems or questions regarding your therapy.    If you need to cancel and/or reschedule your appointment please call one of the following numbers:  [facility] [phone]  [place] - [phone]  [place] - [phone]  [place] - [phone]  [place] - [phone]            June 2018 Details    Sun Mon Tue Wed Thu Fri Sat          1      1.25 mg   See details      2      2.5 mg           3      3.75 mg         4      3.75 mg         5            6               7               8               9                 10               11               12               13               14               15               16                 17               18               19               20               21               22               23                 24               25               26               27               28               29               30                Date Details   06/01 This INR check       Date of next INR:  6/5/2018         How to take your warfarin dose     To take:  1.25 mg Take 0.5 of a 2.5 mg tablet.    To take:  2.5 mg Take 1 of the 2.5 mg tablets.    To take:  3.75 mg Take 1.5 of the 2.5 mg tablets.

## 2018-06-01 NOTE — PROGRESS NOTES
ANTICOAGULATION FOLLOW-UP CLINIC VISIT    Patient Name:  Zechariah Ashby  Date:  6/1/2018  Contact Type:  Telephone/ Brooke with [facility]    SUBJECTIVE:     Patient Findings     Positives Change in diet/appetite (starting ensure daily), Missed doses (5-29 and 5-31)    Comments Patient is discharging from home care today. He is leaving tomorrow for a 3 week visit with family in [place]. He has lung cancer with labile INRs and is on Keytruda. He is not having diarrhea, is eating roughly two full meals daily from meals on wheels. He had a minimal amount of vomiting this morning but has not had any in quite a while before that. He does have nausea, though. He plans to start drinking Ensure daily, even while out of town. Due to missing two doses and INR still high and labile nature of INRs, pt will need INRs done at outside lab while out of town. He will  standing lab order from [facility]  today and will be told to call us if he does not hear back the same day as his lab.    Since pt will be flying, writer does not want to cut back on dosing too much but with the two missed doses only a couple days ago, this may be too much. Would rather err on the side of caution when flying, though.           OBJECTIVE    INR   Date Value Ref Range Status   06/01/2018 3.0  Final       ASSESSMENT / PLAN  INR assessment THER    Recheck INR In: 4 DAYS    INR Location Home INR      Anticoagulation Summary as of 6/1/2018     INR goal 2.0-3.0   Today's INR 3.0   Warfarin maintenance plan 3.75 mg (2.5 mg x 1.5) on Mon; 2.5 mg (2.5 mg x 1) all other days   Full warfarin instructions 6/1: 1.25 mg; 6/3: 3.75 mg; Otherwise 3.75 mg on Mon; 2.5 mg all other days   Weekly warfarin total 18.75 mg   Plan last modified Yaritza Diaz RN (5/18/2018)   Next INR check 6/5/2018   Priority INR   Target end date     Indications   Atrial flutter  unspecified type (H) [I48.92]  Long-term (current) use of anticoagulants [Z79.01] [Z79.01]          Anticoagulation Episode Summary     INR check location     Preferred lab     Send INR reminders to [place] [facility] POOL    Comments *Pt is on AMIODARONE. On palliative Keytruda as of 5-23-18. Pt in [place] for 3 weeks and leaving 6-2-18. He will check INR at outside lab with results faxed to [facility]      Anticoagulation Care Providers     Provider Role Specialty Phone number    Kailash Mason MD [facility] [phone]            See the Encounter Report to view Anticoagulation Flowsheet and Dosing Calendar (Go to Encounters tab in chart review, and find the Anticoagulation Therapy Visit)        Yaritza Diaz RN

## 2018-06-01 NOTE — TELEPHONE ENCOUNTER
Please call.  His blood pressure has been ok.  PLAN: Monitor BP periodically.  This can be done at home, in clinic, at our pharmacy, at a store or by neighbor or relative with a blood pressure cuff.  You should be sitting and relaxed for several minutes when taking blood pressure.   Goal BP (most readings) should be less than 140/90    Normal blood pressure is 120/80.    If your blood pressure is consistently at or above the goal, we can meet and discuss options for treatment.   ROSA JASSO MD

## 2018-06-01 NOTE — TELEPHONE ENCOUNTER
Brooke calling form [facility] stating when discharging Zechariah today (going out of town for 3 weeks) he had b/p reading of Lt arm 150/98 waited awhile 156/98 again waited took in Rt arm 140/96 please advise Home Care Nurse.    Radha Whipple CSS

## 2018-06-05 NOTE — PROGRESS NOTES
ADDENDUM: Patient's daughter in law called [facility] to find out what his INR was. Writer waited to call back to see if the fax would come through. She stated they were going to run it as a stat order, however as of 4:30pm it did not go through. Writer called the lab, they had closed for the day. Writer then attempted to call Rowan back many times, it continue to give a busy tone. As of 5:35 PM, writer still cannot reach Rowan. ACC will attempt to call again tomorrow, hopefully with the INR result. Writer did not give any new dosing information.    Alice Lea, RN, CACP 6/7/2018 at 5:35 PM        ANTICOAGULATION FOLLOW-UP CLINIC VISIT    Patient Name:  Zechariah Ashby  Date:  6/5/2018  Contact Type:  Telephone-Rowan (patient's daughter) Fax [facility]    SUBJECTIVE:     Patient Findings     Positives Change in diet/appetite, Activity level change    Comments Spoke with Rowan ([phone]) regarding patient's INR. Patient is currently on vacation in [place]. He is weak so activity is minimal. His appetite is poor so nutrition is not adequate. I instructed her to have patient hold his Coumadin until he has his lab drawn on Thursday. [facility] will not receive the results until Friday, as the lab is sent out. New orders will be faxed to [phone]. The clinic phone number if needed is [phone]. Patient denies signs or symptoms of bleeding. Writer educated patient regarding increased bleed risk and when to seek immediate medical attention. Patient verbalized understanding.             OBJECTIVE    INR   Date Value Ref Range Status   06/04/2018 6.5  Final       ASSESSMENT / PLAN  INR assessment SUPRA    Recheck INR In: 3 DAYS    INR Location Outside lab      Anticoagulation Summary as of 6/5/2018     INR goal 2.0-3.0   Today's INR 6.5! (6/4/2018)   Warfarin maintenance plan 3.75 mg (2.5 mg x 1.5) on Mon; 2.5 mg (2.5 mg x 1) all other days   Full warfarin instructions 6/5: Hold; 6/6: Hold; 6/7: Hold; Otherwise  3.75 mg on Mon; 2.5 mg all other days   Weekly warfarin total 18.75 mg   Plan last modified Yaritza Diaz RN (5/18/2018)   Next INR check 6/7/2018   Priority INR   Target end date     Indications   Atrial flutter  unspecified type (H) [I48.92]  Long-term (current) use of anticoagulants [Z79.01] [Z79.01]         Anticoagulation Episode Summary     INR check location     Preferred lab     Send INR reminders to [place] PH[facility] POOL    Comments *Pt is on AMIODARONE. On palliative Keytruda as of 5-23-18. Pt in [place] for 3 weeks and leaving 6-2-18. He will check INR at outside lab with results faxed to [facility]      Anticoagulation Care Providers     Provider Role Specialty Phone number    Kailash Mason MD [facility] [phone]            See the Encounter Report to view Anticoagulation Flowsheet and Dosing Calendar (Go to Encounters tab in chart review, and find the Anticoagulation Therapy Visit)        Mahendra David RN

## 2018-06-05 NOTE — MR AVS SNAPSHOT
Zechariah Ashby   6/5/2018   Anticoagulation Therapy Visit    Description:  58 year old male   Provider:  Mahendra David, RN   Department:  [facility]           INR as of 6/5/2018     Today's INR 6.5! (6/4/2018)      Anticoagulation Summary as of 6/5/2018     INR goal 2.0-3.0   Today's INR 6.5! (6/4/2018)   Full warfarin instructions 6/5: Hold; 6/6: Hold; 6/7: Hold; Otherwise 3.75 mg on Mon; 2.5 mg all other days   Next INR check 6/7/2018    Indications   Atrial flutter  unspecified type (H) [I48.92]  Long-term (current) use of anticoagulants [Z79.01] [Z79.01]         June 2018 Details    Sun Mon Tue Wed Thu Fri Sat          1               2                 3               4               5      Hold   See details      6      Hold         7            8               9                 10               11               12               13               14               15               16                 17               18               19               20               21               22               23                 24               25               26               27               28               29               30                Date Details   06/05 This INR check       Date of next INR:  6/7/2018         How to take your warfarin dose     Hold Do not take your warfarin dose. See the Details table to the right for additional instructions.

## 2018-06-08 NOTE — PROGRESS NOTES
ANTICOAGULATION FOLLOW-UP CLINIC VISIT    Patient Name:  Zechariah Ashby  Date:  6/8/2018  Contact Type:  Telephone/ Rowan [phone]    SUBJECTIVE:     Patient Findings     Positives Other complaints    Comments Patient's daughter in law Rowan brought patient into [facility] in [place], writer called [facility] was placed on hold by a gentlemen who transferred the call and then it was disconnected. When writer called back it repeatedly went to voicemail. The voicemail states that they are open tomorrow 7 a. -11 am writer requested that they fax the result to inpatient pharmacy to dose. Rowan is going to try to get Zechariah into the ( [facility] [phone]   fax [phone]) as she is disgusted with the service provided by [facility] this far. Writer instructed Rowan to give Zechariah 2.5mg of warfarin today and await instruction from Pharmacy tomorrow. She verbalized understanding.           OBJECTIVE    INR   Date Value Ref Range Status   06/04/2018 6.5  Final       ASSESSMENT / PLAN  No question data found.  Anticoagulation Summary as of 6/8/2018     INR goal 2.0-3.0   Today's INR No new INR was available at the time of this encounter.   Warfarin maintenance plan 3.75 mg (2.5 mg x 1.5) on Mon; 2.5 mg (2.5 mg x 1) all other days   Full warfarin instructions 6/9: 1.25 mg; Otherwise 3.75 mg on Mon; 2.5 mg all other days   Weekly warfarin total 18.75 mg   Plan last modified Yaritza Diaz RN (5/18/2018)   Next INR check 6/9/2018   Priority INR   Target end date     Indications   Atrial flutter  unspecified type (H) [I48.92]  Long-term (current) use of anticoagulants [Z79.01] [Z79.01]         Anticoagulation Episode Summary     INR check location     Preferred lab     Send INR reminders to [facility]    Comments *Pt is on AMIODARONE. On palliative Keytruda as of 5-23-18. Pt in [place] for 3 weeks and leaving 6-2-18. He will check INR at outside lab with results faxed to [facility]      Anticoagulation Care  Providers     Provider Role Specialty Phone number    Kailash Mason MD [facility] [phone]            See the Encounter Report to view Anticoagulation Flowsheet and Dosing Calendar (Go to Encounters tab in chart review, and find the Anticoagulation Therapy Visit)        Betsey Maldonado RN

## 2018-06-08 NOTE — MR AVS SNAPSHOT
Zechariah Ashby   6/8/2018   Anticoagulation Therapy Visit    Description:  58 year old male   Provider:  Betsey Maldonado, RN   Department:  [facility]           INR as of 6/8/2018     Today's INR No new INR was available at the time of this encounter.      Anticoagulation Summary as of 6/8/2018     INR goal 2.0-3.0   Today's INR No new INR was available at the time of this encounter.   Full warfarin instructions 6/9: 1.25 mg; Otherwise 3.75 mg on Mon; 2.5 mg all other days   Next INR check 6/9/2018    Indications   Atrial flutter  unspecified type (H) [I48.92]  Long-term (current) use of anticoagulants [Z79.01] [Z79.01]         June 2018 Details    Sun Mon Tue Wed Thu Fri Sat          1               2                 3               4               5               6               7               8      2.5 mg   See details      9              10               11               12               13               14               15               16                 17               18               19               20               21               22               23                 24               25               26               27               28               29               30                Date Details   06/08 This INR check       Date of next INR:  6/9/2018         How to take your warfarin dose     To take:  1.25 mg Take 0.5 of a 2.5 mg tablet.    To take:  2.5 mg Take 1 of the 2.5 mg tablets.

## 2018-06-09 NOTE — PROGRESS NOTES
"ADDENDUM: Writer called [facility] ([phone]) regarding the INR that was drawn today. The INR was drawn and put in a stat bag per Rowan's report. No one answered the call, a message was left for them to call [facility] back as soon as they received the message in regards to the INR. Writer spoke with Rowan ([phone]), patient has been experiencing inflammation, swollen jaw. He was prescribed penicillin. He will eventually need to have oral surgery, the plan is to hopefully get him in when he is back in [place]. Patient has been drinking supplements, containing vitamin K, once per day. This is unchanged. Rowan is aware it decreases the INR. She will not increase at this point since we do not know where the INR is today. Patient has not had any concerns of bleeding at this point, will plan to continuing holding warfarin since the INR has not been faxed to [facility] yet. Rowan will call again in the morning for another update.    Alice Lea RN, CACP 6/11/2018 at 5:08 PM    ---------------------------------------------------------------------------------------------      Per telephone note on 6/9/18 from Dr. Holly Gonzalez  \"Called by [employer] [facility] today in [place], where patient is vacationing, and INR was reported as 5.3.  This was an INR that was drawn on 6/7 and the [facility] has been attempting to get this lab results.     Previous INR on 6/5 was 6.5.  Coumadin was held on 6/5, 6/6 and 6/7.  On 6/8 he was given 2.5 mg of Coumadin per [facility] instructions.     Given that after 3 days of holding Coumadin INR had only dropped from 6.5-5.3 and the subsequent day he was dosed again with Coumadin I recommended that he hold Coumadin 6/9 and 6/10 and recheck INR on a.m. of 6/11.     Previous Coumadin regimen was 2.5 mg every day except 3.75 mg on Mondays 6/5: INR 6.5, hold Coumadin  6/6: Hold Coumadin  6/7: INR 5.3, hold Coumadin  6/8: Coumadin 2.5 mg  6/9: hold Coumadin  6/10: hold Coumadin  6/11: re-check INR and call " "[facility] for coumadin dosing.     Patient's daughter Rowan is helping with Coumadin management.  The best number to reach her at is: [phone].\"      ANTICOAGULATION FOLLOW-UP CLINIC VISIT    Patient Name:  Zechariah Ashby  Date:  6/9/2018  Contact Type:  Telephone/ [facility]-[phone]    SUBJECTIVE:     Patient Findings     Comments No issues noted.             OBJECTIVE    INR   Date Value Ref Range Status   06/09/2018 5  Final       ASSESSMENT / PLAN  INR assessment SUPRA    Recheck INR In: 2 DAYS    INR Location Home INR      Anticoagulation Summary as of 6/9/2018     INR goal 2.0-3.0   Today's INR 5!   Warfarin maintenance plan 3.75 mg (2.5 mg x 1.5) on Mon; 2.5 mg (2.5 mg x 1) all other days   Full warfarin instructions 6/9: Hold; 6/10: Hold; Otherwise 3.75 mg on Mon; 2.5 mg all other days   Weekly warfarin total 18.75 mg   Plan last modified Yaritza Diaz RN (5/18/2018)   Next INR check 6/11/2018   Priority INR   Target end date     Indications   Atrial flutter  unspecified type (H) [I48.92]  Long-term (current) use of anticoagulants [Z79.01] [Z79.01]         Anticoagulation Episode Summary     INR check location     Preferred lab     Send INR reminders to [facility]    Comments *Pt is on AMIODARONE. On palliative Keytruda as of 5-23-18. Pt in [place] for 3 weeks and leaving 6-2-18. He will check INR at outside lab with results faxed to [facility]      Anticoagulation Care Providers     Provider Role Specialty Phone number    Kialash Mason MD [facility] [phone]              Chiqui Aguila [facility]               "

## 2018-06-09 NOTE — TELEPHONE ENCOUNTER
Called by [employer] today in [place], where patient is vacationing, and INR was reported as 5.3.  This was an INR that was drawn on 6/7 and the [facility] has been attempting to get this lab results.    Previous INR on 6/5 was 6.5.  Coumadin was held on 6/5, 6/6 and 6/7.  On 6/8 he was given 2.5 mg of Coumadin per [facility] instructions.    Given that after 3 days of holding Coumadin INR had only dropped from 6.5-5.3 and the subsequent day he was dosed again with Coumadin I recommended that he hold Coumadin 6/9 and 6/10 and recheck INR on a.m. of 6/11.    Previous Coumadin regimen was 2.5 mg every day except 3.75 mg on Mondays 6/5: INR 6.5, hold Coumadin  6/6: Hold Coumadin  6/7: INR 5.3, hold Coumadin  6/8: Coumadin 2.5 mg  6/9: hold Coumadin  6/10: hold Coumadin  6/11: re-check INR and call [facility] for coumadin dosing.    Patient's daughter Rowan is helping with Coumadin management.  The best number to reach her at is: [phone].

## 2018-06-09 NOTE — MR AVS SNAPSHOT
Zechariah WOODALL Isma   6/9/2018   Anticoagulation Therapy Visit    Description:  58 year old male   Provider:  Chiqui Aguila Pm, [facility]   Department:  [facility]           INR as of 6/9/2018     Today's INR 5!      Anticoagulation Summary as of 6/9/2018     INR goal 2.0-3.0   Today's INR 5!   Full warfarin instructions 6/9: Hold; 6/10: Hold; Otherwise 3.75 mg on Mon; 2.5 mg all other days   Next INR check 6/11/2018    Indications   Atrial flutter  unspecified type (H) [I48.92]  Long-term (current) use of anticoagulants [Z79.01] [Z79.01]         Description     Patient family member (Rowan) was able to get a hold of the on-call MD at [employer].  She was instructed to hold warfarin doses today and tomorrow and recheck INR on Monday.  No further doses until INR rechecked on Monday June 2018 Details    Sun Mon Tue Wed Thu Fri Sat          1               2                 3               4               5               6               7               8               9      Hold   See details        10      Hold         11            12               13               14               15               16                 17               18               19               20               21               22               23                 24               25               26               27               28               29               30                Date Details   06/09 This INR check       Date of next INR:  6/11/2018         How to take your warfarin dose     To take:  3.75 mg Take 1.5 of the 2.5 mg tablets.    Hold Do not take your warfarin dose. See the Details table to the right for additional instructions.

## 2018-06-12 NOTE — MR AVS SNAPSHOT
Zechariah Ashby   6/12/2018   Anticoagulation Therapy Visit    Description:  58 year old male   Provider:  Mahendra David, RN   Department:  [facility]           INR as of 6/12/2018     Today's INR 3.1! (6/11/2018)      Anticoagulation Summary as of 6/12/2018     INR goal 2.0-3.0   Today's INR 3.1! (6/11/2018)   Full warfarin instructions 6/12: 1.25 mg; 6/13: 1.25 mg; Otherwise 3.75 mg on Mon; 2.5 mg all other days   Next INR check 6/14/2018    Indications   Atrial flutter  unspecified type (H) [I48.92]  Long-term (current) use of anticoagulants [Z79.01] [Z79.01]         June 2018 Details    Sun Mon Tue Wed Thu Fri Sat          1               2                 3               4               5               6               7               8               9                 10               11               12      1.25 mg   See details      13      1.25 mg         14            15               16                 17               18               19               20               21               22               23                 24               25               26               27               28               29               30                Date Details   06/12 This INR check       Date of next INR:  6/14/2018         How to take your warfarin dose     To take:  1.25 mg Take 0.5 of a 2.5 mg tablet.    To take:  2.5 mg Take 1 of the 2.5 mg tablets.

## 2018-06-12 NOTE — PROGRESS NOTES
ANTICOAGULATION FOLLOW-UP CLINIC VISIT    Patient Name:  Zechariah Ashby  Date:  6/12/2018  Contact Type:  Telephone/ Rowan [phone]    SUBJECTIVE:     Patient Findings     Positives Inflammation, Intentional hold of therapy    Comments Patient has been on a hold for most of the past 7 days. His INR is now in the therapeutic range but at the high end. Patient was recently put on PCN for oral inflammation. I spoke with Rowan today and updated her via phone. I instructed her to give patient 1.25 mg today and tomorrow and she will take him to the lab on Thursday for a redraw. I will fax orders to the [facility]. I will place as a stat and hope we can get the results by Thursday and no later then Friday.           OBJECTIVE    INR   Date Value Ref Range Status   06/11/2018 3.1  Final       ASSESSMENT / PLAN  INR assessment THER    Recheck INR In: 2 DAYS    INR Location Outside lab      Anticoagulation Summary as of 6/12/2018     INR goal 2.0-3.0   Today's INR 3.1! (6/11/2018)   Warfarin maintenance plan 3.75 mg (2.5 mg x 1.5) on Mon; 2.5 mg (2.5 mg x 1) all other days   Full warfarin instructions 6/12: 1.25 mg; 6/13: 1.25 mg; Otherwise 3.75 mg on Mon; 2.5 mg all other days   Weekly warfarin total 18.75 mg   Plan last modified Yaritza Diaz RN (5/18/2018)   Next INR check 6/14/2018   Priority INR   Target end date     Indications   Atrial flutter  unspecified type (H) [I48.92]  Long-term (current) use of anticoagulants [Z79.01] [Z79.01]         Anticoagulation Episode Summary     INR check location     Preferred lab     Send INR reminders to [facility]    Comments *Pt is on AMIODARONE. On palliative Keytruda as of 5-23-18. Pt in [place] for 3 weeks and leaving 6-2-18. He will check INR at outside lab with results faxed to [facility]      Anticoagulation Care Providers     Provider Role Specialty Phone number    Kailash Mason MD Responsible [facility]  [phone]            See the Encounter Report to view Anticoagulation Flowsheet and Dosing Calendar (Go to Encounters tab in chart review, and find the Anticoagulation Therapy Visit)        Mahendra David RN

## 2018-06-15 NOTE — PROGRESS NOTES
ANTICOAGULATION FOLLOW-UP CLINIC VISIT    Patient Name:  Zechariah Ashby  Date:  6/15/2018  Contact Type:  Telephone/ Rowan:daughter-n-law    SUBJECTIVE:     Patient Findings     Positives Missed doses    Comments Patient did not take his dose on Thursday as the INR had not been faxed to [facility] in time. INR  Is 3.0 so I instructed Rowan to have the patient continue to take 1.25 mg daily until his next INR draw. He will have a lab draw next Monday, with the result most likely available to [facility] on Tuesday. Patient will be back in [place] on 6/22.            OBJECTIVE    INR   Date Value Ref Range Status   06/14/2018 3.0  Final       ASSESSMENT / PLAN  INR assessment THER    Recheck INR In: 4 DAYS    INR Location Clinic      Anticoagulation Summary as of 6/15/2018     INR goal 2.0-3.0   Today's INR 3.0 (6/14/2018)   Warfarin maintenance plan 3.75 mg (2.5 mg x 1.5) on Mon; 2.5 mg (2.5 mg x 1) all other days   Full warfarin instructions 6/15: 1.25 mg; 6/16: 1.25 mg; 6/17: 1.25 mg; 6/18: 1.25 mg; Otherwise 3.75 mg on Mon; 2.5 mg all other days   Weekly warfarin total 18.75 mg   Plan last modified Yaritza Diaz RN (5/18/2018)   Next INR check 6/18/2018   Priority INR   Target end date     Indications   Atrial flutter  unspecified type (H) [I48.92]  Long-term (current) use of anticoagulants [Z79.01] [Z79.01]         Anticoagulation Episode Summary     INR check location     Preferred lab     Send INR reminders to [facility]    Comments *Pt is on AMIODARONE. On palliative Keytruda as of 5-23-18. Pt in [place] for 3 weeks and leaving 6-2-18. He will check INR at outside lab with results faxed to [facility]      Anticoagulation Care Providers     Provider Role Specialty Phone number    Kailash Mason MD [facility] [phone]            See the Encounter Report to view Anticoagulation Flowsheet and Dosing Calendar (Go to Encounters tab in chart review, and find the Anticoagulation Therapy  Visit)        Mahendra David RN

## 2018-06-15 NOTE — MR AVS SNAPSHOT
Zechariah Ashby   6/15/2018   Anticoagulation Therapy Visit    Description:  58 year old male   Provider:  Mahendra David, RN   [facility]           INR as of 6/15/2018     Today's INR 3.0 (6/14/2018)      Anticoagulation Summary as of 6/15/2018     INR goal 2.0-3.0   Today's INR 3.0 (6/14/2018)   Full warfarin instructions 6/15: 1.25 mg; 6/16: 1.25 mg; 6/17: 1.25 mg; 6/18: 1.25 mg; Otherwise 3.75 mg on Mon; 2.5 mg all other days   Next INR check 6/18/2018    Indications   Atrial flutter  unspecified type (H) [I48.92]  Long-term (current) use of anticoagulants [Z79.01] [Z79.01]         June 2018 Details    Sun Mon Tue Wed Thu Fri Sat          1               2                 3               4               5               6               7               8               9                 10               11               12               13               14               15      1.25 mg   See details      16      1.25 mg           17      1.25 mg         18            19               20               21               22               23                 24               25               26               27               28               29               30                Date Details   06/15 This INR check       Date of next INR:  6/18/2018         How to take your warfarin dose     To take:  1.25 mg Take 0.5 of a 2.5 mg tablet.

## 2018-06-17 NOTE — TELEPHONE ENCOUNTER
Requested Prescriptions   Pending Prescriptions Disp Refills     amiodarone (PACERONE/CODARONE) 200 MG tablet [Pharmacy Med Name: AMIODARONE 200 MG   TAB ZYDU] 30 tablet 0    Last Written Prescription Date:  06/01/18  Last Fill Quantity: 60,  # refills: 0   Last office visit: 5/16/2018 with prescribing provider:  REBA Loco Office Visit:   Next 5 appointments (look out 90 days)     Jun 26, 2018  1:00 PM CDT   Return Visit with Daksha Montemayor PA-C   [facility] ([facility])    [address] [place] [id number]   [phone]            Jun 27, 2018 10:00 AM CDT   Return Visit with Emily Kyle PA-C   [facility]    [facility]  [address]  [place] [facility] [address]   [phone]                  Sig: TAKE ONE TABLET BY MOUTH ONE TIME DAILY    There is no refill protocol information for this order        warfarin (COUMADIN) 2.5 MG tablet [Pharmacy Med Name: WARFARIN 2.5 MG     TAB TEVA] 25 tablet 0    Last Written Prescription Date:  05/18/18  Last Fill Quantity: 25,  # refills: 0   Last office visit: 5/16/2018 with prescribing provider:  REBA Loco Office Visit:   Next 5 appointments (look out 90 days)     Jun 26, 2018  1:00 PM CDT   Return Visit with Daksha Montemayor PA-C   [facility] ([facility])    [address] [place] [id number]   [phone]            Jun 27, 2018 10:00 AM CDT   Return Visit with Emily Kyle PA-C   [facility] ([facility]    [facility]  [address]  [place] [facility] [address]   [phone]                  Sig: TAKE 1/2 TABLET BY MOUTH ON MON & FRI AND 1 TABLET DAILY THE REST OF THE WEEK OR AS DIRECTED    Vitamin K Antagonists Failed    6/17/2018  9:39 AM       Failed - INR is within goal in the past 6 weeks     "Confirm INR is within goal in the past 6 weeks.     Recent Labs   Lab Test 06/14/18   INR  3.0                      Passed - Recent (12 mo) or future (30 days) visit within the authorizing provider's specialty    Patient had office visit in the last 12 months or has a visit in the next 30 days with authorizing provider or within the authorizing provider's specialty.  See \"Patient Info\" tab in inbasket, or \"Choose Columns\" in Meds & Orders section of the refill encounter.           Passed - Patient is 18 years of age or older          "

## 2018-06-18 PROBLEM — F41.9 ANXIETY: Status: ACTIVE | Noted: 2018-01-01

## 2018-06-18 NOTE — MR AVS SNAPSHOT
Zechariah WOODALL Isma   6/18/2018   Anticoagulation Therapy Visit    Description:  58 year old male   Provider:  Pat Bloom, RN   Department:  [facility]           INR as of 6/18/2018     Today's INR No new INR was available at the time of this encounter.      Anticoagulation Summary as of 6/18/2018     INR goal 2.0-3.0   Today's INR No new INR was available at the time of this encounter.   Full warfarin instructions 6/18: 1.25 mg; Otherwise 3.75 mg on Mon; 2.5 mg all other days   Next INR check 6/19/2018    Indications   Atrial flutter  unspecified type (H) [I48.92]  Long-term (current) use of anticoagulants [Z79.01] [Z79.01]         June 2018 Details    Sun Mon Tue Wed Thu Fri Sat          1               2                 3               4               5               6               7               8               9                 10               11               12               13               14               15               16                 17               18      1.25 mg   See details      19            20               21               22               23                 24               25               26               27               28               29               30                Date Details   06/18 This INR check       Date of next INR:  6/19/2018         How to take your warfarin dose     To take:  1.25 mg Take 0.5 of a 2.5 mg tablet.    To take:  2.5 mg Take 1 of the 2.5 mg tablets.

## 2018-06-18 NOTE — PROGRESS NOTES
ANTICOAGULATION FOLLOW-UP CLINIC VISIT    Patient Name:  Zechariah Ashby  Date:  6/18/2018  Contact Type:  Telephone/ Spoke to daughter Dipti    SUBJECTIVE:     Patient Findings     Comments No changes in medications, activity, or diet noted. No bleeding or increased bruising noted. Took warfarin as prescribed.  Spoke to daughter Dipti.  Patient had 5 mg in the last 7 days, will adjust dose to 6.25 mg by tomorrow - INR result has not been received from out of state lab.  She verbalizes understanding and agrees to plan. No further questions or concerns.           OBJECTIVE    INR   Date Value Ref Range Status   06/14/2018 3.0  Final       ASSESSMENT / PLAN  No question data found.  Anticoagulation Summary as of 6/18/2018     INR goal 2.0-3.0   Today's INR No new INR was available at the time of this encounter.   Warfarin maintenance plan 3.75 mg (2.5 mg x 1.5) on Mon; 2.5 mg (2.5 mg x 1) all other days   Full warfarin instructions 6/18: 1.25 mg; Otherwise 3.75 mg on Mon; 2.5 mg all other days   Weekly warfarin total 18.75 mg   Plan last modified Yaritza Diaz RN (5/18/2018)   Next INR check 6/19/2018   Priority INR   Target end date     Indications   Atrial flutter  unspecified type (H) [I48.92]  Long-term (current) use of anticoagulants [Z79.01] [Z79.01]         Anticoagulation Episode Summary     INR check location     Preferred lab     Send INR reminders to [place] [facility]    Comments *Pt is on AMIODARONE. On palliative Keytruda as of 5-23-18. Pt in [place] for 3 weeks and leaving 6-2-18. He will check INR at outside lab with results faxed to [facility]      Anticoagulation Care Providers     Provider Role Specialty Phone number    Kailash Mason MD [facility] [phone]            See the Encounter Report to view Anticoagulation Flowsheet and Dosing Calendar (Go to Encounters tab in chart review, and find the Anticoagulation Therapy Visit)        Pat Bloom RN

## 2018-06-19 NOTE — MR AVS SNAPSHOT
Zechariah Ashby   6/19/2018   Anticoagulation Therapy Visit    Description:  58 year old male   Provider:  Mahendra David, RN   Department:  [facility]           INR as of 6/19/2018     Today's INR 2.3 (6/18/2018)      Anticoagulation Summary as of 6/19/2018     INR goal 2.0-3.0   Today's INR 2.3 (6/18/2018)   Full warfarin instructions 6/20: 1.25 mg; 6/21: 1.25 mg; Otherwise 3.75 mg on Mon; 2.5 mg all other days   Next INR check 6/21/2018    Indications   Atrial flutter  unspecified type (H) [I48.92]  Long-term (current) use of anticoagulants [Z79.01] [Z79.01]         June 2018 Details    Sun Mon Tue Wed Thu Fri Sat          1               2                 3               4               5               6               7               8               9                 10               11               12               13               14               15               16                 17               18               19      2.5 mg   See details      20      1.25 mg         21            22               23                 24               25               26               27               28               29               30                Date Details   06/19 This INR check       Date of next INR:  6/21/2018         How to take your warfarin dose     To take:  1.25 mg Take 0.5 of a 2.5 mg tablet.    To take:  2.5 mg Take 1 of the 2.5 mg tablets.

## 2018-06-19 NOTE — PROGRESS NOTES
ANTICOAGULATION FOLLOW-UP CLINIC VISIT    Patient Name:  Zechariah Ashby  Date:  6/19/2018  Contact Type:  Telephone/ Steph aragon    SUBJECTIVE:     Patient Findings     Positives Missed doses    Comments Patient is therapeutic today after previously missed dosages. Patient states he is feeling better. He will arrive back in [place] on Friday. I instructed him to take 2.5 mg today and 1.25 mg Wednesday and Thursday. Patient will have his INR drawn on Thursday with [facility] receiving the result on Friday. Patient will have INR rechecked in [place] on Monday or Tuesday.            OBJECTIVE    INR   Date Value Ref Range Status   06/18/2018 2.3  Final       ASSESSMENT / PLAN  INR assessment THER    Recheck INR In: 3 DAYS    INR Location Outside lab      Anticoagulation Summary as of 6/19/2018     INR goal 2.0-3.0   Today's INR 2.3 (6/18/2018)   Warfarin maintenance plan 3.75 mg (2.5 mg x 1.5) on Mon; 2.5 mg (2.5 mg x 1) all other days   Full warfarin instructions 6/20: 1.25 mg; 6/21: 1.25 mg; Otherwise 3.75 mg on Mon; 2.5 mg all other days   Weekly warfarin total 18.75 mg   Plan last modified Yaritza Diaz RN (5/18/2018)   Next INR check 6/21/2018   Priority INR   Target end date     Indications   Atrial flutter  unspecified type (H) [I48.92]  Long-term (current) use of anticoagulants [Z79.01] [Z79.01]         Anticoagulation Episode Summary     INR check location     Preferred lab     Send INR reminders to [place] [facility]    Comments *Pt is on AMIODARONE. On palliative Keytruda as of 5-23-18. Pt in [place] for 3 weeks and leaving 6-2-18. He will check INR at outside lab with results faxed to [facility]      Anticoagulation Care Providers     Provider Role Specialty Phone number    Kailash Mason MD [facility] [phone]            See the Encounter Report to view Anticoagulation Flowsheet and Dosing Calendar (Go to Encounters tab in chart review, and find the Anticoagulation Therapy  Visit)        Mahendra David RN

## 2018-06-20 NOTE — TELEPHONE ENCOUNTER
Reason for Call:  orders    Detailed comments: Rowan jenkins patients daughter in law, stating he has been visiting them for the last 3 weeks, so now that he will be returning home on Friday, they will need a new order for Palliative Care and Home Health. Daughter is wondering if he needs to be seen prior to getting these orders. Dr. Mitchell is his palliative care MD, and Dr. Mason is his primary.    Phone Number Patient can be reached at: Rowan [phone]    Best Time: any    Can we leave a detailed message on this number? YES   Margaux Drew  [facility]      Call taken on 6/20/2018 at 9:50 AM by Margaux Drew

## 2018-06-20 NOTE — TELEPHONE ENCOUNTER
I am OK with palliative and Home Health orders.  He does not need to be seen first by me.   ROSA JASSO MD

## 2018-06-20 NOTE — TELEPHONE ENCOUNTER
Will route to  ,PCP for Palliative care orders.  Please route back for new plan if unable to do the Palliative orders or if an appt is needed.  KPavelRN

## 2018-06-22 NOTE — PROGRESS NOTES
ADDENDUM: Writer called and spoke with Zechariah. He states he has been handling his own medications since he has been home. He verified he is taking 1/2 tab (1.25mg daily). Patient has an appointment tomorrow in infusion, is also having other labs drawn. Writer requested he ask the clinic to also draw an INR.    Patient should also eventually have homecare once the order is submitted.     Alice Lea RN, CACP 6/26/2018 at 2:52 PM      ANTICOAGULATION FOLLOW-UP CLINIC VISIT    Patient Name:  Zechariah Ashby  Date:  6/22/2018  Contact Type:  Telephone/ Rowan-daughter n law    SUBJECTIVE:     Patient Findings     Positives No Problem Findings    Comments I instructed Rowan to have patient take 1.25 mg everyday until he has his INR rechecked on Tuesday. Patient is flying back to [facility] [place].            OBJECTIVE    INR   Date Value Ref Range Status   06/22/2018 3.0  Final       ASSESSMENT / PLAN  INR assessment THER    Recheck INR In: 4 DAYS    INR Location Outside lab      Anticoagulation Summary as of 6/22/2018     INR goal 2.0-3.0   Today's INR 3.0   Warfarin maintenance plan 3.75 mg (2.5 mg x 1.5) on Mon; 2.5 mg (2.5 mg x 1) all other days   Full warfarin instructions 6/22: 1.25 mg; 6/23: 1.25 mg; 6/24: 1.25 mg; 6/25: 1.25 mg; Otherwise 3.75 mg on Mon; 2.5 mg all other days   Weekly warfarin total 18.75 mg   Plan last modified Yaritza Diaz RN (5/18/2018)   Next INR check 6/26/2018   Priority INR   Target end date     Indications   Atrial flutter  unspecified type (H) [I48.92]  Long-term (current) use of anticoagulants [Z79.01] [Z79.01]         Anticoagulation Episode Summary     INR check location     Preferred lab     Send INR reminders to [place] [facility]    Comments *Pt is on AMIODARONE. On palliative Keytruda as of 5-23-18. Pt in [place] for 3 weeks and leaving 6-2-18. He will check INR at outside lab with results faxed to [facility]      Anticoagulation Care Providers     Provider Role Specialty Phone  number    Knightsen, Kailash Canas MD [facility] [phone]            See the Encounter Report to view Anticoagulation Flowsheet and Dosing Calendar (Go to Encounters tab in chart review, and find the Anticoagulation Therapy Visit)        Mahendra David RN

## 2018-06-22 NOTE — MR AVS SNAPSHOT
FredyJOSE C Ashby   6/22/2018   Anticoagulation Therapy Visit    Description:  58 year old male   Provider:  Mahendra David, RN   Department:  [facility]           INR as of 6/22/2018     Today's INR 3.0      Anticoagulation Summary as of 6/22/2018     INR goal 2.0-3.0   Today's INR 3.0   Full warfarin instructions 6/22: 1.25 mg; 6/23: 1.25 mg; 6/24: 1.25 mg; 6/25: 1.25 mg; Otherwise 3.75 mg on Mon; 2.5 mg all other days   Next INR check 6/26/2018    Indications   Atrial flutter  unspecified type (H) [I48.92]  Long-term (current) use of anticoagulants [Z79.01] [Z79.01]         June 2018 Details    Sun Mon Tue Wed Thu Fri Sat          1               2                 3               4               5               6               7               8               9                 10               11               12               13               14               15               16                 17               18               19               20               21               22      1.25 mg   See details      23      1.25 mg           24      1.25 mg         25      1.25 mg         26            27               28               29               30                Date Details   06/22 This INR check       Date of next INR:  6/26/2018         How to take your warfarin dose     To take:  1.25 mg Take 0.5 of a 2.5 mg tablet.    To take:  2.5 mg Take 1 of the 2.5 mg tablets.

## 2018-06-26 NOTE — TELEPHONE ENCOUNTER
Reason for Call: Request for an order or referral:    Order or referral being requested: Darby from  Homecare calling.  She is asking that the order that Dr. Mitchell placed yesterday, for palliative care, be changed from future to current and that he also add order for a , so that pt can be seen today.  No need to call Darby back, unless there are questions or problems.    Routed to Dr. Mitchell     Date needed: as soon as possible    Has the patient been seen by the PCP for this problem? NO    Additional comments:     Phone number Patient can be reached at:  Home number on file [phone] (home)    Best Time:  any    Can we leave a detailed message on this number?  YES    Call taken on 6/26/2018 at 9:05 AM by Katrin Ha

## 2018-06-27 NOTE — LETTER
6/27/2018         RE: Zechariah Ashby  [id number] [facility] [address]        Dear Colleague,    Thank you for referring your patient, Zechariah Ashby, to the [facility]. Please see a copy of my visit note below.    Heme/onc visit note  June 27, 2018  Primary hematologist/oncologist: Dr. May    Reason for visit: Follow up lung cancer, here for delayed (vacation) C2 Keytruda    Cancer history: Zechariah Ashby is a 58 year old male who was diagnosed with atrial fib/flutter in early November 2017. During workup just x-ray showed elevation of the left hemidiaphragm with a focal tenting. The CT scan was done for further evaluation showing left upper lobe atelectasis and a moderate-sized left pleural effusion. Patient also had mediastinal adenopathy. Patient had left thoracocentesis. Cytology came back as transudate and cytology came back negative. Patient has a long history of smoking. PET scan showed a large FDG avid mediastinal mass extending to the left hilum with obliteration of the left upper lobe bronchus and atelectasis of the upper lobe.  There are also multiple left cervical left cervical and mediastinal adenopathy.  There is increased left pleural effusion.  There is hypermetabolic left adrenal lesion.  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 He was started on carboplatin and gemcitabine and received 6 cycles before it was discontinued due to progression. He is now on Keytruda since 5/17. He received 1 dose and then was traveling to see his grand children and is 3 weeks delayed to start cycle 2. He has brain metastasis and last treatment for this was 5/17, gamma knife.     Interval history/ROS: He tolerated the first dose well and had a great trip however over the last few weeks be has been more short of breath, even at rest. He came in on a wheelchair today which isn't typical of him. He has noted  some wheezing and has been using albuterol up to 10X/day. No increase in cough or sputum production, no chest pain, no fevers or chills. No palpitations. He continues to smoke (1 pack every 3 days) and would like a script for a nicotrol inhaler. He is also very fatigued. Nausea is controlled with zofran. He has been on Wellbutrin for 1 mos and hasn't noticed much improvement in his mood yet. Bowels are moving ok, he is eating and has gained a few lbs. No DENZEL. No headache, visual changes, syncope or any new neuro changes. 10 pt ROS otherwise negative.     Past medical history:  Patient Active Problem List   Diagnosis     CARDIOVASCULAR SCREENING; LDL GOAL LESS THAN 130     Perforated ulcer (HCC)     Free intraperitoneal air     Atrial flutter (H)     Pleural effusion     Mediastinal lymphadenopathy     Non-small cell carcinoma of lung (H)     COPD exacerbation (H)     Weakness     Cardiomyopathy (H)     Acute on chronic systolic congestive heart failure (H)     Dehydration     Chemotherapy induced nausea and vomiting     Atrial flutter, unspecified type (H)     Long-term (current) use of anticoagulants [Z79.01]     Brain metastasis (H)     Nausea     Slow transit constipation     Moderate single current episode of major depressive disorder (H)     Anxiety       Medications:    Current Outpatient Prescriptions on File Prior to Visit:  albuterol (2.5 MG/3ML) 0.083% neb solution Take 1 vial (2.5 mg) by nebulization every 6 hours as needed for shortness of breath / dyspnea or wheezing   albuterol (PROAIR HFA) 108 (90 BASE) MCG/ACT Inhaler Inhale 2 puffs into the lungs every 4 hours as needed for shortness of breath / dyspnea or wheezing   amiodarone (PACERONE/CODARONE) 200 MG tablet TAKE ONE TABLET BY MOUTH ONE TIME DAILY   buPROPion (WELLBUTRIN SR) 200 MG 12 hr tablet Take 1 tablet (200 mg) by mouth 2 times daily   morphine (MSIR) 15 MG IR tablet Take 1 tablet (15 mg) by mouth every 6 hours as needed for moderate to  "severe pain   omeprazole (PRILOSEC) 40 MG capsule Take 1 capsule (40 mg) by mouth daily Take 30-60 minutes before a meal.   ondansetron (ZOFRAN) 4 MG tablet Take 1 tablet (4 mg) by mouth every 8 hours as needed for nausea   prochlorperazine (COMPAZINE) 10 MG tablet    senna-docusate (SENOKOT-S;PERICOLACE) 8.6-50 MG per tablet Take 2 tablets by mouth 2 times daily   warfarin (COUMADIN) 2.5 MG tablet TAKE 1/2 TABLET BY MOUTH ON MON & FRI AND 1 TABLET DAILY THE REST OF THE WEEK OR AS DIRECTED   warfarin (COUMADIN) 5 MG tablet 3.75 mg Mondays; 2.5 mg all other days as directed by the Anticoagulation Clinic   nicotine (NICODERM CQ) 21 MG/24HR 24 hr patch Place 1 patch onto the skin every 24 hours (Patient not taking: Reported on 6/27/2018)   order for DME Equipment being ordered: Nebulizer with tubing.   order for DME Equipment being ordered: Wheelchair   order for DME Equipment being ordered: Walker - 4 legged walker with wheels and a seat   order for DME Equipment being ordered: lightweight wheelchair.  Cannot lift standard wheelchair due to weakness and cancer.   order for DME Equipment being ordered: donut cushion for sacral ulcer   polyethylene glycol (MIRALAX) powder Take 17 g (1 capful) by mouth daily (Patient not taking: Reported on 6/27/2018)     Current Facility-Administered Medications on File Prior to Visit:  0.9% sodium chloride BOLUS   heparin 100 UNIT/ML injection 5 mL   sodium chloride (PF) 0.9% PF flush 10-20 mL       Allergy:     Allergies   Allergen Reactions     Nka [No Known Allergies]        Physical exam  BP (!) 130/96 (BP Location: Right arm, Patient Position: Sitting, Cuff Size: Adult Regular)  Pulse 87  Temp 97.5  F (36.4  C) (Tympanic)  Resp 20  Ht 1.803 m (5' 10.98\")  Wt 65 kg (143 lb 4.8 oz)  SpO2 99%  BMI 20 kg/m2   With exertion his O2 dropped to 85  Wt Readings from Last 4 Encounters:   06/27/18 65 kg (143 lb 4.8 oz)   05/30/18 64.4 kg (142 lb)   05/23/18 65.3 kg (144 lb)   05/17/18 " 66.7 kg (147 lb)     General: Thin male in NAD in wheelchair. No acute distress. Flat affect. Accompanied by wife  HEENT: Sclera anicteric. Oral mucosa pink and moist.  No mucositis or thrush  Lymph: No lymphadenopathy in neck  Heart: Regular, rate, and rhythm  Lungs: Decreased breath sounds but clear, I do not appreciate wheezing  Abdomen: Positive bowel sounds. Soft, non-distended, non-tender. No organomegaly or mass.   Extremities: no lower extremity edema  Neuro: Cranial nerves grossly intact  Rash: none  Vascular access: port    Labs:  Results for ROQUE SAXENA (MRN [id number]) as of 6/27/2018 14:13   Ref. Range 6/27/2018 09:27 6/27/2018 10:40   Sodium Latest Ref Range: 133 - 144 mmol/L 140    Potassium Latest Ref Range: 3.4 - 5.3 mmol/L 3.8    Chloride Latest Ref Range: 94 - 109 mmol/L 107    Carbon Dioxide Latest Ref Range: 20 - 32 mmol/L 28    Urea Nitrogen Latest Ref Range: 7 - 30 mg/dL 19    Creatinine Latest Ref Range: 0.66 - 1.25 mg/dL 1.03    GFR Estimate Latest Ref Range: >60 mL/min/1.7m2 74    GFR Estimate If Black Latest Ref Range: >60 mL/min/1.7m2 89    Calcium Latest Ref Range: 8.5 - 10.1 mg/dL 8.4 (L)    Anion Gap Latest Ref Range: 3 - 14 mmol/L 5    Albumin Latest Ref Range: 3.4 - 5.0 g/dL 3.1 (L)    Protein Total Latest Ref Range: 6.8 - 8.8 g/dL 6.1 (L)    Bilirubin Total Latest Ref Range: 0.2 - 1.3 mg/dL 0.2    Alkaline Phosphatase Latest Ref Range: 40 - 150 U/L 91    ALT Latest Ref Range: 0 - 70 U/L 18    AST Latest Ref Range: 0 - 45 U/L 18    T4 Free Latest Ref Range: 0.76 - 1.46 ng/dL 1.37    Troponin I ES Latest Ref Range: 0.000 - 0.045 ug/L  <0.015   TSH Latest Ref Range: 0.40 - 4.00 mU/L 9.46 (H)    Glucose Latest Ref Range: 70 - 99 mg/dL 104 (H)    WBC Latest Ref Range: 4.0 - 11.0 10e9/L 9.8    Hemoglobin Latest Ref Range: 13.3 - 17.7 g/dL 15.8    Hematocrit Latest Ref Range: 40.0 - 53.0 % 48.8    Platelet Count Latest Ref Range: 150 - 450 10e9/L 249    RBC Count Latest Ref Range: 4.4  - 5.9 10e12/L 4.94    MCV Latest Ref Range: 78 - 100 fl 99    MCH Latest Ref Range: 26.5 - 33.0 pg 32.0    MCHC Latest Ref Range: 31.5 - 36.5 g/dL 32.4    RDW Latest Ref Range: 10.0 - 15.0 % 12.5    Diff Method Unknown Automated Method    % Neutrophils Latest Units: % 82.7    % Lymphocytes Latest Units: % 9.3    % Monocytes Latest Units: % 6.9    % Eosinophils Latest Units: % 0.6    % Basophils Latest Units: % 0.3    % Immature Granulocytes Latest Units: % 0.2    Nucleated RBCs Latest Ref Range: 0 /100 0    Absolute Neutrophil Latest Ref Range: 1.6 - 8.3 10e9/L 8.1    Absolute Lymphocytes Latest Ref Range: 0.8 - 5.3 10e9/L 0.9    Absolute Monocytes Latest Ref Range: 0.0 - 1.3 10e9/L 0.7    Absolute Eosinophils Latest Ref Range: 0.0 - 0.7 10e9/L 0.1    Absolute Basophils Latest Ref Range: 0.0 - 0.2 10e9/L 0.0    Abs Immature Granulocytes Latest Ref Range: 0 - 0.4 10e9/L 0.0    Absolute Nucleated RBC Unknown 0.0        Assessment and plan:  Zechariah Ashby is a 58 year old male with advanced lung cancer with recent evidence of progression now on Keytruda    Lung cancer. He is short of breath. This may be related to progression (vs COPD exacerbation) as he only has received 1 cycle and then traveled for the last month visiting his grandchildren. It is unlikely to be pneumonitis and CXR was unchanged. No fever chills, increased sputum or cough. No chest pain- no pleuritic pain. He is hypoxic with activity and we will set him up for home O2 when active. He will return tomorrow for cycle 2 of Keytruda. Dr. Rand will be here in clinic. If he develops worsening sx or any chest pain, I would have a low thresh hold to evaluate him for PE. No DENZEL. Troponin and EKG unremarkable, in NSR.     Brain metastasis. Last MRI was 5/17 then he received gamma knife to a lesion that was growing. Plan is repeat scan in 3 mos then he can see Dr Woodson at [place] (per Dr Cisneros's last note).     COPD. His dyspnea responds temporarily to  albuterol and a duoneb here. He has no wheezing on exam but has decreased breath sounds bilaterally. Again, CXR unchanged. He will continue albuterol and will set him up for home O2. He should be monitored closely.     Depression. He has been on Wellbutrin 1 mos now and hasn't noticed a significant improvement in how he feels. Discussed that it can take 6 weeks and if not, his dose may need to be titrated.     Tobacco use. He requests nicotrol inhaler rather than smoking.     Over 50% of 45 min visit was spent counseling and coordinating care    Emily Kyle PA-C    Patient has been assessed for Home Oxygen needs. Oxygen readings:    *Pulse oximetry (SpO2) = 99% on room air at rest while awake.        *SpO2 = 85% on room air during activity/with exercise.    *SpO2 improved to 97% on 2 liters/minute during activity/with exercise.          Again, thank you for allowing me to participate in the care of your patient.        Sincerely,        Emily Kyle PA-C

## 2018-06-27 NOTE — PROGRESS NOTES
Patient has been assessed for Home Oxygen needs. Oxygen readings:    *Pulse oximetry (SpO2) = 99% on room air at rest while awake.        *SpO2 = 85% on room air during activity/with exercise.    *SpO2 improved to 97% on 2 liters/minute during activity/with exercise.

## 2018-06-27 NOTE — PATIENT INSTRUCTIONS
We are rescheduling your Keytruda to tomorrow.  You will need to have testing done today for the shortness of breath you are experiencing, and a Duoneb today. We would like to see you back in clinic with Dr. May prior to next cycle with infusion to be scheduled per treatment plan.      When you are in need of a refill, please call your pharmacy and they will send us a request.      Copy of appointments, and after visit summary (AVS) given to patient.      If you have any questions during business hours (M-F 8 AM- 4PM), please call Camille Lara RN, BSN, OCN Oncology Hematology /Breast Cancer Navigator at [facility] [phone].       For questions after business hours, or on holidays/weekends, please call our after hours Nurse Triage line [phone]. Thank you.

## 2018-06-27 NOTE — NURSING NOTE
"Oncology Rooming Note    June 27, 2018 9:47 AM   Zechariah Ashby is a 58 year old male who presents for:    Chief Complaint   Patient presents with     Oncology Clinic Visit     3 week recheck      Initial Vitals: BP (!) 130/96 (BP Location: Right arm, Patient Position: Sitting, Cuff Size: Adult Regular)  Pulse 87  Temp 97.5  F (36.4  C) (Tympanic)  Resp 20  Ht 1.803 m (5' 10.98\")  Wt 65 kg (143 lb 4.8 oz)  SpO2 99%  BMI 20 kg/m2 Estimated body mass index is 20 kg/(m^2) as calculated from the following:    Height as of this encounter: 1.803 m (5' 10.98\").    Weight as of this encounter: 65 kg (143 lb 4.8 oz). Body surface area is 1.8 meters squared.  No Pain (0) Comment: Data Unavailable   No LMP for male patient.  Allergies reviewed: Yes  Medications reviewed: Yes    Medications: Medication refills not needed today.  Pharmacy name entered into docTrackr:      [id number] [address] [facility]    Clinical concerns:3 week recheck Lung CA, Labs & Chemo today.     Patient reports SOB x 2 weeks. Starting using his nebulizer & inhaler again which helps.      7 minutes for nursing intake (face to face time)     Carmen Barreto CMA              "

## 2018-06-27 NOTE — MR AVS SNAPSHOT
After Visit Summary   6/27/2018    Zechariah Ashby    MRN: [id number]           Patient Information     Date Of Birth          1960        Visit Information        Provider Department      6/27/2018 9:30 AM ROOM 3 [facility]        Today's Diagnoses     Non-small cell carcinoma of lung (H)    -  1       Follow-ups after your visit        Your next 10 appointments already scheduled     Jul 19, 2018  9:30 AM CDT   Level 2 with ROOM 5 [facility] ([facility])    [facility]  [address]  [facility] [address]   [phone]            Jul 19, 2018  9:45 AM CDT   Return Visit with Joycelyn May MD   [facility] ([facility])    [facility]  [address]  [facility] [address]   [phone]            Aug 08, 2018  9:30 AM CDT   Level 2 with ROOM 10 [facility] ([facility])    [facility]  [address]  [facility] [address]   [phone]            Aug 16, 2018 10:15 AM CDT   MR BRAIN W/O & W CONTRAST with [address] MRI ([facility])    [address] [facility] [address]   [phone]           Take your medicines as usual, unless your doctor tells you not to. Bring a list of your current medicines to your exam (including vitamins, minerals and over-the-counter drugs).  You may or may not receive intravenous (IV) contrast for this exam pending the discretion of the Radiologist.  You do not need to do anything special to prepare.  The MRI machine uses a strong magnet. Please wear clothes without metal (snaps, zippers). A sweatsuit works well, or we may give you a hospital gown.  Please remove any body piercings and hair extensions before you arrive. You will also remove watches, jewelry, hairpins, wallets, dentures, partial dental plates and hearing aids. You  may wear contact lenses, and you may be able to wear your rings. We have a safe place to keep your personal items, but it is safer to leave them at home.  **IMPORTANT** THE INSTRUCTIONS BELOW ARE ONLY FOR THOSE PATIENTS WHO HAVE BEEN PRESCRIBED SEDATION OR GENERAL ANESTHESIA DURING THEIR MRI PROCEDURE:  IF YOUR DOCTOR PRESCRIBED ORAL SEDATION (take medicine to help you relax during your exam):   You must get the medicine from your doctor (oral medication) before you arrive. Bring the medicine to the exam. Do not take it at home. You ll be told when to take it upon arriving for your exam.   Arrive one hour early. Bring someone who can take you home after the test. Your medicine will make you sleepy. After the exam, you may not drive, take a bus or take a taxi by yourself.  IF YOUR DOCTOR PRESCRIBED IV SEDATION:   Arrive one hour early. Bring someone who can take you home after the test. Your medicine will make you sleepy. After the exam, you may not drive, take a bus or take a taxi by yourself.   No eating 6 hours before your exam. You may have clear liquids up until 4 hours before your exam. (Clear liquids include water, clear tea, black coffee and fruit juice without pulp.)  IF YOUR DOCTOR PRESCRIBED ANESTHESIA (be asleep for your exam):   Arrive 1 1/2 hours early. Bring someone who can take you home after the test. You may not drive, take a bus or take a taxi by yourself.   No eating 8 hours before your exam. You may have clear liquids up until 4 hours before your exam. (Clear liquids include water, clear tea, black coffee and fruit juice without pulp.)   You will spend four to five hours in the recovery room.  Please call the Imaging Department at your exam site with any questions.            Aug 16, 2018 11:15 AM CDT   Return Visit with Benoit Woodson MD   [facility] ([facility])    [address] [place] [address], [address]  [facility] [address] [phone]              Future tests that were  ordered for you today     Open Future Orders        Priority Expected Expires Ordered    EKG 12-LEAD CLINIC READ Routine 6/27/2018 7/27/2018 6/27/2018    XR Chest 2 Views STAT 6/27/2018 6/27/2019 6/27/2018            Who to contact     If you have questions or need follow up information about today's clinic visit or your schedule please contact [facility] directly at [phone].  Normal or non-critical lab and imaging results will be communicated to you by Minicabsterhart, letter or phone within 4 business days after the clinic has received the results. If you do not hear from us within 7 days, please contact the clinic through Tail or phone. If you have a critical or abnormal lab result, we will notify you by phone as soon as possible.  Submit refill requests through Tail or call your pharmacy and they will forward the refill request to us. Please allow 3 business days for your refill to be completed.          Additional Information About Your Visit        Tail Information     Tail gives you secure access to your electronic health record. If you see a primary care provider, you can also send messages to your care team and make appointments. If you have questions, please call your primary care clinic.  If you do not have a primary care provider, please call [phone] and they will assist you.        Care EveryWhere ID     This is your Care EveryWhere ID. This could be used by other organizations to access your [place] medical records  [id number]        Your Vitals Were     Pulse Pulse Oximetry                74 99%           Blood Pressure from Last 3 Encounters:   06/28/18 134/89   06/27/18 (!) 130/96   06/27/18 121/86    Weight from Last 3 Encounters:   06/27/18 65 kg (143 lb 4.8 oz)   05/30/18 64.4 kg (142 lb)   05/23/18 65.3 kg (144 lb)              We Performed the Following     Comprehensive metabolic panel     T4 free     TSH with free T4 reflex          Today's Medication Changes           These changes are accurate as of 6/27/18 11:59 PM.  If you have any questions, ask your nurse or doctor.               Start taking these medicines.        Dose/Directions    order for DME   Used for:  Shortness of breath, Tobacco abuse, Non-small cell carcinoma of lung (H)   Started by:  Emily Kyle PA-C        Equipment being ordered: Oxygen portable Length of need: 99 2 Liters Nasal Canula to keep O2 sats greater than 92%   Quantity:  1 each   Refills:  0         These medicines have changed or have updated prescriptions.        Dose/Directions    * nicotine 21 MG/24HR 24 hr patch   Commonly known as:  NICODERM CQ   This may have changed:  Another medication with the same name was added. Make sure you understand how and when to take each.   Used for:  Non-small cell carcinoma of lung (H)   Changed by:  Emily Kyle PA-C        Dose:  1 patch   Place 1 patch onto the skin every 24 hours   Quantity:  30 patch   Refills:  0       * nicotine 10 MG Inhaler   Commonly known as:  NICOTROL   This may have changed:  You were already taking a medication with the same name, and this prescription was added. Make sure you understand how and when to take each.   Used for:  Tobacco abuse   Changed by:  Emily Kyle PA-C        Dose:  6 Cartridge   Inhale 6 Cartridges into the lungs daily as needed for smoking cessation   Quantity:  168 each   Refills:  0       * Notice:  This list has 2 medication(s) that are the same as other medications prescribed for you. Read the directions carefully, and ask your doctor or other care provider to review them with you.         Where to get your medicines      These medications were sent to [facility] [place], [place] - [address] [facility]  [address] [facility] [address]     Phone:  [phone]     nicotine 10 MG Inhaler         Some of these will need a paper prescription and others can be bought over the counter.  Ask your nurse if you have questions.      You don't need a prescription for these medications     order for DME                Primary Care Provider Office Phone # Fax #    Kailash Mason [phone] [address] [place] [id number]        Equal Access to Services     ROD PERRY : Hadnehal arnol lai phoenix Soasher, wabusterda luqadaha, qakingta kaalmada judah, harley chauhancristiano villanuevadick frost laStanleypeggy boykin. So [facility] [phone].    ATENCIÓN: Si habla español, tiene a morocho disposición servicios gratuitos de asistencia lingüística. [facility] [phone].    We comply with applicable federal civil rights laws and [place] laws. We do not discriminate on the basis of race, color, national origin, age, disability, sex, sexual orientation, or gender identity.            Thank you!     Thank you for choosing [facility]  for your care. Our goal is always to provide you with excellent care. Hearing back from our patients is one way we can continue to improve our services. Please take a few minutes to complete the written survey that you may receive in the mail after your visit with us. Thank you!             Your Updated Medication List - Protect others around you: Learn how to safely use, store and throw away your medicines at www.disposemymeds.org.          This list is accurate as of 6/27/18 11:59 PM.  Always use your most recent med list.                   Brand Name Dispense Instructions for use Diagnosis    * albuterol 108 (90 Base) MCG/ACT Inhaler    PROAIR HFA    1 Inhaler    Inhale 2 puffs into the lungs every 4 hours as needed for shortness of breath / dyspnea or wheezing    Chronic obstructive pulmonary disease, unspecified COPD type (H)       * albuterol (2.5 MG/3ML) 0.083% neb solution     25 vial    Take 1 vial (2.5 mg) by nebulization every 6 hours as needed for shortness of breath / dyspnea or wheezing    COPD exacerbation (H)       amiodarone 200 MG tablet    PACERONE/CODARONE    30 tablet    TAKE ONE TABLET BY MOUTH  ONE TIME DAILY    Paroxysmal atrial fibrillation (H)       buPROPion 200 MG 12 hr tablet    WELLBUTRIN SR    60 tablet    Take 1 tablet (200 mg) by mouth 2 times daily    Moderate single current episode of major depressive disorder (H)       morphine 15 MG IR tablet    MSIR    60 tablet    Take 1 tablet (15 mg) by mouth every 6 hours as needed for moderate to severe pain    Non-small cell carcinoma of lung (H)       * nicotine 21 MG/24HR 24 hr patch    NICODERM CQ    30 patch    Place 1 patch onto the skin every 24 hours    Non-small cell carcinoma of lung (H)       * nicotine 10 MG Inhaler    NICOTROL    168 each    Inhale 6 Cartridges into the lungs daily as needed for smoking cessation    Tobacco abuse       omeprazole 40 MG capsule    priLOSEC    30 capsule    Take 1 capsule (40 mg) by mouth daily Take 30-60 minutes before a meal.    Gastroesophageal reflux disease without esophagitis       ondansetron 4 MG tablet    ZOFRAN    30 tablet    Take 1 tablet (4 mg) by mouth every 8 hours as needed for nausea    Non-small cell carcinoma of lung (H)       order for DME     1 Device    Equipment being ordered: donut cushion for sacral ulcer    Decubitus ulcer of sacral region, stage 1       order for DME     1 Device    Equipment being ordered: lightweight wheelchair.   Cannot lift standard wheelchair due to weakness and cancer.    Weakness       order for DME     1 Device    Equipment being ordered: Walker - 4 legged walker with wheels and a seat    Mediastinal lymphadenopathy       order for DME     1 each    Equipment being ordered: Wheelchair    Non-small cell carcinoma of lung (H)       order for DME     1 each    Equipment being ordered: Nebulizer with tubing.    Non-small cell carcinoma of lung (H)       order for DME     1 each    Equipment being ordered: Oxygen portable Length of need: 99 2 Liters Nasal Canula to keep O2 sats greater than 92%    Shortness of breath, Tobacco abuse, Non-small cell carcinoma of  lung (H)       polyethylene glycol powder    MIRALAX    510 g    Take 17 g (1 capful) by mouth daily    Constipation, unspecified constipation type       prochlorperazine 10 MG tablet    COMPAZINE          senna-docusate 8.6-50 MG per tablet    SENOKOT-S;PERICOLACE    120 tablet    Take 2 tablets by mouth 2 times daily    Constipation, unspecified constipation type       warfarin 5 MG tablet    COUMADIN    30 tablet    ONLY USING 2.5mg TABS    Atrial flutter, unspecified type (H)       * Notice:  This list has 4 medication(s) that are the same as other medications prescribed for you. Read the directions carefully, and ask your doctor or other care provider to review them with you.

## 2018-06-27 NOTE — MR AVS SNAPSHOT
After Visit Summary   6/27/2018    Zechariah Ashby    MRN: [id number]           Patient Information     Date Of Birth          1960        Visit Information        Provider Department      6/27/2018 10:00 AM Emily Kyle PA-C [facility]        Today's Diagnoses     Shortness of breath    -  1    Tobacco abuse        Non-small cell carcinoma of lung (H)        Brain metastasis (H)        COPD exacerbation (H)        Moderate single current episode of major depressive disorder (H)          Care Instructions    We are rescheduling your Keytruda to tomorrow.  You will need to have testing done today for the shortness of breath you are experiencing, and a Duoneb today. We would like to see you back in clinic with Dr. May prior to next cycle with infusion to be scheduled per treatment plan.      When you are in need of a refill, please call your pharmacy and they will send us a request.      Copy of appointments, and after visit summary (AVS) given to patient.      If you have any questions during business hours (M-F 8 AM- 4PM), please call Camille Lara RN, BSN, OCN Oncology Hematology /Breast Cancer Navigator at [facility] [phone].       For questions after business hours, or on holidays/weekends, please call our after hours Nurse Triage line [phone]. Thank you.            Follow-ups after your visit        Your next 10 appointments already scheduled     Jun 28, 2018  2:30 PM CDT   Level 2 with ROOM 5 [facility] ([facility])    [facility]  [address]  [facility] [address]   [phone]            Jul 19, 2018  9:30 AM CDT   Level 2 with ROOM 5 [facility] ([facility])    [facility]  [address]  [facility] [address]   [phone]            Jul 19, 2018  9:45 AM CDT   Return Visit with Joycelyn VILLAGOMEZ  MD Lalo   [facility] ([facility])    [facility]  [address]  [facility] [address]   [phone]            Aug 08, 2018  9:30 AM CDT   Level 2 with ROOM 10 [facility] ([facility])    [facility]  [address]  [facility] [address]   [phone]            Aug 16, 2018 10:15 AM CDT   MR BRAIN W/O & W CONTRAST with [address] MRI ([facility])    [address]  [facility] [address]   [phone]           Take your medicines as usual, unless your doctor tells you not to. Bring a list of your current medicines to your exam (including vitamins, minerals and over-the-counter drugs).  You may or may not receive intravenous (IV) contrast for this exam pending the discretion of the Radiologist.  You do not need to do anything special to prepare.  The MRI machine uses a strong magnet. Please wear clothes without metal (snaps, zippers). A sweatsuit works well, or we may give you a hospital gown.  Please remove any body piercings and hair extensions before you arrive. You will also remove watches, jewelry, hairpins, wallets, dentures, partial dental plates and hearing aids. You may wear contact lenses, and you may be able to wear your rings. We have a safe place to keep your personal items, but it is safer to leave them at home.  **IMPORTANT** THE INSTRUCTIONS BELOW ARE ONLY FOR THOSE PATIENTS WHO HAVE BEEN PRESCRIBED SEDATION OR GENERAL ANESTHESIA DURING THEIR MRI PROCEDURE:  IF YOUR DOCTOR PRESCRIBED ORAL SEDATION (take medicine to help you relax during your exam):   You must get the medicine from your doctor (oral medication) before you arrive. Bring the medicine to the exam. Do not take it at home. You ll be told when to take it upon arriving for your exam.   Arrive one hour early. Bring someone who can take you home after the test. Your medicine will  make you sleepy. After the exam, you may not drive, take a bus or take a taxi by yourself.  IF YOUR DOCTOR PRESCRIBED IV SEDATION:   Arrive one hour early. Bring someone who can take you home after the test. Your medicine will make you sleepy. After the exam, you may not drive, take a bus or take a taxi by yourself.   No eating 6 hours before your exam. You may have clear liquids up until 4 hours before your exam. (Clear liquids include water, clear tea, black coffee and fruit juice without pulp.)  IF YOUR DOCTOR PRESCRIBED ANESTHESIA (be asleep for your exam):   Arrive 1 1/2 hours early. Bring someone who can take you home after the test. You may not drive, take a bus or take a taxi by yourself.   No eating 8 hours before your exam. You may have clear liquids up until 4 hours before your exam. (Clear liquids include water, clear tea, black coffee and fruit juice without pulp.)   You will spend four to five hours in the recovery room.  Please call the Imaging Department at your exam site with any questions.            Aug 16, 2018 11:15 AM CDT   Return Visit with Benoit Woodson MD   [facility] ([facility])    [address]  [facility] [address]   [phone]              Future tests that were ordered for you today     Open Future Orders        Priority Expected Expires Ordered    EKG 12-LEAD CLINIC READ Routine 6/27/2018 7/27/2018 6/27/2018    XR Chest 2 Views STAT 6/27/2018 6/27/2019 6/27/2018            Who to contact     If you have questions or need follow up information about today's clinic visit or your schedule please contact [facility] directly at [phone].  Normal or non-critical lab and imaging results will be communicated to you by MyChart, letter or phone within 4 business days after the clinic has received the results. If you do not hear from us within 7 days, please contact the clinic through MyChart or phone. If you have a critical or abnormal  "lab result, we will notify you by phone as soon as possible.  Submit refill requests through [employer] or call your pharmacy and they will forward the refill request to us. Please allow 3 business days for your refill to be completed.          Additional Information About Your Visit        [employer] Information     [employer] gives you secure access to your electronic health record. If you see a primary care provider, you can also send messages to your care team and make appointments. If you have questions, please call your primary care clinic.  If you do not have a primary care provider, please call [phone] and they will assist you.        Care EveryWhere ID     This is your Care EveryWhere ID. This could be used by other organizations to access your [facility] medical records  [id number]        Your Vitals Were     Pulse Temperature Respirations Height Pulse Oximetry BMI (Body Mass Index)    87 97.5  F (36.4  C) (Tympanic) 20 1.803 m (5' 10.98\") 99% 20 kg/m2       Blood Pressure from Last 3 Encounters:   06/27/18 (!) 130/96   06/27/18 121/86   05/30/18 (!) 128/98    Weight from Last 3 Encounters:   06/27/18 65 kg (143 lb 4.8 oz)   05/30/18 64.4 kg (142 lb)   05/23/18 65.3 kg (144 lb)              We Performed the Following     CBC with platelets and differential     Troponin I          Today's Medication Changes          These changes are accurate as of 6/27/18  3:22 PM.  If you have any questions, ask your nurse or doctor.               Start taking these medicines.        Dose/Directions    order for DME   Used for:  Shortness of breath, Tobacco abuse, Non-small cell carcinoma of lung (H)   Started by:  Emily Kyle PA-C        Equipment being ordered: Oxygen portable Length of need: 99 2 Liters Nasal Canula to keep O2 sats greater than 92%   Quantity:  1 each   Refills:  0         These medicines have changed or have updated prescriptions.        Dose/Directions    * nicotine 21 MG/24HR 24 hr patch   Commonly " known as:  NICODERM JENNIFER   This may have changed:  Another medication with the same name was added. Make sure you understand how and when to take each.   Used for:  Non-small cell carcinoma of lung (H)   Changed by:  Emily Kyle PA-C        Dose:  1 patch   Place 1 patch onto the skin every 24 hours   Quantity:  30 patch   Refills:  0       * nicotine 10 MG Inhaler   Commonly known as:  NICOTROL   This may have changed:  You were already taking a medication with the same name, and this prescription was added. Make sure you understand how and when to take each.   Used for:  Tobacco abuse   Changed by:  Emily Kyle PA-C        Dose:  6 Cartridge   Inhale 6 Cartridges into the lungs daily as needed for smoking cessation   Quantity:  168 each   Refills:  0       * Notice:  This list has 2 medication(s) that are the same as other medications prescribed for you. Read the directions carefully, and ask your doctor or other care provider to review them with you.         Where to get your medicines      These medications were sent to [facility] [address]  [address] [facility] [address]     Phone:  [phone]     nicotine 10 MG Inhaler         Some of these will need a paper prescription and others can be bought over the counter.  Ask your nurse if you have questions.     You don't need a prescription for these medications     order for DME                Primary Care Provider Office Phone # Fax #    Kailash Mason [phone] [address] [place] [address]        Equal Access to Services     ROD PERRY AH: Diego bateso Soasher, waaxda luqadaha, qaybta kaalmada judah, harley boykin. So [facility] [phone].    ATENCIÓN: Si habla español, tiene a morocho disposición servicios gratuitos de asistencia lingüística. Llame al [phone].    We comply with applicable federal civil rights laws and [place] laws.  We do not discriminate on the basis of race, color, national origin, age, disability, sex, sexual orientation, or gender identity.            Thank you!     Thank you for choosing [facility]  for your care. Our goal is always to provide you with excellent care. Hearing back from our patients is one way we can continue to improve our services. Please take a few minutes to complete the written survey that you may receive in the mail after your visit with us. Thank you!             Your Updated Medication List - Protect others around you: Learn how to safely use, store and throw away your medicines at www.disposemymeds.org.          This list is accurate as of 6/27/18  3:22 PM.  Always use your most recent med list.                   Brand Name Dispense Instructions for use Diagnosis    * albuterol 108 (90 Base) MCG/ACT Inhaler    PROAIR HFA    1 Inhaler    Inhale 2 puffs into the lungs every 4 hours as needed for shortness of breath / dyspnea or wheezing    Chronic obstructive pulmonary disease, unspecified COPD type (H)       * albuterol (2.5 MG/3ML) 0.083% neb solution     25 vial    Take 1 vial (2.5 mg) by nebulization every 6 hours as needed for shortness of breath / dyspnea or wheezing    COPD exacerbation (H)       amiodarone 200 MG tablet    PACERONE/CODARONE    30 tablet    TAKE ONE TABLET BY MOUTH ONE TIME DAILY    Paroxysmal atrial fibrillation (H)       buPROPion 200 MG 12 hr tablet    WELLBUTRIN SR    60 tablet    Take 1 tablet (200 mg) by mouth 2 times daily    Moderate single current episode of major depressive disorder (H)       morphine 15 MG IR tablet    MSIR    60 tablet    Take 1 tablet (15 mg) by mouth every 6 hours as needed for moderate to severe pain    Non-small cell carcinoma of lung (H)       * nicotine 21 MG/24HR 24 hr patch    NICODERM CQ    30 patch    Place 1 patch onto the skin every 24 hours    Non-small cell carcinoma of lung (H)       * nicotine 10 MG Inhaler    NICOTROL     168 each    Inhale 6 Cartridges into the lungs daily as needed for smoking cessation    Tobacco abuse       omeprazole 40 MG capsule    priLOSEC    30 capsule    Take 1 capsule (40 mg) by mouth daily Take 30-60 minutes before a meal.    Gastroesophageal reflux disease without esophagitis       ondansetron 4 MG tablet    ZOFRAN    30 tablet    Take 1 tablet (4 mg) by mouth every 8 hours as needed for nausea    Non-small cell carcinoma of lung (H)       order for DME     1 Device    Equipment being ordered: donut cushion for sacral ulcer    Decubitus ulcer of sacral region, stage 1       order for DME     1 Device    Equipment being ordered: lightweight wheelchair.   Cannot lift standard wheelchair due to weakness and cancer.    Weakness       order for DME     1 Device    Equipment being ordered: Walker - 4 legged walker with wheels and a seat    Mediastinal lymphadenopathy       order for DME     1 each    Equipment being ordered: Wheelchair    Non-small cell carcinoma of lung (H)       order for DME     1 each    Equipment being ordered: Nebulizer with tubing.    Non-small cell carcinoma of lung (H)       order for DME     1 each    Equipment being ordered: Oxygen portable Length of need: 99 2 Liters Nasal Canula to keep O2 sats greater than 92%    Shortness of breath, Tobacco abuse, Non-small cell carcinoma of lung (H)       polyethylene glycol powder    MIRALAX    510 g    Take 17 g (1 capful) by mouth daily    Constipation, unspecified constipation type       prochlorperazine 10 MG tablet    COMPAZINE          senna-docusate 8.6-50 MG per tablet    SENOKOT-S;PERICOLACE    120 tablet    Take 2 tablets by mouth 2 times daily    Constipation, unspecified constipation type       * warfarin 5 MG tablet    COUMADIN    30 tablet    3.75 mg Mondays; 2.5 mg all other days as directed by the Anticoagulation Clinic    Atrial flutter, unspecified type (H)       * warfarin 2.5 MG tablet    COUMADIN    25 tablet    TAKE 1/2  TABLET BY MOUTH ON MON & FRI AND 1 TABLET DAILY THE REST OF THE WEEK OR AS DIRECTED    Long-term (current) use of anticoagulants, Atrial flutter, unspecified type (H)       * Notice:  This list has 6 medication(s) that are the same as other medications prescribed for you. Read the directions carefully, and ask your doctor or other care provider to review them with you.

## 2018-06-27 NOTE — PROGRESS NOTES
Heme/onc visit note  June 27, 2018  Primary hematologist/oncologist: Dr. May    Reason for visit: Follow up lung cancer, here for delayed (vacation) C2 Keytruda    Cancer history: Zechariah Ashby is a 58 year old male who was diagnosed with atrial fib/flutter in early November 2017. During workup just x-ray showed elevation of the left hemidiaphragm with a focal tenting. The CT scan was done for further evaluation showing left upper lobe atelectasis and a moderate-sized left pleural effusion. Patient also had mediastinal adenopathy. Patient had left thoracocentesis. Cytology came back as transudate and cytology came back negative. Patient has a long history of smoking. PET scan showed a large FDG avid mediastinal mass extending to the left hilum with obliteration of the left upper lobe bronchus and atelectasis of the upper lobe.  There are also multiple left cervical left cervical and mediastinal adenopathy.  There is increased left pleural effusion.  There is hypermetabolic left adrenal lesion.  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 He was started on carboplatin and gemcitabine and received 6 cycles before it was discontinued due to progression. He is now on Keytruda since 5/17. He received 1 dose and then was traveling to see his grand children and is 3 weeks delayed to start cycle 2. He has brain metastasis and last treatment for this was 5/17, gamma knife.     Interval history/ROS: He tolerated the first dose well and had a great trip however over the last few weeks be has been more short of breath, even at rest. He came in on a wheelchair today which isn't typical of him. He has noted some wheezing and has been using albuterol up to 10X/day. No increase in cough or sputum production, no chest pain, no fevers or chills. No palpitations. He continues to smoke (1 pack every 3 days) and would like a script for a nicotrol inhaler.  He is also very fatigued. Nausea is controlled with zofran. He has been on Wellbutrin for 1 mos and hasn't noticed much improvement in his mood yet. Bowels are moving ok, he is eating and has gained a few lbs. No DENZEL. No headache, visual changes, syncope or any new neuro changes. 10 pt ROS otherwise negative.     Past medical history:  Patient Active Problem List   Diagnosis     CARDIOVASCULAR SCREENING; LDL GOAL LESS THAN 130     Perforated ulcer (HCC)     Free intraperitoneal air     Atrial flutter (H)     Pleural effusion     Mediastinal lymphadenopathy     Non-small cell carcinoma of lung (H)     COPD exacerbation (H)     Weakness     Cardiomyopathy (H)     Acute on chronic systolic congestive heart failure (H)     Dehydration     Chemotherapy induced nausea and vomiting     Atrial flutter, unspecified type (H)     Long-term (current) use of anticoagulants [Z79.01]     Brain metastasis (H)     Nausea     Slow transit constipation     Moderate single current episode of major depressive disorder (H)     Anxiety       Medications:    Current Outpatient Prescriptions on File Prior to Visit:  albuterol (2.5 MG/3ML) 0.083% neb solution Take 1 vial (2.5 mg) by nebulization every 6 hours as needed for shortness of breath / dyspnea or wheezing   albuterol (PROAIR HFA) 108 (90 BASE) MCG/ACT Inhaler Inhale 2 puffs into the lungs every 4 hours as needed for shortness of breath / dyspnea or wheezing   amiodarone (PACERONE/CODARONE) 200 MG tablet TAKE ONE TABLET BY MOUTH ONE TIME DAILY   buPROPion (WELLBUTRIN SR) 200 MG 12 hr tablet Take 1 tablet (200 mg) by mouth 2 times daily   morphine (MSIR) 15 MG IR tablet Take 1 tablet (15 mg) by mouth every 6 hours as needed for moderate to severe pain   omeprazole (PRILOSEC) 40 MG capsule Take 1 capsule (40 mg) by mouth daily Take 30-60 minutes before a meal.   ondansetron (ZOFRAN) 4 MG tablet Take 1 tablet (4 mg) by mouth every 8 hours as needed for nausea   prochlorperazine  "(COMPAZINE) 10 MG tablet    senna-docusate (SENOKOT-S;PERICOLACE) 8.6-50 MG per tablet Take 2 tablets by mouth 2 times daily   warfarin (COUMADIN) 2.5 MG tablet TAKE 1/2 TABLET BY MOUTH ON MON & FRI AND 1 TABLET DAILY THE REST OF THE WEEK OR AS DIRECTED   warfarin (COUMADIN) 5 MG tablet 3.75 mg Mondays; 2.5 mg all other days as directed by the Anticoagulation Clinic   nicotine (NICODERM CQ) 21 MG/24HR 24 hr patch Place 1 patch onto the skin every 24 hours (Patient not taking: Reported on 6/27/2018)   order for DME Equipment being ordered: Nebulizer with tubing.   order for DME Equipment being ordered: Wheelchair   order for DME Equipment being ordered: Walker - 4 legged walker with wheels and a seat   order for DME Equipment being ordered: lightweight wheelchair.  Cannot lift standard wheelchair due to weakness and cancer.   order for DME Equipment being ordered: donut cushion for sacral ulcer   polyethylene glycol (MIRALAX) powder Take 17 g (1 capful) by mouth daily (Patient not taking: Reported on 6/27/2018)     Current Facility-Administered Medications on File Prior to Visit:  0.9% sodium chloride BOLUS   heparin 100 UNIT/ML injection 5 mL   sodium chloride (PF) 0.9% PF flush 10-20 mL       Allergy:     Allergies   Allergen Reactions     Nka [No Known Allergies]        Physical exam  BP (!) 130/96 (BP Location: Right arm, Patient Position: Sitting, Cuff Size: Adult Regular)  Pulse 87  Temp 97.5  F (36.4  C) (Tympanic)  Resp 20  Ht 1.803 m (5' 10.98\")  Wt 65 kg (143 lb 4.8 oz)  SpO2 99%  BMI 20 kg/m2   With exertion his O2 dropped to 85  Wt Readings from Last 4 Encounters:   06/27/18 65 kg (143 lb 4.8 oz)   05/30/18 64.4 kg (142 lb)   05/23/18 65.3 kg (144 lb)   05/17/18 66.7 kg (147 lb)     General: Thin male in NAD in wheelchair. No acute distress. Flat affect. Accompanied by wife  HEENT: Sclera anicteric. Oral mucosa pink and moist.  No mucositis or thrush  Lymph: No lymphadenopathy in neck  Heart: " Regular, rate, and rhythm  Lungs: Decreased breath sounds but clear, I do not appreciate wheezing  Abdomen: Positive bowel sounds. Soft, non-distended, non-tender. No organomegaly or mass.   Extremities: no lower extremity edema  Neuro: Cranial nerves grossly intact  Rash: none  Vascular access: port    Labs:  Results for ROQUE SAXENA (MRN [id number]) as of 6/27/2018 14:13   Ref. Range 6/27/2018 09:27 6/27/2018 10:40   Sodium Latest Ref Range: 133 - 144 mmol/L 140    Potassium Latest Ref Range: 3.4 - 5.3 mmol/L 3.8    Chloride Latest Ref Range: 94 - 109 mmol/L 107    Carbon Dioxide Latest Ref Range: 20 - 32 mmol/L 28    Urea Nitrogen Latest Ref Range: 7 - 30 mg/dL 19    Creatinine Latest Ref Range: 0.66 - 1.25 mg/dL 1.03    GFR Estimate Latest Ref Range: >60 mL/min/1.7m2 74    GFR Estimate If Black Latest Ref Range: >60 mL/min/1.7m2 89    Calcium Latest Ref Range: 8.5 - 10.1 mg/dL 8.4 (L)    Anion Gap Latest Ref Range: 3 - 14 mmol/L 5    Albumin Latest Ref Range: 3.4 - 5.0 g/dL 3.1 (L)    Protein Total Latest Ref Range: 6.8 - 8.8 g/dL 6.1 (L)    Bilirubin Total Latest Ref Range: 0.2 - 1.3 mg/dL 0.2    Alkaline Phosphatase Latest Ref Range: 40 - 150 U/L 91    ALT Latest Ref Range: 0 - 70 U/L 18    AST Latest Ref Range: 0 - 45 U/L 18    T4 Free Latest Ref Range: 0.76 - 1.46 ng/dL 1.37    Troponin I ES Latest Ref Range: 0.000 - 0.045 ug/L  <0.015   TSH Latest Ref Range: 0.40 - 4.00 mU/L 9.46 (H)    Glucose Latest Ref Range: 70 - 99 mg/dL 104 (H)    WBC Latest Ref Range: 4.0 - 11.0 10e9/L 9.8    Hemoglobin Latest Ref Range: 13.3 - 17.7 g/dL 15.8    Hematocrit Latest Ref Range: 40.0 - 53.0 % 48.8    Platelet Count Latest Ref Range: 150 - 450 10e9/L 249    RBC Count Latest Ref Range: 4.4 - 5.9 10e12/L 4.94    MCV Latest Ref Range: 78 - 100 fl 99    MCH Latest Ref Range: 26.5 - 33.0 pg 32.0    MCHC Latest Ref Range: 31.5 - 36.5 g/dL 32.4    RDW Latest Ref Range: 10.0 - 15.0 % 12.5    Diff Method Unknown Automated  Method    % Neutrophils Latest Units: % 82.7    % Lymphocytes Latest Units: % 9.3    % Monocytes Latest Units: % 6.9    % Eosinophils Latest Units: % 0.6    % Basophils Latest Units: % 0.3    % Immature Granulocytes Latest Units: % 0.2    Nucleated RBCs Latest Ref Range: 0 /100 0    Absolute Neutrophil Latest Ref Range: 1.6 - 8.3 10e9/L 8.1    Absolute Lymphocytes Latest Ref Range: 0.8 - 5.3 10e9/L 0.9    Absolute Monocytes Latest Ref Range: 0.0 - 1.3 10e9/L 0.7    Absolute Eosinophils Latest Ref Range: 0.0 - 0.7 10e9/L 0.1    Absolute Basophils Latest Ref Range: 0.0 - 0.2 10e9/L 0.0    Abs Immature Granulocytes Latest Ref Range: 0 - 0.4 10e9/L 0.0    Absolute Nucleated RBC Unknown 0.0        Assessment and plan:  Zechariah Ashby is a 58 year old male with advanced lung cancer with recent evidence of progression now on Keytruda    Lung cancer. He is short of breath. This may be related to progression (vs COPD exacerbation) as he only has received 1 cycle and then traveled for the last month visiting his grandchildren. It is unlikely to be pneumonitis and CXR was unchanged. No fever chills, increased sputum or cough. No chest pain- no pleuritic pain. He is hypoxic with activity and we will set him up for home O2 when active. He will return tomorrow for cycle 2 of Keytruda. Dr. Rand will be here in clinic. If he develops worsening sx or any chest pain, I would have a low thresh hold to evaluate him for PE. No DENZEL. Troponin and EKG unremarkable, in NSR.     Brain metastasis. Last MRI was 5/17 then he received gamma knife to a lesion that was growing. Plan is repeat scan in 3 mos then he can see Dr Woodson at [place] (per Dr Cisneros's last note).     COPD. His dyspnea responds temporarily to albuterol and a duoneb here. He has no wheezing on exam but has decreased breath sounds bilaterally. Again, CXR unchanged. He will continue albuterol and will set him up for home O2. He should be monitored closely.     Depression. He  has been on Wellbutrin 1 mos now and hasn't noticed a significant improvement in how he feels. Discussed that it can take 6 weeks and if not, his dose may need to be titrated.     Tobacco use. He requests nicotrol inhaler rather than smoking.     Over 50% of 45 min visit was spent counseling and coordinating care    Emily Kyle PA-C

## 2018-06-27 NOTE — PROGRESS NOTES
Infusion Nursing Note:  Zechariah Ashby presents today for Keytruda.    Patient seen by provider today: Yes: SHY Doherty   present during visit today: Not Applicable.    Note: Pt received a nebulizer treatment today.    Intravenous Access:  Labs drawn without difficulty.  Implanted Port.    Treatment Conditions:  Keytruda being held today so pt can have cardiac workup for chest pain and SOB.      Discharge Plan:Pt is stable at discharge. Return tomorrow for Keytruda.  Pt was discharged accompanied by his sister.    Mary Bhakta RN

## 2018-06-28 NOTE — MR AVS SNAPSHOT
After Visit Summary   6/28/2018    Zechariah Ashby    MRN: [id number]           Patient Information     Date Of Birth          1960        Visit Information        Provider Department      6/28/2018 2:30 PM ROOM 5 [facility]        Today's Diagnoses     Non-small cell carcinoma of lung (H)    -  1       Follow-ups after your visit        Your next 10 appointments already scheduled     Jul 19, 2018  9:30 AM CDT   Level 2 with ROOM 5 [facility] ([facility] [place])    [facility]  [address]  [facility] [address]   [phone]            Jul 19, 2018  9:45 AM CDT   Return Visit with Joycelyn May MD   [facility] ([facility] [place])    [facility]  [address]  [facility] [address]   [phone]            Aug 08, 2018  9:30 AM CDT   Level 2 with ROOM 10 [facility] ([facility] [place])    [facility]  [address]  [facility] [address]   [phone]            Aug 16, 2018 10:15 AM CDT   MR BRAIN W/O & W CONTRAST with [address] MRI ([facility] [place])    [address] [facility] [address]   [phone]           Take your medicines as usual, unless your doctor tells you not to. Bring a list of your current medicines to your exam (including vitamins, minerals and over-the-counter drugs).  You may or may not receive intravenous (IV) contrast for this exam pending the discretion of the Radiologist.  You do not need to do anything special to prepare.  The MRI machine uses a strong magnet. Please wear clothes without metal (snaps, zippers). A sweatsuit works well, or we may give you a hospital gown.  Please remove any body piercings and hair extensions before you arrive. You will also remove watches, jewelry, hairpins, wallets, dentures, partial dental plates and hearing aids. You  may wear contact lenses, and you may be able to wear your rings. We have a safe place to keep your personal items, but it is safer to leave them at home.  **IMPORTANT** THE INSTRUCTIONS BELOW ARE ONLY FOR THOSE PATIENTS WHO HAVE BEEN PRESCRIBED SEDATION OR GENERAL ANESTHESIA DURING THEIR MRI PROCEDURE:  IF YOUR DOCTOR PRESCRIBED ORAL SEDATION (take medicine to help you relax during your exam):   You must get the medicine from your doctor (oral medication) before you arrive. Bring the medicine to the exam. Do not take it at home. You ll be told when to take it upon arriving for your exam.   Arrive one hour early. Bring someone who can take you home after the test. Your medicine will make you sleepy. After the exam, you may not drive, take a bus or take a taxi by yourself.  IF YOUR DOCTOR PRESCRIBED IV SEDATION:   Arrive one hour early. Bring someone who can take you home after the test. Your medicine will make you sleepy. After the exam, you may not drive, take a bus or take a taxi by yourself.   No eating 6 hours before your exam. You may have clear liquids up until 4 hours before your exam. (Clear liquids include water, clear tea, black coffee and fruit juice without pulp.)  IF YOUR DOCTOR PRESCRIBED ANESTHESIA (be asleep for your exam):   Arrive 1 1/2 hours early. Bring someone who can take you home after the test. You may not drive, take a bus or take a taxi by yourself.   No eating 8 hours before your exam. You may have clear liquids up until 4 hours before your exam. (Clear liquids include water, clear tea, black coffee and fruit juice without pulp.)   You will spend four to five hours in the recovery room.  Please call the Imaging Department at your exam site with any questions.            Aug 16, 2018 11:15 AM CDT   Return Visit with Benoit Woodson MD   [facility] ([facility])    [address] [place] [address], [address]  [facility] [address] [phone]              Future tests that were  ordered for you today     Open Future Orders        Priority Expected Expires Ordered    EKG 12-LEAD CLINIC READ Routine 6/27/2018 7/27/2018 6/27/2018    XR Chest 2 Views STAT 6/27/2018 6/27/2019 6/27/2018            Who to contact     If you have questions or need follow up information about today's clinic visit or your schedule please contact [facility] directly at [phone].  Normal or non-critical lab and imaging results will be communicated to you by [employer], letter or phone within 4 business days after the clinic has received the results. If you do not hear from us within 7 days, please contact the clinic through [employer] or phone. If you have a critical or abnormal lab result, we will notify you by phone as soon as possible.  Submit refill requests through [employer] or call your pharmacy and they will forward the refill request to us. Please allow 3 business days for your refill to be completed.          Additional Information About Your Visit        [employer] Information     [employer] gives you secure access to your electronic health record. If you see a primary care provider, you can also send messages to your care team and make appointments. If you have questions, please call your primary care clinic.  If you do not have a primary care provider, please call [phone] and they will assist you.        Care EveryWhere ID     This is your Care EveryWhere ID. This could be used by other organizations to access your [facility] medical records  [id number]        Your Vitals Were     Pulse Temperature Pulse Oximetry             96 97.6  F (36.4  C) (Oral) 96%          Blood Pressure from Last 3 Encounters:   06/28/18 134/89   06/27/18 (!) 130/96   06/27/18 121/86    Weight from Last 3 Encounters:   06/27/18 65 kg (143 lb 4.8 oz)   05/30/18 64.4 kg (142 lb)   05/23/18 65.3 kg (144 lb)              Today, you had the following     No orders found for display         Today's Medication Changes           These changes are accurate as of 6/28/18  5:16 PM.  If you have any questions, ask your nurse or doctor.               Start taking these medicines.        Dose/Directions    LORazepam 0.5 MG tablet   Commonly known as:  ATIVAN   Used for:  Non-small cell carcinoma of lung (H)        Dose:  0.5 mg   Take 1 tablet (0.5 mg) by mouth every 4 hours as needed (Anxiety, Nausea/Vomiting or Sleep)   Quantity:  30 tablet   Refills:  5         These medicines have changed or have updated prescriptions.        Dose/Directions    * warfarin 5 MG tablet   Commonly known as:  COUMADIN   This may have changed:  Another medication with the same name was changed. Make sure you understand how and when to take each.   Used for:  Atrial flutter, unspecified type (H)   Changed by:  Alice Lea RN        ONLY USING 2.5mg TABS   Quantity:  30 tablet   Refills:  1       * warfarin 2.5 MG tablet   Commonly known as:  COUMADIN   This may have changed:  See the new instructions.   Used for:  Long-term (current) use of anticoagulants, Atrial flutter, unspecified type (H)   Changed by:  Alice Lea RN        Take 1.25mg daily or as directed by the Anticoagulation Clinic   Quantity:  25 tablet   Refills:  0       * Notice:  This list has 2 medication(s) that are the same as other medications prescribed for you. Read the directions carefully, and ask your doctor or other care provider to review them with you.         Where to get your medicines      Some of these will need a paper prescription and others can be bought over the counter.  Ask your nurse if you have questions.     Bring a paper prescription for each of these medications     LORazepam 0.5 MG tablet                Primary Care Provider Office Phone # Fax #    Kailash Mason [phone] [address] [place] [address]        Equal Access to Services     [facility] AH: ronan Bedoya qaybta kaalmada  harley sodick dc'aan ah. So [facility] [phone].    ATENCIÓN: Si teela jarred, tiene a morocho disposición servicios gratuitos de asistencia lingüística. Roz al [phone].    We comply with applicable federal civil rights laws and [place] laws. We do not discriminate on the basis of race, color, national origin, age, disability, sex, sexual orientation, or gender identity.            Thank you!     Thank you for choosing [facility]  for your care. Our goal is always to provide you with excellent care. Hearing back from our patients is one way we can continue to improve our services. Please take a few minutes to complete the written survey that you may receive in the mail after your visit with us. Thank you!             Your Updated Medication List - Protect others around you: Learn how to safely use, store and throw away your medicines at www.disposemymeds.org.          This list is accurate as of 6/28/18  5:16 PM.  Always use your most recent med list.                   Brand Name Dispense Instructions for use Diagnosis    * albuterol 108 (90 Base) MCG/ACT Inhaler    PROAIR HFA    1 Inhaler    Inhale 2 puffs into the lungs every 4 hours as needed for shortness of breath / dyspnea or wheezing    Chronic obstructive pulmonary disease, unspecified COPD type (H)       * albuterol (2.5 MG/3ML) 0.083% neb solution     25 vial    Take 1 vial (2.5 mg) by nebulization every 6 hours as needed for shortness of breath / dyspnea or wheezing    COPD exacerbation (H)       amiodarone 200 MG tablet    PACERONE/CODARONE    30 tablet    TAKE ONE TABLET BY MOUTH ONE TIME DAILY    Paroxysmal atrial fibrillation (H)       buPROPion 200 MG 12 hr tablet    WELLBUTRIN SR    60 tablet    Take 1 tablet (200 mg) by mouth 2 times daily    Moderate single current episode of major depressive disorder (H)       LORazepam 0.5 MG tablet    ATIVAN    30 tablet    Take 1 tablet (0.5 mg) by mouth every 4 hours  as needed (Anxiety, Nausea/Vomiting or Sleep)    Non-small cell carcinoma of lung (H)       morphine 15 MG IR tablet    MSIR    60 tablet    Take 1 tablet (15 mg) by mouth every 6 hours as needed for moderate to severe pain    Non-small cell carcinoma of lung (H)       * nicotine 21 MG/24HR 24 hr patch    NICODERM CQ    30 patch    Place 1 patch onto the skin every 24 hours    Non-small cell carcinoma of lung (H)       * nicotine 10 MG Inhaler    NICOTROL    168 each    Inhale 6 Cartridges into the lungs daily as needed for smoking cessation    Tobacco abuse       omeprazole 40 MG capsule    priLOSEC    30 capsule    Take 1 capsule (40 mg) by mouth daily Take 30-60 minutes before a meal.    Gastroesophageal reflux disease without esophagitis       ondansetron 4 MG tablet    ZOFRAN    30 tablet    Take 1 tablet (4 mg) by mouth every 8 hours as needed for nausea    Non-small cell carcinoma of lung (H)       order for DME     1 Device    Equipment being ordered: donut cushion for sacral ulcer    Decubitus ulcer of sacral region, stage 1       order for DME     1 Device    Equipment being ordered: lightweight wheelchair.   Cannot lift standard wheelchair due to weakness and cancer.    Weakness       order for DME     1 Device    Equipment being ordered: Walker - 4 legged walker with wheels and a seat    Mediastinal lymphadenopathy       order for DME     1 each    Equipment being ordered: Wheelchair    Non-small cell carcinoma of lung (H)       order for DME     1 each    Equipment being ordered: Nebulizer with tubing.    Non-small cell carcinoma of lung (H)       order for DME     1 each    Equipment being ordered: Oxygen portable Length of need: 99 2 Liters Nasal Canula to keep O2 sats greater than 92%    Shortness of breath, Tobacco abuse, Non-small cell carcinoma of lung (H)       polyethylene glycol powder    MIRALAX    510 g    Take 17 g (1 capful) by mouth daily    Constipation, unspecified constipation type        prochlorperazine 10 MG tablet    COMPAZINE          senna-docusate 8.6-50 MG per tablet    SENOKOT-S;PERICOLACE    120 tablet    Take 2 tablets by mouth 2 times daily    Constipation, unspecified constipation type       * warfarin 5 MG tablet    COUMADIN    30 tablet    ONLY USING 2.5mg TABS    Atrial flutter, unspecified type (H)       * warfarin 2.5 MG tablet    COUMADIN    25 tablet    Take 1.25mg daily or as directed by the Anticoagulation Clinic    Long-term (current) use of anticoagulants, Atrial flutter, unspecified type (H)       * Notice:  This list has 6 medication(s) that are the same as other medications prescribed for you. Read the directions carefully, and ask your doctor or other care provider to review them with you.

## 2018-06-28 NOTE — PROGRESS NOTES
ANTICOAGULATION FOLLOW-UP CLINIC VISIT    Patient Name:  Zechariah Ashby  Date:  6/28/2018  Contact Type:  Telephone/ [facility]    SUBJECTIVE:     Patient Findings     Positives No Problem Findings    Comments Keytruda infusion yesterday.    When up and walking his O2 drops / shortness of breath, he now has oxygen at home.    Continued taking 1.25mg daily, will recheck on Monday when homecare will be back next.            OBJECTIVE    INR   Date Value Ref Range Status   06/28/2018 2.2  Final       ASSESSMENT / PLAN  No question data found.  Anticoagulation Summary as of 6/28/2018     INR goal 2.0-3.0   Today's INR 2.2   Warfarin maintenance plan 1.25 mg (2.5 mg x 0.5) every day   Full warfarin instructions 1.25 mg every day   Weekly warfarin total 8.75 mg   Plan last modified Alice Lea RN (6/28/2018)   Next INR check 7/2/2018   Priority INR   Target end date     Indications   Atrial flutter  unspecified type (H) [I48.92]  Long-term (current) use of anticoagulants [Z79.01] [Z79.01]         Anticoagulation Episode Summary     INR check location     Preferred lab     Send INR reminders to [facility] DAIANA POOL    Comments *Pt is on AMIODARONE. On palliative Keytruda as of 5-23-18. Pt in [place] for 3 weeks and leaving 6-2-18. He will check INR at outside lab with results faxed to [facility]      Anticoagulation Care Providers     Provider Role Specialty Phone number    Kailash Mason MD [facility] [phone]            See the Encounter Report to view Anticoagulation Flowsheet and Dosing Calendar (Go to Encounters tab in chart review, and find the Anticoagulation Therapy Visit)        Alice Lea RN, CACP

## 2018-06-28 NOTE — MR AVS SNAPSHOT
FredyJOSE C Ashby   6/28/2018   Anticoagulation Therapy Visit    Description:  58 year old male   Provider:  Alice Lea RN   Department:  [facility]           INR as of 6/28/2018     Today's INR 2.2      Anticoagulation Summary as of 6/28/2018     INR goal 2.0-3.0   Today's INR 2.2   Full warfarin instructions 1.25 mg every day   Next INR check 7/2/2018    Indications   Atrial flutter  unspecified type (H) [I48.92]  Long-term (current) use of anticoagulants [Z79.01] [Z79.01]         June 2018 Details    Sun Mon Tue Wed Thu Fri Sat          1               2                 3               4               5               6               7               8               9                 10               11               12               13               14               15               16                 17               18               19               20               21               22               23                 24               25               26               27               28      1.25 mg   See details      29      1.25 mg         30      1.25 mg          Date Details   06/28 This INR check               How to take your warfarin dose     To take:  1.25 mg Take 0.5 of a 2.5 mg tablet.           July 2018 Details    Sun Mon Tue Wed Thu Fri Sat     1      1.25 mg         2            3               4               5               6               7                 8               9               10               11               12               13               14                 15               16               17               18               19               20               21                 22               23               24               25               26               27               28                 29               30               31                    Date Details   No additional details    Date of next INR:  7/2/2018         How to take your warfarin dose     To  take:  1.25 mg Take 0.5 of a 2.5 mg tablet.

## 2018-06-28 NOTE — PROGRESS NOTES
Infusion Nursing Note:  Zechariah Ashby presents today for Keytruda.    Patient seen by provider today: No   present during visit today: Not Applicable.    Note: N/A.    Intravenous Access:  Implanted Port.    Treatment Conditions:  Lab Results   Component Value Date     06/27/2018                   Lab Results   Component Value Date    POTASSIUM 3.8 06/27/2018           Lab Results   Component Value Date    MAG 1.5 04/26/2018            Lab Results   Component Value Date    CR 1.03 06/27/2018                   Lab Results   Component Value Date    VIJAY 8.4 06/27/2018                Lab Results   Component Value Date    BILITOTAL 0.2 06/27/2018           Lab Results   Component Value Date    ALBUMIN 3.1 06/27/2018                    Lab Results   Component Value Date    ALT 18 06/27/2018           Lab Results   Component Value Date    AST 18 06/27/2018       Results reviewed, labs MET treatment parameters, ok to proceed with treatment.      Post Infusion Assessment:  Patient tolerated infusion without incident.    Discharge Plan:   Patient discharged in stable condition accompanied by: sister.  Departure Mode: Wheelchair.    Mary Bhakta RN

## 2018-06-28 NOTE — PROGRESS NOTES
Pt was seen by SHY Doherty yesterday in clinic. Chest xray, Troponin and EKG were done. Dr. Rand reviewed results. Pt here for Keytruda today. O2 sats 96% on room. Ok to proceed with with Eb per MD.     Radha Rosas RN on 6/28/2018 at 3:21 PM

## 2018-07-02 NOTE — MR AVS SNAPSHOT
Zechariah Ashby   7/2/2018   Anticoagulation Therapy Visit    Description:  58 year old male   Provider:  Alice Lea RN   Department:  [facility]           INR as of 7/2/2018     Today's INR 1.4!      Anticoagulation Summary as of 7/2/2018     INR goal 2.0-3.0   Today's INR 1.4!   Full warfarin instructions 7/2: 2.5 mg; Otherwise 1.25 mg every day   Next INR check 7/6/2018    Indications   Atrial flutter  unspecified type (H) [I48.92]  Long-term (current) use of anticoagulants [Z79.01] [Z79.01]         July 2018 Details    Sun Mon Tue Wed Thu Fri Sat     1               2      2.5 mg   See details      3      1.25 mg         4      1.25 mg         5      1.25 mg         6            7                 8               9               10               11               12               13               14                 15               16               17               18               19               20               21                 22               23               24               25               26               27               28                 29               30               31                    Date Details   07/02 This INR check       Date of next INR:  7/6/2018         How to take your warfarin dose     To take:  1.25 mg Take 0.5 of a 2.5 mg tablet.    To take:  2.5 mg Take 1 of the 2.5 mg tablets.

## 2018-07-02 NOTE — PROGRESS NOTES
ANTICOAGULATION FOLLOW-UP CLINIC VISIT    Patient Name:  Zechariah Ashby  Date:  7/2/2018  Contact Type:  Telephone/ [facility]    SUBJECTIVE:     Patient Findings     Positives Missed doses (Missed 1 dose this past week)    Comments Will increase warfarin dose today only due to missed dose in the last 7 days, will plan to resume maintenance dose after at 1.25mg daily. Homecare will be back out on Friday, will plan to recheck the INR again at that time. No signs/symptoms of venous thromboembolism or stroke.            OBJECTIVE    INR   Date Value Ref Range Status   07/02/2018 1.4  Final       ASSESSMENT / PLAN  INR assessment SUB    Recheck INR In: 4 DAYS    INR Location Home[facility] INR [facility]     Anticoagulation Summary as of 7/2/2018     INR goal 2.0-3.0   Today's INR 1.4!   Warfarin maintenance plan 1.25 mg (2.5 mg x 0.5) every day   Full warfarin instructions 7/2: 2.5 mg; Otherwise 1.25 mg every day   Weekly warfarin total 8.75 mg   Plan last modified Alice Lea RN (6/28/2018)   Next INR check 7/6/2018   Priority INR   Target end date     Indications   Atrial flutter  unspecified type (H) [I48.92]  Long-term (current) use of anticoagulants [Z79.01] [Z79.01]         Anticoagulation Episode Summary     INR check location     Preferred lab     Send INR reminders to [place] [facility]TICOAG POOL    Comments *Pt is on AMIODARONE. On palliative Keytruda as of 5-23-18. // [facility]      Anticoagulation Care Providers     Provider Role Specialty Phone number    Kailash Mason MD [facility] [phone]            See the Encounter Report to view Anticoagulation Flowsheet and Dosing Calendar (Go to Encounters tab in chart review, and find the Anticoagulation Therapy Visit)        Alice Lea RN, CACP

## 2018-07-05 NOTE — TELEPHONE ENCOUNTER
Spouse Estefani called, states he just urinated. States it was a large amount. Advised to push water intake. Lisa Herrera RN

## 2018-07-05 NOTE — TELEPHONE ENCOUNTER
Reason for call:  Patient reporting a symptom    Symptom or request: Pt has not Urinated or had a bowel movement for over 24 hr.plus. Per Estefani. Pt is stage 4 lung CA and on Vac for a couple days in [facility].       Phone Number patient can be reached at:  Home number on file [phone] (home)    Best Time:  Any Time      Can we leave a detailed message on this number:  YES    Call taken on 7/5/2018 at 2:21 PM by Suzanna Ruiz

## 2018-07-06 NOTE — PROGRESS NOTES
Pt requesting this to be filled at [facility] in [place]. This has been resent.     Becca Diamond RN on 7/6/2018 at 3:34 PM

## 2018-07-06 NOTE — PROGRESS NOTES
ADDENDUM: Writer received a call from Brooke with [facility]. She stated patient missed his warfarin doses all weekend. Will plan to increase dose to today and tomorrow so his weekly dose will stay at 8.75mg/week by his next INR check on Friday.     Alice Lea RN, CACP 7/9/2018 at 1:21 PM      ANTICOAGULATION FOLLOW-UP CLINIC VISIT    Patient Name:  Zechariah Ashby  Date:  7/6/2018  Contact Type:  Telephone/ Brooke LEVI, [facility]    SUBJECTIVE:     Patient Findings     Positives No Problem Findings    Comments Patient denies any changes. Patient will continue weekly maintenance dose. INR is therapeutic.              OBJECTIVE    INR   Date Value Ref Range Status   07/06/2018 2.3  Final       ASSESSMENT / PLAN  INR assessment THER    Recheck INR In: 1 WEEK    INR Location [facility] INR      Anticoagulation Summary as of 7/6/2018     INR goal 2.0-3.0   Today's INR 2.3   Warfarin maintenance plan 1.25 mg (2.5 mg x 0.5) every day   Full warfarin instructions 1.25 mg every day   Weekly warfarin total 8.75 mg   Plan last modified Alice Lea RN (6/28/2018)   Next INR check 7/13/2018   Priority INR   Target end date     Indications   Atrial flutter  unspecified type (H) [I48.92]  Long-term (current) use of anticoagulants [Z79.01] [Z79.01]         Anticoagulation Episode Summary     INR check location     Preferred lab     Send INR reminders to [place] [facility] POOL    Comments *Pt is on AMIODARONE. On palliative Keytruda as of 5-23-18. // [facility]      Anticoagulation Care Providers     Provider Role Specialty Phone number    Kailash Mason MD [facility] [phone]            See the Encounter Report to view Anticoagulation Flowsheet and Dosing Calendar (Go to Encounters tab in chart review, and find the Anticoagulation Therapy Visit)        Mahendra David RN

## 2018-07-06 NOTE — MR AVS SNAPSHOT
Zechariah Ashby   7/6/2018   Anticoagulation Therapy Visit    Description:  58 year old male   Provider:  Mahendra David, RN   [facility]           INR as of 7/6/2018     Today's INR 2.3      Anticoagulation Summary as of 7/6/2018     INR goal 2.0-3.0   Today's INR 2.3   Full warfarin instructions 1.25 mg every day   Next INR check 7/13/2018    Indications   Atrial flutter  unspecified type (H) [I48.92]  Long-term (current) use of anticoagulants [Z79.01] [Z79.01]         July 2018 Details    Sun Mon Tue Wed Thu Fri Sat     1               2               3               4               5               6      1.25 mg   See details      7      1.25 mg           8      1.25 mg         9      1.25 mg         10      1.25 mg         11      1.25 mg         12      1.25 mg         13            14                 15               16               17               18               19               20               21                 22               23               24               25               26               27               28                 29               30               31                    Date Details   07/06 This INR check       Date of next INR:  7/13/2018         How to take your warfarin dose     To take:  1.25 mg Take 0.5 of a 2.5 mg tablet.

## 2018-07-09 NOTE — TELEPHONE ENCOUNTER
Rx place at  and zeke from homecare called and notified.  Deepika Almanza on 7/9/2018 at 1:16 PM

## 2018-07-09 NOTE — TELEPHONE ENCOUNTER
Routing refill request to provider for review/approval because:  Drug not on the [facility] refill protocol     Thank you  Nakia ABDUL RN

## 2018-07-09 NOTE — TELEPHONE ENCOUNTER
Reason for Call:  Other orders    Detailed comments: Homecare nurse is asking for liquid morphine for SOB and also to increase the oral morphine to 30 mg Bid.    Phone Number Brooke can be reached at: Other phone number:  [phone]    Best Time: any    Can we leave a detailed message on this number? YES    Call taken on 7/9/2018 at 12:06 PM by Susan Esqueda

## 2018-07-12 NOTE — TELEPHONE ENCOUNTER
Received a phone call from Dr. Mitchell's office requesting a quantity change due to the bottle size. Dr. Mitchell ordered 20 ml but they are only able to do 30 ml. Verbal order from Dr. May for this to be changed to 30 ml.     Becca Diamond RN on 7/12/2018 at 2:22 PM

## 2018-07-13 NOTE — TELEPHONE ENCOUNTER
Please call.  Monitor blood pressure periodically.    Not at level today that needs immediate treatment.    Can monitor in the next couple weeks.  If extremely high like 180+ or 110+ it would be of concern.    IF persistently >150/90 we could consider medication in the next couple weeks depending upon his status.  ROSA JASSO MD

## 2018-07-13 NOTE — TELEPHONE ENCOUNTER
Spoke with Dr. Mason who says if on palliative care should monitor for now and if greater than 180/110 should be treated with medication. Brooke notified. Brooke is wondering if patient should have daily B/p checks. Routing to PCP to advise if needs daily B/P check. Will be seen next Tuesday by home care nurse.     GURMEET Singleton

## 2018-07-13 NOTE — PROGRESS NOTES
ANTICOAGULATION FOLLOW-UP CLINIC VISIT    Patient Name:  Zechariah Ashby  Date:  7/13/2018  Contact Type:  Telephone/ writer spoke with Brooke at [facility]    SUBJECTIVE:     Patient Findings     Positives Change in diet/appetite (will be starting daily Ensure today)    Comments Patient has not had any side effects from Keytruda. No diarrhea. No changes in medications or activity. He only eats one meal daily, which comes from [employer]. He plans to start Ensure and writer recommended he drink 2 Ensure today and tomorrow to help lower INR. Will only hold one dose today since he plans to drink Ensure today also. Patient has no bleeding concerns. INR may be high from extra dosing on Monday following 3 days of missed doses. Patient also has hx of missing doses frequently.           OBJECTIVE    INR   Date Value Ref Range Status   07/13/2018 4.9  Final       ASSESSMENT / PLAN  INR assessment SUPRA    Recheck INR In: 3 DAYS    INR Location [facility] INR      Anticoagulation Summary as of 7/13/2018     INR goal 2.0-3.0   Today's INR 4.9!   Warfarin maintenance plan 1.25 mg (2.5 mg x 0.5) every day   Full warfarin instructions 7/13: Hold; Otherwise 1.25 mg every day   Weekly warfarin total 8.75 mg   Plan last modified Alice Lea, RN (6/28/2018)   Next INR check 7/16/2018   Priority INR   Target end date     Indications   Atrial flutter  unspecified type (H) [I48.92]  Long-term (current) use of anticoagulants [Z79.01] [Z79.01]         Anticoagulation Episode Summary     INR check location     Preferred lab     Send INR reminders to [place] [facility]TICOAG POOL    Comments *Pt is on AMIODARONE. On palliative Keytruda as of 5-23-18. //  [facility] Ensure daily as of 7-13-18      Anticoagulation Care Providers     Provider Role Specialty Phone number    Kailash Mason MD [facility] [phone]            See the Encounter Report to view Anticoagulation Flowsheet and Dosing Calendar (Go to Encounters  tab in chart review, and find the Anticoagulation Therapy Visit)        Yaritza Diaz RN

## 2018-07-13 NOTE — TELEPHONE ENCOUNTER
Brooke says patient is asymptomatic except is short of breath but is not new. Today 4.9 INR today and is using prn oxygen and morphine, rating his pain 3/10.  Usually 120-130's/ 70-80's per Brooke. Says was 162/104 initially.  Says oxygenation is 95 % on 2L/ NC as needed. Asked the nurse to recheck it now and says it is 142/100 on the left and 138/102, pulse is 87.  Advised that the guidelines B/P over 160/100 should seek the ER if patient is under care for B/P. Patient is not being treated for HTN, but has atrial flutter diagnosis. Will speak to PCP.    GURMEET Singleton

## 2018-07-13 NOTE — MR AVS SNAPSHOT
FredyJOSE C Ashby   7/13/2018   Anticoagulation Therapy Visit    Description:  58 year old male   Provider:  Yaritza Diaz, RN   Department:  [facility]           INR as of 7/13/2018     Today's INR 4.9!      Anticoagulation Summary as of 7/13/2018     INR goal 2.0-3.0   Today's INR 4.9!   Full warfarin instructions 7/13: Hold; Otherwise 1.25 mg every day   Next INR check 7/16/2018    Indications   Atrial flutter  unspecified type (H) [I48.92]  Long-term (current) use of anticoagulants [Z79.01] [Z79.01]         July 2018 Details    Sun Mon Tue Wed Thu Fri Sat     1               2               3               4               5               6               7                 8               9               10               11               12               13      Hold   See details      14      1.25 mg           15      1.25 mg         16            17               18               19               20               21                 22               23               24               25               26               27               28                 29               30               31                    Date Details   07/13 This INR check       Date of next INR:  7/16/2018         How to take your warfarin dose     To take:  1.25 mg Take 0.5 of a 2.5 mg tablet.    Hold Do not take your warfarin dose. See the Details table to the right for additional instructions.

## 2018-07-13 NOTE — TELEPHONE ENCOUNTER
Writer spoke with GURMEET Cox on the phone and relayed below information. Will close encounter.    Yaritza Diaz RN, BSN, PHN

## 2018-07-13 NOTE — TELEPHONE ENCOUNTER
Reason for Call:  Other     Detailed comments: [facility] - [phone] - Patients /104 - please advise    Phone Number Patient can be reached at:     Best Time:     Can we leave a detailed message on this number? YES    Call taken on 7/13/2018 at 12:16 PM by Deepika Lebron

## 2018-07-16 NOTE — PROGRESS NOTES
ANTICOAGULATION FOLLOW-UP CLINIC VISIT    Patient Name:  Zechariah Ashby  Date:  7/16/2018  Contact Type:  Telephone/ [facility]    SUBJECTIVE:     Patient Findings     Positives Change in medications (Liquid morphine, 2mg total per day ), Unexplained INR or factor level change    Comments Took warfarin as prescribed, held his dose last Friday and INR increased. No obvious signs of inflammation or infection. Last chemo infusion (ketruda was on 6/28). Continues with 1 boost / day.    No issues with bleeding or unusual bruising noted. Will hold warfarin x2 days, recheck INR on Wednesday.           OBJECTIVE    INR   Date Value Ref Range Status   07/16/2018 5.7  Final       ASSESSMENT / PLAN  INR assessment SUPRA    Recheck INR In: 2 DAYS    INR Location [facility] INR [facility]     Anticoagulation Summary as of 7/16/2018     INR goal 2.0-3.0   Today's INR 5.7!   Warfarin maintenance plan 1.25 mg (2.5 mg x 0.5) every day   Full warfarin instructions 7/16: Hold; 7/17: Hold; Otherwise 1.25 mg every day   Weekly warfarin total 8.75 mg   Plan last modified Alice Lea RN (6/28/2018)   Next INR check 7/18/2018   Priority INR   Target end date     Indications   Atrial flutter  unspecified type (H) [I48.92]  Long-term (current) use of anticoagulants [Z79.01] [Z79.01]         Anticoagulation Episode Summary     INR check location     Preferred lab     Send INR reminders to [place] [facility] POOL    Comments *Pt is on AMIODARONE. On palliative Keytruda as of 5-23-18. // [facility]. Ensure daily as of 7-13-18      Anticoagulation Care Providers     Provider Role Specialty Phone number    Kailash Mason MD [facility] [phone]            See the Encounter Report to view Anticoagulation Flowsheet and Dosing Calendar (Go to Encounters tab in chart review, and find the Anticoagulation Therapy Visit)        Alice Lea RN, CACP

## 2018-07-16 NOTE — MR AVS SNAPSHOT
FredyJOSE C Ashby   7/16/2018   Anticoagulation Therapy Visit    Description:  58 year old male   Provider:  Alice Lea, RN   Department:  [facility]           INR as of 7/16/2018     Today's INR 5.7!      Anticoagulation Summary as of 7/16/2018     INR goal 2.0-3.0   Today's INR 5.7!   Full warfarin instructions 7/16: Hold; 7/17: Hold; Otherwise 1.25 mg every day   Next INR check 7/18/2018    Indications   Atrial flutter  unspecified type (H) [I48.92]  Long-term (current) use of anticoagulants [Z79.01] [Z79.01]         July 2018 Details    Sun Mon Tue Wed Thu Fri Sat     1               2               3               4               5               6               7                 8               9               10               11               12               13               14                 15               16      Hold   See details      17      Hold         18            19               20               21                 22               23               24               25               26               27               28                 29               30               31                    Date Details   07/16 This INR check       Date of next INR:  7/18/2018         How to take your warfarin dose     To take:  1.25 mg Take 0.5 of a 2.5 mg tablet.    Hold Do not take your warfarin dose. See the Details table to the right for additional instructions.

## 2018-07-16 NOTE — TELEPHONE ENCOUNTER
I talked with Dr Mason about this and he said to start lisinopril 10 mg one daily.  Call with BP readings at end of week.  Will need chem 8 in two weeks  Need to recall Brooke voice mail box not set up.  Emani Lemus RN

## 2018-07-16 NOTE — TELEPHONE ENCOUNTER
Voice message was left.  Brooke from [facility] called to give Dr Mason his BP  Readings which have been high.  Today says his BP was 154/118 and 154/106 but this is better than the weekend.  Saturday was 170/107,168/108,173/115  Sunday was 184/123 heart rate 108, 176/119.157/124    She wants to know if Dr Mason wants them to continue to monitor. Her phone is [phone]

## 2018-07-17 NOTE — ED PROVIDER NOTES
History     Chief Complaint   Patient presents with     Chest Pain     HPI   History per patient, family and review of EMR.  Zechariah Ashby is a 58 year old male with history of metastatic squamous cell lung cancer who presents with right sided chest pain which began insidiously several days ago and is worsened this afternoon. Pain is sharp, constant, currently mild in severity and located in the right mid lower chest. Pain is nonradiating and without clear aggravating or alleviating factors. He has had several weeks of a nonproductive cough and increased shortness of breath.  No hemoptysis.  No leg pain or swelling.  He also complains of several months of dysphagia and swallowing difficulty, worse with lying supine.  No drooling or voice change.  Recent history remarkable for elevated INR of 5.7 yesterday, he has been instructed to hold Coumadin yesterday and will be holding it again tonight.  Followed by Dr. May, Oncology.    Problem List:    Patient Active Problem List    Diagnosis Date Noted     Anxiety 06/18/2018     Priority: Medium     Moderate single current episode of major depressive disorder (H) 05/21/2018     Priority: Medium     Nausea 05/17/2018     Priority: Medium     Slow transit constipation 05/17/2018     Priority: Medium     Brain metastasis (H) 05/03/2018     Priority: Medium     Atrial flutter, unspecified type (H) 02/05/2018     Priority: Medium     Long-term (current) use of anticoagulants [Z79.01] 02/05/2018     Priority: Medium     Chemotherapy induced nausea and vomiting 01/26/2018     Priority: Medium     Dehydration 01/19/2018     Priority: Medium     Cardiomyopathy (H) 01/10/2018     Priority: Medium     Acute on chronic systolic congestive heart failure (H) 01/10/2018     Priority: Medium     Weakness 01/08/2018     Priority: Medium     COPD exacerbation (H) 01/07/2018     Priority: Medium     Non-small cell carcinoma of lung (H) 12/28/2017     Priority: Medium     Initial CXR  and CT chest abnormal on 12/7/2017 12/27/2017: oncology notes:PET scan showing a large FDG avid mediastinal mass extending to the left hilum with obliteration of the left upper lobe bronchus and atelectasis of the upper lobe.  There are also multiple left cervical left cervical and mediastinal adenopathy.  There is increased left pleural effusion.  There is hypermetabolic left adrenal lesion.  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  I reviewed with the patient today management of stage IV squamous cell lung cancer.  4/27/2018 Brain Met- MRI:IMPRESSION:   1. New enhancing 0.6 cm lesion in the inferior right temporal lobe concerning for metastatic disease given the patient's history. Mild surrounding edema around the lesion without significant mass effect or herniation.       Pleural effusion 12/13/2017     Priority: Medium     Mediastinal lymphadenopathy 12/13/2017     Priority: Medium     Atrial flutter (H) 11/16/2017     Priority: Medium     Onset 11/3/2017.  Seen in clinic and ED.  He had atrial flutter in November, 2017.  EF decreased to 30-35% when in the flutter. Treated initially with metoprolol.   Underwent ablation after KIMBERLY on 12/8/2017.  EF improved to 50-55% after the ablation.  Had atrial fibrillation on 1/7 spontaneously converted to sinus.  Back in atrial fibrillation on 1/11/2018.  BP low on metoprolol.  Started amiodarone.  Again converted to sinus when scheduled for cardioversion on 1/12/2018.        Perforated ulcer (HCC) 04/24/2015     Priority: Medium     Free intraperitoneal air 04/24/2015     Priority: Medium     CARDIOVASCULAR SCREENING; LDL GOAL LESS THAN 130 10/31/2010     Priority: Medium        Past Medical History:    Past Medical History:   Diagnosis Date     Atrial flutter (H) 11/2017     Cancer (H)        Past Surgical History:    Past Surgical History:   Procedure Laterality Date     ANESTHESIA  CARDIOVERSION N/A 2018    Procedure: ANESTHESIA CARDIOVERSION;  CARDIOVERSION (FOLLOWING KIMBERLY AT 0830);  Surgeon: GENERIC ANESTHESIA PROVIDER;  Location: [facility]     APPENDECTOMY      age 28s     INSERT PORT VASCULAR ACCESS N/A 1/3/2018    Procedure: INSERT PORT VASCULAR ACCESS;  Port-a-Cath Placement;  Surgeon: Tomas Crews MD;  Location: [facility]     SURGICAL HISTORY OF -   2004    Gastroscopy with biopsy for gastric ulcer       Family History:    Family History   Problem Relation Age of Onset     Cancer Mother      pancreatic cancer,  at 38 years     Alzheimer Disease Father       at 84 years.       Social History:  Marital Status:   [2]  Social History   Substance Use Topics     Smoking status: Current Some Day Smoker     Packs/day: 0.25     Years: 50.00     Start date: 2017     Smokeless tobacco: Never Used      Comment: 10 cigarettes daily.      Alcohol use No        Medications:      albuterol (2.5 MG/3ML) 0.083% neb solution   albuterol (PROAIR HFA) 108 (90 BASE) MCG/ACT Inhaler   amiodarone (PACERONE/CODARONE) 200 MG tablet   buPROPion (WELLBUTRIN SR) 200 MG 12 hr tablet   doxycycline (VIBRAMYCIN) 100 MG capsule   lisinopril (PRINIVIL/ZESTRIL) 10 MG tablet   LORazepam (ATIVAN) 0.5 MG tablet   morphine (MSIR) 15 MG IR tablet   morphine sulfate, high concentrate, (ROXANOL-CONCENTRATED) 20 MG/ML concentrated solution   nicotine (NICODERM CQ) 21 MG/24HR 24 hr patch   nicotine (NICOTROL) 10 MG Inhaler   omeprazole (PRILOSEC) 40 MG capsule   ondansetron (ZOFRAN) 4 MG tablet   order for DME   prochlorperazine (COMPAZINE) 10 MG tablet   senna-docusate (SENOKOT-S;PERICOLACE) 8.6-50 MG per tablet   warfarin (COUMADIN) 2.5 MG tablet   order for DME   order for DME   order for DME   order for DME   order for DME   polyethylene glycol (MIRALAX) powder         Review of Systems   Generalized weakness, increased thirst.  As mentioned above in the history present illness.  All other systems were  reviewed and are negative.    Physical Exam   BP: (!) 156/115  Heart Rate: 89  Temp: 97.6  F (36.4  C)  Resp: 14  SpO2: 97 %      Physical Exam   Constitutional: He is oriented to person, place, and time. No distress.   Cachectic.   HENT:   Head: Normocephalic and atraumatic.   Oral mucosa dry.   Eyes: Conjunctivae and EOM are normal.   Neck: Normal range of motion. Neck supple. No JVD present. No tracheal deviation present. No thyromegaly present.   Cardiovascular: Normal rate, regular rhythm, normal heart sounds and intact distal pulses.  Exam reveals no gallop and no friction rub.    No murmur heard.  Pulmonary/Chest: Effort normal. No stridor. No respiratory distress. He has no wheezes. He has no rales. He exhibits no tenderness.   Decreased breath sounds bilaterally, more so in the right base.  Prolonged expiratory phase.   Abdominal: Soft. He exhibits no distension. There is no tenderness.   Musculoskeletal: Normal range of motion. He exhibits no edema or tenderness.   Lymphadenopathy:     He has cervical adenopathy.   Neurological: He is alert and oriented to person, place, and time.   Skin: Skin is warm and dry. No rash noted. He is not diaphoretic. No erythema. No pallor.   Psychiatric: His behavior is normal.   Flat affect, depressed mood.   Nursing note and vitals reviewed.      ED Course     ED Course     Procedures               Results for orders placed or performed during the hospital encounter of 07/17/18 (from the past 24 hour(s))   CBC with platelets differential   Result Value Ref Range    WBC 10.0 4.0 - 11.0 10e9/L    RBC Count 5.18 4.4 - 5.9 10e12/L    Hemoglobin 16.3 13.3 - 17.7 g/dL    Hematocrit 49.9 40.0 - 53.0 %    MCV 96 78 - 100 fl    MCH 31.5 26.5 - 33.0 pg    MCHC 32.7 31.5 - 36.5 g/dL    RDW 12.9 10.0 - 15.0 %    Platelet Count 294 150 - 450 10e9/L    Diff Method Automated Method     % Neutrophils 86.9 %    % Lymphocytes 6.2 %    % Monocytes 6.4 %    % Eosinophils 0.1 %    % Basophils  0.2 %    % Immature Granulocytes 0.2 %    Nucleated RBCs 0 0 /100    Absolute Neutrophil 8.7 (H) 1.6 - 8.3 10e9/L    Absolute Lymphocytes 0.6 (L) 0.8 - 5.3 10e9/L    Absolute Monocytes 0.6 0.0 - 1.3 10e9/L    Absolute Eosinophils 0.0 0.0 - 0.7 10e9/L    Absolute Basophils 0.0 0.0 - 0.2 10e9/L    Abs Immature Granulocytes 0.0 0 - 0.4 10e9/L    Absolute Nucleated RBC 0.0    Comprehensive metabolic panel   Result Value Ref Range    Sodium 136 133 - 144 mmol/L    Potassium 3.9 3.4 - 5.3 mmol/L    Chloride 102 94 - 109 mmol/L    Carbon Dioxide 27 20 - 32 mmol/L    Anion Gap 7 3 - 14 mmol/L    Glucose 88 70 - 99 mg/dL    Urea Nitrogen 23 7 - 30 mg/dL    Creatinine 1.13 0.66 - 1.25 mg/dL    GFR Estimate 66 >60 mL/min/1.7m2    GFR Estimate If Black 80 >60 mL/min/1.7m2    Calcium 8.4 (L) 8.5 - 10.1 mg/dL    Bilirubin Total 0.5 0.2 - 1.3 mg/dL    Albumin 2.9 (L) 3.4 - 5.0 g/dL    Protein Total 6.2 (L) 6.8 - 8.8 g/dL    Alkaline Phosphatase 92 40 - 150 U/L    ALT 14 0 - 70 U/L    AST 16 0 - 45 U/L   Troponin I   Result Value Ref Range    Troponin I ES <0.015 0.000 - 0.045 ug/L   INR   Result Value Ref Range    INR 4.37 (H) 0.86 - 1.14   Chest CT - IV contrast only - PE protocol    Narrative    CT CHEST PULMONARY EMBOLISM WITH CONTRAST  7/17/2018 6:13 PM    HISTORY: Right chest pain, same.    TECHNIQUE: Axial images from thoracic inlet to diaphragm. 75 mL Isovue  -370 IV contrast  Radiation dose for this scan was reduced using  automated exposure control, adjustment of the mA and/or kV according  to patient size, or iterative reconstruction technique.    COMPARISON: 2/13/2018 CT chest    FINDINGS:   Chest: Moderately large bilateral pleural effusions. Increase in size  of confluent anterior mediastinal, confluent low attenuation tissue  which may be a combination of anterior mediastinal mass and possibly  fluid. On series 6 image 48 a 4 x 2.3 cm hypodense anterior  mediastinal area suspicious for mass was previously 2.9 x 1.4  cm and  significantly larger, adjacent mediastinal density may be fluid or  confluent adenopathy. Right paratracheal adenopathy and confluent  increased soft tissue at the arvind bilaterally concerning for  adenopathy is mildly increased.    There is compressive lower lobe atelectasis. Calcified left lower lobe  granuloma series 5 image 83. Indeterminate anterior left upper lobe  nodule series 6 image 76 also present on 2/13/2018. In the areas of  posterior lower lobe atelectasis are additional more focal opacities  which could represent areas of consolidation.    No acute-appearing pulmonary embolus. Ascending aorta 3.6 cm. Ascites  and diffuse increased attenuation of mesenteric fat in the upper  abdomen. No aggressive bone lesions. Left chest Port-A-Cath with tip  in distal SVC-right atrial junction.      Impression    IMPRESSION:   1. Moderate-large bilateral pleural effusions with posterior lower  lobe atelectasis and patchy lower lobe opacities which could be  associated infiltrate.  2. Indeterminate anterior right upper lobe 0.8 cm nodule.  3. Increase in anterior mediastinal density consistent with increasing  anterior mediastinal mass. There is mild right paratracheal adenopathy  and confluent anterior mediastinal and bilateral hilar hypodense  tissue concerning for confluent low-attenuation adenopathy possibly  associated with fluid.  4. Small ascites and increased attenuation of mesenteric fat diffusely  in the upper abdomen.   CT Soft Tissue Neck w Contrast    Narrative    CT SCAN OF THE NECK WITH CONTRAST  7/17/2018 6:20 PM     HISTORY: Metastatic lung cancer with dysphagia exacerbated by lying.    TECHNIQUE: Axial CT images of the neck following administration of  intravenous contrast with reformations. Radiation dose for this scan  was reduced using automated exposure control, adjustment of the mA  and/or kV according to patient size, or iterative reconstruction  technique. 50 mL Isovue -370 IV.      COMPARISON: PET/CT 5/16/2018.     FINDINGS:   Marked diffuse transfacial edema and subcutaneous stranding throughout  the neck, left greater than right. The edema in the neck is new since  prior PET/CT 5/16/2018.    There are several abnormal-appearing left cervical chain lymph nodes  and left transverse cervical lymph nodes, some of which are centrally  necrotic. For example, there is a centrally necrotic left transverse  cervical chain lymph node measuring 0.9 cm (series 2 image 106). These  appear to be increased in size and number since the prior PET/CT  5/16/2018.    While there is diffuse edema throughout the neck, no loculated abscess  is appreciated.    No destructive osseous lesions identified. Degenerative changes in the  cervical spine most pronounced at C3-C4 and C5-C6.    Marked odontogenic disease seen within the remaining teeth. Visualized  paranasal sinuses are clear.    Bilateral pleural effusions right greater than left in the visualized  lung apices. Please see dedicated chest CT report for details below  the lungs.      Impression    IMPRESSION:    1. New left greater than right diffuse trans-spatial edema throughout  the neck. There are also multiple new abnormal-appearing cervical  chain lymph nodes some of which are necrotic. Findings are favored to  represent progression of metastatic disease.  2. No loculated fluid collection or abscess is appreciated. No obvious  significant mass effect on the airway.    Results discussed with Bruce Galvan at 7:00 PM on 7/17/2018.     WAQAS ROSARIO MD       Medications   sodium chloride 0.9% infusion (0 mLs Intravenous Stopped 7/17/18 1952)   heparin 100 UNIT/ML injection 500 Units (not administered)   LORazepam (ATIVAN) injection 1 mg (1 mg Intravenous Given 7/17/18 1612)   0.9% sodium chloride BOLUS (0 mLs Intravenous Stopped 7/17/18 1802)   iopamidol (ISOVUE-370) solution 125 mL (125 mLs Intravenous Given 7/17/18 1803)   Saline Flush (80 mLs  Intravenous Given 7/17/18 1803)       7:00 PM - Reviewed CT of the neck results with the interpreting radiologist.      Assessments & Plan (with Medical Decision Making)   Patient with history of metastatic squamous cell lung cancer and atrial flutter-fibrillation with chronic anticoagulation who has developed several days of new right-sided chest pain which appears to be secondary to a malignant right pleural effusion which has enlarged since chest x-ray last month.  CT of the chest also showed possible infiltrates versus atelectasis related to this. He has had increased nonproductive cough and shortness of breath in the past couple weeks.  No fever or chills. He was prescribed Doxycycline for possible pneumonia, but wished to defer antibiotic therapy pending scheduled follow-up with his Oncologist in 2 days.  He agreed to initiate doxycycline should his cough worsen, he develop fever or if he changes his mind.  Doxycycline was picked as antibiotic therapy due to less potential interaction with Coumadin as he has recently had a significantly elevated INR.  He has been instructed to hold Coumadin yesterday and today and INR has decreased to 4.37 today.  No signs or symptoms of bleeding.  Doubt worsening effusions are due to bleeding or hemothorax. Worsening lung cancer, lymphadenopathy and metastases based on today's CT evaluations of the chest and neck.  Suspect the worsening neck lymphadenopathy and inflammatory soft tissue changes are malignant in nature and causing dysphagia in the past couple months.  I will have him continue to hold Coumadin until he follows up with Oncologist in 2 days, which would facilitate ability to have a therapeutic thoracentesis for his pleural effusion(s).  He will discuss the need for ongoing anticoagulation therapy in light of potential need for therapeutic thoracenteses for worsening malignant pleural effusions. Patient was provided instructions for supportive care and will return  as needed for worsened condition or worsening symptoms, or new problems or concerns.      I have reviewed the nursing notes.    I have reviewed the findings, diagnosis, plan and need for follow up with the patient.    New Prescriptions    DOXYCYCLINE (VIBRAMYCIN) 100 MG CAPSULE    Take 1 capsule (100 mg) by mouth 2 times daily for 10 days       Final diagnoses:   Acute chest pain   Bilateral pleural effusion   Dysphagia, unspecified type - Secondary to cervical adenopathy and inflammation from metastatic squamous cell lung cancer   Elevated INR - 4.37   Warfarin anticoagulation   Squamous cell carcinoma of lung, unspecified laterality (H)       7/17/2018   [facility] EMERGENCY DEPARTMENT     Luis Galvan MD  07/18/18 2142

## 2018-07-17 NOTE — ED AVS SNAPSHOT
[facility] Emergency Department    [address] [address]    Phone:  [phone]    Fax:  [phone]                                       Zechariah Ashby   MRN: [id number]    Department:  [facility]   Date of Visit:  7/17/2018           Patient Information     Date Of Birth          1960        Your diagnoses for this visit were:     Acute chest pain     Bilateral pleural effusion     Dysphagia, unspecified type Secondary to cervical adenopathy and inflammation from metastatic squamous cell lung cancer    Elevated INR 4.37    Warfarin anticoagulation     Squamous cell carcinoma of lung, unspecified laterality (H)        You were seen by Luis Galvan MD.      Follow-up Information     Follow up with Joycelyn May MD In 2 days.    Specialty:  Hematology & Oncology    Why:  As scheduled    Contact information:    [facility] [address]  [phone]        Discharge References/Attachments     PLEURAL EFFUSION (ENGLISH)      Your next 10 appointments already scheduled     Jul 19, 2018  9:30 AM CDT   Level 2 with ROOM 5 [facility] ([facility])    [facility]  [address] [address] [place] [address]   [phone]            Jul 19, 2018  9:45 AM CDT   Return Visit with Joycelyn May MD   [facility] ([facility])    [facility]  [address] [place] [address]   [phone]            Aug 08, 2018  9:30 AM CDT   Level 2 with ROOM 10 [facility] ([facility])    [facility]  [address] [place] [address]   [phone]            Aug 16, 2018 10:15 AM CDT   MR BRAIN W/O & W CONTRAST with [address] MRI ([facility])    [address] [facility] [address]   [phone]           Take your medicines as usual, unless  your doctor tells you not to. Bring a list of your current medicines to your exam (including vitamins, minerals and over-the-counter drugs).  You may or may not receive intravenous (IV) contrast for this exam pending the discretion of the Radiologist.  You do not need to do anything special to prepare.  The MRI machine uses a strong magnet. Please wear clothes without metal (snaps, zippers). A sweatsuit works well, or we may give you a hospital gown.  Please remove any body piercings and hair extensions before you arrive. You will also remove watches, jewelry, hairpins, wallets, dentures, partial dental plates and hearing aids. You may wear contact lenses, and you may be able to wear your rings. We have a safe place to keep your personal items, but it is safer to leave them at home.  **IMPORTANT** THE INSTRUCTIONS BELOW ARE ONLY FOR THOSE PATIENTS WHO HAVE BEEN PRESCRIBED SEDATION OR GENERAL ANESTHESIA DURING THEIR MRI PROCEDURE:  IF YOUR DOCTOR PRESCRIBED ORAL SEDATION (take medicine to help you relax during your exam):   You must get the medicine from your doctor (oral medication) before you arrive. Bring the medicine to the exam. Do not take it at home. You ll be told when to take it upon arriving for your exam.   Arrive one hour early. Bring someone who can take you home after the test. Your medicine will make you sleepy. After the exam, you may not drive, take a bus or take a taxi by yourself.  IF YOUR DOCTOR PRESCRIBED IV SEDATION:   Arrive one hour early. Bring someone who can take you home after the test. Your medicine will make you sleepy. After the exam, you may not drive, take a bus or take a taxi by yourself.   No eating 6 hours before your exam. You may have clear liquids up until 4 hours before your exam. (Clear liquids include water, clear tea, black coffee and fruit juice without pulp.)  IF YOUR DOCTOR PRESCRIBED ANESTHESIA (be asleep for your exam):   Arrive 1 1/2 hours early. Bring someone who can  take you home after the test. You may not drive, take a bus or take a taxi by yourself.   No eating 8 hours before your exam. You may have clear liquids up until 4 hours before your exam. (Clear liquids include water, clear tea, black coffee and fruit juice without pulp.)   You will spend four to five hours in the recovery room.  Please call the Imaging Department at your exam site with any questions.            Aug 16, 2018 11:15 AM CDT   Return Visit with Benoit Woodson MD   [facility] ([facility])    [address], [address]  [facility] [address]   [phone]              24 Hour Appointment Hotline       To make an appointment at any [facility] clinic, call [phone] ([phone]). If you don't have a family doctor or clinic, we will help you find one. [facility] clinics are conveniently located to serve the needs of you and your family.             Review of your medicines      START taking        Dose / Directions Last dose taken    doxycycline 100 MG capsule   Commonly known as:  VIBRAMYCIN   Dose:  100 mg   Quantity:  20 capsule        Take 1 capsule (100 mg) by mouth 2 times daily for 10 days   Refills:  0          CONTINUE these medicines which may have CHANGED, or have new prescriptions. If we are uncertain of the size of tablets/capsules you have at home, strength may be listed as something that might have changed.        Dose / Directions Last dose taken    order for DME   What changed:    - how much to take  - when to take this  - additional instructions   Quantity:  1 each        Equipment being ordered: Oxygen portable Length of need: 99 2 Liters Nasal Canula to keep O2 sats greater than 92%   Refills:  0          Our records show that you are taking the medicines listed below. If these are incorrect, please call your family doctor or clinic.        Dose / Directions Last dose taken    * albuterol 108 (90 Base) MCG/ACT Inhaler   Commonly known as:  PROAIR HFA    Dose:  2 puff   Quantity:  1 Inhaler        Inhale 2 puffs into the lungs every 4 hours as needed for shortness of breath / dyspnea or wheezing   Refills:  11        * albuterol (2.5 MG/3ML) 0.083% neb solution   Dose:  1 vial   Quantity:  25 vial        Take 1 vial (2.5 mg) by nebulization every 6 hours as needed for shortness of breath / dyspnea or wheezing   Refills:  3        amiodarone 200 MG tablet   Commonly known as:  PACERONE/CODARONE   Quantity:  30 tablet        TAKE ONE TABLET BY MOUTH ONE TIME DAILY   Refills:  0        buPROPion 200 MG 12 hr tablet   Commonly known as:  WELLBUTRIN SR   Dose:  200 mg   Quantity:  60 tablet        Take 1 tablet (200 mg) by mouth 2 times daily   Refills:  5        lisinopril 10 MG tablet   Commonly known as:  PRINIVIL/ZESTRIL   Dose:  10 mg   Quantity:  30 tablet        Take 1 tablet (10 mg) by mouth daily   Refills:  1        LORazepam 0.5 MG tablet   Commonly known as:  ATIVAN   Dose:  0.5 mg   Quantity:  30 tablet        Take 1 tablet (0.5 mg) by mouth every 4 hours as needed (Anxiety, Nausea/Vomiting or Sleep)   Refills:  5        * morphine 15 MG IR tablet   Commonly known as:  MSIR   Dose:  30 mg   Quantity:  60 tablet        Take 2 tablets (30 mg) by mouth 2 times daily   Refills:  0        * morphine sulfate (high concentrate) 20 MG/ML concentrated solution   Commonly known as:  ROXANOL-CONCENTRATED   Dose:  2.5 mg   Quantity:  20 mL        Take 0.125 mLs (2.5 mg) by mouth every 2 hours as needed for shortness of breath / dyspnea or moderate to severe pain   Refills:  0        * nicotine 21 MG/24HR 24 hr patch   Commonly known as:  NICODERM CQ   Dose:  1 patch   Quantity:  30 patch        Place 1 patch onto the skin every 24 hours   Refills:  0        * nicotine 10 MG Inhaler   Commonly known as:  NICOTROL   Dose:  6 Cartridge   Quantity:  168 each        Inhale 6 Cartridges into the lungs daily as needed for smoking cessation   Refills:  0        omeprazole 40  MG capsule   Commonly known as:  priLOSEC   Dose:  40 mg   Quantity:  30 capsule        Take 1 capsule (40 mg) by mouth daily Take 30-60 minutes before a meal.   Refills:  11        ondansetron 4 MG tablet   Commonly known as:  ZOFRAN   Dose:  4 mg   Quantity:  30 tablet        Take 1 tablet (4 mg) by mouth every 8 hours as needed for nausea   Refills:  1        order for DME   Quantity:  1 Device        Equipment being ordered: donut cushion for sacral ulcer   Refills:  0        order for DME   Quantity:  1 Device        Equipment being ordered: lightweight wheelchair.   Cannot lift standard wheelchair due to weakness and cancer.   Refills:  0        order for DME   Quantity:  1 Device        Equipment being ordered: Walker - 4 legged walker with wheels and a seat   Refills:  0        order for DME   Quantity:  1 each        Equipment being ordered: Wheelchair   Refills:  0        order for DME   Quantity:  1 each        Equipment being ordered: Nebulizer with tubing.   Refills:  0        polyethylene glycol powder   Commonly known as:  MIRALAX   Dose:  1 capful   Quantity:  510 g        Take 17 g (1 capful) by mouth daily   Refills:  1        prochlorperazine 10 MG tablet   Commonly known as:  COMPAZINE   Dose:  10 mg        Take 10 mg by mouth every 6 hours as needed   Refills:  0        senna-docusate 8.6-50 MG per tablet   Commonly known as:  SENOKOT-S;PERICOLACE   Dose:  2 tablet   Quantity:  120 tablet        Take 2 tablets by mouth 2 times daily   Refills:  11        warfarin 2.5 MG tablet   Commonly known as:  COUMADIN   Quantity:  25 tablet        Take by mouth every evening Take 1.25mg daily or as directed by the Anticoagulation Clinic   Refills:  0        * Notice:  This list has 6 medication(s) that are the same as other medications prescribed for you. Read the directions carefully, and ask your doctor or other care provider to review them with you.            Prescriptions were sent or printed at these  locations (1 Prescription)                   Other Prescriptions                Printed at Department/Unit printer (1 of 1)         doxycycline (VIBRAMYCIN) 100 MG capsule                Procedures and tests performed during your visit     CBC with platelets differential    CT Soft Tissue Neck w Contrast    Chest CT - IV contrast only - PE protocol    Comprehensive metabolic panel    EKG 12 lead    INR    Troponin I      Orders Needing Specimen Collection     None      Pending Results     Date and Time Order Name Status Description    7/17/2018 1625 Chest CT - IV contrast only - PE protocol Preliminary             Pending Culture Results     No orders found from 7/15/2018 to 7/18/2018.            Pending Results Instructions     If you had any lab results that were not finalized at the time of your Discharge, you can call the ED Lab Result RN at [phone]. You will be contacted by this team for any positive Lab results or changes in treatment. The nurses are available 7 days a week from 10A to 6:30P.  You can leave a message 24 hours per day and they will return your call.        Test Results From Your Hospital Stay        7/17/2018  4:27 PM      Component Results     Component Value Ref Range & Units Status    WBC 10.0 4.0 - 11.0 10e9/L Final    RBC Count 5.18 4.4 - 5.9 10e12/L Final    Hemoglobin 16.3 13.3 - 17.7 g/dL Final    Hematocrit 49.9 40.0 - 53.0 % Final    MCV 96 78 - 100 fl Final    MCH 31.5 26.5 - 33.0 pg Final    MCHC 32.7 31.5 - 36.5 g/dL Final    RDW 12.9 10.0 - 15.0 % Final    Platelet Count 294 150 - 450 10e9/L Final    Diff Method Automated Method  Final    % Neutrophils 86.9 % Final    % Lymphocytes 6.2 % Final    % Monocytes 6.4 % Final    % Eosinophils 0.1 % Final    % Basophils 0.2 % Final    % Immature Granulocytes 0.2 % Final    Nucleated RBCs 0 0 /100 Final    Absolute Neutrophil 8.7 (H) 1.6 - 8.3 10e9/L Final    Absolute Lymphocytes 0.6 (L) 0.8 - 5.3 10e9/L Final    Absolute Monocytes  0.6 0.0 - 1.3 10e9/L Final    Absolute Eosinophils 0.0 0.0 - 0.7 10e9/L Final    Absolute Basophils 0.0 0.0 - 0.2 10e9/L Final    Abs Immature Granulocytes 0.0 0 - 0.4 10e9/L Final    Absolute Nucleated RBC 0.0  Final         7/17/2018  4:42 PM      Component Results     Component Value Ref Range & Units Status    Sodium 136 133 - 144 mmol/L Final    Potassium 3.9 3.4 - 5.3 mmol/L Final    Chloride 102 94 - 109 mmol/L Final    Carbon Dioxide 27 20 - 32 mmol/L Final    Anion Gap 7 3 - 14 mmol/L Final    Glucose 88 70 - 99 mg/dL Final    Urea Nitrogen 23 7 - 30 mg/dL Final    Creatinine 1.13 0.66 - 1.25 mg/dL Final    GFR Estimate 66 >60 mL/min/1.7m2 Final    Non  GFR Calc    GFR Estimate If Black 80 >60 mL/min/1.7m2 Final    African American GFR Calc    Calcium 8.4 (L) 8.5 - 10.1 mg/dL Final    Bilirubin Total 0.5 0.2 - 1.3 mg/dL Final    Albumin 2.9 (L) 3.4 - 5.0 g/dL Final    Protein Total 6.2 (L) 6.8 - 8.8 g/dL Final    Alkaline Phosphatase 92 40 - 150 U/L Final    ALT 14 0 - 70 U/L Final    AST 16 0 - 45 U/L Final         7/17/2018  4:44 PM      Component Results     Component Value Ref Range & Units Status    Troponin I ES <0.015 0.000 - 0.045 ug/L Final    The 99th percentile for upper reference range is 0.045 ug/L.  Troponin values   in the range of 0.045 - 0.120 ug/L may be associated with risks of adverse   clinical events.           7/17/2018  7:39 PM      Narrative     CT SCAN OF THE NECK WITH CONTRAST  7/17/2018 6:20 PM     HISTORY: Metastatic lung cancer with dysphagia exacerbated by lying.    TECHNIQUE: Axial CT images of the neck following administration of  intravenous contrast with reformations. Radiation dose for this scan  was reduced using automated exposure control, adjustment of the mA  and/or kV according to patient size, or iterative reconstruction  technique. 50 mL Isovue -370 IV.     COMPARISON: PET/CT 5/16/2018.     FINDINGS:   Marked diffuse transfacial edema and  subcutaneous stranding throughout  the neck, left greater than right. The edema in the neck is new since  prior PET/CT 5/16/2018.    There are several abnormal-appearing left cervical chain lymph nodes  and left transverse cervical lymph nodes, some of which are centrally  necrotic. For example, there is a centrally necrotic left transverse  cervical chain lymph node measuring 0.9 cm (series 2 image 106). These  appear to be increased in size and number since the prior PET/CT  5/16/2018.    While there is diffuse edema throughout the neck, no loculated abscess  is appreciated.    No destructive osseous lesions identified. Degenerative changes in the  cervical spine most pronounced at C3-C4 and C5-C6.    Marked odontogenic disease seen within the remaining teeth. Visualized  paranasal sinuses are clear.    Bilateral pleural effusions right greater than left in the visualized  lung apices. Please see dedicated chest CT report for details below  the lungs.        Impression     IMPRESSION:    1. New left greater than right diffuse trans-spatial edema throughout  the neck. There are also multiple new abnormal-appearing cervical  chain lymph nodes some of which are necrotic. Findings are favored to  represent progression of metastatic disease.  2. No loculated fluid collection or abscess is appreciated. No obvious  significant mass effect on the airway.    Results discussed with Bruce Galvan at 7:00 PM on 7/17/2018.     WAQAS ROSARIO MD         7/17/2018  7:26 PM      Narrative     CT CHEST PULMONARY EMBOLISM WITH CONTRAST  7/17/2018 6:13 PM    HISTORY: Right chest pain, same.    TECHNIQUE: Axial images from thoracic inlet to diaphragm. 75 mL Isovue  -370 IV contrast  Radiation dose for this scan was reduced using  automated exposure control, adjustment of the mA and/or kV according  to patient size, or iterative reconstruction technique.    COMPARISON: 2/13/2018 CT chest    FINDINGS:   Chest: Moderately large bilateral  pleural effusions. Increase in size  of confluent anterior mediastinal, confluent low attenuation tissue  which may be a combination of anterior mediastinal mass and possibly  fluid. On series 6 image 48 a 4 x 2.3 cm hypodense anterior  mediastinal area suspicious for mass was previously 2.9 x 1.4 cm and  significantly larger, adjacent mediastinal density may be fluid or  confluent adenopathy. Right paratracheal adenopathy and confluent  increased soft tissue at the arvind bilaterally concerning for  adenopathy is mildly increased.    There is compressive lower lobe atelectasis. Calcified left lower lobe  granuloma series 5 image 83. Indeterminate anterior left upper lobe  nodule series 6 image 76 also present on 2/13/2018. In the areas of  posterior lower lobe atelectasis are additional more focal opacities  which could represent areas of consolidation.    No acute-appearing pulmonary embolus. Ascending aorta 3.6 cm. Ascites  and diffuse increased attenuation of mesenteric fat in the upper  abdomen. No aggressive bone lesions. Left chest Port-A-Cath with tip  in distal SVC-right atrial junction.        Impression     IMPRESSION:   1. Moderate-large bilateral pleural effusions with posterior lower  lobe atelectasis and patchy lower lobe opacities which could be  associated infiltrate.  2. Indeterminate anterior right upper lobe 0.8 cm nodule.  3. Increase in anterior mediastinal density consistent with increasing  anterior mediastinal mass. There is mild right paratracheal adenopathy  and confluent anterior mediastinal and bilateral hilar hypodense  tissue concerning for confluent low-attenuation adenopathy possibly  associated with fluid.  4. Small ascites and increased attenuation of mesenteric fat diffusely  in the upper abdomen.         7/17/2018  5:36 PM      Component Results     Component Value Ref Range & Units Status    INR 4.37 (H) 0.86 - 1.14 Final                Thank you for choosing [facility]       Thank  you for choosing [facility] for your care. Our goal is always to provide you with excellent care. Hearing back from our patients is one way we can continue to improve our services. Please take a few minutes to complete the written survey that you may receive in the mail after you visit with us. Thank you!        [employer] Information     [employer] gives you secure access to your electronic health record. If you see a primary care provider, you can also send messages to your care team and make appointments. If you have questions, please call your primary care clinic.  If you do not have a primary care provider, please call [phone] and they will assist you.        Care EveryWhere ID     This is your Care EveryWhere ID. This could be used by other organizations to access your [facility] medical records  [id number]        Equal Access to Services     ROD PERRY : Diego Zhang, ronan pendleton, diane so, harley boykin. So [facility] [phone].    ATENCIÓN: Si habla español, tiene a morocho disposición servicios gratuitos de asistencia lingüística. Llame al [phone].    We comply with applicable federal civil rights laws and [place] laws. We do not discriminate on the basis of race, color, national origin, age, disability, sex, sexual orientation, or gender identity.            After Visit Summary       This is your record. Keep this with you and show to your community pharmacist(s) and doctor(s) at your next visit.

## 2018-07-17 NOTE — TELEPHONE ENCOUNTER
Reason for Call:  Other     Detailed comments: [facility] - [phone] - Patient needs a order for speech therapy because of swallow issues. Order if for [facility] and she is also waiting for return on phone call from yesterday.    Phone Number Patient can be reached at:     Best Time:     Can we leave a detailed message on this number? YES    Call taken on 7/17/2018 at 11:06 AM by Deepika Lebron

## 2018-07-17 NOTE — ED AVS SNAPSHOT
[facility] Emergency Department    [address] [address]    Phone:  [phone]    Fax:  [phone]                                       Zechariah Ashby   MRN: [id number]    Department:  [facility] Emergency Department   Date of Visit:  7/17/2018           After Visit Summary Signature Page     I have received my discharge instructions, and my questions have been answered. I have discussed any challenges I see with this plan with the nurse or doctor.    ..........................................................................................................................................  Patient/Patient Representative Signature      ..........................................................................................................................................  Patient Representative Print Name and Relationship to Patient    ..................................................               ................................................  Date                                            Time    ..........................................................................................................................................  Reviewed by Signature/Title    ...................................................              ..............................................  Date                                                            Time

## 2018-07-18 NOTE — TELEPHONE ENCOUNTER
Routing refill request to provider for review/approval because:  Drug not on the [facility] refill protocol     Tracie Oneal RN

## 2018-07-18 NOTE — TELEPHONE ENCOUNTER
Reason for Call:  Other prescription    Detailed comments: kentrell from [facility] would like clearification on the early release morphine having two Rx. She is asking if you would recommend a Extended release versus early release.  She is currently at patient home  Phone Number Patient can be reached at: Other phone number:kentrell  [phone]    Best Time: any    Can we leave a detailed message on this number? YES    Call taken on 7/18/2018 at 12:09 PM by Deepika Almanza

## 2018-07-18 NOTE — PROGRESS NOTES
ANTICOAGULATION FOLLOW-UP CLINIC VISIT    Patient Name:  Zechariah Ashby  Date:  7/18/2018  Contact Type:  Telephone/ Honey LEVI, [facility]    SUBJECTIVE:     Patient Findings     Positives Inflammation (Pleural effusions), Antibiotic use or infection (doxycycline)    Comments Patient was in the ED for CP. Found to have pleural effusions. He was ordered Doxycyline but has not started the medication. He is to see Dr. May tomorrow and is scheduled for a chemo treatment but it may be postponed. Patient was instructed to hold his Warfarin for now and his INR will be rechecked on Friday. Patient denies signs or symptoms of bleeding.            OBJECTIVE    INR   Date Value Ref Range Status   07/18/2018 4.8  Final       ASSESSMENT / PLAN  INR assessment SUPRA    Recheck INR In: 2 DAYS    INR Location [facility] INR      Anticoagulation Summary as of 7/18/2018     INR goal 2.0-3.0   Today's INR 4.8!   Warfarin maintenance plan 1.25 mg (2.5 mg x 0.5) every day   Full warfarin instructions 7/18: Hold; 7/19: Hold; Otherwise 1.25 mg every day   Weekly warfarin total 8.75 mg   Plan last modified Alice Lea RN (6/28/2018)   Next INR check 7/20/2018   Priority INR   Target end date     Indications   Atrial flutter  unspecified type (H) [I48.92]  Long-term (current) use of anticoagulants [Z79.01] [Z79.01]         Anticoagulation Episode Summary     INR check location     Preferred lab     Send INR reminders to [place] PHONE ANTICOAG POOL    Comments *Pt is on AMIODARONE. On palliative Keytruda as of 5-23-18. // [facility]. Ensure daily as of 7-13-18      Anticoagulation Care Providers     Provider Role Specialty Phone number    Kailash Mason MD [facility] [phone]            See the Encounter Report to view Anticoagulation Flowsheet and Dosing Calendar (Go to Encounters tab in chart review, and find the Anticoagulation Therapy Visit)        Mahendra David RN

## 2018-07-18 NOTE — TELEPHONE ENCOUNTER
GURMEET Pierce is concerned about the patient's medication.  Morphine script is IR 2 tablets 2 times a day.   Should this be extended?  Should this be taken at different times as the liquid morphine 2.5mg every 2 hours as needed?    Please clarify dose for the 2 medications.  RN is aware the provider is out of the office until Friday 7/20/18.     Thank you  Nakia ABDUL RN

## 2018-07-18 NOTE — PROGRESS NOTES
Refill request received from pharmacy on behalf of patient for lorazepam.  Last ordered 06.28.18 #30 with 5 refills.    Pt is on immunotherapy.  Will discuss with patient when in clinic tomorrow to see if taking for nausea, anxiety, or sleep, then discuss with Dr. May if ok to fill or if he would like to refer to PCP/change medication.

## 2018-07-18 NOTE — TELEPHONE ENCOUNTER
Attempted to reach RN.  Not able to leave message.     Notes from phone note on 7/9/18:    Reason for Call:  Other orders     Detailed comments: Homecare nurse is asking for liquid morphine for SOB and also to increase the oral morphine to 30 mg Bid.     Phone Number Brooke can be reached at: Other phone number:  [phone]     Best Time: any     Can we leave a detailed message on this number? YES     Call taken on 7/9/2018 at 12:06 PM by Susan ABDUL RN

## 2018-07-18 NOTE — TELEPHONE ENCOUNTER
Kari called us back, and the incorrect information for phone was give. Please call her at [phone].  Margaux Drew  [facility]  [facility]

## 2018-07-18 NOTE — MR AVS SNAPSHOT
FredyJOSE C Ashby   7/18/2018   Anticoagulation Therapy Visit    Description:  58 year old male   Provider:  Mahendra David, RN   Department:  [facility]           INR as of 7/18/2018     Today's INR 4.8!      Anticoagulation Summary as of 7/18/2018     INR goal 2.0-3.0   Today's INR 4.8!   Full warfarin instructions 7/18: Hold; 7/19: Hold; Otherwise 1.25 mg every day   Next INR check 7/20/2018    Indications   Atrial flutter  unspecified type (H) [I48.92]  Long-term (current) use of anticoagulants [Z79.01] [Z79.01]         July 2018 Details    Sun Mon Tue Wed Thu Fri Sat     1               2               3               4               5               6               7                 8               9               10               11               12               13               14                 15               16               17               18      Hold   See details      19      Hold         20            21                 22               23               24               25               26               27               28                 29               30               31                    Date Details   07/18 This INR check       Date of next INR:  7/20/2018         How to take your warfarin dose     To take:  1.25 mg Take 0.5 of a 2.5 mg tablet.    Hold Do not take your warfarin dose. See the Details table to the right for additional instructions.

## 2018-07-18 NOTE — TELEPHONE ENCOUNTER
Requested Prescriptions   Pending Prescriptions Disp Refills     morphine (MSIR) 15 MG IR tablet [Pharmacy Med Name: MORPHINE SUL 15 MG   Last Written Prescription Date:  07/09/18  Last Fill Quantity: 20ml,  # refills: 0   Last office visit: 5/16/2018 with prescribing provider:  05/16/18   Future Office Visit:   Next 5 appointments (look out 90 days)     Jul 19, 2018  9:45 AM CDT   Return Visit with Joycelyn May MD   [facility] ([facility])    [facility]  [address]  [facility] [place] [address]   [phone]                  TAB HIKM] 60 tablet 0     Sig: TAKE TWO TABLETS BY MOUTH TWICE DAILY    There is no refill protocol information for this order

## 2018-07-19 NOTE — PROGRESS NOTES
Hematology/ Oncology Follow-up Visit:  Jul 19, 2018    Reason for Visit:   Chief Complaint   Patient presents with     Oncology Clinic Visit     labs, 3 wk f/u Left lung CA       Oncologic History:  Non-small cell carcinoma of lung (H)  Zechariah Ashby is a 57 year old male who was recently diagnosed with atrial fib/flutter in early November 2017. During workup just x-ray showed elevation of the left hemidiaphragm with a focal tenting. The CT scan was done for further evaluation showing left upper lobe atelectasis and a moderate-sized left pleural effusion. Patient also had mediastinal adenopathy. Patient had left thoracocentesis. Cytology came back as transudate and cytology came back negative. Patient has a long history of smoking. PET scan showed a large FDG avid mediastinal mass extending to the left hilum with obliteration of the left upper lobe bronchus and atelectasis of the upper lobe.  There are also multiple left cervical left cervical and mediastinal adenopathy.  There is increased left pleural effusion.  There is hypermetabolic left adrenal lesion.  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 He was started on carboplatin and gemcitabine.      Interval History:  Patient is here today for follow-up.  He has been on immunotherapy was Keytruda.  He concluded 2 cycles of treatment.  He presented to the emergency room with increasing shortness of breath.  The CT scan showed progressing disease.  Patient is here today to discuss management plan.    Review Of Systems:  Constitutional: Negative for fever, chills, and night sweats.  Increasing fatigue, generalized weakness.  Patient have poor appetite and weight loss.  Skin: negative.  Eyes: negative.  Ears/Nose/Throat: negative.  Respiratory: Shortness of breath with minimal exertion.    Cardiovascular: negative.  Gastrointestinal: negative.  Genitourinary: negative.  Musculoskeletal:  negative.  Neurologic: negative.  Psychiatric: negative.  Hematologic/Lymphatic/Immunologic: negative.  Endocrine: negative.    All other ROS negative unless mentioned in interval history.    Past medical, social, surgical, and family histories reviewed.    Allergies:  Allergies as of 07/19/2018 - Osmel as Reviewed 07/19/2018   Allergen Reaction Noted     Nka [no known allergies]  01/03/2018       Current Medications:  Current Outpatient Prescriptions   Medication Sig Dispense Refill     albuterol (2.5 MG/3ML) 0.083% neb solution Take 1 vial (2.5 mg) by nebulization every 6 hours as needed for shortness of breath / dyspnea or wheezing 25 vial 3     albuterol (PROAIR HFA) 108 (90 BASE) MCG/ACT Inhaler Inhale 2 puffs into the lungs every 4 hours as needed for shortness of breath / dyspnea or wheezing 1 Inhaler 11     amiodarone (PACERONE/CODARONE) 200 MG tablet TAKE ONE TABLET BY MOUTH ONE TIME DAILY 30 tablet 0     buPROPion (WELLBUTRIN SR) 200 MG 12 hr tablet Take 1 tablet (200 mg) by mouth 2 times daily 60 tablet 5     lisinopril (PRINIVIL/ZESTRIL) 10 MG tablet Take 1 tablet (10 mg) by mouth daily 30 tablet 1     LORazepam (ATIVAN) 0.5 MG tablet Take 1 tablet (0.5 mg) by mouth every 4 hours as needed (Anxiety, Nausea/Vomiting or Sleep) 30 tablet 5     morphine (MSIR) 15 MG IR tablet Take 2 tablets (30 mg) by mouth 2 times daily 60 tablet 0     morphine sulfate, high concentrate, (ROXANOL-CONCENTRATED) 20 MG/ML concentrated solution Take 0.125 mLs (2.5 mg) by mouth every 2 hours as needed for shortness of breath / dyspnea or moderate to severe pain 20 mL 0     nicotine (NICOTROL) 10 MG Inhaler Inhale 6 Cartridges into the lungs daily as needed for smoking cessation 168 each 0     omeprazole (PRILOSEC) 40 MG capsule Take 1 capsule (40 mg) by mouth daily Take 30-60 minutes before a meal. 30 capsule 11     ondansetron (ZOFRAN) 4 MG tablet Take 1 tablet (4 mg) by mouth every 8 hours as needed for nausea 30 tablet 1      "order for DME Equipment being ordered: Oxygen portable  Length of need: 99  2 Liters Nasal Canula to keep O2 sats greater than 92% (Patient taking differently: 2 L daily Equipment being ordered: Oxygen portable  Length of need: 99  2 Liters Nasal Canula to keep O2 sats greater than 92%) 1 each 0     order for DME Equipment being ordered: Nebulizer with tubing. 1 each 0     order for DME Equipment being ordered: Wheelchair 1 each 0     order for DME Equipment being ordered: Walker - 4 legged walker with wheels and a seat 1 Device 0     order for DME Equipment being ordered: lightweight wheelchair.    Cannot lift standard wheelchair due to weakness and cancer. 1 Device 0     order for DME Equipment being ordered: donut cushion for sacral ulcer 1 Device 0     prochlorperazine (COMPAZINE) 10 MG tablet Take 10 mg by mouth every 6 hours as needed        senna-docusate (SENOKOT-S;PERICOLACE) 8.6-50 MG per tablet Take 2 tablets by mouth 2 times daily 120 tablet 11     polyethylene glycol (MIRALAX) powder Take 17 g (1 capful) by mouth daily (Patient not taking: Reported on 6/27/2018) 510 g 1     warfarin (COUMADIN) 2.5 MG tablet Take by mouth every evening Take 1.25mg daily or as directed by the Anticoagulation Clinic 25 tablet 0        Physical Exam:  /88 (BP Location: Right arm, Patient Position: Chair, Cuff Size: Adult Regular)  Pulse 94  Temp 97.5  F (36.4  C) (Oral)  Resp 20  Ht 1.803 m (5' 10.98\")  Wt 67.9 kg (149 lb 9.6 oz)  SpO2 98%  BMI 20.87 kg/m2  Wt Readings from Last 12 Encounters:   07/19/18 67.9 kg (149 lb 9.6 oz)   06/27/18 65 kg (143 lb 4.8 oz)   05/30/18 64.4 kg (142 lb)   05/23/18 65.3 kg (144 lb)   05/17/18 66.7 kg (147 lb)   05/16/18 66.4 kg (146 lb 6.4 oz)   05/11/18 67.6 kg (149 lb)   05/10/18 68.8 kg (151 lb 9.6 oz)   05/09/18 68.4 kg (150 lb 11.2 oz)   05/07/18 67.1 kg (148 lb)   05/03/18 68.1 kg (150 lb 1.6 oz)   04/26/18 64.9 kg (143 lb)     ECOG performance status: 2  GENERAL " APPEARANCE: Healthy, alert and in no acute distress.  HEENT: Sclerae anicteric. PERRLA. Oropharynx without ulcers, lesions, or thrush.  NECK: Supple. No asymmetry or masses.  LYMPHATICS: No palpable cervical, supraclavicular, axillary, or inguinal lymphadenopathy.  RESP: Lungs clear to auscultation bilaterally without rales, rhonchi or wheezes.  CARDIOVASCULAR: Regular rate and rhythm. Normal S1, S2; no S3 or S4. No murmur, gallop, or rub.  ABDOMEN: Soft, nontender. Bowel sounds normal. No palpable organomegaly or masses.  MUSCULOSKELETAL: Extremities without gross deformities noted. No edema of bilateral lower extremities.  SKIN: No suspicious lesions or rashes.  NEURO: Alert and oriented x 3. Cranial nerves II-XII grossly intact.  PSYCHIATRIC: Mentation and affect appear normal.    Laboratory/Imaging Studies:  Anticoagulation Therapy Visit on 07/18/2018   Component Date Value Ref Range Status     INR 07/18/2018 4.8   Final   Anticoagulation Therapy Visit on 07/16/2018   Component Date Value Ref Range Status     INR 07/16/2018 5.7   Final   Anticoagulation Therapy Visit on 07/13/2018   Component Date Value Ref Range Status     INR 07/13/2018 4.9   Final   Anticoagulation Therapy Visit on 07/06/2018   Component Date Value Ref Range Status     INR 07/06/2018 2.3   Final        Recent Results (from the past 744 hour(s))   XR Chest 2 Views    Narrative    CHEST TWO VIEWS  6/27/2018 11:30 AM     HISTORY: 58-year-old with shortness of breath.       Impression    IMPRESSION: Since January 19, 2018, left subclavian port and catheter  again identified with tip at RA/SVC junction, unchanged. Elevation of  the left hemidiaphragm is unchanged. Blunting of both costophrenic  sulci corresponding to small bilateral pleural effusions, new since  previous exam and right greater than left. No pneumothorax or abnormal  area of consolidation.    BARBRA METZ MD   Chest CT - IV contrast only - PE protocol    Narrative    CT CHEST  PULMONARY EMBOLISM WITH CONTRAST  7/17/2018 6:13 PM    HISTORY: Right chest pain, same.    TECHNIQUE: Axial images from thoracic inlet to diaphragm. 75 mL Isovue  -370 IV contrast  Radiation dose for this scan was reduced using  automated exposure control, adjustment of the mA and/or kV according  to patient size, or iterative reconstruction technique.    COMPARISON: 2/13/2018 CT chest    FINDINGS:   Chest: Moderately large bilateral pleural effusions. Increase in size  of confluent anterior mediastinal, confluent low attenuation tissue  which may be a combination of anterior mediastinal mass and possibly  fluid. On series 6 image 48 a 4 x 2.3 cm hypodense anterior  mediastinal area suspicious for mass was previously 2.9 x 1.4 cm and  significantly larger, adjacent mediastinal density may be fluid or  confluent adenopathy. Right paratracheal adenopathy and confluent  increased soft tissue at the arvind bilaterally concerning for  adenopathy is mildly increased.    There is compressive lower lobe atelectasis. Calcified left lower lobe  granuloma series 5 image 83. Indeterminate anterior left upper lobe  nodule series 6 image 76 also present on 2/13/2018. In the areas of  posterior lower lobe atelectasis are additional more focal opacities  which could represent areas of consolidation or nodules.    No acute-appearing pulmonary embolus. Ascending aorta 3.6 cm. Ascites  and diffuse increased attenuation of mesenteric fat in the upper  abdomen. No aggressive bone lesions. Left chest Port-A-Cath with tip  in distal SVC-right atrial junction.      Impression    IMPRESSION:   1. Moderate-large bilateral pleural effusions with posterior lower  lobe atelectasis and patchy lower lobe opacities which could be  associated infiltrate.  2. Indeterminate anterior right upper lobe 0.8 cm nodule.  3. Increase in anterior mediastinal density consistent with increasing  anterior mediastinal mass. There is mild right paratracheal  adenopathy  and confluent anterior mediastinal and bilateral hilar hypodense  tissue concerning for confluent low-attenuation adenopathy possibly  associated with fluid.  4. Small ascites and increased attenuation of mesenteric fat diffusely  in the upper abdomen.    ROGERIO ADAMS MD   CT Soft Tissue Neck w Contrast    Narrative    CT SCAN OF THE NECK WITH CONTRAST  7/17/2018 6:20 PM     HISTORY: Metastatic lung cancer with dysphagia exacerbated by lying.    TECHNIQUE: Axial CT images of the neck following administration of  intravenous contrast with reformations. Radiation dose for this scan  was reduced using automated exposure control, adjustment of the mA  and/or kV according to patient size, or iterative reconstruction  technique. 50 mL Isovue -370 IV.     COMPARISON: PET/CT 5/16/2018.     FINDINGS:   Marked diffuse transfacial edema and subcutaneous stranding throughout  the neck, left greater than right. The edema in the neck is new since  prior PET/CT 5/16/2018.    There are several abnormal-appearing left cervical chain lymph nodes  and left transverse cervical lymph nodes, some of which are centrally  necrotic. For example, there is a centrally necrotic left transverse  cervical chain lymph node measuring 0.9 cm (series 2 image 106). These  appear to be increased in size and number since the prior PET/CT  5/16/2018.    While there is diffuse edema throughout the neck, no loculated abscess  is appreciated.    No destructive osseous lesions identified. Degenerative changes in the  cervical spine most pronounced at C3-C4 and C5-C6.    Marked odontogenic disease seen within the remaining teeth. Visualized  paranasal sinuses are clear.    Bilateral pleural effusions right greater than left in the visualized  lung apices. Please see dedicated chest CT report for details below  the lungs.      Impression    IMPRESSION:    1. New left greater than right diffuse trans-spatial edema throughout  the neck. There are  also multiple new abnormal-appearing cervical  chain lymph nodes some of which are necrotic. Findings are favored to  represent progression of metastatic disease.  2. No loculated fluid collection or abscess is appreciated. No obvious  significant mass effect on the airway.    Results discussed with Bruce Galvan at 7:00 PM on 7/17/2018.     WAQAS ROSARIO MD       Assessment and plan:    (C34.90) Non-small cell carcinoma of lung (H)  (primary encounter diagnosis)  I reviewed with the patient his family today the images from the set scan showing disease progression.  We talked about different treatment options.  We talked about palliative chemotherapy was Taxotere.  We talked about potential side effects in details.  We also reviewed with the patient other options including palliative care and hospice care.  After long discussion the patient selected to proceed with palliative care without any active treatment.  I have not scheduled the patient for any further appointments I will see him in the future if there is new change or concern.    (C79.31) Brain metastasis (H)  Status post stereotactic radiation.    (J90) Pleural effusion  Because the patient is currently asymptomatic I would recommend to arrange for right thoracocentesis.    (K59.01) Slow transit constipation  Patient currently on Senokot-S and MiraLAX    (F32.1) Moderate single current episode of major depressive disorder (H)  Patient currently on patient currently on Wellbutrin SR    (R11.0) Nausea  We reviewed with the patient today the nausea medications in details.    The patient is ready to learn, no apparent learning barriers were identified.  Diagnosis and treatment plans were explained to the patient. The patient expressed understanding of the content. The patient asked appropriate questions. The patient questions were answered to his satisfaction.    Time spent 40 minutes more than 50% of the time in counseling and coordination of care including  discussion of imaging studies, metastatic lung cancer management, follow-up and prognosis.    Chart documentation with Dragon Voice recognition Software. Although reviewed after completion, some words and grammatical errors may remain.

## 2018-07-19 NOTE — LETTER
7/19/2018         RE: Zechariah Ashby  [address] [facility] [id number]        Dear Colleague,    Thank you for referring your patient, Zechariah Ashby, to the [facility]. Please see a copy of my visit note below.    Hematology/ Oncology Follow-up Visit:  Jul 19, 2018    Reason for Visit:   Chief Complaint   Patient presents with     Oncology Clinic Visit     labs, 3 wk f/u Left lung CA       Oncologic History:  Non-small cell carcinoma of lung (H)  Zechariah Ashby is a 57 year old male who was recently diagnosed with atrial fib/flutter in early November 2017. During workup just x-ray showed elevation of the left hemidiaphragm with a focal tenting. The CT scan was done for further evaluation showing left upper lobe atelectasis and a moderate-sized left pleural effusion. Patient also had mediastinal adenopathy. Patient had left thoracocentesis. Cytology came back as transudate and cytology came back negative. Patient has a long history of smoking. PET scan showed a large FDG avid mediastinal mass extending to the left hilum with obliteration of the left upper lobe bronchus and atelectasis of the upper lobe.  There are also multiple left cervical left cervical and mediastinal adenopathy.  There is increased left pleural effusion.  There is hypermetabolic left adrenal lesion.  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 He was started on carboplatin and gemcitabine.      Interval History:  Patient is here today for follow-up.  He has been on immunotherapy was Keytruda.  He concluded 2 cycles of treatment.  He presented to the emergency room with increasing shortness of breath.  The CT scan showed progressing disease.  Patient is here today to discuss management plan.    Review Of Systems:  Constitutional: Negative for fever, chills, and night sweats.  Increasing fatigue, generalized weakness.  Patient have poor  appetite and weight loss.  Skin: negative.  Eyes: negative.  Ears/Nose/Throat: negative.  Respiratory: Shortness of breath with minimal exertion.    Cardiovascular: negative.  Gastrointestinal: negative.  Genitourinary: negative.  Musculoskeletal: negative.  Neurologic: negative.  Psychiatric: negative.  Hematologic/Lymphatic/Immunologic: negative.  Endocrine: negative.    All other ROS negative unless mentioned in interval history.    Past medical, social, surgical, and family histories reviewed.    Allergies:  Allergies as of 07/19/2018 - Osmel as Reviewed 07/19/2018   Allergen Reaction Noted     Nka [no known allergies]  01/03/2018       Current Medications:  Current Outpatient Prescriptions   Medication Sig Dispense Refill     albuterol (2.5 MG/3ML) 0.083% neb solution Take 1 vial (2.5 mg) by nebulization every 6 hours as needed for shortness of breath / dyspnea or wheezing 25 vial 3     albuterol (PROAIR HFA) 108 (90 BASE) MCG/ACT Inhaler Inhale 2 puffs into the lungs every 4 hours as needed for shortness of breath / dyspnea or wheezing 1 Inhaler 11     amiodarone (PACERONE/CODARONE) 200 MG tablet TAKE ONE TABLET BY MOUTH ONE TIME DAILY 30 tablet 0     buPROPion (WELLBUTRIN SR) 200 MG 12 hr tablet Take 1 tablet (200 mg) by mouth 2 times daily 60 tablet 5     lisinopril (PRINIVIL/ZESTRIL) 10 MG tablet Take 1 tablet (10 mg) by mouth daily 30 tablet 1     LORazepam (ATIVAN) 0.5 MG tablet Take 1 tablet (0.5 mg) by mouth every 4 hours as needed (Anxiety, Nausea/Vomiting or Sleep) 30 tablet 5     morphine (MSIR) 15 MG IR tablet Take 2 tablets (30 mg) by mouth 2 times daily 60 tablet 0     morphine sulfate, high concentrate, (ROXANOL-CONCENTRATED) 20 MG/ML concentrated solution Take 0.125 mLs (2.5 mg) by mouth every 2 hours as needed for shortness of breath / dyspnea or moderate to severe pain 20 mL 0     nicotine (NICOTROL) 10 MG Inhaler Inhale 6 Cartridges into the lungs daily as needed for smoking cessation 168 each  "0     omeprazole (PRILOSEC) 40 MG capsule Take 1 capsule (40 mg) by mouth daily Take 30-60 minutes before a meal. 30 capsule 11     ondansetron (ZOFRAN) 4 MG tablet Take 1 tablet (4 mg) by mouth every 8 hours as needed for nausea 30 tablet 1     order for DME Equipment being ordered: Oxygen portable  Length of need: 99  2 Liters Nasal Canula to keep O2 sats greater than 92% (Patient taking differently: 2 L daily Equipment being ordered: Oxygen portable  Length of need: 99  2 Liters Nasal Canula to keep O2 sats greater than 92%) 1 each 0     order for DME Equipment being ordered: Nebulizer with tubing. 1 each 0     order for DME Equipment being ordered: Wheelchair 1 each 0     order for DME Equipment being ordered: Walker - 4 legged walker with wheels and a seat 1 Device 0     order for DME Equipment being ordered: lightweight wheelchair.    Cannot lift standard wheelchair due to weakness and cancer. 1 Device 0     order for DME Equipment being ordered: donut cushion for sacral ulcer 1 Device 0     prochlorperazine (COMPAZINE) 10 MG tablet Take 10 mg by mouth every 6 hours as needed        senna-docusate (SENOKOT-S;PERICOLACE) 8.6-50 MG per tablet Take 2 tablets by mouth 2 times daily 120 tablet 11     polyethylene glycol (MIRALAX) powder Take 17 g (1 capful) by mouth daily (Patient not taking: Reported on 6/27/2018) 510 g 1     warfarin (COUMADIN) 2.5 MG tablet Take by mouth every evening Take 1.25mg daily or as directed by the Anticoagulation Clinic 25 tablet 0        Physical Exam:  /88 (BP Location: Right arm, Patient Position: Chair, Cuff Size: Adult Regular)  Pulse 94  Temp 97.5  F (36.4  C) (Oral)  Resp 20  Ht 1.803 m (5' 10.98\")  Wt 67.9 kg (149 lb 9.6 oz)  SpO2 98%  BMI 20.87 kg/m2  Wt Readings from Last 12 Encounters:   07/19/18 67.9 kg (149 lb 9.6 oz)   06/27/18 65 kg (143 lb 4.8 oz)   05/30/18 64.4 kg (142 lb)   05/23/18 65.3 kg (144 lb)   05/17/18 66.7 kg (147 lb)   05/16/18 66.4 kg (146 lb " 6.4 oz)   05/11/18 67.6 kg (149 lb)   05/10/18 68.8 kg (151 lb 9.6 oz)   05/09/18 68.4 kg (150 lb 11.2 oz)   05/07/18 67.1 kg (148 lb)   05/03/18 68.1 kg (150 lb 1.6 oz)   04/26/18 64.9 kg (143 lb)     ECOG performance status: 2  GENERAL APPEARANCE: Healthy, alert and in no acute distress.  HEENT: Sclerae anicteric. PERRLA. Oropharynx without ulcers, lesions, or thrush.  NECK: Supple. No asymmetry or masses.  LYMPHATICS: No palpable cervical, supraclavicular, axillary, or inguinal lymphadenopathy.  RESP: Lungs clear to auscultation bilaterally without rales, rhonchi or wheezes.  CARDIOVASCULAR: Regular rate and rhythm. Normal S1, S2; no S3 or S4. No murmur, gallop, or rub.  ABDOMEN: Soft, nontender. Bowel sounds normal. No palpable organomegaly or masses.  MUSCULOSKELETAL: Extremities without gross deformities noted. No edema of bilateral lower extremities.  SKIN: No suspicious lesions or rashes.  NEURO: Alert and oriented x 3. Cranial nerves II-XII grossly intact.  PSYCHIATRIC: Mentation and affect appear normal.    Laboratory/Imaging Studies:  Anticoagulation Therapy Visit on 07/18/2018   Component Date Value Ref Range Status     INR 07/18/2018 4.8   Final   Anticoagulation Therapy Visit on 07/16/2018   Component Date Value Ref Range Status     INR 07/16/2018 5.7   Final   Anticoagulation Therapy Visit on 07/13/2018   Component Date Value Ref Range Status     INR 07/13/2018 4.9   Final   Anticoagulation Therapy Visit on 07/06/2018   Component Date Value Ref Range Status     INR 07/06/2018 2.3   Final        Recent Results (from the past 744 hour(s))   XR Chest 2 Views    Narrative    CHEST TWO VIEWS  6/27/2018 11:30 AM     HISTORY: 58-year-old with shortness of breath.       Impression    IMPRESSION: Since January 19, 2018, left subclavian port and catheter  again identified with tip at RA/SVC junction, unchanged. Elevation of  the left hemidiaphragm is unchanged. Blunting of both costophrenic  sulci corresponding  to small bilateral pleural effusions, new since  previous exam and right greater than left. No pneumothorax or abnormal  area of consolidation.    BARBRA METZ MD   Chest CT - IV contrast only - PE protocol    Narrative    CT CHEST PULMONARY EMBOLISM WITH CONTRAST  7/17/2018 6:13 PM    HISTORY: Right chest pain, same.    TECHNIQUE: Axial images from thoracic inlet to diaphragm. 75 mL Isovue  -370 IV contrast  Radiation dose for this scan was reduced using  automated exposure control, adjustment of the mA and/or kV according  to patient size, or iterative reconstruction technique.    COMPARISON: 2/13/2018 CT chest    FINDINGS:   Chest: Moderately large bilateral pleural effusions. Increase in size  of confluent anterior mediastinal, confluent low attenuation tissue  which may be a combination of anterior mediastinal mass and possibly  fluid. On series 6 image 48 a 4 x 2.3 cm hypodense anterior  mediastinal area suspicious for mass was previously 2.9 x 1.4 cm and  significantly larger, adjacent mediastinal density may be fluid or  confluent adenopathy. Right paratracheal adenopathy and confluent  increased soft tissue at the arvind bilaterally concerning for  adenopathy is mildly increased.    There is compressive lower lobe atelectasis. Calcified left lower lobe  granuloma series 5 image 83. Indeterminate anterior left upper lobe  nodule series 6 image 76 also present on 2/13/2018. In the areas of  posterior lower lobe atelectasis are additional more focal opacities  which could represent areas of consolidation or nodules.    No acute-appearing pulmonary embolus. Ascending aorta 3.6 cm. Ascites  and diffuse increased attenuation of mesenteric fat in the upper  abdomen. No aggressive bone lesions. Left chest Port-A-Cath with tip  in distal SVC-right atrial junction.      Impression    IMPRESSION:   1. Moderate-large bilateral pleural effusions with posterior lower  lobe atelectasis and patchy lower lobe opacities  which could be  associated infiltrate.  2. Indeterminate anterior right upper lobe 0.8 cm nodule.  3. Increase in anterior mediastinal density consistent with increasing  anterior mediastinal mass. There is mild right paratracheal adenopathy  and confluent anterior mediastinal and bilateral hilar hypodense  tissue concerning for confluent low-attenuation adenopathy possibly  associated with fluid.  4. Small ascites and increased attenuation of mesenteric fat diffusely  in the upper abdomen.    ROGERIO ADAMS MD   CT Soft Tissue Neck w Contrast    Narrative    CT SCAN OF THE NECK WITH CONTRAST  7/17/2018 6:20 PM     HISTORY: Metastatic lung cancer with dysphagia exacerbated by lying.    TECHNIQUE: Axial CT images of the neck following administration of  intravenous contrast with reformations. Radiation dose for this scan  was reduced using automated exposure control, adjustment of the mA  and/or kV according to patient size, or iterative reconstruction  technique. 50 mL Isovue -370 IV.     COMPARISON: PET/CT 5/16/2018.     FINDINGS:   Marked diffuse transfacial edema and subcutaneous stranding throughout  the neck, left greater than right. The edema in the neck is new since  prior PET/CT 5/16/2018.    There are several abnormal-appearing left cervical chain lymph nodes  and left transverse cervical lymph nodes, some of which are centrally  necrotic. For example, there is a centrally necrotic left transverse  cervical chain lymph node measuring 0.9 cm (series 2 image 106). These  appear to be increased in size and number since the prior PET/CT  5/16/2018.    While there is diffuse edema throughout the neck, no loculated abscess  is appreciated.    No destructive osseous lesions identified. Degenerative changes in the  cervical spine most pronounced at C3-C4 and C5-C6.    Marked odontogenic disease seen within the remaining teeth. Visualized  paranasal sinuses are clear.    Bilateral pleural effusions right greater  than left in the visualized  lung apices. Please see dedicated chest CT report for details below  the lungs.      Impression    IMPRESSION:    1. New left greater than right diffuse trans-spatial edema throughout  the neck. There are also multiple new abnormal-appearing cervical  chain lymph nodes some of which are necrotic. Findings are favored to  represent progression of metastatic disease.  2. No loculated fluid collection or abscess is appreciated. No obvious  significant mass effect on the airway.    Results discussed with Bruce Galvan at 7:00 PM on 7/17/2018.     WAQAS ROSARIO MD       Assessment and plan:    (C34.90) Non-small cell carcinoma of lung (H)  (primary encounter diagnosis)  I reviewed with the patient his family today the images from the set scan showing disease progression.  We talked about different treatment options.  We talked about palliative chemotherapy was Taxotere.  We talked about potential side effects in details.  We also reviewed with the patient other options including palliative care and hospice care.  After long discussion the patient selected to proceed with palliative care without any active treatment.  I have not scheduled the patient for any further appointments I will see him in the future if there is new change or concern.    (C79.31) Brain metastasis (H)  Status post stereotactic radiation.    (J90) Pleural effusion  Because the patient is currently asymptomatic I would recommend to arrange for right thoracocentesis.    (K59.01) Slow transit constipation  Patient currently on Senokot-S and MiraLAX    (F32.1) Moderate single current episode of major depressive disorder (H)  Patient currently on patient currently on Wellbutrin SR    (R11.0) Nausea  We reviewed with the patient today the nausea medications in details.    The patient is ready to learn, no apparent learning barriers were identified.  Diagnosis and treatment plans were explained to the patient. The patient expressed  understanding of the content. The patient asked appropriate questions. The patient questions were answered to his satisfaction.    Time spent 40 minutes more than 50% of the time in counseling and coordination of care including discussion of imaging studies, metastatic lung cancer management, follow-up and prognosis.    Chart documentation with Dragon Voice recognition Software. Although reviewed after completion, some words and grammatical errors may remain.    Received a fax from [employer] regarding the pt needing a PA for the Vitamin K. Family called and reports they will just pay cash for the 1 dose but [employer] does not carry, they would frannie this sent to [facility] in [place] since they have this in stock. This order has been changed. PA not completed per family request.     Becca Diamond RN on 7/19/2018 at 2:16 PM      Again, thank you for allowing me to participate in the care of your patient.        Sincerely,        Joycelyn May MD

## 2018-07-19 NOTE — PATIENT INSTRUCTIONS
We would like you to see Dr. Mitchell for palliative care. We will place a hospice referral. They will call you directly to set up a visit. Written material was provided for taxoterre. Please schedule for thoracentesis for plural effusion. Thank you.    When you are in need of a refill, please call your pharmacy and they will send us a request.      Copy of appointments, and after visit summary (AVS) given to patient.      If you have any questions during business hours (M-F 8 AM- 4PM), please call Roxanne Jaramillo RN, /Breast Cancer Navigator at [facility] [phone].       For questions after business hours, or on holidays/weekends, please call our after hours Nurse Triage line [phone]. Thank you.

## 2018-07-19 NOTE — MR AVS SNAPSHOT
After Visit Summary   7/19/2018    Zechariah Ashby    MRN: [id number]           Patient Information     Date Of Birth          1960        Visit Information        Provider Department      7/19/2018 9:45 AM Joycelyn May MD [facility] ONCOLOGY      Today's Diagnoses     Non-small cell carcinoma of lung (H)    -  1    Brain metastasis (H)        Pleural effusion        Chemotherapy induced nausea and vomiting        Slow transit constipation        Anxiety        Moderate single current episode of major depressive disorder (H)        Nausea          Care Instructions    We would like you to see Dr. Mitchell for palliative care. We will place a hospice referral. They will call you directly to set up a visit. Written material was provided for taxoterre. Please schedule for thoracentesis for plural effusion. Thank you.    When you are in need of a refill, please call your pharmacy and they will send us a request.      Copy of appointments, and after visit summary (AVS) given to patient.      If you have any questions during business hours (M-F 8 AM- 4PM), please call Roxanne Jaramillo RN, /Breast Cancer Navigator at [facility] [phone].       For questions after business hours, or on holidays/weekends, please call our after hours Nurse Triage line [phone]. Thank you.            Follow-ups after your visit        Additional Services     HOSPICE REFERRAL       **Order classes of: [place] [facility], [facility] and [facility] will route to the Home Care and Hospice Referral Pool.  Home Care or Hospice will then contact the patient to schedule their appointment.**    Hospice eligibility overview: prognosis of 6 months or less, end stage disease, patient goals are comfort only, patient is not seeking curative treatment.    If you do not hear from Hospice, or you would like to call to schedule, please call the referring place of service that your  provider has listed below.  ______________________________________________________________________    Your provider has referred you to: [facility]: [facility] [phone]   [email or web address]    Anticipated Start Date for Services: patient would like to explore options.  PLEASE Schedule Hospice consult for 24 - 48 hours.  Please call if there is need for a variance to this timeline.    REASON FOR REFERRAL: Hospice Diagnosis: small cell, brain mets    OTHER PERTINENT INFORMATION: Patient was last seen by provider on 7/19/18 for small cell, brain mets.  Factors that indicate prognosis of less than 6 months:  End stage disease: small cell, brain mets    Current Outpatient Prescriptions:  albuterol (2.5 MG/3ML) 0.083% neb solution, Take 1 vial (2.5 mg) by nebulization every 6 hours as needed for shortness of breath / dyspnea or wheezing, Disp: 25 vial, Rfl: 3  albuterol (PROAIR HFA) 108 (90 BASE) MCG/ACT Inhaler, Inhale 2 puffs into the lungs every 4 hours as needed for shortness of breath / dyspnea or wheezing, Disp: 1 Inhaler, Rfl: 11  amiodarone (PACERONE/CODARONE) 200 MG tablet, TAKE ONE TABLET BY MOUTH ONE TIME DAILY, Disp: 30 tablet, Rfl: 0  buPROPion (WELLBUTRIN SR) 200 MG 12 hr tablet, Take 1 tablet (200 mg) by mouth 2 times daily, Disp: 60 tablet, Rfl: 5  lisinopril (PRINIVIL/ZESTRIL) 10 MG tablet, Take 1 tablet (10 mg) by mouth daily, Disp: 30 tablet, Rfl: 1  LORazepam (ATIVAN) 0.5 MG tablet, Take 1 tablet (0.5 mg) by mouth every 4 hours as needed (Anxiety, Nausea/Vomiting or Sleep), Disp: 30 tablet, Rfl: 5  morphine (MSIR) 15 MG IR tablet, Take 2 tablets (30 mg) by mouth 2 times daily, Disp: 60 tablet, Rfl: 0  morphine sulfate, high concentrate, (ROXANOL-CONCENTRATED) 20 MG/ML concentrated solution, Take 0.125 mLs (2.5 mg) by mouth every 2 hours as needed for shortness of breath / dyspnea or moderate to severe pain, Disp:  20 mL, Rfl: 0  nicotine (NICOTROL) 10 MG Inhaler, Inhale 6 Cartridges into the lungs daily as needed for smoking cessation, Disp: 168 each, Rfl: 0  omeprazole (PRILOSEC) 40 MG capsule, Take 1 capsule (40 mg) by mouth daily Take 30-60 minutes before a meal., Disp: 30 capsule, Rfl: 11  ondansetron (ZOFRAN) 4 MG tablet, Take 1 tablet (4 mg) by mouth every 8 hours as needed for nausea, Disp: 30 tablet, Rfl: 1  order for DME, Equipment being ordered: Oxygen portable  Length of need: 99  2 Liters Nasal Canula to keep O2 sats greater than 92% (Patient taking differently: 2 L daily Equipment being ordered: Oxygen portable  Length of need: 99  2 Liters Nasal Canula to keep O2 sats greater than 92%), Disp: 1 each, Rfl: 0  order for DME, Equipment being ordered: Nebulizer with tubing., Disp: 1 each, Rfl: 0  order for DME, Equipment being ordered: Wheelchair, Disp: 1 each, Rfl: 0  order for DME, Equipment being ordered: Walker - 4 legged walker with wheels and a seat, Disp: 1 Device, Rfl: 0  order for DME, Equipment being ordered: lightweight wheelchair.    Cannot lift standard wheelchair due to weakness and cancer., Disp: 1 Device, Rfl: 0  order for DME, Equipment being ordered: donut cushion for sacral ulcer, Disp: 1 Device, Rfl: 0  prochlorperazine (COMPAZINE) 10 MG tablet, Take 10 mg by mouth every 6 hours as needed , Disp: , Rfl:   senna-docusate (SENOKOT-S;PERICOLACE) 8.6-50 MG per tablet, Take 2 tablets by mouth 2 times daily, Disp: 120 tablet, Rfl: 11  polyethylene glycol (MIRALAX) powder, Take 17 g (1 capful) by mouth daily (Patient not taking: Reported on 6/27/2018), Disp: 510 g, Rfl: 1  warfarin (COUMADIN) 2.5 MG tablet, Take by mouth every evening Take 1.25mg daily or as directed by the Anticoagulation Clinic, Disp: 25 tablet, Rfl: 0      Patient Active Problem List:     CARDIOVASCULAR SCREENING; LDL GOAL LESS THAN 130     Perforated ulcer (HCC)     Free intraperitoneal air     Atrial flutter (H)     Pleural  effusion     Mediastinal lymphadenopathy     Non-small cell carcinoma of lung (H)     COPD exacerbation (H)     Weakness     Cardiomyopathy (H)     Acute on chronic systolic congestive heart failure (H)     Dehydration     Chemotherapy induced nausea and vomiting     Atrial flutter, unspecified type (H)     Long-term (current) use of anticoagulants [Z79.01]     Brain metastasis (H)     Nausea     Slow transit constipation     Moderate single current episode of major depressive disorder (H)     Anxiety    This patient is under my care, and I have initiated the establishment of the plan of care. This patient will be followed by a physician who will periodically review the plan of care.    Physician/Provider to provide follow up care: Kailash Mason certified Physician at time of discharge: Dr. May    Please be aware that coverage of these services is subject to the terms and limitations of your health insurance plan.  Call member services at your health plan with any benefit or coverage questions.                  Follow-up notes from your care team     Return if symptoms worsen or fail to improve.      Your next 10 appointments already scheduled     Aug 08, 2018 10:00 AM CDT   Return Visit with Chace Mitchell MD   [facility] ([facility])    [facility]  [address]  [facility] [address]   [phone]            Aug 16, 2018 10:15 AM CDT   MR BRAIN W/O & W CONTRAST with [facility]   [facility] MRI ([facility])    [address] [address]   [phone]           Take your medicines as usual, unless your doctor tells you not to. Bring a list of your current medicines to your exam (including vitamins, minerals and over-the-counter drugs).  You may or may not receive intravenous (IV) contrast for this exam pending the discretion of the Radiologist.  You do not need to do anything special to  prepare.  The MRI machine uses a strong magnet. Please wear clothes without metal (snaps, zippers). A sweatsuit works well, or we may give you a hospital gown.  Please remove any body piercings and hair extensions before you arrive. You will also remove watches, jewelry, hairpins, wallets, dentures, partial dental plates and hearing aids. You may wear contact lenses, and you may be able to wear your rings. We have a safe place to keep your personal items, but it is safer to leave them at home.  **IMPORTANT** THE INSTRUCTIONS BELOW ARE ONLY FOR THOSE PATIENTS WHO HAVE BEEN PRESCRIBED SEDATION OR GENERAL ANESTHESIA DURING THEIR MRI PROCEDURE:  IF YOUR DOCTOR PRESCRIBED ORAL SEDATION (take medicine to help you relax during your exam):   You must get the medicine from your doctor (oral medication) before you arrive. Bring the medicine to the exam. Do not take it at home. You ll be told when to take it upon arriving for your exam.   Arrive one hour early. Bring someone who can take you home after the test. Your medicine will make you sleepy. After the exam, you may not drive, take a bus or take a taxi by yourself.  IF YOUR DOCTOR PRESCRIBED IV SEDATION:   Arrive one hour early. Bring someone who can take you home after the test. Your medicine will make you sleepy. After the exam, you may not drive, take a bus or take a taxi by yourself.   No eating 6 hours before your exam. You may have clear liquids up until 4 hours before your exam. (Clear liquids include water, clear tea, black coffee and fruit juice without pulp.)  IF YOUR DOCTOR PRESCRIBED ANESTHESIA (be asleep for your exam):   Arrive 1 1/2 hours early. Bring someone who can take you home after the test. You may not drive, take a bus or take a taxi by yourself.   No eating 8 hours before your exam. You may have clear liquids up until 4 hours before your exam. (Clear liquids include water, clear tea, black coffee and fruit juice without pulp.)   You will spend four  to five hours in the recovery room.  Please call the Imaging Department at your exam site with any questions.            Aug 16, 2018 11:15 AM CDT   Return Visit with Benoit Woodson MD   [facility] ([facility])    [address]  [facility] [place] [place] [address]   [phone]              Future tests that were ordered for you today     Open Future Orders        Priority Expected Expires Ordered    INR Routine 7/20/2018 7/22/2018 7/19/2018    US Thoracentesis Routine 7/19/2018 8/19/2018 7/19/2018            Who to contact     If you have questions or need follow up information about today's clinic visit or your schedule please contact [facility] directly at [phone].  Normal or non-critical lab and imaging results will be communicated to you by [employer], letter or phone within 4 business days after the clinic has received the results. If you do not hear from us within 7 days, please contact the clinic through [employer] or phone. If you have a critical or abnormal lab result, we will notify you by phone as soon as possible.  Submit refill requests through [employer] or call your pharmacy and they will forward the refill request to us. Please allow 3 business days for your refill to be completed.          Additional Information About Your Visit        [employer] Information     [employer] gives you secure access to your electronic health record. If you see a primary care provider, you can also send messages to your care team and make appointments. If you have questions, please call your primary care clinic.  If you do not have a primary care provider, please call [phone] and they will assist you.        Care EveryWhere ID     This is your Care EveryWhere ID. This could be used by other organizations to access your [facility] medical records  [id number]        Your Vitals Were     Pulse Temperature Respirations Height Pulse Oximetry BMI (Body Mass Index)    94 97.5  F (36.4  C)  "(Oral) 20 1.803 m (5' 10.98\") 98% 20.87 kg/m2       Blood Pressure from Last 3 Encounters:   07/19/18 113/88   07/17/18 (!) 147/109   06/28/18 134/89    Weight from Last 3 Encounters:   07/19/18 67.9 kg (149 lb 9.6 oz)   06/27/18 65 kg (143 lb 4.8 oz)   05/30/18 64.4 kg (142 lb)              We Performed the Following     HOSPICE REFERRAL          Today's Medication Changes          These changes are accurate as of 7/19/18  2:45 PM.  If you have any questions, ask your nurse or doctor.               Start taking these medicines.        Dose/Directions    phytonadione 5 MG tablet   Commonly known as:  MEPHYTON   Used for:  Non-small cell carcinoma of lung (H)   Started by:  Joycelyn May MD        Dose:  5 mg   Take 1 tablet (5 mg) by mouth once for 1 dose   Quantity:  1 tablet   Refills:  0         These medicines have changed or have updated prescriptions.        Dose/Directions    * morphine 15 MG IR tablet   Commonly known as:  MSIR   This may have changed:  Another medication with the same name was added. Make sure you understand how and when to take each.   Used for:  Non-small cell carcinoma of lung (H)   Changed by:  Joycelyn May MD        Dose:  30 mg   Take 2 tablets (30 mg) by mouth 2 times daily   Quantity:  60 tablet   Refills:  0       * morphine sulfate (high concentrate) 20 MG/ML concentrated solution   Commonly known as:  ROXANOL-CONCENTRATED   This may have changed:  Another medication with the same name was added. Make sure you understand how and when to take each.   Used for:  Non-small cell carcinoma of lung (H)   Changed by:  Joycelyn May MD        Dose:  2.5 mg   Take 0.125 mLs (2.5 mg) by mouth every 2 hours as needed for shortness of breath / dyspnea or moderate to severe pain   Quantity:  20 mL   Refills:  0       * morphine 15 MG 12 hr tablet   Commonly known as:  MS CONTIN   This may have changed:  You were already taking a medication with the same name, and this " prescription was added. Make sure you understand how and when to take each.   Used for:  Non-small cell carcinoma of lung (H)   Changed by:  Joycelyn May MD        Dose:  15 mg   Take 1 tablet (15 mg) by mouth every 12 hours maximum 2 tablet(s) per day   Quantity:  60 tablet   Refills:  0       order for DME   This may have changed:    - how much to take  - when to take this  - additional instructions   Used for:  Shortness of breath, Tobacco abuse, Non-small cell carcinoma of lung (H)        Equipment being ordered: Oxygen portable Length of need: 99 2 Liters Nasal Canula to keep O2 sats greater than 92%   Quantity:  1 each   Refills:  0       * Notice:  This list has 3 medication(s) that are the same as other medications prescribed for you. Read the directions carefully, and ask your doctor or other care provider to review them with you.         Where to get your medicines      These medications were sent to [facility] [address] [place] [address]     Phone:  [phone]     phytonadione 5 MG tablet         Some of these will need a paper prescription and others can be bought over the counter.  Ask your nurse if you have questions.     Bring a paper prescription for each of these medications     LORazepam 0.5 MG tablet    morphine 15 MG 12 hr tablet               Information about OPIOIDS     PRESCRIPTION OPIOIDS: WHAT YOU NEED TO KNOW   We gave you an opioid (narcotic) pain medicine. It is important to manage your pain, but opioids are not always the best choice. You should first try all the other options your care team gave you. Take this medicine for as short a time (and as few doses) as possible.     These medicines have risks:    DO NOT drive when on new or higher doses of pain medicine. These medicines can affect your alertness and reaction times, and you could be arrested for driving under the influence (DUI). If you need to use  opioids long-term, talk to your care team about driving.    DO NOT operate heave machinery    DO NOT do any other dangerous activities while taking these medicines.     DO NOT drink any alcohol while taking these medicines.      If the opioid prescribed includes acetaminophen, DO NOT take with any other medicines that contain acetaminophen. Read all labels carefully. Look for the word  acetaminophen  or  Tylenol.  Ask your pharmacist if you have questions or are unsure.    You can get addicted to pain medicines, especially if you have a history of addiction (chemical, alcohol or substance dependence). Talk to your care team about ways to reduce this risk.    Store your pills in a secure place, locked if possible. We will not replace any lost or stolen medicine. If you don t finish your medicine, please throw away (dispose) as directed by your pharmacist. The [place] Pollution Control Agency has more information about safe disposal: https://www.pca.[facility].mn.us/living-green/managing-unwanted-medications.     All opioids tend to cause constipation. Drink plenty of water and eat foods that have a lot of fiber, such as fruits, vegetables, prune juice, apple juice and high-fiber cereal. Take a laxative (Miralax, milk of magnesia, Colace, Senna) if you don t move your bowels at least every other day.          Primary Care Provider Office Phone # Fax #    Kailash Mason [phone] [address] [place] [address]        Equal Access to Services     ROD PERRY : Hadnehal garibay Soasher, waaxda luqadaha, qaybta kaaldwayne so, harley boykin. So [facility] [phone].    ATENCIÓN: Si habla español, tiene a morocho disposición servicios gratuitos de asistencia lingüística. Roz robbins [phone].    We comply with applicable federal civil rights laws and [place] laws. We do not discriminate on the basis of race, color, national origin, age, disability, sex, sexual  orientation, or gender identity.            Thank you!     Thank you for choosing [facility]  for your care. Our goal is always to provide you with excellent care. Hearing back from our patients is one way we can continue to improve our services. Please take a few minutes to complete the written survey that you may receive in the mail after your visit with us. Thank you!             Your Updated Medication List - Protect others around you: Learn how to safely use, store and throw away your medicines at www.disposemymeds.org.          This list is accurate as of 7/19/18  2:45 PM.  Always use your most recent med list.                   Brand Name Dispense Instructions for use Diagnosis    * albuterol 108 (90 Base) MCG/ACT Inhaler    PROAIR HFA    1 Inhaler    Inhale 2 puffs into the lungs every 4 hours as needed for shortness of breath / dyspnea or wheezing    Chronic obstructive pulmonary disease, unspecified COPD type (H)       * albuterol (2.5 MG/3ML) 0.083% neb solution     25 vial    Take 1 vial (2.5 mg) by nebulization every 6 hours as needed for shortness of breath / dyspnea or wheezing    COPD exacerbation (H)       amiodarone 200 MG tablet    PACERONE/CODARONE    30 tablet    TAKE ONE TABLET BY MOUTH ONE TIME DAILY    Paroxysmal atrial fibrillation (H)       buPROPion 200 MG 12 hr tablet    WELLBUTRIN SR    60 tablet    Take 1 tablet (200 mg) by mouth 2 times daily    Moderate single current episode of major depressive disorder (H)       lisinopril 10 MG tablet    PRINIVIL/ZESTRIL    30 tablet    Take 1 tablet (10 mg) by mouth daily    Brain metastasis (H), HTN (hypertension)       LORazepam 0.5 MG tablet    ATIVAN    30 tablet    Take 1 tablet (0.5 mg) by mouth every 4 hours as needed (Anxiety, Nausea/Vomiting or Sleep)    Non-small cell carcinoma of lung (H)       * morphine 15 MG IR tablet    MSIR    60 tablet    Take 2 tablets (30 mg) by mouth 2 times daily    Non-small cell carcinoma of lung  (H)       * morphine sulfate (high concentrate) 20 MG/ML concentrated solution    ROXANOL-CONCENTRATED    20 mL    Take 0.125 mLs (2.5 mg) by mouth every 2 hours as needed for shortness of breath / dyspnea or moderate to severe pain    Non-small cell carcinoma of lung (H)       * morphine 15 MG 12 hr tablet    MS CONTIN    60 tablet    Take 1 tablet (15 mg) by mouth every 12 hours maximum 2 tablet(s) per day    Non-small cell carcinoma of lung (H)       nicotine 10 MG Inhaler    NICOTROL    168 each    Inhale 6 Cartridges into the lungs daily as needed for smoking cessation    Tobacco abuse       omeprazole 40 MG capsule    priLOSEC    30 capsule    Take 1 capsule (40 mg) by mouth daily Take 30-60 minutes before a meal.    Gastroesophageal reflux disease without esophagitis       ondansetron 4 MG tablet    ZOFRAN    30 tablet    Take 1 tablet (4 mg) by mouth every 8 hours as needed for nausea    Non-small cell carcinoma of lung (H)       order for DME     1 Device    Equipment being ordered: donut cushion for sacral ulcer    Decubitus ulcer of sacral region, stage 1       order for DME     1 Device    Equipment being ordered: lightweight wheelchair.   Cannot lift standard wheelchair due to weakness and cancer.    Weakness       order for DME     1 Device    Equipment being ordered: Walker - 4 legged walker with wheels and a seat    Mediastinal lymphadenopathy       order for DME     1 each    Equipment being ordered: Wheelchair    Non-small cell carcinoma of lung (H)       order for DME     1 each    Equipment being ordered: Nebulizer with tubing.    Non-small cell carcinoma of lung (H)       order for DME     1 each    Equipment being ordered: Oxygen portable Length of need: 99 2 Liters Nasal Canula to keep O2 sats greater than 92%    Shortness of breath, Tobacco abuse, Non-small cell carcinoma of lung (H)       phytonadione 5 MG tablet    MEPHYTON    1 tablet    Take 1 tablet (5 mg) by mouth once for 1 dose     Non-small cell carcinoma of lung (H)       polyethylene glycol powder    MIRALAX    510 g    Take 17 g (1 capful) by mouth daily    Constipation, unspecified constipation type       prochlorperazine 10 MG tablet    COMPAZINE     Take 10 mg by mouth every 6 hours as needed        senna-docusate 8.6-50 MG per tablet    SENOKOT-S;PERICOLACE    120 tablet    Take 2 tablets by mouth 2 times daily    Constipation, unspecified constipation type       warfarin 2.5 MG tablet    COUMADIN    25 tablet    Take by mouth every evening Take 1.25mg daily or as directed by the Anticoagulation Clinic    Long-term (current) use of anticoagulants, Atrial flutter, unspecified type (H)       * Notice:  This list has 5 medication(s) that are the same as other medications prescribed for you. Read the directions carefully, and ask your doctor or other care provider to review them with you.

## 2018-07-19 NOTE — PROGRESS NOTES
Received a fax from [employer] regarding the pt needing a PA for the Vitamin K. Family called and reports they will just pay cash for the 1 dose but [employer] does not carry, they would frannie this sent to [facility] in [place] since they have this in stock. This order has been changed. PA not completed per family request.     Becca Diamond RN on 7/19/2018 at 2:16 PM

## 2018-07-20 NOTE — TELEPHONE ENCOUNTER
I talked with Kari and she will check BP again on Monday.  He will be starting hospice on Monday.  Last two BP's are 122/88 and 113/88  Emani Lemus RN

## 2018-07-20 NOTE — TELEPHONE ENCOUNTER
Patient was seen by Dr. May yesterday. Patient currently has a pleural effusion and is possibly going to have a thoracentesis. His INR was supra-therapeutic so he was given 5 mg of Vitamin K. Today his INR is 1.7. Do we have an expected date for this thoracentesis? Is the plan to hold his Coumadin until then? Please advise [facility]. Thank you!    Mahendra David RN, BSN, PHN  Anticoagulation Clinic   [phone]

## 2018-07-20 NOTE — TELEPHONE ENCOUNTER
Kari calling back to explain she is not going to  this Rx right now because Dr. Khan. Will  leave the Rx up front per Kari because this short acting is not needed at this time and doesn't want patient to lose it.   Deepika Almanza on 7/20/2018 at 10:46 AM

## 2018-07-20 NOTE — MR AVS SNAPSHOT
Zechariah Ashby   7/20/2018   Anticoagulation Therapy Visit    Description:  58 year old male   Provider:  Mahendra David, RN   Department:  [facility]           INR as of 7/20/2018     Today's INR No new INR was available at the time of this encounter.      Anticoagulation Summary as of 7/20/2018     INR goal 2.0-3.0   Today's INR No new INR was available at the time of this encounter.   Full warfarin instructions 7/20: Hold; 7/21: Hold; 7/22: Hold; 7/23: Hold   Next INR check 7/24/2018    Indications   Atrial flutter  unspecified type (H) [I48.92]  Long-term (current) use of anticoagulants [Z79.01] [Z79.01]         July 2018 Details    Sun Mon Tue Wed Thu Fri Sat     1               2               3               4               5               6               7                 8               9               10               11               12               13               14                 15               16               17               18               19               20      Hold   See details      21      Hold           22      Hold         23      Hold         24            25               26               27               28                 29               30               31                    Date Details   07/20 This INR check       Date of next INR:  7/24/2018         How to take your warfarin dose     Hold Do not take your warfarin dose. See the Details table to the right for additional instructions.

## 2018-07-20 NOTE — TELEPHONE ENCOUNTER
Called to nurse to clarify dosing.    He has been on immediate release MS tablets and liquid for a couple weeks and this has been working fairly well so for now he can stay on that however he apparently is being referred to hospice so that can be switched to more conventional long-acting scheduled with short acting breakthrough morphine.  If he does not get into hospice then he should see me in palliative clinic soon to make that adjustment.    Paula

## 2018-07-20 NOTE — PROGRESS NOTES
Pt was to have repeat INR today 07.20.18 d/t elevated INR yesterday and received vitamin K. Unable to find result in Epic.    Spoke with Catalina at [facility].  She reports INR was obtained today via POC and result is 1.76.    Dr. May aware and OK to proceed with IR Thoracentesis with Pleurex catheter placement.    Scheduling updated.

## 2018-07-20 NOTE — PROGRESS NOTES
ANTICOAGULATION FOLLOW-UP CLINIC VISIT    Patient Name:  Zechariah Ashby  Date:  7/20/2018  Contact Type:  Telephone/ Kari LEVI, SAMANTHA/Telephone Encounter Lalo    SUBJECTIVE:     Patient Findings     Positives Change in medications (Vitamin K given on 7/19. MS Contin long acting.), Dental/Other procedures (Thoracentesis TBD), Inflammation (Pleural effusion.)    Comments Per Dr. May, Coumadin to be on hold until patient has a thoracentesis for the pleural effusions. Date TBD. Hospice consult for 7/23. Chemo currently on hold.            OBJECTIVE    INR   Date Value Ref Range Status   07/18/2018 4.8  Final       ASSESSMENT / PLAN  No question data found.  Anticoagulation Summary as of 7/20/2018     INR goal 2.0-3.0   Today's INR No new INR was available at the time of this encounter.   Warfarin maintenance plan No maintenance plan   Full warfarin instructions 7/20: Hold; 7/21: Hold; 7/22: Hold; 7/23: Hold   Weekly warfarin total 8.75 mg   Plan last modified Mahendra David RN (7/20/2018)   Next INR check 7/24/2018   Priority INR   Target end date     Indications   Atrial flutter  unspecified type (H) [I48.92]  Long-term (current) use of anticoagulants [Z79.01] [Z79.01]         Anticoagulation Episode Summary     INR check location     Preferred lab     Send INR reminders to [facility] DAIANA POOL    Comments *Pt is on AMIODARONE. On palliative Keytruda as of 5-23-18. // [facility]. Ensure daily as of 7-13-18. Coumadin on hold as of 7/20/18. Hospice consult on 7/23.      Anticoagulation Care Providers     Provider Role Specialty Phone number    Kailash Mason MD [facility] [phone]            See the Encounter Report to view Anticoagulation Flowsheet and Dosing Calendar (Go to Encounters tab in chart review, and find the Anticoagulation Therapy Visit)        Mahendra David RN

## 2018-07-20 NOTE — PROGRESS NOTES
Zechariah Ashby was seen in homecare today 7/20/18. Per the RN who completed the visit Kari, his INR result was 1.7 via fingerstick.  Thank you,   Misty Manrique RN

## 2018-07-20 NOTE — TELEPHONE ENCOUNTER
Reason for Call:  Other     Detailed comments: [facility] - [phone] - Patient Blood Pressures - Today  - 122/88 - Monday 154/118 - Sunday - 157/124 - Wednesday 152/102 -   Patient started Lisinopril Wednesday morning - He Seems to be doing better - Patient is meeting with Hospice on Monday -    Phone Number Patient can be reached at:     Best Time:     Can we leave a detailed message on this number?     Call taken on 7/20/2018 at 2:35 PM by Deepika Lebron

## 2018-07-22 NOTE — PROGRESS NOTES
[employer] note. Patient had a thoracentesis, tolerated well, vital signs stable, site clean and dry. Had chest xray post procedure, returned to ED, report to RN.

## 2018-07-22 NOTE — PROCEDURES
RADIOLOGY POST PROCEDURE NOTE    Patient name: Zechariah Ashby  MRN: [id number]  : 1960    Pre-procedure diagnosis: Right pleural effusion  Post-procedure diagnosis: Same    Procedure Date/Time: 2018  12:24 PM  Procedure: Right thoracentesis, with removal of 1.7 L of fluid.  No complications.  Post CXR.  Estimated blood loss: < 5ml  Specimen(s) collected with description: Right thoracentesis    The patient tolerated the procedure well with no immediate complications.  Significant findings:  Please see above.    See imaging dictation for procedural details.    Provider name: Lalo Davis  Assistant(s):None

## 2018-07-22 NOTE — ED AVS SNAPSHOT
Emergency Department    [address] [facility] [address]    Phone:  [phone]    Fax:  [phone]                                       Zechariah Ashby   MRN: [id number]    Department:   Emergency Department   Date of Visit:  7/22/2018           Patient Information     Date Of Birth          1960        Your diagnoses for this visit were:     Pleural effusion        You were seen by Andrzej Carreon MD.      Follow-up Information     Follow up with Kailash Mason MD.    Specialty:  Family Practice    Contact information:    [address] [place] [address] [id number] [phone]          Follow up with your care team.        Discharge Instructions         Pleural Effusion     Pleural effusion is fluid buildup between the layers of tissue that line the outside of the lungs.   Your healthcare provider has told you that you have pleural effusion. Pleural effusion means that you have extra fluid between the pleura. This area is called the pleural space. The pleura are 2 layers of thin, smooth tissue that surround the lungs and line the chest. The pleural space usually holds only a small amount of fluid. This fluid lubricates the pleura. But if too much fluid fills the space, it can make it hard or painful to breathe.  There are 2 types of pleural effusion:    Inflammatory. This is caused by a lung disease like pneumonia or lung cancer, both of which irritates the pleura.    Noninflammatory. This is caused by abnormal fluid pressures inside the lungs. The pressure can be caused by congestive heart failure (CHF). In CHF, extra fluid collects inside the lung tissues because of a weak heart muscle. This extra fluid then leaks into the pleural space. Other causes of noninflammatory pleural effusions include kidney disease, liver disease, and malnutrition.  What are the symptoms of pleural effusion?  The symptoms of pleural effusion include:    Sharp pains in the chest, especially when taking a  breath, coughing, or sneezing    Trouble breathing    Cough    Fever  What are the causes of pleural effusion?  Common causes include:    Congestive heart failure    Cirrhosis of the liver    Pneumonia    Viral lung infection    Cancer    Blood clot in the lung (pulmonary embolism)    Heart surgery  Chest infections like pneumonia and heart disease are the most common causes. Less common causes include lung cancer.  How is pleural effusion diagnosed?  Your healthcare provider will examine you and ask about your health history. Tests include:    Blood tests    Analysis of fluid in pleural space, chest X-ray, CT scan, or ultrasound  How is pleural effusion treated?  The extra fluid may be drained from the pleural space. This is done with a procedure called thoracentesis. This procedure uses a thin needle to draw out the fluid from the pleural space. In some cases, a tube is placed in the chest to drain the extra fluid. The tube will likely stay in place for several days.  You may have other treatments, depending on the cause of your pleural effusion. If it s because of a bacterial infection, you will be given antibiotics to fight the infection. If it s because of a heart condition, you will be given medicines and other treatment for your heart. Your healthcare provider can tell you more about the cause of your pleural effusion and your treatment choices.  What are the long-term concerns?  If untreated, pleural effusion can lead to serious health problems, such as collapsed lung from fluid filling the pleural space.  Call 911  Call 911 if you have:    Trouble breathing    Worsening chest pain   When to call your healthcare provider   Call your healthcare provider right away if you have:    Continued coughing    Fever  Date Last Reviewed: 12/1/2016 2000-2017 [employer]. [address] [place] [address], [place] [address]. All rights reserved. This information is not intended as a substitute for professional  medical care. Always follow your healthcare professional's instructions.          Your next 10 appointments already scheduled     Jul 27, 2018  7:30 AM CDT   Procedure 4.5 hour with U2A ROOM 5   Unit 2A [facility] ([facility])    [address] [facility] [address]               Jul 27, 2018  9:00 AM CDT   IR THORACENTESIS with UUIR1   [facility], [place], Interventional Radiology ([facility])    [address] [facility] [id number] [phone]           1. Laboratory test are to be obtained by your doctor prior to the exam (Hgb/Hct, platelet count, INR) 2. Someone will need to drive you to and from the hospital if you feel you may need sedation for the procedure. 3. Bring a list of all drugs you are taking; include supplements and over-the-counter medications. 4. Wear comfortable clothes and leave your valuables at home. 5. If you are or may be pregnant, be sure to contact your doctor or a Radiology nurse prior to the day of the exam. 6. If you have diabetes, check with your doctor or a Radiology nurse to see if your insulin needs to be adjusted for the exam. 7. If you are taking Coumadin (to thin your blood) please contact your doctor or a Radiology nurse at least 3 days before the exam for special instructions (only need the INR to be <2.0). 8. The day before your exam you may eat your regular diet. Drink no alcoholic beverages for 24 hours prior to the exam. 9. Do not eat any solid food or milk products for 6 hours prior to the exam. You may drink clear liquids until 2 hours prior to the exam. Clear liquids include the following: water, Jell-O, clear broth, apple juice or any noncarbonated drink that you can see through (no pop!) 10. The morning of the exam you may brush your teeth and take medications as directed with a sip of water. 11. Tell the Radiology nurse if you have any allergies. 12. If the  tube needs to remain in place you will remain in the hospital until the tube can be removed 13. If the tube is removed immediately you may leave the hospital following your exam. However, if you received sedation you will need to stay 1-2 hours. You may be asked to stay longer and have a chest x-ray sometime after the procedure. 14. If you received sedation, you cannot drive until morning, and an adult must be with you until then. If you live more than one hour away you should stay in the [place] area overnight.            Aug 08, 2018 10:00 AM CDT   Return Visit with Chace Mitchell MD   [facility] ([facility])    [facility]  [address]  [facility] [address]   [phone]            Aug 16, 2018 10:15 AM CDT   MR BRAIN W/O & W CONTRAST with [address] MRI ([facility])    [address] [place]  [facility] [address]   [phone]           Take your medicines as usual, unless your doctor tells you not to. Bring a list of your current medicines to your exam (including vitamins, minerals and over-the-counter drugs).  You may or may not receive intravenous (IV) contrast for this exam pending the discretion of the Radiologist.  You do not need to do anything special to prepare.  The MRI machine uses a strong magnet. Please wear clothes without metal (snaps, zippers). A sweatsuit works well, or we may give you a hospital gown.  Please remove any body piercings and hair extensions before you arrive. You will also remove watches, jewelry, hairpins, wallets, dentures, partial dental plates and hearing aids. You may wear contact lenses, and you may be able to wear your rings. We have a safe place to keep your personal items, but it is safer to leave them at home.  **IMPORTANT** THE INSTRUCTIONS BELOW ARE ONLY FOR THOSE PATIENTS WHO HAVE BEEN PRESCRIBED SEDATION OR GENERAL ANESTHESIA DURING THEIR MRI PROCEDURE:  IF YOUR DOCTOR  PRESCRIBED ORAL SEDATION (take medicine to help you relax during your exam):   You must get the medicine from your doctor (oral medication) before you arrive. Bring the medicine to the exam. Do not take it at home. You ll be told when to take it upon arriving for your exam.   Arrive one hour early. Bring someone who can take you home after the test. Your medicine will make you sleepy. After the exam, you may not drive, take a bus or take a taxi by yourself.  IF YOUR DOCTOR PRESCRIBED IV SEDATION:   Arrive one hour early. Bring someone who can take you home after the test. Your medicine will make you sleepy. After the exam, you may not drive, take a bus or take a taxi by yourself.   No eating 6 hours before your exam. You may have clear liquids up until 4 hours before your exam. (Clear liquids include water, clear tea, black coffee and fruit juice without pulp.)  IF YOUR DOCTOR PRESCRIBED ANESTHESIA (be asleep for your exam):   Arrive 1 1/2 hours early. Bring someone who can take you home after the test. You may not drive, take a bus or take a taxi by yourself.   No eating 8 hours before your exam. You may have clear liquids up until 4 hours before your exam. (Clear liquids include water, clear tea, black coffee and fruit juice without pulp.)   You will spend four to five hours in the recovery room.  Please call the Imaging Department at your exam site with any questions.            Aug 16, 2018 11:15 AM CDT   Return Visit with Benoit Woodson MD   [facility] ([facility])    [address] [place] [address], [address]  [facility] [id number] [phone]              24 Hour Appointment Hotline       To make an appointment at any [facility], call [phone] ([phone]). If you don't have a family doctor or clinic, we will help you find one. [facility] are conveniently located to serve the needs of you and your family.             Review of your medicines      CONTINUE these medicines  which may have CHANGED, or have new prescriptions. If we are uncertain of the size of tablets/capsules you have at home, strength may be listed as something that might have changed.        Dose / Directions Last dose taken    order for DME   What changed:    - how much to take  - when to take this  - additional instructions   Quantity:  1 each        Equipment being ordered: Oxygen portable Length of need: 99 2 Liters Nasal Canula to keep O2 sats greater than 92%   Refills:  0          Our records show that you are taking the medicines listed below. If these are incorrect, please call your family doctor or clinic.        Dose / Directions Last dose taken    * albuterol 108 (90 Base) MCG/ACT Inhaler   Commonly known as:  PROAIR HFA   Dose:  2 puff   Quantity:  1 Inhaler        Inhale 2 puffs into the lungs every 4 hours as needed for shortness of breath / dyspnea or wheezing   Refills:  11        * albuterol (2.5 MG/3ML) 0.083% neb solution   Dose:  1 vial   Quantity:  25 vial        Take 1 vial (2.5 mg) by nebulization every 6 hours as needed for shortness of breath / dyspnea or wheezing   Refills:  3        amiodarone 200 MG tablet   Commonly known as:  PACERONE/CODARONE   Quantity:  30 tablet        TAKE ONE TABLET BY MOUTH ONE TIME DAILY   Refills:  0        buPROPion 200 MG 12 hr tablet   Commonly known as:  WELLBUTRIN SR   Dose:  200 mg   Quantity:  60 tablet        Take 1 tablet (200 mg) by mouth 2 times daily   Refills:  5        lisinopril 10 MG tablet   Commonly known as:  PRINIVIL/ZESTRIL   Dose:  10 mg   Quantity:  30 tablet        Take 1 tablet (10 mg) by mouth daily   Refills:  1        LORazepam 0.5 MG tablet   Commonly known as:  ATIVAN   Dose:  0.5 mg   Quantity:  30 tablet        Take 1 tablet (0.5 mg) by mouth every 4 hours as needed (Anxiety, Nausea/Vomiting or Sleep)   Refills:  5        * morphine sulfate (high concentrate) 20 MG/ML concentrated solution   Commonly known as:   ROXANOL-CONCENTRATED   Dose:  2.5 mg   Quantity:  20 mL        Take 0.125 mLs (2.5 mg) by mouth every 2 hours as needed for shortness of breath / dyspnea or moderate to severe pain   Refills:  0        * morphine 15 MG 12 hr tablet   Commonly known as:  MS CONTIN   Dose:  15 mg   Quantity:  60 tablet        Take 1 tablet (15 mg) by mouth every 12 hours maximum 2 tablet(s) per day   Refills:  0        * morphine 15 MG IR tablet   Commonly known as:  MSIR   Quantity:  60 tablet        TAKE TWO TABLETS BY MOUTH TWICE DAILY   Refills:  0        nicotine 10 MG Inhaler   Commonly known as:  NICOTROL   Dose:  6 Cartridge   Quantity:  168 each        Inhale 6 Cartridges into the lungs daily as needed for smoking cessation   Refills:  0        omeprazole 40 MG capsule   Commonly known as:  priLOSEC   Dose:  40 mg   Quantity:  30 capsule        Take 1 capsule (40 mg) by mouth daily Take 30-60 minutes before a meal.   Refills:  11        ondansetron 4 MG tablet   Commonly known as:  ZOFRAN   Dose:  4 mg   Quantity:  30 tablet        Take 1 tablet (4 mg) by mouth every 8 hours as needed for nausea   Refills:  1        order for DME   Quantity:  1 Device        Equipment being ordered: donut cushion for sacral ulcer   Refills:  0        order for DME   Quantity:  1 Device        Equipment being ordered: lightweight wheelchair.   Cannot lift standard wheelchair due to weakness and cancer.   Refills:  0        order for DME   Quantity:  1 Device        Equipment being ordered: Walker - 4 legged walker with wheels and a seat   Refills:  0        order for DME   Quantity:  1 each        Equipment being ordered: Wheelchair   Refills:  0        order for DME   Quantity:  1 each        Equipment being ordered: Nebulizer with tubing.   Refills:  0        polyethylene glycol powder   Commonly known as:  MIRALAX   Dose:  1 capful   Quantity:  510 g        Take 17 g (1 capful) by mouth daily   Refills:  1        prochlorperazine 10 MG  tablet   Commonly known as:  COMPAZINE   Dose:  10 mg        Take 10 mg by mouth every 6 hours as needed   Refills:  0        senna-docusate 8.6-50 MG per tablet   Commonly known as:  SENOKOT-S;PERICOLACE   Dose:  2 tablet   Quantity:  120 tablet        Take 2 tablets by mouth 2 times daily   Refills:  11        warfarin 2.5 MG tablet   Commonly known as:  COUMADIN   Quantity:  25 tablet        Take by mouth every evening Take 1.25mg daily or as directed by the Anticoagulation Clinic   Refills:  0        * Notice:  This list has 5 medication(s) that are the same as other medications prescribed for you. Read the directions carefully, and ask your doctor or other care provider to review them with you.            Procedures and tests performed during your visit     CBC (+ platelets, no diff)    EKG 12 lead    INR    US Thoracentesis    XR Chest 1 View    XR Chest 2 Views      Orders Needing Specimen Collection     None      Pending Results     No orders found from 7/20/2018 to 7/23/2018.            Pending Culture Results     No orders found from 7/20/2018 to 7/23/2018.            Pending Results Instructions     If you had any lab results that were not finalized at the time of your Discharge, you can call the ED Lab Result RN at [phone]. You will be contacted by this team for any positive Lab results or changes in treatment. The nurses are available 7 days a week from 10A to 6:30P.  You can leave a message 24 hours per day and they will return your call.        Test Results From Your Hospital Stay        7/22/2018 10:25 AM      Component Results     Component Value Ref Range & Units Status    WBC 9.4 4.0 - 11.0 10e9/L Final    RBC Count 4.89 4.4 - 5.9 10e12/L Final    Hemoglobin 15.5 13.3 - 17.7 g/dL Final    Hematocrit 46.3 40.0 - 53.0 % Final    MCV 95 78 - 100 fl Final    MCH 31.7 26.5 - 33.0 pg Final    MCHC 33.5 31.5 - 36.5 g/dL Final    RDW 13.7 10.0 - 15.0 % Final    Platelet Count 250 150 - 450 10e9/L  Final         7/22/2018 10:35 AM      Component Results     Component Value Ref Range & Units Status    INR 1.19 (H) 0.86 - 1.14 Final         7/22/2018 10:30 AM      Narrative     XR CHEST 2 VW   7/22/2018 10:24 AM     HISTORY: sob, stage IV lung ca, scheduled for thoracentesis and drain  placement, worsening sob;     COMPARISON: Chest x-ray dated 6/27/2018    FINDINGS: A left Port-A-Cath is in place. There is a moderate-sized  right pleural effusion. The right pleural effusion has increased in  size since 6/27/2018. There is associated infiltrate or atelectasis at  the right lung base. There was a small right pleural effusion on the  film of 6/27/2018. There is still a small left pleural effusion..  The  lungs are clear. The pulmonary vasculature is normal.  The bones and  soft tissues are unremarkable.        Impression     IMPRESSION:   1. Moderate-sized right pleural effusion with associated infiltrate or  atelectasis at the right base.  2. Small left pleural effusion.        ZO PRINCE MD         7/22/2018 12:52 PM      Narrative     US THORACENTESIS       7/22/2018 12:32 PM       HISTORY: Patient with right pleural effusion and shortness of breath,  request made for therapeutic thoracentesis.      PROCEDURE: Written informed consent was obtained from the patient  prior to the procedure. The risks and benefits including bleeding,  infection and pneumothorax were discussed and the patient wished to  continue. Initial ultrasound images demonstrated a moderate pleural  fluid collection. A permanent image was saved. The skin overlying this  collection was marked, prepped, draped and anesthetized in usual  sterile fashion utilizing 5 mL of lidocaine 1%. Thoracentesis catheter  was then placed into the pleural fluid collection with return of 1700  mL of clear yellow fluid. Estimated blood loss during the procedure  was less than 5 mL. No specimens collected. Patient tolerated the  procedure well. Followup chest  x-ray was ordered.          Impression     IMPRESSION: Ultrasound-guided left thoracentesis.     BARBRA METZ MD         7/22/2018 12:50 PM      Narrative     XR CHEST 1 VW   7/22/2018 12:35 PM     HISTORY: Right thora;     COMPARISON: Film dated 7/22/2018    FINDINGS: A left Port-A-Cath is again noted. There is been a decrease  in the size of the right pleural effusion. No pneumothorax is seen.        Impression     IMPRESSION: Decrease in the size of the right pleural effusion. No  pneumothorax identified.    ZO PRINCE MD                Clinical Quality Measure: Blood Pressure Screening     Your blood pressure was checked while you were in the emergency department today. The last reading we obtained was  BP: (!) 134/96 . Please read the guidelines below about what these numbers mean and what you should do about them.  If your systolic blood pressure (the top number) is less than 120 and your diastolic blood pressure (the bottom number) is less than 80, then your blood pressure is normal. There is nothing more that you need to do about it.  If your systolic blood pressure (the top number) is 120-139 or your diastolic blood pressure (the bottom number) is 80-89, your blood pressure may be higher than it should be. You should have your blood pressure rechecked within a year by a primary care provider.  If your systolic blood pressure (the top number) is 140 or greater or your diastolic blood pressure (the bottom number) is 90 or greater, you may have high blood pressure. High blood pressure is treatable, but if left untreated over time it can put you at risk for heart attack, stroke, or kidney failure. You should have your blood pressure rechecked by a primary care provider within the next 4 weeks.  If your provider in the emergency department today gave you specific instructions to follow-up with your doctor or provider even sooner than that, you should follow that instruction and not wait for up to 4 weeks for  your follow-up visit.        Thank you for choosing [facility]       Thank you for choosing [facility] for your care. Our goal is always to provide you with excellent care. Hearing back from our patients is one way we can continue to improve our services. Please take a few minutes to complete the written survey that you may receive in the mail after you visit with us. Thank you!        [facility] Information     [facility] gives you secure access to your electronic health record. If you see a primary care provider, you can also send messages to your care team and make appointments. If you have questions, please call your primary care clinic.  If you do not have a primary care provider, please call [phone] and they will assist you.        Care EveryWhere ID     This is your Care EveryWhere ID. This could be used by other organizations to access your [facility] medical records  [id number]        Equal Access to Services     ROD PERRY : Diego Zhang, ronan pendleton, harley campos. So [facility] [phone].    ATENCIÓN: Si habla español, tiene a morocho disposición servicios gratuitos de asistencia lingüística. Roz al [phone].    We comply with applicable federal civil rights laws and [place] laws. We do not discriminate on the basis of race, color, national origin, age, disability, sex, sexual orientation, or gender identity.            After Visit Summary       This is your record. Keep this with you and show to your community pharmacist(s) and doctor(s) at your next visit.

## 2018-07-22 NOTE — ED NOTES
Wife called about pt having increased breathing difficulty with ongoing cancer and need of tube to be placed to drain fluid from lungs.  Wondering if she should bring patient here or go to [facility].  Offered to transfer call to nurse line and wife refused.  Encouraged to get pt into hospital right away, the closest hospital would be best.  Wife agrees that she will bring pt to the hospital, not sure where.

## 2018-07-22 NOTE — DISCHARGE INSTRUCTIONS
Pleural Effusion     Pleural effusion is fluid buildup between the layers of tissue that line the outside of the lungs.   Your healthcare provider has told you that you have pleural effusion. Pleural effusion means that you have extra fluid between the pleura. This area is called the pleural space. The pleura are 2 layers of thin, smooth tissue that surround the lungs and line the chest. The pleural space usually holds only a small amount of fluid. This fluid lubricates the pleura. But if too much fluid fills the space, it can make it hard or painful to breathe.  There are 2 types of pleural effusion:    Inflammatory. This is caused by a lung disease like pneumonia or lung cancer, both of which irritates the pleura.    Noninflammatory. This is caused by abnormal fluid pressures inside the lungs. The pressure can be caused by congestive heart failure (CHF). In CHF, extra fluid collects inside the lung tissues because of a weak heart muscle. This extra fluid then leaks into the pleural space. Other causes of noninflammatory pleural effusions include kidney disease, liver disease, and malnutrition.  What are the symptoms of pleural effusion?  The symptoms of pleural effusion include:    Sharp pains in the chest, especially when taking a breath, coughing, or sneezing    Trouble breathing    Cough    Fever  What are the causes of pleural effusion?  Common causes include:    Congestive heart failure    Cirrhosis of the liver    Pneumonia    Viral lung infection    Cancer    Blood clot in the lung (pulmonary embolism)    Heart surgery  Chest infections like pneumonia and heart disease are the most common causes. Less common causes include lung cancer.  How is pleural effusion diagnosed?  Your healthcare provider will examine you and ask about your health history. Tests include:    Blood tests    Analysis of fluid in pleural space, chest X-ray, CT scan, or ultrasound  How is pleural effusion treated?  The extra fluid may  be drained from the pleural space. This is done with a procedure called thoracentesis. This procedure uses a thin needle to draw out the fluid from the pleural space. In some cases, a tube is placed in the chest to drain the extra fluid. The tube will likely stay in place for several days.  You may have other treatments, depending on the cause of your pleural effusion. If it s because of a bacterial infection, you will be given antibiotics to fight the infection. If it s because of a heart condition, you will be given medicines and other treatment for your heart. Your healthcare provider can tell you more about the cause of your pleural effusion and your treatment choices.  What are the long-term concerns?  If untreated, pleural effusion can lead to serious health problems, such as collapsed lung from fluid filling the pleural space.  Call 911  Call 911 if you have:    Trouble breathing    Worsening chest pain   When to call your healthcare provider   Call your healthcare provider right away if you have:    Continued coughing    Fever  Date Last Reviewed: 12/1/2016 2000-2017 [employer]. [address] [place] [address]. All rights reserved. This information is not intended as a substitute for professional medical care. Always follow your healthcare professional's instructions.

## 2018-07-22 NOTE — ED PROVIDER NOTES
"  History     Chief Complaint:  Shortness of Breath      HPI   Zechariah Ashby is a 58 year old male non-small cell carcinoma of lung, COPD, CHF, and Atrial fibrillation who presents with shortness of breath. The patient states that he is not currently undergoing chemotherapy and is starting hospice in 3 days. He has had increasing shortness of breath as well as a dry cough. The patient was scheduled to have a thoracentesis in 5 days, but over the last 2 days his shortness of breath has been worse and he does not believe he can wait until his appointment. He has not been taking his anticoagulation for the last 3 days.       Allergies:  No known drug allergies.      Medications:    Albuterol nebulizer solution   Albuterol inhaler  Amiodarone  Wellbutrin   Lisinopril  Ativan   MS contin  Morphine  Morphine Sulfate   Nicotine  Prilosec  Zofran   Miralax  Compazine  Senna-docusate  Warfarin      Past Medical History:    Atrial fibrillation   COPD   CHF  Anxiety   Cardiomyopathy  Non-small cell carcinoma of lung     Past Surgical History:    Appendectomy   Port vascular access insertion    Family History:    Cancer-mother  Alzheimer Disease-Father    Social History:  Marital Status:   Presents to the ED with wife  Tobacco Use: Current daily smoker, 0.25 PPD.   Alcohol Use: No  PCP: Kailash Mason      Review of Systems   Constitutional: Negative for fever.   Respiratory: Positive for cough and shortness of breath.    Gastrointestinal: Negative for vomiting.   All other systems reviewed and are negative.    Physical Exam   First Vitals:  BP: (!) 131/109  Heart Rate: 87  Temp: 97.8  F (36.6  C)  Resp: 20  Height: 180.3 cm (5' 11\")  Weight: 68 kg (150 lb)  SpO2: 94 %      Physical Exam  GENERAL: cachectic, pleasant  HEAD: atraumatic  EYES: pupils reactive, extraocular muscles intact, conjunctivae normal  ENT:  mucus membranes moist  NECK:  trachea midline, normal range of motion  RESPIRATORY: no tachypnea, " diminished lung sounds bilaterally   CVS: normal S1/S2, no murmurs, intact distal pulses  ABDOMEN: soft, nontender, nondistention  MUSCULOSKELETAL: no deformities  SKIN: warm and dry, no acute rashes or ulceration  NEURO: GCS 15, cranial nerves intact, alert and oriented x3  PSYCH:  Mood/affect normal     Emergency Department Course   ECG:  @ 0950  Indication: shortness of breath   Vent. Rate 84 bpm. SC interval 154 ms. QRS duration 84 ms. QT/QTc 378/446 ms. P-R-T axis 41 85 82.   Normal sinus rhythm. Low voltage QRS. Cannot rule out anterior infarct, age undetermined.  Abnormal ECG.     Read @ 0954 by Dr. Carreon .     Imaging:  Chest x-ray, 2 views:   1. Moderate-sized right pleural effusion with associated infiltrate or  atelectasis at the right base.  2. Small left pleural effusion.   Report per radiology.     Chest x-ray, 1 view:   Decrease in the size of the right pleural effusion. No pneumothorax identified.   Report per radiology.   Radiographic findings were communicated with the patient who voiced understanding of the findings.    Laboratory:  CBC:  WBC 9.4, HGB 15.5, , otherwise WNL   INR: 1.19 (H)      Emergency Department Course:  Nursing notes and vitals reviewed.  (1002) I performed an exam of the patient as documented above.    A peripheral IV was established. Blood was drawn from the patient. This was sent for laboratory testing, findings above.    The patient was sent for a chest x-ray while in the emergency department, findings above.    (1035) I consulted with Dr. Davis of interventional radiology regarding the patient.   Findings and plan explained to the patient. Patient discharged home with instructions regarding supportive care, medications, and reasons to return. The importance of close follow-up was reviewed.      Impression & Plan    Medical Decision Making:  Zechariah Ashby is a 58 year old male who presents with increased shortness of breath with plans of having a thoracentesis  and pleura vac placed. Patient does not feel that he can wait until this coming Friday. INR and platelets are stable. Spoke with the interventional radiologist and the thoracentesis was done. The patient feels much improved and will be discharged to outpatient management.       Diagnosis:    ICD-10-CM    1. Pleural effusion J90        Disposition:  discharged to home      Betsey BEAVER, am serving as a scribe on 7/22/2018 at 10:02 AM to personally document services performed by Dr. Carreon based on my observations and the provider's statements to me.    7/22/2018    EMERGENCY DEPARTMENT       Andrzej Carreon MD  07/25/18 5400

## 2018-07-22 NOTE — ED AVS SNAPSHOT
Emergency Department    [address] [place] [address]    Phone:  [phone]    Fax:  [phone]                                       Zechariah Ashby   MRN: [id number]    Department:   Emergency Department   Date of Visit:  7/22/2018           After Visit Summary Signature Page     I have received my discharge instructions, and my questions have been answered. I have discussed any challenges I see with this plan with the nurse or doctor.    ..........................................................................................................................................  Patient/Patient Representative Signature      ..........................................................................................................................................  Patient Representative Print Name and Relationship to Patient    ..................................................               ................................................  Date                                            Time    ..........................................................................................................................................  Reviewed by Signature/Title    ...................................................              ..............................................  Date                                                            Time

## 2018-07-26 NOTE — MR AVS SNAPSHOT
Zechariah Ashby   7/26/2018   Anticoagulation Therapy Visit    Description:  58 year old male   Provider:  Alice Lea RN   Department:  [facility]           INR as of 7/26/2018     Today's INR 1.4!      Anticoagulation Summary as of 7/26/2018     INR goal 2.0-3.0   Today's INR 1.4!   Full warfarin instructions 7/26: Hold; 7/27: Hold   Next INR check 7/30/2018    Indications   Atrial flutter  unspecified type (H) [I48.92]  Long-term (current) use of anticoagulants [Z79.01] [Z79.01]         July 2018 Details    Sun Mon Tue Wed Thu Fri Sat     1               2               3               4               5               6               7                 8               9               10               11               12               13               14                 15               16               17               18               19               20               21                 22               23               24               25               26      Hold   See details      27      Hold         28                 29               30            31                    Date Details   07/26 This INR check       Date of next INR:  7/30/2018         How to take your warfarin dose     Hold Do not take your warfarin dose. See the Details table to the right for additional instructions.

## 2018-07-26 NOTE — PROGRESS NOTES
ANTICOAGULATION FOLLOW-UP CLINIC VISIT    Patient Name:  Zechariah Ashby  Date:  7/26/2018  Contact Type:  Telephone/ [facility]    SUBJECTIVE:     Patient Findings     Positives Intentional hold of therapy, Unexplained INR or factor level change    Comments Writer requested patient double check he did not have warfarin in the past few days. It is unexpected that his INR increased after holding with a result of 1.19.    Patient will be having a hospice consult on Saturday (it was postponed from Monday). Writer called and spoke with the charge nurse at the IR department at the [facility], patient is okay to have the thoracentesis tomorrow as the INR only needs to be <2.0.     Patient will continue holding warfarin for now. It will be discussed on Saturday if he will continue to take warfarin or not while on hospice.            OBJECTIVE    INR   Date Value Ref Range Status   07/26/2018 1.4  Final       ASSESSMENT / PLAN  No question data found.  Anticoagulation Summary as of 7/26/2018     INR goal 2.0-3.0   Today's INR 1.4!   Warfarin maintenance plan No maintenance plan   Full warfarin instructions 7/26: Hold; 7/27: Hold   Weekly warfarin total 8.75 mg   Plan last modified Mahendra David RN (7/20/2018)   Next INR check 7/30/2018   Priority INR   Target end date     Indications   Atrial flutter  unspecified type (H) [I48.92]  Long-term (current) use of anticoagulants [Z79.01] [Z79.01]         Anticoagulation Episode Summary     INR check location     Preferred lab     Send INR reminders to [facility] [facility]    Comments *Pt is on AMIODARONE. On palliative Keytruda as of 5-23-18. // [facility]. Ensure daily as of 7-13-18. Coumadin on hold as of 7/20/18. Hospice consult on 7/23.      Anticoagulation Care Providers     Provider Role Specialty Phone number    Kailash Mason MD [facility] [phone]            See the Encounter Report to view Anticoagulation Flowsheet and Dosing  Calendar (Go to Encounters tab in chart review, and find the Anticoagulation Therapy Visit)        Alice Lea RN, CACP

## 2018-07-27 NOTE — PROGRESS NOTES
Interventional Radiology Pre-Procedure Sedation Assessment   Time of Assessment: 8:35 AM    Expected Level: Moderate Sedation    Indication: Sedation is required for the following type of Procedure: Drain    Sedation and procedural consent: Risks, benefits and alternatives were discussed with Patient    PO Intake: Appropriately NPO for procedure    ASA Class: Class 3 - SEVERE SYSTEMIC DISEASE, DEFINITE FUNCTIONAL LIMITATIONS.    Mallampati: Grade 2:  Soft palate, base of uvula, tonsillar pillars, and portion of posterior pharyngeal wall visible    Lungs: Lungs Clear with good breath sounds bilaterally    Heart: Normal heart sounds and rate    History and physical reviewed and no updates needed. I have reviewed the lab findings, diagnostic data, medications, and the plan for sedation. I have determined this patient to be an appropriate candidate for the planned sedation and procedure and have reassessed the patient IMMEDIATELY PRIOR to sedation and procedure.    Kevin Johnson DO

## 2018-07-27 NOTE — IP AVS SNAPSHOT
[address] [address]                                       After Visit Summary   7/27/2018    Zechariah Ashby    MRN: [id number]           After Visit Summary Signature Page     I have received my discharge instructions, and my questions have been answered. I have discussed any challenges I see with this plan with the nurse or doctor.    ..........................................................................................................................................  Patient/Patient Representative Signature      ..........................................................................................................................................  Patient Representative Print Name and Relationship to Patient    ..................................................               ................................................  Date                                            Time    ..........................................................................................................................................  Reviewed by Signature/Title    ...................................................              ..............................................  Date                                                            Time

## 2018-07-27 NOTE — DISCHARGE INSTRUCTIONS
[facility],   Interventional Radiology Discharge Instructions Following Thoracentesis and Pleurix Catheter Placement        AFTER YOU GO HOME:  ? Resume previous diet and medications  ? Limit strenuous physical activity such as lifting, straining, or exercising for 48 hours.  You may resume normal activity in 24 hours  ? Keep site clean and dry.  Leaking of additional fluid is not uncommon during the first 24-48 hours    CALL THE PHYSICIAN:  ? If you develop a fever, shortness of breath, chest pain, cough up blood, excessive bleeding from the thoracentesis/drain site, severe lightheadedness, or fainting call you doctor immediately or come to the closest Emergency Department.  Do not drive yourself.   ? If you have questions or concerns regarding your procedure call the radiologist.      ADDITIONAL INSTRUCTIONS: None      [facility] INTERVENTIONAL RADIOLOGY DEPARTMENT  Procedure Physician: Dr. Dee                Date of procedure: July 27, 2018  Telephone Numbers: [phone]       Monday-Friday 8:00 am to 4:30 pm                                                          [phone]       After 4:30 pm Monday - Friday, Ask for the Interventional Radiologist on call.  Someone is on call 24 hrs/day  [facility] toll free number: [phone]    Monday-Friday 8:00 am to 4:30 pm  [facility] Emergency Dept: [phone]  _

## 2018-07-27 NOTE — PROGRESS NOTES
1100 Pt arrived on 2a post thoracentesis and pleurix catheter placement. Dressing c/di. No pain. VSS RA. Family at bedside. 1110 PT eating and drinking. 1150 Pt tolerating oral intake. Dc instructions reviewed, copy given to pt. Pt set up with hospice care to assist with use and care of drain. Port flushed with normal saline and heparin de-accessed. 1200 Pt dc'd home accompanied by family

## 2018-07-27 NOTE — PROGRESS NOTES
Interventional Radiology Brief Post Procedure Note    Procedure: IR CHEST TUBE DRAIN TUNNELED BILATERAL    Proceduralist: Adam Mittal MD and Shankar Egan MD    Assistant: Radiology Resident Physician, Kevin Johnson DO    Time Out: Prior to the start of the procedure and with procedural staff participation, I verbally confirmed the patient s identity using two indicators, relevant allergies, that the procedure was appropriate and matched the consent or emergent situation, and that the correct equipment/implants were available. Immediately prior to starting the procedure I conducted the Time Out with the procedural staff and re-confirmed the patient s name, procedure, and site/side. (The Joint Commission universal protocol was followed.)  Yes    Medications   Medication Event Details Admin User Admin Time   midazolam (VERSED) injection 0.5-1 mg Medication Given Dose: 1 mg; Route: Intravenous Jermaine Caldwell RN 7/27/2018 10:20 AM   fentaNYL (PF) (SUBLIMAZE) injection 25-50 mcg Medication Given Dose: 50 mcg; Route: Intravenous Jermaine Caldwell RN 7/27/2018 10:20 AM   midazolam (VERSED) injection 0.5-1 mg Medication Given Dose: 0.5 mg; Route: Intravenous Jermaine Caldwell RN 7/27/2018 10:24 AM   fentaNYL (PF) (SUBLIMAZE) injection 25-50 mcg Medication Given Dose: 25 mcg; Route: Intravenous Jermaine Caldwell RN 7/27/2018 10:24 AM   lidocaine 1 % 1-30 mL Medication Given by Other Dose: 4 mL; Route: Intradermal; Comment: given by dr egan. Jermaine Caldwell RN 7/27/2018 10:25 AM       Sedation: IR Nurse Monitored Care   Post Procedure Summary:  Prior to the start of the procedure and with procedural staff participation, I verbally confirmed the patient s identity using two indicators, relevant allergies, that the procedure was appropriate and matched the consent or emergent situation, and that the correct equipment/implants were available. Immediately prior to starting the procedure I conducted the Time Out with the procedural  staff and re-confirmed the patient s name, procedure, and site/side. (The Joint Commission universal protocol was followed.)  Yes       Sedatives: Fentanyl and Midazolam (Versed)    Vital signs, airway and pulse oximetry were monitored and remained stable throughout the procedure and sedation was maintained until the procedure was complete.  The patient was monitored by staff until sedation discharge criteria were met.    Patient tolerance: Patient tolerated the procedure well with no immediate complications.    Time of sedation in minutes: 25 minutes minutes from beginning to end of physician one to one monitoring.          Findings: US guided right PleurX catheter placed with catheter tip confirmed by fluoroscopy with no complications. 1L of fluid was drained.     Estimated Blood Loss: Minimal    Fluoroscopy Time: 0.2 minute(s)    SPECIMENS: None    Complications: 1. None     Condition: Stable    Plan: Successful right PleurX catheter placement and ready to use. Drainage of 1L of fluid. Patient to return to 2A for 1 hour of observation. If no bleeding at incision site and vitals stable, patient may be discharged.     Comments: See dictated procedure note for full details.    Kevin Johnson DO

## 2018-07-27 NOTE — PROGRESS NOTES
Patient Name: Zechariah Ashby  Medical Record Number: [id number]  Today's Date: 7/27/2018    Procedure: right side thoracentesis, pluerx (15.5 fr) placement right chest.  Proceduralist: Dr. Shankar Dee,     Sedation start time: 1020  Sedation end time:   1045  Sedation medications administered: 1.5 mg Versed, 75 mcg Fentanyl.  Total sedation time: 25 minutes    800 ml total fluid removed from right lung.    Patient in room: 1007  Procedure start time: 1024  Puncture time: 1026  Procedure end time: 1045  Patient out of room: 1057    Report given to: AR back    Other Notes: Pt arrived to IR room 3 from . Pt denies any questions or concerns regarding procedure. Pt positioned supine and monitored per protocol. Pt tolerated procedure without any noted complications. Pt transferred back to .

## 2018-07-27 NOTE — IP AVS SNAPSHOT
MRN:[id number]                      After Visit Summary   7/27/2018    Zechariah Ashby    MRN: [id number]           Visit Information        Department      7/27/2018  7:13 AM [address] [facility] [place]          Review of your medicines      UNREVIEWED medicines. Ask your doctor about these medicines        Dose / Directions    * albuterol 108 (90 Base) MCG/ACT Inhaler   Commonly known as:  PROAIR HFA   Used for:  Chronic obstructive pulmonary disease, unspecified COPD type (H)        Dose:  2 puff   Inhale 2 puffs into the lungs every 4 hours as needed for shortness of breath / dyspnea or wheezing   Quantity:  1 Inhaler   Refills:  11       * albuterol (2.5 MG/3ML) 0.083% neb solution   Used for:  COPD exacerbation (H)        Dose:  1 vial   Take 1 vial (2.5 mg) by nebulization every 6 hours as needed for shortness of breath / dyspnea or wheezing   Quantity:  25 vial   Refills:  3       amiodarone 200 MG tablet   Commonly known as:  PACERONE/CODARONE   Used for:  Paroxysmal atrial fibrillation (H)        TAKE ONE TABLET BY MOUTH ONE TIME DAILY   Quantity:  30 tablet   Refills:  0       buPROPion 200 MG 12 hr tablet   Commonly known as:  WELLBUTRIN SR   Used for:  Moderate single current episode of major depressive disorder (H)        Dose:  200 mg   Take 1 tablet (200 mg) by mouth 2 times daily   Quantity:  60 tablet   Refills:  5       lisinopril 10 MG tablet   Commonly known as:  PRINIVIL/ZESTRIL   Used for:  Brain metastasis (H), HTN (hypertension)        Dose:  10 mg   Take 1 tablet (10 mg) by mouth daily   Quantity:  30 tablet   Refills:  1       LORazepam 0.5 MG tablet   Commonly known as:  ATIVAN   Used for:  Non-small cell carcinoma of lung (H), Brain metastasis (H), Pleural effusion, Chemotherapy induced nausea and vomiting, Slow transit constipation, Anxiety, Moderate single current episode of major depressive disorder (H), Nausea        Dose:  0.5 mg   Take 1 tablet (0.5 mg) by mouth every  4 hours as needed (Anxiety, Nausea/Vomiting or Sleep)   Quantity:  30 tablet   Refills:  5       * morphine sulfate (high concentrate) 20 MG/ML concentrated solution   Commonly known as:  ROXANOL-CONCENTRATED   Used for:  Non-small cell carcinoma of lung (H)        Dose:  2.5 mg   Take 0.125 mLs (2.5 mg) by mouth every 2 hours as needed for shortness of breath / dyspnea or moderate to severe pain   Quantity:  20 mL   Refills:  0       * morphine 15 MG 12 hr tablet   Commonly known as:  MS CONTIN   Used for:  Non-small cell carcinoma of lung (H), Brain metastasis (H), Pleural effusion, Chemotherapy induced nausea and vomiting, Slow transit constipation, Anxiety, Moderate single current episode of major depressive disorder (H), Nausea        Dose:  15 mg   Take 1 tablet (15 mg) by mouth every 12 hours maximum 2 tablet(s) per day   Quantity:  60 tablet   Refills:  0       nicotine 10 MG Inhaler   Commonly known as:  NICOTROL   Used for:  Tobacco abuse        Dose:  6 Cartridge   Inhale 6 Cartridges into the lungs daily as needed for smoking cessation   Quantity:  168 each   Refills:  0       omeprazole 40 MG capsule   Commonly known as:  priLOSEC   Used for:  Gastroesophageal reflux disease without esophagitis        Dose:  40 mg   Take 1 capsule (40 mg) by mouth daily Take 30-60 minutes before a meal.   Quantity:  30 capsule   Refills:  11       ondansetron 4 MG tablet   Commonly known as:  ZOFRAN   Used for:  Non-small cell carcinoma of lung (H)        Dose:  4 mg   Take 1 tablet (4 mg) by mouth every 8 hours as needed for nausea   Quantity:  30 tablet   Refills:  1       polyethylene glycol powder   Commonly known as:  MIRALAX   Used for:  Constipation, unspecified constipation type        Dose:  1 capful   Take 17 g (1 capful) by mouth daily   Quantity:  510 g   Refills:  1       prochlorperazine 10 MG tablet   Commonly known as:  COMPAZINE        Dose:  10 mg   Take 10 mg by mouth every 6 hours as needed    Refills:  0       senna-docusate 8.6-50 MG per tablet   Commonly known as:  SENOKOT-S;PERICOLACE   Used for:  Constipation, unspecified constipation type        Dose:  2 tablet   Take 2 tablets by mouth 2 times daily   Quantity:  120 tablet   Refills:  11       warfarin 2.5 MG tablet   Commonly known as:  COUMADIN   Used for:  Long-term (current) use of anticoagulants, Atrial flutter, unspecified type (H)        Currently on HOLD   Quantity:  25 tablet   Refills:  0       * Notice:  This list has 4 medication(s) that are the same as other medications prescribed for you. Read the directions carefully, and ask your doctor or other care provider to review them with you.      CONTINUE these medicines which may have CHANGED, or have new prescriptions. If we are uncertain of the size of tablets/capsules you have at home, strength may be listed as something that might have changed.        Dose / Directions    order for DME   This may have changed:    - how much to take  - when to take this  - additional instructions   Used for:  Shortness of breath, Tobacco abuse, Non-small cell carcinoma of lung (H)        Equipment being ordered: Oxygen portable Length of need: 99 2 Liters Nasal Canula to keep O2 sats greater than 92%   Quantity:  1 each   Refills:  0         CONTINUE these medicines which have NOT CHANGED        Dose / Directions    order for DME   Used for:  Decubitus ulcer of sacral region, stage 1        Equipment being ordered: donut cushion for sacral ulcer   Quantity:  1 Device   Refills:  0       order for DME   Used for:  Weakness        Equipment being ordered: lightweight wheelchair.   Cannot lift standard wheelchair due to weakness and cancer.   Quantity:  1 Device   Refills:  0       order for DME   Used for:  Mediastinal lymphadenopathy        Equipment being ordered: Walker - 4 legged walker with wheels and a seat   Quantity:  1 Device   Refills:  0       order for DME   Used for:  Non-small cell  carcinoma of lung (H)        Equipment being ordered: Wheelchair   Quantity:  1 each   Refills:  0       order for DME   Used for:  Non-small cell carcinoma of lung (H)        Equipment being ordered: Nebulizer with tubing.   Quantity:  1 each   Refills:  0                Protect others around you: Learn how to safely use, store and throw away your medicines at www.disposemymeds.org.         Follow-ups after your visit        Your next 10 appointments already scheduled     Aug 08, 2018 10:00 AM CDT   Return Visit with Cahce Mitchell MD   [facility] ([facility])    [facility]  [address]  [facility] [address]   [phone]            Aug 16, 2018 10:15 AM CDT   MR BRAIN W/O & W CONTRAST with [address] MRI ([facility])    [address]  [facility] [address]   [phone]           Take your medicines as usual, unless your doctor tells you not to. Bring a list of your current medicines to your exam (including vitamins, minerals and over-the-counter drugs).  You may or may not receive intravenous (IV) contrast for this exam pending the discretion of the Radiologist.  You do not need to do anything special to prepare.  The MRI machine uses a strong magnet. Please wear clothes without metal (snaps, zippers). A sweatsuit works well, or we may give you a hospital gown.  Please remove any body piercings and hair extensions before you arrive. You will also remove watches, jewelry, hairpins, wallets, dentures, partial dental plates and hearing aids. You may wear contact lenses, and you may be able to wear your rings. We have a safe place to keep your personal items, but it is safer to leave them at home.  **IMPORTANT** THE INSTRUCTIONS BELOW ARE ONLY FOR THOSE PATIENTS WHO HAVE BEEN PRESCRIBED SEDATION OR GENERAL ANESTHESIA DURING THEIR MRI PROCEDURE:  IF YOUR DOCTOR PRESCRIBED ORAL SEDATION (take medicine to help you relax  during your exam):   You must get the medicine from your doctor (oral medication) before you arrive. Bring the medicine to the exam. Do not take it at home. You ll be told when to take it upon arriving for your exam.   Arrive one hour early. Bring someone who can take you home after the test. Your medicine will make you sleepy. After the exam, you may not drive, take a bus or take a taxi by yourself.  IF YOUR DOCTOR PRESCRIBED IV SEDATION:   Arrive one hour early. Bring someone who can take you home after the test. Your medicine will make you sleepy. After the exam, you may not drive, take a bus or take a taxi by yourself.   No eating 6 hours before your exam. You may have clear liquids up until 4 hours before your exam. (Clear liquids include water, clear tea, black coffee and fruit juice without pulp.)  IF YOUR DOCTOR PRESCRIBED ANESTHESIA (be asleep for your exam):   Arrive 1 1/2 hours early. Bring someone who can take you home after the test. You may not drive, take a bus or take a taxi by yourself.   No eating 8 hours before your exam. You may have clear liquids up until 4 hours before your exam. (Clear liquids include water, clear tea, black coffee and fruit juice without pulp.)   You will spend four to five hours in the recovery room.  Please call the Imaging Department at your exam site with any questions.            Aug 16, 2018 11:15 AM CDT   Return Visit with Benoit Woodson MD   [facility] ([facility])    [address] [place] [address], [address]  [facility] [address]   [phone]               Care Instructions        Further instructions from your care team         [facility],   Interventional Radiology Discharge Instructions Following Thoracentesis and Pleurix Catheter Placement        AFTER YOU GO HOME:  ? Resume previous diet and medications  ? Limit strenuous physical activity such as lifting, straining, or exercising for 48 hours.  You may resume normal activity  in 24 hours  ? Keep site clean and dry.  Leaking of additional fluid is not uncommon during the first 24-48 hours    CALL THE PHYSICIAN:  ? If you develop a fever, shortness of breath, chest pain, cough up blood, excessive bleeding from the thoracentesis/drain site, severe lightheadedness, or fainting call you doctor immediately or come to the closest Emergency Department.  Do not drive yourself.   ? If you have questions or concerns regarding your procedure call the radiologist.      ADDITIONAL INSTRUCTIONS: None      [facility] INTERVENTIONAL RADIOLOGY DEPARTMENT  Procedure Physician: Dr. eDe                Date of procedure: July 27, 2018  Telephone Numbers: [phone]       Monday-Friday 8:00 am to 4:30 pm                                                          [phone]       After 4:30 pm Monday - Friday, Ask for the Interventional Radiologist on call.  Someone is on call 24 hrs/day  [facility] toll free number: [phone]    Monday-Friday 8:00 am to 4:30 pm  [facility] Emergency Dept: [phone]  _             Additional Information About Your Visit        [facility] Information     [facility] gives you secure access to your electronic health record. If you see a primary care provider, you can also send messages to your care team and make appointments. If you have questions, please call your primary care clinic.  If you do not have a primary care provider, please call [phone] and they will assist you.        Care EveryWhere ID     This is your Care EveryWhere ID. This could be used by other organizations to access your [facility] medical records  [id number]        Your Vitals Were     Blood Pressure Temperature Respirations Pulse Oximetry          112/84 97.8  F (36.6  C) (Oral) 20 97%         Primary Care Provider Office Phone # Fax #    Kailash Mason [phone]      Equal Access to Services     ROD PERRY AH: ronan Bedoya, harley campos  ramila villanuevadick dc'aan ah. So [facility] [phone].    ATENCIÓN: Si teela jarred, tiene a morocho disposición servicios gratuitos de asistencia lingüística. Roz al [phone].    We comply with applicable federal civil rights laws and [place] laws. We do not discriminate on the basis of race, color, national origin, age, disability, sex, sexual orientation, or gender identity.            Thank you!     Thank you for choosing [facility] for your care. Our goal is always to provide you with excellent care. Hearing back from our patients is one way we can continue to improve our services. Please take a few minutes to complete the written survey that you may receive in the mail after you visit with us. Thank you!             Medication List: This is a list of all your medications and when to take them. Check marks below indicate your daily home schedule. Keep this list as a reference.      Medications           Morning Afternoon Evening Bedtime As Needed    * albuterol 108 (90 Base) MCG/ACT Inhaler   Commonly known as:  PROAIR HFA   Inhale 2 puffs into the lungs every 4 hours as needed for shortness of breath / dyspnea or wheezing                                * albuterol (2.5 MG/3ML) 0.083% neb solution   Take 1 vial (2.5 mg) by nebulization every 6 hours as needed for shortness of breath / dyspnea or wheezing                                amiodarone 200 MG tablet   Commonly known as:  PACERONE/CODARONE   TAKE ONE TABLET BY MOUTH ONE TIME DAILY                                buPROPion 200 MG 12 hr tablet   Commonly known as:  WELLBUTRIN SR   Take 1 tablet (200 mg) by mouth 2 times daily                                lisinopril 10 MG tablet   Commonly known as:  PRINIVIL/ZESTRIL   Take 1 tablet (10 mg) by mouth daily                                LORazepam 0.5 MG tablet   Commonly known as:  ATIVAN   Take 1 tablet (0.5 mg) by mouth every 4 hours as needed (Anxiety, Nausea/Vomiting or Sleep)                                 * morphine sulfate (high concentrate) 20 MG/ML concentrated solution   Commonly known as:  ROXANOL-CONCENTRATED   Take 0.125 mLs (2.5 mg) by mouth every 2 hours as needed for shortness of breath / dyspnea or moderate to severe pain                                * morphine 15 MG 12 hr tablet   Commonly known as:  MS CONTIN   Take 1 tablet (15 mg) by mouth every 12 hours maximum 2 tablet(s) per day                                nicotine 10 MG Inhaler   Commonly known as:  NICOTROL   Inhale 6 Cartridges into the lungs daily as needed for smoking cessation                                omeprazole 40 MG capsule   Commonly known as:  priLOSEC   Take 1 capsule (40 mg) by mouth daily Take 30-60 minutes before a meal.                                ondansetron 4 MG tablet   Commonly known as:  ZOFRAN   Take 1 tablet (4 mg) by mouth every 8 hours as needed for nausea                                order for DME   Equipment being ordered: donut cushion for sacral ulcer                                order for DME   Equipment being ordered: lightweight wheelchair.   Cannot lift standard wheelchair due to weakness and cancer.                                order for DME   Equipment being ordered: Walker - 4 legged walker with wheels and a seat                                order for DME   Equipment being ordered: Wheelchair                                order for DME   Equipment being ordered: Nebulizer with tubing.                                order for DME   Equipment being ordered: Oxygen portable Length of need: 99 2 Liters Nasal Canula to keep O2 sats greater than 92%                                polyethylene glycol powder   Commonly known as:  MIRALAX   Take 17 g (1 capful) by mouth daily                                prochlorperazine 10 MG tablet   Commonly known as:  COMPAZINE   Take 10 mg by mouth every 6 hours as needed                                senna-docusate 8.6-50 MG per tablet    Commonly known as:  SENOKOT-S;PERICOLACE   Take 2 tablets by mouth 2 times daily                                warfarin 2.5 MG tablet   Commonly known as:  COUMADIN   Currently on HOLD                                * Notice:  This list has 4 medication(s) that are the same as other medications prescribed for you. Read the directions carefully, and ask your doctor or other care provider to review them with you.

## 2018-07-27 NOTE — PROGRESS NOTES
0840 Pt on 2a prepped and ready for pleurix catheter placement. Port accessed. Labs done yesterday and pt has not had coumadin in one week. H&P current. Pt consented. Family at bedside.

## 2018-07-30 NOTE — PROGRESS NOTES
Writer spoke with Tati with  Hospice. Patient has decided to stop taking warfarin. [facility] will no longer follow patient. If patient should restart warfarin again, he will need a new INR clinic referral. Will route to PCP as ELIO FRANCES RN, CACP 7/30/2018 at 4:22 PM

## 2018-07-30 NOTE — MR AVS SNAPSHOT
Zechariah Ashby   7/30/2018   Anticoagulation Therapy Visit    Description:  58 year old male   Provider:  Alice Lea RN   Department:  [facility]           INR as of 7/30/2018     Today's INR       Anticoagulation Summary as of 7/30/2018     INR goal 2.0-3.0   Today's INR    Full warfarin instructions No maintenance plan   Next INR check     Indications   Atrial flutter  unspecified type (H) [I48.92]  Long-term (current) use of anticoagulants [Z79.01] [Z79.01]         Anticoagulation Episode Summary     Resolved date 7/30/2018    Resolved reason Therapy  Complete

## 2018-08-21 ENCOUNTER — TELEPHONE (OUTPATIENT)
Dept: FAMILY MEDICINE | Facility: CLINIC | Age: 58
End: 2018-08-21

## 2018-08-21 NOTE — TELEPHONE ENCOUNTER
Physician Medical Examiner Cause of Death Worksheet completed by Dr Rehman (Dr Mason out of office) and successfully submitted online [facility] [id number]  Copy sent to scanning.

## 2018-11-19 NOTE — NURSING NOTE
"Oncology Rooming Note    July 19, 2018 10:10 AM   Zechariah Ashby is a 58 year old male who presents for:    Chief Complaint   Patient presents with     Oncology Clinic Visit     labs, 3 wk f/u Left lung CA     Initial Vitals: /88 (BP Location: Right arm, Patient Position: Chair, Cuff Size: Adult Regular)  Pulse 94  Temp 97.5  F (36.4  C) (Oral)  Resp 20  Ht 1.803 m (5' 10.98\")  Wt 67.9 kg (149 lb 9.6 oz)  SpO2 98%  BMI 20.87 kg/m2 Estimated body mass index is 20.87 kg/(m^2) as calculated from the following:    Height as of this encounter: 1.803 m (5' 10.98\").    Weight as of this encounter: 67.9 kg (149 lb 9.6 oz). Body surface area is 1.84 meters squared.  Extreme Pain (8) Comment: whole body aches.    No LMP for male patient.  Allergies reviewed: Yes  Medications reviewed: Yes    Medications: Medication refills not needed today.  Pharmacy name entered into Anywhere [facility]:    [id number] - [facility] [address]    Clinical concerns: labs, 3 wk f/u Left lung CA. C/o 8/10 whole body aches. Recently seen in the ER on 7/17 discontinued blood thinners.     8 minutes for nursing intake (face to face time)     Courtney Lopez, SANDI            " Fall Risk

## 2019-05-16 NOTE — LETTER
5/17/2018         RE: Zechariah Ashby  [id number] [facility] [id number]        Dear Colleague,    Thank you for referring your patient, Zechariah Ashby, to the [facility]. Please see a copy of my visit note below.    Hematology/ Oncology Follow-up Visit:  May 17, 2018    Reason for Visit:   Chief Complaint   Patient presents with     Oncology Clinic Visit     1 month recheck Non-small cell carcinoma of lung, review labs, PET & MRI        Oncologic History:  Non-small cell carcinoma of lung (H)  Zechariah Ashby is a 57 year old male who was recently diagnosed with atrial fib/flutter in early November 2017. During workup just x-ray showed elevation of the left hemidiaphragm with a focal tenting. The CT scan was done for further evaluation showing left upper lobe atelectasis and a moderate-sized left pleural effusion. Patient also had mediastinal adenopathy. Patient had left thoracocentesis. Cytology came back as transudate and cytology came back negative. Patient has a long history of smoking. PET scan showed a large FDG avid mediastinal mass extending to the left hilum with obliteration of the left upper lobe bronchus and atelectasis of the upper lobe.  There are also multiple left cervical left cervical and mediastinal adenopathy.  There is increased left pleural effusion.  There is hypermetabolic left adrenal lesion.  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 He was started on carboplatin and gemcitabine.    Interval History:  Patient is here today to discuss management plan.  He concluded treatment was carboplatin and gemcitabine.  Patient has been having increasing fatigue, exertional dyspnea, poor appetite, and weight loss.  Pain is well controlled on morphine as daily.  Denies to have constipation by 2 years of laxative MiraLAX powder and Senokot    Review Of Systems:  Constitutional: Negative for fever,    Gabo Cruz comes to the clinic for follow up anticoagulation management visit.    Patient denies any complaints related to anticoagulation therapy at this time. Patient reports no change in medication, diet or health. Reinforced with patient to call clinic with any medication changes as this can impact INR. Reinforced signs and symptoms bleeding/clotting with patient. Patient aware to seek medical care if signs and symptoms develop. Advised that if patient falls and/or hits their head, they should seek medical attention. Verbalized understanding.     Dosing instructions given to patient both verbally and in writing. Advised to call the clinic with any questions or concerns. Patient verbalized understanding. Clinic number provided.    Anticoagulation Summary  As of 2019    INR goal:   2.0-3.0   TTR:   58.7 % (1.2 y)   INR used for dosin.4 (2019)   Warfarin maintenance plan:   6 mg (3 mg x 2) every Jacobs, , ; 4.5 mg (3 mg x 1.5) all other days   Weekly warfarin total:   36 mg   Plan last modified:   Joanna Chisholm RN (2019)   Next INR check:   6/3/2019   Target end date:   Indefinite    Indications    Persistent atrial fibrillation (CMS/HCC) [I48.2]             Anticoagulation Episode Summary     INR check location:   Anticoagulation Clinic    Preferred lab:       Send INR reminders to:   [facility] (OPEN ENROLLMENT) ACS CARD/EP    Comments:   Next STAC due 20; ACC; 2.5 mg and 3 mg tablets; Amio stopped 19; Limitations - elevated AST PASSED 10/11/18; CBC wnl 18; NOTE: Patient will be in [place] -; Call on landline FIRST: [phone] (cell: [phone])      Anticoagulation Care Providers     Provider Role Specialty Phone number    Ector Madison MD Referring Cardiovascular Disease [phone]          Supervising provider: CHRISTOPH Singh     chills, and night sweats.  Fatigue, weight loss  Skin: negative.  Eyes: negative.  Ears/Nose/Throat: negative.  Respiratory: No shortness of breath, dyspnea on exertion, cough, or hemoptysis.  Cardiovascular: negative.  Gastrointestinal: negative.  Genitourinary: negative.  Musculoskeletal: negative.  Neurologic: negative.  Psychiatric: negative.  Hematologic/Lymphatic/Immunologic: negative.  Endocrine: negative.    All other ROS negative unless mentioned in interval history.    Past medical, social, surgical, and family histories reviewed.    Allergies:  Allergies as of 05/17/2018 - Osmel as Reviewed 05/17/2018   Allergen Reaction Noted     Nka [no known allergies]  01/03/2018       Current Medications:  Current Outpatient Prescriptions   Medication Sig Dispense Refill     albuterol (PROAIR HFA) 108 (90 BASE) MCG/ACT Inhaler Inhale 2 puffs into the lungs every 4 hours as needed for shortness of breath / dyspnea or wheezing 1 Inhaler 11     amiodarone (PACERONE/CODARONE) 200 MG tablet 1 tablet daily (Patient taking differently: Take 200 mg by mouth daily 1 tablet daily) 90 tablet 0     buPROPion (WELLBUTRIN SR) 200 MG 12 hr tablet Take 1 tablet (200 mg) by mouth 2 times daily 60 tablet 5     LORazepam (ATIVAN) 0.5 MG tablet Take 1 tablet (0.5 mg) by mouth every 4 hours as needed (Anxiety, Nausea/Vomiting or Sleep) 30 tablet 5     Megestrol Acetate (MEGACE ORAL PO) Take 200 mg by mouth daily       morphine (MSIR) 15 MG IR tablet Take 1 tablet (15 mg) by mouth every 6 hours as needed for moderate to severe pain 60 tablet 0     nicotine (NICODERM CQ) 21 MG/24HR 24 hr patch Place 1 patch onto the skin every 24 hours 30 patch 0     omeprazole (PRILOSEC) 40 MG capsule Take 1 capsule (40 mg) by mouth daily Take 30-60 minutes before a meal. 30 capsule 11     ondansetron (ZOFRAN) 4 MG tablet Take 1 tablet (4 mg) by mouth every 8 hours as needed for nausea 30 tablet 1     order for DME Equipment being ordered: donut cushion  "for sacral ulcer 1 Device 0     order for DME Equipment being ordered: lightweight wheelchair.    Cannot lift standard wheelchair due to weakness and cancer. 1 Device 0     polyethylene glycol (MIRALAX) powder Take 17 g (1 capful) by mouth daily 510 g 1     prochlorperazine (COMPAZINE) 10 MG tablet Take 1 tablet (10 mg) by mouth every 6 hours as needed (Nausea/Vomiting) 30 tablet 5     senna-docusate (SENOKOT-S;PERICOLACE) 8.6-50 MG per tablet Take 2 tablets by mouth 2 times daily 120 tablet 11     warfarin (COUMADIN) 2.5 MG tablet Take 5/14: 3.75 mg; 5/15: 2.5 mg; 5/16: 2.5 mg; 5/17: 2.5 mg as directed by the Anticoagulation Clinic 75 tablet 0     warfarin (COUMADIN) 5 MG tablet Take 5/14: 3.75 mg; 5/15: 2.5 mg; 5/16: 2.5 mg; 5/17: 2.5 mg as directed by the Anticoagulation Clinic 30 tablet 1        Physical Exam:  /73 (BP Location: Right arm, Patient Position: Sitting, Cuff Size: Adult Regular)  Pulse 83  Temp 98.6  F (37  C) (Tympanic)  Resp 20  Ht 1.803 m (5' 10.98\")  Wt 66.7 kg (147 lb)  SpO2 94%  BMI 20.51 kg/m2  Wt Readings from Last 12 Encounters:   05/17/18 66.7 kg (147 lb)   05/16/18 66.4 kg (146 lb 6.4 oz)   05/11/18 67.6 kg (149 lb)   05/10/18 68.8 kg (151 lb 9.6 oz)   05/09/18 68.4 kg (150 lb 11.2 oz)   05/07/18 67.1 kg (148 lb)   05/03/18 68.1 kg (150 lb 1.6 oz)   04/26/18 64.9 kg (143 lb)   04/20/18 68.9 kg (152 lb)   04/19/18 67.9 kg (149 lb 9.6 oz)   04/10/18 68.5 kg (151 lb)   03/29/18 68 kg (149 lb 14.4 oz)     ECOG performance status: 1  GENERAL APPEARANCE: Healthy, alert and in no acute distress.  Muscle wasting  HEENT: Sclerae anicteric. PERRLA. Oropharynx without ulcers, lesions, or thrush.  NECK: Supple. No asymmetry or masses.  LYMPHATICS: No palpable cervical, supraclavicular, axillary, or inguinal lymphadenopathy.  RESP: Lungs clear to auscultation bilaterally without rales, rhonchi or wheezes.  CARDIOVASCULAR: Regular rate and rhythm. Normal S1, S2; no S3 or S4. No murmur, " gallop, or rub.  ABDOMEN: Soft, nontender. Bowel sounds normal. No palpable organomegaly or masses.  MUSCULOSKELETAL: Extremities without gross deformities noted. No edema of bilateral lower extremities.  SKIN: No suspicious lesions or rashes.  NEURO: Alert and oriented x 3. Cranial nerves II-XII grossly intact.  PSYCHIATRIC: Mentation and affect appear normal.    Laboratory/Imaging Studies:  Anticoagulation Therapy Visit on 05/14/2018   Component Date Value Ref Range Status     INR 05/14/2018 2.1   Final   Infusion Therapy Visit on 05/10/2018   Component Date Value Ref Range Status     WBC 05/10/2018 3.4* 4.0 - 11.0 10e9/L Final     RBC Count 05/10/2018 2.97* 4.4 - 5.9 10e12/L Final     Hemoglobin 05/10/2018 9.9* 13.3 - 17.7 g/dL Final     Hematocrit 05/10/2018 30.7* 40.0 - 53.0 % Final     MCV 05/10/2018 103* 78 - 100 fl Final     MCH 05/10/2018 33.3* 26.5 - 33.0 pg Final     MCHC 05/10/2018 32.2  31.5 - 36.5 g/dL Final     RDW 05/10/2018 15.6* 10.0 - 15.0 % Final     Platelet Count 05/10/2018 198  150 - 450 10e9/L Final     Diff Method 05/10/2018 Automated Method   Final     % Neutrophils 05/10/2018 63.4  % Final     % Lymphocytes 05/10/2018 22.4  % Final     % Monocytes 05/10/2018 13.3  % Final     % Eosinophils 05/10/2018 0.6  % Final     % Basophils 05/10/2018 0.3  % Final     % Immature Granulocytes 05/10/2018 0.0  % Final     Absolute Neutrophil 05/10/2018 2.2  1.6 - 8.3 10e9/L Final     Absolute Lymphocytes 05/10/2018 0.8  0.8 - 5.3 10e9/L Final     Absolute Monocytes 05/10/2018 0.5  0.0 - 1.3 10e9/L Final     Absolute Eosinophils 05/10/2018 0.0  0.0 - 0.7 10e9/L Final     Absolute Basophils 05/10/2018 0.0  0.0 - 0.2 10e9/L Final     Abs Immature Granulocytes 05/10/2018 0.0  0 - 0.4 10e9/L Final     INR 05/10/2018 2.19* 0.86 - 1.14 Final   Anticoagulation Therapy Visit on 05/07/2018   Component Date Value Ref Range Status     INR Protime 05/07/2018 1.2* 0.86 - 1.14 Final        Recent Results (from the past  744 hour(s))   MR Brain w/o & w Contrast    Narrative    MRI BRAIN WITHOUT AND WITH CONTRAST  4/27/2018 5:44 PM    HISTORY: Non-small cell carcinoma of lung.    TECHNIQUE: Multiplanar, multisequence MRI of the brain without and  with 6 mL Gadavist.    COMPARISON: Brain MR 12/29/2017.    FINDINGS: There is a new 0.6 cm enhancing lesion within the  inferomedial right temporal lobe with surrounding T2 hyperintense  edema. This is concerning for metastases given history of lung cancer.  No other abnormal enhancing intracranial lesions are identified.    No restricted diffusion to suggest infarct. No evidence of acute  intracranial hemorrhage. Ventricular size within normal limits without  hydrocephalus. Mild burden of patchy deep and subcortical white matter  T2 hyperintensities that appear similar to prior and are likely due to  chronic microvascular ischemic disease.    The facial structures appear normal. The arteries at the base of the  brain and the dural venous sinuses appear patent.      Impression    IMPRESSION:     1. New enhancing 0.6 cm lesion in the inferior right temporal lobe  concerning for metastatic disease given the patient's history. Mild  surrounding edema around the lesion without significant mass effect or  herniation.  2. Mild nonspecific white matter changes likely due to chronic  microvascular ischemic disease. These appear similar to prior.    WAQAS ROSARIO MD   PET Oncology (Eyes to Thighs)    Narrative    PET ONCOLOGY (EYES TO THIGHS)    5/16/2018 1:49 PM     HISTORY: Non-small cell lung cancer.    COMPARISON EXAMS:  SUBURBAN IMAGING: None.  [place]: PET/CT performed 12/15/2017.  OTHER: None.    TECHNIQUE: The patient was injected intravenously with 14.3 mCi F-18  FDG, followed by delayed PET imaging. Noncontrast CT from the skull  base through the upper thighs was performed for attenuation correction  purposes. Blood glucose: 72 mg/dL. The CT, PET, and fusion images were  then evaluated on  a [employer] workstation. Radiation dose for this scan  was reduced using automated exposure control, adjustment of the mA  and/or kV according to patient size, or iterative reconstruction  technique.    FINDINGS: Normal physiologic uptake is identified within the salivary  glands, myocardium, kidneys, ureters and bladder. Scattered areas of  physiologic bowel uptake are also present.    NECK:  Lymph nodes: No pathologic activity.    Additional findings: None.      CHEST:  Lungs: Previously noted hypermetabolic mass in the left upper lobe  anteriorly and medially involving the anterior mediastinum has  decreased significantly in size, but increased slightly in degree of  hypermetabolic activity, today measuring 2.5 x 2 cm, SUV max 16.6  (previously 8 x 6 cm, SUV max 15). Two hypermetabolic nodular  opacities in the left lower lobe posteriorly were not seen on the  previous exam, and are highly suspicious for metastatic disease, with  the largest (series 3 image 204) measuring 2 x 1.4 cm, SUV max 26.3. A  few tiny indeterminate pulmonary nodules in both lungs were also not  seen on the previous exam, and are too small for accurate PET  characterization. Emphysematous changes are again noted in both lungs.    Lymph nodes: Scattered small hypermetabolic mediastinal lymph nodes  have decreased in size compared to 12/15/2017. For example, a small  hypermetabolic subcarinal lymph node measures 2.5 x 1.1 cm, SUV max  9.6 (previously 2.8 x 2.1 cm, SUV max 18.8). Mild hypermetabolic  bilateral hilar adenopathy are not well defined on the noncontrast CT  images, but is new since the previous exam. For example, a  hypermetabolic right hilar lymph node measures 2 cm, SUV max 7.1.    Additional findings: Left Port-A-Cath with tip in the right atrium.      ABDOMEN/PELVIS:  Hepatobiliary: No pathologic activity.     Spleen: No pathologic activity.     Pancreas: No pathologic activity.     Kidneys: No pathologic activity.      Adrenals: No pathologic activity.     Reproductive: No pathologic activity.     Gastrointestinal: No pathologic activity. Moderate amount of stool  throughout the colon.    Lymph nodes: No pathologic activity.     Additional findings: Vascular calcifications.      SKELETON:   No pathologic activity. Bilateral L5 spondylolysis has a chronic  appearance. Degenerative changes are noted throughout the spine and  within the right hip.       Impression    IMPRESSION:   1. Previously noted left upper anterior and medial lobe mass involving  the anterior mediastinum has decreased in size, but increased slightly  in degree of hypermetabolic activity.  2. Multiple hypermetabolic mediastinal lymph nodes have decreased in  size and hypermetabolic activity.  3. Two new hypermetabolic left lower lobe pulmonary nodules are  suspicious for progression of metastatic disease.  4. Scattered smaller indeterminate pulmonary nodules in both lungs are  new since the previous exam, but are too small for accurate PET  characterization.  5. Mild bilateral hilar adenopathy with associated hypermetabolic  activity is new since the previous exam, and is also suspicious for  metastatic disease.  6. Emphysema.    GIA JAIN MD   MRI Brain w contrast    Narrative    Brain MRI with contrast primarily for the purposes of stereotactic  evaluation 5/17/2018    History: Metastasis to brain (H)  ICD-10: Metastasis to brain (H)    Comparison: Brain MRI 4/27/2018    Technique: MR imaging performed with 3-dimensonally acquired axial  T1-weighted sequences performed with intravenous contrast.    Contrast: 7.5cc of Gadavist injected.     Findings: Slight increase in size of the enhancing lesion in the  anteromedial right temporal lobe measuring 8 x 9 x 9 mm (previously 5  x 6 x 7 mm). No new enhancing lesion. Ventricles are proportionate to  the cerebral sulci. Mild generalized parenchymal volume loss, normal  for age. No mass effect or midline  shift.      Impression    Impression: Slight increase in size of the enhancing right temporal  lobe metastasis measuring up to 9 mm. Limited imaging primarily for  the purposes of stereotactic localization.    CATHRYN ROSALES MD       Assessment and plan:    (C34.90) Non-small cell carcinoma of lung (H)  (primary encounter diagnosis)  I reviewed with the patient and his family today the results from the PET scan.  There is left upper anterior and medial lobe mass involving the anterior mediastinum arising decreased in size but increase slightly in intensity.  His multiple hypermetabolic mediastinal lymph nodes that has decreased in size and hypermetabolic activity.  There is 2 new hypermetabolic left lower lobe pulmonary nodules that are suspicious for disease progression.  There is a scattered smaller indeterminate pulmonary nodules in both lungs.  There is bilateral hilar adenopathy associated with hypermetabolic activity that is new from previous imaging studies.  These results showed next response to treatment.  Because of the patient current performance status and it is inability to handle chemotherapy at this point I would recommend patient to proceed with immunotherapy with Keytruda.  I gave the patient an overview about Keytruda.  We talked about potential benefits and side effects in details.  Patient will be starting his first cycle next week or so.    (J44.1) COPD exacerbation (H)  Having shortness of breath on exertion.  He continues to smoke about half a pack of cigarettes a day.  I strongly emphasized the importance of quitting smoking.    (C79.31) Brain metastasis (H)  Patient had stereotactic radiation therapy done today.  He will continue to follow with radiation oncology.    (I48.92) Atrial flutter, unspecified type (H)  (I42.9) Cardiomyopathy, unspecified type (H)  Patient under care of cardiology.    (J90) Pleural effusion  Proved on most recent imaging studies.    (R11.0) Nausea  Patient  "currently using Compazine, Ativan, and Zofran.  The patient will be seen by palliative care physician for further evaluation and management of his symptoms.    The patient is ready to learn, no apparent learning barriers were identified.  Diagnosis and treatment plans were explained to the patient. The patient expressed understanding of the content. The patient asked appropriate questions. The patient questions were answered to his satisfaction.    Time spent 40 minutes more than 50% of the time in counseling coronation of care including discussion of management of metastatic squamous cell lung cancer, side effects of immune therapy, follow-up and prognosis    Chart documentation with Dragon Voice recognition Software. Although reviewed after completion, some words and grammatical errors may remain.    Immunotherapy Education    Patient is a 58 year old male here today for chemotherapy education, accompanied by family members.  Pt has a cancer diagnosis of   Encounter Diagnoses   Name Primary?     Non-small cell carcinoma of lung (H) Yes     COPD exacerbation (H)      Brain metastasis (H)      Atrial flutter, unspecified type (H)      Cardiomyopathy, unspecified type (H)      Chemotherapy induced nausea and vomiting      Pleural effusion      Nausea     and their main concern is \"hope this stuff works\".  Their Oncologist is Dr. LASHON May, and PCP is Kailash Mason.  Reviewed the following with the patient and their support person:  Treatment goal: palliative  Treatment regimen & duration: Keytruda every 3 weeks until progression; and rationale for strict adherence to this schedule, including specific medication names including pre-treatment medications and at home scheduled or as needed medications, delivery methods,.  Potential side effects: Side effects and management; including skin changes/hand-foot syndrome, anemia, neutropenia, thrombocytopenia, diarrhea/constipation, hair loss syndrome, memory changes/ " "\"chemobrain\", mouth sores, taste changes, neuropathy, fatigue, myelosuppression, sexuality/infertility, and risk of extravasation or infiltration.  Infection prevention, and monitoring of lab values, what lab tests and what changes of these values meant, along with the possibility of hydration or blood product transfusion, or the need to defer or hold treatment.    Immunotherapy information, including ways it is excreted from the body and cleaning and containment of vomitus or other bodily fluid, use of the bathroom, sexual health and intimacy, what to do if needing to miss a treatment, when to call a provider and the need for staff to wear protective equipment.  Importance of Central line care (port) or IV site care.  Written information:  Specific drug information guides printed from [employer].  Also, information on when to contact the provider, various programs offered at [facility], and our business card with contact information given; Oncology Clinic, RN Case Manager, and the after hours Nurse Advise Line.  General orientation to the Medical Oncology department, Infusion Services department, Huc/scheduling, bathrooms and usual flow of the treatment day provided as well as introduction to the Infusion nurses.  No barriers to learning identified. Patient and family verbalized understanding of all written and verbal information. All questions answered to patients satisfaction.   Other concerns: none  Pt instructed to call with further questions or concerns.  Patient states understanding and is in agreement with this plan.  Copy of appointments, and after visit summary (AVS) given to patient. Patient discharged via wheelchair.    Face to Face time with patient: 15 min    Camille Lara RN, BSN, OCN  Oncology Hematology   Breast Cancer Navigator  [facility]  [email or web address]  Phone [phone]      Again, thank you for allowing me to participate in the care of " your patient.        Sincerely,        Joycelyn May MD

## 2020-09-08 NOTE — MR AVS SNAPSHOT
Zechariah Ashby   3/15/2018   Anticoagulation Therapy Visit    Description:  57 year old male   Provider:  Mahendra David, RN   Department:  [facility]           INR as of 3/15/2018     Today's INR 2.51      Anticoagulation Summary as of 3/15/2018     INR goal 2.0-3.0   Today's INR 2.51   Full instructions 1.25 mg on Mon, Wed, Fri; 2.5 mg all other days   Next INR check 3/29/2018    Indications   Atrial flutter  unspecified type (H) [I48.92]  Long-term (current) use of anticoagulants [Z79.01] [Z79.01]         March 2018 Details    Sun Mon Tue Wed Thu Fri Sat         1               2               3                 4               5               6               7               8               9               10                 11               12               13               14               15      2.5 mg   See details      16      1.25 mg         17      2.5 mg           18      2.5 mg         19      1.25 mg         20      2.5 mg         21      1.25 mg         22      2.5 mg         23      1.25 mg         24      2.5 mg           25      2.5 mg         26      1.25 mg         27      2.5 mg         28      1.25 mg         29            30               31                Date Details   03/15 This INR check       Date of next INR:  3/29/2018         How to take your warfarin dose     To take:  1.25 mg Take 0.5 of a 2.5 mg tablet.    To take:  2.5 mg Take 1 of the 2.5 mg tablets.            ----- Message from Cris Felipe DO sent at 9/7/2020  4:48 PM CDT -----  Blood count improving

## 2021-10-17 NOTE — PATIENT INSTRUCTIONS
Dr. May recommends you start Keytruda soon.  You will receive information on this today. You will be scheduled with Dr. Mitchell next week to discuss nausea, vomiting, and constipation. We would like to see you back in clinic with Dr. Mya in 1 week.      Copy of appointments, and after visit summary (AVS) given to patient.      If you have any questions during business hours (M-F 8 AM- 4PM), please call Camille Lara RN, BSN, OCN Oncology Hematology /Breast Cancer Navigator at [facility] [phone].       For questions after business hours, or on holidays/weekends, please call our after hours Nurse Triage line [phone]. Thank you.     normal... Well appearing, awake, alert, oriented to person, place, time/situation and in no apparent distress.

## 2023-04-03 PROBLEM — C79.31 MALIGNANT NEOPLASM METASTATIC TO BRAIN (H): Status: ACTIVE | Noted: 2018-01-01

## 2023-08-23 NOTE — PATIENT INSTRUCTIONS
Health Maintenance Due   Topic Date Due   • Shingles Vaccine (2 of 3) 02/13/2015   • Depression Screening  01/23/2024       Patient is due for topics as listed above but is not proceeding with Immunization(s) Shingles at this time.        Phone number for home care palliative nursing [phone]

## 2023-12-04 NOTE — MR AVS SNAPSHOT
Zechariah Ashby   5/18/2018   Anticoagulation Therapy Visit    Description:  58 year old male   Provider:  Yaritza Diaz, RN   Department:  [facility]           INR as of 5/18/2018     Today's INR 2.1      Anticoagulation Summary as of 5/18/2018     INR goal 2.0-3.0   Today's INR 2.1   Full instructions 3.75 mg on Mon; 2.5 mg all other days   Next INR check 5/25/2018    Indications   Atrial flutter  unspecified type (H) [I48.92]  Long-term (current) use of anticoagulants [Z79.01] [Z79.01]         May 2018 Details    Sun Mon Tue Wed Thu Fri Sat       1               2               3               4               5                 6               7               8               9               10               11               12                 13               14               15               16               17               18      2.5 mg   See details      19      2.5 mg           20      2.5 mg         21      3.75 mg         22      2.5 mg         23      2.5 mg         24      2.5 mg         25            26                 27               28               29               30               31                  Date Details   05/18 This INR check       Date of next INR:  5/25/2018         How to take your warfarin dose     To take:  2.5 mg Take 1 of the 2.5 mg tablets.    To take:  3.75 mg Take 1.5 of the 2.5 mg tablets.            [facility]  Chronic Interventional Pain Service  Operative Report    NAME:   Guillermo Nieto    :   1960    MRN:  [id number]    DATE OF SERVICE:   23    ATTENDING SURGEON:   Nav Soliz DO     ASSISTANT:   None    PREOPERATIVE DIAGNOSES:   1.  Chronic right-sided hip pain.  2.  Right-sided hip osteoarthritis.     POSTOPERATIVE DIAGNOSES:   1.  Chronic right-sided hip pain.  2.  Right-sided hip osteoarthritis.     PROCEDURE:   Right-sided hip injection with fluoroscopic guidance.    BLOOD LOSS:   Minimal     COMPLICATIONS:   None     ANESTHESIA:   None     INDICATIONS:   Guillermo Nieto is a 63 year old male with osteoarthritis of the right-sided hip. They have severe pain in that hip with radiation to the groin. It was discussed that they would be a candidate for an intra-articular hip steroid injection as a means to attenuate these symptoms.     DESCRIPTION OF PROCEDURE:   The patient was identified in the preoperative holding area and all questions were answered. The risks, benefits, and alternatives of the procedure were discussed in detail. Consent was obtained. The site was marked. The patient was brought to the operating room and positioned themselves supine on the operating room table. A timeout was convened confirming the correct patient and procedure to be performed. The skin overlying the lateral aspect of the right-sided hip was prepped and draped in sterile fashion.  The C-arm fluoroscope was brought into the field. No sedation was given and the patient remained conversant throughout the procedure.     Under AP fluoroscopy, the right-sided hip joint was identified.  The skin overlying greater trochanter was localized using 1% lidocaine. A 22-gauge spinal needle was advanced through the skin and directed into the joint under fluoroscopic guidance.  The needle was positioned in the upper acetabular rim, engaged in the hip capsule.  Isovue was delivered  under live fluoroscopy, which demonstrated an arthrogram of the right-sided hip.  At this point, a solution of 5 mL of 0.25% Marcaine with 80 mg of Depo-Medrol was injected slowly. The needle was removed, and a bandage was applied.  The patient was taken supine on a cart to the recovery room in stable condition.      Nav Soliz DO  Dept. of Anesthesiology  Division of Interventional Pain Medicine  [facility]

## (undated) DEVICE — SPONGE RAY-TEC 4X8" 7318

## (undated) DEVICE — NDL 22GA 1.5"

## (undated) DEVICE — BLADE KNIFE SURG 11 371111

## (undated) DEVICE — DRAPE C-ARM 60X42" 1013

## (undated) DEVICE — DECANTER VIAL 2006S

## (undated) DEVICE — NDL COUNTER 20CT 31142493

## (undated) DEVICE — SYR 10ML FINGER CONTROL W/O NDL 309695

## (undated) DEVICE — SU VICRYL 3-0 SH 27" UND J416H

## (undated) DEVICE — SOL WATER IRRIG 1000ML BOTTLE 07139-09

## (undated) DEVICE — DECANTER BAG 2002S

## (undated) DEVICE — BLADE CLIPPER 4406

## (undated) DEVICE — PREP CHLORAPREP 26ML TINTED ORANGE  260815

## (undated) DEVICE — SOL NACL 0.9% 100ML BAG 2B1302

## (undated) DEVICE — GOWN XLG DISP 9545

## (undated) DEVICE — GLOVE PROTEXIS W/NEU-THERA 7.5  2D73TE75

## (undated) DEVICE — BASIN SET MINOR DISP

## (undated) DEVICE — LIGHT HANDLE X2

## (undated) DEVICE — SYR 10ML LL W/O NDL

## (undated) DEVICE — LABEL MEDICATION SYSTEM  3304

## (undated) DEVICE — PACK LAP TRANSVERSE STD

## (undated) DEVICE — SU VICRYL 4-0 FS-2 27" J422-H

## (undated) DEVICE — DRAPE SHEET REV FOLD 3/4 9349

## (undated) DEVICE — ADHESIVE SWIFTSET 0.8ML OCTYL SS6

## (undated) DEVICE — BLADE KNIFE SURG 15 371115

## (undated) DEVICE — ESU PENCIL W/COATED BLADE E2450H

## (undated) DEVICE — SU PROLENE 2-0 SHDA 36" 8523H

## (undated) DEVICE — SU VICRYL 3-0 SH 27" J316H

## (undated) DEVICE — SOL NACL 0.9% IRRIG 1000ML BOTTLE 07138-09

## (undated) RX ORDER — FENTANYL CITRATE 50 UG/ML
INJECTION, SOLUTION INTRAMUSCULAR; INTRAVENOUS
Status: DISPENSED
Start: 2018-01-01

## (undated) RX ORDER — SODIUM CHLORIDE 9 MG/ML
INJECTION, SOLUTION INTRAVENOUS
Status: DISPENSED
Start: 2018-01-01

## (undated) RX ORDER — BUPIVACAINE HYDROCHLORIDE 2.5 MG/ML
INJECTION, SOLUTION INFILTRATION; PERINEURAL
Status: DISPENSED
Start: 2018-01-01

## (undated) RX ORDER — DEXAMETHASONE SODIUM PHOSPHATE 4 MG/ML
INJECTION, SOLUTION INTRA-ARTICULAR; INTRALESIONAL; INTRAMUSCULAR; INTRAVENOUS; SOFT TISSUE
Status: DISPENSED
Start: 2018-01-01

## (undated) RX ORDER — GLYCOPYRROLATE 0.2 MG/ML
INJECTION, SOLUTION INTRAMUSCULAR; INTRAVENOUS
Status: DISPENSED
Start: 2017-01-01

## (undated) RX ORDER — NALOXONE HYDROCHLORIDE 0.4 MG/ML
INJECTION, SOLUTION INTRAMUSCULAR; INTRAVENOUS; SUBCUTANEOUS
Status: DISPENSED
Start: 2017-01-01

## (undated) RX ORDER — HEPARIN SODIUM (PORCINE) LOCK FLUSH IV SOLN 100 UNIT/ML 100 UNIT/ML
SOLUTION INTRAVENOUS
Status: DISPENSED
Start: 2018-01-01

## (undated) RX ORDER — LIDOCAINE HYDROCHLORIDE 10 MG/ML
INJECTION, SOLUTION EPIDURAL; INFILTRATION; INTRACAUDAL; PERINEURAL
Status: DISPENSED
Start: 2017-01-01

## (undated) RX ORDER — FENTANYL CITRATE 50 UG/ML
INJECTION, SOLUTION INTRAMUSCULAR; INTRAVENOUS
Status: DISPENSED
Start: 2017-01-01

## (undated) RX ORDER — LIDOCAINE HYDROCHLORIDE 10 MG/ML
INJECTION, SOLUTION EPIDURAL; INFILTRATION; INTRACAUDAL; PERINEURAL
Status: DISPENSED
Start: 2018-01-01

## (undated) RX ORDER — FLUMAZENIL 0.1 MG/ML
INJECTION, SOLUTION INTRAVENOUS
Status: DISPENSED
Start: 2017-01-01

## (undated) RX ORDER — LIDOCAINE HYDROCHLORIDE 10 MG/ML
INJECTION, SOLUTION INFILTRATION; PERINEURAL
Status: DISPENSED
Start: 2018-01-01

## (undated) RX ORDER — LIDOCAINE 40 MG/G
CREAM TOPICAL
Status: DISPENSED
Start: 2018-01-01

## (undated) RX ORDER — CEFAZOLIN SODIUM 2 G/100ML
INJECTION, SOLUTION INTRAVENOUS
Status: DISPENSED
Start: 2018-01-01

## (undated) RX ORDER — LIDOCAINE HYDROCHLORIDE AND EPINEPHRINE 10; 10 MG/ML; UG/ML
INJECTION, SOLUTION INFILTRATION; PERINEURAL
Status: DISPENSED
Start: 2018-01-01

## (undated) RX ORDER — HEPARIN SODIUM,PORCINE 10 UNIT/ML
VIAL (ML) INTRAVENOUS
Status: DISPENSED
Start: 2018-01-01

## (undated) RX ORDER — PROPOFOL 10 MG/ML
INJECTION, EMULSION INTRAVENOUS
Status: DISPENSED
Start: 2018-01-01

## (undated) RX ORDER — LORAZEPAM 0.5 MG/1
TABLET ORAL
Status: DISPENSED
Start: 2018-01-01

## (undated) RX ORDER — HEPARIN SODIUM 1000 [USP'U]/ML
INJECTION, SOLUTION INTRAVENOUS; SUBCUTANEOUS
Status: DISPENSED
Start: 2017-01-01

## (undated) RX ORDER — ONDANSETRON 2 MG/ML
INJECTION INTRAMUSCULAR; INTRAVENOUS
Status: DISPENSED
Start: 2018-01-01